# Patient Record
Sex: FEMALE | Race: WHITE | NOT HISPANIC OR LATINO | Employment: OTHER | URBAN - METROPOLITAN AREA
[De-identification: names, ages, dates, MRNs, and addresses within clinical notes are randomized per-mention and may not be internally consistent; named-entity substitution may affect disease eponyms.]

---

## 2017-01-10 ENCOUNTER — TELEPHONE (OUTPATIENT)
Dept: FAMILY MEDICINE | Facility: CLINIC | Age: 62
End: 2017-01-10

## 2017-01-10 NOTE — TELEPHONE ENCOUNTER
Panel Management Review      Patient has the following on her problem list:     Depression / Dysthymia review  PHQ-9 SCORE 2/6/2015 11/23/2015 9/23/2016   Total Score 10 - -   Total Score - 4 11      Patient is due for:  None      IVD   ASA: Failed    Last LDL:    CHOL      193   9/23/2016  HDL       37   9/23/2016  LDL       94   9/23/2016  LDL      144   11/23/2015  LDL      128   4/29/2009  TRIG      309   9/23/2016   CHOLHDLRATIO      6.0   4/18/2013     Is the patient on a Statin? YES   Is the patient on Aspirin? NO                  Medications     HMG CoA Reductase Inhibitors    atorvastatin (LIPITOR) 40 MG tablet          Last three blood pressure readings:  BP Readings from Last 3 Encounters:   10/05/16 159/95   09/23/16 128/64   03/23/16 160/98        Tobacco History:     History   Smoking status     Former Smoker -- 0.50 packs/day for 30 years     Quit date: 08/12/2011   Smokeless tobacco     Never Used     Comment: smoking 3-4 cigs per day         Hypertension   Last three blood pressure readings:  BP Readings from Last 3 Encounters:   10/05/16 159/95   09/23/16 128/64   03/23/16 160/98     Blood pressure: Failed    HTN Guidelines:  Age 18-59 BP range:  Less than 140/90  Age 60-85 with Diabetes:  Less than 140/90  Age 60-85 without Diabetes:  less than 150/90      Composite cancer screening  Chart review shows that this patient is due/due soon for the following None  Summary:    Patient is due/failing the following:   BP check    Action needed:   Patient needs nurse only appointment for BP check.    Type of outreach:    Sent MessageCast message.    Questions for provider review:    None                                                                                                                                    Cinda GRAVES       Chart routed to none .

## 2017-02-20 DIAGNOSIS — I10 HTN, GOAL BELOW 140/80: ICD-10-CM

## 2017-02-20 NOTE — TELEPHONE ENCOUNTER
Lisinopril-HCTZ      Last Written Prescription Date: 11-15-16  Last Fill Quantity: 90, # refills: 0  Last Office Visit with [facility], [facility] or [facility] prescribing provider: 9-23-16 with Dr. Davis       Potassium   Date Value Ref Range Status   10/14/2016 3.7 3.4 - 5.3 mmol/L Final     Creatinine   Date Value Ref Range Status   10/14/2016 0.77 0.52 - 1.04 mg/dL Final     BP Readings from Last 3 Encounters:   10/05/16 (!) 159/95   09/23/16 128/64   03/23/16 (!) 160/98     Fátima Bhatt [facility]

## 2017-02-21 RX ORDER — LISINOPRIL AND HYDROCHLOROTHIAZIDE 12.5; 2 MG/1; MG/1
1 TABLET ORAL DAILY
Qty: 90 TABLET | Refills: 0 | Status: SHIPPED | OUTPATIENT
Start: 2017-02-21 | End: 2017-05-10

## 2017-02-21 NOTE — TELEPHONE ENCOUNTER
Prescription approved per [facility] Protocol or patient Primary care provider (PCP)  RENETTA Bae RN/Ralf Tolbert

## 2017-02-22 DIAGNOSIS — E78.5 HYPERLIPIDEMIA LDL GOAL <100: ICD-10-CM

## 2017-02-22 DIAGNOSIS — F33.1 MAJOR DEPRESSIVE DISORDER, RECURRENT EPISODE, MODERATE (H): ICD-10-CM

## 2017-02-22 RX ORDER — SERTRALINE HYDROCHLORIDE 100 MG/1
200 TABLET, FILM COATED ORAL DAILY
Qty: 180 TABLET | Refills: 0 | Status: SHIPPED | OUTPATIENT
Start: 2017-02-22 | End: 2017-05-10

## 2017-02-22 RX ORDER — ATORVASTATIN CALCIUM 40 MG/1
40 TABLET, FILM COATED ORAL DAILY
Qty: 90 TABLET | Refills: 2 | Status: SHIPPED | OUTPATIENT
Start: 2017-02-22 | End: 2017-05-10

## 2017-02-22 NOTE — TELEPHONE ENCOUNTER
Prescription approved per [facility] Refill Protocol or patient Primary care provider (PCP)  RENETTA Bae  RN/Ralf Tolbert    phq9 sent through [facility]  RENETTA Bae  RN/Ralf Tolbert

## 2017-02-22 NOTE — TELEPHONE ENCOUNTER
sertraline      Last Written Prescription Date: 11/15/16  Last Fill Quantity: 180, # refills: 0  Last Office Visit with [facility] primary care provider:  9/23/16        Last PHQ-9 score on record=   PHQ-9 SCORE 9/23/2016   Total Score -   Total Score 11

## 2017-02-22 NOTE — TELEPHONE ENCOUNTER
atorvastatin      Last Written Prescription Date: 5/18/16  Last Fill Quantity: 90, # refills: 2  Last Office Visit with [facility], [facility] or [facility] prescribing provider: 9/23/16       Lab Results   Component Value Date    CHOL 193 09/23/2016     Lab Results   Component Value Date    HDL 37 09/23/2016     Lab Results   Component Value Date    LDL 94 09/23/2016     11/23/2015     04/29/2009     Lab Results   Component Value Date    TRIG 309 09/23/2016     Lab Results   Component Value Date    CHOLHDLRATIO 6.0 04/18/2013

## 2017-05-09 NOTE — PROGRESS NOTES
"  SUBJECTIVE:                                                    Karyn Gamez is a 61 year old female who presents to clinic today for the following health issues:      Hyperlipidemia Follow-Up  Atorvastatin 40mg qd    Rate your low fat/cholesterol diet?: not monitoring fat    Taking statin?  Yes, no muscle aches from statin    Other lipid medications/supplements?:  none     Hypertension Follow-up  Lisinopril-hydrochlorothiazide 20-12.5mg qd    Outpatient blood pressures are not being checked.    Low Salt Diet: no added salt     Cerebrovascular Follow-up      Patient history: cerebral embolism with cerebral infarction    Residual symptoms: None    Worsened or new symptoms since last visit: No    Daily aspirin use: No    Hypertension controlled: Yes      Depression Followup  Zoloft 200mg qd    Status since last visit: Stable    See PHQ-9 for current symptoms.  Other associated symptoms: None    Complicating factors:   Significant life event:  5th grandchild born 2 weeks ago, Tiffanie!   Current substance abuse:  None  Anxiety or Panic symptoms:  No    PHQ-9  English PHQ-9   Any Language            Amount of exercise or physical activity: 2-3 days/week for an average of 30-45 minutes    Problems taking medications regularly: No    Medication side effects: none    Diet: low salt          ROS:  Constitutional, HEENT, cardiovascular, pulmonary, gi and gu systems are negative, except as otherwise noted.    This document serves as a record of the services and decisions personally performed and made by Cristiana Davis MD. It was created on his behalf by Zaki Berger, a trained medical scribe. The creation of this document is based the provider's statements to the medical scribe.  Zaki Berger 1:26 PM May 10, 2017      OBJECTIVE:                                                    /66  Pulse 80  Temp 97.8  F (36.6  C) (Tympanic)  Ht 5' 5.5\" (1.664 m)  Wt 242 lb 2 oz (109.8 kg)  BMI 39.68 kg/m2  Body mass index is " 39.68 kg/(m^2).       GENERAL: healthy, alert and no distress  EYES: Eyes grossly normal to inspection, conjunctivae and sclerae normal  NECK: no adenopathy, no asymmetry, masses, or scars and thyroid normal to palpation  RESP: lungs clear to auscultation - no rales, rhonchi or wheezes  CV: regular rate and rhythm, normal S1 S2, no murmur  ABDOMEN: soft, nontender  MS: no gross musculoskeletal defects noted, no edema  NEURO: Normal strength and tone, mentation intact and speech normal  PSYCH: mentation appears normal, affect normal/bright         ASSESSMENT/PLAN:                                                      (E78.5) Hyperlipidemia LDL goal <100  Comment: Patient is tolerating high intensity statin therapy. Secondary prevention. Medication refilled.   Plan: atorvastatin (LIPITOR) 40 MG tablet            (F33.1) Major depressive disorder, recurrent episode, moderate (H)  Comment: Mood stable with SSRI therapy. Medication refilled.   Plan: sertraline (ZOLOFT) 100 MG tablet          (I10) HTN, goal below 140/80  Comment: BP within guidelines with use of ACE inhibitor and diuretic. Secondary prevention. Medication refilled.   Plan: lisinopril-hydrochlorothiazide         (PRINZIDE/ZESTORETIC) 20-12.5 MG per tablet      (E66.01) Morbid obesity, unspecified obesity type (H)  Comment: Body mass index is 39.68 kg/(m^2).   Plan: reviewed life style.     (I11.9,  I42.2) Hypertensive hypertrophic cardiomyopathy, without heart failure (H)  Comment: improved with better blood pressure control. No evidence of heart failure today. At some point, will repeat echocardiogram.       (I63.40) Cerebral infarction due to embolism of cerebral artery (H)  Comment: no reoccurrence of other neurologic symptoms.           Patient Instructions   *  Overall, you're doing well.     *  No change in medications.     *  Mammogram due this July. Call [phone].     *  Follow up appointment in October.     *  Call with any questions.     *   Check with your pharmacy about the shingles shot.         Patient will follow up in 6 months or sooner, PRN. Patient instructed to call with any questions or concerns.      The information in this document, created by a scribe for me, accurately reflects the services I personally performed and the decisions made by me. I have reviewed and approved this document for accuracy. 1:26 PM 5/10/2017    Cristiana Davis MD  [facility]

## 2017-05-10 ENCOUNTER — OFFICE VISIT (OUTPATIENT)
Dept: FAMILY MEDICINE | Facility: CLINIC | Age: 62
End: 2017-05-10
Payer: COMMERCIAL

## 2017-05-10 VITALS
TEMPERATURE: 97.8 F | SYSTOLIC BLOOD PRESSURE: 124 MMHG | HEIGHT: 66 IN | WEIGHT: 242.13 LBS | DIASTOLIC BLOOD PRESSURE: 66 MMHG | BODY MASS INDEX: 38.91 KG/M2 | HEART RATE: 80 BPM

## 2017-05-10 DIAGNOSIS — I11.9 HYPERTENSIVE HYPERTROPHIC CARDIOMYOPATHY, WITHOUT HEART FAILURE (H): ICD-10-CM

## 2017-05-10 DIAGNOSIS — I42.2 HYPERTENSIVE HYPERTROPHIC CARDIOMYOPATHY, WITHOUT HEART FAILURE (H): ICD-10-CM

## 2017-05-10 DIAGNOSIS — I63.40 CEREBRAL INFARCTION DUE TO EMBOLISM OF CEREBRAL ARTERY (H): ICD-10-CM

## 2017-05-10 DIAGNOSIS — E66.01 MORBID OBESITY, UNSPECIFIED OBESITY TYPE (H): ICD-10-CM

## 2017-05-10 DIAGNOSIS — F33.1 MAJOR DEPRESSIVE DISORDER, RECURRENT EPISODE, MODERATE (H): ICD-10-CM

## 2017-05-10 DIAGNOSIS — I10 HTN, GOAL BELOW 140/80: ICD-10-CM

## 2017-05-10 DIAGNOSIS — E78.5 HYPERLIPIDEMIA LDL GOAL <100: Primary | ICD-10-CM

## 2017-05-10 PROCEDURE — 99214 OFFICE O/P EST MOD 30 MIN: CPT | Performed by: FAMILY MEDICINE

## 2017-05-10 RX ORDER — ATORVASTATIN CALCIUM 40 MG/1
40 TABLET, FILM COATED ORAL DAILY
Qty: 90 TABLET | Refills: 1 | Status: SHIPPED | OUTPATIENT
Start: 2017-05-10 | End: 2018-02-19

## 2017-05-10 RX ORDER — SERTRALINE HYDROCHLORIDE 100 MG/1
200 TABLET, FILM COATED ORAL DAILY
Qty: 180 TABLET | Refills: 1 | Status: SHIPPED | OUTPATIENT
Start: 2017-05-10 | End: 2018-02-19

## 2017-05-10 RX ORDER — LISINOPRIL AND HYDROCHLOROTHIAZIDE 12.5; 2 MG/1; MG/1
1 TABLET ORAL DAILY
Qty: 90 TABLET | Refills: 3 | Status: SHIPPED | OUTPATIENT
Start: 2017-05-10 | End: 2018-02-19

## 2017-05-10 ASSESSMENT — ANXIETY QUESTIONNAIRES
5. BEING SO RESTLESS THAT IT IS HARD TO SIT STILL: NOT AT ALL
GAD7 TOTAL SCORE: 0
2. NOT BEING ABLE TO STOP OR CONTROL WORRYING: NOT AT ALL
6. BECOMING EASILY ANNOYED OR IRRITABLE: NOT AT ALL
7. FEELING AFRAID AS IF SOMETHING AWFUL MIGHT HAPPEN: NOT AT ALL
1. FEELING NERVOUS, ANXIOUS, OR ON EDGE: NOT AT ALL
3. WORRYING TOO MUCH ABOUT DIFFERENT THINGS: NOT AT ALL

## 2017-05-10 ASSESSMENT — PATIENT HEALTH QUESTIONNAIRE - PHQ9: 5. POOR APPETITE OR OVEREATING: NOT AT ALL

## 2017-05-10 NOTE — PATIENT INSTRUCTIONS
*  Overall, you're doing well.     *  No change in medications.     *  Mammogram due this July. Call [phone].     *  Follow up appointment in October.     *  Call with any questions.     *  Check with your pharmacy about the shingles shot.

## 2017-05-10 NOTE — MR AVS SNAPSHOT
After Visit Summary   5/10/2017    Karyn Gamez    MRN: [id number]           Patient Information     Date Of Birth          1955        Visit Information        Provider Department      5/10/2017 1:00 PM Cristiana Davis MD [facility]        Today's Diagnoses     Hyperlipidemia LDL goal <100        Major depressive disorder, recurrent episode, moderate (H)        HTN, goal below 140/80          Care Instructions    *  Overall, you're doing well.     *  No change in medications.     *  Mammogram due this July. Call [phone].     *  Follow up appointment in October.     *  Call with any questions.     *  Check with your pharmacy about the shingles shot.         Follow-ups after your visit        Who to contact     Normal or non-critical lab and imaging results will be communicated to you by [employer], letter or phone within 4 business days after the clinic has received the results. If you do not hear from us within 7 days, please contact the clinic through [employer] or phone. If you have a critical or abnormal lab result, we will notify you by phone as soon as possible.  Submit refill requests through [employer] or call your pharmacy and they will forward the refill request to us. Please allow 3 business days for your refill to be completed.          If you need to speak with a  for additional information , please call: [phone]           Additional Information About Your Visit        [employer] Information     [employer] gives you secure access to your electronic health record. If you see a primary care provider, you can also send messages to your care team and make appointments. If you have questions, please call your primary care clinic.  If you do not have a primary care provider, please call [phone] and they will assist you.        Care EveryWhere ID     This is your Care EveryWhere ID. This could be used by other organizations to access your [place]  "medical records  [id number]        Your Vitals Were     Pulse Temperature Height BMI (Body Mass Index)          80 97.8  F (36.6  C) (Tympanic) 5' 5.5\" (1.664 m) 39.68 kg/m2         Blood Pressure from Last 3 Encounters:   05/10/17 124/66   10/05/16 (!) 159/95   09/23/16 128/64    Weight from Last 3 Encounters:   05/10/17 242 lb 2 oz (109.8 kg)   10/05/16 243 lb 3.2 oz (110.3 kg)   09/23/16 243 lb 2 oz (110.3 kg)              Today, you had the following     No orders found for display         Today's Medication Changes          These changes are accurate as of: 5/10/17  1:37 PM.  If you have any questions, ask your nurse or doctor.               These medicines have changed or have updated prescriptions.        Dose/Directions    lisinopril-hydrochlorothiazide 20-12.5 MG per tablet   Commonly known as:  PRINZIDE/ZESTORETIC   This may have changed:  additional instructions   Used for:  HTN, goal below 140/80   Changed by:  Cristiana Davis MD        Dose:  1 tablet   Take 1 tablet by mouth daily   Quantity:  90 tablet   Refills:  3            Where to get your medicines      These medications were sent to [id number] - [place], [place] - [address] [place] [address] [address] [place] [address]    Hours:  24-hours Phone:  [phone]     atorvastatin 40 MG tablet    lisinopril-hydrochlorothiazide 20-12.5 MG per tablet    sertraline 100 MG tablet                Primary Care Provider Office Phone # Fax #    Cristiana Davis [phone]       [facility] [address] [place] [address]        Thank you!     Thank you for choosing [facility]  for your care. Our goal is always to provide you with excellent care. Hearing back from our patients is one way we can continue to improve our services. Please take a few minutes to complete the written survey that you may receive in the mail after your visit with us. Thank " you!             Your Updated Medication List - Protect others around you: Learn how to safely use, store and throw away your medicines at www.disposemymeds.org.          This list is accurate as of: 5/10/17  1:37 PM.  Always use your most recent med list.                   Brand Name Dispense Instructions for use    atorvastatin 40 MG tablet    LIPITOR    90 tablet    Take 1 tablet (40 mg) by mouth daily For cholesterol.       lisinopril-hydrochlorothiazide 20-12.5 MG per tablet    PRINZIDE/ZESTORETIC    90 tablet    Take 1 tablet by mouth daily       nitroglycerin 0.4 MG sublingual tablet    NITROSTAT     1 TAB EVERY 5 MIN AS NEEDED, UP TO 3 PER EPISODE       sertraline 100 MG tablet    ZOLOFT    180 tablet    Take 2 tablets (200 mg) by mouth daily

## 2017-05-10 NOTE — NURSING NOTE
"Chief Complaint   Patient presents with     Depression     Hypertension       Initial /66  Pulse 80  Temp 97.8  F (36.6  C) (Tympanic)  Ht 5' 5.5\" (1.664 m)  Wt 242 lb 2 oz (109.8 kg)  BMI 39.68 kg/m2 Estimated body mass index is 39.68 kg/(m^2) as calculated from the following:    Height as of this encounter: 5' 5.5\" (1.664 m).    Weight as of this encounter: 242 lb 2 oz (109.8 kg).  Medication Reconciliation: complete  "

## 2017-05-11 ASSESSMENT — PATIENT HEALTH QUESTIONNAIRE - PHQ9: SUM OF ALL RESPONSES TO PHQ QUESTIONS 1-9: 0

## 2017-05-11 ASSESSMENT — ANXIETY QUESTIONNAIRES: GAD7 TOTAL SCORE: 0

## 2017-05-20 DIAGNOSIS — F33.1 MAJOR DEPRESSIVE DISORDER, RECURRENT EPISODE, MODERATE (H): ICD-10-CM

## 2017-05-20 DIAGNOSIS — I10 HTN, GOAL BELOW 140/80: ICD-10-CM

## 2017-05-22 RX ORDER — SERTRALINE HYDROCHLORIDE 100 MG/1
TABLET, FILM COATED ORAL
Qty: 180 TABLET | Refills: 1 | Status: SHIPPED | OUTPATIENT
Start: 2017-05-22 | End: 2017-06-07

## 2017-05-22 RX ORDER — LISINOPRIL AND HYDROCHLOROTHIAZIDE 12.5; 2 MG/1; MG/1
TABLET ORAL
Qty: 90 TABLET | Refills: 3 | Status: SHIPPED | OUTPATIENT
Start: 2017-05-22 | End: 2017-06-07

## 2017-05-22 NOTE — TELEPHONE ENCOUNTER
Prescription approved per [facility] Refill Protocol or patient Primary care provider (PCP)  RENETTA Bae RN/Ralf Tolbert

## 2017-05-22 NOTE — TELEPHONE ENCOUNTER
Lisinopril/HCTZ  20/12.5mg      Last Written Prescription Date: 05/10/2017  #90 x 3  Last filled 02/21/82017  Last Office Visit with [facility], [facility] or [facility] prescribing provider: 05/10/2017  DARREN Davis       Potassium   Date Value Ref Range Status   10/14/2016 3.7 3.4 - 5.3 mmol/L Final     Creatinine   Date Value Ref Range Status   10/14/2016 0.77 0.52 - 1.04 mg/dL Final     BP Readings from Last 3 Encounters:   05/10/17 124/66   10/05/16 (!) 159/95   09/23/16 128/64     Sertraline 100mg     Last Written Prescription Date: 05/10/2017 #180 x 1  Last filled 02/22/2017  Last Office Last Written Prescription Date: 05/10/2017 #180 x 1  Last filled 02/22/2017Visit with [facility] primary care provider:  05/10/2017 DARREN Davis        Last PHQ-9 score on record=   PHQ-9 SCORE 5/10/2017   Total Score -   Total Score MyChart -   Total Score 0

## 2017-06-07 ENCOUNTER — OFFICE VISIT (OUTPATIENT)
Dept: FAMILY MEDICINE | Facility: CLINIC | Age: 62
End: 2017-06-07
Payer: COMMERCIAL

## 2017-06-07 VITALS
HEART RATE: 60 BPM | DIASTOLIC BLOOD PRESSURE: 80 MMHG | WEIGHT: 242.13 LBS | BODY MASS INDEX: 38.91 KG/M2 | TEMPERATURE: 98 F | SYSTOLIC BLOOD PRESSURE: 130 MMHG | HEIGHT: 66 IN
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
## 2017-06-07 DIAGNOSIS — S81.852A CAT BITE OF LEFT LOWER LEG, INITIAL ENCOUNTER: Primary | ICD-10-CM

## 2017-06-07 PROCEDURE — 99213 OFFICE O/P EST LOW 20 MIN: CPT | Performed by: FAMILY MEDICINE

## 2017-06-07 NOTE — PROGRESS NOTES
"  SUBJECTIVE:                                                    Karyn Gamez is a 61 year old female who presents to clinic today for the following health issues:      - Cat bite on inside of left calf, bite happened 3 days ago by patients cat. Cat is not UTD on shots but is indoor pet. Wound is red and swollen. There is some white drainage. She is using Peroxide at home to clean area.      ROS:  Constitutional, HEENT, cardiovascular, pulmonary, gi and gu systems are negative, except as otherwise noted.    This document serves as a record of the services and decisions personally performed and made by Cristiana Davis MD. It was created on his behalf by Zaki Berger, a trained medical scribe. The creation of this document is based the provider's statements to the medical scribe.  Zaki Berger 11:34 AM June 7, 2017  OBJECTIVE:                                                    /80  Pulse 60  Temp 98  F (36.7  C) (Tympanic)  Ht 5' 5.5\" (1.664 m)  Wt 242 lb 2 oz (109.8 kg)  BMI 39.68 kg/m2  Body mass index is 39.68 kg/(m^2).     GENERAL: healthy, alert and no distress  EYES: Eyes grossly normal to inspection, conjunctivae and sclerae normal  MS: no gross musculoskeletal defects noted, no edema  SKIN: 2 puncture wound, consistent with a cat bite. The superior wound have 2 mm erythema surrounding the wound without lymphangitis, the inferior puncture wound is clean and dry.  NEURO: Normal strength and tone, mentation intact and speech normal  PSYCH: mentation appears normal, affect normal/bright       ASSESSMENT/PLAN:                                                      (S81.852A,  W55.01XA) Cat bite of left lower leg, initial encounter  (primary encounter diagnosis)  Comment: Infected. Will treat with antibiotics. Reviewed side effects. Instructed to take it twice a day with food.   Plan: amoxicillin-clavulanate (AUGMENTIN) 875-125 MG         per tablet               There are no Patient Instructions on " file for this visit.       Patient will follow up if symptoms worsen or do not improve. Patient instructed to call with any questions or concerns.    The information in this document, created by a scribe for me, accurately reflects the services I personally performed and the decisions made by me. I have reviewed and approved this document for accuracy. 11:36 AM 6/7/2017      Cristiana Davis MD  [facility]

## 2017-06-07 NOTE — NURSING NOTE
"Chief Complaint   Patient presents with     Cat Bite       Initial /80  Pulse 60  Temp 98  F (36.7  C) (Tympanic)  Ht 5' 5.5\" (1.664 m)  Wt 242 lb 2 oz (109.8 kg)  BMI 39.68 kg/m2 Estimated body mass index is 39.68 kg/(m^2) as calculated from the following:    Height as of this encounter: 5' 5.5\" (1.664 m).    Weight as of this encounter: 242 lb 2 oz (109.8 kg).  Medication Reconciliation: complete  "

## 2017-06-07 NOTE — MR AVS SNAPSHOT
"              After Visit Summary   6/7/2017    Karyn Gamez    MRN: [id number]           Patient Information     Date Of Birth          1955        Visit Information        Provider Department      6/7/2017 11:20 AM Cristiana Davis MD [facility]        Today's Diagnoses     Cat bite of left lower leg, initial encounter    -  1       Follow-ups after your visit        Who to contact     Normal or non-critical lab and imaging results will be communicated to you by [employer], letter or phone within 4 business days after the clinic has received the results. If you do not hear from us within 7 days, please contact the clinic through [employer] or phone. If you have a critical or abnormal lab result, we will notify you by phone as soon as possible.  Submit refill requests through [employer] or call your pharmacy and they will forward the refill request to us. Please allow 3 business days for your refill to be completed.          If you need to speak with a  for additional information , please call: [phone]           Additional Information About Your Visit        [employer] Information     [employer] gives you secure access to your electronic health record. If you see a primary care provider, you can also send messages to your care team and make appointments. If you have questions, please call your primary care clinic.  If you do not have a primary care provider, please call [phone] and they will assist you.        Care EveryWhere ID     This is your Care EveryWhere ID. This could be used by other organizations to access your [place] medical records  [id number]        Your Vitals Were     Pulse Temperature Height BMI (Body Mass Index)          60 98  F (36.7  C) (Tympanic) 5' 5.5\" (1.664 m) 39.68 kg/m2         Blood Pressure from Last 3 Encounters:   06/07/17 130/80   05/10/17 124/66   10/05/16 (!) 159/95    Weight from Last 3 Encounters:   06/07/17 242 lb 2 oz (109.8 kg) "   05/10/17 242 lb 2 oz (109.8 kg)   10/05/16 243 lb 3.2 oz (110.3 kg)              Today, you had the following     No orders found for display         Today's Medication Changes          These changes are accurate as of: 6/7/17 11:59 PM.  If you have any questions, ask your nurse or doctor.               Start taking these medicines.        Dose/Directions    amoxicillin-clavulanate 875-125 MG per tablet   Commonly known as:  AUGMENTIN   Used for:  Cat bite of left lower leg, initial encounter   Started by:  Cristiana Davis MD        Dose:  1 tablet   Take 1 tablet by mouth 2 times daily   Quantity:  20 tablet   Refills:  0         These medicines have changed or have updated prescriptions.        Dose/Directions    lisinopril-hydrochlorothiazide 20-12.5 MG per tablet   Commonly known as:  PRINZIDE/ZESTORETIC   This may have changed:  Another medication with the same name was removed. Continue taking this medication, and follow the directions you see here.   Used for:  HTN, goal below 140/80   Changed by:  Cristiana Davis MD        Dose:  1 tablet   Take 1 tablet by mouth daily   Quantity:  90 tablet   Refills:  3       sertraline 100 MG tablet   Commonly known as:  ZOLOFT   This may have changed:  Another medication with the same name was removed. Continue taking this medication, and follow the directions you see here.   Used for:  Major depressive disorder, recurrent episode, moderate (H)   Changed by:  Cristiana Davis MD        Dose:  200 mg   Take 2 tablets (200 mg) by mouth daily   Quantity:  180 tablet   Refills:  1            Where to get your medicines      These medications were sent to [id number] - [place], [place] - [address] AT [place] [address] [address], [place] [address]    Hours:  24-hours Phone:  [phone]     amoxicillin-clavulanate 875-125 MG per tablet                Primary Care Provider Office Phone # Fax #    Cristiana Davis MD  [phone]       [facility] [address] [place] [address]        Thank you!     Thank you for choosing [facility]  for your care. Our goal is always to provide you with excellent care. Hearing back from our patients is one way we can continue to improve our services. Please take a few minutes to complete the written survey that you may receive in the mail after your visit with us. Thank you!             Your Updated Medication List - Protect others around you: Learn how to safely use, store and throw away your medicines at www.disposemymeds.org.          This list is accurate as of: 6/7/17 11:59 PM.  Always use your most recent med list.                   Brand Name Dispense Instructions for use    amoxicillin-clavulanate 875-125 MG per tablet    AUGMENTIN    20 tablet    Take 1 tablet by mouth 2 times daily       atorvastatin 40 MG tablet    LIPITOR    90 tablet    Take 1 tablet (40 mg) by mouth daily For cholesterol.       lisinopril-hydrochlorothiazide 20-12.5 MG per tablet    PRINZIDE/ZESTORETIC    90 tablet    Take 1 tablet by mouth daily       nitroglycerin 0.4 MG sublingual tablet    NITROSTAT     1 TAB EVERY 5 MIN AS NEEDED, UP TO 3 PER EPISODE       sertraline 100 MG tablet    ZOLOFT    180 tablet    Take 2 tablets (200 mg) by mouth daily

## 2018-01-17 ENCOUNTER — TELEPHONE (OUTPATIENT)
Dept: FAMILY MEDICINE | Facility: CLINIC | Age: 63
End: 2018-01-17

## 2018-01-17 DIAGNOSIS — R73.01 ELEVATED FASTING GLUCOSE: Primary | ICD-10-CM

## 2018-01-17 DIAGNOSIS — Z12.39 BREAST CANCER SCREENING: ICD-10-CM

## 2018-01-17 NOTE — TELEPHONE ENCOUNTER
Panel Management Review      Patient has the following on her problem list:     Depression / Dysthymia review    Measure:  Needs PHQ-9 score of 4 or less during index window.  Administer PHQ-9 and if score is 5 or more, send encounter to provider for next steps.    5 - 7 month window range:     PHQ-9 SCORE 9/23/2016 2/23/2017 5/10/2017   Total Score - - -   Total Score MyChart - 17 (Moderately severe depression) -   Total Score 11 - 0       If PHQ-9 recheck is 5 or more, route to provider for next steps.    Patient is due for:  PHQ9 and DAP    Hypertension   Last three blood pressure readings:  BP Readings from Last 3 Encounters:   06/07/17 130/80   05/10/17 124/66   10/05/16 (!) 159/95     Blood pressure: Passed    HTN Guidelines:  Age 18-59 BP range:  Less than 140/90  Age 60-85 with Diabetes:  Less than 140/90  Age 60-85 without Diabetes:  less than 150/90          Composite cancer screening  Chart review shows that this patient is due/due soon for the following Mammogram  Summary:    Patient is due/failing the following:   A1C, BP CHECK, DAP, PHQ9 and PHYSICAL    Action needed:   Patient needs office visit for physical with fasting labs. Needs referral for mammo .    Type of outreach:    Sent Fanzter message.    Questions for provider review:    None                                                                                                                                    Cinda Hood CMA([facility])       Chart routed to provider.

## 2018-01-17 NOTE — LETTER
My Depression Action Plan  Name: Karyn Gamez   Date of Birth 1955  Date: 1/18/2018    My doctor: Cristiana Davis   My clinic: [address] [address] [phone]          GREEN    ZONE   Good Control    What it looks like:     Things are going generally well. You have normal up s and down s. You may even feel depressed from time to time, but bad moods usually last less than a day.   What you need to do:  1. Continue to care for yourself (see self care plan)  2. Check your depression survival kit and update it as needed  3. Follow your physician s recommendations including any medication.  4. Do not stop taking medication unless you consult with your physician first.           YELLOW         ZONE Getting Worse    What it looks like:     Depression is starting to interfere with your life.     It may be hard to get out of bed; you may be starting to isolate yourself from others.    Symptoms of depression are starting to last most all day and this has happened for several days.     You may have suicidal thoughts but they are not constant.   What you need to do:     1. Call your care team, your response to treatment will improve if you keep your care team informed of your progress. Yellow periods are signs an adjustment may need to be made.     2. Continue your self-care, even if you have to fake it!    3. Talk to someone in your support network    4. Open up your depression survival kit           RED    ZONE Medical Alert - Get Help    What it looks like:     Depression is seriously interfering with your life.     You may experience these or other symptoms: You can t get out of bed most days, can t work or engage in other necessary activities, you have trouble taking care of basic hygiene, or basic responsibilities, thoughts of suicide or death that will not go away, self-injurious behavior.     What you need to do:  1. Call your care team and  request a same-day appointment. If they are not available (weekends or after hours) call your local crisis line, emergency room or 911.      Electronically signed by: Cinda Hood, January 18, 2018    Depression Self Care Plan / Survival Kit    Self-Care for Depression  Here s the deal. Your body and mind are really not as separate as most people think.  What you do and think affects how you feel and how you feel influences what you do and think. This means if you do things that people who feel good do, it will help you feel better.  Sometimes this is all it takes.  There is also a place for medication and therapy depending on how severe your depression is, so be sure to consult with your medical provider and/ or Behavioral Health Consultant if your symptoms are worsening or not improving.     In order to better manage my stress, I will:    Exercise  Get some form of exercise, every day. This will help reduce pain and release endorphins, the  feel good  chemicals in your brain. This is almost as good as taking antidepressants!  This is not the same as joining a gym and then never going! (they count on that by the way ) It can be as simple as just going for a walk or doing some gardening, anything that will get you moving.      Hygiene   Maintain good hygiene (Get out of bed in the morning, Make your bed, Brush your teeth, Take a shower, and Get dressed like you were going to work, even if you are unemployed).  If your clothes don't fit try to get ones that do.    Diet  I will strive to eat foods that are good for me, drink plenty of water, and avoid excessive sugar, caffeine, alcohol, and other mood-altering substances.  Some foods that are helpful in depression are: complex carbohydrates, B vitamins, flaxseed, fish or fish oil, fresh fruits and vegetables.    Psychotherapy  I agree to participate in Individual Therapy (if recommended).    Medication  If prescribed medications, I agree to take them.  Missing  doses can result in serious side effects.  I understand that drinking alcohol, or other illicit drug use, may cause potential side effects.  I will not stop my medication abruptly without first discussing it with my provider.    Staying Connected With Others  I will stay in touch with my friends, family members, and my primary care provider/team.    Use your imagination  Be creative.  We all have a creative side; it doesn t matter if it s oil painting, sand castles, or mud pies! This will also kick up the endorphins.    Witness Beauty  (AKA stop and smell the roses) Take a look outside, even in mid-winter. Notice colors, textures. Watch the squirrels and birds.     Service to others  Be of service to others.  There is always someone else in need.  By helping others we can  get out of ourselves  and remember the really important things.  This also provides opportunities for practicing all the other parts of the program.    Humor  Laugh and be silly!  Adjust your TV habits for less news and crime-drama and more comedy.    Control your stress  Try breathing deep, massage therapy, biofeedback, and meditation. Find time to relax each day.     My support system    Clinic Contact:  Phone number:    Contact 1:  Phone number:    Contact 2:  Phone number:    Tenriism/:  Phone number:    Therapist:  Phone number:    Local crisis center:    Phone number:    Other community support:  Phone number:

## 2018-01-18 ENCOUNTER — MYC MEDICAL ADVICE (OUTPATIENT)
Dept: FAMILY MEDICINE | Facility: CLINIC | Age: 63
End: 2018-01-18

## 2018-02-07 ASSESSMENT — PATIENT HEALTH QUESTIONNAIRE - PHQ9
10. IF YOU CHECKED OFF ANY PROBLEMS, HOW DIFFICULT HAVE THESE PROBLEMS MADE IT FOR YOU TO DO YOUR WORK, TAKE CARE OF THINGS AT HOME, OR GET ALONG WITH OTHER PEOPLE: NOT DIFFICULT AT ALL
SUM OF ALL RESPONSES TO PHQ QUESTIONS 1-9: 9
SUM OF ALL RESPONSES TO PHQ QUESTIONS 1-9: 9

## 2018-02-08 ASSESSMENT — PATIENT HEALTH QUESTIONNAIRE - PHQ9: SUM OF ALL RESPONSES TO PHQ QUESTIONS 1-9: 9

## 2018-02-15 ENCOUNTER — ALLIED HEALTH/NURSE VISIT (OUTPATIENT)
Dept: FAMILY MEDICINE | Facility: CLINIC | Age: 63
End: 2018-02-15

## 2018-02-15 ENCOUNTER — HOSPITAL ENCOUNTER (OUTPATIENT)
Dept: MAMMOGRAPHY | Facility: CLINIC | Age: 63
Discharge: HOME | End: 2018-02-15
Attending: FAMILY MEDICINE | Admitting: FAMILY MEDICINE
Payer: COMMERCIAL

## 2018-02-15 VITALS — SYSTOLIC BLOOD PRESSURE: 138 MMHG | DIASTOLIC BLOOD PRESSURE: 78 MMHG

## 2018-02-15 DIAGNOSIS — I42.2: ICD-10-CM

## 2018-02-15 DIAGNOSIS — Z12.39 BREAST CANCER SCREENING: ICD-10-CM

## 2018-02-15 DIAGNOSIS — I11.9: ICD-10-CM

## 2018-02-15 DIAGNOSIS — I10 HTN, GOAL BELOW 140/80: ICD-10-CM

## 2018-02-15 DIAGNOSIS — I10 HTN, GOAL BELOW 140/80: Primary | ICD-10-CM

## 2018-02-15 DIAGNOSIS — R73.01 ELEVATED FASTING GLUCOSE: ICD-10-CM

## 2018-02-15 DIAGNOSIS — E78.5 HYPERLIPIDEMIA LDL GOAL <100: ICD-10-CM

## 2018-02-15 LAB
ANION GAP SERPL CALCULATED.3IONS-SCNC: 4 MMOL/L (ref 3–14)
BUN SERPL-MCNC: 17 MG/DL (ref 7–30)
CALCIUM SERPL-MCNC: 9.4 MG/DL (ref 8.5–10.1)
CHLORIDE SERPL-SCNC: 105 MMOL/L (ref 94–109)
CHOLEST SERPL-MCNC: 208 MG/DL
CO2 SERPL-SCNC: 32 MMOL/L (ref 20–32)
CREAT SERPL-MCNC: 0.7 MG/DL (ref 0.52–1.04)
CREAT UR-MCNC: 278 MG/DL
GFR SERPL CREATININE-BSD FRML MDRD: 85 ML/MIN/1.7M2
GLUCOSE SERPL-MCNC: 132 MG/DL (ref 70–99)
HBA1C MFR BLD: 6.1 % (ref 4.3–6)
HDLC SERPL-MCNC: 42 MG/DL
LDLC SERPL CALC-MCNC: 123 MG/DL
MICROALBUMIN UR-MCNC: 143 MG/L
MICROALBUMIN/CREAT UR: 51.44 MG/G CR (ref 0–25)
NONHDLC SERPL-MCNC: 166 MG/DL
POTASSIUM SERPL-SCNC: 4.2 MMOL/L (ref 3.4–5.3)
SODIUM SERPL-SCNC: 141 MMOL/L (ref 133–144)
TRIGL SERPL-MCNC: 213 MG/DL

## 2018-02-15 PROCEDURE — 82043 UR ALBUMIN QUANTITATIVE: CPT | Performed by: FAMILY MEDICINE

## 2018-02-15 PROCEDURE — 36415 COLL VENOUS BLD VENIPUNCTURE: CPT | Performed by: FAMILY MEDICINE

## 2018-02-15 PROCEDURE — 77067 SCR MAMMO BI INCL CAD: CPT

## 2018-02-15 PROCEDURE — 83036 HEMOGLOBIN GLYCOSYLATED A1C: CPT | Performed by: FAMILY MEDICINE

## 2018-02-15 PROCEDURE — 80061 LIPID PANEL: CPT | Performed by: FAMILY MEDICINE

## 2018-02-15 PROCEDURE — 80048 BASIC METABOLIC PNL TOTAL CA: CPT | Performed by: FAMILY MEDICINE

## 2018-02-15 PROCEDURE — 99207 ZZC NO CHARGE NURSE ONLY: CPT | Performed by: FAMILY MEDICINE

## 2018-02-15 NOTE — PROGRESS NOTES
Karyn Gamez is enrolled/participating in the retail pharmacy Blood Pressure Goals Achievement Program (BPGAP).  Karyn Gamez was evaluated at [facility] on February 15, 2018 at which time her blood pressure was:    BP Readings from Last 3 Encounters:   02/15/18 138/78   06/07/17 130/80   05/10/17 124/66     Reviewed lifestyle modifications for blood pressure control and reduction: including making healthy food choices, managing weight, getting regular exercise, smoking cessation, reducing alcohol consumption, monitoring blood pressure regularly.     Karyn Gamez is not experiencing symptoms.    Follow-Up: BP is at goal of < 140/90mmHg (patient 18+ years of age with or without diabetes).  Recommended follow-up at pharmacy in 6 months.     Recommendation to Provider: none    Karyn Gamez was evaluated for enrollment into the PGEN study today.    Patient eligible for enrollment:  Unknown  Patient interested in enrollment:  Unknown    Completed by:   Thank you,  Josefina Dominguez, PharmD, [facility] Pharmacist  [facility]

## 2018-02-15 NOTE — MR AVS SNAPSHOT
"              After Visit Summary   2/15/2018    Karyn Gamez    MRN: [id number]           Patient Information     Date Of Birth          1955        Visit Information        Provider Department      2/15/2018 10:43 AM Cristiana Davis MD [facility]        Today's Diagnoses     HTN, goal below 140/80    -  1       Follow-ups after your visit        Your next 10 appointments already scheduled     Feb 15, 2018  3:30 PM CST   (Arrive by 3:15 PM)   MA SCREENING DIGITAL BILATERAL with [id number]   [facility] ([facility])    [address]  [facility] [address]   [phone]           Do not use any powder, lotion or deodorant under your arms or on your breast. If you do, we will ask you to remove it before your exam.  Wear comfortable, two-piece clothing.  If you have any allergies, tell your care team.  Bring any previous mammograms from other facilities or have them mailed to the breast center. Three-dimensional (3D) mammograms are available at [place] locations in [facility], [facility], [place], [place], and [place]. Health locations include [place] [facility] in [place]. Benefits of 3D mammograms include: - Improved rate of cancer detection - Decreases your chance of having to go back for more tests, which means fewer: - \"False-positive\" results (This means that there is an abnormal area but it isn't cancer.) - Invasive testing procedures, such as a biopsy or surgery - Can provide clearer images of the breast if you have dense breast tissue. 3D mammography is an optional exam that anyone can have with a 2D mammogram. It doesn't replace or take the place of a 2D mammogram. 2D mammograms remain an effective screening test for all women.  Not all insurance companies cover the cost of a 3D mammogram. Check with your insurance.              Who to contact     Normal or non-critical lab and imaging " results will be communicated to you by MyChart, letter or phone within 4 business days after the clinic has received the results. If you do not hear from us within 7 days, please contact the clinic through Neurosearchhart or phone. If you have a critical or abnormal lab result, we will notify you by phone as soon as possible.  Submit refill requests through Tangled or call your pharmacy and they will forward the refill request to us. Please allow 3 business days for your refill to be completed.          If you need to speak with a  for additional information , please call: [phone]           Additional Information About Your Visit        Tangled Information     Tangled gives you secure access to your electronic health record. If you see a primary care provider, you can also send messages to your care team and make appointments. If you have questions, please call your primary care clinic.  If you do not have a primary care provider, please call [phone] and they will assist you.        Care EveryWhere ID     This is your Care EveryWhere ID. This could be used by other organizations to access your [place] medical records  [id number]         Blood Pressure from Last 3 Encounters:   02/15/18 138/78   06/07/17 130/80   05/10/17 124/66    Weight from Last 3 Encounters:   06/07/17 242 lb 2 oz (109.8 kg)   05/10/17 242 lb 2 oz (109.8 kg)   10/05/16 243 lb 3.2 oz (110.3 kg)              Today, you had the following     No orders found for display       Primary Care Provider Office Phone # Fax #    Cristiana Armani Davis [phone] [address] [place] [facility] [address]        Equal Access to Services     [facility] AH: Hadii aad nicole hadasho Sojasonali, waaxda luqadaha, qaybta kaalmada presley, viola franklin. So [facility] [phone].    ATENCIÓN: Si habla español, tiene a francisco disposición servicios gratuitos de asistencia lingüística. Llame al [phone].    We  comply with applicable federal civil rights laws and [place] laws. We do not discriminate on the basis of race, color, national origin, age, disability, sex, sexual orientation, or gender identity.            Thank you!     Thank you for choosing [facility]  for your care. Our goal is always to provide you with excellent care. Hearing back from our patients is one way we can continue to improve our services. Please take a few minutes to complete the written survey that you may receive in the mail after your visit with us. Thank you!             Your Updated Medication List - Protect others around you: Learn how to safely use, store and throw away your medicines at www.disposemymeds.org.          This list is accurate as of 2/15/18 10:46 AM.  Always use your most recent med list.                   Brand Name Dispense Instructions for use Diagnosis    amoxicillin-clavulanate 875-125 MG per tablet    AUGMENTIN    20 tablet    Take 1 tablet by mouth 2 times daily    Cat bite of left lower leg, initial encounter       atorvastatin 40 MG tablet    LIPITOR    90 tablet    Take 1 tablet (40 mg) by mouth daily For cholesterol.    Hyperlipidemia LDL goal <100       lisinopril-hydrochlorothiazide 20-12.5 MG per tablet    PRINZIDE/ZESTORETIC    90 tablet    Take 1 tablet by mouth daily    HTN, goal below 140/80       nitroGLYcerin 0.4 MG sublingual tablet    NITROSTAT     1 TAB EVERY 5 MIN AS NEEDED, UP TO 3 PER EPISODE        sertraline 100 MG tablet    ZOLOFT    180 tablet    Take 2 tablets (200 mg) by mouth daily    Major depressive disorder, recurrent episode, moderate (H)

## 2018-05-24 ENCOUNTER — OFFICE VISIT (OUTPATIENT)
Dept: FAMILY MEDICINE | Facility: CLINIC | Age: 63
End: 2018-05-24
Payer: COMMERCIAL

## 2018-05-24 VITALS
DIASTOLIC BLOOD PRESSURE: 70 MMHG | HEIGHT: 65 IN | SYSTOLIC BLOOD PRESSURE: 132 MMHG | HEART RATE: 68 BPM | WEIGHT: 250.4 LBS | TEMPERATURE: 96.9 F | BODY MASS INDEX: 41.72 KG/M2 | RESPIRATION RATE: 14 BRPM

## 2018-05-24 DIAGNOSIS — R73.03 PRE-DIABETES: Primary | ICD-10-CM

## 2018-05-24 DIAGNOSIS — I10 HTN, GOAL BELOW 140/80: ICD-10-CM

## 2018-05-24 DIAGNOSIS — E78.5 HYPERLIPIDEMIA LDL GOAL <100: ICD-10-CM

## 2018-05-24 DIAGNOSIS — F33.1 MAJOR DEPRESSIVE DISORDER, RECURRENT EPISODE, MODERATE (H): ICD-10-CM

## 2018-05-24 LAB — HBA1C MFR BLD: 6.2 % (ref 0–5.6)

## 2018-05-24 PROCEDURE — 83036 HEMOGLOBIN GLYCOSYLATED A1C: CPT | Performed by: NURSE PRACTITIONER

## 2018-05-24 PROCEDURE — 36415 COLL VENOUS BLD VENIPUNCTURE: CPT | Performed by: NURSE PRACTITIONER

## 2018-05-24 PROCEDURE — 99214 OFFICE O/P EST MOD 30 MIN: CPT | Performed by: NURSE PRACTITIONER

## 2018-05-24 RX ORDER — LISINOPRIL AND HYDROCHLOROTHIAZIDE 12.5; 2 MG/1; MG/1
1 TABLET ORAL DAILY
Qty: 90 TABLET | Refills: 1 | Status: SHIPPED | OUTPATIENT
Start: 2018-05-24 | End: 2019-01-08

## 2018-05-24 RX ORDER — ATORVASTATIN CALCIUM 40 MG/1
40 TABLET, FILM COATED ORAL DAILY
Qty: 90 TABLET | Refills: 1 | Status: SHIPPED | OUTPATIENT
Start: 2018-05-24 | End: 2019-02-23

## 2018-05-24 RX ORDER — SERTRALINE HYDROCHLORIDE 100 MG/1
200 TABLET, FILM COATED ORAL DAILY
Qty: 180 TABLET | Refills: 1 | Status: SHIPPED | OUTPATIENT
Start: 2018-05-24 | End: 2019-02-23

## 2018-05-24 ASSESSMENT — ANXIETY QUESTIONNAIRES
6. BECOMING EASILY ANNOYED OR IRRITABLE: SEVERAL DAYS
5. BEING SO RESTLESS THAT IT IS HARD TO SIT STILL: SEVERAL DAYS
GAD7 TOTAL SCORE: 7
7. FEELING AFRAID AS IF SOMETHING AWFUL MIGHT HAPPEN: SEVERAL DAYS
2. NOT BEING ABLE TO STOP OR CONTROL WORRYING: SEVERAL DAYS
3. WORRYING TOO MUCH ABOUT DIFFERENT THINGS: SEVERAL DAYS
1. FEELING NERVOUS, ANXIOUS, OR ON EDGE: SEVERAL DAYS

## 2018-05-24 ASSESSMENT — ENCOUNTER SYMPTOMS
PALPITATIONS: 0
ARTHRALGIAS: 0
CHEST TIGHTNESS: 0
DIZZINESS: 0
VOMITING: 0
COUGH: 0
ABDOMINAL DISTENTION: 0
NAUSEA: 0
RHINORRHEA: 0
LIGHT-HEADEDNESS: 0
WHEEZING: 0
ABDOMINAL PAIN: 0
DIARRHEA: 0
HEADACHES: 0
NUMBNESS: 0
SHORTNESS OF BREATH: 0
CONSTIPATION: 0
MYALGIAS: 0
FATIGUE: 0
SORE THROAT: 0

## 2018-05-24 ASSESSMENT — PATIENT HEALTH QUESTIONNAIRE - PHQ9: 5. POOR APPETITE OR OVEREATING: SEVERAL DAYS

## 2018-05-24 ASSESSMENT — PAIN SCALES - GENERAL: PAINLEVEL: NO PAIN (0)

## 2018-05-24 NOTE — MR AVS SNAPSHOT
After Visit Summary   5/24/2018    Karyn Gamez    MRN: [id number]           Patient Information     Date Of Birth          1955        Visit Information        Provider Department      5/24/2018 10:40 AM Autumn Smallwood APRN CNP [facility]        Today's Diagnoses     Pre-diabetes    -  1    Hyperlipidemia LDL goal <100        HTN, goal below 140/80        Major depressive disorder, recurrent episode, moderate (H)           Follow-ups after your visit        Additional Services     DIABETES EDUCATOR REFERRAL       DIABETES SELF MANAGEMENT TRAINING (DSMT)      Your provider has referred you to Diabetes Education: FMG: Diabetes Education - All [facility] [phone]   [email or web address]     If an urgent visit is needed or A1C is above 12, Care Team to call the Diabetes  Education Team at [phone] or send an In Basket message to the Diabetes Education Pool (P DIAB ED-PATIENT CARE).    A  will call you to make your appointment. If it has been more than 3 business days since your referral was placed, please call the above phone number to schedule.    Type of training and number of hours: New Diagnosis: Initial group DSMT - 10 hours.      Diabetes Type: Pre-Diabetes - Patient to call [phone] for Pre-Diabetes Class        Diabetes Education Topics: Comprehensive Knowledge Assessment and Instruction and Blood glucose meter instruction     Special Educational Needs Requiring Individual DSMT: None      Please be aware that coverage of these services is subject to the terms and limitations of your health insurance plan.  Call member services at your health plan to determine Diabetes Self-Management Training (Codes  and ) and Medical Nutrition Therapy (Codes 71775 and 72095) benefits and ask which blood glucose monitor brands are covered by your plan.  Please bring the following with you to your  "appointment:    (1)  List of current medications   (2)  List of Blood Glucose Monitor brands that are covered by your insurance plan  (3)  Blood Glucose Monitor and log book  (4)   Food records for the 3 days prior to your visit    The Certified Diabetes Educator may make diabetes medication adjustments per the CDE Protocol and Collaborative Practice Agreement.                  Who to contact     Normal or non-critical lab and imaging results will be communicated to you by Somahart, letter or phone within 4 business days after the clinic has received the results. If you do not hear from us within 7 days, please contact the clinic through GFI Softwaret or phone. If you have a critical or abnormal lab result, we will notify you by phone as soon as possible.  Submit refill requests through QVOD Technology or call your pharmacy and they will forward the refill request to us. Please allow 3 business days for your refill to be completed.          If you need to speak with a  for additional information , please call: [phone]           Additional Information About Your Visit        QVOD Technology Information     Q[employer] Technology gives you secure access to your electronic health record. If you see a primary care provider, you can also send messages to your care team and make appointments. If you have questions, please call your primary care clinic.  If you do not have a primary care provider, please call [phone] and they will assist you.        Care EveryWhere ID     This is your Care EveryWhere ID. This could be used by other organizations to access your [place] medical records  [id number]        Your Vitals Were     Pulse Temperature Respirations Height BMI (Body Mass Index)       68 96.9  F (36.1  C) (Tympanic) 14 5' 5.25\" (1.657 m) 41.35 kg/m2        Blood Pressure from Last 3 Encounters:   05/24/18 132/70   02/15/18 138/78   06/07/17 130/80    Weight from Last 3 Encounters:   05/24/18 250 lb 6.4 oz (113.6 kg)   06/07/17 " 242 lb 2 oz (109.8 kg)   05/10/17 242 lb 2 oz (109.8 kg)              We Performed the Following     DIABETES EDUCATOR REFERRAL     Hemoglobin A1c          Today's Medication Changes          These changes are accurate as of 5/24/18 11:07 AM.  If you have any questions, ask your nurse or doctor.               Stop taking these medicines if you haven't already. Please contact your care team if you have questions.     amoxicillin-clavulanate 875-125 MG per tablet   Commonly known as:  AUGMENTIN   Stopped by:  Autumn Smallwood APRN CNP                Where to get your medicines      These medications were sent to [id number] - [place], [place] - [address]  [address], [place] [address]     Phone:  [phone]     atorvastatin 40 MG tablet    lisinopril-hydrochlorothiazide 20-12.5 MG per tablet    sertraline 100 MG tablet                Primary Care Provider Office Phone # Fax #    Cristiana Davis [phone] [address] [place] [address]        Equal Access to Services     [facility] AH: Hadii aad ku hadasho Soomaali, waaxda luqadaha, qaybta kaalmada adeegyada, viola valenzuela . So [facility] [phone].    ATENCIÓN: Si habla español, tiene a francisco disposición servicios gratuitos de asistencia lingüística. [facility] [phone].    We comply with applicable federal civil rights laws and [place] laws. We do not discriminate on the basis of race, color, national origin, age, disability, sex, sexual orientation, or gender identity.            Thank you!     [facility]  for your care. Our goal is always to provide you with excellent care. Hearing back from our patients is one way we can continue to improve our services. Please take a few minutes to complete the written survey that you may receive in the mail after your visit with us. Thank you!              Your Updated Medication List - Protect others around you: Learn how to safely use, store and throw away your medicines at www.disposemymeds.org.          This list is accurate as of 5/24/18 11:07 AM.  Always use your most recent med list.                   Brand Name Dispense Instructions for use Diagnosis    atorvastatin 40 MG tablet    LIPITOR    90 tablet    Take 1 tablet (40 mg) by mouth daily For cholesterol.    Hyperlipidemia LDL goal <100       lisinopril-hydrochlorothiazide 20-12.5 MG per tablet    PRINZIDE/ZESTORETIC    90 tablet    Take 1 tablet by mouth daily    HTN, goal below 140/80       nitroGLYcerin 0.4 MG sublingual tablet    NITROSTAT     1 TAB EVERY 5 MIN AS NEEDED, UP TO 3 PER EPISODE        sertraline 100 MG tablet    ZOLOFT    180 tablet    Take 2 tablets (200 mg) by mouth daily    Major depressive disorder, recurrent episode, moderate (H)

## 2018-05-24 NOTE — PROGRESS NOTES
SUBJECTIVE:   Karyn Gamez is a 62 year old female who presents to clinic today for the following health issues:      Hyperlipidemia Follow-Up      Rate your low fat/cholesterol diet?: not monitoring fat    Taking statin?  Yes, no muscle aches from statin    Other lipid medications/supplements?:  none    Hypertension Follow-up      Outpatient blood pressures are not being checked.    Low Salt Diet: no added salt    Depression Followup    Status since last visit: Stable     See PHQ-9 for current symptoms.  Other associated symptoms: None    Complicating factors:   Significant life event:  No   Current substance abuse:  None  Anxiety or Panic symptoms:  No    PHQ-9 5/10/2017 2/7/2018 5/24/2018   Total Score 0 9 8   Q9: Suicide Ideation Not at all Not at all Not at all     PHQ-9  English  PHQ-9   Any Language  Suicide Assessment Five-step Evaluation and Treatment (SAFE-T)      Amount of exercise or physical activity: 2-3 days/week for an average of 15-30 minutes    Problems taking medications regularly: No    Medication side effects: none    Diet: regular (no restrictions)        Problem list and histories reviewed & adjusted, as indicated.  Additional history: as documented    Current Outpatient Prescriptions   Medication Sig Dispense Refill     atorvastatin (LIPITOR) 40 MG tablet Take 1 tablet (40 mg) by mouth daily For cholesterol. 90 tablet 1     lisinopril-hydrochlorothiazide (PRINZIDE/ZESTORETIC) 20-12.5 MG per tablet Take 1 tablet by mouth daily 90 tablet 1     NITROGLYCERIN 0.4 MG SL SUBL 1 TAB EVERY 5 MIN AS NEEDED, UP TO 3 PER EPISODE       sertraline (ZOLOFT) 100 MG tablet Take 2 tablets (200 mg) by mouth daily 180 tablet 1     [DISCONTINUED] atorvastatin (LIPITOR) 40 MG tablet Take 1 tablet (40 mg) by mouth daily For cholesterol. 90 tablet 0     [DISCONTINUED] lisinopril-hydrochlorothiazide (PRINZIDE/ZESTORETIC) 20-12.5 MG per tablet Take 1 tablet by mouth daily 90 tablet 0     [DISCONTINUED] sertraline  "(ZOLOFT) 100 MG tablet Take 2 tablets (200 mg) by mouth daily 180 tablet 0     No Known Allergies    Reviewed and updated as needed this visit by clinical staff  Tobacco  Allergies  Meds  Med Hx  Surg Hx  Fam Hx  Soc Hx      Reviewed and updated as needed this visit by Provider          Needs to have refills of meds. Would like to start checking blood sugars.  Knows needs to be better about diet.  Really likes ice cream and eats it nearly daily.  Is agreeable to meeting with a diabetic nurse educator and getting a glucometer.  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    Taking all meds and is tolerating them ok. No real side effects that is aware of.  Does not ever forget to take medications.  Does not check blood pressure out in the community.    Had mammogram already this year.  Has had hysterectomy in the past for noncancerous reasons.  Is due for colonoscopy in 2019.    ROS:  Review of Systems   Constitutional: Negative for fatigue.   HENT: Negative for ear pain, rhinorrhea and sore throat.    Eyes: Negative for visual disturbance.   Respiratory: Negative for cough, chest tightness, shortness of breath and wheezing.    Cardiovascular: Negative for chest pain, palpitations and leg swelling.   Gastrointestinal: Negative for abdominal distention, abdominal pain, constipation, diarrhea, nausea and vomiting.   Endocrine: Negative for cold intolerance and heat intolerance.   Musculoskeletal: Negative for arthralgias and myalgias.   Skin: Negative for rash.   Neurological: Negative for dizziness, light-headedness, numbness and headaches.         OBJECTIVE:     /70 (BP Location: Left arm, Patient Position: Sitting, Cuff Size: Adult Large)  Pulse 68  Temp 96.9  F (36.1  C) (Tympanic)  Resp 14  Ht 5' 5.25\" (1.657 m)  Wt 250 lb 6.4 oz (113.6 kg)  BMI 41.35 kg/m2  Body mass index is 41.35 kg/(m^2).  Physical Exam   Constitutional: She appears well-developed and well-nourished.   HENT:   Head: " Normocephalic and atraumatic.   Right Ear: Tympanic membrane and external ear normal. No middle ear effusion.   Left Ear: Tympanic membrane and external ear normal.  No middle ear effusion.   Nose: No mucosal edema.   Mouth/Throat: Oropharynx is clear and moist and mucous membranes are normal.   Neck: Carotid bruit is not present. No thyromegaly present.   Cardiovascular: Normal rate, regular rhythm and normal heart sounds.    Pulmonary/Chest: Effort normal and breath sounds normal.   Abdominal: Soft. Normal appearance and bowel sounds are normal.   Neurological: She is alert.   Skin: Skin is warm and dry.   Psychiatric: She has a normal mood and affect. Her behavior is normal.       ASSESSMENT/PLAN:   1. Pre-diabetes  We will plan to refer to diabetic nurse educator  Plan to recheck A1c today.  - DIABETES EDUCATOR REFERRAL  - Hemoglobin A1c    2. Hyperlipidemia LDL goal <100  Risks and potential complications of hyperlipemia were reviewed with the patient  The patient understands that her hyperlipidemia is under fair control  Lifestyle modification was encouraged   We reviewed the importance of medication compliance regular follow up  Encouraged to call or return:  With any chest pain or discomfort  Any muscle or joint aches  Any shortness of breath or swelling to legs  Any new or unexplained symptoms   Plan to follow up in 6 months   - atorvastatin (LIPITOR) 40 MG tablet; Take 1 tablet (40 mg) by mouth daily For cholesterol.  Dispense: 90 tablet; Refill: 1    3. HTN, goal below 140/80  Risk and potential complications of hypertension were reviewed with the patient  The patient understands that their hypertension in under good control currently  We reviewed the importance of medication compliance and regular follow-up  Lifestyle modification was encouraged  Encouraged to call or return:  With any chest pain or discomfort  Any shortness of breath or swelling of ankles   Headaches not relieved with over the counter  meds  Any new or unexplained symptoms   Plan to follow up in 6 months  - lisinopril-hydrochlorothiazide (PRINZIDE/ZESTORETIC) 20-12.5 MG per tablet; Take 1 tablet by mouth daily  Dispense: 90 tablet; Refill: 1    4. Major depressive disorder, recurrent episode, moderate (H)  Doing well on current, will refill for the next 6 months.   - sertraline (ZOLOFT) 100 MG tablet; Take 2 tablets (200 mg) by mouth daily  Dispense: 180 tablet; Refill: 1        [facility]

## 2018-05-25 ASSESSMENT — PATIENT HEALTH QUESTIONNAIRE - PHQ9: SUM OF ALL RESPONSES TO PHQ QUESTIONS 1-9: 8

## 2018-05-25 ASSESSMENT — ANXIETY QUESTIONNAIRES: GAD7 TOTAL SCORE: 7

## 2018-08-25 ENCOUNTER — OFFICE VISIT (OUTPATIENT)
Dept: URGENT CARE | Facility: URGENT CARE | Age: 63
End: 2018-08-25
Payer: COMMERCIAL

## 2018-08-25 VITALS
DIASTOLIC BLOOD PRESSURE: 90 MMHG | WEIGHT: 244 LBS | HEART RATE: 75 BPM | OXYGEN SATURATION: 100 % | HEIGHT: 64 IN | TEMPERATURE: 98.1 F | BODY MASS INDEX: 41.66 KG/M2 | SYSTOLIC BLOOD PRESSURE: 176 MMHG

## 2018-08-25 DIAGNOSIS — M54.2 CERVICALGIA: Primary | ICD-10-CM

## 2018-08-25 PROCEDURE — 99213 OFFICE O/P EST LOW 20 MIN: CPT | Performed by: PHYSICIAN ASSISTANT

## 2018-08-25 RX ORDER — CYCLOBENZAPRINE HCL 10 MG
10 TABLET ORAL 3 TIMES DAILY PRN
Qty: 30 TABLET | Refills: 1 | Status: SHIPPED | OUTPATIENT
Start: 2018-08-25 | End: 2018-09-04

## 2018-08-25 NOTE — MR AVS SNAPSHOT
After Visit Summary   8/25/2018    Karyn Gamez    MRN: [id number]           Patient Information     Date Of Birth          1955        Visit Information        Provider Department      8/25/2018 9:40 AM Timo Patel PA-C [facility]        Today's Diagnoses     Cervicalgia    -  1    Morbid obesity (H)          Care Instructions    Ibuprofen 600-800 mg (3-4 pills) three times a day for 2-3 days   Muscle relaxer up to 3x a day, but as little as 5mg (1/2 pill) at bedtime is OK!    Follow up if worsening or no improvement by Wednesday    Back Care Tips    Caring for your back  These are things you can do to prevent a recurrence of acute back pain and to reduce symptoms from chronic back pain:    Maintain a healthy weight. If you are overweight, losing weight will help most types of back pain.    Exercise is an important part of recovery from most types of back pain. The muscles behind and in front of the spine support the back. This means strengthening both the back muscles and the abdominal muscles will provide better support for your spine.     Swimming and brisk walking are good overall exercises to improve your fitness level.    Practice safe lifting methods (below).    Practice good posture when sitting, standing and walking. Avoid prolonged sitting. This puts more stress on the lower back than standing or walking.    Wear quality shoes with sufficient arch support. Foot and ankle alignment can affect back symptoms. Women should avoid wearing high heels.    Therapeutic massage can help relax the back muscles without stretching them.    During the first 24 to 72 hours after an acute injury or flare-up of chronic back pain, apply an ice pack to the painful area for 20 minutes and then remove it for 20 minutes, over a period of 60 to 90 minutes, or several times a day. As a safety precaution, do not use a heating pad at bedtime. Sleeping on a heating pad  can lead to skin burns or tissue damage.    You can alternate ice and heat therapies.  Medicines  Talk to your healthcare provider before using medicines, especially if you have other medical problems or are taking other medicines.    You may use acetaminophen or ibuprofen to control pain, unless your healthcare provider prescribed other pain medicine. If you have chronic conditions like diabetes, liver or kidney disease, stomach ulcers, or gastrointestinal bleeding, or are taking blood thinners, talk with your healthcare provider before taking any medicines.    Be careful if you are given prescription pain medicines, narcotics, or medicine for muscle spasm. They can cause drowsiness, affect your coordination, reflexes, and judgment. Do not drive or operate heavy machinery while taking these types of medicines. Take prescription pain medicine only as prescribed by your healthcare provider.  Lumbar stretch  Here is a simple stretching exercise that will help relax muscle spasm and keep your back more limber. If exercise makes your back pain worse, don t do it.    Lie on your back with your knees bent and both feet on the ground.    Slowly raise your left knee to your chest as you flatten your lower back against the floor. Hold for 5 seconds.    Relax and repeat the exercise with your right knee.    Do 10 of these exercises for each leg.  Safe lifting method    Don t bend over at the waist to lift an object off the floor.  Instead, bend your knees and hips in a squat.     Keep your back and head upright    Hold the object close to your body, directly in front of you.    Straighten your legs to lift the object.     Lower the object to the floor in the reverse fashion.    If you must slide something across the floor, push it.  Posture tips  Sitting  Sit in chairs with straight backs or low-back support. Keep your knees lower than your hips, with your feet flat on the floor.  When driving, sit up straight. Adjust the  seat forward so you are not leaning toward the steering wheel.  A small pillow or rolled towel behind your lower back may help if you are driving long distances.   Standing  When standing for long periods, shift most of your weight to one leg at a time. Alternate legs every few minutes.   Sleeping  The best way to sleep is on your side with your knees bent. Put a low pillow under your head to support your neck in a neutral spine position. Avoid thick pillows that bend your neck to one side. Put a pillow between your legs to further relax your lower back. If you sleep on your back, put pillows under your knees to support your legs in a slightly flexed position. Use a firm mattress. If your mattress sags, replace it, or use a 1/2-inch plywood board under the mattress to add support.  Follow-up care  Follow up with your healthcare provider, or as advised.  If X-rays, a CT scan or an MRI scan were taken, they will be reviewed by a radiologist. You will be notified of any new findings that may affect your care.  Call 911  Call 911 if any of the following occur:    Trouble breathing    Confusion    Very drowsy    Fainting or loss of consciousness    Rapid or very slow heart rate    Loss of  bowel or bladder control  When to seek medical advice  Call your healthcare provider right away if any of the following occur:    Pain becomes worse or spreads to your arms or legs    Weakness or numbness in one or both arms or legs    Numbness in the groin area  Date Last Reviewed: 6/1/2016 2000-2017 [employer]. [address] [place] [address], [place] [id number]. All rights reserved. This information is not intended as a substitute for professional medical care. Always follow your healthcare professional's instructions.        General Neck and Back Pain    Both neck and back pain are usually caused by injury to the muscles or ligaments of the spine. Sometimes the disks that separate each bone of the spine may cause pain by  pressing on a nearby nerve. Back and neck pain may appear after a sudden twisting or bending force (such as in a car accident), or sometimes after a simple awkward movement. In either case, muscle spasm is often present and adds to the pain.  Acute neck and back pain usually gets better in 1 to 2 weeks. Pain related to disk disease, arthritis in the spinal joints or spinal stenosis (narrowing of the spinal canal) can become chronic and last for months or years.  Back and neck pain are common problems. Most people feel better in 1 or 2 weeks, and most of the rest in 1 to 2 months. Most people can remain active.  People have and describe pain differently.    Pain can be sharp, stabbing, shooting, aching, cramping, or burning    Movement, standing, bending, lifting, sitting, or walking may worsen the pain    Pain can be localized to one spot or area, or it can be more generalized    Pain can spread or radiate upwards, downwards, to the front, or go down your arms    Muscle spasm may occur.  Most of the time mechanical problems with the muscles or spine cause the pain. it is usually caused by an injury, whether known or not, to the muscles or ligaments. While illnesses can cause back pain, it is usually not caused by a serious illness. Pain is usually related to physical activity, whether sports, exercise, work, or normal activity. Sometimes it can occur without an identifiable cause. This can happen simply by stretching or moving wrong, without noting pain at the time. Other causes include:    Overexertion, lifting, pushing, pulling incorrectly or too aggressively.    Sudden twisting, bending or stretching from an accident (car or fall), or accidental movement.    Poor posture    Poor conditioning, lack of regular exercise    Spinal disc disease or arthritis    Stress    Pregnancy, or illness like appendicitis, bladder or kidney infection, pelvic infections   Home care    For neck pain: Use a comfortable pillow that  supports the head and keeps the spine in a neutral position. The position of the head should not be tilted forward or backward.    When in bed, try to find a position of comfort. A firm mattress is best. Try lying flat on your back with pillows under your knees. You can also try lying on your side with your knees bent up towards your chest and a pillow between your knees.    At first, do not try to stretch out the sore spots. If there is a strain, it is not like the good soreness you get after exercising without an injury. In this case, stretching may make it worse.    Don't sit for long periods, as in long car rides or other travel. This puts more stress on the lower back than standing or walking.    During the first 24 to 72 hours after an injury, apply an ice pack to the painful area for 20 minutes and then remove it for 20 minutes over a period of 60 to 90 minutes or several times a day.     You can alternate ice and heat therapies. Talk with your healthcare provider about the best treatment for your back or neck pain. As a safety precaution, do not use a heating pad at bedtime. Sleeping with a heating pad can lead to skin burns or tissue damage.    Therapeutic massage can help relax the back and neck muscles without stretching them.    Be aware of safe lifting methods and do not lift anything over 15 pounds until all the pain is gone.  Medicines  Talk to your healthcare provider before using medicine, especially if you have other medical problems or are taking other medicines.    You may use over-the-counter medicine to control pain, unless another pain medicine was prescribed. If you have chronic conditions like diabetes, liver or kidney disease, stomach ulcers,  gastrointestinal bleeding, or are taking blood thinner medicines.    Be careful if you are given pain medicines, narcotics, or medicine for muscle spasm. They can cause drowsiness, and can affect your coordination, reflexes, and judgment. Do not drive  or operate heavy machinery.  Follow-up care  Follow up with your healthcare provider, or as advised. Physical therapy or further tests may be needed.  If X-rays were taken, you will be notified of any new findings that may affect your care.  Call 911  Call 911 if any of the following occur:    Trouble breathing    Confusion    Very drowsy or trouble awakening    Fainting or loss of consciousness    Rapid or very slow heart rate    Loss of bowel or bladder control  When to seek medical advice  Call your healthcare provider right away if any of these occur:    Pain becomes worse or spreads into your arms or legs    Weakness, numbness or pain in one or both arms or legs    Numbness in the groin area    Difficulty walking    Fever of 100.4 F (38 C) or higher, or as directed by your healthcare provider  Date Last Reviewed: 7/1/2016 2000-2017 [employer]. [address] [place] [address]. All rights reserved. This information is not intended as a substitute for professional medical care. Always follow your healthcare professional's instructions.        Back Spasm (No Trauma)    Spasm of the back muscles can occur after a sudden forceful twisting or bending force (such as in a car accident), after a simple awkward movement, or after lifting something heavy with poor body positioning. In any case, muscle spasm adds to the pain. Sleeping in an awkward position or on a poor quality mattress can also cause this. Some people respond to emotional stress by tensing the muscles of their back.  Pain that continues may need further evaluation or other types of treatment such as physical therapy.  You don't always need X-rays for the initial evaluation of back pain, unless you had a physical injury such as from a car accident or fall. If your pain continues and doesn't respond to medical treatment, X-rays and other tests may then be done.   Home care    As soon as possible, start sitting or walking again to  avoid problems from prolonged bed rest (muscle weakness, worsening back stiffness and pain, blood clots in the legs).    When in bed, try to find a position of comfort. A firm mattress is best. Try lying flat on your back with pillows under your knees. You can also try lying on your side with your knees bent up toward your chest and a pillow between your knees.    Avoid prolonged sitting, long car rides, or travel. This puts more stress on the lower back than standing or walking.     During the first 24 to 72 hours after an injury or flare-up, apply an ice pack to the painful area for 20 minutes, then remove it for 20 minutes. Do this over a period of 60 to 90 minutes or several times a day. This will reduce swelling and pain. Always wrap ice packs in a thin towel.    You can start with ice, then switch to heat. Heat (hot shower, hot bath, or heating pad) reduces pain, and works well for muscle spasms. Apply heat to the painful area for 20 minutes, then remove it for 20 minutes. Do this over a period of 60 to 90 minutes or several times a day. Do not sleep on a heating pad as it can burn or damage skin.    Alternate ice and heat therapies.    Be aware of safe lifting methods and do not lift anything over 15 pounds until all the pain is gone.  Gentle stretching will help your back heal faster. Do this simple routine 2 to 3 times a day until your back is feeling better.    Lie on your back with your knees bent and both feet on the ground    Slowly raise your left knee to your chest as you flatten your lower back against the floor. Hold for 20 to 30 seconds.    Relax and repeat the exercise with your right knee.    Do 2 to 3 of these exercises for each leg.    Repeat, hugging both knees to your chest at the same time.    Do not bounce, but use a gentle pull.  Medicines  Talk to your doctor before using medicine, especially if you have other medical problems or are taking other medicines.  You may use acetaminophen or  ibuprofen to control pain, unless your healthcare provider prescribed another pain medicine. If you have a chronic condition such as diabetes, liver or kidney disease, stomach ulcer, or gastrointestinal bleeding, or are taking blood thinners, talk with your healthcare provider before taking any medicines.  Be careful if you are given prescription pain medicine, narcotics, or medicine for muscle spasm. They can cause drowsiness, affect your coordination, reflexes, or judgment. Do not drive or operate heavy machinery when taking these medicines. Take pain medicine only as prescribed by your healthcare provider.  Follow-up care  Follow up with your doctor, or as advised. Physical therapy or further tests may be needed.  If X-rays were taken, they may be reviewed by a radiologist. You will be notified of any new findings that may affect your care.  Call 911  Call 911 if any of these occur:    Trouble breathing    Confusion    Drowsiness or trouble awakening    Fainting or loss of consciousness    Rapid or very slow heart rate    Loss of bowel or bladder control  When to seek medical advice  Call your healthcare provider right away if any of these occur:    Pain becomes worse or spreads to your legs    Weakness or numbness in one or both legs    Numbness in the groin or genital area    Fever of 100.4 F (38 C) or higher, or as directed by your healthcare provider    Burning or pain when passing urine  Date Last Reviewed: 6/1/2016 2000-2017 [employer]. [address] [place] [address], [place] [address]. All rights reserved. This information is not intended as a substitute for professional medical care. Always follow your healthcare professional's instructions.                Follow-ups after your visit        Who to contact     If you have questions or need follow up information about today's clinic visit or your schedule please contact [facility] directly at [phone].  Normal or  "non-critical lab and imaging results will be communicated to you by MyChart, letter or phone within 4 business days after the clinic has received the results. If you do not hear from us within 7 days, please contact the clinic through [facility] or phone. If you have a critical or abnormal lab result, we will notify you by phone as soon as possible.  Submit refill requests through [employer] or call your pharmacy and they will forward the refill request to us. Please allow 3 business days for your refill to be completed.          Additional Information About Your Visit        [employer] Information     [employer] gives you secure access to your electronic health record. If you see a primary care provider, you can also send messages to your care team and make appointments. If you have questions, please call your primary care clinic.  If you do not have a primary care provider, please call [phone] and they will assist you.        Care EveryWhere ID     This is your Care EveryWhere ID. This could be used by other organizations to access your [place] medical records  [id number]        Your Vitals Were     Pulse Temperature Height Pulse Oximetry BMI (Body Mass Index)       75 98.1  F (36.7  C) (Tympanic) 5' 4\" (1.626 m) 100% 41.88 kg/m2        Blood Pressure from Last 3 Encounters:   08/25/18 176/90   05/24/18 132/70   02/15/18 138/78    Weight from Last 3 Encounters:   08/25/18 244 lb (110.7 kg)   05/24/18 250 lb 6.4 oz (113.6 kg)   06/07/17 242 lb 2 oz (109.8 kg)              Today, you had the following     No orders found for display         Today's Medication Changes          These changes are accurate as of 8/25/18 10:26 AM.  If you have any questions, ask your nurse or doctor.               Start taking these medicines.        Dose/Directions    cyclobenzaprine 10 MG tablet   Commonly known as:  FLEXERIL   Used for:  Cervicalgia   Started by:  Timo Patel PA-C        Dose:  10 mg   Take 1 tablet (10 mg) " by mouth 3 times daily as needed for muscle spasms   Quantity:  30 tablet   Refills:  1            Where to get your medicines      These medications were sent to [id number] - [place], [place] - [address]  [address], [place] [address]     Phone:  [phone]     cyclobenzaprine 10 MG tablet                Primary Care Provider Office Phone # Fax #    Cristiana Davis [phone] [address] [place] [address]        Equal Access to Services     [facility]: Hadii aad ku hadasho Soomaali, waaxda luqadaha, qaybta kaalmada adeegyada, waxay idiin hayaan adebraeden valenzuela . So [facility] [phone].    ATENCIÓN: Si habla español, tiene a francisco disposición servicios gratuitos de asistencia lingüística. [facility] [phone].    We comply with applicable federal civil rights laws and [place] laws. We do not discriminate on the basis of race, color, national origin, age, disability, sex, sexual orientation, or gender identity.            Thank you!     Thank you for choosing [facility]  for your care. Our goal is always to provide you with excellent care. Hearing back from our patients is one way we can continue to improve our services. Please take a few minutes to complete the written survey that you may receive in the mail after your visit with us. Thank you!             Your Updated Medication List - Protect others around you: Learn how to safely use, store and throw away your medicines at www.disposemymeds.org.          This list is accurate as of 8/25/18 10:26 AM.  Always use your most recent med list.                   Brand Name Dispense Instructions for use Diagnosis    atorvastatin 40 MG tablet    LIPITOR    90 tablet    Take 1 tablet (40 mg) by mouth daily For cholesterol.    Hyperlipidemia LDL goal <100       cyclobenzaprine 10 MG tablet    FLEXERIL    30 tablet    Take 1 tablet (10 mg) by  mouth 3 times daily as needed for muscle spasms    Cervicalgia       lisinopril-hydrochlorothiazide 20-12.5 MG per tablet    PRINZIDE/ZESTORETIC    90 tablet    Take 1 tablet by mouth daily    HTN, goal below 140/80       nitroGLYcerin 0.4 MG sublingual tablet    NITROSTAT     1 TAB EVERY 5 MIN AS NEEDED, UP TO 3 PER EPISODE        sertraline 100 MG tablet    ZOLOFT    180 tablet    Take 2 tablets (200 mg) by mouth daily    Major depressive disorder, recurrent episode, moderate (H)

## 2018-08-25 NOTE — PATIENT INSTRUCTIONS
Ibuprofen 600-800 mg (3-4 pills) three times a day for 2-3 days   Muscle relaxer up to 3x a day, but as little as 5mg (1/2 pill) at bedtime is OK!    Follow up if worsening or no improvement by Wednesday    Back Care Tips    Caring for your back  These are things you can do to prevent a recurrence of acute back pain and to reduce symptoms from chronic back pain:    Maintain a healthy weight. If you are overweight, losing weight will help most types of back pain.    Exercise is an important part of recovery from most types of back pain. The muscles behind and in front of the spine support the back. This means strengthening both the back muscles and the abdominal muscles will provide better support for your spine.     Swimming and brisk walking are good overall exercises to improve your fitness level.    Practice safe lifting methods (below).    Practice good posture when sitting, standing and walking. Avoid prolonged sitting. This puts more stress on the lower back than standing or walking.    Wear quality shoes with sufficient arch support. Foot and ankle alignment can affect back symptoms. Women should avoid wearing high heels.    Therapeutic massage can help relax the back muscles without stretching them.    During the first 24 to 72 hours after an acute injury or flare-up of chronic back pain, apply an ice pack to the painful area for 20 minutes and then remove it for 20 minutes, over a period of 60 to 90 minutes, or several times a day. As a safety precaution, do not use a heating pad at bedtime. Sleeping on a heating pad can lead to skin burns or tissue damage.    You can alternate ice and heat therapies.  Medicines  Talk to your healthcare provider before using medicines, especially if you have other medical problems or are taking other medicines.    You may use acetaminophen or ibuprofen to control pain, unless your healthcare provider prescribed other pain medicine. If you have chronic conditions like  diabetes, liver or kidney disease, stomach ulcers, or gastrointestinal bleeding, or are taking blood thinners, talk with your healthcare provider before taking any medicines.    Be careful if you are given prescription pain medicines, narcotics, or medicine for muscle spasm. They can cause drowsiness, affect your coordination, reflexes, and judgment. Do not drive or operate heavy machinery while taking these types of medicines. Take prescription pain medicine only as prescribed by your healthcare provider.  Lumbar stretch  Here is a simple stretching exercise that will help relax muscle spasm and keep your back more limber. If exercise makes your back pain worse, don t do it.    Lie on your back with your knees bent and both feet on the ground.    Slowly raise your left knee to your chest as you flatten your lower back against the floor. Hold for 5 seconds.    Relax and repeat the exercise with your right knee.    Do 10 of these exercises for each leg.  Safe lifting method    Don t bend over at the waist to lift an object off the floor.  Instead, bend your knees and hips in a squat.     Keep your back and head upright    Hold the object close to your body, directly in front of you.    Straighten your legs to lift the object.     Lower the object to the floor in the reverse fashion.    If you must slide something across the floor, push it.  Posture tips  Sitting  Sit in chairs with straight backs or low-back support. Keep your knees lower than your hips, with your feet flat on the floor.  When driving, sit up straight. Adjust the seat forward so you are not leaning toward the steering wheel.  A small pillow or rolled towel behind your lower back may help if you are driving long distances.   Standing  When standing for long periods, shift most of your weight to one leg at a time. Alternate legs every few minutes.   Sleeping  The best way to sleep is on your side with your knees bent. Put a low pillow under your head  to support your neck in a neutral spine position. Avoid thick pillows that bend your neck to one side. Put a pillow between your legs to further relax your lower back. If you sleep on your back, put pillows under your knees to support your legs in a slightly flexed position. Use a firm mattress. If your mattress sags, replace it, or use a 1/2-inch plywood board under the mattress to add support.  Follow-up care  Follow up with your healthcare provider, or as advised.  If X-rays, a CT scan or an MRI scan were taken, they will be reviewed by a radiologist. You will be notified of any new findings that may affect your care.  Call 911  Call 911 if any of the following occur:    Trouble breathing    Confusion    Very drowsy    Fainting or loss of consciousness    Rapid or very slow heart rate    Loss of  bowel or bladder control  When to seek medical advice  Call your healthcare provider right away if any of the following occur:    Pain becomes worse or spreads to your arms or legs    Weakness or numbness in one or both arms or legs    Numbness in the groin area  Date Last Reviewed: 6/1/2016 2000-2017 [employer] [address] [place] [address]. All rights reserved. This information is not intended as a substitute for professional medical care. Always follow your healthcare professional's instructions.        General Neck and Back Pain    Both neck and back pain are usually caused by injury to the muscles or ligaments of the spine. Sometimes the disks that separate each bone of the spine may cause pain by pressing on a nearby nerve. Back and neck pain may appear after a sudden twisting or bending force (such as in a car accident), or sometimes after a simple awkward movement. In either case, muscle spasm is often present and adds to the pain.  Acute neck and back pain usually gets better in 1 to 2 weeks. Pain related to disk disease, arthritis in the spinal joints or spinal stenosis (narrowing  of the spinal canal) can become chronic and last for months or years.  Back and neck pain are common problems. Most people feel better in 1 or 2 weeks, and most of the rest in 1 to 2 months. Most people can remain active.  People have and describe pain differently.    Pain can be sharp, stabbing, shooting, aching, cramping, or burning    Movement, standing, bending, lifting, sitting, or walking may worsen the pain    Pain can be localized to one spot or area, or it can be more generalized    Pain can spread or radiate upwards, downwards, to the front, or go down your arms    Muscle spasm may occur.  Most of the time mechanical problems with the muscles or spine cause the pain. it is usually caused by an injury, whether known or not, to the muscles or ligaments. While illnesses can cause back pain, it is usually not caused by a serious illness. Pain is usually related to physical activity, whether sports, exercise, work, or normal activity. Sometimes it can occur without an identifiable cause. This can happen simply by stretching or moving wrong, without noting pain at the time. Other causes include:    Overexertion, lifting, pushing, pulling incorrectly or too aggressively.    Sudden twisting, bending or stretching from an accident (car or fall), or accidental movement.    Poor posture    Poor conditioning, lack of regular exercise    Spinal disc disease or arthritis    Stress    Pregnancy, or illness like appendicitis, bladder or kidney infection, pelvic infections   Home care    For neck pain: Use a comfortable pillow that supports the head and keeps the spine in a neutral position. The position of the head should not be tilted forward or backward.    When in bed, try to find a position of comfort. A firm mattress is best. Try lying flat on your back with pillows under your knees. You can also try lying on your side with your knees bent up towards your chest and a pillow between your knees.    At first, do not try  to stretch out the sore spots. If there is a strain, it is not like the good soreness you get after exercising without an injury. In this case, stretching may make it worse.    Don't sit for long periods, as in long car rides or other travel. This puts more stress on the lower back than standing or walking.    During the first 24 to 72 hours after an injury, apply an ice pack to the painful area for 20 minutes and then remove it for 20 minutes over a period of 60 to 90 minutes or several times a day.     You can alternate ice and heat therapies. Talk with your healthcare provider about the best treatment for your back or neck pain. As a safety precaution, do not use a heating pad at bedtime. Sleeping with a heating pad can lead to skin burns or tissue damage.    Therapeutic massage can help relax the back and neck muscles without stretching them.    Be aware of safe lifting methods and do not lift anything over 15 pounds until all the pain is gone.  Medicines  Talk to your healthcare provider before using medicine, especially if you have other medical problems or are taking other medicines.    You may use over-the-counter medicine to control pain, unless another pain medicine was prescribed. If you have chronic conditions like diabetes, liver or kidney disease, stomach ulcers,  gastrointestinal bleeding, or are taking blood thinner medicines.    Be careful if you are given pain medicines, narcotics, or medicine for muscle spasm. They can cause drowsiness, and can affect your coordination, reflexes, and judgment. Do not drive or operate heavy machinery.  Follow-up care  Follow up with your healthcare provider, or as advised. Physical therapy or further tests may be needed.  If X-rays were taken, you will be notified of any new findings that may affect your care.  Call 911  Call 911 if any of the following occur:    Trouble breathing    Confusion    Very drowsy or trouble awakening    Fainting or loss of  consciousness    Rapid or very slow heart rate    Loss of bowel or bladder control  When to seek medical advice  Call your healthcare provider right away if any of these occur:    Pain becomes worse or spreads into your arms or legs    Weakness, numbness or pain in one or both arms or legs    Numbness in the groin area    Difficulty walking    Fever of 100.4 F (38 C) or higher, or as directed by your healthcare provider  Date Last Reviewed: 7/1/2016 2000-2017 [employer]. [address] [place] [address]. All rights reserved. This information is not intended as a substitute for professional medical care. Always follow your healthcare professional's instructions.        Back Spasm (No Trauma)    Spasm of the back muscles can occur after a sudden forceful twisting or bending force (such as in a car accident), after a simple awkward movement, or after lifting something heavy with poor body positioning. In any case, muscle spasm adds to the pain. Sleeping in an awkward position or on a poor quality mattress can also cause this. Some people respond to emotional stress by tensing the muscles of their back.  Pain that continues may need further evaluation or other types of treatment such as physical therapy.  You don't always need X-rays for the initial evaluation of back pain, unless you had a physical injury such as from a car accident or fall. If your pain continues and doesn't respond to medical treatment, X-rays and other tests may then be done.   Home care    As soon as possible, start sitting or walking again to avoid problems from prolonged bed rest (muscle weakness, worsening back stiffness and pain, blood clots in the legs).    When in bed, try to find a position of comfort. A firm mattress is best. Try lying flat on your back with pillows under your knees. You can also try lying on your side with your knees bent up toward your chest and a pillow between your knees.    Avoid prolonged  sitting, long car rides, or travel. This puts more stress on the lower back than standing or walking.     During the first 24 to 72 hours after an injury or flare-up, apply an ice pack to the painful area for 20 minutes, then remove it for 20 minutes. Do this over a period of 60 to 90 minutes or several times a day. This will reduce swelling and pain. Always wrap ice packs in a thin towel.    You can start with ice, then switch to heat. Heat (hot shower, hot bath, or heating pad) reduces pain, and works well for muscle spasms. Apply heat to the painful area for 20 minutes, then remove it for 20 minutes. Do this over a period of 60 to 90 minutes or several times a day. Do not sleep on a heating pad as it can burn or damage skin.    Alternate ice and heat therapies.    Be aware of safe lifting methods and do not lift anything over 15 pounds until all the pain is gone.  Gentle stretching will help your back heal faster. Do this simple routine 2 to 3 times a day until your back is feeling better.    Lie on your back with your knees bent and both feet on the ground    Slowly raise your left knee to your chest as you flatten your lower back against the floor. Hold for 20 to 30 seconds.    Relax and repeat the exercise with your right knee.    Do 2 to 3 of these exercises for each leg.    Repeat, hugging both knees to your chest at the same time.    Do not bounce, but use a gentle pull.  Medicines  Talk to your doctor before using medicine, especially if you have other medical problems or are taking other medicines.  You may use acetaminophen or ibuprofen to control pain, unless your healthcare provider prescribed another pain medicine. If you have a chronic condition such as diabetes, liver or kidney disease, stomach ulcer, or gastrointestinal bleeding, or are taking blood thinners, talk with your healthcare provider before taking any medicines.  Be careful if you are given prescription pain medicine, narcotics, or  medicine for muscle spasm. They can cause drowsiness, affect your coordination, reflexes, or judgment. Do not drive or operate heavy machinery when taking these medicines. Take pain medicine only as prescribed by your healthcare provider.  Follow-up care  Follow up with your doctor, or as advised. Physical therapy or further tests may be needed.  If X-rays were taken, they may be reviewed by a radiologist. You will be notified of any new findings that may affect your care.  Call 911  Call 911 if any of these occur:    Trouble breathing    Confusion    Drowsiness or trouble awakening    Fainting or loss of consciousness    Rapid or very slow heart rate    Loss of bowel or bladder control  When to seek medical advice  Call your healthcare provider right away if any of these occur:    Pain becomes worse or spreads to your legs    Weakness or numbness in one or both legs    Numbness in the groin or genital area    Fever of 100.4 F (38 C) or higher, or as directed by your healthcare provider    Burning or pain when passing urine  Date Last Reviewed: 6/1/2016 2000-2017 [employer]. [address] [place] [id number]. All rights reserved. This information is not intended as a substitute for professional medical care. Always follow your healthcare professional's instructions.

## 2018-08-25 NOTE — PROGRESS NOTES
SUBJECTIVE:  Chief Complaint   Patient presents with     Urgent Care     Pt in clinic to have eval for neck pain for 3 days.     Neck Pain     Karyn Gamez is a 62 year old female who presents with a chief complaint of neck pain.  Symptoms began 3 day(s) ago, are moderate and still present  Context:  Injury: None known woke up with it in the morning, assumed it was from sleeping on it wrong, but it hasn't improved.   Pain exacerbated by twisting and flexion/extension Relieved by Ibuprofen 4-5 pills.  She treated it initially with Ibuprofen. This is the first time this type of injury has occurred to this patient.     No headache, chest pain, vision change, numbness, tingling, shortness of breath or fever.  Denies MVA, fall.      Past Medical History:   Diagnosis Date     Cervicalgia 6/29/2005 June 29, 2005: Thrown from a horse - wearing a helmet - and struck side of head and neck, heard crepitation, no loc, Able to walk after injury.  persistant pain in neck relieved with ibuprofen, stiff but can move with ROM.  No changes in hands and feet sensation/motor.     Coronary artery disease      Depressive disorder, not elsewhere classified      Hypertension      Obesity, unspecified      Current Outpatient Prescriptions   Medication Sig Dispense Refill     atorvastatin (LIPITOR) 40 MG tablet Take 1 tablet (40 mg) by mouth daily For cholesterol. 90 tablet 1     lisinopril-hydrochlorothiazide (PRINZIDE/ZESTORETIC) 20-12.5 MG per tablet Take 1 tablet by mouth daily 90 tablet 1     NITROGLYCERIN 0.4 MG SL SUBL 1 TAB EVERY 5 MIN AS NEEDED, UP TO 3 PER EPISODE       sertraline (ZOLOFT) 100 MG tablet Take 2 tablets (200 mg) by mouth daily 180 tablet 1     Social History   Substance Use Topics     Smoking status: Former Smoker     Packs/day: 0.50     Years: 30.00     Quit date: 8/12/2011     Smokeless tobacco: Never Used      Comment: smoking 3-4 cigs per day     Alcohol use Yes      Comment: rarely       ROS:  Review of  "systems negative except as stated below    EXAM:   /90  Pulse 75  Temp 98.1  F (36.7  C) (Tympanic)  Ht 5' 4\" (1.626 m)  Wt 244 lb (110.7 kg)  SpO2 100%  BMI 41.88 kg/m2  M/S Exam: neck tenderness to palpation bilateral SCM, no deformity, no bony tenderness, step-offs, crepitus   GENERAL APPEARANCE: healthy, pleasant, alert and no distress  CV: No carotid bruits  SKIN: no suspicious lesions or rashes  NEURO: Normal strength and tone, mentation intact and speech normal    X-RAY was done.    ASSESSMENT:  (M54.2) Cervicalgia  (primary encounter diagnosis)  Plan: cyclobenzaprine (FLEXERIL) 10 MG tablet    Patient Instructions   Ibuprofen 600-800 mg (3-4 pills) three times a day for 2-3 days   Muscle relaxer up to 3x a day, but as little as 5mg (1/2 pill) at bedtime is OK!    Follow up if worsening or no improvement by Wednesday    Back Care Tips    Caring for your back  These are things you can do to prevent a recurrence of acute back pain and to reduce symptoms from chronic back pain:    Maintain a healthy weight. If you are overweight, losing weight will help most types of back pain.    Exercise is an important part of recovery from most types of back pain. The muscles behind and in front of the spine support the back. This means strengthening both the back muscles and the abdominal muscles will provide better support for your spine.     Swimming and brisk walking are good overall exercises to improve your fitness level.    Practice safe lifting methods (below).    Practice good posture when sitting, standing and walking. Avoid prolonged sitting. This puts more stress on the lower back than standing or walking.    Wear quality shoes with sufficient arch support. Foot and ankle alignment can affect back symptoms. Women should avoid wearing high heels.    Therapeutic massage can help relax the back muscles without stretching them.    During the first 24 to 72 hours after an acute injury or flare-up of " chronic back pain, apply an ice pack to the painful area for 20 minutes and then remove it for 20 minutes, over a period of 60 to 90 minutes, or several times a day. As a safety precaution, do not use a heating pad at bedtime. Sleeping on a heating pad can lead to skin burns or tissue damage.    You can alternate ice and heat therapies.  Medicines  Talk to your healthcare provider before using medicines, especially if you have other medical problems or are taking other medicines.    You may use acetaminophen or ibuprofen to control pain, unless your healthcare provider prescribed other pain medicine. If you have chronic conditions like diabetes, liver or kidney disease, stomach ulcers, or gastrointestinal bleeding, or are taking blood thinners, talk with your healthcare provider before taking any medicines.    Be careful if you are given prescription pain medicines, narcotics, or medicine for muscle spasm. They can cause drowsiness, affect your coordination, reflexes, and judgment. Do not drive or operate heavy machinery while taking these types of medicines. Take prescription pain medicine only as prescribed by your healthcare provider.  Lumbar stretch  Here is a simple stretching exercise that will help relax muscle spasm and keep your back more limber. If exercise makes your back pain worse, don t do it.    Lie on your back with your knees bent and both feet on the ground.    Slowly raise your left knee to your chest as you flatten your lower back against the floor. Hold for 5 seconds.    Relax and repeat the exercise with your right knee.    Do 10 of these exercises for each leg.  Safe lifting method    Don t bend over at the waist to lift an object off the floor.  Instead, bend your knees and hips in a squat.     Keep your back and head upright    Hold the object close to your body, directly in front of you.    Straighten your legs to lift the object.     Lower the object to the floor in the reverse  fashion.    If you must slide something across the floor, push it.  Posture tips  Sitting  Sit in chairs with straight backs or low-back support. Keep your knees lower than your hips, with your feet flat on the floor.  When driving, sit up straight. Adjust the seat forward so you are not leaning toward the steering wheel.  A small pillow or rolled towel behind your lower back may help if you are driving long distances.   Standing  When standing for long periods, shift most of your weight to one leg at a time. Alternate legs every few minutes.   Sleeping  The best way to sleep is on your side with your knees bent. Put a low pillow under your head to support your neck in a neutral spine position. Avoid thick pillows that bend your neck to one side. Put a pillow between your legs to further relax your lower back. If you sleep on your back, put pillows under your knees to support your legs in a slightly flexed position. Use a firm mattress. If your mattress sags, replace it, or use a 1/2-inch plywood board under the mattress to add support.  Follow-up care  Follow up with your healthcare provider, or as advised.  If X-rays, a CT scan or an MRI scan were taken, they will be reviewed by a radiologist. You will be notified of any new findings that may affect your care.  Call 911  Call 911 if any of the following occur:    Trouble breathing    Confusion    Very drowsy    Fainting or loss of consciousness    Rapid or very slow heart rate    Loss of  bowel or bladder control  When to seek medical advice  Call your healthcare provider right away if any of the following occur:    Pain becomes worse or spreads to your arms or legs    Weakness or numbness in one or both arms or legs    Numbness in the groin area  Date Last Reviewed: 6/1/2016 2000-2017 [employer]. [address] [facility], [place] [address]. All rights reserved. This information is not intended as a substitute for professional medical care. Always  follow your healthcare professional's instructions.        General Neck and Back Pain    Both neck and back pain are usually caused by injury to the muscles or ligaments of the spine. Sometimes the disks that separate each bone of the spine may cause pain by pressing on a nearby nerve. Back and neck pain may appear after a sudden twisting or bending force (such as in a car accident), or sometimes after a simple awkward movement. In either case, muscle spasm is often present and adds to the pain.  Acute neck and back pain usually gets better in 1 to 2 weeks. Pain related to disk disease, arthritis in the spinal joints or spinal stenosis (narrowing of the spinal canal) can become chronic and last for months or years.  Back and neck pain are common problems. Most people feel better in 1 or 2 weeks, and most of the rest in 1 to 2 months. Most people can remain active.  People have and describe pain differently.    Pain can be sharp, stabbing, shooting, aching, cramping, or burning    Movement, standing, bending, lifting, sitting, or walking may worsen the pain    Pain can be localized to one spot or area, or it can be more generalized    Pain can spread or radiate upwards, downwards, to the front, or go down your arms    Muscle spasm may occur.  Most of the time mechanical problems with the muscles or spine cause the pain. it is usually caused by an injury, whether known or not, to the muscles or ligaments. While illnesses can cause back pain, it is usually not caused by a serious illness. Pain is usually related to physical activity, whether sports, exercise, work, or normal activity. Sometimes it can occur without an identifiable cause. This can happen simply by stretching or moving wrong, without noting pain at the time. Other causes include:    Overexertion, lifting, pushing, pulling incorrectly or too aggressively.    Sudden twisting, bending or stretching from an accident (car or fall), or accidental  movement.    Poor posture    Poor conditioning, lack of regular exercise    Spinal disc disease or arthritis    Stress    Pregnancy, or illness like appendicitis, bladder or kidney infection, pelvic infections   Home care    For neck pain: Use a comfortable pillow that supports the head and keeps the spine in a neutral position. The position of the head should not be tilted forward or backward.    When in bed, try to find a position of comfort. A firm mattress is best. Try lying flat on your back with pillows under your knees. You can also try lying on your side with your knees bent up towards your chest and a pillow between your knees.    At first, do not try to stretch out the sore spots. If there is a strain, it is not like the good soreness you get after exercising without an injury. In this case, stretching may make it worse.    Don't sit for long periods, as in long car rides or other travel. This puts more stress on the lower back than standing or walking.    During the first 24 to 72 hours after an injury, apply an ice pack to the painful area for 20 minutes and then remove it for 20 minutes over a period of 60 to 90 minutes or several times a day.     You can alternate ice and heat therapies. Talk with your healthcare provider about the best treatment for your back or neck pain. As a safety precaution, do not use a heating pad at bedtime. Sleeping with a heating pad can lead to skin burns or tissue damage.    Therapeutic massage can help relax the back and neck muscles without stretching them.    Be aware of safe lifting methods and do not lift anything over 15 pounds until all the pain is gone.  Medicines  Talk to your healthcare provider before using medicine, especially if you have other medical problems or are taking other medicines.    You may use over-the-counter medicine to control pain, unless another pain medicine was prescribed. If you have chronic conditions like diabetes, liver or kidney  disease, stomach ulcers,  gastrointestinal bleeding, or are taking blood thinner medicines.    Be careful if you are given pain medicines, narcotics, or medicine for muscle spasm. They can cause drowsiness, and can affect your coordination, reflexes, and judgment. Do not drive or operate heavy machinery.  Follow-up care  Follow up with your healthcare provider, or as advised. Physical therapy or further tests may be needed.  If X-rays were taken, you will be notified of any new findings that may affect your care.  Call 911  Call 911 if any of the following occur:    Trouble breathing    Confusion    Very drowsy or trouble awakening    Fainting or loss of consciousness    Rapid or very slow heart rate    Loss of bowel or bladder control  When to seek medical advice  Call your healthcare provider right away if any of these occur:    Pain becomes worse or spreads into your arms or legs    Weakness, numbness or pain in one or both arms or legs    Numbness in the groin area    Difficulty walking    Fever of 100.4 F (38 C) or higher, or as directed by your healthcare provider  Date Last Reviewed: 7/1/2016 2000-2017 [employer]. [address] [place] [address]. All rights reserved. This information is not intended as a substitute for professional medical care. Always follow your healthcare professional's instructions.        Back Spasm (No Trauma)    Spasm of the back muscles can occur after a sudden forceful twisting or bending force (such as in a car accident), after a simple awkward movement, or after lifting something heavy with poor body positioning. In any case, muscle spasm adds to the pain. Sleeping in an awkward position or on a poor quality mattress can also cause this. Some people respond to emotional stress by tensing the muscles of their back.  Pain that continues may need further evaluation or other types of treatment such as physical therapy.  You don't always need X-rays for the  initial evaluation of back pain, unless you had a physical injury such as from a car accident or fall. If your pain continues and doesn't respond to medical treatment, X-rays and other tests may then be done.   Home care    As soon as possible, start sitting or walking again to avoid problems from prolonged bed rest (muscle weakness, worsening back stiffness and pain, blood clots in the legs).    When in bed, try to find a position of comfort. A firm mattress is best. Try lying flat on your back with pillows under your knees. You can also try lying on your side with your knees bent up toward your chest and a pillow between your knees.    Avoid prolonged sitting, long car rides, or travel. This puts more stress on the lower back than standing or walking.     During the first 24 to 72 hours after an injury or flare-up, apply an ice pack to the painful area for 20 minutes, then remove it for 20 minutes. Do this over a period of 60 to 90 minutes or several times a day. This will reduce swelling and pain. Always wrap ice packs in a thin towel.    You can start with ice, then switch to heat. Heat (hot shower, hot bath, or heating pad) reduces pain, and works well for muscle spasms. Apply heat to the painful area for 20 minutes, then remove it for 20 minutes. Do this over a period of 60 to 90 minutes or several times a day. Do not sleep on a heating pad as it can burn or damage skin.    Alternate ice and heat therapies.    Be aware of safe lifting methods and do not lift anything over 15 pounds until all the pain is gone.  Gentle stretching will help your back heal faster. Do this simple routine 2 to 3 times a day until your back is feeling better.    Lie on your back with your knees bent and both feet on the ground    Slowly raise your left knee to your chest as you flatten your lower back against the floor. Hold for 20 to 30 seconds.    Relax and repeat the exercise with your right knee.    Do 2 to 3 of these exercises  for each leg.    Repeat, hugging both knees to your chest at the same time.    Do not bounce, but use a gentle pull.  Medicines  Talk to your doctor before using medicine, especially if you have other medical problems or are taking other medicines.  You may use acetaminophen or ibuprofen to control pain, unless your healthcare provider prescribed another pain medicine. If you have a chronic condition such as diabetes, liver or kidney disease, stomach ulcer, or gastrointestinal bleeding, or are taking blood thinners, talk with your healthcare provider before taking any medicines.  Be careful if you are given prescription pain medicine, narcotics, or medicine for muscle spasm. They can cause drowsiness, affect your coordination, reflexes, or judgment. Do not drive or operate heavy machinery when taking these medicines. Take pain medicine only as prescribed by your healthcare provider.  Follow-up care  Follow up with your doctor, or as advised. Physical therapy or further tests may be needed.  If X-rays were taken, they may be reviewed by a radiologist. You will be notified of any new findings that may affect your care.  Call 911  Call 911 if any of these occur:    Trouble breathing    Confusion    Drowsiness or trouble awakening    Fainting or loss of consciousness    Rapid or very slow heart rate    Loss of bowel or bladder control  When to seek medical advice  Call your healthcare provider right away if any of these occur:    Pain becomes worse or spreads to your legs    Weakness or numbness in one or both legs    Numbness in the groin or genital area    Fever of 100.4 F (38 C) or higher, or as directed by your healthcare provider    Burning or pain when passing urine  Date Last Reviewed: 6/1/2016 2000-2017 [employer]. [address] [place] [address], [place] [address]. All rights reserved. This information is not intended as a substitute for professional medical care. Always follow your healthcare  professional's instructions.

## 2018-09-25 ENCOUNTER — OFFICE VISIT (OUTPATIENT)
Dept: FAMILY MEDICINE | Facility: CLINIC | Age: 63
End: 2018-09-25

## 2018-09-25 VITALS
WEIGHT: 246.2 LBS | DIASTOLIC BLOOD PRESSURE: 86 MMHG | TEMPERATURE: 98.3 F | RESPIRATION RATE: 20 BRPM | BODY MASS INDEX: 42.26 KG/M2 | SYSTOLIC BLOOD PRESSURE: 138 MMHG | HEART RATE: 64 BPM

## 2018-09-25 DIAGNOSIS — R07.0 THROAT PAIN: Primary | ICD-10-CM

## 2018-09-25 LAB
DEPRECATED S PYO AG THROAT QL EIA: NORMAL
SPECIMEN SOURCE: NORMAL

## 2018-09-25 PROCEDURE — 99213 OFFICE O/P EST LOW 20 MIN: CPT | Performed by: NURSE PRACTITIONER

## 2018-09-25 PROCEDURE — 87081 CULTURE SCREEN ONLY: CPT | Performed by: NURSE PRACTITIONER

## 2018-09-25 PROCEDURE — 87880 STREP A ASSAY W/OPTIC: CPT | Performed by: NURSE PRACTITIONER

## 2018-09-25 ASSESSMENT — PAIN SCALES - GENERAL: PAINLEVEL: MILD PAIN (2)

## 2018-09-25 NOTE — MR AVS SNAPSHOT
After Visit Summary   9/25/2018    Karyn Gamez    MRN: [id number]           Patient Information     Date Of Birth          1955        Visit Information        Provider Department      9/25/2018 1:40 PM Sonia Hogue APRN [facility]        Today's Diagnoses     Throat pain    -  1      Care Instructions    For the sore throat use warm tea and honey or even just spoonful of honey and hold it to the back of the throat. This will help to decrease the inflammation and thin the mucous.    gargle with warm salt water.       Once you start to feel better get a new toothbrush has you are feeling better,             Follow-ups after your visit        Follow-up notes from your care team     Return if symptoms worsen or fail to improve.      Who to contact     Normal or non-critical lab and imaging results will be communicated to you by [employer], letter or phone within 4 business days after the clinic has received the results. If you do not hear from us within 7 days, please contact the clinic through [employer] or phone. If you have a critical or abnormal lab result, we will notify you by phone as soon as possible.  Submit refill requests through [employer] or call your pharmacy and they will forward the refill request to us. Please allow 3 business days for your refill to be completed.          If you need to speak with a  for additional information , please call: [phone]           Additional Information About Your Visit        [employer] Information     [employer] gives you secure access to your electronic health record. If you see a primary care provider, you can also send messages to your care team and make appointments. If you have questions, please call your primary care clinic.  If you do not have a primary care provider, please call [phone] and they will assist you.        Care EveryWhere ID     This is your Care EveryWhere ID. This could be used by  other organizations to access your [facility] medical records  [id number]        Your Vitals Were     Pulse Temperature Respirations BMI (Body Mass Index)          64 98.3  F (36.8  C) (Tympanic) 20 42.26 kg/m2         Blood Pressure from Last 3 Encounters:   09/25/18 (!) 156/98   08/25/18 176/90   05/24/18 132/70    Weight from Last 3 Encounters:   09/25/18 246 lb 3.2 oz (111.7 kg)   08/25/18 244 lb (110.7 kg)   05/24/18 250 lb 6.4 oz (113.6 kg)              We Performed the Following     Beta strep group A culture     Strep, Rapid Screen        Primary Care Provider Office Phone # Fax #    Cristiana Davis [phone] [address] [place] [address]        Equal Access to Services     [facility]: Hadii francisca rivera hadasho Soomaali, waaxda luqadaha, qaybta kaalmada adeegyada, viola horton haycorey valenzuela . So [facility] [phone].    ATENCIÓN: Si habla español, tiene a francisco disposición servicios gratuitos de asistencia lingüística. Llame al [phone].    We comply with applicable federal civil rights laws and [place] laws. We do not discriminate on the basis of race, color, national origin, age, disability, sex, sexual orientation, or gender identity.            Thank you!     Thank you for choosing [facility]  for your care. Our goal is always to provide you with excellent care. Hearing back from our patients is one way we can continue to improve our services. Please take a few minutes to complete the written survey that you may receive in the mail after your visit with us. Thank you!             Your Updated Medication List - Protect others around you: Learn how to safely use, store and throw away your medicines at www.disposemymeds.org.          This list is accurate as of 9/25/18  2:14 PM.  Always use your most recent med list.                   Brand Name Dispense Instructions for use Diagnosis    atorvastatin 40 MG tablet    LIPITOR    90 tablet    Take  1 tablet (40 mg) by mouth daily For cholesterol.    Hyperlipidemia LDL goal <100       lisinopril-hydrochlorothiazide 20-12.5 MG per tablet    PRINZIDE/ZESTORETIC    90 tablet    Take 1 tablet by mouth daily    HTN, goal below 140/80       nitroGLYcerin 0.4 MG sublingual tablet    NITROSTAT     1 TAB EVERY 5 MIN AS NEEDED, UP TO 3 PER EPISODE        sertraline 100 MG tablet    ZOLOFT    180 tablet    Take 2 tablets (200 mg) by mouth daily    Major depressive disorder, recurrent episode, moderate (H)

## 2018-09-25 NOTE — NURSING NOTE
"Initial BP (!) 156/98 (BP Location: Left arm, Patient Position: Chair, Cuff Size: Adult Regular)  Pulse 64  Temp 98.3  F (36.8  C) (Tympanic)  Resp 20  Wt 246 lb 3.2 oz (111.7 kg)  BMI 42.26 kg/m2 Estimated body mass index is 42.26 kg/(m^2) as calculated from the following:    Height as of 8/25/18: 5' 4\" (1.626 m).    Weight as of this encounter: 246 lb 3.2 oz (111.7 kg). .    Effie Bailey CMA (AAMA)    "

## 2018-09-25 NOTE — PROGRESS NOTES
"  SUBJECTIVE:   Karyn Gamez is a 63 year old female who presents to clinic today for the following health issues:      ENT Symptoms             Symptoms: cc Present Absent Comment   Fever/Chills   x    Fatigue  x  More tired than usual   Muscle Aches   x    Eye Irritation   x    Sneezing   x    Nasal Rafa/Drg   x    Sinus Pressure/Pain   x    Loss of smell   x    Dental pain   x    Sore Throat x x     Swollen Glands x x     Ear Pain/Fullness  x  Bilateral plugged feeling   Cough   x    Wheeze   x    Chest Pain   x    Shortness of breath   x    Rash   x    Other   x      Symptom duration:  A couple of weeks   Symptom severity:  Had gotten better after taking some of her 's antibiotics and then about 3 days later symptoms returned.    Treatments tried:  Amoxicillin (took 6 pills/3 days of 's medication)   Contacts:  Unknown     Patient comes to clinic today with a several week history of sore throat. Describes it as \"razor blades\" when she swallows. Also complaining of ear fullness and fatigue. Reports she took 3 days worth of her 's amoxicillin 3 days ago which seemed to help but her discomfort has since returned. Has also tried ibuprofen last night and today which has provided some relief. Denies ear pain or otorrhea. Does report a slight decrease in hearing since the discomfort began. Denies cough, nasal drainage. 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     Problem list and histories reviewed & adjusted, as indicated.  Additional history: as documented    Patient Active Problem List   Diagnosis     HTN, goal below 140/80     Moderate major depression (H)     Hyperlipidemia LDL goal <100     Hypertensive hypertrophic cardiomyopathy (H)     Obesity     Cerebral embolism with cerebral infarction (H)     Elevated fasting glucose     Advanced directives, counseling/discussion     Morbid obesity (H)     Past Surgical History:   Procedure Laterality Date "     ANGIOGRAM  2010    negative     HYSTERECTOMY, PAP NO LONGER INDICATED      BSO       Social History   Substance Use Topics     Smoking status: Former Smoker     Packs/day: 0.50     Years: 30.00     Quit date: 8/12/2011     Smokeless tobacco: Never Used      Comment: smoking 3-4 cigs per day     Alcohol use Yes      Comment: rarely     Family History   Problem Relation Age of Onset     C.A.D. Father      first MI at 53, stenting x2     C.A.D. Mother      dx in her mid 50's     C.A.D. Brother      MI in mid-50's     Cerebrovascular Disease Brother      in late 50's         Current Outpatient Prescriptions   Medication Sig Dispense Refill     atorvastatin (LIPITOR) 40 MG tablet Take 1 tablet (40 mg) by mouth daily For cholesterol. 90 tablet 1     lisinopril-hydrochlorothiazide (PRINZIDE/ZESTORETIC) 20-12.5 MG per tablet Take 1 tablet by mouth daily 90 tablet 1     sertraline (ZOLOFT) 100 MG tablet Take 2 tablets (200 mg) by mouth daily 180 tablet 1     NITROGLYCERIN 0.4 MG SL SUBL 1 TAB EVERY 5 MIN AS NEEDED, UP TO 3 PER EPISODE       No Known Allergies  Recent Labs   Lab Test  05/24/18   1108  02/15/18   1002  10/14/16   0948  09/23/16   1023  11/23/15   1345   04/18/13   1007   08/12/11   2300   A1C  6.2*  6.1*   --   5.8  5.9   < >   --    < >   --    LDL   --   123*   --   94  144*   < >  162*   < >   --    HDL   --   42*   --   37*   --    --   40*   < >   --    TRIG   --   213*   --   309*   --    --   235*   < >   --    ALT   --    --   28   --    --    --    --    --   14   CR   --   0.70  0.77  0.79   --    < >  0.82   < >  0.78   GFRESTIMATED   --   85  76  74   --    < >  72   < >  77   GFRESTBLACK   --   >90  >90  African American GFR Calc    89   --    < >  87   < >  >90   POTASSIUM   --   4.2  3.7  3.6   --    < >  3.8   < >  3.6   TSH   --    --   1.62   --    --    --    --    --    --     < > = values in this interval not displayed.      BP Readings from Last 3 Encounters:   09/25/18 138/86    08/25/18 176/90   05/24/18 132/70    Wt Readings from Last 3 Encounters:   09/25/18 246 lb 3.2 oz (111.7 kg)   08/25/18 244 lb (110.7 kg)   05/24/18 250 lb 6.4 oz (113.6 kg)                    Reviewed and updated as needed this visit by clinical staff  Tobacco  Allergies  Meds  Med Hx  Surg Hx  Fam Hx  Soc Hx      Reviewed and updated as needed this visit by Provider         ROS:  CONSTITUTIONAL: NEGATIVE for fever, chills, change in weight. POSITIVE for mild fatigue   INTEGUMENTARY/SKIN: NEGATIVE for worrisome rashes, moles or lesions  ENT/MOUTH: See HPI   RESP: NEGATIVE for significant cough or SOB  CV: NEGATIVE for chest pain, palpitations or peripheral edema  GI: NEGATIVE for nausea, abdominal pain, heartburn, or change in bowel habits  NEURO: NEGATIVE for headaches    OBJECTIVE:     /86  Pulse 64  Temp 98.3  F (36.8  C) (Tympanic)  Resp 20  Wt 246 lb 3.2 oz (111.7 kg)  BMI 42.26 kg/m2  Body mass index is 42.26 kg/(m^2).  GENERAL: healthy, alert and no distress  EYES: Eyes grossly normal to inspection, PERRL and conjunctivae and sclerae normal  HENT: ear canals intact. Bilateral TMs with light reflex and landmarks are visible. Nose and mouth without ulcers or lesions; OP pink, moist, without edema, erythema, or exudate. Tonsils +0.   NECK: no adenopathy, no asymmetry, masses. Bilateral lateral neck tender to palpation  RESP: lungs clear to auscultation - no rales, rhonchi or wheezes  CV: regular rate and rhythm, normal S1 S2, no S3 or S4, no murmur, click or rub    Diagnostic Test Results:  Results for orders placed or performed in visit on 09/25/18 (from the past 24 hour(s))   Strep, Rapid Screen   Result Value Ref Range    Specimen Description Throat     Rapid Strep A Screen       NEGATIVE: No Group A streptococcal antigen detected by immunoassay, await culture report.       ASSESSMENT/PLAN:       ASSESSMENT/PLAN:      ICD-10-CM    1. Throat pain R07.0 Strep, Rapid Screen     Beta strep  group A culture       Patient Instructions   For the sore throat use warm tea and honey or even just spoonful of honey and hold it to the back of the throat. This will help to decrease the inflammation and thin the mucous.    gargle with warm salt water.       Once you start to feel better get a new toothbrush has you are feeling better,         RASHARD WOODY NP, APRN CNP  [facility]

## 2018-09-25 NOTE — PATIENT INSTRUCTIONS
For the sore throat use warm tea and honey or even just spoonful of honey and hold it to the back of the throat. This will help to decrease the inflammation and thin the mucous.    gargle with warm salt water.       Once you start to feel better get a new toothbrush has you are feeling better,

## 2018-09-26 LAB
BACTERIA SPEC CULT: NORMAL
SPECIMEN SOURCE: NORMAL

## 2019-01-08 DIAGNOSIS — I10 HTN, GOAL BELOW 140/80: ICD-10-CM

## 2019-01-08 RX ORDER — LISINOPRIL AND HYDROCHLOROTHIAZIDE 12.5; 2 MG/1; MG/1
1 TABLET ORAL DAILY
Qty: 90 TABLET | Refills: 1 | Status: SHIPPED | OUTPATIENT
Start: 2019-01-08 | End: 2019-06-19

## 2019-01-08 NOTE — TELEPHONE ENCOUNTER
Potassium   Date Value Ref Range Status   02/15/2018 4.2 3.4 - 5.3 mmol/L Final     BP Readings from Last 3 Encounters:   09/25/18 138/86   08/25/18 176/90   05/24/18 132/70       Prescription approved per [facility] Refill Protocol or patient Primary care provider (PCP)  RENETTA Bae  RN/Ralf Tolbert

## 2019-02-23 DIAGNOSIS — F33.1 MAJOR DEPRESSIVE DISORDER, RECURRENT EPISODE, MODERATE (H): ICD-10-CM

## 2019-02-23 DIAGNOSIS — E78.5 HYPERLIPIDEMIA LDL GOAL <100: ICD-10-CM

## 2019-02-25 RX ORDER — ATORVASTATIN CALCIUM 40 MG/1
TABLET, FILM COATED ORAL
Qty: 90 TABLET | Refills: 0 | Status: SHIPPED | OUTPATIENT
Start: 2019-02-25 | End: 2019-05-22

## 2019-02-25 RX ORDER — SERTRALINE HYDROCHLORIDE 100 MG/1
TABLET, FILM COATED ORAL
Qty: 180 TABLET | Refills: 0 | Status: SHIPPED | OUTPATIENT
Start: 2019-02-25 | End: 2019-05-22

## 2019-02-25 NOTE — TELEPHONE ENCOUNTER
"Requested Prescriptions   Pending Prescriptions Disp Refills     sertraline (ZOLOFT) 100 MG tablet [Pharmacy Med Name: SERTRALINE 100MG TABLETS] 180 tablet 0    Last Written Prescription Date:  05/24/2018 #180 x 1  Last filled 08/28/2018  Last office visit: 9/25/2018 DARREN Hogue   Future Office Visit: None    Sig: TAKE 2 TABLETS(200 MG) BY MOUTH DAILY    SSRIs Protocol Failed - 2/23/2019  8:52 PM       Failed - PHQ-9 score less than 5 in past 6 months    Please review last PHQ-9 score.   PHQ-9 SCORE 5/10/2017 2/7/2018 5/24/2018   PHQ-9 Total Score - - -   PHQ-9 Total Score MyChart - 9 (Mild depression) -   PHQ-9 Total Score 0 9 8       LOUISE-7 SCORE 9/23/2016 5/10/2017 5/24/2018   Total Score - - -   Total Score 10 0 7                Passed - Medication is active on med list       Passed - Patient is age 18 or older       Passed - No active pregnancy on record       Passed - No positive pregnancy test in last 12 months       Passed - Recent (6 mo) or future (30 days) visit within the authorizing provider's specialty    Patient had office visit in the last 6 months or has a visit in the next 30 days with authorizing provider or within the authorizing provider's specialty.  See \"Patient Info\" tab in inbasket, or \"Choose Columns\" in Meds & Orders section of the refill encounter.            atorvastatin (LIPITOR) 40 MG tablet [Pharmacy Med Name: ATORVASTATIN 40MG TABLETS] 90 tablet 0    Last Written Prescription Date:  05/24/2018 #90 x 1  Last filled 08/28/2018  Last office visit: 9/25/2018 DARREN Hogue   Future Office Visit: None    Sig: TAKE 1 TABLET(40 MG) BY MOUTH DAILY FOR CHOLESTEROL    Statins Protocol Failed - 2/23/2019  8:52 PM       Failed - LDL on file in past 12 months    Recent Labs   Lab Test 02/15/18  1002   *            Passed - No abnormal creatine kinase in past 12 months    No lab results found.            Passed - Recent (12 mo) or future (30 days) visit within the authorizing provider's specialty " "   Patient had office visit in the last 12 months or has a visit in the next 30 days with authorizing provider or within the authorizing provider's specialty.  See \"Patient Info\" tab in inbasket, or \"Choose Columns\" in Meds & Orders section of the refill encounter.             Passed - Medication is active on med list       Passed - Patient is age 18 or older       Passed - No active pregnancy on record       Passed - No positive pregnancy test in past 12 months          "

## 2019-02-25 NOTE — TELEPHONE ENCOUNTER
Routing refill request to provider for review/approval because:  PHQ-9= 8    Elayne Melton RN

## 2019-04-17 ENCOUNTER — ALLIED HEALTH/NURSE VISIT (OUTPATIENT)
Dept: FAMILY MEDICINE | Facility: CLINIC | Age: 64
End: 2019-04-17

## 2019-04-17 VITALS — SYSTOLIC BLOOD PRESSURE: 156 MMHG | DIASTOLIC BLOOD PRESSURE: 84 MMHG

## 2019-04-17 DIAGNOSIS — I10 HTN, GOAL BELOW 140/80: Primary | ICD-10-CM

## 2019-04-17 PROCEDURE — 99207 ZZC NO CHARGE NURSE ONLY: CPT | Performed by: FAMILY MEDICINE

## 2019-04-17 NOTE — PROGRESS NOTES
Karyn Gamez was evaluated at [facility] on April 17, 2019 at which time her blood pressure was:    BP Readings from Last 3 Encounters:   04/17/19 156/84   09/25/18 138/86   08/25/18 176/90     Pulse Readings from Last 3 Encounters:   09/25/18 64   08/25/18 75   05/24/18 68       Reviewed lifestyle modifications for blood pressure control and reduction: including making healthy food choices, managing weight, getting regular exercise, smoking cessation, reducing alcohol consumption, monitoring blood pressure regularly.     Symptoms: None    BP Goal:< 140/90 mmHg    BP Assessment:  BP too high    Potential Reasons for BP too high: Unknown/Other: -    BP Follow-Up Plan: Recheck BP in 30 days at pharmacy    Recommendation to Provider: -      Note completed by: Melinda Portillo RPh  [facility]

## 2019-05-22 DIAGNOSIS — F33.1 MAJOR DEPRESSIVE DISORDER, RECURRENT EPISODE, MODERATE (H): ICD-10-CM

## 2019-05-22 DIAGNOSIS — E78.5 HYPERLIPIDEMIA LDL GOAL <100: ICD-10-CM

## 2019-05-22 NOTE — TELEPHONE ENCOUNTER
PHQ-9 SCORE 5/10/2017 2/7/2018 5/24/2018   PHQ-9 Total Score - - -   PHQ-9 Total Score MyChart - 9 (Mild depression) -   PHQ-9 Total Score 0 9 8     ML for pt to call back  NEEDS appt and labs will fill till schedule appt only   RENETTA Bae RN/Ralf Tolbert  Medication is being filled for 1 time refill only due to: 30 daus fill only advised needs appt ,   Over due for office visit and/or labs   RENETTA Bae RN/Ralf Tolbert

## 2019-05-22 NOTE — TELEPHONE ENCOUNTER
"Requested Prescriptions   Pending Prescriptions Disp Refills     sertraline (ZOLOFT) 100 MG tablet [Pharmacy Med Name: SERTRALINE 100MG TABLETS]  Last Written Prescription Date:  2/25/19  Last Fill Quantity: 180,  # refills: 0   Last office visit: 9/25/2018 with prescribing provider:  geno   Future Office Visit:     180 tablet 0     Sig: TAKE 2 TABLETS(200 MG) BY MOUTH DAILY       SSRIs Protocol Failed - 5/22/2019  5:06 AM        Failed - PHQ-9 score less than 5 in past 6 months     Please review last PHQ-9 score.           Failed - Recent (6 mo) or future (30 days) visit within the authorizing provider's specialty     Patient had office visit in the last 6 months or has a visit in the next 30 days with authorizing provider or within the authorizing provider's specialty.  See \"Patient Info\" tab in inbasket, or \"Choose Columns\" in Meds & Orders section of the refill encounter.            Passed - Medication is active on med list        Passed - Patient is age 18 or older        Passed - No active pregnancy on record        Passed - No positive pregnancy test in last 12 months        atorvastatin (LIPITOR) 40 MG tablet [Pharmacy Med Name: ATORVASTATIN 40MG TABLETS]  Last Written Prescription Date:  2/25/19  Last Fill Quantity: 90,  # refills: 0   Last office visit: 9/25/2018 with prescribing provider:  geno   Future Office Visit:     90 tablet 0     Sig: TAKE 1 TABLET(40 MG) BY MOUTH DAILY FOR CHOLESTEROL       Statins Protocol Failed - 5/22/2019  5:06 AM        Failed - LDL on file in past 12 months     Recent Labs   Lab Test 02/15/18  1002   *             Passed - No abnormal creatine kinase in past 12 months     No lab results found.             Passed - Recent (12 mo) or future (30 days) visit within the authorizing provider's specialty     Patient had office visit in the last 12 months or has a visit in the next 30 days with authorizing provider or within the authorizing provider's specialty.  See " "\"Patient Info\" tab in inbasket, or \"Choose Columns\" in Meds & Orders section of the refill encounter.              Passed - Medication is active on med list        Passed - Patient is age 18 or older        Passed - No active pregnancy on record        Passed - No positive pregnancy test in past 12 months          "

## 2019-05-23 RX ORDER — ATORVASTATIN CALCIUM 40 MG/1
TABLET, FILM COATED ORAL
Qty: 30 TABLET | Refills: 0 | Status: SHIPPED | OUTPATIENT
Start: 2019-05-23 | End: 2019-06-19

## 2019-05-23 RX ORDER — SERTRALINE HYDROCHLORIDE 100 MG/1
TABLET, FILM COATED ORAL
Qty: 60 TABLET | Refills: 0 | Status: SHIPPED | OUTPATIENT
Start: 2019-05-23 | End: 2019-06-19

## 2019-06-06 ENCOUNTER — TELEPHONE (OUTPATIENT)
Dept: FAMILY MEDICINE | Facility: CLINIC | Age: 64
End: 2019-06-06

## 2019-06-06 NOTE — TELEPHONE ENCOUNTER
Panel Management Review      Patient has the following on her problem list:     Depression / Dysthymia review    Patient is due for:  PHQ9    Hypertension   Last three blood pressure readings:  BP Readings from Last 3 Encounters:   04/17/19 156/84   09/25/18 138/86   08/25/18 176/90     Blood pressure: FAILED    HTN Guidelines:  Less than 140/90      Composite cancer screening  Chart review shows that this patient is due/due soon for the following Colonoscopy  Summary:    Patient is due/failing the following:   PE/Labs; PHQ9; Colonoscopy    Action needed:   Patient needs office visit for PE and LABS, Patient needs referral/order: COLONOSCOPY    Type of outreach:    Sent letter.    Questions for provider review:    None                                                                                   Effie Bailey CMA (AAMA)      Chart routed to None .

## 2019-06-06 NOTE — LETTER
June 6, 2019      Karyn Gamez  [address] [place] [address]      Dear Karyn Gamez    We care about your health and have reviewed your health plan including your medical conditions, medication list, and lab results.  Based on this review, it is recommended that you follow up regarding the following health topic(s):     -Depression  -Colon Cancer Screening  -Wellness (Physical) Visit     We recommend you take the following action(s):    -- Questionnaire for depression. Please fill out the attached questionnaire. These questions are about your depression and how you have been feeling in the last 2 weeks.     -- Physical. Please call our clinic to schedule your physical appointment. Please plan to be fasting for this appointment (nothing to eat or drink except water and medications).     -- Colon screen. Colonoscopy or FIT test (take home test). One of these tests is recommended at age 50 to screen for colon cancer. The last colonoscopy/FIT that we have on file for you was from 05/12/2009.   Please call one of the following numbers to schedule a colonoscopy:  [facility] [phone]  [facility] [phone]  [facility] [phone]  [facility] [phone] (multiple sites, call for locations)  OR....  If you prefer to do a screening that is LESS INVASIVE AND LESS EXPENSIVE there is an test for you! It is called the FIT test. It is a screening test that is done yearly and can be DONE AT HOME! Do the test at home and mail it in (you don't even have to pay for postage). If you are willing to do this test, we can order the kit for you to  at our clinic. Please call us at [phone] if you need an order for a colonoscopy or FIT testing.    Please call us at [phone] (or use [employer]) to address the above recommendations.     Thank you for trusting [facility] and we appreciate the opportunity to serve you.  We look forward to supporting your  healthcare needs in the future.    Healthy Regards,      Your Health Care Team  [facility]

## 2019-06-07 ENCOUNTER — HOSPITAL ENCOUNTER (OUTPATIENT)
Facility: CLINIC | Age: 64
Setting detail: OBSERVATION
Discharge: HOME | End: 2019-06-08
Attending: INTERNAL MEDICINE | Admitting: INTERNAL MEDICINE
Payer: COMMERCIAL

## 2019-06-07 ENCOUNTER — HOSPITAL ENCOUNTER (OUTPATIENT)
Facility: CLINIC | Age: 64
Setting detail: OBSERVATION
Discharge: ANOTHER HOSPITAL | End: 2019-06-07
Attending: EMERGENCY MEDICINE | Admitting: INTERNAL MEDICINE
Payer: COMMERCIAL

## 2019-06-07 VITALS
HEART RATE: 60 BPM | WEIGHT: 253.31 LBS | OXYGEN SATURATION: 92 % | DIASTOLIC BLOOD PRESSURE: 102 MMHG | HEIGHT: 64 IN | TEMPERATURE: 97.2 F | BODY MASS INDEX: 43.25 KG/M2 | RESPIRATION RATE: 18 BRPM | SYSTOLIC BLOOD PRESSURE: 196 MMHG

## 2019-06-07 DIAGNOSIS — R07.9 ACUTE CHEST PAIN: ICD-10-CM

## 2019-06-07 DIAGNOSIS — I20.9 ANGINA PECTORIS (H): Primary | ICD-10-CM

## 2019-06-07 PROBLEM — E66.01 MORBID OBESITY (H): Chronic | Status: ACTIVE | Noted: 2018-08-25

## 2019-06-07 LAB
ALBUMIN SERPL-MCNC: 3.9 G/DL (ref 3.4–5)
ALP SERPL-CCNC: 100 U/L (ref 40–150)
ALT SERPL W P-5'-P-CCNC: 26 U/L (ref 0–50)
ANION GAP SERPL CALCULATED.3IONS-SCNC: 5 MMOL/L (ref 3–14)
AST SERPL W P-5'-P-CCNC: 23 U/L (ref 0–45)
BASOPHILS # BLD AUTO: 0.1 10E9/L (ref 0–0.2)
BASOPHILS NFR BLD AUTO: 0.7 %
BILIRUB SERPL-MCNC: 0.3 MG/DL (ref 0.2–1.3)
BUN SERPL-MCNC: 14 MG/DL (ref 7–30)
CALCIUM SERPL-MCNC: 9.5 MG/DL (ref 8.5–10.1)
CHLORIDE SERPL-SCNC: 106 MMOL/L (ref 94–109)
CO2 SERPL-SCNC: 30 MMOL/L (ref 20–32)
CREAT SERPL-MCNC: 0.73 MG/DL (ref 0.52–1.04)
D DIMER PPP FEU-MCNC: 0.3 UG/ML FEU (ref 0–0.5)
DIFFERENTIAL METHOD BLD: ABNORMAL
EOSINOPHIL # BLD AUTO: 0.2 10E9/L (ref 0–0.7)
EOSINOPHIL NFR BLD AUTO: 1.2 %
ERYTHROCYTE [DISTWIDTH] IN BLOOD BY AUTOMATED COUNT: 13 % (ref 10–15)
GFR SERPL CREATININE-BSD FRML MDRD: 88 ML/MIN/{1.73_M2}
GLUCOSE SERPL-MCNC: 83 MG/DL (ref 70–99)
HBA1C MFR BLD: 6.8 % (ref 0–5.6)
HCT VFR BLD AUTO: 42.6 % (ref 35–47)
HGB BLD-MCNC: 13.6 G/DL (ref 11.7–15.7)
IMM GRANULOCYTES # BLD: 0.1 10E9/L (ref 0–0.4)
IMM GRANULOCYTES NFR BLD: 0.7 %
LYMPHOCYTES # BLD AUTO: 4.9 10E9/L (ref 0.8–5.3)
LYMPHOCYTES NFR BLD AUTO: 37.7 %
MCH RBC QN AUTO: 29.4 PG (ref 26.5–33)
MCHC RBC AUTO-ENTMCNC: 31.9 G/DL (ref 31.5–36.5)
MCV RBC AUTO: 92 FL (ref 78–100)
MONOCYTES # BLD AUTO: 0.9 10E9/L (ref 0–1.3)
MONOCYTES NFR BLD AUTO: 7.2 %
NEUTROPHILS # BLD AUTO: 6.8 10E9/L (ref 1.6–8.3)
NEUTROPHILS NFR BLD AUTO: 52.5 %
NRBC # BLD AUTO: 0 10*3/UL
NRBC BLD AUTO-RTO: 0 /100
PLATELET # BLD AUTO: 352 10E9/L (ref 150–450)
POTASSIUM SERPL-SCNC: 3.2 MMOL/L (ref 3.4–5.3)
PROT SERPL-MCNC: 8.1 G/DL (ref 6.8–8.8)
RBC # BLD AUTO: 4.62 10E12/L (ref 3.8–5.2)
SODIUM SERPL-SCNC: 141 MMOL/L (ref 133–144)
TROPONIN I SERPL-MCNC: 0.02 UG/L (ref 0–0.04)
TROPONIN I SERPL-MCNC: <0.015 UG/L (ref 0–0.04)
WBC # BLD AUTO: 12.9 10E9/L (ref 4–11)

## 2019-06-07 PROCEDURE — 93005 ELECTROCARDIOGRAM TRACING: CPT | Performed by: EMERGENCY MEDICINE

## 2019-06-07 PROCEDURE — 93005 ELECTROCARDIOGRAM TRACING: CPT | Mod: 76 | Performed by: EMERGENCY MEDICINE

## 2019-06-07 PROCEDURE — 84484 ASSAY OF TROPONIN QUANT: CPT | Performed by: INTERNAL MEDICINE

## 2019-06-07 PROCEDURE — 85379 FIBRIN DEGRADATION QUANT: CPT | Performed by: EMERGENCY MEDICINE

## 2019-06-07 PROCEDURE — 99207 ZZC CDG-CODE CATEGORY CHANGED: CPT | Performed by: FAMILY MEDICINE

## 2019-06-07 PROCEDURE — 93010 ELECTROCARDIOGRAM REPORT: CPT | Mod: 76 | Performed by: EMERGENCY MEDICINE

## 2019-06-07 PROCEDURE — 25000132 ZZH RX MED GY IP 250 OP 250 PS 637: Performed by: PHYSICIAN ASSISTANT

## 2019-06-07 PROCEDURE — 85025 COMPLETE CBC W/AUTO DIFF WBC: CPT | Performed by: EMERGENCY MEDICINE

## 2019-06-07 PROCEDURE — 99207 ZZC CDG-CODE CATEGORY CHANGED: CPT | Performed by: INTERNAL MEDICINE

## 2019-06-07 PROCEDURE — 99236 HOSP IP/OBS SAME DATE HI 85: CPT | Performed by: FAMILY MEDICINE

## 2019-06-07 PROCEDURE — 25000132 ZZH RX MED GY IP 250 OP 250 PS 637: Performed by: HOSPITALIST

## 2019-06-07 PROCEDURE — G0378 HOSPITAL OBSERVATION PER HR: HCPCS

## 2019-06-07 PROCEDURE — 36415 COLL VENOUS BLD VENIPUNCTURE: CPT | Performed by: PHYSICIAN ASSISTANT

## 2019-06-07 PROCEDURE — 83036 HEMOGLOBIN GLYCOSYLATED A1C: CPT | Performed by: PHYSICIAN ASSISTANT

## 2019-06-07 PROCEDURE — 25000132 ZZH RX MED GY IP 250 OP 250 PS 637: Performed by: FAMILY MEDICINE

## 2019-06-07 PROCEDURE — 99285 EMERGENCY DEPT VISIT HI MDM: CPT | Mod: 25 | Performed by: EMERGENCY MEDICINE

## 2019-06-07 PROCEDURE — 84484 ASSAY OF TROPONIN QUANT: CPT | Mod: 91 | Performed by: PHYSICIAN ASSISTANT

## 2019-06-07 PROCEDURE — 99219 ZZC INITIAL OBSERVATION CARE,LEVL II: CPT | Performed by: INTERNAL MEDICINE

## 2019-06-07 PROCEDURE — 36415 COLL VENOUS BLD VENIPUNCTURE: CPT | Performed by: INTERNAL MEDICINE

## 2019-06-07 PROCEDURE — 25000132 ZZH RX MED GY IP 250 OP 250 PS 637: Performed by: INTERNAL MEDICINE

## 2019-06-07 PROCEDURE — 84484 ASSAY OF TROPONIN QUANT: CPT | Performed by: EMERGENCY MEDICINE

## 2019-06-07 PROCEDURE — 93010 ELECTROCARDIOGRAM REPORT: CPT | Mod: Z6 | Performed by: EMERGENCY MEDICINE

## 2019-06-07 PROCEDURE — 25000132 ZZH RX MED GY IP 250 OP 250 PS 637: Performed by: EMERGENCY MEDICINE

## 2019-06-07 PROCEDURE — 99207 ZZC APP CREDIT; MD BILLING SHARED VISIT: CPT | Performed by: PHYSICIAN ASSISTANT

## 2019-06-07 PROCEDURE — 80053 COMPREHEN METABOLIC PANEL: CPT | Performed by: EMERGENCY MEDICINE

## 2019-06-07 RX ORDER — LISINOPRIL 20 MG/1
20 TABLET ORAL DAILY
Status: DISCONTINUED | OUTPATIENT
Start: 2019-06-07 | End: 2019-06-07 | Stop reason: HOSPADM

## 2019-06-07 RX ORDER — SERTRALINE HYDROCHLORIDE 100 MG/1
200 TABLET, FILM COATED ORAL DAILY
Status: DISCONTINUED | OUTPATIENT
Start: 2019-06-07 | End: 2019-06-07 | Stop reason: HOSPADM

## 2019-06-07 RX ORDER — ACETAMINOPHEN 325 MG/1
650 TABLET ORAL EVERY 4 HOURS PRN
Status: DISCONTINUED | OUTPATIENT
Start: 2019-06-07 | End: 2019-06-07 | Stop reason: HOSPADM

## 2019-06-07 RX ORDER — POTASSIUM CHLORIDE 1.5 G/1.58G
20-40 POWDER, FOR SOLUTION ORAL
Status: DISCONTINUED | OUTPATIENT
Start: 2019-06-07 | End: 2019-06-08 | Stop reason: HOSPADM

## 2019-06-07 RX ORDER — NITROGLYCERIN 0.4 MG/1
0.4 TABLET SUBLINGUAL EVERY 5 MIN PRN
Status: DISCONTINUED | OUTPATIENT
Start: 2019-06-07 | End: 2019-06-08 | Stop reason: HOSPADM

## 2019-06-07 RX ORDER — LISINOPRIL 20 MG/1
20 TABLET ORAL DAILY
Status: DISCONTINUED | OUTPATIENT
Start: 2019-06-08 | End: 2019-06-08 | Stop reason: HOSPADM

## 2019-06-07 RX ORDER — ONDANSETRON 4 MG/1
4 TABLET, ORALLY DISINTEGRATING ORAL EVERY 6 HOURS PRN
Status: DISCONTINUED | OUTPATIENT
Start: 2019-06-07 | End: 2019-06-08 | Stop reason: HOSPADM

## 2019-06-07 RX ORDER — ACETAMINOPHEN 650 MG/1
650 SUPPOSITORY RECTAL EVERY 4 HOURS PRN
Status: DISCONTINUED | OUTPATIENT
Start: 2019-06-07 | End: 2019-06-07 | Stop reason: HOSPADM

## 2019-06-07 RX ORDER — ONDANSETRON 2 MG/ML
4 INJECTION INTRAMUSCULAR; INTRAVENOUS EVERY 6 HOURS PRN
Status: DISCONTINUED | OUTPATIENT
Start: 2019-06-07 | End: 2019-06-08 | Stop reason: HOSPADM

## 2019-06-07 RX ORDER — POTASSIUM CHLORIDE 1500 MG/1
20-40 TABLET, EXTENDED RELEASE ORAL
Status: DISCONTINUED | OUTPATIENT
Start: 2019-06-07 | End: 2019-06-08 | Stop reason: HOSPADM

## 2019-06-07 RX ORDER — ACETAMINOPHEN 325 MG/1
650 TABLET ORAL EVERY 4 HOURS PRN
Status: DISCONTINUED | OUTPATIENT
Start: 2019-06-07 | End: 2019-06-08 | Stop reason: HOSPADM

## 2019-06-07 RX ORDER — OXYCODONE HYDROCHLORIDE 5 MG/1
5-10 TABLET ORAL
Status: DISCONTINUED | OUTPATIENT
Start: 2019-06-07 | End: 2019-06-07 | Stop reason: HOSPADM

## 2019-06-07 RX ORDER — ASPIRIN 81 MG/1
324 TABLET, CHEWABLE ORAL ONCE
Status: COMPLETED | OUTPATIENT
Start: 2019-06-07 | End: 2019-06-07

## 2019-06-07 RX ORDER — ATORVASTATIN CALCIUM 20 MG/1
40 TABLET, FILM COATED ORAL DAILY
Status: DISCONTINUED | OUTPATIENT
Start: 2019-06-07 | End: 2019-06-07 | Stop reason: HOSPADM

## 2019-06-07 RX ORDER — NALOXONE HYDROCHLORIDE 0.4 MG/ML
.1-.4 INJECTION, SOLUTION INTRAMUSCULAR; INTRAVENOUS; SUBCUTANEOUS
Status: DISCONTINUED | OUTPATIENT
Start: 2019-06-07 | End: 2019-06-07 | Stop reason: HOSPADM

## 2019-06-07 RX ORDER — NITROGLYCERIN 0.4 MG/1
0.4 TABLET SUBLINGUAL EVERY 5 MIN PRN
Status: DISCONTINUED | OUTPATIENT
Start: 2019-06-07 | End: 2019-06-07

## 2019-06-07 RX ORDER — SERTRALINE HYDROCHLORIDE 100 MG/1
200 TABLET, FILM COATED ORAL DAILY
Status: DISCONTINUED | OUTPATIENT
Start: 2019-06-08 | End: 2019-06-08 | Stop reason: HOSPADM

## 2019-06-07 RX ORDER — ONDANSETRON 4 MG/1
4 TABLET, ORALLY DISINTEGRATING ORAL EVERY 6 HOURS PRN
Status: DISCONTINUED | OUTPATIENT
Start: 2019-06-07 | End: 2019-06-07 | Stop reason: HOSPADM

## 2019-06-07 RX ORDER — HYDROCHLOROTHIAZIDE 12.5 MG/1
12.5 CAPSULE ORAL DAILY
Status: DISCONTINUED | OUTPATIENT
Start: 2019-06-07 | End: 2019-06-07 | Stop reason: HOSPADM

## 2019-06-07 RX ORDER — ASPIRIN 81 MG/1
81 TABLET ORAL DAILY
Status: DISCONTINUED | OUTPATIENT
Start: 2019-06-08 | End: 2019-06-07

## 2019-06-07 RX ORDER — ALUMINA, MAGNESIA, AND SIMETHICONE 2400; 2400; 240 MG/30ML; MG/30ML; MG/30ML
30 SUSPENSION ORAL EVERY 4 HOURS PRN
Status: DISCONTINUED | OUTPATIENT
Start: 2019-06-07 | End: 2019-06-07 | Stop reason: HOSPADM

## 2019-06-07 RX ORDER — ATORVASTATIN CALCIUM 40 MG/1
40 TABLET, FILM COATED ORAL DAILY
Status: DISCONTINUED | OUTPATIENT
Start: 2019-06-08 | End: 2019-06-08 | Stop reason: HOSPADM

## 2019-06-07 RX ORDER — LIDOCAINE 40 MG/G
CREAM TOPICAL
Status: DISCONTINUED | OUTPATIENT
Start: 2019-06-07 | End: 2019-06-07 | Stop reason: HOSPADM

## 2019-06-07 RX ORDER — POLYETHYLENE GLYCOL 3350 17 G/17G
17 POWDER, FOR SOLUTION ORAL DAILY PRN
Status: DISCONTINUED | OUTPATIENT
Start: 2019-06-07 | End: 2019-06-08 | Stop reason: HOSPADM

## 2019-06-07 RX ORDER — LISINOPRIL AND HYDROCHLOROTHIAZIDE 12.5; 2 MG/1; MG/1
1 TABLET ORAL DAILY
Status: DISCONTINUED | OUTPATIENT
Start: 2019-06-07 | End: 2019-06-07 | Stop reason: RX

## 2019-06-07 RX ORDER — AMOXICILLIN 250 MG
2 CAPSULE ORAL 2 TIMES DAILY PRN
Status: DISCONTINUED | OUTPATIENT
Start: 2019-06-07 | End: 2019-06-08 | Stop reason: HOSPADM

## 2019-06-07 RX ORDER — POTASSIUM CHLORIDE 29.8 MG/ML
20 INJECTION INTRAVENOUS
Status: DISCONTINUED | OUTPATIENT
Start: 2019-06-07 | End: 2019-06-08 | Stop reason: HOSPADM

## 2019-06-07 RX ORDER — AMOXICILLIN 250 MG
1 CAPSULE ORAL 2 TIMES DAILY PRN
Status: DISCONTINUED | OUTPATIENT
Start: 2019-06-07 | End: 2019-06-08 | Stop reason: HOSPADM

## 2019-06-07 RX ORDER — ACETAMINOPHEN 650 MG/1
650 SUPPOSITORY RECTAL EVERY 4 HOURS PRN
Status: DISCONTINUED | OUTPATIENT
Start: 2019-06-07 | End: 2019-06-08 | Stop reason: HOSPADM

## 2019-06-07 RX ORDER — NITROGLYCERIN 0.4 MG/1
0.4 TABLET SUBLINGUAL EVERY 5 MIN PRN
Status: DISCONTINUED | OUTPATIENT
Start: 2019-06-07 | End: 2019-06-07 | Stop reason: HOSPADM

## 2019-06-07 RX ORDER — NALOXONE HYDROCHLORIDE 0.4 MG/ML
.1-.4 INJECTION, SOLUTION INTRAMUSCULAR; INTRAVENOUS; SUBCUTANEOUS
Status: DISCONTINUED | OUTPATIENT
Start: 2019-06-07 | End: 2019-06-08 | Stop reason: HOSPADM

## 2019-06-07 RX ORDER — POTASSIUM CL/LIDO/0.9 % NACL 10MEQ/0.1L
10 INTRAVENOUS SOLUTION, PIGGYBACK (ML) INTRAVENOUS
Status: DISCONTINUED | OUTPATIENT
Start: 2019-06-07 | End: 2019-06-08 | Stop reason: HOSPADM

## 2019-06-07 RX ORDER — POTASSIUM CHLORIDE 7.45 MG/ML
10 INJECTION INTRAVENOUS
Status: DISCONTINUED | OUTPATIENT
Start: 2019-06-07 | End: 2019-06-08 | Stop reason: HOSPADM

## 2019-06-07 RX ORDER — HYDRALAZINE HYDROCHLORIDE 20 MG/ML
10 INJECTION INTRAMUSCULAR; INTRAVENOUS EVERY 4 HOURS PRN
Status: DISCONTINUED | OUTPATIENT
Start: 2019-06-07 | End: 2019-06-08 | Stop reason: HOSPADM

## 2019-06-07 RX ADMIN — ALUMINUM HYDROXIDE, MAGNESIUM HYDROXIDE, AND DIMETHICONE 30 ML: 400; 400; 40 SUSPENSION ORAL at 14:57

## 2019-06-07 RX ADMIN — NITROGLYCERIN 0.4 MG: 0.4 TABLET SUBLINGUAL at 04:08

## 2019-06-07 RX ADMIN — METOPROLOL TARTRATE 12.5 MG: 25 TABLET, FILM COATED ORAL at 15:10

## 2019-06-07 RX ADMIN — ATORVASTATIN CALCIUM 40 MG: 20 TABLET, FILM COATED ORAL at 12:13

## 2019-06-07 RX ADMIN — ACETAMINOPHEN 650 MG: 325 TABLET, FILM COATED ORAL at 21:11

## 2019-06-07 RX ADMIN — POTASSIUM CHLORIDE 40 MEQ: 1500 TABLET, EXTENDED RELEASE ORAL at 21:11

## 2019-06-07 RX ADMIN — ASPIRIN 81 MG 324 MG: 81 TABLET ORAL at 02:34

## 2019-06-07 RX ADMIN — LISINOPRIL 20 MG: 20 TABLET ORAL at 12:13

## 2019-06-07 RX ADMIN — HYDROCHLOROTHIAZIDE 12.5 MG: 12.5 CAPSULE ORAL at 12:13

## 2019-06-07 RX ADMIN — SERTRALINE HYDROCHLORIDE 200 MG: 100 TABLET ORAL at 12:13

## 2019-06-07 ASSESSMENT — ENCOUNTER SYMPTOMS
PALPITATIONS: 0
APPETITE CHANGE: 0
HEADACHES: 0
SHORTNESS OF BREATH: 1
LIGHT-HEADEDNESS: 0
FEVER: 0
CHILLS: 0
FATIGUE: 0
BACK PAIN: 0
VOMITING: 0
NAUSEA: 0
ABDOMINAL PAIN: 0
COUGH: 0
CHEST TIGHTNESS: 0

## 2019-06-07 ASSESSMENT — MIFFLIN-ST. JEOR
SCORE: 1694
SCORE: 1588.27

## 2019-06-07 NOTE — ED PROVIDER NOTES
"     Emergency Department Patient Sign-out       Brief HPI:  This is a 63 year old female signed out to me by Dr.C Sullivan at shift change at 6AM .  See initial ED note by Dr MONALISA Sullivan for details of the presentation, plan of care, evaluation and assessment prior to assuming care in the emergency department.  Briefly by report patient presented for evaluation for concern for accelerating chest discomfort with activity concerning for ischemia.  Patient has comorbidities including obesity, hypertension, hyperlipidemia.  Plan of care at the time of signout is to admit to the medical service for further evaluation for cardiac rule out.  Patient had been signed out to Dr. HANS Zuniga-the admitting hospitalist who agreed to assume care on admission.  Patient was awaiting transfer to the medical service for further care.        Significant Events prior to my assuming care: Blood work, EKG, d-dimer, medications aspirin and nitroglycerin.    Significant Events prior to my assuming care: I reviewed Dr. Adams Sullivan's ED note.  I also reviewed diagnostic evaluation and studies include  EKG  obtained at 0225.  EKG showed concerning changes involving T wave depression with inversion in  leads I and aVL, V2 and V4 through V6.  Repeat troponin is pending.  Patient was transferred to the medical floor without incident.      Exam:   Patient Vitals for the past 24 hrs:   BP Temp Temp src Pulse Heart Rate Resp SpO2 Height Weight   06/07/19 0635 176/87 -- -- -- 58 -- -- -- --   06/07/19 0629 (!) 193/94 97.7  F (36.5  C) Oral -- 60 16 95 % 1.626 m (5' 4\") 114.9 kg (253 lb 4.9 oz)   06/07/19 0430 (!) 158/95 -- -- 64 60 11 96 % -- --   06/07/19 0400 (!) 198/107 -- -- 60 60 16 95 % -- --   06/07/19 0300 (!) 165/92 -- -- 60 60 18 94 % -- --   06/07/19 0203 (!) 188/107 98  F (36.7  C) Oral -- 98 20 98 % 1.626 m (5' 4\") 104.3 kg (230 lb)           ED RESULTS:   Results for orders placed or performed during the hospital encounter of 06/07/19 (from " the past 24 hour(s))   CBC with platelets differential     Status: Abnormal    Collection Time: 06/07/19  2:02 AM   Result Value Ref Range    WBC 12.9 (H) 4.0 - 11.0 10e9/L    RBC Count 4.62 3.8 - 5.2 10e12/L    Hemoglobin 13.6 11.7 - 15.7 g/dL    Hematocrit 42.6 35.0 - 47.0 %    MCV 92 78 - 100 fl    MCH 29.4 26.5 - 33.0 pg    MCHC 31.9 31.5 - 36.5 g/dL    RDW 13.0 10.0 - 15.0 %    Platelet Count 352 150 - 450 10e9/L    Diff Method Automated Method     % Neutrophils 52.5 %    % Lymphocytes 37.7 %    % Monocytes 7.2 %    % Eosinophils 1.2 %    % Basophils 0.7 %    % Immature Granulocytes 0.7 %    Nucleated RBCs 0 0 /100    Absolute Neutrophil 6.8 1.6 - 8.3 10e9/L    Absolute Lymphocytes 4.9 0.8 - 5.3 10e9/L    Absolute Monocytes 0.9 0.0 - 1.3 10e9/L    Absolute Eosinophils 0.2 0.0 - 0.7 10e9/L    Absolute Basophils 0.1 0.0 - 0.2 10e9/L    Abs Immature Granulocytes 0.1 0 - 0.4 10e9/L    Absolute Nucleated RBC 0.0    Comprehensive metabolic panel     Status: Abnormal    Collection Time: 06/07/19  2:02 AM   Result Value Ref Range    Sodium 141 133 - 144 mmol/L    Potassium 3.2 (L) 3.4 - 5.3 mmol/L    Chloride 106 94 - 109 mmol/L    Carbon Dioxide 30 20 - 32 mmol/L    Anion Gap 5 3 - 14 mmol/L    Glucose 83 70 - 99 mg/dL    Urea Nitrogen 14 7 - 30 mg/dL    Creatinine 0.73 0.52 - 1.04 mg/dL    GFR Estimate 88 >60 mL/min/[1.73_m2]    GFR Estimate If Black >90 >60 mL/min/[1.73_m2]    Calcium 9.5 8.5 - 10.1 mg/dL    Bilirubin Total 0.3 0.2 - 1.3 mg/dL    Albumin 3.9 3.4 - 5.0 g/dL    Protein Total 8.1 6.8 - 8.8 g/dL    Alkaline Phosphatase 100 40 - 150 U/L    ALT 26 0 - 50 U/L    AST 23 0 - 45 U/L   Troponin I     Status: None    Collection Time: 06/07/19  2:02 AM   Result Value Ref Range    Troponin I ES <0.015 0.000 - 0.045 ug/L   D dimer quantitative     Status: None    Collection Time: 06/07/19  2:02 AM   Result Value Ref Range    D Dimer 0.3 0.0 - 0.50 ug/ml FEU       ED MEDICATIONS:   Medications   nitroGLYcerin  (NITROSTAT) sublingual tablet 0.4 mg (has no administration in time range)   lidocaine 1 % 0.1-1 mL (has no administration in time range)   lidocaine (LMX4) kit (has no administration in time range)   sodium chloride (PF) 0.9% PF flush 3 mL (has no administration in time range)   sodium chloride (PF) 0.9% PF flush 3 mL (3 mLs Intracatheter Given 6/7/19 8067)   aspirin EC tablet 81 mg (has no administration in time range)   alum & mag hydroxide-simethicone (MYLANTA ES/MAALOX  ES) suspension 30 mL (has no administration in time range)   acetaminophen (TYLENOL) tablet 650 mg (has no administration in time range)   acetaminophen (TYLENOL) Suppository 650 mg (has no administration in time range)   naloxone (NARCAN) injection 0.1-0.4 mg (has no administration in time range)   oxyCODONE (ROXICODONE) tablet 5-10 mg (has no administration in time range)   aspirin (ASA) chewable tablet 324 mg (324 mg Oral Given 6/7/19 9857)         Impression:    ICD-10-CM    1. Acute chest pain R07.9        Plan:    Admit to the medical service for further evaluation for exertional chest discomfort.      Antwan Curtis MD  06/07/19 6978

## 2019-06-07 NOTE — ED PROVIDER NOTES
History     Chief Complaint   Patient presents with     Chest Pain     CP for the past few days has gotten worse tonight to left shoulder     HPI  Karyn Gamez is a 63 year old female with a history of hypertension, hyperlipidemia, obesity, and previous strokes presenting for evaluation of chest pain.  Patient reports left-sided chest pain radiating to her left arm over the past few days.  Pain is worse with any kind of exertion or movement.  Pain especially worse with laying on her left side.  Patient does report some increased difficulty breathing but no sweating or nausea.  No previous similar symptoms.  No known injury.  She does admit to increased physical activity with shoveling compost recently.    Allergies:  No Known Allergies    Problem List:    Patient Active Problem List    Diagnosis Date Noted     Morbid obesity (H) 08/25/2018     Priority: Medium     Advanced directives, counseling/discussion 09/23/2016     Priority: Medium     Advance Care Planning 9/23/2016: Patient declined information at this time             Elevated fasting glucose 04/19/2013     Priority: Medium     January 15, 2014 A1c 5.9. Address lifestyle. Repeat A1c in 6-12 months.       Cerebral embolism with cerebral infarction (H) 08/13/2011     Priority: Medium     January 16, 2014   no reoccurrence, no residual neurologic deficits.        Obesity 05/26/2011     Priority: Medium     April 18, 2013 Body mass index is 38.94 kg/(m^2)., addressed.          Hypertensive hypertrophic cardiomyopathy (H) 05/11/2010     Priority: Medium     May 26, 2011  echo done 4/10 showed concentric hypertrophy with LVH, EF 71%. Angiogram showed mild 3 vessel disease. On ace inhibitor.        Hyperlipidemia LDL goal <100 12/19/2007     Priority: Medium     April 18, 2013  muscle pain with simvastatin and Crestor. Currently tolerating 20 mg atorvastatin well. LDL: 105, down from 197 without statin,  repeat LDL in 6 months.       Moderate major  depression (H) 2006     Priority: Medium     January 15, 2014  doing well on zoloft. PHQ 9: 18, but I believe she's doing better then her scores represent. No change in medications. Follow up 6 months.         HTN, goal below 140/80 2005     Priority: Medium     January 15, 2014  within guide lines,  on lisinopri, 40 mg daily and HCTZ 12.5 mg daily. bp 120/80. K+: 4.1GFR: 71, microalbuminuria neg.  Follow up 6 months. History of CVA. Secondary prevention.          Past Medical History:    Past Medical History:   Diagnosis Date     Cervicalgia 2005     Coronary artery disease      Depressive disorder, not elsewhere classified      Hypertension      Obesity, unspecified        Past Surgical History:    Past Surgical History:   Procedure Laterality Date     ANGIOGRAM      negative     HYSTERECTOMY, PAP NO LONGER INDICATED      BSO       Family History:    Family History   Problem Relation Age of Onset     C.A.D. Father         first MI at 53, stenting x2     C.A.D. Mother         dx in her mid 50's     C.A.D. Brother         MI in mid-50's     Cerebrovascular Disease Brother         in late 50's       Social History:  Marital Status:   [2]  Social History     Tobacco Use     Smoking status: Former Smoker     Packs/day: 0.50     Years: 30.00     Pack years: 15.00     Last attempt to quit: 2011     Years since quittin.8     Smokeless tobacco: Never Used     Tobacco comment: smoking 3-4 cigs per day   Substance Use Topics     Alcohol use: Yes     Comment: rarely     Drug use: No        Medications:      atorvastatin (LIPITOR) 40 MG tablet   lisinopril-hydrochlorothiazide (PRINZIDE/ZESTORETIC) 20-12.5 MG tablet   NITROGLYCERIN 0.4 MG SL SUBL   sertraline (ZOLOFT) 100 MG tablet         Review of Systems   Constitutional: Negative for appetite change, chills, fatigue and fever.   HENT: Negative for congestion.    Respiratory: Positive for shortness of breath (mild with exertion). Negative  "for cough and chest tightness.    Cardiovascular: Positive for chest pain. Negative for palpitations.   Gastrointestinal: Negative for abdominal pain, nausea and vomiting.   Musculoskeletal: Negative for back pain.   Skin: Negative for rash.   Neurological: Negative for light-headedness and headaches.   All other systems reviewed and are negative.      Physical Exam   BP: (!) 188/107  Pulse: 60  Heart Rate: 98  Temp: 98  F (36.7  C)  Resp: 20  Height: 162.6 cm (5' 4\")  Weight: 104.3 kg (230 lb)  SpO2: 98 %      Physical Exam   Constitutional: She is oriented to person, place, and time. She appears well-developed and well-nourished.   HENT:   Head: Normocephalic and atraumatic.   Eyes: Conjunctivae are normal.   Cardiovascular: Normal rate and regular rhythm.   Pulmonary/Chest: Effort normal.   Abdominal: Soft. There is no tenderness.   Musculoskeletal: Normal range of motion. She exhibits no edema.   Neurological: She is alert and oriented to person, place, and time.   Skin: Skin is warm and dry. Capillary refill takes less than 2 seconds.   Psychiatric: She has a normal mood and affect.   Nursing note and vitals reviewed.      ED Course        Procedures               EKG Interpretation:      Interpreted by Adams Sullivan  Time reviewed: 0211  Symptoms at time of EKG: chest pain   Rhythm: normal sinus   Rate: Normal  Axis: Normal  Ectopy: none  Conduction: normal  ST Segments/ T Waves: ST depression in leads I, aVL, V6 with T wave inversions in 1, aVL, V5, V6.  ST elevation minimally present in V1 and lead III  Q Waves: III and aVr  Comparison to prior: Distinctly changed from previous EKG dated 1/24/06    Clinical Impression: Sinus rhythm with signs of lateral ischemia concerning for early ischemia             EKG Interpretation:      Interpreted by Adams Sullivan  Time reviewed:0230   Symptoms at time of EKG: chest pain   Rhythm: Normal sinus   Rate: Normal  Axis: Normal  Ectopy: None  Conduction: " Normal  ST Segments/ T Waves: Lateral ST depressions with T wave inversions suggestive of ischemia  Q Waves: None, v1, III and aVr  Comparison to prior: Unchanged    Clinical Impression: Sinus rhythm with lateral ST changes suggestive of ischemia, unchanged from previous                 Results for orders placed or performed during the hospital encounter of 06/07/19 (from the past 24 hour(s))   CBC with platelets differential   Result Value Ref Range    WBC 12.9 (H) 4.0 - 11.0 10e9/L    RBC Count 4.62 3.8 - 5.2 10e12/L    Hemoglobin 13.6 11.7 - 15.7 g/dL    Hematocrit 42.6 35.0 - 47.0 %    MCV 92 78 - 100 fl    MCH 29.4 26.5 - 33.0 pg    MCHC 31.9 31.5 - 36.5 g/dL    RDW 13.0 10.0 - 15.0 %    Platelet Count 352 150 - 450 10e9/L    Diff Method Automated Method     % Neutrophils 52.5 %    % Lymphocytes 37.7 %    % Monocytes 7.2 %    % Eosinophils 1.2 %    % Basophils 0.7 %    % Immature Granulocytes 0.7 %    Nucleated RBCs 0 0 /100    Absolute Neutrophil 6.8 1.6 - 8.3 10e9/L    Absolute Lymphocytes 4.9 0.8 - 5.3 10e9/L    Absolute Monocytes 0.9 0.0 - 1.3 10e9/L    Absolute Eosinophils 0.2 0.0 - 0.7 10e9/L    Absolute Basophils 0.1 0.0 - 0.2 10e9/L    Abs Immature Granulocytes 0.1 0 - 0.4 10e9/L    Absolute Nucleated RBC 0.0    Comprehensive metabolic panel   Result Value Ref Range    Sodium 141 133 - 144 mmol/L    Potassium 3.2 (L) 3.4 - 5.3 mmol/L    Chloride 106 94 - 109 mmol/L    Carbon Dioxide 30 20 - 32 mmol/L    Anion Gap 5 3 - 14 mmol/L    Glucose 83 70 - 99 mg/dL    Urea Nitrogen 14 7 - 30 mg/dL    Creatinine 0.73 0.52 - 1.04 mg/dL    GFR Estimate 88 >60 mL/min/[1.73_m2]    GFR Estimate If Black >90 >60 mL/min/[1.73_m2]    Calcium 9.5 8.5 - 10.1 mg/dL    Bilirubin Total 0.3 0.2 - 1.3 mg/dL    Albumin 3.9 3.4 - 5.0 g/dL    Protein Total 8.1 6.8 - 8.8 g/dL    Alkaline Phosphatase 100 40 - 150 U/L    ALT 26 0 - 50 U/L    AST 23 0 - 45 U/L   Troponin I   Result Value Ref Range    Troponin I ES <0.015 0.000 - 0.045  "ug/L   D dimer quantitative   Result Value Ref Range    D Dimer 0.3 0.0 - 0.50 ug/ml FEU       Medications   nitroGLYcerin (NITROSTAT) sublingual tablet 0.4 mg (0.4 mg Sublingual Given 6/7/19 0408)   aspirin (ASA) chewable tablet 324 mg (324 mg Oral Given 6/7/19 4188)     Patient Vitals for the past 24 hrs:   BP Temp Temp src Pulse Heart Rate Resp SpO2 Height Weight   06/07/19 0430 (!) 158/95 -- -- 64 60 11 96 % -- --   06/07/19 0400 (!) 198/107 -- -- 60 60 16 95 % -- --   06/07/19 0300 (!) 165/92 -- -- 60 60 18 94 % -- --   06/07/19 0203 (!) 188/107 98  F (36.7  C) Oral -- 98 20 98 % 1.626 m (5' 4\") 104.3 kg (230 lb)         3:40 AM:Trop neg. Repeat ekg unchanged.  Low risk by Wells for PE, Age >50 so cannot rule out by PERC. Dimer added on.     4:50 AM Patient re-assessed: Chest pain resolved after nitroglycerin.  Remains chest pain-free.  Blood pressure slightly improved.  Patient advised of my concern about her abnormal EKG and symptom improvement with nitro.  Recommended admission for further chest pain work-up.    5:38 AM; Discussed with Dr Zuniga.  Accepts for obs.    Assessments & Plan (with Medical Decision Making)  63-year-old female with a history of hypertension, hyperlipidemia, obesity, and coronary artery disease clinic for evaluation of chest pain.  Symptoms waxing and waning but appear to be exertional per her description.  Still with some active chest pain at rest upon arrival which resolved with nitroglycerin.  Initial EKG showed some lateral ischemic changes but were unchanged on repeat.  Troponin negative.  Symptoms fully resolved after single nitro.  Was given aspirin as well.  Given the resolution of her pain, admitted to hospital under observation for further chest pain rule out with serial troponins and stress testing.     I have reviewed the nursing notes.    I have reviewed the findings, diagnosis, plan and need for follow up with the patient.          Medication List      There are no " discharge medications for this visit.         Final diagnoses:   Acute chest pain       6/7/2019   [facility] EMERGENCY DEPARTMENT     Sullivan, Adams Espitia MD  06/07/19 9979

## 2019-06-07 NOTE — PROGRESS NOTES
Report called to LORELEI Burgos for transfer to [facility].      Patient left via ambulance at 1600. Condition stable. Daughter present.

## 2019-06-07 NOTE — PROVIDER NOTIFICATION
06/07/19 1433 06/07/19 1455   Vital Signs   BP (!) 186/92 (!) 196/102  (at rest)   BP Location Right arm Right arm   Dr Zuniga informed of blood pressure readings.

## 2019-06-07 NOTE — DISCHARGE SUMMARY
[facility], [place]    Transfer Summary  Hospital Medicine    Date of Admission:  6/7/2019  Date of Transfer:  6/7/2019   Discharging Provider: Priscila Honeycutt  Date of Service: 6/7/2019     Primary Care     Cristiana Davis  [address] [place] [address]      Identification and Chief Compaint: Karyn Gamez is a 63 year old female who presented on 6/7/2019 with complaint of chest pain.    Reason for Transfer:  Cardiology consult, potential coronary angiogram    Transfer Diagnosis:  Chest pain with ECG changes  Transfer hospital: [facility], discussed with accepting hospitalist, Dr. Mccray as well as consulting cardiologist    Medications Prior to Admission   Medication Sig Dispense Refill Last Dose     atorvastatin (LIPITOR) 40 MG tablet TAKE 1 TABLET(40 MG) BY MOUTH DAILY FOR CHOLESTEROL 30 tablet 0 6/6/2019 at am     lisinopril-hydrochlorothiazide (PRINZIDE/ZESTORETIC) 20-12.5 MG tablet TAKE 1 TABLET BY MOUTH DAILY 90 tablet 1 6/6/2019 at am     sertraline (ZOLOFT) 100 MG tablet TAKE 2 TABLETS(200 MG) BY MOUTH DAILY 60 tablet 0 6/6/2019 at am     Hospital Medications  Current Facility-Administered Medications   Medication     acetaminophen (TYLENOL) Suppository 650 mg     acetaminophen (TYLENOL) tablet 650 mg     alum & mag hydroxide-simethicone (MYLANTA ES/MAALOX  ES) suspension 30 mL     [START ON 6/8/2019] aspirin (ASA) EC tablet 325 mg     atorvastatin (LIPITOR) tablet 40 mg     lisinopril (PRINIVIL/ZESTRIL) tablet 20 mg    And     hydrochlorothiazide (MICROZIDE) capsule 12.5 mg     lidocaine (LMX4) kit     lidocaine 1 % 0.1-1 mL     naloxone (NARCAN) injection 0.1-0.4 mg     nitroGLYcerin (NITROSTAT) sublingual tablet 0.4 mg     oxyCODONE (ROXICODONE) tablet 5-10 mg     sertraline (ZOLOFT) tablet 200 mg     sodium chloride (PF) 0.9% PF flush 3 mL     sodium chloride (PF) 0.9% PF flush 3 mL     Allergies   No Known Allergies    Consultations This  "Hospital Stay   No consultations were requested during this admission    Significant Results and Procedures   Procedures    None    Data    Lab Results   Component Value Date    TROPI 0.020 06/07/2019    TROPI <0.015 06/07/2019      CBC RESULTS:   Recent Labs   Lab Test 06/07/19  0202   WBC 12.9*   RBC 4.62   HGB 13.6   HCT 42.6   MCV 92   MCH 29.4   MCHC 31.9   RDW 13.0        Last Comprehensive Metabolic Panel:  Sodium   Date Value Ref Range Status   06/07/2019 141 133 - 144 mmol/L Final     Potassium   Date Value Ref Range Status   06/07/2019 3.2 (L) 3.4 - 5.3 mmol/L Final     Chloride   Date Value Ref Range Status   06/07/2019 106 94 - 109 mmol/L Final     Carbon Dioxide   Date Value Ref Range Status   06/07/2019 30 20 - 32 mmol/L Final     Anion Gap   Date Value Ref Range Status   06/07/2019 5 3 - 14 mmol/L Final     Glucose   Date Value Ref Range Status   06/07/2019 83 70 - 99 mg/dL Final     Urea Nitrogen   Date Value Ref Range Status   06/07/2019 14 7 - 30 mg/dL Final     Creatinine   Date Value Ref Range Status   06/07/2019 0.73 0.52 - 1.04 mg/dL Final     GFR Estimate   Date Value Ref Range Status   06/07/2019 88 >60 mL/min/[1.73_m2] Final     Comment:     Non  GFR Calc  Starting 12/18/2018, serum creatinine based estimated GFR (eGFR) will be   calculated using the Chronic Kidney Disease Epidemiology Collaboration   (CKD-EPI) equation.       Calcium   Date Value Ref Range Status   06/07/2019 9.5 8.5 - 10.1 mg/dL Final     Bilirubin Total   Date Value Ref Range Status   06/07/2019 0.3 0.2 - 1.3 mg/dL Final     Alkaline Phosphatase   Date Value Ref Range Status   06/07/2019 100 40 - 150 U/L Final     ALT   Date Value Ref Range Status   06/07/2019 26 0 - 50 U/L Final     AST   Date Value Ref Range Status   06/07/2019 23 0 - 45 U/L Final     History of Present Illness   (From H&P) \"Karyn Gamez is a 63 year old female who presents with chest pain.     4 days ago developed left-sided " "chest pain. It started when she laid down on her left side to go to sleep on Monday night. It lasted for about a minute and resolved when she rolled onto her back. She also had a chest pressure in the same area that would last about 10-20 minutes. She did have some radiation at times to the left shoulder and some altered sensation down her left arm. She did have some associated nausea. The pain was reproducible when she would push on a specific area on her left breast. She wondered if it was all soreness related to doing yard work all weekend. She denied exertional pain on admission though per ED note did report some exertional symptoms. The pain and pressure continued and yesterday the pressure sensation became more frequent. She then developed some associated shortness of breath but felt this may have been due to feeling anxious about going to the ED. Upon arrival to the ED she received a dose of nitroglycerin which improved her symptoms. Currently she feels a small amount of sharp pain and soreness intermittently. She does have family history of coronary artery disease with her brother and father having MIs in their 50s and she believes her estranged mother had an MI early as well. \"    Hospital Course   Karyn Gamez was admitted on 6/7/2019.  The following problems were addressed during her hospitalization:    Atypical Chest Pain and Pressure  ECG changes  Chest pain and pressure began 5 days ago, became more frequent evening prior to admission. Radiated to left shoulder intermittently. Associated single episode of shortness of breath, intermittent radiation to left shoulder and intermittent nausea. Not really exertional on my history though history per ED sounded more exertional. Reproducible. Now largely resolved following nitroglycerin given in ED. Vital signs show elevated blood pressure (prior to resuming home med).  Troponin negative x 2, EKG with slight ST depression in 1, aVL, V6 and T wave inversion " in 1 when compared to prior 2010. Received ASA in addition to nitroglycerin in the ED. Concern for cardiac ischemia given strong family history, multiple risk factors and known mild disease. May also be musculoskeletal related to recent yard work. Cardiac risk factors include: smoking, family history, personal history of mild CAD, hyperlipidemia, hypertension and pre-diabetes.  Discussed with cardiology at [facility] who recommends transfer for potential cardiac cath, recommends starting heparin if next troponin elevated.  - plan for transfer to [facility]  - monitor pending troponin, will start heparin prior to transfer if elevated  - prn nitroglycerin available     Coronary Artery Disease  Chronic. Angiogram 5/2010 showed mild disease in LAD, OM, RCA per report that was pasted into a cardiology note.   - re-started full-dose aspirin as previously recommended by neurology, continue statin     History of Stress-Induced cardiomyopathy  Left ventricular hypertrophy  Admitted in 2010, diagnosed with stress induced cardiomyopathy. Last echo in 2011 showed normal EF, LVH.  - continue home ACEi     Hypertension  Chronic. Blood pressures reviewed, elevated since presentation -180.   - continue home lisinopril-hydrochlorothiazide      Hyperlipidemia  Chronic.  - continue home atorvastatin     Cerebrovascular disease  Occurred in 2011, no residual defects. Recommended high dose aspirin at time of discharge.  - restart home full dose ASA, continue statin     Depression  Chronic  - continue home sertraline     Pre-diabetes  Last hemoglobin A1c 6.2% on 5/2018.  - repeat hemoglobin A1c     Obesity  BMI 43.4. Contributes to above.    Pending Results   Unresulted Labs Ordered in the Past 30 Days of this Admission     No orders found from 4/8/2019 to 6/8/2019.        Physical Exam   Temp:  [96.7  F (35.9  C)-98  F (36.7  C)] 97.2  F (36.2  C)  Pulse:  [60-64] 64  Heart Rate:  [56-98] 59  Resp:  [11-20]  18  BP: (158-198)/() 160/82  SpO2:  [92 %-98 %] 92 %  Vitals:    06/07/19 0203 06/07/19 0629   Weight: 104.3 kg (230 lb) 114.9 kg (253 lb 4.9 oz)     Constitutional: appears well, standing up beside bed, no distress  CV: Regular rate and rhythm. Bilateral lower extremity edema to the shin  Respiratory: Clear to auscultation bilaterally  GI: Soft, non-tender.  Skin: Warm and dry    The transfer was discussed with the patient who initially was unsure of transfer due to concerns with expense. Ultimately agreeable, wanting the [facility] though no beds currently available. Agreeable to transfer to [facility]. Transfer also discussed with supervising physician, accepting hospitalist and consulting cardiologist.     Total time on this transfer was => 30 minutes    Priscila Honeycutt

## 2019-06-07 NOTE — H&P
[facility], [place]    History and Physical - Hospitalist Service       Date of Admission:  6/7/2019    Assessment & Plan   Karyn Gamez is a 63 year old female admitted on 6/7/2019. She has history of CVA, coronary artery disease, stress-induce cardiomyopathy, hypertension, hyperlipidemia, depression and obesity. She presents with intermittent chest pain for 5 days, becoming more frequent over past 12 hours.     Atypical Chest Pain and Pressure  ECG changes  Chest pain and pressure began 5 days ago, became more frequent evening prior to admission. Radiated to left shoulder intermittently. Associated single episode of shortness of breath, intermittent radiation to left shoulder and intermittent nausea. Not really exertional on my history though history per ED sounded more exertional. Reproducible.  Now largely resolved following nitroglycerin given in ED. Vital signs show elevated blood pressure.  Troponin negative x 2, EKG with slight ST depression in 1, aVL, V6. Received ASA in addition to nitroglycerin in the ED. Concern for cardiac ischemia given strong family history and known mild disease. May also be musculoskeletal related to recent yard work. Cardiac risk factors include: smoking, family history, personal history of mild CAD, hyperlipidemia, hypertension and pre-diabetes.  - continuous tele monitoring  - EKG prn chest pain   - serial troponins  - plan for stress test, will discuss with cardiology to see if this can be done as an outpatient vs inpatient  - Nitroglycerine prn pain as well as acetaminophen and oxycodone prn   - Regular adult diet, No caffeine  - order daily aspirin full dose as previously recommended by neurology, continue statin    Coronary Artery Disease  Chronic. Angiogram 5/2010 showed mild disease in LAD, OM, RCA per report that was pasted into a cardiology note.   - start aspirin will do full dose as previously recommended by neurology  - continue statin    History  of Stress-Induced cardiomyopathy  Left ventricular hypertrophy  Admitted in 2010, diagnosed with stress induced cardiomyopathy. Last echo in 2011 showed normal EF, LVH.  - continue home ACEi    Hypertension  Chronic. Blood pressures reviewed, elevated since presentation -180.   - continue home lisinopril-hydrochlorothiazide     Hyperlipidemia  Chronic.  - continue home atorvastatin    Cerebrovascular disease  Occurred in 2011, no residual defects. Recommended high dose aspirin at time of discharge.  - restart home full dose ASA, continue statin    Depression  Chronic  - continue home sertraline    Pre-diabetes  Last hemoglobin A1c 6.2% on 5/2018.  - repeat hemoglobin A1c    Obesity  BMI 43.4. Contributes to above.     Diet: Combination Diet No Caffeine Diet    DVT Prophylaxis: Low Risk/Ambulatory with no VTE prophylaxis indicated  Jasmine Catheter: not present  Code Status: Full code, presumed, patient would like more time to think about code status.    Disposition Plan   Expected discharge: today or tomorrow, recommended to prior living arrangement once work up complete.  Entered: Priscila Honeycutt PA-C 06/07/2019, 9:30 AM     The patient's care was discussed with the Attending Physician, Dr. Hernan Zuniga and Patient.    Priscila Honeycutt PA-C  [facility]  ______________________________________________________________________    Chief Complaint   Chest pain    History is obtained from the patient    History of Present Illness   Karyn Gamez is a 63 year old female who presents with chest pain.    4 days ago developed left-sided chest pain. It started when she laid down on her left side to go to sleep on Monday night. It lasted for about a minute and resolved when she rolled onto her back. She also had a chest pressure in the same area that would last about 10-20 minutes. She did have some radiation at times to the left shoulder and some altered sensation down her left arm.  She did have some associated nausea. The pain was reproducible when she would push on a specific area on her left breast. She wondered if it was all soreness related to doing yard work all weekend. She denied exertional pain on admission though per ED note did report some exertional symptoms. The pain and pressure continued and yesterday the pressure sensation became more frequent. She then developed some associated shortness of breath but felt this may have been due to feeling anxious about going to the ED. Upon arrival to the ED she received a dose of nitroglycerin which improved her symptoms. Currently she feels a small amount of sharp pain and soreness intermittently. She does have family history of coronary artery disease with her brother and father having MIs in their 50s and she believes her estranged mother had an MI early as well.     Review of Systems    The 10 point Review of Systems is negative other than noted in the HPI or here.     Past Medical History    I have reviewed this patient's medical history and updated it with pertinent information if needed.   Past Medical History:   Diagnosis Date     Cervicalgia 6/29/2005 June 29, 2005: Thrown from a horse - wearing a helmet - and struck side of head and neck, heard crepitation, no loc, Able to walk after injury.  persistant pain in neck relieved with ibuprofen, stiff but can move with ROM.  No changes in hands and feet sensation/motor.     Coronary artery disease      Depressive disorder, not elsewhere classified      Hypertension      Obesity, unspecified      Past Surgical History   I have reviewed this patient's surgical history and updated it with pertinent information if needed.  Past Surgical History:   Procedure Laterality Date     ANGIOGRAM  2010    negative     HYSTERECTOMY, PAP NO LONGER INDICATED      BSO     Social History   I have reviewed this patient's social history and updated it with pertinent information if needed.  Social History      Tobacco Use     Smoking status: Former Smoker     Packs/day: 0.50     Years: 30.00     Pack years: 15.00     Last attempt to quit: 2011     Years since quittin.8     Smokeless tobacco: Never Used     Tobacco comment: smoking 3-4 cigs per day   Substance Use Topics     Alcohol use: Yes     Comment: rarely     Drug use: No     Family History   I have reviewed this patient's family history and updated it with pertinent information if needed.   Family History   Problem Relation Age of Onset     MONALISA.TALITA Father         first MI at 53, stenting x2     C.TALITA Mother         dx in her mid 50's     CNAKUL Brother         MI in mid-50's     Cerebrovascular Disease Brother         in late 50's     Prior to Admission Medications   Prior to Admission Medications   Prescriptions Last Dose Informant Patient Reported? Taking?   NITROGLYCERIN 0.4 MG SL SUBL   Yes No   Si TAB EVERY 5 MIN AS NEEDED, UP TO 3 PER EPISODE   atorvastatin (LIPITOR) 40 MG tablet   No No   Sig: TAKE 1 TABLET(40 MG) BY MOUTH DAILY FOR CHOLESTEROL   lisinopril-hydrochlorothiazide (PRINZIDE/ZESTORETIC) 20-12.5 MG tablet   No No   Sig: TAKE 1 TABLET BY MOUTH DAILY   sertraline (ZOLOFT) 100 MG tablet   No No   Sig: TAKE 2 TABLETS(200 MG) BY MOUTH DAILY      Facility-Administered Medications: None     Allergies   No Known Allergies    Physical Exam   Vital Signs: Temp: 96.7  F (35.9  C) Temp src: Oral BP: 173/86 Pulse: 64 Heart Rate: 56 Resp: 16 SpO2: 94 % O2 Device: None (Room air)    Weight: 253 lbs 4.94 oz    Constitutional: awake, alert, cooperative, no apparent distress, and appears stated age  Eyes: Lids and lashes normal, extra ocular muscles intact, sclera clear, conjunctiva normal  ENT: Normocephalic, without obvious abnormality, atraumatic, oral pharynx with moist mucous membranes.  Hematologic / Lymphatic: no cervical lymphadenopathy and no supraclavicular lymphadenopathy  Respiratory: No increased work of breathing, good air exchange,  clear to auscultation bilaterally, no crackles or wheezing  Cardiovascular: regular rate and rhythm, and no murmur noted. Lower extremity edema to the knee bilaterally.  GI: obese, normal bowel sounds, soft, non-distended, non-tender  Genitounirinary: Deferred  Skin: normal skin color, texture, turgor  Musculoskeletal: Normal bulk and tone. Moves all 4 extremities appropriately.  Neurologic: Awake, alert, oriented to name, place and time.    Neuropsychiatric: calm though somewhat anxious, appropriate thought process/content, short term memory intact.    Data   Data reviewed today: I reviewed all medications, new labs and imaging results over the last 24 hours. I personally reviewed the EKG tracing showing NSR, slight ST depressions in I, aVL and V6 when compared to prior from 2010.    Recent Labs   Lab 06/07/19  0817 06/07/19  0202   WBC  --  12.9*   HGB  --  13.6   MCV  --  92   PLT  --  352   NA  --  141   POTASSIUM  --  3.2*   CHLORIDE  --  106   CO2  --  30   BUN  --  14   CR  --  0.73   ANIONGAP  --  5   ANGELES  --  9.5   GLC  --  83   ALBUMIN  --  3.9   PROTTOTAL  --  8.1   BILITOTAL  --  0.3   ALKPHOS  --  100   ALT  --  26   AST  --  23   TROPI 0.020 <0.015     No results found for this or any previous visit (from the past 24 hour(s)).

## 2019-06-07 NOTE — PLAN OF CARE
"[facility] ADMISSION NOTE    Patient admitted to room 2306 at approximately 0630 via wheel chair from emergency room. Patient was accompanied by transport tech.     Verbal SBAR report received from LORELEI Austin prior to patient arrival.     Patient ambulated to bed independently. Patient alert and oriented X 3. The patient is not having any pain. 0-10 Pain Scale: 4. Admission vital signs: Blood pressure 176/87, pulse 64, temperature 97.7  F (36.5  C), temperature source Oral, resp. rate 16, height 1.626 m (5' 4\"), weight 114.9 kg (253 lb 4.9 oz), SpO2 95 %, not currently breastfeeding. Patient was oriented to plan of care, call light, bed controls, tv, telephone, bathroom and visiting hours.     Risk Assessment    The following safety risks were identified during admission: none. Yellow risk band applied: NO.     Skin Initial Assessment    Patient declined full skin check. Patient denies any skin concerns.          Maki Cabrera"

## 2019-06-07 NOTE — ED NOTES
"Patient has  [place] to Observation  order. Patient has been given the Observation brochure -  What does Observation mean to me.\"  Patient has been given the opportunity to ask questions about observation status and their plan of care.      LETICIA CLAUDIO    "

## 2019-06-07 NOTE — PROGRESS NOTES
/92, HR 60. EDINSON Lam notified. Dr. Zuniga also notified. Additional 12.5mg Lisinopril administered. Maalox given for indigestion. No additional order for potassium ordered due to upset stomach at this time.

## 2019-06-08 VITALS
TEMPERATURE: 93.5 F | HEART RATE: 60 BPM | DIASTOLIC BLOOD PRESSURE: 80 MMHG | RESPIRATION RATE: 16 BRPM | SYSTOLIC BLOOD PRESSURE: 131 MMHG | OXYGEN SATURATION: 95 % | BODY MASS INDEX: 41.9 KG/M2 | WEIGHT: 244.1 LBS

## 2019-06-08 LAB
ANION GAP SERPL CALCULATED.3IONS-SCNC: 4 MMOL/L (ref 3–14)
BUN SERPL-MCNC: 17 MG/DL (ref 7–30)
CALCIUM SERPL-MCNC: 9.1 MG/DL (ref 8.5–10.1)
CHLORIDE SERPL-SCNC: 107 MMOL/L (ref 94–109)
CO2 SERPL-SCNC: 30 MMOL/L (ref 20–32)
CREAT SERPL-MCNC: 0.67 MG/DL (ref 0.52–1.04)
ERYTHROCYTE [DISTWIDTH] IN BLOOD BY AUTOMATED COUNT: 13.3 % (ref 10–15)
GFR SERPL CREATININE-BSD FRML MDRD: >90 ML/MIN/{1.73_M2}
GLUCOSE BLDC GLUCOMTR-MCNC: 125 MG/DL (ref 70–99)
GLUCOSE BLDC GLUCOMTR-MCNC: 96 MG/DL (ref 70–99)
GLUCOSE SERPL-MCNC: 104 MG/DL (ref 70–99)
HCT VFR BLD AUTO: 38.2 % (ref 35–47)
HGB BLD-MCNC: 12.9 G/DL (ref 11.7–15.7)
MCH RBC QN AUTO: 30.2 PG (ref 26.5–33)
MCHC RBC AUTO-ENTMCNC: 33.8 G/DL (ref 31.5–36.5)
MCV RBC AUTO: 90 FL (ref 78–100)
PLATELET # BLD AUTO: 291 10E9/L (ref 150–450)
POTASSIUM SERPL-SCNC: 4 MMOL/L (ref 3.4–5.3)
RBC # BLD AUTO: 4.27 10E12/L (ref 3.8–5.2)
SODIUM SERPL-SCNC: 141 MMOL/L (ref 133–144)
WBC # BLD AUTO: 8.8 10E9/L (ref 4–11)

## 2019-06-08 PROCEDURE — 80048 BASIC METABOLIC PNL TOTAL CA: CPT | Performed by: INTERNAL MEDICINE

## 2019-06-08 PROCEDURE — 36415 COLL VENOUS BLD VENIPUNCTURE: CPT | Performed by: INTERNAL MEDICINE

## 2019-06-08 PROCEDURE — 99217 ZZC OBSERVATION CARE DISCHARGE: CPT | Performed by: INTERNAL MEDICINE

## 2019-06-08 PROCEDURE — 99207 ZZC CDG-CODE CATEGORY CHANGED: CPT | Performed by: INTERNAL MEDICINE

## 2019-06-08 PROCEDURE — 85027 COMPLETE CBC AUTOMATED: CPT | Performed by: INTERNAL MEDICINE

## 2019-06-08 PROCEDURE — 25000132 ZZH RX MED GY IP 250 OP 250 PS 637: Performed by: HOSPITALIST

## 2019-06-08 PROCEDURE — 00000146 ZZHCL STATISTIC GLUCOSE BY METER IP

## 2019-06-08 PROCEDURE — 25000132 ZZH RX MED GY IP 250 OP 250 PS 637: Performed by: INTERNAL MEDICINE

## 2019-06-08 PROCEDURE — 99204 OFFICE O/P NEW MOD 45 MIN: CPT | Performed by: INTERNAL MEDICINE

## 2019-06-08 PROCEDURE — G0378 HOSPITAL OBSERVATION PER HR: HCPCS

## 2019-06-08 RX ADMIN — ATORVASTATIN CALCIUM 40 MG: 40 TABLET, FILM COATED ORAL at 09:16

## 2019-06-08 RX ADMIN — POTASSIUM CHLORIDE 20 MEQ: 1500 TABLET, EXTENDED RELEASE ORAL at 01:59

## 2019-06-08 RX ADMIN — LISINOPRIL 20 MG: 20 TABLET ORAL at 09:16

## 2019-06-08 RX ADMIN — SERTRALINE HYDROCHLORIDE 200 MG: 100 TABLET ORAL at 09:16

## 2019-06-08 NOTE — PLAN OF CARE
Patient admitted via ambulance. Denies pain/nausea/dyspnea. VSS. A/O. Independent. Appetite good. IV saline locked. Dr. Macias here to admit patient.

## 2019-06-08 NOTE — PLAN OF CARE
VSS: wnl for this pt  Neuro/Orientation: A&Ox4  Transfer/ activity: independent in room  LS/O2 sats: clear, diminished at bases  CV/Rhythm: SB  GI/diet:mod carb  /Jasmine:  Lines/ drains/ IVs/ GTT: SL  Procedures/Test results/Labs: cards work up  Arterial sites/Bruit/ CMS /hematoma: na  Isolation/Wounds/ Drains/ Dressing: na  Pain/PRN meds: na  Behaviors/ sitters/ CIWA:  Edema: none  Discharge Plan: pending cards work up  Other: potasium replaced this shift, recheck 4.0  Will pass on and continue to monitor

## 2019-06-08 NOTE — PROGRESS NOTES
Pt has been pain free with V/signs stable and maintaining her O2 sats in the mid 90's on Rm air. K+ on Adm 3.2 but after replacement recheck K+ 4.0 this morning.  Pt  Is now ready for discharge to home with her  providing transportation. Reviewed discharge instructions with Pt regarding Out patient Stress echo and U/S of lower extremities to be scheduled this coming week, Follow-up appts and Home Medications. No New RX's were ordered upon discharge.Remains in NSR.

## 2019-06-08 NOTE — DISCHARGE SUMMARY
[facility]  Hospitalist Discharge Summary       Date of Admission:  6/7/2019  Date of Discharge:  6/8/2019  Discharging Provider: Perez Vallejo DO      Discharge Diagnoses   1. Non-cardiac chest pain  2. H/o CAD   3. Possible subclavian steal vs PAD   4. HLP   5. HTN   6. Depression   7. Continue tobacco abuse   8. DM, suspect type II     Follow-ups Needed After Discharge   Follow-up Appointments     Follow-up and recommended labs and tests       Follow up with primary care provider, Cristiana Davis, within 1-2 weeks,   for hospital follow- up. The following labs/tests are recommended:   outpatient stress test with cardiology.  Consider arterial doppler US of   the upper extremities to evaluate for possible subclavian steal   Follow up with cardiology in 1 week for outpatient stress test per their   recommendations             Unresulted Labs Ordered in the Past 30 Days of this Admission     No orders found for last 61 day(s).          Discharge Disposition   Discharged to home  Condition at discharge: Stable    Hospital Course   Chest pain  Known mild CAD  Patient describes five days of left sided pressure chest pain brought on by exertion, but also reproducible at times by laying on her left side. Improved with nitroglycerin in ED. EKG is not entirely normal with nonspecific TWI and nonspecific ST changes that was stable on repeat. Troponins also 0.02-->0.018-->0.016 which is not entirely normal either. Angiogram in 2010 noted mild CAD. Patient is a smoker, has new diabetes, HLD, obesity, and family risk factors.  Serial troponins were negative   - ASA  - Seen by cardiology.  Plan for outpatient follow with likely stress testing    Possible subclavian steal vs PAD  Patient reports approximately 30-40 mmHg difference in blood pressures between their upper extremities, R>L.  She has a smoking history which puts her at higher risk for PAD.  This was an incidental finding and patient is  otherwise asymptomatic  - Defer further work up to outpatient setting.  May consider arterial US of the extremities to check flow      New Diabetes  Hgb A1c is 6.8% this admission which technically makes her a diabetic, this would be her first diagnosis.  - Outpatient follow up         Consultations This Hospital Stay   CARDIOLOGY IP CONSULT    Code Status   Full Code    Time Spent on this Encounter   IPerez, personally saw the patient today and spent less than or equal to 30 minutes discharging this patient.       Perez Vallejo, DO  [facility]  ______________________________________________________________________    Physical Exam   Vital Signs: Temp: 93.5  F (34.2  C) Temp src: Oral BP: 131/80 Pulse: 60 Heart Rate: 63 Resp: 16 SpO2: 95 % O2 Device: None (Room air)    Weight: 244 lbs 1.6 oz  General Appearance: Resting comfortably.  NAD  Respiratory: Clear to auscultation.  No respiratory distress  Cardiovascular: RRR.  No murmurs  GI: Bowel sounds present.  Non-tender  Skin: No rashes  Other: No edema         Primary Care Physician   Cristiana Davis    Discharge Orders      Reason for your hospital stay    Non-cardiac chest pain     Follow-up and recommended labs and tests     Follow up with primary care provider, Cristiana Davis, within 1-2 weeks, for hospital follow- up. The following labs/tests are recommended: outpatient stress test with cardiology.  Consider arterial doppler US of the upper extremities to evaluate for possible subclavian steal   Follow up with cardiology in 1 week for outpatient stress test per their recommendations     Activity    Your activity upon discharge: activity as tolerated     Diet    Follow this diet upon discharge: Orders Placed This Encounter      Moderate Consistent CHO Diet       Significant Results and Procedures   Most Recent 3 CBC's:  Recent Labs   Lab Test 06/08/19  0537 06/07/19  0202 10/14/16  0948   WBC 8.8 12.9* 9.9   HGB 12.9 13.6  13.2   MCV 90 92 89    352 297     Most Recent 3 BMP's:  Recent Labs   Lab Test 06/08/19  0537 06/07/19  0202 02/15/18  1002    141 141   POTASSIUM 4.0 3.2* 4.2   CHLORIDE 107 106 105   CO2 30 30 32   BUN 17 14 17   CR 0.67 0.73 0.70   ANIONGAP 4 5 4   ANGELES 9.1 9.5 9.4   * 83 132*   ,   Results for orders placed or performed during the hospital encounter of 02/15/18   *MA Screening Digital Bilateral    Narrative    SCREENING MAMMOGRAM, BILATERAL, DIGITAL w/CAD - 2/15/2018 3:35 PM.    BREAST SYMPTOMS: No current breast complaints.     COMPARISON:  7/23/2015, 5/5/2009.    BREAST DENSITY: Scattered fibroglandular densities.    COMMENTS: No findings of suspicion for malignancy.       Impression    IMPRESSION: BI-RADS CATEGORY: 1 - Negative.    RECOMMENDED FOLLOW-UP: Annual Mammography.  Recommend routine annual screening mammography.    Exam results letter mailed to patient.                AILEEN VILLAFUERTE MD       Discharge Medications   Current Discharge Medication List      CONTINUE these medications which have NOT CHANGED    Details   atorvastatin (LIPITOR) 40 MG tablet TAKE 1 TABLET(40 MG) BY MOUTH DAILY FOR CHOLESTEROL  Qty: 30 tablet, Refills: 0    Comments: Short fillonly must have OV and labs done to ck cholesterol June 2019  Associated Diagnoses: Hyperlipidemia LDL goal <100      lisinopril-hydrochlorothiazide (PRINZIDE/ZESTORETIC) 20-12.5 MG tablet TAKE 1 TABLET BY MOUTH DAILY  Qty: 90 tablet, Refills: 1    Comments: DUE in MAY 2019 for yearly office visit with provider  Associated Diagnoses: HTN, goal below 140/80      sertraline (ZOLOFT) 100 MG tablet TAKE 2 TABLETS(200 MG) BY MOUTH DAILY  Qty: 60 tablet, Refills: 0    Comments: Short fill  Pt must have OV  With Susan for ongoing refill [id number]  Associated Diagnoses: Major depressive disorder, recurrent episode, moderate (H)           Allergies   No Known Allergies

## 2019-06-08 NOTE — H&P
[facility]    History and Physical  Hospitalist       Date of Admission:  6/7/2019    Assessment & Plan   Karyn Gamez is a 63 year old female with PMH of mild coronary artery disease, tobacco use disorder, prediabetes, HTN, HLD, MDD, obesity, who presents with chest pain.    Chest pain  Known mild CAD  Patient describes five days of left sided pressure chest pain brought on by exertion, but also reproducible at times by laying on her left side. Improved with nitroglycerin in ED. EKG is not entirely normal with nonspecific TWI and nonspecific ST changes that was stable on repeat. Troponins also 0.02-->0.018-->0.016 which is not entirely normal either. Angiogram in 2010 noted mild CAD. Patient is a smoker, has new diabetes, HLD, obesity, and family risk factors.  - Tele  - nitroglycerin SL  - Cardiology consult for stress test vs cath  - Mod carb diet  - ASA    HTN:  - PTA lisinopril  - Hold hydrochlorothiazide    HLD: PTA atorvastatin    Depression: PTA Sertraline    New Diabetes: Hgb A1c is 6.8% this admission which technically makes her a diabetic, this would be her first diagnosis.  - Consider initiation of metformin at discharge, along with outpatient follow up    DVT Prophylaxis: Pneumatic Compression Devices  Code Status: Full Code    Disposition: Expected discharge in 1-2 days    Terrence Macias MD    Primary Care Physician   Cristiana Davis    Chief Complaint   Chest pain, transfer    History is obtained from the patient    History of Present Illness   Karyn Gamez is a 63 year old female who presents from [facility] for chest pain. She was admitted at [facility] earlier today. She reports chest pain for the past five days. The chest pain is left sided, pressure type, and happens throughout the day. Exertion seems to bring it on reliably. The pain was at times reproducible by laying on her left side. Initialyl she felt that she had pulled a muscle but now it no  longer feels that way. The frequency of the pain has been increasing and was most intense last night. Each episodes lasts for about 10 to 15 minutes.    Past Medical History    I have reviewed this patient's medical history and updated it with pertinent information if needed.   Past Medical History:   Diagnosis Date     Cervicalgia 6/29/2005 June 29, 2005: Thrown from a horse - wearing a helmet - and struck side of head and neck, heard crepitation, no loc, Able to walk after injury.  persistant pain in neck relieved with ibuprofen, stiff but can move with ROM.  No changes in hands and feet sensation/motor.     Coronary artery disease      Depressive disorder, not elsewhere classified      Hypertension      Obesity, unspecified        Past Surgical History   I have reviewed this patient's surgical history and updated it with pertinent information if needed.  Past Surgical History:   Procedure Laterality Date     ANGIOGRAM  2010    negative     HYSTERECTOMY, PAP NO LONGER INDICATED      BSO       Prior to Admission Medications   Prior to Admission Medications   Prescriptions Last Dose Informant Patient Reported? Taking?   atorvastatin (LIPITOR) 40 MG tablet 6/6/2019 at Unknown time  No Yes   Sig: TAKE 1 TABLET(40 MG) BY MOUTH DAILY FOR CHOLESTEROL   lisinopril-hydrochlorothiazide (PRINZIDE/ZESTORETIC) 20-12.5 MG tablet 6/6/2019 at Unknown time  No Yes   Sig: TAKE 1 TABLET BY MOUTH DAILY   sertraline (ZOLOFT) 100 MG tablet 6/6/2019 at Unknown time  No Yes   Sig: TAKE 2 TABLETS(200 MG) BY MOUTH DAILY      Facility-Administered Medications: None     Allergies   No Known Allergies    Social History   I have reviewed this patient's social history and updated it with pertinent information if needed. Karyn Flower Vera  reports that she quit smoking about 7 years ago. She has a 15.00 pack-year smoking history. She has never used smokeless tobacco. She reports that she drinks alcohol. She reports that she does not use  drugs.    Family History   I have reviewed this patient's family history and updated it with pertinent information if needed.   Family History   Problem Relation Age of Onset     C.A.D. Father         first MI at 53, stenting x2     C.A.D. Mother         dx in her mid 50's     C.A.LESLI. Brother         MI in mid-50's     Cerebrovascular Disease Brother         in late 50's       Review of Systems   The 10 point Review of Systems is negative other than noted in the HPI or here.     Physical Exam   Temp: 98.7  F (37.1  C) Temp src: Oral BP: 159/89   Heart Rate: 55 Resp: 18 SpO2: 93 % O2 Device: None (Room air)    Vital Signs with Ranges  Temp:  [96.7  F (35.9  C)-98.7  F (37.1  C)] 98.7  F (37.1  C)  Pulse:  [60-64] 60  Heart Rate:  [55-98] 55  Resp:  [11-20] 18  BP: (158-198)/() 159/89  SpO2:  [92 %-98 %] 93 %  0 lbs 0 oz    Constitutional: Female in NAD  Eyes: PERRL, nonicteric, normal ocular movements  Respiratory: CTAB, no wheezing or crackles  Cardiovascular: RRR, normal S1/2, no m/r/g  GI: No organomegaly, normoactive bowel sounds, nontender, nondistended  Vascular: Palpable peripheral pulses in upper and lower extremities, no lower extremity pitting edema  Skin: No rashes or scars  Musculoskeletal: Normal strength in UE and LE, moves all extremities  Neurologic: A&Ox3  Psychiatric: Appropriate affect and mood    Data   Data reviewed today:  I personally reviewed EKG .  Recent Labs   Lab 06/07/19  1807 06/07/19  1426 06/07/19  0817 06/07/19  0202   WBC  --   --   --  12.9*   HGB  --   --   --  13.6   MCV  --   --   --  92   PLT  --   --   --  352   NA  --   --   --  141   POTASSIUM  --   --   --  3.2*   CHLORIDE  --   --   --  106   CO2  --   --   --  30   BUN  --   --   --  14   CR  --   --   --  0.73   ANIONGAP  --   --   --  5   ANGELES  --   --   --  9.5   GLC  --   --   --  83   ALBUMIN  --   --   --  3.9   PROTTOTAL  --   --   --  8.1   BILITOTAL  --   --   --  0.3   ALKPHOS  --   --   --  100   ALT  --    --   --  26   AST  --   --   --  23   TROPI 0.016 0.018 0.020 <0.015       Imaging:  No results found for this or any previous visit (from the past 24 hour(s)).

## 2019-06-08 NOTE — CONSULTS
"Consult Date:  06/08/2019      REASON FOR CONSULTATION:  Chest pain.      Ms. Gamez is a 63-year-old female with a background history of obesity (body mass index 43), hypertension, hyperlipidemia, type 2 diabetes, active tobacco use, major depressive disorder, history of stress-induced cardiomyopathy, and mild coronary artery disease noted in 08/2010 a coronary angiogram.      HISTORY OF PRESENT ILLNESS:  Mrs. Gamez arrives to [facility] for further evaluation of chest discomfort for the past 5 days.  Notably, last weekend she was actively engaged and \"mulching\" with the use of a pitch fork.  Mrs. Gamez describes intermittent chest discomfort with a dull and sharp component, primarily on the left side of her chest.  Pain is able to be reproduced with positional changes, specifically lying on her left side as well as reaching across her chest with her left arm.  She also notes that perceived left arm weakness with the pain; the pain is not directly tied to exertion.  Pain is without associated symptoms including nausea, vomiting, diaphoresis, generalized weakness, shortness of breath, palpitations, presyncope or syncope.  The patient endorses no exertional limitations; however, she does have a sedentary lifestyle.  She denies heart failure syndrome.  She reports compliance with her medications.      Latest vital signs reveal a blood pressure 131/80, heart rate in the 60s.  Complete blood count and electrolyte panel are normal.  Troponin is negative.  ECG demonstrates left ventricular hypertrophy with a strain pattern, sinus rhythm.      SOCIAL HISTORY:  Active smoker, does engage in alcohol use.  Denies illicit drug use.      FAMILY HISTORY:  Notable for coronary artery disease in her brother, mother and father.      PHYSICAL EXAMINATION:   GENERAL:  Obese female, relaxed, sensorium clear, cognition intact, sitting in a chair reading at the bedside, cooperative.   HEENT:  No major abnormalities. "   VESSELS:  Non-elevated  JVP.   LUNGS:  Clear to auscultation bilaterally.   CARDIOVASCULAR:  Normal heart tones, regular rate and rhythm.   ABDOMEN:  Increased adiposity benign.   EXTREMITIES:  No edema.  Acceptable perfusion.      REVIEW OF SYSTEMS:  Otherwise negative except as what is already noted in the history of present illness.      ASSESSMENT AND PLAN:   1.  Chest pain, likely noncardiac.   2.  Mild coronary artery disease, nonobstructive.   3.  Hypertension.   4.  Hyperlipidemia.   5.  Type 2 diabetes.   6.  Active tobacco use.   7.  Obesity.   8.  History of stress-induced cardiomyopathy.   9.  Major depressive disorder.      Mrs. Gamez describes an atypical chest pain syndrome.   I do not feel that she needs further inpatient evaluation.  I would like her to be seen in the outpatient setting with preceding stress test.      The patient did endorse a known history of having differential blood pressures in her upper extremities.  This should be evaluated as well.  If there is a substantial difference greater than 12 mmHg, she should also receive an upper extremity ultrasound for further evaluation.      Thank you for this consultation.  We will sign off.  If there are any ongoing questions or concerns, feel free to contact the Cardiology Consult Service.         SHUKRI ZAVALA MD             D: 2019   T: 2019   MT: CHOLO      Name:     CAILIN GAMEZ   MRN:      [id number]        Account:       [id number]   :      1955           Consult Date:  2019      Document: [id number]

## 2019-06-19 ENCOUNTER — OFFICE VISIT (OUTPATIENT)
Dept: FAMILY MEDICINE | Facility: CLINIC | Age: 64
End: 2019-06-19
Payer: COMMERCIAL

## 2019-06-19 VITALS
WEIGHT: 247 LBS | BODY MASS INDEX: 42.17 KG/M2 | DIASTOLIC BLOOD PRESSURE: 80 MMHG | HEIGHT: 64 IN | TEMPERATURE: 99.1 F | SYSTOLIC BLOOD PRESSURE: 146 MMHG | OXYGEN SATURATION: 95 % | RESPIRATION RATE: 18 BRPM | HEART RATE: 69 BPM

## 2019-06-19 DIAGNOSIS — E11.8 TYPE 2 DIABETES MELLITUS WITH COMPLICATION, WITHOUT LONG-TERM CURRENT USE OF INSULIN (H): ICD-10-CM

## 2019-06-19 DIAGNOSIS — E78.5 HYPERLIPIDEMIA LDL GOAL <100: ICD-10-CM

## 2019-06-19 DIAGNOSIS — F33.1 MAJOR DEPRESSIVE DISORDER, RECURRENT EPISODE, MODERATE (H): ICD-10-CM

## 2019-06-19 DIAGNOSIS — I10 HTN, GOAL BELOW 140/80: ICD-10-CM

## 2019-06-19 PROCEDURE — 99214 OFFICE O/P EST MOD 30 MIN: CPT | Performed by: FAMILY MEDICINE

## 2019-06-19 RX ORDER — LISINOPRIL AND HYDROCHLOROTHIAZIDE 12.5; 2 MG/1; MG/1
1 TABLET ORAL DAILY
Qty: 90 TABLET | Refills: 1 | Status: SHIPPED | OUTPATIENT
Start: 2019-06-19 | End: 2020-01-22

## 2019-06-19 RX ORDER — METFORMIN HCL 500 MG
500 TABLET, EXTENDED RELEASE 24 HR ORAL
Qty: 90 TABLET | Refills: 1 | Status: SHIPPED | OUTPATIENT
Start: 2019-06-19 | End: 2020-01-22

## 2019-06-19 RX ORDER — ATORVASTATIN CALCIUM 40 MG/1
TABLET, FILM COATED ORAL
Qty: 90 TABLET | Refills: 1 | Status: SHIPPED | OUTPATIENT
Start: 2019-06-19 | End: 2020-01-22

## 2019-06-19 RX ORDER — SERTRALINE HYDROCHLORIDE 100 MG/1
TABLET, FILM COATED ORAL
Qty: 180 TABLET | Refills: 1 | Status: SHIPPED | OUTPATIENT
Start: 2019-06-19 | End: 2020-01-22

## 2019-06-19 ASSESSMENT — PAIN SCALES - GENERAL: PAINLEVEL: NO PAIN (0)

## 2019-06-19 ASSESSMENT — PATIENT HEALTH QUESTIONNAIRE - PHQ9: SUM OF ALL RESPONSES TO PHQ QUESTIONS 1-9: 15

## 2019-06-19 ASSESSMENT — MIFFLIN-ST. JEOR: SCORE: 1665.38

## 2019-06-19 NOTE — PROGRESS NOTES
Subjective     Karyn Gamez is a 63 year old female who presents to clinic today for the following health issues:    Chief Complaint   Patient presents with     Hospital F/U     chest pain 6/7-6/8     Hospital Follow-up Visit:  Hospital/Nursing Home/IP Rehab Facility: [facility]  Date of Admission: 6/7/19  Date of Discharge: 6/8/19  Reason(s) for Admission: Chest pain-Angina pectoris with movement and activity. Elevated blood pressure  Current status: Pain is not as intense, only present with movement and lying on left side at night. Denies shortness of breath.          Problems taking medications regularly:  None       Medication changes since discharge: None       Problems adhering to non-medication therapy:  None    Summary of hospitalization:  [facility] discharge summary reviewed  Diagnostic Tests/Treatments reviewed.  Follow up needed: none  Other Healthcare Providers Involved in Patient s Care:         None  Update since discharge: improved.     Post Discharge Medication Reconciliation: discharge medications reconciled, continue medications without change.  Plan of care communicated with patient     Coding guidelines for this visit:  Type of Medical   Decision Making Face-to-Face Visit       within 7 Days of discharge Face-to-Face Visit        within 14 days of discharge   Moderate Complexity [id number]   High Complexity [id number]            Hyperlipidemia Follow-Up  Atorvastatin 40 mg every day    Are you having any of the following symptoms?     Are you regularly taking any medication or supplement to lower your cholesterol?   Yes- lipitor    Are you having muscle aches or other side effects that you think could be caused by your cholesterol lowering medication?  No      Hypertension Follow-up  Lisinopril-hydrochlorothiazide 20-12.5 mg every day    Do you check your blood pressure regularly outside of the clinic? No     Are you following a low salt diet? Yes    Are your blood  "pressures ever more than 140 on the top number (systolic) OR more   than 90 on the bottom number (diastolic), for example 140/90? No      Reviewed and updated as needed this visit by Provider         Review of Systems   ROS COMP: Constitutional, HEENT, cardiovascular, pulmonary, gi and gu systems are negative, except as otherwise noted.      Objective    /80   Pulse 69   Temp 99.1  F (37.3  C) (Tympanic)   Resp 18   Ht 1.626 m (5' 4\")   Wt 112 kg (247 lb)   SpO2 95%   BMI 42.40 kg/m    Body mass index is 42.4 kg/m .  Physical Exam   GENERAL: healthy, alert and no distress  RESP: lungs clear to auscultation - no rales, rhonchi or wheezes  CV: regular rate and rhythm, normal S1 S2  ABDOMEN: soft, nontender  MS: no gross musculoskeletal defects noted, no edema  SKIN: no suspicious lesions or rashes  PSYCH: mentation appears normal, affect normal/bright    Diagnostic Test Results:      Results for orders placed or performed during the hospital encounter of 06/07/19   Troponin I   Result Value Ref Range    Troponin I ES 0.016 0.000 - 0.045 ug/L   Basic metabolic panel   Result Value Ref Range    Sodium 141 133 - 144 mmol/L    Potassium 4.0 3.4 - 5.3 mmol/L    Chloride 107 94 - 109 mmol/L    Carbon Dioxide 30 20 - 32 mmol/L    Anion Gap 4 3 - 14 mmol/L    Glucose 104 (H) 70 - 99 mg/dL    Urea Nitrogen 17 7 - 30 mg/dL    Creatinine 0.67 0.52 - 1.04 mg/dL    GFR Estimate >90 >60 mL/min/[1.73_m2]    GFR Estimate If Black >90 >60 mL/min/[1.73_m2]    Calcium 9.1 8.5 - 10.1 mg/dL   CBC with platelets   Result Value Ref Range    WBC 8.8 4.0 - 11.0 10e9/L    RBC Count 4.27 3.8 - 5.2 10e12/L    Hemoglobin 12.9 11.7 - 15.7 g/dL    Hematocrit 38.2 35.0 - 47.0 %    MCV 90 78 - 100 fl    MCH 30.2 26.5 - 33.0 pg    MCHC 33.8 31.5 - 36.5 g/dL    RDW 13.3 10.0 - 15.0 %    Platelet Count 291 150 - 450 10e9/L   Glucose by meter   Result Value Ref Range    Glucose 96 70 - 99 mg/dL   Glucose by meter   Result Value Ref Range    " Glucose 125 (H) 70 - 99 mg/dL       Assessment & Plan     (F33.1) Major depressive disorder, recurrent episode, moderate (H)  Comment: Doing well.  Plan: sertraline (ZOLOFT) 100 MG tablet        *Continue SSRI therapy.    (E11.8) Type 2 diabetes mellitus with complication, without long-term current use of insulin (H)  Comment: New diagnosis.  Will start metformin therapy and refer for diabetic education  Plan: metFORMIN (GLUCOPHAGE-XR) 500 MG 24 hr tablet      (I10) HTN, goal below 140/80  Comment: Systolic elevation.  Plan: lisinopril-hydrochlorothiazide         (PRINZIDE/ZESTORETIC) 20-12.5 MG tablet        Reviewed diet and lifestyle.    (E78.5) Hyperlipidemia LDL goal <100  Comment: Tolerating high intensity statin well. Denies any myalgias.   Plan: atorvastatin (LIPITOR) 40 MG tablet            Patient Instructions      *   The chest pain sounds like rib pain. Takes 6-8 weeks to go away.     *   It's okay to have a mammogram. [phone].     *   I'm okay with not doing a stress test.     *   Unfortunately, you now have diabetes. Need to start a medication called metformin. I'll send a prescription.     *  Call about setting up a mammogram: [phone].     *  Take a 81 mg aspirin daily.     *   Follow up appointment in 3 months.          Return in about 3 months (around 9/19/2019) for diabetes check.    Cristiana Davis MD  [facility]

## 2019-06-19 NOTE — NURSING NOTE
"Chief Complaint   Patient presents with     Hospital F/U     chest pain 6/7-6/8     Initial /80   Pulse 69   Temp 99.1  F (37.3  C) (Tympanic)   Resp 18   Ht 1.626 m (5' 4\")   Wt 112 kg (247 lb)   SpO2 95%   BMI 42.40 kg/m   Estimated body mass index is 42.4 kg/m  as calculated from the following:    Height as of this encounter: 1.626 m (5' 4\").    Weight as of this encounter: 112 kg (247 lb).    Medication Reconciliation: complete  Doris Avelar MA  "

## 2019-06-19 NOTE — PATIENT INSTRUCTIONS
Patient Education     Thanks for coming in today!    BP Readings from Last 6 Encounters:   06/19/19 146/80   06/08/19 131/80   06/07/19 (!) 196/102   04/17/19 156/84   09/25/18 138/86   08/25/18 176/90     Some tips and tricks to improve your eating habits with these basic guidelines: Read nutrition labels, limit your sodium (salt) intake to the recommended 1,500mg to 2,300 mg per day, which in turn can improve your blood pressure. Limit added sugars, cut out excess carbohydrates (cakes, cookies, muffins, breads, pasta, white rice, baked goods). Focus on healthy, non saturated fats that are liquid at room temperature and High Density Lipoproteins such as olive oil, almonds, and avocados to improve your cholesterol. Choose foods that are high in protein (grilled or baked chicken, eggs, fish, salmon) and a wide variety of fresh or frozen fruits and vegetables as opposed to canned. Avoid drinking beverages that are high in calories but low in nutrients. The suggested number of calories the average person should consume is 1,600 to 2,000 per day. Contact your healthcare provider to create a dietary plan that works best for you.   Lowering Your Blood Pressure with DASH  What is the DASH eating plan?  DASH (Dietary Approaches to Stop Hypertension) can help you prevent or lower high blood pressure. This eating plan provides the nutrients that help lower blood pressure: potassium, magnesium, calcium, protein and fiber.   If not controlled, high blood pressure may lead to heart disease, stroke or blindness.  This eating plan is rich in:     Fruits and vegetables    Whole grains    Fat-free or low-fat milk products    Fish and poultry (chicken, turkey, etc.)    Beans, seeds and nuts.  This eating plan is low in:     Salt and sodium    Sugar, sweets and drinks with sugar    Red meat, saturated fat and trans fat.  Lifestyle changes  Besides a healthy eating plan, other steps are needed to help control high blood pressure.      Stay at a healthy weight.    Be active for at least 30 minutes on most days.    If you drink alcohol, have no more than two drinks per day (for men) or one per day (for women).    If you take blood pressure medicine, take it as directed.  Losing weight with DASH  You can lose weight by eating fewer calories. It is best to take off pounds slowly over time. Talk to a dietitian about the best way to do this.  Staying active   To shed pounds, combine DASH with daily exercise. Start with a 15-minute walk each day. Slowly increase the time until you reach a total of 30 minutes on most days. To avoid weight gain, try for 60 minutes each day. Check with your doctor before starting any exercise program.  Tips for less sodium    Avoid processed foods. These may include baked goods, cereals, soy sauce and even antacids.    Cook with less salt. Don't bring the saltshaker to the table.    Season food with herbs, spices, lemon, lime, vinegar, wine and salt-free seasoning blends.    Use low-sodium canned vegetables or fruits.  Tips to ease the change  Take a week or two to slowly make changes to your diet.    Add a serving of vegetables at lunch one day. The next day, add a serving at dinner as well.    Add fruit to one meal or eat it as a snack.    Work up to three servings of fat-free and low-fat milk products each day.    If you eat large portions of meat, cut back by a third at each meal. The goal is to eat 6 oz (ounces) of meat or less per day.    Serve brown rice and whole-wheat bread and pasta.    Try casseroles and stir-davila dishes that have less meat and more vegetables, grains or cooked dry beans.    Serve two or more meatless meals each week.    For snacks and desserts, eat foods low in fat, sodium and sugar, such as:  ? Unsalted rice cakes, nuts or seeds  ? Fresh fruits, raw vegetables and raisins  ? Fat-free, low-fat or frozen yogurt  ? Popcorn with no salt or butter.    If you have trouble digesting milk products,  try lactose-free milk or take lactase pills.    If you have a nut allergy, eat seeds, beans, lentils or split peas.    Fruits, vegetables and whole grain foods are high in fiber. To avoid bloating and diarrhea (loose, watery stools), increase these foods over several weeks.  The DASH eating plan  Use this chart to plan your meals. Or take it with you when you shop for food.   Food Group Servings per Day  Serving Size Examples    1,600 Calories 2,000 Calories 2,600 Calories      Grains  (whole wheat) 6 6 to 8 10 to 11   1 slice bread    1 oz dry cereal      cup cooked rice, pasta or cereal Whole-wheat bread and rolls, whole-wheat pasta, English muffin, leis bread, bagel, cereals, grits, oatmeal, brown rice, unsalted pretzels and popcorn   Vegetables  3 to 4 4 to 5 5 to 6   1 cup raw leafy vegetables      cup cut-up raw or cooked vegetable      cup vegetable juice Broccoli, carrots, collards, green beans, green peas, kale, lima beans, potatoes, spinach, squash, sweet potatoes, tomatoes   Fruits 4 4 to 5 5 to 6   1 medium fruit      cup dried fruit      cup fresh, frozen, or canned fruit      cup fruit juice Apples, apricots, bananas, dates, grapes, oranges, grapefruit, mangoes, melons, peaches, pineapples, raisins, strawberries, tangerines   Fat-free or   low-fat milk and milk products 2 to 3 2 to 3 3   1 cup milk or yogurt    1   oz cheese Fat-free or low-fat (1%) milk, buttermilk, cheese, regular or frozen yogurt   Lean meats, poultry and fish 3 to 6 6 or less 6   1 oz cooked meats, poultry (chicken, turkey) or fish    1 egg    2 egg whites Lean meats (trim away any fat, then broil, roast or poach); remove skin from poultry. Eggs are high in cholesterol, so limit egg yolks to four or less per week.   Nuts, seeds   and legumes 3 per week 4 to 5 per week 1 per day   ? cup (1   oz) nuts    2 Tbsp peanut butter    2 Tbsp (   oz) seeds      cup cooked legumes (dry beans and peas) Almonds, hazelnuts, mixed nuts, peanuts,  walnuts, sunflower seeds, peanut butter, kidney beans, lentils, split peas   Fats and oils 2 2 to 3 3   1 tsp soft margarine    1 tsp vegetable oil    1 Tbsp champion    2 Tbsp salad dressing Soft margarine, vegetable oil (such as canola, corn, olive or safflower), low-fat mayonnaise, light salad dressing   Sweets and   added sugars 0 5 or less per week 2 or less per day   1 Tbsp sugar, jelly or jam      cup sorbet or gelatin    1 cup lemonade Fruit-flavored gelatin, fruit punch, hard candy, jelly, maple syrup, sorbets and ices   A sample meal plan  This sample menu gives two sodium levels. Start with 2,300 mg of sodium (about 1 teaspoon of table salt per day). Then, try to lower your intake to 1,500 mg a day. Talk to your doctor or dietitian about how to do this.   These samples are for people who eat 2,000 calories per day. The less salt you eat, the more you may lower your blood pressure.   Menu for 2,300 mg sodium per day   Breakfast:   cup instant oatmeal, 1 mini whole-wheat bagel, 1 tablespoon peanut butter, 1 medium banana, 1 cup (8 ounces) low-fat milk.   Lunch: 1 cup cantaloupe chunks, 1 cup apple juice and one chicken breast sandwich that includes:    Two slices (3 ounces) chicken breast, no skin    Two slices whole-wheat bread    1 slice (   ounce) reduced-fat cheddar cheese    One leaf roc lettuce    Two slices tomato    1 tablespoon low-fat mayonnaise.  Dinner: 1 cup cooked spaghetti,   cup low-salt vegetarian spaghetti sauce, 3 tablespoons Parmesan cheese;   cup corn (from frozen);   cup canned pears in juice; one spinach salad that includes:    1 cup fresh spinach leaves      cup fresh carrots, grated      cup fresh mushrooms, sliced    1 tablespoon vinegar and oil dressing.  Snacks:? cup unsalted almonds;   cup dried apricots; 1 cup fat-free fruit yogurt, no sugar added.  Reducing to 1,500 mg sodium per day  Use the same menu plan, but:    For breakfast, replace instant oatmeal with regular oatmeal  "and 1 teaspoon cinnamon.    For lunch, replace cheddar cheese with low-sodium Swiss cheese.  Resources on diet and health  [employer]  [facility] [facility]  [address] [address]   [place] [address]   Phone: [phone]   TTY: [phone]   www.nhlbi.nih.gov   \"Aim for a Healthy Weight\"   www.nhlbi.nih.gov/health/public/heart/obesity/lose_wt/index.htm  \"Dietary Guidelines for Americans 2005\"   and \"A Healthier You\"   www.healthierus.gov/dietaryguidelines  For informational purposes only. Not to replace the advice of your health care provider. Adapted from \"Your Guide to Lowering Blood Pressure with DASH,\" by the [employer] [place],   [facility] Publication No. , revised April 2006. Available at www.nhlbi.nih.gov/health/public/heart/hbp/dash/index.htm. Published by [employer]. [employer] 391465 - REV 09/15.  For informational purposes only. Not to replace the advice of your health care provider.  Copyright   2018 [employer]. All rights reserved.         *   The chest pain sounds like rib pain. Takes 6-8 weeks to go away.   *   It's okay to have a mammogram. [phone].   *   I'm okay with not doing a stress test.   *   Unfortunately, you now have diabetes. Need to start a medication called metformin. I'll send a prescription.     *  Call about setting up a mammogram: [phone].     *  Take a 81 mg aspirin daily.     *   Follow up appointment in 3 months.   "

## 2019-06-20 ENCOUNTER — ALLIED HEALTH/NURSE VISIT (OUTPATIENT)
Dept: EDUCATION SERVICES | Facility: CLINIC | Age: 64
End: 2019-06-20
Payer: COMMERCIAL

## 2019-06-20 ENCOUNTER — TELEPHONE (OUTPATIENT)
Dept: FAMILY MEDICINE | Facility: CLINIC | Age: 64
End: 2019-06-20

## 2019-06-20 DIAGNOSIS — E11.9 DIABETES MELLITUS, TYPE 2 (H): Primary | ICD-10-CM

## 2019-06-20 DIAGNOSIS — E11.8 TYPE 2 DIABETES MELLITUS WITH COMPLICATION, WITHOUT LONG-TERM CURRENT USE OF INSULIN (H): Primary | ICD-10-CM

## 2019-06-20 DIAGNOSIS — E11.9 TYPE 2 DIABETES MELLITUS WITHOUT COMPLICATION, WITHOUT LONG-TERM CURRENT USE OF INSULIN (H): Primary | ICD-10-CM

## 2019-06-20 PROCEDURE — G0108 DIAB MANAGE TRN  PER INDIV: HCPCS

## 2019-06-20 RX ORDER — FLASH GLUCOSE SENSOR
1 KIT MISCELLANEOUS
Qty: 2 EACH | Refills: 12 | Status: SHIPPED | OUTPATIENT
Start: 2019-06-20 | End: 2023-10-31

## 2019-06-20 RX ORDER — FLASH GLUCOSE SCANNING READER
1 EACH MISCELLANEOUS DAILY
Qty: 1 DEVICE | Refills: 2 | Status: SHIPPED | OUTPATIENT
Start: 2019-06-20 | End: 2023-10-31

## 2019-06-20 NOTE — PROGRESS NOTES
"Diabetes Self-Management Education & Support    Diabetes Education Self Management & Training    SUBJECTIVE/OBJECTIVE:  Presents for: Individual review  Accompanied by: Self  Diabetes education in the past 24mo: No  Diabetes type: Type 2  Date of diagnosis: 6.8 A1c on 6.7.19 - was familiar with pre-diabetes from before   Other concerns:: None  Cultural Influences/Ethnic Background:  American      Diabetes Symptoms & Complications   no signs/symptoms        Patient Problem List and Family Medical History reviewed for relevant medical history, current medical status, and diabetes risk factors.    Vitals:  There were no vitals taken for this visit.  Estimated body mass index is 42.4 kg/m  as calculated from the following:    Height as of 6/19/19: 1.626 m (5' 4\").    Weight as of 6/19/19: 112 kg (247 lb).   Last 3 BP:   BP Readings from Last 3 Encounters:   06/19/19 146/80   06/08/19 131/80   06/07/19 (!) 196/102       History   Smoking Status     Former Smoker     Packs/day: 0.50     Years: 30.00     Quit date: 8/12/2011   Smokeless Tobacco     Never Used     Comment: smoking 3-4 cigs per day       Labs:  Lab Results   Component Value Date    A1C 6.8 06/07/2019     Lab Results   Component Value Date     06/08/2019     Lab Results   Component Value Date     02/15/2018     HDL Cholesterol   Date Value Ref Range Status   02/15/2018 42 (L) >49 mg/dL Final   ]  GFR Estimate   Date Value Ref Range Status   06/08/2019 >90 >60 mL/min/[1.73_m2] Final     Comment:     Non  GFR Calc  Starting 12/18/2018, serum creatinine based estimated GFR (eGFR) will be   calculated using the Chronic Kidney Disease Epidemiology Collaboration   (CKD-EPI) equation.       GFR Estimate If Black   Date Value Ref Range Status   06/08/2019 >90 >60 mL/min/[1.73_m2] Final     Comment:      GFR Calc  Starting 12/18/2018, serum creatinine based estimated GFR (eGFR) will be   calculated using the Chronic Kidney " "Disease Epidemiology Collaboration   (CKD-EPI) equation.       Lab Results   Component Value Date    CR 0.67 06/08/2019     No results found for: MICROALBUMIN    Healthy Eating  Healthy Eating Assessed Today: Yes  Cultural/Holiness diet restrictions?: No  Patient on a regular basis: Eats 3 meals a day  Meals include: Breakfast, Lunch, Dinner  Breakfast: black coffee + zeus crackers, \"a lot\" and then eats more zeus crackers until lunch time   Lunch: egg salad or tuna sandwich OR fruit OR chips.  Drinking water throughout the day   Dinner: panini  OR pre prepared meals OR out to eat  (bigger meal than the others)   Snacks: crackers / zesu crackers / fruit  /  dairy is fine / pistachios   Other: sometimes up in the night, ice cream.  Cut out ice cream one week ago   Beverages: Water, Milk, Coffee  Has patient met with a dietitian in the past?: Yes    Being Active  Being Active Assessed Today: Yes(is planning on starting to walk)    Monitoring  Monitoring Assessed Today: Yes  Blood Glucose Meter: One Touch(clari one touch & cgm)      Taking Medications  Diabetes Medication(s)     Biguanides       metFORMIN (GLUCOPHAGE-XR) 500 MG 24 hr tablet    Take 1 tablet (500 mg) by mouth daily (with dinner) For blood sugars.          Taking Medication Assessed Today: Yes  Current Treatments: Oral Agent (monotherapy)  Problems taking diabetes medications regularly?: No  Diabetes medication side effects?: No  Treatment Compliance: All of the time    Problem Solving  Problem Solving Assessed Today: Yes  Hypoglycemia Frequency: Neve       Reducing Risks  Reducing Risks Assessed Today: No    Healthy Coping  Healthy Coping Assessed Today: Yes  Emotional response to diabetes: Ready to learn, Guilt/Self-blame  Stage of change: ACTION (Actively working towards change)  Support resources: In-person Offerings  Patient Activation Measure Survey Score:  ANGEL Score (Last Two) 5/26/2011   ANGEL Raw Score 45   Activation Score 73.1   ANGEL " Level 4             Goals Addressed as of 6/20/2019 at 3:19 PM       Healthy Eating (pt-stated)     Added 6/20/19 by Nikki Puri RD      I will have an egg or oatmeal or something else at breakfast and limit zeus crackers to a snack     I plan to meet my goal by this date:              Patient's most recent   Lab Results   Component Value Date    A1C 6.8 06/07/2019    is meeting goal of <7.0    ASSESSMENT/INTERVENTION:   New diagnosis.  Reviewed glucometer and patient is interested in a FreeStyle Germán.  Set diet goals.     Diabetes knowledge and skills assessment:     Patient is knowledgeable in diabetes management concepts related to: Healthy Eating, Being Active, Monitoring and Taking Medication    Patient needs further education on the following diabetes management concepts: Healthy Eating, Problem Solving, Reducing Risks and Healthy Coping    Based on learning assessment above, most appropriate setting for further diabetes education would be: Individual setting.    Education provided today on:  AADE Self-Care Behaviors:  Diabetes Pathophysiology  Healthy Eating: carbohydrate counting, consistency in amount, composition, and timing of food intake, portion control and label reading  Being Active: relationship to blood glucose  Monitoring: purpose, proper technique, log and interpret results, individual blood glucose targets, frequency of monitoring and  Taking Medication: action of prescribed medication  Problem Solving: low blood glucose - causes, signs/symptoms, treatment and prevention and carrying a carbohydrate source at all times  Patient was instructed on One Touch Verio meter and was able to provide an accurate return demonstration. Patient's blood glucose reading today was 99 mg/dL.    Opportunities for ongoing education and support in diabetes-self management were discussed.  Pt verbalized understanding of concepts discussed and recommendations provided today.       Education Materials  Provided:  Healthy Living with Diabetes     PLAN:  See Patient Instructions for co-developed, patient-stated behavior change goals.  AVS printed and provided to patient today. See Follow-Up section for recommended follow-up.    Nikki Puri RD, LD, CDE  Diabetes Education    Time Spent: 60 minutes  Encounter Type: Individual    A copy of this encounter was shared with the provider.

## 2019-06-20 NOTE — TELEPHONE ENCOUNTER
Nader Davis,     I pended a FreeStyle Germán continuous glucose monitor system (are generally has it on formulary)       Thanks!  Bekah Puri RD, LD, CDE  Diabetes Education

## 2019-06-20 NOTE — PATIENT INSTRUCTIONS
Diabetes Support Resources:  Websites: American Association of Diabetes Educators Participant Resources: www.diabeteseducator.org/living-with-diabetes   American Diabetes Association: www.diabetes.org  Diabetes Together 2 Goal: http://gewxglzo5jojl.org     Www.diatribe.org      Bring blood glucose meter and logbook with you to all doctor and follow-up appointments.    Diabetes Education Telephone Visit Follow-up:    We realize your time is valuable and your health is important! We offer a convenient Telephone Visit follow up! It s a quick way to check in for a medication dose adjustment without having to come back to clinic as soon.    Telephone Visits are often covered by insurance. Please check with your insurance plan to see if this type of visit is covered. If not, the cost is less expensive than an office visit:      Up to 10 minutes (Code 71799): $30    11-20 minutes (Code 64170): $59    More than 20 minutes (Code 21334): $85    Talk with your Diabetes Educator if you want to learn more.      [facility]:  For Your Diabetes Education and Nutrition Appointments Call:  [phone]   For Diabetes Education or Nutrition Related Questions:   Phone: [phone]  E-mail: [email or web address]  Fax: [phone]   If you need a medication refill please contact your pharmacy. Please allow 3 business days for your refills to be completed.      Please read the full Freestyle Germán manual for operating instructions and important safety information before use.  The following highlights some of the important information.     1.  The reader comes with a quick start guide for how to place the sensor and begin it.  It is FDA approved for the back of the arm only.  You can also view short  how to  videos on this website: [email or web address].[email or web address]/   Go to the support tab in the upper right hand corner and click overview.      2. The 14 day sensor takes an hour to warm up before  use, however is less accurate during the first day and therefor you still want to check your blood sugar.  If you see the  eye glass and blood drop symbol  this means check a finger stick blood sugar before making any treatment decisions.  If you are in doubt of the accuracy always use a blood finger stick.        3. Make sure you are adequately hydrated while using this system as dehydration can lead to inaccurate results.   Aspirin use > 350mg will temporarily make the sensor read lower.  Vitamin C intake of > 1000mg will temporarily make the sensor read higher.  Use a blood fingerstick.     4. The sensor can hold 8 hours of data.    Read/scan blood sugars every 8 hours to ensure entirety of data is available.  The reader can store 90 days of data.      5. Focus on trend arrows- will let you know if blood sugars are rising, falling or stable at the time you check.  Your blood sugar and sensor glucose will not always match.       6. It is okay to shower, bathe, and swim (up to 3 feet deep for 30 minutes) with sensor. Do not get reader wet.    7. Remove the sensor if you need to have an MRI, CT scan, x-ray or diathermy.    Metal detectors are ok.       8. Do not cover the sensor with extra adhesive (the small hole the center of the sensor must remain uncovered).  If you have trouble with sensor falling off, these are approved products to help with sensor adhesion: Torbot Skin Tac, SKIN-PREP Protective Barrier Wipe and Mastisol Liquid Adhesive.  Make sure to let these products dry appropriately before applying the sensor.      9. The reader functions as a glucometer and uses FreeStyle Precision Edenilson Test strips.  These are over the counter and you do not need a prescription to buy them.  Test strips are foil wrapped since you will likely be using less of them.     10.  The website https://www.[employer]libre.us/   has a comprehensive FAQ section.  Go to support in the upper right hand corner and then click FAQ.       11.  For reader and sensor questions or concerns please call [employer] Customer Support at [phone] (Monday through Friday 8AM to 8PM  Eastern time, excluding holidays).

## 2019-07-03 DIAGNOSIS — I10 HTN, GOAL BELOW 140/80: ICD-10-CM

## 2019-07-03 RX ORDER — LISINOPRIL AND HYDROCHLOROTHIAZIDE 12.5; 2 MG/1; MG/1
1 TABLET ORAL DAILY
Qty: 90 TABLET | Refills: 1 | Status: SHIPPED | OUTPATIENT
Start: 2019-07-03 | End: 2020-01-22

## 2019-07-03 NOTE — TELEPHONE ENCOUNTER
BP Readings from Last 3 Encounters:   06/19/19 146/80   06/08/19 131/80   06/07/19 (!) 196/102     Potassium   Date Value Ref Range Status   06/08/2019 4.0 3.4 - 5.3 mmol/L Final       Prescription approved per [facility] Refill Protocol or patient Primary care provider (PCP)  RENETTA Bae RN/Ralf Tolbert

## 2019-09-28 ENCOUNTER — HEALTH MAINTENANCE LETTER (OUTPATIENT)
Age: 64
End: 2019-09-28

## 2020-01-02 ENCOUNTER — TELEPHONE (OUTPATIENT)
Dept: FAMILY MEDICINE | Facility: CLINIC | Age: 65
End: 2020-01-02

## 2020-01-02 NOTE — LETTER
January 2, 2020    Karyn Gamez  [address] [facility]  [place] [address]    Dear Ryan Boss cares about your health and your health plan.  I have reviewed your medical conditions, medication list and lab results, and am making recommendations based on this review to better manage your health.    You are in particular need of attention regarding:  -Cholesterol  -Colon Cancer Screening  -Wellness (Physical) Visit     I am recommending that you:     -schedule a WELLNESS (Physical) APPOINTMENT with me.   I will check fasting labs the same day - nothing to eat except water and meds for 8-10 hours prior.    -schedule a COLONOSCOPY to look for colon cancer (due every 10 years or 5 years in higher risk situations.)        Colon cancer is now the second leading cause of cancer-related deaths in the [place] for both men and women and there are over 130,000 new cases and 50,000 deaths per year from colon cancer.  Colonoscopies can prevent 90-95% of these deaths.  Problem lesions can be removed before they ever become cancer.  This test is not only looking for cancer, but also getting rid of precancerious lesions.    If you are under/uninsured, we recommend you contact the [employer] program. [employer] is a free colorectal cancer screening program that provides colonoscopies for eligible under/uninsured [place] men and women. If you are interested in receiving a free colonoscopy, please call [employer] at [phone] (mention code [employer]Web) to see if you re eligible.      If you do not wish to do a colonoscopy or cannot afford to do one, at this time, there is another option. It is called a FIT test or Fecal Immunochemical Occult Blood Test (take home stool sample kit).  It does not replace the colonoscopy for colorectal cancer screening, but it can detect hidden bleeding in the lower colon.  It does need to be repeated every year and if a positive result is obtained, you would be referred  for a colonoscopy.          If you have completed either one of these tests at another facility, please call with the details of when and where the tests were done and if they were normal or not. Or have the records sent to our clinic so that we can best coordinate your care.      Please call us at the [phone] or use TouchMail to address the above recommendations.     Thank you for trusting [facility].  We appreciate the opportunity to serve you and look forward to supporting your healthcare in the future.    If you have (or plan to have) any of these tests done at a facility other than a [facility] or a [facility], please have the results sent to the [facility] location noted above.      Best Regards,    Cristiana Daivs MD

## 2020-01-02 NOTE — TELEPHONE ENCOUNTER
Panel Management Review      Patient has the following on her problem list:     Depression / Dysthymia review    Measure:  Needs PHQ-9 score of 4 or less during index window.  Administer PHQ-9 and if score is 5 or more, send encounter to provider for next steps.    5 - 7 month window range: yes    PHQ-9 SCORE 2/7/2018 5/24/2018 6/19/2019   PHQ-9 Total Score - - -   PHQ-9 Total Score MyChart 9 (Mild depression) - -   PHQ-9 Total Score 9 8 15       If PHQ-9 recheck is 5 or more, route to provider for next steps.    Patient is due for:  PHQ9    Diabetes    ASA: Failed    Last A1C  Lab Results   Component Value Date    A1C 6.8 06/07/2019    A1C 6.2 05/24/2018    A1C 6.1 02/15/2018    A1C 5.8 09/23/2016    A1C 5.9 11/23/2015     A1C tested: MONITOR    Last LDL:    Lab Results   Component Value Date    CHOL 208 02/15/2018     Lab Results   Component Value Date    HDL 42 02/15/2018     Lab Results   Component Value Date     02/15/2018     Lab Results   Component Value Date    TRIG 213 02/15/2018     Lab Results   Component Value Date    CHOLHDLRATIO 6.0 04/18/2013     Lab Results   Component Value Date    NHDL 166 02/15/2018       Is the patient on a Statin? YES             Is the patient on Aspirin? NO    Medications     HMG CoA Reductase Inhibitors     atorvastatin (LIPITOR) 40 MG tablet             Last three blood pressure readings:  BP Readings from Last 3 Encounters:   06/19/19 146/80   06/08/19 131/80   06/07/19 (!) 196/102       Date of last diabetes office visit: 6/19/19     Tobacco History:     History   Smoking Status     Former Smoker     Packs/day: 0.50     Years: 30.00     Quit date: 8/12/2011   Smokeless Tobacco     Never Used     Comment: smoking 3-4 cigs per day         Hypertension   Last three blood pressure readings:  BP Readings from Last 3 Encounters:   06/19/19 146/80   06/08/19 131/80   06/07/19 (!) 196/102     Blood pressure: MONITOR    HTN Guidelines:  Less than 140/90      Composite  cancer screening  Chart review shows that this patient is due/due soon for the following Colonoscopy  Summary:    Patient is due/failing the following:   COLONOSCOPY, LDL, PHQ9 and PHYSICAL    Action needed:   Patient needs office visit for Physical and fasting labs. and Patient needs referral/order: colonoscopy    Type of outreach:    Sent letter.    Questions for provider review:    None                                                                                                                                    EFRAIN MITTAL CMA

## 2020-01-22 ENCOUNTER — OFFICE VISIT (OUTPATIENT)
Dept: FAMILY MEDICINE | Facility: CLINIC | Age: 65
End: 2020-01-22

## 2020-01-22 VITALS
BODY MASS INDEX: 43.19 KG/M2 | SYSTOLIC BLOOD PRESSURE: 126 MMHG | HEIGHT: 64 IN | TEMPERATURE: 97.8 F | HEART RATE: 72 BPM | RESPIRATION RATE: 12 BRPM | DIASTOLIC BLOOD PRESSURE: 80 MMHG | WEIGHT: 253 LBS

## 2020-01-22 DIAGNOSIS — Z23 NEED FOR VACCINATION: ICD-10-CM

## 2020-01-22 DIAGNOSIS — E66.01 CLASS 3 SEVERE OBESITY DUE TO EXCESS CALORIES WITH SERIOUS COMORBIDITY AND BODY MASS INDEX (BMI) OF 40.0 TO 44.9 IN ADULT (H): ICD-10-CM

## 2020-01-22 DIAGNOSIS — E78.5 HYPERLIPIDEMIA LDL GOAL <100: Chronic | ICD-10-CM

## 2020-01-22 DIAGNOSIS — Z12.11 COLON CANCER SCREENING: ICD-10-CM

## 2020-01-22 DIAGNOSIS — Z23 NEED FOR PROPHYLACTIC VACCINATION AND INOCULATION AGAINST INFLUENZA: ICD-10-CM

## 2020-01-22 DIAGNOSIS — I10 HTN, GOAL BELOW 140/80: Chronic | ICD-10-CM

## 2020-01-22 DIAGNOSIS — E66.813 CLASS 3 SEVERE OBESITY DUE TO EXCESS CALORIES WITH SERIOUS COMORBIDITY AND BODY MASS INDEX (BMI) OF 40.0 TO 44.9 IN ADULT (H): ICD-10-CM

## 2020-01-22 DIAGNOSIS — F33.1 MAJOR DEPRESSIVE DISORDER, RECURRENT EPISODE, MODERATE (H): ICD-10-CM

## 2020-01-22 DIAGNOSIS — E11.8 TYPE 2 DIABETES MELLITUS WITH COMPLICATION, WITHOUT LONG-TERM CURRENT USE OF INSULIN (H): Primary | ICD-10-CM

## 2020-01-22 PROBLEM — E11.9 TYPE 2 DIABETES MELLITUS, WITHOUT LONG-TERM CURRENT USE OF INSULIN (H): Status: ACTIVE | Noted: 2019-06-19

## 2020-01-22 LAB
CHOLEST SERPL-MCNC: 230 MG/DL
CREAT UR-MCNC: 244 MG/DL
HBA1C MFR BLD: 6 % (ref 0–5.6)
HDLC SERPL-MCNC: 33 MG/DL
LDLC SERPL CALC-MCNC: 154 MG/DL
MICROALBUMIN UR-MCNC: 54 MG/L
MICROALBUMIN/CREAT UR: 22.09 MG/G CR (ref 0–25)
NONHDLC SERPL-MCNC: 197 MG/DL
TRIGL SERPL-MCNC: 215 MG/DL
TSH SERPL DL<=0.005 MIU/L-ACNC: 1.48 MU/L (ref 0.4–4)

## 2020-01-22 PROCEDURE — 90471 IMMUNIZATION ADMIN: CPT | Performed by: FAMILY MEDICINE

## 2020-01-22 PROCEDURE — 80061 LIPID PANEL: CPT | Performed by: FAMILY MEDICINE

## 2020-01-22 PROCEDURE — 90682 RIV4 VACC RECOMBINANT DNA IM: CPT | Performed by: FAMILY MEDICINE

## 2020-01-22 PROCEDURE — 36415 COLL VENOUS BLD VENIPUNCTURE: CPT | Performed by: FAMILY MEDICINE

## 2020-01-22 PROCEDURE — 84443 ASSAY THYROID STIM HORMONE: CPT | Performed by: FAMILY MEDICINE

## 2020-01-22 PROCEDURE — 83036 HEMOGLOBIN GLYCOSYLATED A1C: CPT | Performed by: FAMILY MEDICINE

## 2020-01-22 PROCEDURE — 90670 PCV13 VACCINE IM: CPT | Performed by: FAMILY MEDICINE

## 2020-01-22 PROCEDURE — 82043 UR ALBUMIN QUANTITATIVE: CPT | Performed by: FAMILY MEDICINE

## 2020-01-22 PROCEDURE — 90472 IMMUNIZATION ADMIN EACH ADD: CPT | Performed by: FAMILY MEDICINE

## 2020-01-22 PROCEDURE — 99214 OFFICE O/P EST MOD 30 MIN: CPT | Mod: 25 | Performed by: FAMILY MEDICINE

## 2020-01-22 PROCEDURE — 99207 C FOOT EXAM  NO CHARGE: CPT | Performed by: FAMILY MEDICINE

## 2020-01-22 RX ORDER — LISINOPRIL AND HYDROCHLOROTHIAZIDE 12.5; 2 MG/1; MG/1
1 TABLET ORAL DAILY
Qty: 90 TABLET | Refills: 1 | Status: SHIPPED | OUTPATIENT
Start: 2020-01-22 | End: 2021-04-12

## 2020-01-22 RX ORDER — METFORMIN HCL 500 MG
500 TABLET, EXTENDED RELEASE 24 HR ORAL
Qty: 90 TABLET | Refills: 1 | Status: SHIPPED | OUTPATIENT
Start: 2020-01-22 | End: 2021-01-24

## 2020-01-22 RX ORDER — SERTRALINE HYDROCHLORIDE 100 MG/1
TABLET, FILM COATED ORAL
Qty: 180 TABLET | Refills: 1 | Status: SHIPPED | OUTPATIENT
Start: 2020-01-22 | End: 2021-01-31

## 2020-01-22 RX ORDER — ATORVASTATIN CALCIUM 40 MG/1
TABLET, FILM COATED ORAL
Qty: 90 TABLET | Refills: 1 | Status: SHIPPED | OUTPATIENT
Start: 2020-01-22 | End: 2021-01-17 | Stop reason: DRUGHIGH

## 2020-01-22 ASSESSMENT — PATIENT HEALTH QUESTIONNAIRE - PHQ9
5. POOR APPETITE OR OVEREATING: SEVERAL DAYS
SUM OF ALL RESPONSES TO PHQ QUESTIONS 1-9: 9

## 2020-01-22 ASSESSMENT — PAIN SCALES - GENERAL: PAINLEVEL: NO PAIN (0)

## 2020-01-22 ASSESSMENT — ANXIETY QUESTIONNAIRES
GAD7 TOTAL SCORE: 8
7. FEELING AFRAID AS IF SOMETHING AWFUL MIGHT HAPPEN: SEVERAL DAYS
2. NOT BEING ABLE TO STOP OR CONTROL WORRYING: SEVERAL DAYS
5. BEING SO RESTLESS THAT IT IS HARD TO SIT STILL: SEVERAL DAYS
3. WORRYING TOO MUCH ABOUT DIFFERENT THINGS: SEVERAL DAYS
1. FEELING NERVOUS, ANXIOUS, OR ON EDGE: SEVERAL DAYS
6. BECOMING EASILY ANNOYED OR IRRITABLE: MORE THAN HALF THE DAYS

## 2020-01-22 ASSESSMENT — MIFFLIN-ST. JEOR: SCORE: 1687.6

## 2020-01-22 NOTE — PATIENT INSTRUCTIONS
*    Flu and pneumonia shot.     *   Overall, your doing well.     *   No change in medications.     *   The A1c, a 3 month average of your blood sugars, is very good 6.0.     *   Call about setting a mammogram: [phone].     *   Call about setting up a colonoscopy: [phone].     *   Follow up appointment in 6 months.

## 2020-01-22 NOTE — PROGRESS NOTES
Subjective     Karyn Gamez is a 64 year old female who presents to clinic today for the following health issues:    HPI     Diabetes Follow-up  Metformin 500mg every day     How often are you checking your blood sugar? Not at all    What concerns do you have today about your diabetes? None     Do you have any of these symptoms? (Select all that apply)  No numbness or tingling in feet.  No redness, sores or blisters on feet.  No complaints of excessive thirst.  No reports of blurry vision.  No significant changes to weight.    Have you had a diabetic eye exam in the last 12 months? No      Hyperlipidemia Follow-Up  Atorvastatin 40mg every day     Are you regularly taking any medication or supplement to lower your cholesterol?   Yes- statin    Are you having muscle aches or other side effects that you think could be caused by your cholesterol lowering medication?  No    Hypertension Follow-up  Lisinopril-hydrochlorothiazide 20-12.5mg every day     Do you check your blood pressure regularly outside of the clinic? No     Are you following a low salt diet? No    Are your blood pressures ever more than 140 on the top number (systolic) OR more   than 90 on the bottom number (diastolic), for example 140/90? N/A    BP Readings from Last 2 Encounters:   01/22/20 126/80   06/19/19 146/80     Hemoglobin A1C (%)   Date Value   01/22/2020 6.0 (H)   06/07/2019 6.8 (H)     LDL Cholesterol Calculated (mg/dL)   Date Value   01/22/2020 154 (H)   02/15/2018 123 (H)       Depression Followup  Zoloft 200mg every day     How are you doing with your depression since your last visit? No change    Are you having other symptoms that might be associated with depression? No    Have you had a significant life event?  No     Are you feeling anxious or having panic attacks?   No    Do you have any concerns with your use of alcohol or other drugs? No    Social History     Tobacco Use     Smoking status: Former Smoker     Packs/day: 0.50      Years: 30.00     Pack years: 15.00     Last attempt to quit: 2011     Years since quittin.4     Smokeless tobacco: Never Used     Tobacco comment: smoking 3-4 cigs per day   Substance Use Topics     Alcohol use: Yes     Comment: rarely     Drug use: No     PHQ 2018   PHQ-9 Total Score 8 15 9   Q9: Thoughts of better off dead/self-harm past 2 weeks Not at all Not at all Several days     LOUISE-7 SCORE 5/10/2017 2018 2020   Total Score - - -   Total Score 0 7 8       - Normal colonoscopy 2009. No family history of colorectal cancer in first-degree relative. On q 10 year colonoscopy schedule and is past due to repeat.      - Normal mammogram last completed 2/15/2018. She is on a every 2 year schedule and is due to repeat.        How many servings of fruits and vegetables do you eat daily?  2-3    On average, how many sweetened beverages do you drink each day (Examples: soda, juice, sweet tea, etc.  Do NOT count diet or artificially sweetened beverages)?   Orange juice    How many days per week do you exercise enough to make your heart beat faster? 3 or less    How many minutes a day do you exercise enough to make your heart beat faster? 20 - 29    How many days per week do you miss taking your medication? 0        Reviewed and updated as needed this visit by Provider         Review of Systems   ROS COMP: CONSTITUTIONAL:NEGATIVE for fever, chills, change in weight  INTEGUMENTARY/SKIN: NEGATIVE for worrisome rashes, moles or lesions  EYES: NEGATIVE for vision changes or irritation  RESP:NEGATIVE for significant cough or SOB  CV: NEGATIVE for chest pain, palpitations or peripheral edema  GI: NEGATIVE for nausea, abdominal pain, heartburn, or change in bowel habits  MUSCULOSKELETAL: NEGATIVE for significant arthralgias or myalgia  NEURO: NEGATIVE for weakness, dizziness or paresthesias  PSYCHIATRIC: NEGATIVE for changes in mood or affect    This document serves as a record of  "the services and decisions personally performed and made by Cristiana Davis MD. It was created on his behalf by Klaus Wagner, a trained medical scribe. The creation of this document is based the provider's statements to the medical scribe.  Klaus Wagner 2:59 PM January 22, 2020        Objective    /80   Pulse 72   Temp 97.8  F (36.6  C) (Tympanic)   Resp 12   Ht 1.626 m (5' 4\")   Wt 114.8 kg (253 lb)   BMI 43.43 kg/m    Body mass index is 43.43 kg/m .  Physical Exam   GENERAL: alert, pleasant and no distress  EYES: Eyes grossly normal to inspection, conjunctivae and sclerae normal  RESP: lungs clear to auscultation - no rales, rhonchi or wheezes  CV: regular rate and rhythm, normal S1 S2  ABDOMEN: soft, nontender  MS: no gross musculoskeletal defects noted, no edema  SKIN: no suspicious lesions or rashes  NEURO:  mentation intact and speech normal  PSYCH: mentation appears normal, affect normal/bright    Diagnostic Test Results:    Results for orders placed or performed in visit on 01/22/20   Hemoglobin A1c     Status: Abnormal   Result Value Ref [place]    Hemoglobin A1C 6.0 (H) 0 - 5.6 %   TSH with free T4 reflex     Status: None   Result Value Ref [place]    TSH 1.48 0.40 - 4.00 mU/L   Lipid panel reflex to direct LDL Fasting     Status: Abnormal   Result Value Ref [place]    Cholesterol 230 (H) <200 mg/dL    Triglycerides 215 (H) <150 mg/dL    HDL Cholesterol 33 (L) >49 mg/dL    LDL Cholesterol Calculated 154 (H) <100 mg/dL    Non HDL Cholesterol 197 (H) <130 mg/dL   Albumin Random Urine Quantitative with Creat Ratio     Status: None   Result Value Ref [place]    Creatinine Urine 244 mg/dL    Albumin Urine mg/L 54 mg/L    Albumin Urine mg/g Cr 22.09 0 - 25 mg/g Cr           Assessment & Plan     (E11.8) Type 2 diabetes mellitus with complication, without long-term current use of insulin (H)  (primary encounter diagnosis)  Comment: A1c 6.0, controlled well with using metformin.  Plan: metFORMIN " (GLUCOPHAGE-XR) 500 MG 24 hr tablet        Continue medication. Follow up 6 months.    (E78.5) Hyperlipidemia LDL goal <100  Comment: Tolerating high intensity statin well. Denies any myalgias. Elevated lipid panel, .   Plan: Lipid panel reflex to direct LDL Fasting,         atorvastatin (LIPITOR) 40 MG tablet        Continue.    (I10) HTN, goal below 140/80  Comment: BP within guidelines using 2 drug therapy, combined thiazide diuretic ACE Inhibitor.  Normal renal function. No evidence of microalbuminuria.  Plan: Albumin Random Urine Quantitative with Creat         Ratio, lisinopril-hydrochlorothiazide         (PRINZIDE/ZESTORETIC) 20-12.5 MG tablet        Continue medication. Follow up in 6 months.    (F33.1) Major depressive disorder, recurrent episode, moderate (H)  Comment: Doing well on SSRI therapy.  Plan: sertraline (ZOLOFT) 100 MG tablet        Continue.  Follow-up in 6 months.    (E66.01,  Z68.41) Class 3 severe obesity due to excess calories with serious comorbidity and body mass index (BMI) of 40.0 to 44.9 in adult (H)  Comment: Body mass index is 43.43 kg/m .  Plan: Reviewed lifestyle modifications, diet and exercise.    (Z12.11) Colon cancer screening  Comment: Preventative screening.  Plan: GASTROENTEROLOGY ADULT REF PROCEDURE ONLY          (Z23) Need for vaccination  Comment: I provided face to face counseling, answered questions, and explained the benefits and risks of the vaccine component order today.  Plan: VACCINE ADMINISTRATION, EACH ADDITIONAL,         PNEUMOCOCCAL CONJ VACCINE 13 VALENT IM          (Z23) Need for prophylactic vaccination and inoculation against influenza  Comment: I provided face to face counseling, answered questions, and explained the benefits and risks of the vaccine component order today.  Plan: INFLUENZA QUAD, RECOMBINANT, P-FREE (RIV4)         (FLUBLOCK) [19630], Vaccine Administration,         Initial [39567]            Patient Instructions   *    Flu and  "pneumonia shot.     *   Overall, your doing well.     *   No change in medications.     *   The A1c, a 3 month average of your blood sugars, is very good 6.0.     *   Call about setting a mammogram: [phone].     *   Call about setting up a colonoscopy: [phone].     *   Follow up appointment in 6 months.        BMI:   Estimated body mass index is 43.43 kg/m  as calculated from the following:    Height as of this encounter: 1.626 m (5' 4\").    Weight as of this encounter: 114.8 kg (253 lb).   Weight management plan: Discussed healthy diet and exercise guidelines            Return in about 6 months (around 7/22/2020) for diabetes check, Diabetes.    The information in this document, created by a scribe for me, accurately reflects the services I personally performed and the decisions made by me. I have reviewed and approved this document for accuracy.     Cristiana Davis MD  [facility]"

## 2020-01-23 ASSESSMENT — ANXIETY QUESTIONNAIRES: GAD7 TOTAL SCORE: 8

## 2020-01-24 ENCOUNTER — TELEPHONE (OUTPATIENT)
Dept: FAMILY MEDICINE | Facility: CLINIC | Age: 65
End: 2020-01-24

## 2020-01-24 NOTE — TELEPHONE ENCOUNTER
Patient called and has questions about her blood work that was done on 1/22/2020.    [facility]

## 2020-01-27 NOTE — TELEPHONE ENCOUNTER
Last viewed in [employer]:     1/24/2020  8:45 AM   By:     Karyn Gamez          Result Notes for Hemoglobin A1c     Notes recorded by Cristiana Davis MD on 1/26/2020 at 6:35 PM CST  Karyn,    Your recent tests show that you have normal thyroid function and there is no protein, or albumin, in your urine.  Your hemoglobin A1c, three-month average of your blood sugar readings, is much better.  Your cholesterol is still elevated however.  I would recommend that you consistently take your cholesterol-lowering medication, atorvastatin, and your other medications.  Please come in for a follow-up visit in 6 months     Not able to contact pt ? Phone problem   Sent [employer] note   P. Rex Bae  RN/Ralf Tolbert

## 2020-01-28 PROBLEM — F33.1 MAJOR DEPRESSIVE DISORDER, RECURRENT EPISODE, MODERATE (H): Status: ACTIVE | Noted: 2020-01-28

## 2020-01-28 PROBLEM — E11.8 TYPE 2 DIABETES MELLITUS WITH COMPLICATION, WITHOUT LONG-TERM CURRENT USE OF INSULIN (H): Status: ACTIVE | Noted: 2020-01-28

## 2020-11-21 ENCOUNTER — VIRTUAL VISIT (OUTPATIENT)
Dept: FAMILY MEDICINE | Facility: OTHER | Age: 65
End: 2020-11-21
Payer: COMMERCIAL

## 2020-11-21 ENCOUNTER — AMBULATORY - HEALTHEAST (OUTPATIENT)
Dept: FAMILY MEDICINE | Facility: CLINIC | Age: 65
End: 2020-11-21

## 2020-11-21 DIAGNOSIS — Z20.822 SUSPECTED 2019 NOVEL CORONAVIRUS INFECTION: ICD-10-CM

## 2020-11-21 PROCEDURE — 99421 OL DIG E/M SVC 5-10 MIN: CPT | Performed by: PHYSICIAN ASSISTANT

## 2020-11-21 NOTE — PROGRESS NOTES
"Date: 2020 11:38:55  Clinician: Margo Irene  Clinician NPI: [id number]  Patient: Karyn Gamez  Patient : 1955  Patient Address: [address] [place], [place] [id number]  Patient Phone: [phone]  Visit Protocol: URI  Patient Summary:  Karyn is a 65 year old ( : 1955 ) female who initiated a [facility] Visit for COVID-19 (Coronavirus) evaluation and screening. When asked the question \"Please sign me up to receive news, health information and promotions from [facility].\", Karyn responded \"Yes\".    Karyn states her symptoms started gradually 3-4 days ago.   Her symptoms consist of malaise and a cough.   Symptom details   Cough: Karyn coughs a few times an hour and her cough is not more bothersome at night. Phlegm does not come into her throat when she coughs. She believes her cough is caused by post-nasal drip.    Karyn denies having vomiting, rhinitis, facial pain or pressure, myalgias, chills, sore throat, teeth pain, ageusia, diarrhea, ear pain, headache, wheezing, fever, nasal congestion, nausea, and anosmia. She also denies taking antibiotic medication in the past month, having recent facial or sinus surgery in the past 60 days, and double sickening (worsening symptoms after initial improvement). She is not experiencing dyspnea.   Precipitating events  She has not recently been exposed to someone with influenza. Karyn has not been in close contact with any high risk individuals.   Pertinent COVID-19 (Coronavirus) information  Karyn does not work or volunteer as healthcare worker or a . In the past 14 days, Karyn has not worked or volunteered at a healthcare facility or group living setting.   In the past 14 days, she also has not lived in a congregate living setting.   Karyn has had a close contact with a laboratory-confirmed COVID-19 patient within 14 days of symptom onset. She was not exposed at her work. Date Karyn was exposed to the laboratory-confirmed " COVID-19 patient: 11/16/2020   Additional information about contact with COVID-19 (Coronavirus) patient as reported by the patient (free text): Our adult daughter &amp; her 12 &amp; 14 yr old kids all tested positive, daughter in bed now.    Since December 2019, Karyn has not been tested for COVID-19 and has not had upper respiratory infection or influenza-like illness.   Pertinent medical history  Karyn does not get yeast infections when she takes antibiotics.   Karyn does not need a return to work/school note.   Weight: 220 lbs   Karyn smokes or uses smokeless tobacco.   Weight: 220 lbs    MEDICATIONS: lisinopril oral, atorvastatin oral, sertraline oral, metformin oral, ALLERGIES: NKDA  Clinician Response:  Dear Karyn,   Your symptoms show that you may have coronavirus (COVID-19). This illness can cause fever, cough and trouble breathing. Many people get a mild case and get better on their own. Some people can get very sick.  What should I do?  We would like to test you for this virus.   1. Please call [phone] to schedule your visit. Explain that you were referred by [facility] to have a COVID-19 test. Be ready to share your [facility] visit ID number.  * If you need to schedule in [facility] please call [phone] or for [employer] employees please call [phone].  * If you need to schedule in the [place] area please call [phone]. [employer] employees call [phone].  The following will serve as your written order for this COVID Test, ordered by me, for the indication of suspected COVID [Z20.828]: The test will be ordered in [employer], our electronic health record, after you are scheduled. It will show as ordered and authorized by Wu Kenyon MD.  Order: COVID-19 (Coronavirus) PCR for SYMPTOMATIC testing from [facility].   2. When it's time for your COVID test:  Stay at least 6 feet away from others. (If someone will drive you to your test, stay in the backseat, as far away from the  as you can.)   Cover  "your mouth and nose with a mask, tissue or washcloth.  Go straight to the testing site. Don't make any stops on the way there or back.      3.Starting now: Stay home and away from others (self-isolate) until:   You've had no fever---and no medicine that reduces fever---for one full day (24 hours). And...   Your other symptoms have gotten better. For example, your cough or breathing has improved. And...   At least 10 days have passed since your symptoms started.       During this time, don't leave the house except for testing or medical care.   Stay in your own room, even for meals. Use your own bathroom if you can.   Stay away from others in your home. No hugging, kissing or shaking hands. No visitors.  Don't go to work, school or anywhere else.    Clean \"high touch\" surfaces often (doorknobs, counters, handles, etc.). Use a household cleaning spray or wipes. You'll find a full list of  on the EPA website: www.epa.gov/pesticide-registration/list-n-disinfectants-use-against-sars-cov-2.   Cover your mouth and nose with a mask, tissue or washcloth to avoid spreading germs.  Wash your hands and face often. Use soap and water.  Caregivers in these groups are at risk for severe illness due to COVID-19:  o People 65 years and older  o People who live in a nursing home or long-term care facility  o People with chronic disease (lung, heart, cancer, diabetes, kidney, liver, immunologic)  o People who have a weakened immune system, including those who:   Are in cancer treatment  Take medicine that weakens the immune system, such as corticosteroids  Had a bone marrow or organ transplant  Have an immune deficiency  Have poorly controlled HIV or AIDS  Are obese (body mass index of 40 or higher)  Smoke regularly   o Caregivers should wear gloves while washing dishes, handling laundry and cleaning bedrooms and bathrooms.  o Use caution when washing and drying laundry: Don't shake dirty laundry, and use the warmest water " setting that you can.  o For more tips, go to www.cdc.gov/coronavirus/2019-ncov/downloads/10Things.pdf.    4.Sign up for [employer]. We know it's scary to hear that you might have COVID-19. We want to track your symptoms to make sure you're okay over the next 2 weeks. Please look for an email from [employer]---this is a free, online program that we'll use to keep in touch. To sign up, follow the link in the email. Learn more at [email or web address]  How can I take care of myself?   Get lots of rest. Drink extra fluids (unless a doctor has told you not to).   Take Tylenol (acetaminophen) for fever or pain. If you have liver or kidney problems, ask your family doctor if it's okay to take Tylenol.   Adults can take either:    650 mg (two 325 mg pills) every 4 to 6 hours, or...   1,000 mg (two 500 mg pills) every 8 hours as needed.    Note: Don't take more than 3,000 mg in one day. Acetaminophen is found in many medicines (both prescribed and over-the-counter medicines). Read all labels to be sure you don't take too much.   For children, check the Tylenol bottle for the right dose. The dose is based on the child's age or weight.    If you have other health problems (like cancer, heart failure, an organ transplant or severe kidney disease): Call your specialty clinic if you don't feel better in the next 2 days.       Know when to call 911. Emergency warning signs include:    Trouble breathing or shortness of breath Pain or pressure in the chest that doesn't go away Feeling confused like you haven't felt before, or not being able to wake up Bluish-colored lips or face.  Where can I get more information?    [employer] -- About COVID-19: [email or web address]   CDC -- What to Do If You're Sick: www.cdc.gov/coronavirus/2019-ncov/about/steps-when-sick.html   CDC -- Ending Home Isolation: www.cdc.gov/coronavirus/2019-ncov/hcp/disposition-in-home-patients.html   CDC -- Caring for Someone:  www.cdc.gov/coronavirus/2019-ncov/if-you-are-sick/care-for-someone.html   [facility] -- Interim Guidance for Hospital Discharge to Home: www.health.[facility]/diseases/coronavirus/hcp/hospdischarge.pdf   [facility] clinical trials (COVID-19 research studies): [email or web address].[facility]-clinical-trials    Below are the COVID-19 hotlines at the [place] Department of Health ([facility]). Interpreters are available.    For health questions: Call 253-354-2184 or 1-480.769.1066 (7 a.m. to 7 p.m.) For questions about schools and childcare: Call 793-422-6659 or 1-739.153.3719 (7 a.m. to 7 p.m.)    Diagnosis: Contact with and (suspected) exposure to other viral communicable diseases  Diagnosis ICD: Z20.828

## 2020-11-22 ENCOUNTER — AMBULATORY - HEALTHEAST (OUTPATIENT)
Dept: FAMILY MEDICINE | Facility: CLINIC | Age: 65
End: 2020-11-22

## 2020-11-22 DIAGNOSIS — Z20.822 SUSPECTED 2019 NOVEL CORONAVIRUS INFECTION: ICD-10-CM

## 2020-11-24 ENCOUNTER — COMMUNICATION - HEALTHEAST (OUTPATIENT)
Dept: SCHEDULING | Facility: CLINIC | Age: 65
End: 2020-11-24

## 2020-11-27 ENCOUNTER — COMMUNICATION - HEALTHEAST (OUTPATIENT)
Dept: SCHEDULING | Facility: CLINIC | Age: 65
End: 2020-11-27

## 2021-01-09 ENCOUNTER — HEALTH MAINTENANCE LETTER (OUTPATIENT)
Age: 66
End: 2021-01-09

## 2021-01-15 ENCOUNTER — OFFICE VISIT (OUTPATIENT)
Dept: FAMILY MEDICINE | Facility: CLINIC | Age: 66
End: 2021-01-15
Payer: COMMERCIAL

## 2021-01-15 VITALS
BODY MASS INDEX: 40.63 KG/M2 | DIASTOLIC BLOOD PRESSURE: 96 MMHG | HEIGHT: 64 IN | SYSTOLIC BLOOD PRESSURE: 152 MMHG | TEMPERATURE: 98.5 F | WEIGHT: 238 LBS | HEART RATE: 83 BPM | OXYGEN SATURATION: 95 %

## 2021-01-15 DIAGNOSIS — E66.813 CLASS 3 SEVERE OBESITY DUE TO EXCESS CALORIES WITH SERIOUS COMORBIDITY AND BODY MASS INDEX (BMI) OF 40.0 TO 44.9 IN ADULT (H): ICD-10-CM

## 2021-01-15 DIAGNOSIS — F33.1 MAJOR DEPRESSIVE DISORDER, RECURRENT EPISODE, MODERATE (H): ICD-10-CM

## 2021-01-15 DIAGNOSIS — I11.9 HYPERTENSIVE HYPERTROPHIC CARDIOMYOPATHY, WITHOUT HEART FAILURE (H): ICD-10-CM

## 2021-01-15 DIAGNOSIS — E66.01 CLASS 3 SEVERE OBESITY DUE TO EXCESS CALORIES WITH SERIOUS COMORBIDITY AND BODY MASS INDEX (BMI) OF 40.0 TO 44.9 IN ADULT (H): ICD-10-CM

## 2021-01-15 DIAGNOSIS — I63.40 CEREBRAL INFARCTION DUE TO EMBOLISM OF CEREBRAL ARTERY (H): Chronic | ICD-10-CM

## 2021-01-15 DIAGNOSIS — I10 HTN, GOAL BELOW 140/90: ICD-10-CM

## 2021-01-15 DIAGNOSIS — F17.210 CIGARETTE SMOKER: ICD-10-CM

## 2021-01-15 DIAGNOSIS — I42.2 HYPERTENSIVE HYPERTROPHIC CARDIOMYOPATHY, WITHOUT HEART FAILURE (H): ICD-10-CM

## 2021-01-15 DIAGNOSIS — E78.5 HYPERLIPIDEMIA LDL GOAL <100: Chronic | ICD-10-CM

## 2021-01-15 DIAGNOSIS — Z23 NEED FOR SHINGLES VACCINE: ICD-10-CM

## 2021-01-15 DIAGNOSIS — E11.8 TYPE 2 DIABETES MELLITUS WITH COMPLICATION, WITHOUT LONG-TERM CURRENT USE OF INSULIN (H): Primary | ICD-10-CM

## 2021-01-15 DIAGNOSIS — Z87.891 PERSONAL HISTORY OF TOBACCO USE: ICD-10-CM

## 2021-01-15 DIAGNOSIS — Z23 NEED FOR PROPHYLACTIC VACCINATION AND INOCULATION AGAINST INFLUENZA: ICD-10-CM

## 2021-01-15 LAB
ANION GAP SERPL CALCULATED.3IONS-SCNC: 4 MMOL/L (ref 3–14)
BUN SERPL-MCNC: 16 MG/DL (ref 7–30)
CALCIUM SERPL-MCNC: 9.7 MG/DL (ref 8.5–10.1)
CHLORIDE SERPL-SCNC: 107 MMOL/L (ref 94–109)
CHOLEST SERPL-MCNC: 243 MG/DL
CO2 SERPL-SCNC: 30 MMOL/L (ref 20–32)
CREAT SERPL-MCNC: 0.81 MG/DL (ref 0.52–1.04)
CREAT UR-MCNC: 33 MG/DL
GFR SERPL CREATININE-BSD FRML MDRD: 76 ML/MIN/{1.73_M2}
GLUCOSE SERPL-MCNC: 94 MG/DL (ref 70–99)
HBA1C MFR BLD: 6.2 % (ref 0–5.6)
HDLC SERPL-MCNC: 44 MG/DL
LDLC SERPL CALC-MCNC: 152 MG/DL
MICROALBUMIN UR-MCNC: 12 MG/L
MICROALBUMIN/CREAT UR: 37.43 MG/G CR (ref 0–25)
NONHDLC SERPL-MCNC: 199 MG/DL
POTASSIUM SERPL-SCNC: 4.1 MMOL/L (ref 3.4–5.3)
SODIUM SERPL-SCNC: 141 MMOL/L (ref 133–144)
TRIGL SERPL-MCNC: 234 MG/DL

## 2021-01-15 PROCEDURE — 90750 HZV VACC RECOMBINANT IM: CPT | Performed by: FAMILY MEDICINE

## 2021-01-15 PROCEDURE — 99214 OFFICE O/P EST MOD 30 MIN: CPT | Mod: 25 | Performed by: FAMILY MEDICINE

## 2021-01-15 PROCEDURE — 80061 LIPID PANEL: CPT | Performed by: FAMILY MEDICINE

## 2021-01-15 PROCEDURE — 90472 IMMUNIZATION ADMIN EACH ADD: CPT | Performed by: FAMILY MEDICINE

## 2021-01-15 PROCEDURE — 90471 IMMUNIZATION ADMIN: CPT | Performed by: FAMILY MEDICINE

## 2021-01-15 PROCEDURE — 82043 UR ALBUMIN QUANTITATIVE: CPT | Performed by: FAMILY MEDICINE

## 2021-01-15 PROCEDURE — 36415 COLL VENOUS BLD VENIPUNCTURE: CPT | Performed by: FAMILY MEDICINE

## 2021-01-15 PROCEDURE — 83036 HEMOGLOBIN GLYCOSYLATED A1C: CPT | Performed by: FAMILY MEDICINE

## 2021-01-15 PROCEDURE — G0296 VISIT TO DETERM LDCT ELIG: HCPCS | Mod: 25 | Performed by: FAMILY MEDICINE

## 2021-01-15 PROCEDURE — 90682 RIV4 VACC RECOMBINANT DNA IM: CPT | Performed by: FAMILY MEDICINE

## 2021-01-15 PROCEDURE — 80048 BASIC METABOLIC PNL TOTAL CA: CPT | Performed by: FAMILY MEDICINE

## 2021-01-15 RX ORDER — AMLODIPINE BESYLATE 2.5 MG/1
2.5 TABLET ORAL DAILY
Qty: 90 TABLET | Refills: 1 | Status: SHIPPED | OUTPATIENT
Start: 2021-01-15 | End: 2021-08-26

## 2021-01-15 ASSESSMENT — PATIENT HEALTH QUESTIONNAIRE - PHQ9
5. POOR APPETITE OR OVEREATING: MORE THAN HALF THE DAYS
SUM OF ALL RESPONSES TO PHQ QUESTIONS 1-9: 17

## 2021-01-15 ASSESSMENT — ANXIETY QUESTIONNAIRES
IF YOU CHECKED OFF ANY PROBLEMS ON THIS QUESTIONNAIRE, HOW DIFFICULT HAVE THESE PROBLEMS MADE IT FOR YOU TO DO YOUR WORK, TAKE CARE OF THINGS AT HOME, OR GET ALONG WITH OTHER PEOPLE: SOMEWHAT DIFFICULT
1. FEELING NERVOUS, ANXIOUS, OR ON EDGE: MORE THAN HALF THE DAYS
7. FEELING AFRAID AS IF SOMETHING AWFUL MIGHT HAPPEN: MORE THAN HALF THE DAYS
2. NOT BEING ABLE TO STOP OR CONTROL WORRYING: MORE THAN HALF THE DAYS
5. BEING SO RESTLESS THAT IT IS HARD TO SIT STILL: SEVERAL DAYS
3. WORRYING TOO MUCH ABOUT DIFFERENT THINGS: MORE THAN HALF THE DAYS
6. BECOMING EASILY ANNOYED OR IRRITABLE: SEVERAL DAYS
GAD7 TOTAL SCORE: 12

## 2021-01-15 ASSESSMENT — MIFFLIN-ST. JEOR: SCORE: 1614.56

## 2021-01-15 NOTE — PROGRESS NOTES
Lung Cancer Screening Shared Decision Making Visit     Karyn Gamez is eligible for lung cancer screening on the basis of the information provided in my signed lung cancer screening order.     I have discussed with patient the risks and benefits of screening for lung cancer with low-dose CT.     The risks include:  radiation exposure: one low dose chest CT has as much ionizing radiation as about 15 chest x-rays or 6 months of background radiation living in [place]    false positives: 96% of positive findings/nodules are NOT cancer, but some might still require additional diagnostic evaluation, including biopsy  over-diagnosis: some slow growing cancers that might never have been clinically significant will be detected and treated unnecessarily     The benefit of early detection of lung cancer is contingent upon adherence to annual screening or more frequent follow up if indicated.     Furthermore, reaping the benefits of screening requires Karyn Gamez to be willing and physically able to undergo diagnostic procedures, if indicated. Although no specific guide is available for determining severity of comorbidities, it is reasonable to withhold screening in patients who have greater mortality risk from other diseases.     We did discuss that the only way to prevent lung cancer is to not smoke. Smoking cessation counseling was given, duration < 3 minutes.      I did not offer risk estimation using a calculator such as this one:    ShouldIScreen

## 2021-01-15 NOTE — PATIENT INSTRUCTIONS
*   Blood tests today.     *   Flu and shingles shot today. You'll need a second and final shingles booster in 2-6 months.     *    Time to stop smoking.     *    Call to set up a CT scan for lung cancer screening. [phone]. Also, ask about setting up a mammogram.     *     Follow up in 3 months to check blood pressure, weight, and smoking.       *     Will add another blood pressure pill called amlodipine.       Lung Cancer Screening   Frequently Asked Questions  If you are at high-risk for lung cancer, getting screened with low-dose computed tomography (LDCT) every year can help save your life. This handout offers answers to some of the most common questions about lung cancer screening. If you have other questions, please call [phone] ([phone]).     What is it?  Lung cancer screening uses special X-ray technology to create an image of your lung tissue. The exam is quick and easy and takes less than 10 seconds. We don t give you any medicine or use any needles. You can eat before and after the exam. You don t need to change your clothes as long as the clothing on your chest doesn t contain metal. But, you do need to be able to hold your breath for at least 6 seconds during the exam.    What is the goal of lung cancer screening?  The goal of lung cancer screening is to save lives. Many times, lung cancer is not found until a person starts having physical symptoms. Lung cancer screening can help detect lung cancer in the earliest stages when it may be easier to treat.    Who should be screened for lung cancer?  We suggest lung cancer screening for anyone who is at high-risk for lung cancer. You are in the high-risk group if you:      are between the ages of 55 and 79, and    have smoked at least 1 pack of cigarettes a day for 30 or more years, and    still smoke or have quit within the past 15 years.    However, if you have a new cough or shortness of breath, you should talk to your doctor  before being screened.    Some national lung health advocacy groups also recommend screening for people ages 50 to 79 who have smoked an average of 1 pack of cigarettes a day for 20 years. They must also have at least 1 other risk factor for lung cancer, not including exposure to secondhand smoke. Other risk factors are having had cancer in the past, emphysema, pulmonary fibrosis, COPD, a family history of lung cancer, or exposure to certain materials such as arsenic, asbestos, beryllium, cadmium, chromium, diesel fumes, nickel, radon or silica. Your care team can help you know if you have one of these risk factors.     Why does it matter if I have symptoms?  Certain symptoms can be a sign that you have a condition in your lungs that should be checked and treated by your doctor. These symptoms include fever, chest pain, a new or changing cough, shortness of breath that you have never felt before, coughing up blood or unexplained weight loss. Having any of these symptoms can greatly affect the results of lung cancer screening.       Should all smokers get an LDCT lung cancer screening exam?  It depends. Lung cancer screening is for a very specific group of men and women who have a history of heavy smoking over a long period of time (see  Who should be screened for lung cancer  above).  I am in the high-risk group, but have been diagnosed with cancer in the past. Is LDCT lung cancer screening right for me?  In some cases, you should not have LDCT lung screening, such as when your doctor is already following your cancer with CT scan studies. Your doctor will help you decide if LDCT lung screening is right for you.  Do I need to have a screening exam every year?  Yes. If you are in the high-risk group described earlier, you should get an LDCT lung cancer screening exam every year until you are 79, or are no longer willing or able to undergo screening and possible procedures to diagnose and treat lung cancer.  How  effective is LDCT at preventing death from lung cancer?  Studies have shown that LDCT lung cancer screening can lower the risk of death from lung cancer by 20 percent in people who are at high-risk.  What are the risks?  There are some risks and limitations of LDCT lung cancer screening. We want to make sure you understand the risks and benefits, so please let us know if you have any questions. Your doctor may want to talk with you more about these risks.    Radiation exposure: As with any exam that uses radiation, there is a very small increased risk of cancer. The amount of radiation in LDCT is small--about the same amount a person would get from a mammogram. Your doctor orders the exam when he or she feels the potential benefits outweigh the risks.    False negatives: No test is perfect, including LDCT. It is possible that you may have a medical condition, including lung cancer, that is not found during your exam. This is called a false negative result.    False positives and more testing: LDCT very often finds something in the lung that could be cancer, but in fact is not. This is called a false positive result. False positive tests often cause anxiety. To make sure these findings are not cancer, you may need to have more tests. These tests will be done only if you give us permission. Sometimes patients need a treatment that can have side effects, such as a biopsy. For more information on false positives, see  What can I expect from the results?     Findings not related to lung cancer: Your LDCT exam also takes pictures of areas of your body next to your lungs. In a very small number of cases, the CT scan will show an abnormal finding in one of these areas, such as your kidneys, adrenal glands, liver or thyroid. This finding may not be serious, but you may need more tests. Your doctor can help you decide what other tests you may need, if any.  What can I expect from the results?  About 1 out of 4 LDCT exams will  find something that may need more tests. Most of the time, these findings are lung nodules. Lung nodules are very small collections of tissue in the lung. These nodules are very common, and the vast majority--more than 97 percent--are not cancer (benign). Most are normal lymph nodes or small areas of scarring from past infections.  But, if a small lung nodule is found to be cancer, the cancer can be cured more than 90 percent of the time. To know if the nodule is cancer, we may need to get more images before your next yearly screening exam. If the nodule has suspicious features (for example, it is large, has an odd shape or grows over time), we will refer you to a specialist for further testing.  Will my doctor also get the results?  Yes. Your doctor will get a copy of your results.  Is it okay to keep smoking now that there s a cancer screening exam?  No. Tobacco is one of the strongest cancer-causing agents. It causes not only lung cancer, but other cancers and cardiovascular (heart) diseases as well. The damage caused by smoking builds over time. This means that the longer you smoke, the higher your risk of disease. While it is never too late to quit, the sooner you quit, the better.  Where can I find help to quit smoking?  The best way to prevent lung cancer is to stop smoking. If you have already quit smoking, congratulations and keep it up! For help on quitting smoking, please call [employer] at 2-174-174-JXEE ([phone]) or the American [employer] Society at 1-557.773.7941 to find local resources near you.  One-on-one health coaching:  If you d prefer to work individually with a health care provider on tobacco cessation, we offer:      Medication Therapy Management:  Our specially trained pharmacists work closely with you and your doctor to help you quit smoking.  Call [phone] or [phone] (toll free).     Can Do: Health coaching offered by [facility].  [email or web address]

## 2021-01-15 NOTE — PROGRESS NOTES
Assessment & Plan      (E11.8) Type 2 diabetes mellitus with complication, without long-term current use of insulin (H)  (primary encounter diagnosis)  Comment: Excellent control using single drug therapy.  Adequate renal function, trace microalbuminuria.  Lab Results   Component Value Date    A1C 6.2 01/15/2021    A1C 6.0 01/22/2020    A1C 6.8 06/07/2019    A1C 6.2 05/24/2018    A1C 6.1 02/15/2018       Plan: BASIC METABOLIC PANEL, HEMOGLOBIN A1C, Albumin         Random Urine Quantitative with Creat Ratio        Continue Metformin.  Follow-up in 6 months.    (F33.1) Major depressive disorder, recurrent episode, moderate (H)  Comment: Appears to be doing reasonably well on SSRI therapy.  Plan: Continue.  Follow-up in 6 months.    (E78.5) Hyperlipidemia LDL goal <100  Comment: Tolerating high intensity statin therapy well.  I question her compliance, LDL markedly elevated.  Plan: Lipid panel reflex to direct LDL Non-fasting,         atorvastatin (LIPITOR) 80 MG tablet        Reviewed the need to be compliant with her medications.    (I63.40) Cerebral infarction due to embolism of cerebral artery (H)  Comment: Minimal residual symptoms.  Reviewed secondary prevention.  Patient is on high intensity statin, aspirin therapy.  Will need to manage blood pressure more effectively.  Plan: Stressed importance of stopping smoking.    (E66.01,  Z68.41) Class 3 severe obesity due to excess calories with serious comorbidity and body mass index (BMI) of 40.0 to 44.9 in adult (H)  Comment: Reviewed lifestyle.      (I11.9,  I42.2) Hypertensive hypertrophic cardiomyopathy, without heart failure (H)  Comment: No signs of failure, will refer for echocardiogram in the future.      (I10) HTN, goal below 140/90  Comment: Above guidelines using 2 drug therapy, ACE inhibitor and thiazide diuretic.  We will add low-dose amlodipine.  Plan: amLODIPine (NORVASC) 2.5 MG tablet        Reviewed side effects including lower extremity edema.   Follow-up in 3 months.    (F17.210) Cigarette smoker  Plan: Prof fee: Shared Decisionmaking for Lung Cancer        Screening, CT Chest Lung Cancer Scrn Low Dose         wo      (Z41.291) Personal history of tobacco use  Comment: Reviewed, patient will try to quit smoking.  Declined pharmacological therapy.      (Z23) Need for prophylactic vaccination and inoculation against influenza  Plan: INFLUENZA QUAD, RECOMBINANT, P-FREE (RIV4)         (FLUBLOCK) [07805], ADMIN 1st VACCINE      (Z23) Need for shingles vaccine  Plan: EACH ADD'L VACCINE ADMINISTRATION    Results for orders placed or performed in visit on 01/15/21   BASIC METABOLIC PANEL     Status: None   Result Value Ref Range    Sodium 141 133 - 144 mmol/L    Potassium 4.1 3.4 - 5.3 mmol/L    Chloride 107 94 - 109 mmol/L    Carbon Dioxide 30 20 - 32 mmol/L    Anion Gap 4 3 - 14 mmol/L    Glucose 94 70 - 99 mg/dL    Urea Nitrogen 16 7 - 30 mg/dL    Creatinine 0.81 0.52 - 1.04 mg/dL    GFR Estimate 76 >60 mL/min/[1.73_m2]    GFR Estimate If Black 88 >60 mL/min/[1.73_m2]    Calcium 9.7 8.5 - 10.1 mg/dL   HEMOGLOBIN A1C     Status: Abnormal   Result Value Ref Range    Hemoglobin A1C 6.2 (H) 0 - 5.6 %   Lipid panel reflex to direct LDL Non-fasting     Status: Abnormal   Result Value Ref Range    Cholesterol 243 (H) <200 mg/dL    Triglycerides 234 (H) <150 mg/dL    HDL Cholesterol 44 (L) >49 mg/dL    LDL Cholesterol Calculated 152 (H) <100 mg/dL    Non HDL Cholesterol 199 (H) <130 mg/dL   Albumin Random Urine Quantitative with Creat Ratio     Status: Abnormal   Result Value Ref Range    Creatinine Urine 33 mg/dL    Albumin Urine mg/L 12 mg/L    Albumin Urine mg/g Cr 37.43 (H) 0 - 25 mg/g Cr        Patient Instructions   *   Blood tests today.     *   Flu and shingles shot today. You'll need a second and final shingles booster in 2-6 months.     *    Time to stop smoking.     *    Call to set up a CT scan for lung cancer screening. [phone]. Also, ask about  "setting up a mammogram.     *     Follow up in 3 months to check blood pressure, weight, and smoking.       *     Will add another blood pressure pill called amlodipine.           What is it?  Lung cancer screening uses special X-ray technology to create an image of your lung tissue. The exam is quick and easy and takes less than 10 seconds. We don t give you any medicine or use any   0956}     BMI:   Estimated body mass index is 40.85 kg/m  as calculated from the following:    Height as of this encounter: 1.626 m (5' 4\").    Weight as of this encounter: 108 kg (238 lb).   Weight management plan: Discussed healthy diet and exercise guidelines        Return in about 6 months (around 7/15/2021).    Cristiana Davis MD  [facility] NOAH Boss is a 65 year old who presents to clinic today for the following health issues     HPI       Diabetes Follow-up      How often are you checking your blood sugar? Not at all    What concerns do you have today about your diabetes? None     Do you have any of these symptoms? (Select all that apply)  No numbness or tingling in feet.  No redness, sores or blisters on feet.  No complaints of excessive thirst.  No reports of blurry vision.  No significant changes to weight.    Have you had a diabetic eye exam in the last 12 months? No          Hyperlipidemia Follow-Up      Are you regularly taking any medication or supplement to lower your cholesterol?   Yes- Atorvastatin    Are you having muscle aches or other side effects that you think could be caused by your cholesterol lowering medication?  No    Hypertension Follow-up      Do you check your blood pressure regularly outside of the clinic? No     Are you following a low salt diet? Yes, no added salt    Are your blood pressures ever more than 140 on the top number (systolic) OR more   than 90 on the bottom number (diastolic), for example 140/90? No    BP Readings from Last 2 Encounters:   01/15/21 (!) " 152/96   20 126/80     Hemoglobin A1C (%)   Date Value   01/15/2021 6.2 (H)   2020 6.0 (H)     LDL Cholesterol Calculated (mg/dL)   Date Value   01/15/2021 152 (H)   2020 154 (H)       Depression Followup    How are you doing with your depression since your last visit? Fluctuates but better     Are you having other symptoms that might be associated with depression? No    Have you had a significant life event?  No     Are you feeling anxious or having panic attacks?   No    Do you have any concerns with your use of alcohol or other drugs? No    Social History     Tobacco Use     Smoking status: Former Smoker     Packs/day: 0.50     Years: 30.00     Pack years: 15.00     Quit date: 2011     Years since quittin.5     Smokeless tobacco: Never Used     Tobacco comment: smoking 3-4 cigs per day   Substance Use Topics     Alcohol use: Yes     Comment: rarely     Drug use: No     PHQ 2019 2020 1/15/2021   PHQ-9 Total Score 15 9 17   Q9: Thoughts of better off dead/self-harm past 2 weeks Not at all Several days Not at all     LOIUSE-7 SCORE 2018 2020 1/15/2021   Total Score - - -   Total Score 7 8 12           Suicide Assessment Five-step Evaluation and Treatment (SAFE-T)      How many servings of fruits and vegetables do you eat daily?  0-1    On average, how many sweetened beverages do you drink each day (Examples: soda, juice, sweet tea, etc.  Do NOT count diet or artificially sweetened beverages)?   0    How many days per week do you exercise enough to make your heart beat faster? 3 or less    How many minutes a day do you exercise enough to make your heart beat faster? 9 or less    How many days per week do you miss taking your medication? 0    Also reviewed: Diabetes, hypertension, hypercholesterolemia, history of CVA.    Review of Systems   CONSTITUTIONAL: NEGATIVE for fever, chills, change in weight  ENT/MOUTH: NEGATIVE for ear, mouth and throat problems  RESP: NEGATIVE for  "significant cough or SOB  CV: NEGATIVE for chest pain, palpitations or peripheral edema  NEURO: NEGATIVE for weakness, dizziness or paresthesias  PSYCHIATRIC: NEGATIVE for changes in mood or affect      Objective    BP (!) 152/96 (BP Location: Left arm, Patient Position: Chair, Cuff Size: Adult Large)   Pulse 83   Temp 98.5  F (36.9  C) (Tympanic)   Ht 1.626 m (5' 4\")   Wt 108 kg (238 lb)   LMP  (LMP Unknown)   SpO2 95%   Breastfeeding No   BMI 40.85 kg/m    Body mass index is 40.85 kg/m .  Physical Exam   GENERAL: Healthy, alert and no distress  EYES: Eyes grossly normal to inspection, conjunctivae and sclerae normal  RESP: Lungs clear to auscultation - no rales, rhonchi or wheezes  CV: Regular rate and rhythm, normal S1 S2, no murmur  MS: No gross musculoskeletal defects noted, no edema  NEURO: Normal strength and tone, mentation intact and speech normal  PSYCH: Mentation appears normal, affect normal/bright    Cristiana Davis MD         "

## 2021-01-15 NOTE — NURSING NOTE
Prior to immunization administration, verified patients identity using patient s name and date of birth. Please see Immunization Activity for additional information.     Screening Questionnaire for Adult Immunization    Are you sick today?   No   Do you have allergies to medications, food, a vaccine component or latex?   No   Have you ever had a serious reaction after receiving a vaccination?   No   Do you have a long-term health problem with heart, lung, kidney, or metabolic disease (e.g., diabetes), asthma, a blood disorder, no spleen, complement component deficiency, a cochlear implant, or a spinal fluid leak?  Are you on long-term aspirin therapy?   No   Do you have cancer, leukemia, HIV/AIDS, or any other immune system problem?   No   Do you have a parent, brother, or sister with an immune system problem?   No   In the past 3 months, have you taken medications that affect  your immune system, such as prednisone, other steroids, or anticancer drugs; drugs for the treatment of rheumatoid arthritis, Crohn s disease, or psoriasis; or have you had radiation treatments?   No   Have you had a seizure, or a brain or other nervous system problem?   No   During the past year, have you received a transfusion of blood or blood    products, or been given immune (gamma) globulin or antiviral drug?   No   For women: Are you pregnant or is there a chance you could become       pregnant during the next month?   No   Have you received any vaccinations in the past 4 weeks?   No     Immunization questionnaire answers were all negative.        Per orders of Dr. Davis, injection of Shingrix and flu given by Federica Lopez MA. Patient instructed to remain in clinic for 15 minutes afterwards, and to report any adverse reaction to me immediately.       Screening performed by Federica Lopez MA on 1/15/2021 at 2:29 PM.

## 2021-01-16 ASSESSMENT — ANXIETY QUESTIONNAIRES: GAD7 TOTAL SCORE: 12

## 2021-01-17 RX ORDER — ATORVASTATIN CALCIUM 80 MG/1
80 TABLET, FILM COATED ORAL DAILY
Qty: 30 TABLET | Refills: 11 | Status: SHIPPED | OUTPATIENT
Start: 2021-01-17 | End: 2021-08-26

## 2021-01-22 DIAGNOSIS — E11.8 TYPE 2 DIABETES MELLITUS WITH COMPLICATION, WITHOUT LONG-TERM CURRENT USE OF INSULIN (H): ICD-10-CM

## 2021-01-24 RX ORDER — METFORMIN HCL 500 MG
TABLET, EXTENDED RELEASE 24 HR ORAL
Qty: 90 TABLET | Refills: 1 | Status: SHIPPED | OUTPATIENT
Start: 2021-01-24 | End: 2021-08-26

## 2021-01-24 NOTE — CONFIDENTIAL NOTE
"Prescription approved per [facility] [place].  Requested Prescriptions   Pending Prescriptions Disp Refills     metFORMIN (GLUCOPHAGE-XR) 500 MG 24 hr tablet [Pharmacy Med Name: METFORMIN ER 500MG 24HR TABS] 90 tablet 1     Sig: TAKE 1 TABLET BY MOUTH DAILY WITH DINNER FOR BLOOD SUGARS       Biguanide Agents Passed - 1/22/2021 10:44 AM        Passed - Patient is age 10 or older        Passed - Patient has documented A1c within the specified period of time.     If HgbA1C is 8 or greater, it needs to be on file within the past 3 months.  If less than 8, must be on file within the past 6 months.     Recent Labs   Lab Test 01/15/21  1357   A1C 6.2*             Passed - Patient's CR is NOT>1.4 OR Patient's EGFR is NOT<45 within past 12 mos.     Recent Labs   Lab Test 01/15/21  1357   GFRESTIMATED 76   GFRESTBLACK 88       Recent Labs   Lab Test 01/15/21  1357   CR 0.81             Passed - Patient does NOT have a diagnosis of CHF.        Passed - Medication is active on med list        Passed - Patient is not pregnant        Passed - Patient has not had a positive pregnancy test within the past 12 mos.         Passed - Recent (6 mo) or future (30 days) visit within the authorizing provider's specialty     Patient had office visit in the last 6 months or has a visit in the next 30 days with authorizing provider or within the authorizing provider's specialty.  See \"Patient Info\" tab in inbasket, or \"Choose Columns\" in Meds & Orders section of the refill encounter.                   "

## 2021-01-27 DIAGNOSIS — F33.1 MAJOR DEPRESSIVE DISORDER, RECURRENT EPISODE, MODERATE (H): ICD-10-CM

## 2021-01-31 RX ORDER — SERTRALINE HYDROCHLORIDE 100 MG/1
TABLET, FILM COATED ORAL
Qty: 180 TABLET | Refills: 1 | Status: SHIPPED | OUTPATIENT
Start: 2021-01-31 | End: 2021-08-26

## 2021-04-09 DIAGNOSIS — I10 HTN, GOAL BELOW 140/80: Chronic | ICD-10-CM

## 2021-04-12 RX ORDER — LISINOPRIL AND HYDROCHLOROTHIAZIDE 12.5; 2 MG/1; MG/1
1 TABLET ORAL DAILY
Qty: 90 TABLET | Refills: 1 | Status: SHIPPED | OUTPATIENT
Start: 2021-04-12 | End: 2021-08-26

## 2021-04-12 NOTE — TELEPHONE ENCOUNTER
"Requested Prescriptions   Pending Prescriptions Disp Refills    lisinopril-hydrochlorothiazide (ZESTORETIC) 20-12.5 MG tablet [Pharmacy Med Name: LISINOPRIL-HCTZ 20/12.5MG TABLETS] 90 tablet 1     Sig: Take 1 tablet by mouth daily       Diuretics (Including Combos) Protocol Failed - 4/9/2021  4:43 PM        Failed - Blood pressure under 140/90 in past 12 months     BP Readings from Last 3 Encounters:   01/15/21 (!) 152/96   01/22/20 126/80   06/19/19 146/80                 Passed - Recent (12 mo) or future (30 days) visit within the authorizing provider's specialty     Patient has had an office visit with the authorizing provider or a provider within the authorizing providers department within the previous 12 mos or has a future within next 30 days. See \"Patient Info\" tab in inbasket, or \"Choose Columns\" in Meds & Orders section of the refill encounter.              Passed - Medication is active on med list        Passed - Patient is age 18 or older        Passed - No active pregancy on record        Passed - Normal serum creatinine on file in past 12 months     Recent Labs   Lab Test 01/15/21  1357   CR 0.81              Passed - Normal serum potassium on file in past 12 months     Recent Labs   Lab Test 01/15/21  1357   POTASSIUM 4.1                    Passed - Normal serum sodium on file in past 12 months     Recent Labs   Lab Test 01/15/21  1357                 Passed - No positive pregnancy test in past 12 months       ACE Inhibitors (Including Combos) Protocol Failed - 4/9/2021  4:43 PM        Failed - Blood pressure under 140/90 in past 12 months     BP Readings from Last 3 Encounters:   01/15/21 (!) 152/96   01/22/20 126/80   06/19/19 146/80                 Passed - Recent (12 mo) or future (30 days) visit within the authorizing provider's specialty     Patient has had an office visit with the authorizing provider or a provider within the authorizing providers department within the previous 12 mos or " "has a future within next 30 days. See \"Patient Info\" tab in inbasket, or \"Choose Columns\" in Meds & Orders section of the refill encounter.              Passed - Medication is active on med list        Passed - Patient is age 18 or older        Passed - No active pregnancy on record        Passed - Normal serum creatinine on file in past 12 months     Recent Labs   Lab Test 01/15/21  1357   CR 0.81       Ok to refill medication if creatinine is low          Passed - Normal serum potassium on file in past 12 months     Recent Labs   Lab Test 01/15/21  1357   POTASSIUM 4.1             Passed - No positive pregnancy test within past 12 months           Routing refill request to provider for review/approval because:  Failed protocol.  Eli Torres BSN-RN  [facility]    "

## 2021-06-13 NOTE — TELEPHONE ENCOUNTER
Coronavirus (COVID-19) Notification    Lab Result   Lab test 2019-nCoV rRt-PCR OR SARS-COV-2 PCR    Nasopharyngeal AND/OR Oropharyngeal swab is NEGATIVE for 2019-nCoV RNA [OR] SARS-COV-2 RNA (COVID-19) RNA    Your result was negative. This means that we didn't find the virus that causes COVID-19 in your sample. A test may show negative when you do actually have the virus. This can happen when the virus is in the early stages of infection, before you feel illness symptoms.    If you have symptoms   Stay home and away from others (self-isolate) until you meet ALL of the guidelines below:    You've had no fever--and no medicine that reduces fever--for 1 full day (24 hours). And      Your other symptoms have gotten better. For example, your cough or breathing has improved. And     At least 10 days have passed since your symptoms started. (If you ve been told by a doctor that you have a weak immune system, wait 20 days.)     During this time:    Stay home. Don't go to work, school or anywhere else.     Stay in your own room, including for meals. Use your own bathroom if you can.    Stay away from others in your home. No hugging, kissing or shaking hands. No visitors.    Clean  high touch  surfaces often (doorknobs, counters, handles, etc.). Use a household cleaning spray or wipes. You can find a full list on the EPA website at www.epa.gov/pesticide-registration/list-n-disinfectants-use-against-sars-cov-2.    Cover your mouth and nose with a mask, tissue or other face covering to avoid spreading germs.    Wash your hands and face often with soap and water.    Going back to work  Check with your employer for any guidelines to follow for going back to work.  You are sent a letter for your Employer which will serve as formal document notice that you, the employee, tested negative for COVID-19, as of the testing date shown above.    If your symptoms worsen or other concerning symptoms, contact PCP, oncare or consider  returning to Emergency Dept.    Where can I get more information?    [facility]: [email or web address]    Coronavirus Basics: www.health.[email or web address].[place]/diseases/coronavirus/basics.html    [facility] Hotline (185-979-2201)    Lamin Arboleda RN, [facility] Triage/Med Refill 11/27/2020 1:44 PM

## 2021-08-26 ENCOUNTER — OFFICE VISIT (OUTPATIENT)
Dept: FAMILY MEDICINE | Facility: CLINIC | Age: 66
End: 2021-08-26
Payer: COMMERCIAL

## 2021-08-26 VITALS
HEIGHT: 64 IN | BODY MASS INDEX: 40.97 KG/M2 | RESPIRATION RATE: 16 BRPM | SYSTOLIC BLOOD PRESSURE: 136 MMHG | TEMPERATURE: 97.8 F | OXYGEN SATURATION: 97 % | HEART RATE: 59 BPM | DIASTOLIC BLOOD PRESSURE: 78 MMHG | WEIGHT: 240 LBS

## 2021-08-26 DIAGNOSIS — E66.01 CLASS 3 SEVERE OBESITY DUE TO EXCESS CALORIES WITH SERIOUS COMORBIDITY AND BODY MASS INDEX (BMI) OF 40.0 TO 44.9 IN ADULT (H): ICD-10-CM

## 2021-08-26 DIAGNOSIS — E66.813 CLASS 3 SEVERE OBESITY DUE TO EXCESS CALORIES WITH SERIOUS COMORBIDITY AND BODY MASS INDEX (BMI) OF 40.0 TO 44.9 IN ADULT (H): ICD-10-CM

## 2021-08-26 DIAGNOSIS — I42.2 HYPERTENSIVE HYPERTROPHIC CARDIOMYOPATHY, WITHOUT HEART FAILURE (H): Chronic | ICD-10-CM

## 2021-08-26 DIAGNOSIS — Z12.11 SCREEN FOR COLON CANCER: ICD-10-CM

## 2021-08-26 DIAGNOSIS — F33.1 MAJOR DEPRESSIVE DISORDER, RECURRENT EPISODE, MODERATE (H): ICD-10-CM

## 2021-08-26 DIAGNOSIS — E78.5 HYPERLIPIDEMIA LDL GOAL <100: Chronic | ICD-10-CM

## 2021-08-26 DIAGNOSIS — Z23 NEED FOR VACCINATION: ICD-10-CM

## 2021-08-26 DIAGNOSIS — I63.40 CEREBRAL INFARCTION DUE TO EMBOLISM OF CEREBRAL ARTERY (H): Chronic | ICD-10-CM

## 2021-08-26 DIAGNOSIS — I10 HTN, GOAL BELOW 140/90: ICD-10-CM

## 2021-08-26 DIAGNOSIS — I11.9 HYPERTENSIVE HYPERTROPHIC CARDIOMYOPATHY, WITHOUT HEART FAILURE (H): Chronic | ICD-10-CM

## 2021-08-26 DIAGNOSIS — E11.8 TYPE 2 DIABETES MELLITUS WITH COMPLICATION, WITHOUT LONG-TERM CURRENT USE OF INSULIN (H): Primary | ICD-10-CM

## 2021-08-26 LAB
ANION GAP SERPL CALCULATED.3IONS-SCNC: 3 MMOL/L (ref 3–14)
BUN SERPL-MCNC: 19 MG/DL (ref 7–30)
CALCIUM SERPL-MCNC: 9.3 MG/DL (ref 8.5–10.1)
CHLORIDE BLD-SCNC: 105 MMOL/L (ref 94–109)
CO2 SERPL-SCNC: 32 MMOL/L (ref 20–32)
CREAT SERPL-MCNC: 0.82 MG/DL (ref 0.52–1.04)
GFR SERPL CREATININE-BSD FRML MDRD: 76 ML/MIN/1.73M2
GLUCOSE BLD-MCNC: 85 MG/DL (ref 70–99)
HBA1C MFR BLD: 6 % (ref 0–5.6)
POTASSIUM BLD-SCNC: 3.9 MMOL/L (ref 3.4–5.3)
SODIUM SERPL-SCNC: 140 MMOL/L (ref 133–144)

## 2021-08-26 PROCEDURE — 83036 HEMOGLOBIN GLYCOSYLATED A1C: CPT | Performed by: FAMILY MEDICINE

## 2021-08-26 PROCEDURE — 99207 PR FOOT EXAM NO CHARGE: CPT | Performed by: FAMILY MEDICINE

## 2021-08-26 PROCEDURE — 99215 OFFICE O/P EST HI 40 MIN: CPT | Mod: 25 | Performed by: FAMILY MEDICINE

## 2021-08-26 PROCEDURE — 90471 IMMUNIZATION ADMIN: CPT | Performed by: FAMILY MEDICINE

## 2021-08-26 PROCEDURE — 90472 IMMUNIZATION ADMIN EACH ADD: CPT | Performed by: FAMILY MEDICINE

## 2021-08-26 PROCEDURE — 90715 TDAP VACCINE 7 YRS/> IM: CPT | Performed by: FAMILY MEDICINE

## 2021-08-26 PROCEDURE — 90750 HZV VACC RECOMBINANT IM: CPT | Performed by: FAMILY MEDICINE

## 2021-08-26 PROCEDURE — 96127 BRIEF EMOTIONAL/BEHAV ASSMT: CPT | Mod: 59 | Performed by: FAMILY MEDICINE

## 2021-08-26 PROCEDURE — 36415 COLL VENOUS BLD VENIPUNCTURE: CPT | Performed by: FAMILY MEDICINE

## 2021-08-26 PROCEDURE — 80061 LIPID PANEL: CPT | Performed by: FAMILY MEDICINE

## 2021-08-26 PROCEDURE — 90732 PPSV23 VACC 2 YRS+ SUBQ/IM: CPT | Performed by: FAMILY MEDICINE

## 2021-08-26 PROCEDURE — 80048 BASIC METABOLIC PNL TOTAL CA: CPT | Performed by: FAMILY MEDICINE

## 2021-08-26 RX ORDER — SERTRALINE HYDROCHLORIDE 100 MG/1
TABLET, FILM COATED ORAL
Qty: 180 TABLET | Refills: 1 | Status: SHIPPED | OUTPATIENT
Start: 2021-08-26 | End: 2022-03-02

## 2021-08-26 RX ORDER — METFORMIN HCL 500 MG
TABLET, EXTENDED RELEASE 24 HR ORAL
Qty: 90 TABLET | Refills: 1 | Status: SHIPPED | OUTPATIENT
Start: 2021-08-26 | End: 2022-03-23

## 2021-08-26 RX ORDER — LISINOPRIL AND HYDROCHLOROTHIAZIDE 12.5; 2 MG/1; MG/1
1 TABLET ORAL DAILY
Qty: 90 TABLET | Refills: 1 | Status: SHIPPED | OUTPATIENT
Start: 2021-08-26 | End: 2022-03-02

## 2021-08-26 RX ORDER — ATORVASTATIN CALCIUM 80 MG/1
80 TABLET, FILM COATED ORAL DAILY
Qty: 90 TABLET | Refills: 1 | Status: SHIPPED | OUTPATIENT
Start: 2021-08-26 | End: 2022-03-23

## 2021-08-26 RX ORDER — AMLODIPINE BESYLATE 2.5 MG/1
2.5 TABLET ORAL DAILY
Qty: 90 TABLET | Refills: 1 | Status: SHIPPED | OUTPATIENT
Start: 2021-08-26 | End: 2022-03-23

## 2021-08-26 ASSESSMENT — ANXIETY QUESTIONNAIRES
3. WORRYING TOO MUCH ABOUT DIFFERENT THINGS: SEVERAL DAYS
2. NOT BEING ABLE TO STOP OR CONTROL WORRYING: SEVERAL DAYS
7. FEELING AFRAID AS IF SOMETHING AWFUL MIGHT HAPPEN: SEVERAL DAYS
GAD7 TOTAL SCORE: 7
5. BEING SO RESTLESS THAT IT IS HARD TO SIT STILL: SEVERAL DAYS
6. BECOMING EASILY ANNOYED OR IRRITABLE: SEVERAL DAYS
IF YOU CHECKED OFF ANY PROBLEMS ON THIS QUESTIONNAIRE, HOW DIFFICULT HAVE THESE PROBLEMS MADE IT FOR YOU TO DO YOUR WORK, TAKE CARE OF THINGS AT HOME, OR GET ALONG WITH OTHER PEOPLE: NOT DIFFICULT AT ALL
1. FEELING NERVOUS, ANXIOUS, OR ON EDGE: SEVERAL DAYS

## 2021-08-26 ASSESSMENT — MIFFLIN-ST. JEOR: SCORE: 1623.63

## 2021-08-26 ASSESSMENT — PATIENT HEALTH QUESTIONNAIRE - PHQ9
5. POOR APPETITE OR OVEREATING: SEVERAL DAYS
SUM OF ALL RESPONSES TO PHQ QUESTIONS 1-9: 10

## 2021-08-26 NOTE — PATIENT INSTRUCTIONS
The A1c, a 3 month average of your blood sugars, is better. It's now 6.0.     Lab Results   Component Value Date    A1C 6.0 08/26/2021    A1C 6.2 01/15/2021    A1C 6.0 01/22/2020    A1C 6.8 06/07/2019    A1C 6.2 05/24/2018    A1C 6.1 02/15/2018      Sounds like a lymph node infection. Nothing else needs to be done.     Our scheduling office will call you about a colonoscopy.     Call about setting up a mammogram: [phone].     No change in medications.     The  is a great idea.     Follow up appointment in 6 months.

## 2021-08-26 NOTE — PROGRESS NOTES
Assessment & Plan     (E11.8) Type 2 diabetes mellitus with complication, without long-term current use of insulin (H)  (primary encounter diagnosis)  Comment: Currently doing well with single drug therapy, Metformin.  Denies GI side effects.  Lab Results   Component Value Date    A1C 6.0 08/26/2021    A1C 6.2 01/15/2021    A1C 6.0 01/22/2020    A1C 6.8 06/07/2019    A1C 6.2 05/24/2018    A1C 6.1 02/15/2018        Plan: FOOT EXAM, Hemoglobin A1c, Basic metabolic         panel  (Ca, Cl, CO2, Creat, Gluc, K, Na, BUN),         metFORMIN (GLUCOPHAGE-XR) 500 MG 24 hr tablet,         CANCELED: UA Macro with Reflex to Micro and         Culture - lab collect        Continue.  Follow-up in 6 months.    (I63.40) Cerebral infarction due to embolism of cerebral artery (H)  Comment: No sequelae, no recurrence.  Patient blood pressure within guidelines, this is being managed, high intensity statin, antiplatelet therapy  Plan: Continue current secondary prevention.    (E66.01,  Z68.41) Class 3 severe obesity due to excess calories with serious comorbidity and body mass index (BMI) of 40.0 to 44.9 in adult (H)  Comment: Reviewed lifestyle, patient will be seeing a  this fall to work on conditioning  Plan: Monitor.    (E78.5) Hyperlipidemia LDL goal <100  Comment: Tolerating high intensity statin therapy well.  LDL elevated, unclear if this secondary to not  LDL Cholesterol Calculated   Date Value Ref Range Status   01/15/2021 152 (H) <100 mg/dL Final     Comment:     Above desirable:  100-129 mg/dl  Borderline High:  130-159 mg/dL  High:             160-189 mg/dL  Very high:       >189 mg/dl        Plan: atorvastatin (LIPITOR) 80 MG tablet        We will recheck nonfasting lipid panel.    (I11.9,  I42.2) Hypertensive hypertrophic cardiomyopathy, without heart failure (H)  Comment: Based on echocardiogram from 2011.  No evidence of failure today on exam  Plan: We will repeat echocardiogram next year.    (F33.1) Major  depressive disorder, recurrent episode, moderate (H)  Comment: Doing well on SSRI therapy, continue  Plan: sertraline (ZOLOFT) 100 MG tablet  Follow-up in 6 months.    (Z23) Need for vaccination  Comment:   Plan: PNEUMOCOCCAL VACCINE,ADULT,SQ OR IM ([id number]),         TDAP VACCINE (Adacel, Boostrix)  [3043083],         ZOSTER VACCINE RECOMBINANT ADJUVANTED IM NJX      (Z12.11) Screen for colon cancer  Plan: Adult Gastro Ref - Procedure Only    (I10) HTN, goal below 140/90  Comment: Within guidelines on 3 drug regimen.  Normal potassium, adequate renal function.  Plan: amLODIPine (NORVASC) 2.5 MG tablet,         lisinopril-hydrochlorothiazide (ZESTORETIC)         20-12.5 MG tablet          Results for orders placed or performed in visit on 08/26/21   Basic metabolic panel  (Ca, Cl, CO2, Creat, Gluc, K, Na, BUN)     Status: Normal   Result Value Ref Range    Sodium 140 133 - 144 mmol/L    Potassium 3.9 3.4 - 5.3 mmol/L    Chloride 105 94 - 109 mmol/L    Carbon Dioxide (CO2) 32 20 - 32 mmol/L    Anion Gap 3 3 - 14 mmol/L    Urea Nitrogen 19 7 - 30 mg/dL    Creatinine 0.82 0.52 - 1.04 mg/dL    Calcium 9.3 8.5 - 10.1 mg/dL    Glucose 85 70 - 99 mg/dL    GFR Estimate 76 >60 mL/min/1.73m2   Hemoglobin A1c     Status: Abnormal   Result Value Ref Range    Hemoglobin A1C 6.0 (H) 0.0 - 5.6 %        Patient Instructions     The A1c, a 3 month average of your blood sugars, is better. It's now 6.0.     Lab Results   Component Value Date    A1C 6.0 08/26/2021    A1C 6.2 01/15/2021    A1C 6.0 01/22/2020    A1C 6.8 06/07/2019    A1C 6.2 05/24/2018    A1C 6.1 02/15/2018      Sounds like a lymph node infection. Nothing else needs to be done.     Our scheduling office will call you about a colonoscopy.     Call about setting up a mammogram: [phone].     No change in medications.     The  is a great idea.     Follow up appointment in 6 months.         [id number]}     BMI:   Estimated body mass index is 41.2 kg/m  as calculated  "from the following:    Height as of this encounter: 1.626 m (5' 4\").    Weight as of this encounter: 108.9 kg (240 lb).   Weight management plan: Discussed healthy diet and exercise guidelines      Return in about 6 months (around 2/26/2022) for BP Recheck.    Cristiana Davis MD  [facility] NOAH martin is a 65 year old who presents for the following health issues  HPI     Diabetes Follow-up      How often are you checking your blood sugar? Not at all    What concerns do you have today about your diabetes? None     Do you have any of these symptoms? (Select all that apply)  No numbness or tingling in feet.  No redness, sores or blisters on feet.  No complaints of excessive thirst.  No reports of blurry vision.  No significant changes to weight.    Have you had a diabetic eye exam in the last 12 months? No      Hyperlipidemia Follow-Up      Are you regularly taking any medication or supplement to lower your cholesterol?   Yes- atorvastatin 80mg    Are you having muscle aches or other side effects that you think could be caused by your cholesterol lowering medication?  No    Hypertension Follow-up      Do you check your blood pressure regularly outside of the clinic? No     Are you following a low salt diet? No    Are your blood pressures ever more than 140 on the top number (systolic) OR more   than 90 on the bottom number (diastolic), for example 140/90? No     *Pain under right arm for a few days- took abx for tooth infection and seemed to improve. No injury, redness or swelling visible when this occurred.    BP Readings from Last 2 Encounters:   08/26/21 136/78   01/15/21 (!) 152/96     Hemoglobin A1C (%)   Date Value   08/26/2021 6.0 (H)   01/15/2021 6.2 (H)   01/22/2020 6.0 (H)     LDL Cholesterol Calculated (mg/dL)   Date Value   01/15/2021 152 (H)   01/22/2020 154 (H)     Depression Followup    How are you doing with your depression since your last visit? No change    Are you " having other symptoms that might be associated with depression? No    Have you had a significant life event?  OTHER:  had stroke and moved in last year     Are you feeling anxious or having panic attacks?   No    Do you have any concerns with your use of alcohol or other drugs? No    Social History     Tobacco Use     Smoking status: Former Smoker     Packs/day: 0.50     Years: 30.00     Pack years: 15.00     Quit date: 8/12/2011     Years since quitting: 10.0     Smokeless tobacco: Never Used     Tobacco comment: smoking 3-4 cigs per day   Substance Use Topics     Alcohol use: Yes     Comment: rarely     Drug use: No     PHQ 1/22/2020 1/15/2021 8/26/2021   PHQ-9 Total Score 9 17 10   Q9: Thoughts of better off dead/self-harm past 2 weeks Several days Not at all Not at all     LOUISE-7 SCORE 1/22/2020 1/15/2021 8/26/2021   Total Score - - -   Total Score 8 12 7     Last PHQ-9 8/26/2021   1.  Little interest or pleasure in doing things 1   2.  Feeling down, depressed, or hopeless 1   3.  Trouble falling or staying asleep, or sleeping too much 1   4.  Feeling tired or having little energy 1   5.  Poor appetite or overeating 3   6.  Feeling bad about yourself 1   7.  Trouble concentrating 1   8.  Moving slowly or restless 1   Q9: Thoughts of better off dead/self-harm past 2 weeks 0   PHQ-9 Total Score 10   Difficulty at work, home, or with people Not difficult at all     LOUISE-7  8/26/2021   1. Feeling nervous, anxious, or on edge 1   2. Not being able to stop or control worrying 1   3. Worrying too much about different things 1   4. Trouble relaxing 1   5. Being so restless that it is hard to sit still 1   6. Becoming easily annoyed or irritable 1   7. Feeling afraid, as if something awful might happen 1   LOUISE-7 Total Score 7   If you checked any problems, how difficult have they made it for you to do your work, take care of things at home, or get along with other people? Not difficult at all     PHQ-9 somewhat  "artificially inflated secondary to caring for her .  He recently sustained a stroke significant neoplasia    Review of Systems   CONSTITUTIONAL: NEGATIVE for fever, chills, change in weight  ENT/MOUTH: NEGATIVE for ear, mouth and throat problems  RESP: NEGATIVE for significant cough or SOB  CV: NEGATIVE for chest pain, palpitations or peripheral edema      Objective    /78   Pulse 59   Temp 97.8  F (36.6  C) (Tympanic)   Resp 16   Ht 1.626 m (5' 4\")   Wt 108.9 kg (240 lb)   LMP  (LMP Unknown)   SpO2 97%   Breastfeeding No   BMI 41.20 kg/m    Body mass index is 41.2 kg/m .  Physical Exam   GENERAL: Healthy, alert and no distress  EYES: Eyes grossly normal to inspection, conjunctivae and sclerae normal  RESP: Lungs clear to auscultation - no rales, rhonchi or wheezes  CV: Regular rate and rhythm, normal S1 S2, no murmur  MS: No gross musculoskeletal defects noted, no edema  NEURO: Normal strength and tone, mentation intact and speech normal  PSYCH: Mentation appears normal, affect normal/bright     Cristiana Davis MD       Total time spent with chart preparation:                       6 minutes.  Time spent with face-to-face visit:                              24 minutes.  Time spent with documentation:                                 12 minutes.    Total time spent:                                                          42 minutes.        "

## 2021-08-27 LAB
CHOLEST SERPL-MCNC: 267 MG/DL
HDLC SERPL-MCNC: 41 MG/DL
LDLC SERPL CALC-MCNC: 179 MG/DL
NONHDLC SERPL-MCNC: 226 MG/DL
TRIGL SERPL-MCNC: 237 MG/DL

## 2021-08-27 ASSESSMENT — ANXIETY QUESTIONNAIRES: GAD7 TOTAL SCORE: 7

## 2021-10-23 ENCOUNTER — HEALTH MAINTENANCE LETTER (OUTPATIENT)
Age: 66
End: 2021-10-23

## 2021-12-21 ENCOUNTER — VIRTUAL VISIT (OUTPATIENT)
Dept: FAMILY MEDICINE | Facility: CLINIC | Age: 66
End: 2021-12-21
Payer: COMMERCIAL

## 2021-12-21 DIAGNOSIS — Z20.822 EXPOSURE TO 2019 NOVEL CORONAVIRUS: Primary | ICD-10-CM

## 2021-12-21 PROCEDURE — 99213 OFFICE O/P EST LOW 20 MIN: CPT | Mod: 95 | Performed by: FAMILY MEDICINE

## 2021-12-21 NOTE — PROGRESS NOTES
veronica is a 66 year old who is being evaluated via a billable video visit.      How would you like to obtain your AVS? [employer]  If the video visit is dropped, the invitation should be resent by: Text to cell phone: [phone]  Will anyone else be joining your video visit? No        Patient was evaluated by video rather than an in person visit due to current community outbreak of COVID-19.    Assessment/Plan:     1. Exposure to 2019 novel coronavirus  This is a 66-year-old patient who is vaccinated and boosted against COVID-19 with a high risk exposure to symptomatic person 2 days ago.  Discussed recommendation for PCR testing 3 to 5 days after exposure.  Will order this to be done in 2 days from now ideally; they realize that due to increased demand for testing I cannot guarantee testing would be done exactly on that day.  Risk factors for severe Covid illness include age greater than or equal to 65, BMI greater than 25, diabetes, hypertension, cerebrovascular disease.  We discussed that if Covid test is positive, they could consider treatment with monoclonal antibodies.  We will touch base with him once results are available.  They do have access to [employer] to see results.  - Asymptomatic COVID-19 Virus (Coronavirus) by PCR; Future      No follow-ups on file.  Subjective:   Veronica Gamez is a 66 year old female who is seen by video telehealth for: COVID exposure    HPI:   Patient and spouse seen together for video visit for Covid exposure.  They were both at a friend's house on 12/19/2021 (2 days ago).  The friend started to have a bit of a sore throat before they left that day and so immediately they distanced themselves.  The friend got tested yesterday and tested positive.  Patient denies fever, cough, sore throat, runny nose, cough, loss of smell or taste, fever, chills, or other symptoms.  Is immunized and boosted against COVID-19; booster 20 days ago.    Immunization History   Administered Date(s)  Administered     COVID-19,PF,Amy 03/30/2021     COVID-19,PF,Moderna Booster 12/01/2021        Objective   Vitals (patient-reported): LMP  (LMP Unknown)      Gen:  Awake and alert, appears well and in no distress.  CV: Normal color/no cyanosis  Resp: Normal respiratory effort, no grossly audible wheezing  Skin: No rash of visible skin         Video-Visit Details  Type of service:  Video Visit  Video Start Time: 3:09 PM   Video End Time (time video stopped): 3:14PM   10 min spent on the date of the encounter in chart review, patient visit, review of tests, documentation and/or discussion with other providers about the issues documented above.   Originating Location (pt. Location): Home  Distant Location (provider location):  [facility]   Mode of Communication:  Video Conference via [employer]    used: none- English fluent

## 2021-12-23 ENCOUNTER — LAB (OUTPATIENT)
Dept: FAMILY MEDICINE | Facility: CLINIC | Age: 66
End: 2021-12-23
Attending: FAMILY MEDICINE
Payer: COMMERCIAL

## 2021-12-23 DIAGNOSIS — Z20.822 EXPOSURE TO 2019 NOVEL CORONAVIRUS: ICD-10-CM

## 2021-12-23 LAB — SARS-COV-2 RNA RESP QL NAA+PROBE: NEGATIVE

## 2021-12-23 PROCEDURE — U0003 INFECTIOUS AGENT DETECTION BY NUCLEIC ACID (DNA OR RNA); SEVERE ACUTE RESPIRATORY SYNDROME CORONAVIRUS 2 (SARS-COV-2) (CORONAVIRUS DISEASE [COVID-19]), AMPLIFIED PROBE TECHNIQUE, MAKING USE OF HIGH THROUGHPUT TECHNOLOGIES AS DESCRIBED BY CMS-2020-01-R: HCPCS

## 2021-12-23 PROCEDURE — U0005 INFEC AGEN DETEC AMPLI PROBE: HCPCS

## 2022-02-24 ENCOUNTER — PATIENT OUTREACH (OUTPATIENT)
Dept: FAMILY MEDICINE | Facility: CLINIC | Age: 67
End: 2022-02-24
Payer: COMMERCIAL

## 2022-02-24 NOTE — TELEPHONE ENCOUNTER
Patient Quality Outreach    Patient is due for the following:   Diabetes -  Diabetic Follow-Up Visit  Depression  -  Depression follow-up visit  Physical    NEXT STEPS:   Schedule a yearly physical    Type of outreach:    Sent Send[employer] message.      Questions for provider review:    None     Anjelica Ybarra [facility]  Chart routed to Care Team.

## 2022-03-23 ENCOUNTER — OFFICE VISIT (OUTPATIENT)
Dept: FAMILY MEDICINE | Facility: CLINIC | Age: 67
End: 2022-03-23
Payer: COMMERCIAL

## 2022-03-23 VITALS
DIASTOLIC BLOOD PRESSURE: 81 MMHG | WEIGHT: 228.2 LBS | BODY MASS INDEX: 39.17 KG/M2 | HEART RATE: 82 BPM | SYSTOLIC BLOOD PRESSURE: 124 MMHG | TEMPERATURE: 98.5 F | OXYGEN SATURATION: 96 %

## 2022-03-23 DIAGNOSIS — I10 HTN, GOAL BELOW 140/90: ICD-10-CM

## 2022-03-23 DIAGNOSIS — Z12.11 SCREEN FOR COLON CANCER: ICD-10-CM

## 2022-03-23 DIAGNOSIS — F33.1 MAJOR DEPRESSIVE DISORDER, RECURRENT EPISODE, MODERATE (H): Primary | ICD-10-CM

## 2022-03-23 DIAGNOSIS — Z23 NEED FOR PROPHYLACTIC VACCINATION AND INOCULATION AGAINST INFLUENZA: ICD-10-CM

## 2022-03-23 DIAGNOSIS — I63.40 CEREBRAL INFARCTION DUE TO EMBOLISM OF CEREBRAL ARTERY (H): Chronic | ICD-10-CM

## 2022-03-23 DIAGNOSIS — E11.8 TYPE 2 DIABETES MELLITUS WITH COMPLICATION, WITHOUT LONG-TERM CURRENT USE OF INSULIN (H): ICD-10-CM

## 2022-03-23 DIAGNOSIS — I11.9 HYPERTENSIVE HYPERTROPHIC CARDIOMYOPATHY, WITHOUT HEART FAILURE (H): ICD-10-CM

## 2022-03-23 DIAGNOSIS — Z12.31 VISIT FOR SCREENING MAMMOGRAM: ICD-10-CM

## 2022-03-23 DIAGNOSIS — I42.2 HYPERTENSIVE HYPERTROPHIC CARDIOMYOPATHY, WITHOUT HEART FAILURE (H): ICD-10-CM

## 2022-03-23 DIAGNOSIS — E78.5 HYPERLIPIDEMIA LDL GOAL <100: Chronic | ICD-10-CM

## 2022-03-23 DIAGNOSIS — Z23 HIGH PRIORITY FOR 2019-NCOV VACCINE: ICD-10-CM

## 2022-03-23 DIAGNOSIS — Z71.89 COUNSELING FOR LIVING WILL: ICD-10-CM

## 2022-03-23 DIAGNOSIS — E66.01 MORBID OBESITY (H): ICD-10-CM

## 2022-03-23 PROBLEM — E66.813 CLASS 3 SEVERE OBESITY DUE TO EXCESS CALORIES WITH SERIOUS COMORBIDITY AND BODY MASS INDEX (BMI) OF 40.0 TO 44.9 IN ADULT (H): Status: RESOLVED | Noted: 2018-08-25 | Resolved: 2022-03-23

## 2022-03-23 LAB — TSH SERPL DL<=0.005 MIU/L-ACNC: 2.83 MU/L (ref 0.4–4)

## 2022-03-23 PROCEDURE — 90471 IMMUNIZATION ADMIN: CPT | Performed by: FAMILY MEDICINE

## 2022-03-23 PROCEDURE — 96127 BRIEF EMOTIONAL/BEHAV ASSMT: CPT | Performed by: FAMILY MEDICINE

## 2022-03-23 PROCEDURE — 90662 IIV NO PRSV INCREASED AG IM: CPT | Performed by: FAMILY MEDICINE

## 2022-03-23 PROCEDURE — 82043 UR ALBUMIN QUANTITATIVE: CPT | Performed by: FAMILY MEDICINE

## 2022-03-23 PROCEDURE — 99214 OFFICE O/P EST MOD 30 MIN: CPT | Mod: 25 | Performed by: FAMILY MEDICINE

## 2022-03-23 PROCEDURE — 84443 ASSAY THYROID STIM HORMONE: CPT | Performed by: FAMILY MEDICINE

## 2022-03-23 PROCEDURE — 36415 COLL VENOUS BLD VENIPUNCTURE: CPT | Performed by: FAMILY MEDICINE

## 2022-03-23 PROCEDURE — 0064A COVID-19,PF,MODERNA (18+ YRS BOOSTER .25ML): CPT | Performed by: FAMILY MEDICINE

## 2022-03-23 PROCEDURE — 91306 COVID-19,PF,MODERNA (18+ YRS BOOSTER .25ML): CPT | Performed by: FAMILY MEDICINE

## 2022-03-23 RX ORDER — METFORMIN HCL 500 MG
TABLET, EXTENDED RELEASE 24 HR ORAL
Qty: 90 TABLET | Refills: 1 | Status: SHIPPED | OUTPATIENT
Start: 2022-03-23 | End: 2023-01-31

## 2022-03-23 RX ORDER — AMLODIPINE BESYLATE 2.5 MG/1
2.5 TABLET ORAL DAILY
Qty: 90 TABLET | Refills: 1 | Status: SHIPPED | OUTPATIENT
Start: 2022-03-23 | End: 2023-01-31

## 2022-03-23 RX ORDER — ATORVASTATIN CALCIUM 80 MG/1
80 TABLET, FILM COATED ORAL DAILY
Qty: 90 TABLET | Refills: 1 | Status: SHIPPED | OUTPATIENT
Start: 2022-03-23 | End: 2023-01-31

## 2022-03-23 RX ORDER — BUPROPION HYDROCHLORIDE 150 MG/1
150 TABLET ORAL EVERY MORNING
Qty: 90 TABLET | Refills: 1 | Status: SHIPPED | OUTPATIENT
Start: 2022-03-23 | End: 2023-01-31

## 2022-03-23 ASSESSMENT — PAIN SCALES - GENERAL: PAINLEVEL: NO PAIN (0)

## 2022-03-23 ASSESSMENT — PATIENT HEALTH QUESTIONNAIRE - PHQ9
10. IF YOU CHECKED OFF ANY PROBLEMS, HOW DIFFICULT HAVE THESE PROBLEMS MADE IT FOR YOU TO DO YOUR WORK, TAKE CARE OF THINGS AT HOME, OR GET ALONG WITH OTHER PEOPLE: NOT DIFFICULT AT ALL
SUM OF ALL RESPONSES TO PHQ QUESTIONS 1-9: 5
SUM OF ALL RESPONSES TO PHQ QUESTIONS 1-9: 5

## 2022-03-23 NOTE — PATIENT INSTRUCTIONS
*   Thanks for coming in today.     *   Blood tests today.     *   No change in medications.     *   Time for a colonoscopy: If you have not heard from the scheduling office within 2 business days, please call [phone].    *   Call about setting up a mammogram: [phone]     *   The  is a great idea! Keep it up.

## 2022-03-23 NOTE — PROGRESS NOTES
Assessment & Plan     (F33.1) Major depressive disorder, recurrent episode, moderate (H)  (primary encounter diagnosis)  Comment: Appears to be doing well on SSRI therapy plus Wellbutrin.  Plan: buPROPion (WELLBUTRIN XL) 150 MG 24 hr tablet        Continue, follow-up in 6 months.    (E66.01) Morbid obesity (H)  Comment: BMI greater than 35, comorbidities.  Plan: Reviewed lifestyle.    (I11.9,  I42.2) Hypertensive hypertrophic cardiomyopathy, without heart failure (H)  Comment: Blood pressure with normal guidelines, on ACE inhibitor.  Plan: Continue current treatment, will rePete echocardiogram in the next 1 to 2 years.    (I63.40) Cerebral infarction due to embolism of cerebral artery (H)  Comment: Minimal residual symptoms, reviewed secondary prevention.  Plan: Continue with statin therapy, blood pressure management, blood sugar management, and antiplatelet therapy.    (E11.8) Type 2 diabetes mellitus with complication, without long-term current use of insulin (H)  Comment: Excellent control using single drug therapy, Metformin.  Lab Results   Component Value Date    A1C 6.0 08/26/2021    A1C 6.2 01/15/2021    A1C 6.0 01/22/2020    A1C 6.8 06/07/2019    A1C 6.2 05/24/2018    A1C 6.1 02/15/2018        Plan: Albumin Random Urine Quantitative with Creat         Ratio, metFORMIN (GLUCOPHAGE-XR) 500 MG 24 hr         tablet, TSH with free T4 reflex        Continue.  Follow-up in 6 months.    (I10) HTN, goal below 140/90  Comment: Within guidelines using 3 drug regimen, amlodipine, ACE inhibitor, thiazide diuretic.  Trace microalbuminuria.  Plan: amLODIPine (NORVASC) 2.5 MG tablet       continue current medications.    (E78.5) Hyperlipidemia LDL goal <100  Comment: Tolerating high intensity statin therapy well.  LDL elevated, question noncompliance.  LDL Cholesterol Calculated   Date Value Ref Range Status   08/26/2021 179 (H) <=100 mg/dL Final     Comment:     Age 0-19 years:  Desirable: 0-110 mg/dL   Borderline high:  "110-129 mg/dL   High: >= 130 mg/dL    Age 20 years and older:  Desirable: <100mg/dL  Above desirable: 100-129 mg/dL   Borderline high: 130-159 mg/dL   High: 160-189 mg/dL   Very high: >= 190 mg/dL   01/15/2021 152 (H) <100 mg/dL Final     Comment:     Above desirable:  100-129 mg/dl  Borderline High:  130-159 mg/dL  High:             160-189 mg/dL  Very high:       >189 mg/dl         Plan: atorvastatin (LIPITOR) 80 MG tablet        Stress importance of consistently taking her atorvastatin.    (Z71.89) Counseling for living will  Plan: Honoring Choices Referral      (Z23) Need for prophylactic vaccination and inoculation against influenza  Plan: INFLUENZA, QUAD, HIGH DOSE, PF, 65YR + (FLUZONE        HD)      (Z23) High priority for 2019-nCoV vaccine  Plan: COVID-19,PF,MODERNA (18+ Yrs BOOSTER .25mL)      (Z12.11) Screen for colon cancer  Plan: Adult Gastro Ref - Procedure Only      (Z12.31) Visit for screening mammogram      Results for orders placed or performed in visit on 03/23/22   TSH with free T4 reflex     Status: Normal   Result Value Ref Range    TSH 2.83 0.40 - 4.00 mU/L   Albumin Random Urine Quantitative with Creat Ratio     Status: Abnormal   Result Value Ref Range    Creatinine Urine mg/dL 62 mg/dL    Albumin Urine mg/L 26 mg/L    Albumin Urine mg/g Cr 41.94 (H) 0.00 - 25.00 mg/g Cr      Patient Instructions   *   Thanks for coming in today.     *   Blood tests today.     *   No change in medications.     *   Time for a colonoscopy: If you have not heard from the scheduling office within 2 business days, please call [phone].    *   Call about setting up a mammogram: [phone] .     *   The  is a great idea! Keep it up.  }     BMI:   Estimated body mass index is 39.17 kg/m  as calculated from the following:    Height as of 8/26/21: 1.626 m (5' 4\").    Weight as of this encounter: 103.5 kg (228 lb 3.2 oz).   Weight management plan: Discussed healthy diet and exercise " guidelines      Return in about 6 months (around 9/23/2022) for Depression checkup.    Cristiana Davis MD  [facility] NOAH martin is a 66 year old who presents for the following health issues     History of Present Illness       Mental Health Follow-up:                    Today's PHQ-9         PHQ-9 Total Score: 5  PHQ-9 Q9 Thoughts of better off dead/self-harm past 2 weeks :   (P) Not at all    How difficult have these problems made it for you to do your work, take care of things at home, or get along with other people: Not difficult at all        Reason for visit:  We ll check  Symptoms include:  None    She eats 2-3 servings of fruits and vegetables daily.She consumes 0 sweetened beverage(s) daily.She exercises with enough effort to increase her heart rate 30 to 60 minutes per day.  She exercises with enough effort to increase her heart rate 3 or less days per week. She is missing 1 dose(s) of medications per week.       Diabetes Follow-up      How often are you checking your blood sugar? Not at all    What concerns do you have today about your diabetes? None     Do you have any of these symptoms? (Select all that apply)  No numbness or tingling in feet.  No redness, sores or blisters on feet.  No complaints of excessive thirst.  No reports of blurry vision.  No significant changes to weight.    Have you had a diabetic eye exam in the last 12 months? No          Hyperlipidemia Follow-Up      Are you regularly taking any medication or supplement to lower your cholesterol?   Yes- .    Are you having muscle aches or other side effects that you think could be caused by your cholesterol lowering medication?  No    Hypertension Follow-up      Do you check your blood pressure regularly outside of the clinic? Yes     Are you following a low salt diet? Yes    Are your blood pressures ever more than 140 on the top number (systolic) OR more   than 90 on the bottom number (diastolic), for  example 140/90? Yes    BP Readings from Last 2 Encounters:   08/26/21 136/78   01/15/21 (!) 152/96     Hemoglobin A1C POCT (%)   Date Value   01/15/2021 6.2 (H)   01/22/2020 6.0 (H)     Hemoglobin A1C (%)   Date Value   08/26/2021 6.0 (H)     LDL Cholesterol Calculated (mg/dL)   Date Value   08/26/2021 179 (H)   01/15/2021 152 (H)   01/22/2020 154 (H)       Depression Followup    How are you doing with your depression since your last visit? No change    Are you having other symptoms that might be associated with depression? No    Have you had a significant life event?  No     Are you feeling anxious or having panic attacks?   No    Do you have any concerns with your use of alcohol or other drugs? No    Social History     Tobacco Use     Smoking status: Former Smoker     Packs/day: 0.50     Years: 30.00     Pack years: 15.00     Quit date: 8/12/2011     Years since quitting: 10.6     Smokeless tobacco: Never Used     Tobacco comment: smoking 3-4 cigs per day   Vaping Use     Vaping Use: Never used   Substance Use Topics     Alcohol use: Yes     Comment: rarely     Drug use: No     PHQ 1/15/2021 8/26/2021 3/23/2022   PHQ-9 Total Score 17 10 5   Q9: Thoughts of better off dead/self-harm past 2 weeks Not at all Not at all Not at all     LOUISE-7 SCORE 1/22/2020 1/15/2021 8/26/2021   Total Score - - -   Total Score 8 12 7     Last PHQ-9 3/23/2022   1.  Little interest or pleasure in doing things 1   2.  Feeling down, depressed, or hopeless 1   3.  Trouble falling or staying asleep, or sleeping too much 1   4.  Feeling tired or having little energy 1   5.  Poor appetite or overeating 1   6.  Feeling bad about yourself 0   7.  Trouble concentrating 0   8.  Moving slowly or restless 0   Q9: Thoughts of better off dead/self-harm past 2 weeks 0   PHQ-9 Total Score 5   Difficulty at work, home, or with people -     LOUISE-7  8/26/2021   1. Feeling nervous, anxious, or on edge 1   2. Not being able to stop or control worrying 1   3.  Worrying too much about different things 1   4. Trouble relaxing 1   5. Being so restless that it is hard to sit still 1   6. Becoming easily annoyed or irritable 1   7. Feeling afraid, as if something awful might happen 1   LOUISE-7 Total Score 7   If you checked any problems, how difficult have they made it for you to do your work, take care of things at home, or get along with other people? Not difficult at all       Review of Systems   CONSTITUTIONAL: NEGATIVE for fever, chills, change in weight  ENT/MOUTH: NEGATIVE for ear, mouth and throat problems  RESP: NEGATIVE for significant cough or SOB  CV: NEGATIVE for chest pain, palpitations or peripheral edema  NEURO: NEGATIVE for weakness, dizziness or paresthesias  PSYCHIATRIC: NEGATIVE for changes in mood or affect      Objective    LMP  (LMP Unknown)   There is no height or weight on file to calculate BMI.  Physical Exam   GENERAL: Healthy, alert and no distress  EYES: Eyes grossly normal to inspection, conjunctivae and sclerae normal  RESP: Lungs clear to auscultation - no rales, rhonchi or wheezes  CV: Regular rate and rhythm, normal S1 S2, no murmur  MS: No gross musculoskeletal defects noted, no edema  NEURO: Normal strength and tone, mentation intact and speech normal  PSYCH: Mentation appears normal, affect normal/bright     Cristiana Davis MD     Answers for HPI/ROS submitted by the patient on 3/23/2022  If you checked off any problems, how difficult have these problems made it for you to do your work, take care of things at home, or get along with other people?: Not difficult at all  PHQ9 TOTAL SCORE: 5

## 2022-03-24 LAB
CREAT UR-MCNC: 62 MG/DL
MICROALBUMIN UR-MCNC: 26 MG/L
MICROALBUMIN/CREAT UR: 41.94 MG/G CR (ref 0–25)

## 2022-03-24 ASSESSMENT — PATIENT HEALTH QUESTIONNAIRE - PHQ9: SUM OF ALL RESPONSES TO PHQ QUESTIONS 1-9: 5

## 2022-05-30 DIAGNOSIS — F33.1 MAJOR DEPRESSIVE DISORDER, RECURRENT EPISODE, MODERATE (H): ICD-10-CM

## 2022-05-30 DIAGNOSIS — I10 HTN, GOAL BELOW 140/90: ICD-10-CM

## 2022-06-01 RX ORDER — LISINOPRIL AND HYDROCHLOROTHIAZIDE 12.5; 2 MG/1; MG/1
1 TABLET ORAL DAILY
Qty: 90 TABLET | Refills: 0 | Status: SHIPPED | OUTPATIENT
Start: 2022-06-01 | End: 2023-01-31

## 2022-06-01 RX ORDER — SERTRALINE HYDROCHLORIDE 100 MG/1
TABLET, FILM COATED ORAL
Qty: 180 TABLET | Refills: 0 | Status: SHIPPED | OUTPATIENT
Start: 2022-06-01 | End: 2023-01-31

## 2022-06-01 NOTE — TELEPHONE ENCOUNTER
Prescription approved per [facility] Refill Protocol.  Cecile Mendoza RN    PHQ-9 score:    PHQ 3/23/2022   PHQ-9 Total Score 5   Q9: Thoughts of better off dead/self-harm past 2 weeks Not at all

## 2022-10-09 ENCOUNTER — HEALTH MAINTENANCE LETTER (OUTPATIENT)
Age: 67
End: 2022-10-09

## 2022-11-26 ENCOUNTER — HEALTH MAINTENANCE LETTER (OUTPATIENT)
Age: 67
End: 2022-11-26

## 2023-01-31 ENCOUNTER — OFFICE VISIT (OUTPATIENT)
Dept: FAMILY MEDICINE | Facility: CLINIC | Age: 68
End: 2023-01-31
Payer: COMMERCIAL

## 2023-01-31 ENCOUNTER — TELEPHONE (OUTPATIENT)
Dept: GASTROENTEROLOGY | Facility: CLINIC | Age: 68
End: 2023-01-31
Payer: COMMERCIAL

## 2023-01-31 VITALS
SYSTOLIC BLOOD PRESSURE: 128 MMHG | HEIGHT: 65 IN | BODY MASS INDEX: 37.62 KG/M2 | TEMPERATURE: 97.9 F | OXYGEN SATURATION: 95 % | WEIGHT: 225.8 LBS | RESPIRATION RATE: 16 BRPM | HEART RATE: 71 BPM | DIASTOLIC BLOOD PRESSURE: 80 MMHG

## 2023-01-31 DIAGNOSIS — E11.29 CONTROLLED TYPE 2 DIABETES MELLITUS WITH MICROALBUMINURIA, WITHOUT LONG-TERM CURRENT USE OF INSULIN (H): Primary | ICD-10-CM

## 2023-01-31 DIAGNOSIS — I42.2 HYPERTENSIVE HYPERTROPHIC CARDIOMYOPATHY, WITHOUT HEART FAILURE (H): ICD-10-CM

## 2023-01-31 DIAGNOSIS — I11.9 HYPERTENSIVE HYPERTROPHIC CARDIOMYOPATHY, WITHOUT HEART FAILURE (H): ICD-10-CM

## 2023-01-31 DIAGNOSIS — R80.9 CONTROLLED TYPE 2 DIABETES MELLITUS WITH MICROALBUMINURIA, WITHOUT LONG-TERM CURRENT USE OF INSULIN (H): Primary | ICD-10-CM

## 2023-01-31 DIAGNOSIS — F33.1 MAJOR DEPRESSIVE DISORDER, RECURRENT EPISODE, MODERATE (H): ICD-10-CM

## 2023-01-31 DIAGNOSIS — I10 HTN, GOAL BELOW 140/90: ICD-10-CM

## 2023-01-31 DIAGNOSIS — E78.5 HYPERLIPIDEMIA LDL GOAL <70: ICD-10-CM

## 2023-01-31 DIAGNOSIS — Z23 NEED FOR INFLUENZA VACCINATION: ICD-10-CM

## 2023-01-31 DIAGNOSIS — E66.01 MORBID OBESITY (H): ICD-10-CM

## 2023-01-31 DIAGNOSIS — I10 HYPERTENSION GOAL BP (BLOOD PRESSURE) < 140/90: ICD-10-CM

## 2023-01-31 DIAGNOSIS — Z23 HIGH PRIORITY FOR COVID-19 VACCINATION: ICD-10-CM

## 2023-01-31 DIAGNOSIS — I63.40 CEREBRAL INFARCTION DUE TO EMBOLISM OF CEREBRAL ARTERY (H): Chronic | ICD-10-CM

## 2023-01-31 LAB — HBA1C MFR BLD: 5.7 % (ref 0–5.6)

## 2023-01-31 PROCEDURE — 90471 IMMUNIZATION ADMIN: CPT | Performed by: FAMILY MEDICINE

## 2023-01-31 PROCEDURE — 80048 BASIC METABOLIC PNL TOTAL CA: CPT | Performed by: FAMILY MEDICINE

## 2023-01-31 PROCEDURE — 36415 COLL VENOUS BLD VENIPUNCTURE: CPT | Performed by: FAMILY MEDICINE

## 2023-01-31 PROCEDURE — 91313 COVID-19 VACCINE BIVALENT BOOSTER 18+ (MODERNA): CPT | Performed by: FAMILY MEDICINE

## 2023-01-31 PROCEDURE — 90662 IIV NO PRSV INCREASED AG IM: CPT | Performed by: FAMILY MEDICINE

## 2023-01-31 PROCEDURE — 83036 HEMOGLOBIN GLYCOSYLATED A1C: CPT | Performed by: FAMILY MEDICINE

## 2023-01-31 PROCEDURE — 80061 LIPID PANEL: CPT | Performed by: FAMILY MEDICINE

## 2023-01-31 PROCEDURE — 82043 UR ALBUMIN QUANTITATIVE: CPT | Performed by: FAMILY MEDICINE

## 2023-01-31 PROCEDURE — 82570 ASSAY OF URINE CREATININE: CPT | Performed by: FAMILY MEDICINE

## 2023-01-31 PROCEDURE — 0134A COVID-19 VACCINE BIVALENT BOOSTER 18+ (MODERNA): CPT | Performed by: FAMILY MEDICINE

## 2023-01-31 PROCEDURE — 99214 OFFICE O/P EST MOD 30 MIN: CPT | Mod: 25 | Performed by: FAMILY MEDICINE

## 2023-01-31 RX ORDER — ATORVASTATIN CALCIUM 80 MG/1
80 TABLET, FILM COATED ORAL DAILY
Qty: 90 TABLET | Refills: 1 | Status: SHIPPED | OUTPATIENT
Start: 2023-01-31

## 2023-01-31 RX ORDER — AMLODIPINE BESYLATE 2.5 MG/1
2.5 TABLET ORAL DAILY
Qty: 90 TABLET | Refills: 1 | Status: SHIPPED | OUTPATIENT
Start: 2023-01-31

## 2023-01-31 RX ORDER — LISINOPRIL AND HYDROCHLOROTHIAZIDE 12.5; 2 MG/1; MG/1
1 TABLET ORAL DAILY
Qty: 90 TABLET | Refills: 0 | Status: SHIPPED | OUTPATIENT
Start: 2023-01-31

## 2023-01-31 RX ORDER — BUPROPION HYDROCHLORIDE 150 MG/1
150 TABLET ORAL EVERY MORNING
Qty: 90 TABLET | Refills: 1 | Status: SHIPPED | OUTPATIENT
Start: 2023-01-31

## 2023-01-31 RX ORDER — SERTRALINE HYDROCHLORIDE 100 MG/1
200 TABLET, FILM COATED ORAL DAILY
Qty: 180 TABLET | Refills: 1 | Status: SHIPPED | OUTPATIENT
Start: 2023-01-31

## 2023-01-31 RX ORDER — METFORMIN HCL 500 MG
TABLET, EXTENDED RELEASE 24 HR ORAL
Qty: 90 TABLET | Refills: 1 | Status: SHIPPED | OUTPATIENT
Start: 2023-01-31

## 2023-01-31 ASSESSMENT — ANXIETY QUESTIONNAIRES
6. BECOMING EASILY ANNOYED OR IRRITABLE: SEVERAL DAYS
2. NOT BEING ABLE TO STOP OR CONTROL WORRYING: SEVERAL DAYS
4. TROUBLE RELAXING: SEVERAL DAYS
1. FEELING NERVOUS, ANXIOUS, OR ON EDGE: SEVERAL DAYS
IF YOU CHECKED OFF ANY PROBLEMS ON THIS QUESTIONNAIRE, HOW DIFFICULT HAVE THESE PROBLEMS MADE IT FOR YOU TO DO YOUR WORK, TAKE CARE OF THINGS AT HOME, OR GET ALONG WITH OTHER PEOPLE: NOT DIFFICULT AT ALL
5. BEING SO RESTLESS THAT IT IS HARD TO SIT STILL: NOT AT ALL
GAD7 TOTAL SCORE: 5
GAD7 TOTAL SCORE: 5
7. FEELING AFRAID AS IF SOMETHING AWFUL MIGHT HAPPEN: NOT AT ALL
7. FEELING AFRAID AS IF SOMETHING AWFUL MIGHT HAPPEN: NOT AT ALL
8. IF YOU CHECKED OFF ANY PROBLEMS, HOW DIFFICULT HAVE THESE MADE IT FOR YOU TO DO YOUR WORK, TAKE CARE OF THINGS AT HOME, OR GET ALONG WITH OTHER PEOPLE?: NOT DIFFICULT AT ALL
3. WORRYING TOO MUCH ABOUT DIFFERENT THINGS: SEVERAL DAYS
GAD7 TOTAL SCORE: 5

## 2023-01-31 ASSESSMENT — PATIENT HEALTH QUESTIONNAIRE - PHQ9
SUM OF ALL RESPONSES TO PHQ QUESTIONS 1-9: 5
SUM OF ALL RESPONSES TO PHQ QUESTIONS 1-9: 5
10. IF YOU CHECKED OFF ANY PROBLEMS, HOW DIFFICULT HAVE THESE PROBLEMS MADE IT FOR YOU TO DO YOUR WORK, TAKE CARE OF THINGS AT HOME, OR GET ALONG WITH OTHER PEOPLE: NOT DIFFICULT AT ALL

## 2023-01-31 NOTE — PATIENT INSTRUCTIONS
Blood and urine tests today.     I sent a sent in a prescription for Ozempic. Watch for nausea. It's a once weekly shot.     No other changes in medications.     Flu shot    Covid booster.    Keep up with exercise.     Follow up in 6 months. Make this a wellness visit.

## 2023-01-31 NOTE — PROGRESS NOTES
Assessment & Plan     (E11.29,  R80.9) Controlled type 2 diabetes mellitus with microalbuminuria, without long-term current use of insulin (H)  (primary encounter diagnosis)  Comment: Excellent control using single drug therapy.  Adequate renal function, no signs of microalbuminuria.  Lab Results   Component Value Date    A1C 5.7 01/31/2023    A1C 6.0 08/26/2021    A1C 6.2 01/15/2021    A1C 6.0 01/22/2020    A1C 6.8 06/07/2019    A1C 6.2 05/24/2018    A1C 6.1 02/15/2018        Plan: metFORMIN (GLUCOPHAGE XR) 500 MG 24 hr tablet,         HEMOGLOBIN A1C, semaglutide (OZEMPIC) 2         MG/1.5ML SOPN pen, Albumin Random Urine         Quantitative with Creat Ratio        Continue.  Follow-up in 6 months.    (I10) HTN, goal below 140/90  Comment: Within guidelines using 3 drug regimen.  Plan: amLODIPine (NORVASC) 2.5 MG tablet,         lisinopril-hydrochlorothiazide (ZESTORETIC)         20-12.5 MG tablet        Continue.    (E78.5) Hyperlipidemia LDL goal <70  Comment: Elevated, continue on high intensity statin.  Plan: atorvastatin (LIPITOR) 80 MG tablet, Lipid         panel reflex to direct LDL Non-fasting        Recheck in 6 months.    (F33.1) Major depressive disorder, recurrent episode, moderate (H)  Comment: Doing well with SSRI therapy plus Wellbutrin.  Plan: buPROPion (WELLBUTRIN XL) 150 MG 24 hr tablet,         sertraline (ZOLOFT) 100 MG tablet        Continue, follow-up in 6 months.    (I10) Hypertension goal BP (blood pressure) < 140/90  Plan: BASIC METABOLIC PANEL      (I11.9,  I42.2) Hypertensive hypertrophic cardiomyopathy, without heart failure (H)  Comment: Appears well compensated.  Blood pressure normal.  Plan: Continue current medications.    (E66.01) Morbid obesity (H)  Comment: BMI greater than 35 with comorbidities of hypertension, diabetes, hyperlipidemia.  Plan: Reviewed lifestyle.    (I63.40) Cerebral infarction due to embolism of cerebral artery (H)  Comment: No apparent sequela I.  Plan:  Continue with high intensity statin, antiplatelet therapy and management of her blood pressure.    (Z23) High priority for COVID-19 vaccination  Plan: COVID-19,PF,MODERNA BIVALENT (18+YRS)      (Z23) Need for influenza vaccination  Plan: INFLUENZA, QUAD, HIGH DOSE, PF, 65YR + (FLUZONE        HD)      Results for orders placed or performed in visit on 01/31/23   Albumin Random Urine Quantitative with Creat Ratio     Status: None   Result Value Ref Range    Creatinine Urine mg/dL 16 mg/dL    Albumin Urine mg/L <5 mg/L    Albumin Urine mg/g Cr     Lipid panel reflex to direct LDL Non-fasting     Status: Abnormal   Result Value Ref Range    Cholesterol 198 <200 mg/dL    Triglycerides 132 <150 mg/dL    Direct Measure HDL 42 (L) >=50 mg/dL    LDL Cholesterol Calculated 130 (H) <=100 mg/dL    Non HDL Cholesterol 156 (H) <130 mg/dL    Patient Fasting > 8hrs? Yes     Narrative    Cholesterol  Desirable:  <200 mg/dL    Triglycerides  Normal:  Less than 150 mg/dL  Borderline High:  150-199 mg/dL  High:  200-499 mg/dL  Very High:  Greater than or equal to 500 mg/dL    Direct Measure HDL  Female:  Greater than or equal to 50 mg/dL   Male:  Greater than or equal to 40 mg/dL    LDL Cholesterol  Desirable:  <100mg/dL  Above Desirable:  100-129 mg/dL   Borderline High:  130-159 mg/dL   High:  160-189 mg/dL   Very High:  >= 190 mg/dL    Non HDL Cholesterol  Desirable:  130 mg/dL  Above Desirable:  130-159 mg/dL  Borderline High:  160-189 mg/dL  High:  190-219 mg/dL  Very High:  Greater than or equal to 220 mg/dL   BASIC METABOLIC PANEL     Status: Abnormal   Result Value Ref Range    Sodium 139 133 - 144 mmol/L    Potassium 4.2 3.4 - 5.3 mmol/L    Chloride 103 94 - 109 mmol/L    Carbon Dioxide (CO2) 30 20 - 32 mmol/L    Anion Gap 6 3 - 14 mmol/L    Urea Nitrogen 20 7 - 30 mg/dL    Creatinine 0.83 0.52 - 1.04 mg/dL    Calcium 9.3 8.5 - 10.1 mg/dL    Glucose 100 (H) 70 - 99 mg/dL    GFR Estimate 77 >60 mL/min/1.73m2   HEMOGLOBIN A1C     " Status: Abnormal   Result Value Ref Range    Hemoglobin A1C 5.7 (H) 0.0 - 5.6 %        Patient Instructions   Blood and urine tests today.     I sent a sent in a prescription for Ozempic. Watch for nausea. It's a once weekly shot.     No other changes in medications.     Flu shot    Covid booster.    Keep up with exercise.     Follow up in 6 months. Make this a wellness visit.     BMI:   Estimated body mass index is 38.08 kg/m  as calculated from the following:    Height as of this encounter: 1.64 m (5' 4.57\").    Weight as of this encounter: 102.4 kg (225 lb 12.8 oz).   Weight management plan: Discussed healthy diet and exercise guidelines      Return in about 6 months (around 7/31/2023).    Cristiana Davis MD  [facility] NOAH martin is a 67 year old, presenting for the following health issues:  Recheck Medication      History of Present Illness       Diabetes:   She presents for follow up of diabetes.  She is not checking blood glucose. She has no concerns regarding her diabetes at this time.  She is not experiencing numbness or burning in feet, excessive thirst, blurry vision, weight changes or redness, sores or blisters on feet. The patient has not had a diabetic eye exam in the last 12 months.         She eats 2-3 servings of fruits and vegetables daily.She consumes 0 sweetened beverage(s) daily.She exercises with enough effort to increase her heart rate 20 to 29 minutes per day.  She exercises with enough effort to increase her heart rate 3 or less days per week. She is missing 1 dose(s) of medications per week.    Today's PHQ-9         PHQ-9 Total Score: 5    PHQ-9 Q9 Thoughts of better off dead/self-harm past 2 weeks :   Not at all    How difficult have these problems made it for you to do your work, take care of things at home, or get along with other people: Not difficult at all  Today's LOUISE-7 Score: 5         Also addressed the following:      HTN, goal below " "140/90  Hyperlipidemia LDL goal <70  Major depressive disorder, recurrent episode, moderate (H)  Hypertension goal BP (blood pressure) < 140/90  Hypertensive hypertrophic cardiomyopathy, without heart failure (H)  Morbid obesity (H)  Cerebral infarction due to embolism of cerebral artery (H)  High priority for COVID-19 vaccination  Need for influenza vaccination     Review of Systems   Constitutional, HEENT, cardiovascular, pulmonary, gi and gu systems are negative, except as otherwise noted.      Objective    Pulse 71   Temp 97.9  F (36.6  C) (Temporal)   Resp 16   Ht 1.64 m (5' 4.57\")   Wt 102.4 kg (225 lb 12.8 oz)   LMP  (LMP Unknown)   SpO2 95%   BMI 38.08 kg/m    Body mass index is 38.08 kg/m .  Physical Exam   GENERAL: Healthy, alert and no distress  EYES: Eyes grossly normal to inspection, conjunctivae and sclerae normal  RESP: Lungs clear to auscultation - no rales, rhonchi or wheezes  CV: Regular rate and rhythm, normal S1 S2, no murmur  MS: No gross musculoskeletal defects noted, no edema  NEURO: Normal strength and tone, mentation intact and speech normal  PSYCH: Mentation appears normal, affect normal/bright       Cristiana Davis MD         "

## 2023-01-31 NOTE — TELEPHONE ENCOUNTER
Screening Questions  BLUE  KIND OF PREP RED  LOCATION [review exclusion criteria] GREEN  SEDATION TYPE    Y Are you active on mychart?   CHAYITO MÉNDEZ  Ordering/Referring Provider?    [employer]  What type of coverage do you have?  N Have you had a positive covid test in the last 14 days?    37.8 1. BMI  [BMI 40+ - review exclusion criteria - MAC + [facility]]            *NEED PAC APPT AT [facility] + MAC (ONLY)*     SELF   2. Are you able to give consent for your medical care? [IF NO,RN REVIEW]          N  3. Are you taking any prescription pain medications on a routine schedule   (ex narcotics: tramadol, oxycodone, roxicodone, oxycontin,  and percocet)?               N  3a. EXTENDED PREP What kind of prescription?     N 4. Do you have any chemical dependencies such as alcohol, street drugs, or methadone?    **If yes 3- 5 , please schedule with MAC sedation.**          IF YES TO ANY 6 - 10 - HOSPITAL SETTING ONLY.     N 6.   Do you need assistance transferring?     N 7.   Have you had a heart or lung transplant?    N 8.   Are you currently on dialysis?   N 9.   Do you use daily home oxygen?   N 10. Do you take nitroglycerin?   10a. N If yes, how often?     11. [FEMALES]   Are you currently pregnant?     11a.  If yes, how many weeks? [ Greater than 12 weeks, OR NEEDED]    N 12. [review exclusion criteria]  Do you have any implantable devices in your body (pacemaker, defib, LVAD)?            *NEED PAC APPT AT [facility]*     N 13. Do you have Pulmonary Hypertension?             *NEED PAC APPT AT [facility]*     N 14. In the past 6 months, have you had any heart related issues including cardiomyopathy or heart attack?     N 14a. If yes, did it require cardiac stenting if so when?     N 15. Have you had a stroke or Transient ischemic attack (TIA - aka  mini stroke ) within 6 months?      N 16. Do you have mod to severe Obstructive Sleep Apnea?  [Hospital only - [place] at [place]]    N 17. Do you have SEVERE AND UNCONTROLLED asthma?               "*NEED PAC APPT AT [facility]*     N 18. Are you currently taking any blood thinners?     18a. If yes, inform patient to \"follow up w/ ordering provider for bridging instructions.\"    N 19. Do you take the medication Phentermine?    19a. If yes, \"Hold for 7 days before procedure.  Please consult your prescribing provider if you have questions about holding this medication.\"     N  20. Do you have chronic kidney disease?      Y - DIABETIC   21. Do you have a diagnosis of diabetes?     N  22. On a regular basis do you go 3-5 days between bowel movements?     23. Preferred LOCAL Pharmacy for Pre Prescription    [ LIST ONLY ONE PHARMACY]        [id number] - [place], [place] - [address] [place] [address]  AT [facility] OF [address]          - CLOSING REMINDERS -    Informed patient they will need an adult          Cannot take any type of public or medical transportation alone    Conscious Sedation- Needs  for 6 hours after the procedure        MAC/General-Needs  for 24 hours after procedure    Pre-Procedure Covid test to be completed         [[facility] PCR/HOME Testing Required] + ADULT to ACCOMPANY     Confirmed Nurse will call to complete assessment       - SCHEDULING DETAILS -      Hospital Setting Required? If yes, what is the exclusion?:  N      Additional comments:  PT REQUEST [facility]       IGOR   Surgeon   03/24/2023  Date of Procedure  MAC  Sedation Type     N PAC / Pre-op Required   [facility]        Type of Procedure Scheduled  Lower Endoscopy [Colonoscopy]      Which Colonoscopy Prep was Sent?     STANDARD GOLYTELY-If you answer yes to questions #8, #20, #21          "

## 2023-02-01 ENCOUNTER — ANCILLARY PROCEDURE (OUTPATIENT)
Dept: MAMMOGRAPHY | Facility: CLINIC | Age: 68
End: 2023-02-01
Attending: FAMILY MEDICINE
Payer: COMMERCIAL

## 2023-02-01 DIAGNOSIS — Z12.31 VISIT FOR SCREENING MAMMOGRAM: ICD-10-CM

## 2023-02-01 LAB
ANION GAP SERPL CALCULATED.3IONS-SCNC: 6 MMOL/L (ref 3–14)
BUN SERPL-MCNC: 20 MG/DL (ref 7–30)
CALCIUM SERPL-MCNC: 9.3 MG/DL (ref 8.5–10.1)
CHLORIDE BLD-SCNC: 103 MMOL/L (ref 94–109)
CHOLEST SERPL-MCNC: 198 MG/DL
CO2 SERPL-SCNC: 30 MMOL/L (ref 20–32)
CREAT SERPL-MCNC: 0.83 MG/DL (ref 0.52–1.04)
CREAT UR-MCNC: 16 MG/DL
FASTING STATUS PATIENT QL REPORTED: YES
GFR SERPL CREATININE-BSD FRML MDRD: 77 ML/MIN/1.73M2
GLUCOSE BLD-MCNC: 100 MG/DL (ref 70–99)
HDLC SERPL-MCNC: 42 MG/DL
LDLC SERPL CALC-MCNC: 130 MG/DL
MICROALBUMIN UR-MCNC: <5 MG/L
MICROALBUMIN/CREAT UR: NORMAL MG/G{CREAT}
NONHDLC SERPL-MCNC: 156 MG/DL
POTASSIUM BLD-SCNC: 4.2 MMOL/L (ref 3.4–5.3)
SODIUM SERPL-SCNC: 139 MMOL/L (ref 133–144)
TRIGL SERPL-MCNC: 132 MG/DL

## 2023-02-01 PROCEDURE — 77067 SCR MAMMO BI INCL CAD: CPT

## 2023-02-06 ENCOUNTER — TRANSFERRED RECORDS (OUTPATIENT)
Dept: MULTI SPECIALTY CLINIC | Facility: CLINIC | Age: 68
End: 2023-02-06
Payer: COMMERCIAL

## 2023-02-06 LAB — RETINOPATHY: NORMAL

## 2023-02-08 ENCOUNTER — MYC MEDICAL ADVICE (OUTPATIENT)
Dept: FAMILY MEDICINE | Facility: CLINIC | Age: 68
End: 2023-02-08
Payer: COMMERCIAL

## 2023-02-08 NOTE — TELEPHONE ENCOUNTER
Patient Quality Outreach    Patient is due for the following:   Diabetes -  Eye Exam and Foot Exam  Colon Cancer Screening  Physical Annual Wellness Visit    Type of outreach:    Sent [employer] message.  Patient responded to [employer]-sent eye exam to abstraction    Questions for provider review:    None     Jessi Meraz MA  Chart routed to Care Team.

## 2023-03-09 ENCOUNTER — TELEPHONE (OUTPATIENT)
Dept: GASTROENTEROLOGY | Facility: CLINIC | Age: 68
End: 2023-03-09

## 2023-03-09 DIAGNOSIS — Z12.11 SPECIAL SCREENING FOR MALIGNANT NEOPLASMS, COLON: Primary | ICD-10-CM

## 2023-03-09 RX ORDER — BISACODYL 5 MG/1
TABLET, DELAYED RELEASE ORAL
Qty: 4 TABLET | Refills: 0 | Status: SHIPPED | OUTPATIENT
Start: 2023-03-09

## 2023-03-14 NOTE — TELEPHONE ENCOUNTER
Attempted to contact patient regarding upcoming Colonoscopy  procedure on 3.24.23 for pre assessment questions. No answer.     Left message to return call to [phone] #4        Mary Schofield RN  Endoscopy Procedure Pre Assessment RN          
Patient scheduled for Colonoscopy  on 3.24.23.     Discuss Covid policy.     Pre op exam needed? N/A    Arrival time: 0930. Procedure time 1030    Facility location: [facility]; [address] [place] [address], [address] Floor, [place] [address]    Sedation type: MAC    Anticoagulations? No    Electronic implanted devices? No    Diabetic? No    Indication for procedure: Screening    Bowel prep recommendation: Standard Golytely     Prep instructions sent via [employer] Bowel prep script sent to [facility] #[id number] - [place], [place] - [address] [place] [address]  AT [facility] OF [address]    Pre visit planning completed.    Mary Schofield RN  Endoscopy Procedure Pre Assessment RN    
Pre assessment questions completed for upcoming Colonoscopy  procedure scheduled on 3.24.23    COVID policy reviewed.     Reviewed procedural arrival time 0930 and facility location [facility]; [address] [place] [address], [address] Floor, [place] [address]    Designated  policy reviewed. Instructed to have someone stay 24 hours post procedure.     Anticoagulation/blood thinners? No    Electronic implanted devices? No    Diabetic? Yes - Patient to hold oral diabetic medications day of procedure - metformin    Reviewed standard golytely procedure prep instructions.     Patient verbalized understanding and had no questions or concerns at this time.    Mary Schofield, LORELEI  Endoscopy Procedure Pre Assessment RN    
Cell Phone/PDA (specify)/Clothing/Other belongings

## 2023-03-24 ENCOUNTER — ANESTHESIA (OUTPATIENT)
Dept: SURGERY | Facility: AMBULATORY SURGERY CENTER | Age: 68
End: 2023-03-24
Payer: COMMERCIAL

## 2023-03-24 ENCOUNTER — HOSPITAL ENCOUNTER (OUTPATIENT)
Facility: AMBULATORY SURGERY CENTER | Age: 68
Discharge: HOME | End: 2023-03-24
Attending: INTERNAL MEDICINE
Payer: COMMERCIAL

## 2023-03-24 ENCOUNTER — ANESTHESIA EVENT (OUTPATIENT)
Dept: SURGERY | Facility: AMBULATORY SURGERY CENTER | Age: 68
End: 2023-03-24
Payer: COMMERCIAL

## 2023-03-24 VITALS
DIASTOLIC BLOOD PRESSURE: 63 MMHG | WEIGHT: 211 LBS | HEART RATE: 63 BPM | BODY MASS INDEX: 35.16 KG/M2 | HEIGHT: 65 IN | TEMPERATURE: 97.1 F | SYSTOLIC BLOOD PRESSURE: 116 MMHG | RESPIRATION RATE: 18 BRPM | OXYGEN SATURATION: 94 %

## 2023-03-24 VITALS — HEART RATE: 50 BPM

## 2023-03-24 LAB
COLONOSCOPY: NORMAL
GLUCOSE BLDC GLUCOMTR-MCNC: 100 MG/DL (ref 70–99)

## 2023-03-24 PROCEDURE — 82962 GLUCOSE BLOOD TEST: CPT | Performed by: PATHOLOGY

## 2023-03-24 PROCEDURE — 45385 COLONOSCOPY W/LESION REMOVAL: CPT | Mod: PT

## 2023-03-24 PROCEDURE — 88305 TISSUE EXAM BY PATHOLOGIST: CPT | Mod: TC | Performed by: INTERNAL MEDICINE

## 2023-03-24 PROCEDURE — 88305 TISSUE EXAM BY PATHOLOGIST: CPT | Mod: 26 | Performed by: PATHOLOGY

## 2023-03-24 RX ORDER — LIDOCAINE 40 MG/G
CREAM TOPICAL
Status: DISCONTINUED | OUTPATIENT
Start: 2023-03-24 | End: 2023-03-24 | Stop reason: HOSPADM

## 2023-03-24 RX ORDER — NALOXONE HYDROCHLORIDE 0.4 MG/ML
0.4 INJECTION, SOLUTION INTRAMUSCULAR; INTRAVENOUS; SUBCUTANEOUS
Status: DISCONTINUED | OUTPATIENT
Start: 2023-03-24 | End: 2023-03-25 | Stop reason: HOSPADM

## 2023-03-24 RX ORDER — ONDANSETRON 2 MG/ML
4 INJECTION INTRAMUSCULAR; INTRAVENOUS
Status: DISCONTINUED | OUTPATIENT
Start: 2023-03-24 | End: 2023-03-24 | Stop reason: HOSPADM

## 2023-03-24 RX ORDER — PROCHLORPERAZINE MALEATE 5 MG
5 TABLET ORAL EVERY 6 HOURS PRN
Status: DISCONTINUED | OUTPATIENT
Start: 2023-03-24 | End: 2023-03-25 | Stop reason: HOSPADM

## 2023-03-24 RX ORDER — ONDANSETRON 4 MG/1
4 TABLET, ORALLY DISINTEGRATING ORAL EVERY 6 HOURS PRN
Status: DISCONTINUED | OUTPATIENT
Start: 2023-03-24 | End: 2023-03-25 | Stop reason: HOSPADM

## 2023-03-24 RX ORDER — SODIUM CHLORIDE, SODIUM LACTATE, POTASSIUM CHLORIDE, CALCIUM CHLORIDE 600; 310; 30; 20 MG/100ML; MG/100ML; MG/100ML; MG/100ML
INJECTION, SOLUTION INTRAVENOUS CONTINUOUS
Status: DISCONTINUED | OUTPATIENT
Start: 2023-03-24 | End: 2023-03-24 | Stop reason: HOSPADM

## 2023-03-24 RX ORDER — GLYCOPYRROLATE 0.2 MG/ML
INJECTION, SOLUTION INTRAMUSCULAR; INTRAVENOUS PRN
Status: DISCONTINUED | OUTPATIENT
Start: 2023-03-24 | End: 2023-03-24

## 2023-03-24 RX ORDER — PROPOFOL 10 MG/ML
INJECTION, EMULSION INTRAVENOUS CONTINUOUS PRN
Status: DISCONTINUED | OUTPATIENT
Start: 2023-03-24 | End: 2023-03-24

## 2023-03-24 RX ORDER — NALOXONE HYDROCHLORIDE 0.4 MG/ML
0.2 INJECTION, SOLUTION INTRAMUSCULAR; INTRAVENOUS; SUBCUTANEOUS
Status: DISCONTINUED | OUTPATIENT
Start: 2023-03-24 | End: 2023-03-25 | Stop reason: HOSPADM

## 2023-03-24 RX ORDER — PROPOFOL 10 MG/ML
INJECTION, EMULSION INTRAVENOUS PRN
Status: DISCONTINUED | OUTPATIENT
Start: 2023-03-24 | End: 2023-03-24

## 2023-03-24 RX ORDER — FLUMAZENIL 0.1 MG/ML
0.2 INJECTION, SOLUTION INTRAVENOUS
Status: ACTIVE | OUTPATIENT
Start: 2023-03-24 | End: 2023-03-24

## 2023-03-24 RX ORDER — ONDANSETRON 2 MG/ML
4 INJECTION INTRAMUSCULAR; INTRAVENOUS EVERY 6 HOURS PRN
Status: DISCONTINUED | OUTPATIENT
Start: 2023-03-24 | End: 2023-03-25 | Stop reason: HOSPADM

## 2023-03-24 RX ADMIN — GLYCOPYRROLATE 0.2 MG: 0.2 INJECTION, SOLUTION INTRAMUSCULAR; INTRAVENOUS at 11:15

## 2023-03-24 RX ADMIN — SODIUM CHLORIDE, SODIUM LACTATE, POTASSIUM CHLORIDE, CALCIUM CHLORIDE: 600; 310; 30; 20 INJECTION, SOLUTION INTRAVENOUS at 09:50

## 2023-03-24 RX ADMIN — PROPOFOL 60 MG: 10 INJECTION, EMULSION INTRAVENOUS at 11:12

## 2023-03-24 RX ADMIN — PROPOFOL 200 MCG/KG/MIN: 10 INJECTION, EMULSION INTRAVENOUS at 11:12

## 2023-03-24 RX ADMIN — PROPOFOL 125 MCG/KG/MIN: 10 INJECTION, EMULSION INTRAVENOUS at 11:31

## 2023-03-24 ASSESSMENT — LIFESTYLE VARIABLES: TOBACCO_USE: 1

## 2023-03-24 NOTE — ANESTHESIA PREPROCEDURE EVALUATION
Anesthesia Pre-Procedure Evaluation    Patient: Karyn Gamez   MRN: [id number] : 1955        Procedure : Procedure(s):  COLONOSCOPY          Past Medical History:   Diagnosis Date     Cervicalgia 2005: Thrown from a horse - wearing a helmet - and struck side of head and neck, heard crepitation, no loc, Able to walk after injury.  persistant pain in neck relieved with ibuprofen, stiff but can move with ROM.  No changes in hands and feet sensation/motor.     Coronary artery disease      Depressive disorder, not elsewhere classified      Hypertension      Obesity, unspecified       Past Surgical History:   Procedure Laterality Date     ANGIOGRAM      negative     HYSTERECTOMY, PAP NO LONGER INDICATED      BSO      No Known Allergies   Social History     Tobacco Use     Smoking status: Former     Packs/day: 0.50     Years: 30.00     Pack years: 15.00     Types: Cigarettes     Quit date: 2011     Years since quittin.6     Smokeless tobacco: Never     Tobacco comments:     smoking 3-4 cigs per day   Substance Use Topics     Alcohol use: Yes     Comment: rarely      Wt Readings from Last 1 Encounters:   23 95.7 kg (211 lb)        Anesthesia Evaluation            ROS/MED HX  ENT/Pulmonary:     (+) tobacco use, Past use,     Neurologic:       Cardiovascular:     (+) Dyslipidemia hypertension-----    METS/Exercise Tolerance:     Hematologic:       Musculoskeletal:       GI/Hepatic:       Renal/Genitourinary:       Endo:     (+) type II DM,     Psychiatric/Substance Use:       Infectious Disease:       Malignancy:       Other:            Physical Exam    Airway  airway exam normal           Respiratory Devices and Support         Dental       (+) Removable bridges or other hardware      Cardiovascular   cardiovascular exam normal          Pulmonary   pulmonary exam normal                OUTSIDE LABS:  CBC:   Lab Results   Component Value Date    WBC 8.8 2019    WBC  12.9 (H) 06/07/2019    HGB 12.9 06/08/2019    HGB 13.6 06/07/2019    HCT 38.2 06/08/2019    HCT 42.6 06/07/2019     06/08/2019     06/07/2019     BMP:   Lab Results   Component Value Date     01/31/2023     08/26/2021    POTASSIUM 4.2 01/31/2023    POTASSIUM 3.9 08/26/2021    CHLORIDE 103 01/31/2023    CHLORIDE 105 08/26/2021    CO2 30 01/31/2023    CO2 32 08/26/2021    BUN 20 01/31/2023    BUN 19 08/26/2021    CR 0.83 01/31/2023    CR 0.82 08/26/2021     (H) 03/24/2023     (H) 01/31/2023     COAGS:   Lab Results   Component Value Date    PTT 28 08/13/2011    INR 1.06 08/13/2011     POC:   Lab Results   Component Value Date     (H) 06/08/2019     HEPATIC:   Lab Results   Component Value Date    ALBUMIN 3.9 06/07/2019    PROTTOTAL 8.1 06/07/2019    ALT 26 06/07/2019    AST 23 06/07/2019    ALKPHOS 100 06/07/2019    BILITOTAL 0.3 06/07/2019     OTHER:   Lab Results   Component Value Date    A1C 5.7 (H) 01/31/2023    ANGELES 9.3 01/31/2023    PHOS 4.3 08/12/2011    MAG 2.2 08/12/2011    TSH 2.83 03/23/2022       Anesthesia Plan    ASA Status:  3   NPO Status:  NPO Appropriate    Anesthesia Type: MAC.     - Reason for MAC: immobility needed   Induction: Intravenous.   Maintenance: TIVA.        Consents    Anesthesia Plan(s) and associated risks, benefits, and realistic alternatives discussed. Questions answered and patient/representative(s) expressed understanding.    - Discussed:     - Discussed with:  Patient      - Extended Intubation/Ventilatory Support Discussed: No.      - Patient is DNR/DNI Status: No    Use of blood products discussed: No .     Postoperative Care       PONV prophylaxis: Background Propofol Infusion     Comments:           H&P reviewed: Unable to attach H&P to encounter due to EHR limitations. H&P Update: appropriate H&P reviewed, patient examined. No interval changes since H&P (within 30 days).         Cole Srinivasan MD

## 2023-03-24 NOTE — ANESTHESIA CARE TRANSFER NOTE
Patient: Karyn Gamez    Procedure: Procedure(s):  COLONOSCOPY, WITH HOT POLYPECTOMY AND RESEARCH BIOPSY       Diagnosis: Screen for colon cancer [Z12.11]  Diagnosis Additional Information: No value filed.    Anesthesia Type:   MAC     Note:    Oropharynx: spontaneously breathing  Level of Consciousness: drowsy  Oxygen Supplementation: room air    Independent Airway: airway patency satisfactory and stable  Dentition: dentition unchanged  Vital Signs Stable: post-procedure vital signs reviewed and stable  Report to RN Given: handoff report given  Patient transferred to: Phase II    Handoff Report: Identifed the Patient, Identified the Reponsible Provider, Reviewed the pertinent medical history, Discussed the surgical course, Reviewed Intra-OP anesthesia mangement and issues during anesthesia, Set expectations for post-procedure period and Allowed opportunity for questions and acknowledgement of understanding      Vitals:  Vitals Value Taken Time   BP     Temp     Pulse     Resp     SpO2         Electronically Signed By: RK Verdin CRNA  March 24, 2023  11:48 AM

## 2023-03-24 NOTE — H&P
Karyn Gamez  [id number]  female  67 year old      Reason for procedure/surgery: Screening    Patient Active Problem List   Diagnosis     Hypertension goal BP (blood pressure) < 140/90     Hyperlipidemia LDL goal <100     Hypertensive hypertrophic cardiomyopathy (H)     Moderate obesity     Cerebral embolism with cerebral infarction (H)     Advanced directives, counseling/discussion     Type 2 diabetes mellitus with complication, without long-term current use of insulin (H)     Major depressive disorder, recurrent episode, moderate (H)     Morbid obesity (H)       Past Surgical History:    Past Surgical History:   Procedure Laterality Date     ANGIOGRAM      negative     HYSTERECTOMY, PAP NO LONGER INDICATED      BSO       Past Medical History:   Past Medical History:   Diagnosis Date     Cervicalgia 2005: Thrown from a horse - wearing a helmet - and struck side of head and neck, heard crepitation, no loc, Able to walk after injury.  persistant pain in neck relieved with ibuprofen, stiff but can move with ROM.  No changes in hands and feet sensation/motor.     Coronary artery disease      Depressive disorder, not elsewhere classified      Hypertension      Obesity, unspecified        Social History:   Social History     Tobacco Use     Smoking status: Former     Packs/day: 0.50     Years: 30.00     Pack years: 15.00     Types: Cigarettes     Quit date: 2011     Years since quittin.6     Smokeless tobacco: Never     Tobacco comments:     smoking 3-4 cigs per day   Substance Use Topics     Alcohol use: Yes     Comment: rarely       Family History:   Family History   Problem Relation Age of Onset     C.A.D. Father         first MI at 53, stenting x2     C.A.D. Mother         dx in her mid 50's     C.A.D. Brother         MI in mid-50's     Cerebrovascular Disease Brother         in late 50's       Allergies: No Known Allergies    Active Medications:   Current Outpatient Medications    Medication Sig Dispense Refill     amLODIPine (NORVASC) 2.5 MG tablet Take 1 tablet (2.5 mg) by mouth daily 90 tablet 1     atorvastatin (LIPITOR) 80 MG tablet Take 1 tablet (80 mg) by mouth daily For cholesterol. 90 tablet 1     bisacodyl (DULCOLAX) 5 MG EC tablet Take 2 tablets at 3 pm the day before your procedure. If your procedure is before 11 am, take 2 additional tablets at 11 pm. If your procedure is after 11 am, take 2 additional tablets at 6 am. For additional instructions refer to your colonoscopy prep instructions. 4 tablet 0     buPROPion (WELLBUTRIN XL) 150 MG 24 hr tablet Take 1 tablet (150 mg) by mouth every morning 90 tablet 1     lisinopril-hydrochlorothiazide (ZESTORETIC) 20-12.5 MG tablet Take 1 tablet by mouth daily 90 tablet 0     metFORMIN (GLUCOPHAGE XR) 500 MG 24 hr tablet TAKE 1 TABLET BY MOUTH DAILY WITH DINNER FOR BLOOD SUGARS 90 tablet 1     polyethylene glycol (GOLYTELY) 236 g suspension The night before the exam at 6 pm drink an 8-ounce glass every 15 minutes until the jug is half empty. If you arrive before 11 AM: Drink the other half of the [employer] jug at 11 PM night before procedure. If you arrive after 11 AM: Drink the other half of the [employer] jug at 6 AM day of procedure. For additional instructions refer to your colonoscopy prep instructions. 4000 mL 0     sertraline (ZOLOFT) 100 MG tablet Take 2 tablets (200 mg) by mouth daily 180 tablet 1     blood glucose monitoring (ONE TOUCH DELICA) lancets Use to test blood sugar 1 times daily 100 each 11     blood glucose monitoring (ONETOUCH VERIO IQ SYSTEM) meter device kit Use to test blood sugar 1 times daily 1 kit 0     Continuous Blood Gluc  (FREESTYLE CHAVO 14 DAY READER) HEAVEN 1 Device daily (Patient not taking: Reported on 12/21/2021) 1 Device 2     Continuous Blood Gluc Sensor (FREESTYLE CHAVO 14 DAY SENSOR) MISC 1 Device every 14 days (Patient not taking: Reported on 12/21/2021) 2 each 12     semaglutide (OZEMPIC) 2  "MG/1.5ML SOPN pen Inject 0.25 mg Subcutaneous every 7 days 3 mL 1       Systemic Review:   ENT/MOUTH: NEGATIVE for ear, mouth and throat problems  RESP: NEGATIVE for significant cough or SOB  CV: NEGATIVE for chest pain, palpitations or peripheral edema    Physical Examination:   Vital Signs: /80 (BP Location: Right arm)   Pulse 52   Temp 97.8  F (36.6  C) (Temporal)   Resp 16   Ht 1.638 m (5' 4.5\")   Wt 95.7 kg (211 lb)   LMP  (LMP Unknown)   SpO2 96%   BMI 35.66 kg/m    NECK: no adenopathy, no asymmetry, masses, or scars  RESP: lungs clear to auscultation - no rales, rhonchi or wheezes  CV: regular rate and rhythm, normal S1 S2, no S3 or S4, no murmur, click or rub, no peripheral edema and peripheral pulses strong  ABDOMEN: soft, nontender, no hepatosplenomegaly, no masses and bowel sounds normal  MS: no gross musculoskeletal defects noted, no edema    Plan: Appropriate to proceed as scheduled.      Bret Velez MD  3/24/2023    PCP:  Cristiana Davis    "

## 2023-03-24 NOTE — ANESTHESIA POSTPROCEDURE EVALUATION
Patient: Karyn Gamez    Procedure: Procedure(s):  COLONOSCOPY, WITH HOT POLYPECTOMY AND RESEARCH BIOPSY       Anesthesia Type:  MAC    Note:  Disposition: Outpatient   Postop Pain Control: Uneventful            Sign Out: Well controlled pain   PONV: No   Neuro/Psych: Uneventful            Sign Out: Acceptable/Baseline neuro status   Airway/Respiratory: Uneventful            Sign Out: Acceptable/Baseline resp. status   CV/Hemodynamics: Uneventful            Sign Out: Acceptable CV status; No obvious hypovolemia; No obvious fluid overload   Other NRE: NONE   DID A NON-ROUTINE EVENT OCCUR? No           Last vitals:  Vitals Value Taken Time   BP 96/57 03/24/23 1150   Temp 36.2  C (97.1  F) 03/24/23 1150   Pulse 57 03/24/23 1150   Resp 16 03/24/23 1150   SpO2 94 % 03/24/23 1150       Electronically Signed By: Cloe Srinivasan MD  March 24, 2023  11:53 AM

## 2023-03-27 LAB
PATH REPORT.COMMENTS IMP SPEC: NORMAL
PATH REPORT.COMMENTS IMP SPEC: NORMAL
PATH REPORT.FINAL DX SPEC: NORMAL
PATH REPORT.GROSS SPEC: NORMAL
PATH REPORT.MICROSCOPIC SPEC OTHER STN: NORMAL
PATH REPORT.RELEVANT HX SPEC: NORMAL
PHOTO IMAGE: NORMAL

## 2023-07-13 DIAGNOSIS — R80.9 CONTROLLED TYPE 2 DIABETES MELLITUS WITH MICROALBUMINURIA, WITHOUT LONG-TERM CURRENT USE OF INSULIN (H): Primary | ICD-10-CM

## 2023-07-13 DIAGNOSIS — E11.29 CONTROLLED TYPE 2 DIABETES MELLITUS WITH MICROALBUMINURIA, WITHOUT LONG-TERM CURRENT USE OF INSULIN (H): Primary | ICD-10-CM

## 2023-07-13 RX ORDER — SEMAGLUTIDE 0.68 MG/ML
INJECTION, SOLUTION SUBCUTANEOUS
Qty: 6 ML | Refills: 0 | Status: SHIPPED | OUTPATIENT
Start: 2023-07-13 | End: 2023-10-12

## 2023-08-19 ENCOUNTER — HEALTH MAINTENANCE LETTER (OUTPATIENT)
Age: 68
End: 2023-08-19

## 2023-09-18 ENCOUNTER — MYC MEDICAL ADVICE (OUTPATIENT)
Dept: FAMILY MEDICINE | Facility: CLINIC | Age: 68
End: 2023-09-18
Payer: COMMERCIAL

## 2023-09-18 ENCOUNTER — NURSE TRIAGE (OUTPATIENT)
Dept: FAMILY MEDICINE | Facility: CLINIC | Age: 68
End: 2023-09-18
Payer: COMMERCIAL

## 2023-09-18 DIAGNOSIS — U07.1 INFECTION DUE TO 2019 NOVEL CORONAVIRUS: Primary | ICD-10-CM

## 2023-09-18 NOTE — CONFIDENTIAL NOTE
RN COVID TREATMENT VISIT  09/18/23    The patient has been triaged and does not require a higher level of care.    Karyn Gamez  67 year old  Current weight? 311 lb    Has the patient been seen by a primary care provider at an [facility] or [facility] within the past two years? Yes.   Have you been in close proximity to/do you have a known exposure to a person with a confirmed case of influenza? No.     General treatment eligibility:  Date of positive COVID test (PCR or at home)?  9/18/23    Are you or have you been hospitalized for this COVID-19 infection? No.   Have you received monoclonal antibodies or antiviral treatment for COVID-19 since this positive test? No.   Do you have any of the following conditions that place you at risk of being very sick from COVID-19?   - Age 50 years or older  Yes, patient has at least one high risk condition as noted above.     Current COVID symptoms:   - fever or chills  - cough  - fatigue  - headache  - sore throat  - congestion or runny nose  Yes. Patient has at least one symptom as selected.     How many days since symptoms started? 5 days or less. Established patient, 12 years or older weighing at least 88.2 lbs, who has symptoms that started in the past 5 days, has not been hospitalized nor received treatment already, and is at risk for being very sick from COVID-19.     Treatment eligibility by RN:  Are you currently pregnant or nursing? No  Do you have a clinically significant hypersensitivity to nirmatrelvir or ritonavir, or toxic epidermal necrolysis (TEN) or Cobb-Bernard Syndrome? No  Do you have a history of hepatitis, any hepatic impairment on the Problem List (such as Child-Stark Class C, cirrhosis, fatty liver disease, alcoholic liver disease), or was the last liver lab (hepatic panel, ALT, AST, ALK Phos, bilirubin) elevated in the past 6 months? No  Do you have any history of severe renal impairment (eGFR < 30mL/min)? No    Is  patient eligible to continue? Yes, patient meets all eligibility requirements for the RN COVID treatment (as denoted by all no responses above).     Current Outpatient Medications   Medication Sig Dispense Refill    amLODIPine (NORVASC) 2.5 MG tablet Take 1 tablet (2.5 mg) by mouth daily 90 tablet 1    atorvastatin (LIPITOR) 80 MG tablet Take 1 tablet (80 mg) by mouth daily For cholesterol. 90 tablet 1    bisacodyl (DULCOLAX) 5 MG EC tablet Take 2 tablets at 3 pm the day before your procedure. If your procedure is before 11 am, take 2 additional tablets at 11 pm. If your procedure is after 11 am, take 2 additional tablets at 6 am. For additional instructions refer to your colonoscopy prep instructions. 4 tablet 0    blood glucose monitoring (ONE TOUCH DELICA) lancets Use to test blood sugar 1 times daily 100 each 11    blood glucose monitoring (ONETOUCH VERIO IQ SYSTEM) meter device kit Use to test blood sugar 1 times daily 1 kit 0    buPROPion (WELLBUTRIN XL) 150 MG 24 hr tablet Take 1 tablet (150 mg) by mouth every morning 90 tablet 1    Continuous Blood Gluc  (FREESTYLE CHAVO 14 DAY READER) HEAVEN 1 Device daily (Patient not taking: Reported on 12/21/2021) 1 Device 2    Continuous Blood Gluc Sensor (FREESTYLE CHAVO 14 DAY SENSOR) MISC 1 Device every 14 days (Patient not taking: Reported on 12/21/2021) 2 each 12    lisinopril-hydrochlorothiazide (ZESTORETIC) 20-12.5 MG tablet Take 1 tablet by mouth daily 90 tablet 0    metFORMIN (GLUCOPHAGE XR) 500 MG 24 hr tablet TAKE 1 TABLET BY MOUTH DAILY WITH DINNER FOR BLOOD SUGARS 90 tablet 1    OZEMPIC, 0.25 OR 0.5 MG/DOSE, 2 MG/3ML pen ADMINISTER 0.25 MG SUBCUTANOUSLY EVERY 7 DAYS 6 mL 0    polyethylene glycol (GOLYTELY) 236 g suspension The night before the exam at 6 pm drink an 8-ounce glass every 15 minutes until the jug is half empty. If you arrive before 11 AM: Drink the other half of the [employer] jug at 11 PM night before procedure. If you arrive after 11 AM:  Drink the other half of the [employer] jug at 6 AM day of procedure. For additional instructions refer to your colonoscopy prep instructions. 4000 mL 0    semaglutide (OZEMPIC) 2 MG/1.5ML SOPN pen Inject 0.25 mg Subcutaneous every 7 days 3 mL 1    sertraline (ZOLOFT) 100 MG tablet Take 2 tablets (200 mg) by mouth daily 180 tablet 1       Medications from List 1 of the standing order (on medications that exclude the use of Paxlovid) that patient is taking: NONE. Is patient taking Evelin's Wort? No  Is patient taking [place]'s Wort or any meds from List 1? No.   Medications from List 2 of the standing order (on meds that provider needs to adjust) that patient is taking: NONE. Is patient on any of the meds from List 2? No.   Medications from List 3 of standing order (on meds that a RN needs to adjust) that patient is taking: NONE. Is patient on any meds from List 3? No.     Paxlovid has an approximate 90% reduction in hospitalization. Paxlovid can possibly cause altered sense of taste, diarrhea (loose, watery stools), high blood pressure, muscle aches.     Would patient like a Paxlovid prescription?   Yes.   Lab Results   Component Value Date    GFRESTIMATED 77 01/31/2023       Was last eGFR reduced? No, eGFR 60 or greater/ No Result on record. Patient can receive the normal renal function dose. Paxlovid Rx sent to [place] pharmacy   [facility]     Temporary change to home medications: None    All medication adjustments (holds, etc) were discussed with the patient and patient was asked to repeat back (teachback) their med adjustment.  Did patient understand med adjustment? Yes, patient repeated back and understood correctly.        Reviewed the following instructions with the patient:    Paxlovid (nimatrelvir and ritonavir)    How it works  Two medicines (nirmatrelvir and ritonavir) are taken together. They stop the virus from growing. Less amount of virus is easier for your body to fight.    How to take  Medicine  comes in a daily container with both medicine tablets. Take by mouth twice daily (once in the morning, once at night) for 5 days.  The number of tablets to take varies by patient.  Don't chew or break capsules. Swallow whole.    When to take  Take as soon as possible after positive COVID-19 test result, and within 5 days of your first symptoms.    Possible side effects  Can cause altered sense of taste, diarrhea (loose, watery stools), high blood pressure, muscle aches.    Darlin Head RN

## 2023-09-18 NOTE — TELEPHONE ENCOUNTER
Additional Information    Negative: SEVERE difficulty breathing (e.g., struggling for each breath, speaks in single words)    Negative: Difficult to awaken or acting confused (e.g., disoriented, slurred speech)    Negative: Bluish (or gray) lips or face now    Negative: Shock suspected (e.g., cold/pale/clammy skin, too weak to stand, low BP, rapid pulse)    Negative: Sounds like a life-threatening emergency to the triager    Negative: SEVERE or constant chest pain or pressure  (Exception: Mild central chest pain, present only when coughing.)    Negative: MODERATE difficulty breathing (e.g., speaks in phrases, SOB even at rest, pulse 100-120)    Negative: Headache and stiff neck (can't touch chin to chest)    Negative: Oxygen level (e.g., pulse oximetry) 90% or lower    Negative: Chest pain or pressure  (Exception: MILD central chest pain, present only when coughing.)    Negative: Drinking very little and dehydration suspected (e.g., no urine > 12 hours, very dry mouth, very lightheaded)    Negative: Patient sounds very sick or weak to the triager    Protocols used: Coronavirus (COVID-19) Diagnosed or [id number]

## 2023-10-11 DIAGNOSIS — R80.9 CONTROLLED TYPE 2 DIABETES MELLITUS WITH MICROALBUMINURIA, WITHOUT LONG-TERM CURRENT USE OF INSULIN (H): ICD-10-CM

## 2023-10-11 DIAGNOSIS — E11.29 CONTROLLED TYPE 2 DIABETES MELLITUS WITH MICROALBUMINURIA, WITHOUT LONG-TERM CURRENT USE OF INSULIN (H): ICD-10-CM

## 2023-10-12 RX ORDER — SEMAGLUTIDE 0.68 MG/ML
INJECTION, SOLUTION SUBCUTANEOUS
Qty: 6 ML | Refills: 0 | Status: SHIPPED | OUTPATIENT
Start: 2023-10-12 | End: 2023-10-17

## 2023-10-17 RX ORDER — SEMAGLUTIDE 0.68 MG/ML
INJECTION, SOLUTION SUBCUTANEOUS
Qty: 6 ML | Refills: 0 | Status: SHIPPED | OUTPATIENT
Start: 2023-10-17

## 2023-10-17 NOTE — TELEPHONE ENCOUNTER
Patient calling to see if the ozempic Rx was sent.  She says she called and was told the Rx was denied by Dr. Davis.    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    I called [facility] who states they did not get the Rx sent by [facility], they only got a denial from Dr. Davis.    I re-sent the ozempic per [facility]'s Rx now.    Appears it was received.  I called and spoke to pharmacist, they did receive this and can fill for the patient.    Oralia BUTCHER, RN  [facility] Triage

## 2023-10-31 ENCOUNTER — OFFICE VISIT (OUTPATIENT)
Dept: FAMILY MEDICINE | Facility: CLINIC | Age: 68
End: 2023-10-31
Payer: COMMERCIAL

## 2023-10-31 ENCOUNTER — LAB (OUTPATIENT)
Dept: LAB | Facility: CLINIC | Age: 68
End: 2023-10-31
Payer: COMMERCIAL

## 2023-10-31 VITALS
DIASTOLIC BLOOD PRESSURE: 82 MMHG | RESPIRATION RATE: 16 BRPM | HEIGHT: 64 IN | OXYGEN SATURATION: 95 % | SYSTOLIC BLOOD PRESSURE: 135 MMHG | BODY MASS INDEX: 36.37 KG/M2 | HEART RATE: 65 BPM | WEIGHT: 213 LBS | TEMPERATURE: 96.9 F

## 2023-10-31 DIAGNOSIS — F33.1 MAJOR DEPRESSIVE DISORDER, RECURRENT EPISODE, MODERATE (H): ICD-10-CM

## 2023-10-31 DIAGNOSIS — E66.01 MORBID OBESITY (H): ICD-10-CM

## 2023-10-31 DIAGNOSIS — E11.8 TYPE 2 DIABETES MELLITUS WITH COMPLICATION, WITHOUT LONG-TERM CURRENT USE OF INSULIN (H): ICD-10-CM

## 2023-10-31 DIAGNOSIS — H26.9 CATARACT OF BOTH EYES, UNSPECIFIED CATARACT TYPE: ICD-10-CM

## 2023-10-31 DIAGNOSIS — Z01.818 PREOP GENERAL PHYSICAL EXAM: Primary | ICD-10-CM

## 2023-10-31 DIAGNOSIS — I10 HYPERTENSION GOAL BP (BLOOD PRESSURE) < 140/90: ICD-10-CM

## 2023-10-31 DIAGNOSIS — E66.9 MODERATE OBESITY: ICD-10-CM

## 2023-10-31 DIAGNOSIS — N18.2 CHRONIC KIDNEY DISEASE, STAGE 2 (MILD): ICD-10-CM

## 2023-10-31 DIAGNOSIS — Z23 NEEDS FLU SHOT: ICD-10-CM

## 2023-10-31 DIAGNOSIS — I11.9 HYPERTENSIVE HYPERTROPHIC CARDIOMYOPATHY, WITHOUT HEART FAILURE (H): Chronic | ICD-10-CM

## 2023-10-31 DIAGNOSIS — E78.5 HYPERLIPIDEMIA LDL GOAL <100: Chronic | ICD-10-CM

## 2023-10-31 DIAGNOSIS — Z01.818 PREOP GENERAL PHYSICAL EXAM: ICD-10-CM

## 2023-10-31 DIAGNOSIS — I63.40 CEREBRAL INFARCTION DUE TO EMBOLISM OF CEREBRAL ARTERY (H): Chronic | ICD-10-CM

## 2023-10-31 DIAGNOSIS — Z23 NEED FOR COVID-19 VACCINE: ICD-10-CM

## 2023-10-31 DIAGNOSIS — I42.2 HYPERTENSIVE HYPERTROPHIC CARDIOMYOPATHY, WITHOUT HEART FAILURE (H): Chronic | ICD-10-CM

## 2023-10-31 DIAGNOSIS — Z71.89 ADVANCED DIRECTIVES, COUNSELING/DISCUSSION: ICD-10-CM

## 2023-10-31 DIAGNOSIS — R06.83 SNORING: ICD-10-CM

## 2023-10-31 LAB
ERYTHROCYTE [DISTWIDTH] IN BLOOD BY AUTOMATED COUNT: 13 % (ref 10–15)
HBA1C MFR BLD: 5.4 % (ref 0–5.6)
HCT VFR BLD AUTO: 37.5 % (ref 35–47)
HGB BLD-MCNC: 12.4 G/DL (ref 11.7–15.7)
MCH RBC QN AUTO: 29.3 PG (ref 26.5–33)
MCHC RBC AUTO-ENTMCNC: 33.1 G/DL (ref 31.5–36.5)
MCV RBC AUTO: 89 FL (ref 78–100)
PLATELET # BLD AUTO: 360 10E3/UL (ref 150–450)
RBC # BLD AUTO: 4.23 10E6/UL (ref 3.8–5.2)
WBC # BLD AUTO: 8.9 10E3/UL (ref 4–11)

## 2023-10-31 PROCEDURE — 85027 COMPLETE CBC AUTOMATED: CPT

## 2023-10-31 PROCEDURE — 91320 SARSCV2 VAC 30MCG TRS-SUC IM: CPT | Performed by: STUDENT IN AN ORGANIZED HEALTH CARE EDUCATION/TRAINING PROGRAM

## 2023-10-31 PROCEDURE — 36415 COLL VENOUS BLD VENIPUNCTURE: CPT

## 2023-10-31 PROCEDURE — 80053 COMPREHEN METABOLIC PANEL: CPT

## 2023-10-31 PROCEDURE — 99214 OFFICE O/P EST MOD 30 MIN: CPT | Mod: 25 | Performed by: STUDENT IN AN ORGANIZED HEALTH CARE EDUCATION/TRAINING PROGRAM

## 2023-10-31 PROCEDURE — G0008 ADMIN INFLUENZA VIRUS VAC: HCPCS | Performed by: STUDENT IN AN ORGANIZED HEALTH CARE EDUCATION/TRAINING PROGRAM

## 2023-10-31 PROCEDURE — 83036 HEMOGLOBIN GLYCOSYLATED A1C: CPT

## 2023-10-31 PROCEDURE — 90480 ADMN SARSCOV2 VAC 1/ONLY CMP: CPT | Performed by: STUDENT IN AN ORGANIZED HEALTH CARE EDUCATION/TRAINING PROGRAM

## 2023-10-31 PROCEDURE — 90662 IIV NO PRSV INCREASED AG IM: CPT | Performed by: STUDENT IN AN ORGANIZED HEALTH CARE EDUCATION/TRAINING PROGRAM

## 2023-10-31 ASSESSMENT — PATIENT HEALTH QUESTIONNAIRE - PHQ9
SUM OF ALL RESPONSES TO PHQ QUESTIONS 1-9: 8
SUM OF ALL RESPONSES TO PHQ QUESTIONS 1-9: 8
10. IF YOU CHECKED OFF ANY PROBLEMS, HOW DIFFICULT HAVE THESE PROBLEMS MADE IT FOR YOU TO DO YOUR WORK, TAKE CARE OF THINGS AT HOME, OR GET ALONG WITH OTHER PEOPLE: SOMEWHAT DIFFICULT

## 2023-10-31 NOTE — PROGRESS NOTES
[facility] [address] [place] [address]  Phone: [phone]  Fax: [phone]  Primary Provider: Cristiana Davis  Pre-op Performing Provider: TAMARA ALAN [facility] Fax: [phone]      PREOPERATIVE EVALUATION:  Today's date: 10/31/2023    Karyn is a 68 year old female who presents for a preoperative evaluation.      10/31/2023     2:35 PM   Additional Questions   Roomed by eh   Accompanied by self       Surgical Information:  Surgery/Procedure: cataract  Surgery Location: [facility]  Surgeon: N/A  Surgery Date: 11/2 and 11/11  Time of Surgery: 6:15am  Where patient plans to recover: At home with family  Fax number for surgical facility: [phone]    Assessment & Plan     The proposed surgical procedure is considered LOW risk.    Preop general physical exam  Cataract of both eyes, unspecified cataract type  Medically optimized for procedure.  No contraindications.  CBC, CMP, A1c pending below.  - CBC with platelets  - Comprehensive metabolic panel (BMP + Alb, Alk Phos, ALT, AST, Total. 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  Patient reports that she was previously on aspirin, however, stopped taking it at some point.  Does not appear that there was an intentional reason to stop this.  - We will hold aspirin for now, however discussed with PCP regarding aspirin for history of stroke.    Type 2 diabetes mellitus with complication, without long-term current use of insulin (H)  Chronic, well controlled.  Continue current regimen.  - Hemoglobin A1c    Morbid obesity (H)  -On semaglutide, will hold 7 days prior.    Hyperlipidemia LDL goal <100  Chronic, continue atorvastatin 80 mg daily    Hypertension goal BP (blood pressure) < 140/90  Hypertensive hypertrophic cardiomyopathy, without heart failure (H)  Blood pressure well controlled.  EKG not indicated for low risk  procedure.  -Continue current medications    Major depressive disorder, recurrent episode, moderate (H)  Chronic, stable.  Continue current medications    Chronic kidney disease, stage 2 (mild)  Chronic, recheck today.  - Comprehensive metabolic panel (BMP + Alb, Alk Phos, ALT, AST, Total. Bili, TP)    Snoring  Stop bang score 5, high risk for moderate to severe TL.  Discussed with patient regarding referral to sleep study.  Patient would like to discuss with PCP.    Advanced directives, counseling/discussion  Provided for patient to review    Needs flu shot  - INFLUENZA VACCINE 65+ (FLUZONE HD)    Need for COVID-19 vaccine  - COVID-19 12+ (2023-24) (PFIZER)        Possible Sleep Apnea: STOP-BANG score 5          - No identified additional risk factors other than previously addressed    Antiplatelet or Anticoagulation Medication Instructions:  Patient is not on aspirin, however with history of CVA, should consider starting aspirin after surgery.   - Patient is on no antiplatelet or anticoagulation medications.    Additional Medication Instructions:   - ACE/ARB: Continue without modification (e.g., MAC anesthesia, neurosurgery, spine surgery, heart failure, or labile hypertension with risk of hypertension).   - Calcium Channel Blockers: May be continued on the day of surgery.   - Diuretics: May continue due to heart failure.   - GLP-1 Injectable (exenitide, liraglutide, semaglutide, dulaglutide, etc.): HOLD 7 days before surgery    - fiber, laxatives: HOLD day of surgery   - SSRIs, SNRIs, TCAs, Antipsychotics: Continue without modification.       RECOMMENDATION:  APPROVAL GIVEN to proceed with proposed procedure, without further diagnostic evaluation.    Review of external notes as documented elsewhere in note  35 minutes spent by me on the date of the encounter doing chart review, history and exam, documentation and further activities per the note      Subjective       HPI related to upcoming procedure:     Right  eye 11/2/23  Left 11/4/23        10/31/2023     2:23 PM   Preop Questions   1. Have you ever had a heart attack or stroke? YES - h/o CVA, approximately 10 years ago, not on aspirin.    2. Have you ever had surgery on your heart or blood vessels, such as a stent placement, a coronary artery bypass, or surgery on an artery in your head, neck, heart, or legs? No   3. Do you have chest pain with activity? No   4. Do you have a history of  heart failure? No   5. Do you currently have a cold, bronchitis or symptoms of other infection? No   6. Do you have a cough, shortness of breath, or wheezing? No   7. Do you or anyone in your family have previous history of blood clots? YES - brother has factor 5 leiden had blood clots, no personally h/o blood clots, negative for factor 5 leiden   8. Do you or does anyone in your family have a serious bleeding problem such as prolonged bleeding following surgeries or cuts? No   9. Have you ever had problems with anemia or been told to take iron pills? No   10. Have you had any abnormal blood loss such as black, tarry or bloody stools, or abnormal vaginal bleeding? No   11. Have you ever had a blood transfusion? No   12. Are you willing to have a blood transfusion if it is medically needed before, during, or after your surgery? Yes   13. Have you or any of your relatives ever had problems with anesthesia? No   14. Do you have sleep apnea, excessive snoring or daytime drowsiness? YES - stop bang 5 points, high risk. Will discuss with PCP regarding sleep study.    14a. Do you have a CPAP machine? No   15. Do you have any artifical heart valves or other implanted medical devices like a pacemaker, defibrillator, or continuous glucose monitor? No   16. Do you have artificial joints? No   17. Are you allergic to latex? No       Health Care Directive:  Patient does not have a Health Care Directive or Living Will: Discussed advance care planning with patient; information given to patient to  review.    Preoperative Review of :   reviewed - no record of controlled substances prescribed.          Review of Systems  CONSTITUTIONAL: NEGATIVE for fever, chills, change in weight  ENT/MOUTH: NEGATIVE for ear, mouth and throat problems  RESP: NEGATIVE for significant cough or SOB  CV: NEGATIVE for chest pain, palpitations or peripheral edema    Patient Active Problem List    Diagnosis Date Noted    Chronic kidney disease, stage 2 (mild) 10/31/2023     Priority: Medium    Morbid obesity (H) 03/23/2022     Priority: Medium    Type 2 diabetes mellitus with complication, without long-term current use of insulin (H) 01/28/2020     Priority: Medium    Major depressive disorder, recurrent episode, moderate (H) 01/28/2020     Priority: Medium    Advanced directives, counseling/discussion 09/23/2016     Priority: Medium     Advance Care Planning 9/23/2016: Patient declined information at this time            Cerebral embolism with cerebral infarction (H) 08/13/2011     Priority: Medium     January 16, 2014   no reoccurrence, no residual neurologic deficits.       Moderate obesity 05/26/2011     Priority: Medium     April 18, 2013 Body mass index is 38.94 kg/(m^2)., addressed.         Hypertensive hypertrophic cardiomyopathy (H) 05/11/2010     Priority: Medium     May 26, 2011  echo done 4/10 showed concentric hypertrophy with LVH, EF 71%. Angiogram showed mild 3 vessel disease. On ace inhibitor.       Hyperlipidemia LDL goal <100 12/19/2007     Priority: Medium     April 18, 2013  muscle pain with simvastatin and Crestor. Currently tolerating 20 mg atorvastatin well. LDL: 105, down from 197 without statin,  repeat LDL in 6 months.      Hypertension goal BP (blood pressure) < 140/90 06/29/2005     Priority: Medium     January 15, 2014  within guide lines,  on lisinopri, 40 mg daily and HCTZ 12.5 mg daily. bp 120/80. K+: 4.1GFR: 71, microalbuminuria neg.  Follow up 6 months. History of CVA. Secondary prevention.         Past Medical History:   Diagnosis Date    Cervicalgia 6/29/2005 June 29, 2005: Thrown from a horse - wearing a helmet - and struck side of head and neck, heard crepitation, no loc, Able to walk after injury.  persistant pain in neck relieved with ibuprofen, stiff but can move with ROM.  No changes in hands and feet sensation/motor.    Coronary artery disease     Depressive disorder, not elsewhere classified     Hypertension     Obesity, unspecified      Past Surgical History:   Procedure Laterality Date    ANGIOGRAM  2010    negative    COLONOSCOPY N/A 3/24/2023    Procedure: COLONOSCOPY, WITH HOT POLYPECTOMY AND RESEARCH BIOPSY;  Surgeon: Bret Velez MD;  Location: [facility] OR    HYSTERECTOMY, PAP NO LONGER INDICATED      BSO     Current Outpatient Medications   Medication Sig Dispense Refill    amLODIPine (NORVASC) 2.5 MG tablet Take 1 tablet (2.5 mg) by mouth daily 90 tablet 1    atorvastatin (LIPITOR) 80 MG tablet Take 1 tablet (80 mg) by mouth daily For cholesterol. 90 tablet 1    bisacodyl (DULCOLAX) 5 MG EC tablet Take 2 tablets at 3 pm the day before your procedure. If your procedure is before 11 am, take 2 additional tablets at 11 pm. If your procedure is after 11 am, take 2 additional tablets at 6 am. For additional instructions refer to your colonoscopy prep instructions. 4 tablet 0    blood glucose monitoring (ONE TOUCH DELICA) lancets Use to test blood sugar 1 times daily 100 each 11    blood glucose monitoring (ONETOUCH VERIO IQ SYSTEM) meter device kit Use to test blood sugar 1 times daily 1 kit 0    buPROPion (WELLBUTRIN XL) 150 MG 24 hr tablet Take 1 tablet (150 mg) by mouth every morning 90 tablet 1    lisinopril-hydrochlorothiazide (ZESTORETIC) 20-12.5 MG tablet Take 1 tablet by mouth daily 90 tablet 0    metFORMIN (GLUCOPHAGE XR) 500 MG 24 hr tablet TAKE 1 TABLET BY MOUTH DAILY WITH DINNER FOR BLOOD SUGARS 90 tablet 1    polyethylene glycol (GOLYTELY) 236 g suspension The night before  "the exam at 6 pm drink an 8-ounce glass every 15 minutes until the jug is half empty. If you arrive before 11 AM: Drink the other half of the Golytely jug at 11 PM night before procedure. If you arrive after 11 AM: Drink the other half of the Golytely jug at 6 AM day of procedure. For additional instructions refer to your colonoscopy prep instructions. 4000 mL 0    semaglutide (OZEMPIC, 0.25 OR 0.5 MG/DOSE,) 2 MG/3ML pen ADMINISTER 0.25 MG SUBCUTANEOUSLY ONCE EVERY 7 DAYS. 6 mL 0    sertraline (ZOLOFT) 100 MG tablet Take 2 tablets (200 mg) by mouth daily 180 tablet 1       No Known Allergies     Social History     Tobacco Use    Smoking status: Former     Packs/day: 0.50     Years: 30.00     Additional pack years: 0.00     Total pack years: 15.00     Types: Cigarettes     Quit date: 2011     Years since quittin.2    Smokeless tobacco: Never    Tobacco comments:     smoking 3-4 cigs per day   Substance Use Topics    Alcohol use: Yes     Comment: rarely     Family History   Problem Relation Age of Onset    C.A.D. Father         first MI at 53, stenting x2    C.A.D. Mother         dx in her mid 50's    C.A.D. Brother         MI in mid-50's    Cerebrovascular Disease Brother         in late 50's     History   Drug Use No         Objective     /82   Pulse 65   Temp 96.9  F (36.1  C) (Temporal)   Resp 16   Ht 1.638 m (5' 4.49\")   Wt 96.6 kg (213 lb)   LMP  (LMP Unknown)   SpO2 95%   BMI 36.01 kg/m      Physical Exam  GENERAL APPEARANCE: healthy, alert and no distress  HENT: ear canals and TM's normal and nose and mouth without ulcers or lesions  RESP: lungs clear to auscultation - no rales, rhonchi or wheezes  CV: regular rate and rhythm, normal S1 S2, no S3 or S4 and no murmur, click or rub   ABDOMEN: soft, nontender, no HSM or masses and bowel sounds normal  NEURO: Normal strength and tone, sensory exam grossly normal, mentation intact and speech normal    Recent Labs   Lab Test 23  1426 "      POTASSIUM 4.2   CR 0.83   A1C 5.7*        Diagnostics:  Labs pending at this time.  Results will be reviewed when available.   No EKG required for low risk surgery (cataract, skin procedure, breast biopsy, etc).    Revised Cardiac Risk Index (RCRI):  The patient has the following serious cardiovascular risks for perioperative complications:   - Cerebrovascular Disease (TIA or CVA) = 1 point     RCRI Interpretation: 1 point: Class II (low risk - 0.9% complication rate)         Signed Electronically by: Kadie Mcknight MD  Copy of this evaluation report is provided to requesting physician.      Prior to immunization administration, verified patients identity using patient s name and date of birth. Please see Immunization Activity for additional information.     Screening Questionnaire for Adult Immunization    Are you sick today?   No   Do you have allergies to medications, food, a vaccine component or latex?   No   Have you ever had a serious reaction after receiving a vaccination?   Yes   Do you have a long-term health problem with heart, lung, kidney, or metabolic disease (e.g., diabetes), asthma, a blood disorder, no spleen, complement component deficiency, a cochlear implant, or a spinal fluid leak?  Are you on long-term aspirin therapy?   No   Do you have cancer, leukemia, HIV/AIDS, or any other immune system problem?   No   Do you have a parent, brother, or sister with an immune system problem?   No   In the past 3 months, have you taken medications that affect  your immune system, such as prednisone, other steroids, or anticancer drugs; drugs for the treatment of rheumatoid arthritis, Crohn s disease, or psoriasis; or have you had radiation treatments?   No   Have you had a seizure, or a brain or other nervous system problem?   No   During the past year, have you received a transfusion of blood or blood    products, or been given immune (gamma) globulin or antiviral drug?   No   For women: Are you  pregnant or is there a chance you could become       pregnant during the next month?   No   Have you received any vaccinations in the past 4 weeks?   No     Immunization questionnaire was positive for at least one answer.  Notified provider.      Patient instructed to remain in clinic for 15 minutes afterwards, and to report any adverse reactions.     Screening performed by Abrahan Arias MA on 10/31/2023 at 2:42 PM.       Answers submitted by the patient for this visit:  Patient Health Questionnaire (Submitted on 10/31/2023)  If you checked off any problems, how difficult have these problems made it for you to do your work, take care of things at home, or get along with other people?: Somewhat difficult  PHQ9 TOTAL SCORE: 8

## 2023-10-31 NOTE — PATIENT INSTRUCTIONS
Preparing for Your Surgery  Getting started  A nurse will call you to review your health history and instructions. They will give you an arrival time based on your scheduled surgery time. Please be ready to share:  Your doctor's clinic name and phone number  Your medical, surgical, and anesthesia history  A list of allergies and sensitivities  A list of medicines, including herbal treatments and over-the-counter drugs  Whether the patient has a legal guardian (ask how to send us the papers in advance)  Please tell us if you're pregnant--or if there's any chance you might be pregnant. Some surgeries may injure a fetus (unborn baby), so they require a pregnancy test. Surgeries that are safe for a fetus don't always need a test, and you can choose whether to have one.   If you have a child who's having surgery, please ask for a copy of Preparing for Your Child's Surgery.    Preparing for surgery  Within 10 to 30 days of surgery: Have a pre-op exam (sometimes called an H&P, or History and Physical). This can be done at a clinic or pre-operative center.  If you're having a , you may not need this exam. Talk to your care team.  At your pre-op exam, talk to your care team about all medicines you take. If you need to stop any medicines before surgery, ask when to start taking them again.  We do this for your safety. Many medicines can make you bleed too much during surgery. Some change how well surgery (anesthesia) drugs work.  Call your insurance company to let them know you're having surgery. (If you don't have insurance, call [phone].)  Call your clinic if there's any change in your health. This includes signs of a cold or flu (sore throat, runny nose, cough, rash, fever). It also includes a scrape or scratch near the surgery site.  If you have questions on the day of surgery, call your hospital or surgery center.  Eating and drinking guidelines  For your safety: Unless your surgeon tells you otherwise,  follow the guidelines below.  Eat and drink as usual until 8 hours before you arrive for surgery. After that, no food or milk.  Drink clear liquids until 2 hours before you arrive. These are liquids you can see through, like water, Gatorade, and Propel Water. They also include plain black coffee and tea (no cream or milk), candy, and breath mints. You can spit out gum when you arrive.  If you drink alcohol: Stop drinking it the night before surgery.  If your care team tells you to take medicine on the morning of surgery, it's okay to take it with a sip of water.  Preventing infection  Shower or bathe the night before and morning of your surgery. Follow the instructions your clinic gave you. (If no instructions, use regular soap.)  Don't shave or clip hair near your surgery site. We'll remove the hair if needed.  Don't smoke or vape the morning of surgery. You may chew nicotine gum up to 2 hours before surgery. A nicotine patch is okay.  Note: Some surgeries require you to completely quit smoking and nicotine. Check with your surgeon.  Your care team will make every effort to keep you safe from infection. We will:  Clean our hands often with soap and water (or an alcohol-based hand rub).  Clean the skin at your surgery site with a special soap that kills germs.  Give you a special gown to keep you warm. (Cold raises the risk of infection.)  Wear special hair covers, masks, gowns and gloves during surgery.  Give antibiotic medicine, if prescribed. Not all surgeries need antibiotics.  What to bring on the day of surgery  Photo ID and insurance card  Copy of your health care directive, if you have one  Glasses and hearing aids (bring cases)  You can't wear contacts during surgery  Inhaler and eye drops, if you use them (tell us about these when you arrive)  CPAP machine or breathing device, if you use them  A few personal items, if spending the night  If you have . . .  A pacemaker, ICD (cardiac defibrillator) or other  implant: Bring the ID card.  An implanted stimulator: Bring the remote control.  A legal guardian: Bring a copy of the certified (court-stamped) guardianship papers.  Please remove any jewelry, including body piercings. Leave jewelry and other valuables at home.  If you're going home the day of surgery  You must have a responsible adult drive you home. They should stay with you overnight as well.  If you don't have someone to stay with you, and you aren't safe to go home alone, we may keep you overnight. Insurance often won't pay for this.  After surgery  If it's hard to control your pain or you need more pain medicine, please call your surgeon's office.  Questions?   If you have any questions for your care team, list them here: _________________________________________________________________________________________________________________________________________________________________________ ____________________________________ ____________________________________ ____________________________________  For informational purposes only. Not to replace the advice of your health care provider. Copyright   2003, 2019 [employer]. All rights reserved. Clinically reviewed by Kely Coker MD. [employer] 791880 - REV 12/22.    How to Take Your Medication Before Surgery  Hold semaglutide 7 days prior to surgery.  Hold any laxatives day of surgery.  Other medications okay to continue taking.

## 2023-11-01 LAB
ALBUMIN SERPL BCG-MCNC: 4 G/DL (ref 3.5–5.2)
ALP SERPL-CCNC: 84 U/L (ref 35–104)
ALT SERPL W P-5'-P-CCNC: 15 U/L (ref 0–50)
ANION GAP SERPL CALCULATED.3IONS-SCNC: 11 MMOL/L (ref 7–15)
AST SERPL W P-5'-P-CCNC: 23 U/L (ref 0–45)
BILIRUB SERPL-MCNC: 0.2 MG/DL
BUN SERPL-MCNC: 19 MG/DL (ref 8–23)
CALCIUM SERPL-MCNC: 9.6 MG/DL (ref 8.8–10.2)
CHLORIDE SERPL-SCNC: 102 MMOL/L (ref 98–107)
CREAT SERPL-MCNC: 0.83 MG/DL (ref 0.51–0.95)
DEPRECATED HCO3 PLAS-SCNC: 29 MMOL/L (ref 22–29)
EGFRCR SERPLBLD CKD-EPI 2021: 77 ML/MIN/1.73M2
GLUCOSE SERPL-MCNC: 93 MG/DL (ref 70–99)
POTASSIUM SERPL-SCNC: 4 MMOL/L (ref 3.4–5.3)
PROT SERPL-MCNC: 7.2 G/DL (ref 6.4–8.3)
SODIUM SERPL-SCNC: 142 MMOL/L (ref 135–145)

## 2024-01-06 ENCOUNTER — HEALTH MAINTENANCE LETTER (OUTPATIENT)
Age: 69
End: 2024-01-06

## 2024-05-25 ENCOUNTER — HEALTH MAINTENANCE LETTER (OUTPATIENT)
Age: 69
End: 2024-05-25

## 2024-09-09 ENCOUNTER — PATIENT OUTREACH (OUTPATIENT)
Dept: CARE COORDINATION | Facility: CLINIC | Age: 69
End: 2024-09-09

## 2024-09-09 ENCOUNTER — VIRTUAL VISIT (OUTPATIENT)
Dept: FAMILY MEDICINE | Facility: CLINIC | Age: 69
End: 2024-09-09
Payer: COMMERCIAL

## 2024-09-09 DIAGNOSIS — R10.9 ACUTE RIGHT FLANK PAIN: Primary | ICD-10-CM

## 2024-09-09 PROCEDURE — 99442 PR PHYSICIAN TELEPHONE EVALUATION 11-20 MIN: CPT | Mod: 93 | Performed by: STUDENT IN AN ORGANIZED HEALTH CARE EDUCATION/TRAINING PROGRAM

## 2024-09-09 ASSESSMENT — PATIENT HEALTH QUESTIONNAIRE - PHQ9
SUM OF ALL RESPONSES TO PHQ QUESTIONS 1-9: 14
10. IF YOU CHECKED OFF ANY PROBLEMS, HOW DIFFICULT HAVE THESE PROBLEMS MADE IT FOR YOU TO DO YOUR WORK, TAKE CARE OF THINGS AT HOME, OR GET ALONG WITH OTHER PEOPLE: NOT DIFFICULT AT ALL
SUM OF ALL RESPONSES TO PHQ QUESTIONS 1-9: 14

## 2024-09-09 NOTE — PROGRESS NOTES
Karyn is a 68 year old who is being evaluated via a billable telephone visit.      Originating Location (pt. Location): Home    Distant Location (provider location):  On-site    Karyn was seen today for office visit.    Diagnoses and all orders for this visit:    Acute right flank pain  Patient reported right flank pain for the last few days, started improving today.  Denies any trauma, injury, fever, blood in urine or stones in urine.  No prior imaging on file.  No history of kidney stones.  Discussed that pain could be musculoskeletal versus obstruction/stone that may have passed, although patient has not seen this.  Offered CT, however, given that pain is improving we can monitor for now.  Can take ibuprofen as needed for pain.  -Will call back if pain returns.  Could also consider going to urgent care/ADS/ED.        Subjective   Karyn is a 68 year old, presenting for the following health issues:  office visit (Right flank pain, getting better now)        9/9/2024    11:44 AM   Additional Questions   Roomed by    Accompanied by self     History of Present Illness       Reason for visit:  Right flank pain She is missing 4 dose(s) of medications per week.  She is not taking prescribed medications regularly due to remembering to take.      Pt reports at the time of the call, it was very painful, hurt to walk. Lasted 3 days, but now has a tenderness in the flank, seems at level of belly button. Feels like it may flare up again.   Has had this before, but doesn't remember when, a long time ago. Not sure what it was.   No blood or stones in urine, fevers, dysuria.   No known trigger, trauma or injury.   Took ibuprofen and helped.     No imaging on file.           Objective           Vitals:  No vitals were obtained today due to virtual visit.    Physical Exam   General: Alert and no distress //Respiratory: No audible wheeze, cough, or shortness of breath // Psychiatric:  Appropriate affect, tone, and pace of  words            Phone call duration: 15 minutes  Signed Electronically by: Kadie Mcknight MD

## 2024-11-07 ENCOUNTER — APPOINTMENT (OUTPATIENT)
Dept: GENERAL RADIOLOGY | Facility: CLINIC | Age: 69
DRG: 281 | End: 2024-11-07
Payer: COMMERCIAL

## 2024-11-07 ENCOUNTER — HOSPITAL ENCOUNTER (INPATIENT)
Facility: CLINIC | Age: 69
DRG: 281 | End: 2024-11-07
Attending: EMERGENCY MEDICINE | Admitting: STUDENT IN AN ORGANIZED HEALTH CARE EDUCATION/TRAINING PROGRAM
Payer: COMMERCIAL

## 2024-11-07 DIAGNOSIS — Z98.890 S/P CORONARY ANGIOGRAM: Primary | ICD-10-CM

## 2024-11-07 DIAGNOSIS — I21.4 NSTEMI (NON-ST ELEVATED MYOCARDIAL INFARCTION) (H): ICD-10-CM

## 2024-11-07 LAB
ALBUMIN SERPL BCG-MCNC: 4 G/DL (ref 3.5–5.2)
ALP SERPL-CCNC: 80 U/L (ref 40–150)
ALT SERPL W P-5'-P-CCNC: 15 U/L (ref 0–50)
ANION GAP SERPL CALCULATED.3IONS-SCNC: 12 MMOL/L (ref 7–15)
AST SERPL W P-5'-P-CCNC: 26 U/L (ref 0–45)
BASOPHILS # BLD AUTO: 0.1 10E3/UL (ref 0–0.2)
BASOPHILS NFR BLD AUTO: 1 %
BILIRUB SERPL-MCNC: 0.3 MG/DL
BUN SERPL-MCNC: 14.1 MG/DL (ref 8–23)
CALCIUM SERPL-MCNC: 9.3 MG/DL (ref 8.8–10.4)
CHLORIDE SERPL-SCNC: 106 MMOL/L (ref 98–107)
CHOLEST SERPL-MCNC: 312 MG/DL
CREAT SERPL-MCNC: 0.7 MG/DL (ref 0.51–0.95)
EGFRCR SERPLBLD CKD-EPI 2021: >90 ML/MIN/1.73M2
EOSINOPHIL # BLD AUTO: 0.1 10E3/UL (ref 0–0.7)
EOSINOPHIL NFR BLD AUTO: 1 %
ERYTHROCYTE [DISTWIDTH] IN BLOOD BY AUTOMATED COUNT: 13.1 % (ref 10–15)
ERYTHROCYTE [DISTWIDTH] IN BLOOD BY AUTOMATED COUNT: 13.2 % (ref 10–15)
GLUCOSE SERPL-MCNC: 119 MG/DL (ref 70–99)
HCO3 SERPL-SCNC: 24 MMOL/L (ref 22–29)
HCT VFR BLD AUTO: 42 % (ref 35–47)
HCT VFR BLD AUTO: 43.4 % (ref 35–47)
HDLC SERPL-MCNC: 39 MG/DL
HGB BLD-MCNC: 13.6 G/DL (ref 11.7–15.7)
HGB BLD-MCNC: 14.3 G/DL (ref 11.7–15.7)
HOLD SPECIMEN: NORMAL
IMM GRANULOCYTES # BLD: 0 10E3/UL
IMM GRANULOCYTES NFR BLD: 0 %
LDLC SERPL CALC-MCNC: 232 MG/DL
LYMPHOCYTES # BLD AUTO: 2.8 10E3/UL (ref 0.8–5.3)
LYMPHOCYTES NFR BLD AUTO: 32 %
MAGNESIUM SERPL-MCNC: 2.2 MG/DL (ref 1.7–2.3)
MCH RBC QN AUTO: 29 PG (ref 26.5–33)
MCH RBC QN AUTO: 29.1 PG (ref 26.5–33)
MCHC RBC AUTO-ENTMCNC: 32.4 G/DL (ref 31.5–36.5)
MCHC RBC AUTO-ENTMCNC: 32.9 G/DL (ref 31.5–36.5)
MCV RBC AUTO: 88 FL (ref 78–100)
MCV RBC AUTO: 90 FL (ref 78–100)
MONOCYTES # BLD AUTO: 0.7 10E3/UL (ref 0–1.3)
MONOCYTES NFR BLD AUTO: 7 %
NEUTROPHILS # BLD AUTO: 5.2 10E3/UL (ref 1.6–8.3)
NEUTROPHILS NFR BLD AUTO: 59 %
NONHDLC SERPL-MCNC: 273 MG/DL
NRBC # BLD AUTO: 0 10E3/UL
NRBC BLD AUTO-RTO: 0 /100
PLATELET # BLD AUTO: 373 10E3/UL (ref 150–450)
PLATELET # BLD AUTO: 427 10E3/UL (ref 150–450)
POTASSIUM SERPL-SCNC: 3.9 MMOL/L (ref 3.4–5.3)
PROT SERPL-MCNC: 7.5 G/DL (ref 6.4–8.3)
RBC # BLD AUTO: 4.69 10E6/UL (ref 3.8–5.2)
RBC # BLD AUTO: 4.92 10E6/UL (ref 3.8–5.2)
SODIUM SERPL-SCNC: 142 MMOL/L (ref 135–145)
TRIGL SERPL-MCNC: 205 MG/DL
TROPONIN T SERPL HS-MCNC: 19 NG/L
TROPONIN T SERPL HS-MCNC: 41 NG/L
TROPONIN T SERPL HS-MCNC: 45 NG/L
TSH SERPL DL<=0.005 MIU/L-ACNC: 3.54 UIU/ML (ref 0.3–4.2)
WBC # BLD AUTO: 11.8 10E3/UL (ref 4–11)
WBC # BLD AUTO: 8.8 10E3/UL (ref 4–11)

## 2024-11-07 PROCEDURE — 85027 COMPLETE CBC AUTOMATED: CPT | Performed by: EMERGENCY MEDICINE

## 2024-11-07 PROCEDURE — 83036 HEMOGLOBIN GLYCOSYLATED A1C: CPT

## 2024-11-07 PROCEDURE — 250N000013 HC RX MED GY IP 250 OP 250 PS 637

## 2024-11-07 PROCEDURE — 99285 EMERGENCY DEPT VISIT HI MDM: CPT | Performed by: EMERGENCY MEDICINE

## 2024-11-07 PROCEDURE — 120N000005 HC R&B MS OVERFLOW UMMC

## 2024-11-07 PROCEDURE — 85004 AUTOMATED DIFF WBC COUNT: CPT | Performed by: EMERGENCY MEDICINE

## 2024-11-07 PROCEDURE — 93005 ELECTROCARDIOGRAM TRACING: CPT | Performed by: EMERGENCY MEDICINE

## 2024-11-07 PROCEDURE — 36415 COLL VENOUS BLD VENIPUNCTURE: CPT | Performed by: EMERGENCY MEDICINE

## 2024-11-07 PROCEDURE — 82947 ASSAY GLUCOSE BLOOD QUANT: CPT | Performed by: EMERGENCY MEDICINE

## 2024-11-07 PROCEDURE — 82465 ASSAY BLD/SERUM CHOLESTEROL: CPT

## 2024-11-07 PROCEDURE — 99223 1ST HOSP IP/OBS HIGH 75: CPT | Mod: 25 | Performed by: STUDENT IN AN ORGANIZED HEALTH CARE EDUCATION/TRAINING PROGRAM

## 2024-11-07 PROCEDURE — 71045 X-RAY EXAM CHEST 1 VIEW: CPT | Mod: 26 | Performed by: RADIOLOGY

## 2024-11-07 PROCEDURE — 85014 HEMATOCRIT: CPT | Performed by: EMERGENCY MEDICINE

## 2024-11-07 PROCEDURE — 84443 ASSAY THYROID STIM HORMONE: CPT

## 2024-11-07 PROCEDURE — 93010 ELECTROCARDIOGRAM REPORT: CPT | Performed by: EMERGENCY MEDICINE

## 2024-11-07 PROCEDURE — 84484 ASSAY OF TROPONIN QUANT: CPT | Performed by: EMERGENCY MEDICINE

## 2024-11-07 PROCEDURE — 99285 EMERGENCY DEPT VISIT HI MDM: CPT | Mod: 25 | Performed by: EMERGENCY MEDICINE

## 2024-11-07 PROCEDURE — 250N000011 HC RX IP 250 OP 636: Performed by: EMERGENCY MEDICINE

## 2024-11-07 PROCEDURE — 84484 ASSAY OF TROPONIN QUANT: CPT

## 2024-11-07 PROCEDURE — 82310 ASSAY OF CALCIUM: CPT | Performed by: EMERGENCY MEDICINE

## 2024-11-07 PROCEDURE — 83735 ASSAY OF MAGNESIUM: CPT | Performed by: EMERGENCY MEDICINE

## 2024-11-07 PROCEDURE — 84075 ASSAY ALKALINE PHOSPHATASE: CPT | Performed by: EMERGENCY MEDICINE

## 2024-11-07 PROCEDURE — 71045 X-RAY EXAM CHEST 1 VIEW: CPT

## 2024-11-07 PROCEDURE — 250N000013 HC RX MED GY IP 250 OP 250 PS 637: Performed by: EMERGENCY MEDICINE

## 2024-11-07 PROCEDURE — 36415 COLL VENOUS BLD VENIPUNCTURE: CPT

## 2024-11-07 RX ORDER — ACETAMINOPHEN 650 MG/1
650 SUPPOSITORY RECTAL EVERY 4 HOURS PRN
Status: DISCONTINUED | OUTPATIENT
Start: 2024-11-07 | End: 2024-11-18 | Stop reason: HOSPADM

## 2024-11-07 RX ORDER — ASPIRIN 325 MG
325 TABLET ORAL ONCE
Status: COMPLETED | OUTPATIENT
Start: 2024-11-07 | End: 2024-11-07

## 2024-11-07 RX ORDER — AMOXICILLIN 250 MG
1 CAPSULE ORAL 2 TIMES DAILY
Status: DISCONTINUED | OUTPATIENT
Start: 2024-11-07 | End: 2024-11-18 | Stop reason: HOSPADM

## 2024-11-07 RX ORDER — AMOXICILLIN 250 MG
2 CAPSULE ORAL 2 TIMES DAILY
Status: DISCONTINUED | OUTPATIENT
Start: 2024-11-07 | End: 2024-11-18 | Stop reason: HOSPADM

## 2024-11-07 RX ORDER — DEXTROSE MONOHYDRATE 25 G/50ML
25-50 INJECTION, SOLUTION INTRAVENOUS
Status: DISCONTINUED | OUTPATIENT
Start: 2024-11-07 | End: 2024-11-18 | Stop reason: HOSPADM

## 2024-11-07 RX ORDER — LISINOPRIL AND HYDROCHLOROTHIAZIDE 12.5; 2 MG/1; MG/1
1 TABLET ORAL EVERY EVENING
Status: DISCONTINUED | OUTPATIENT
Start: 2024-11-07 | End: 2024-11-10

## 2024-11-07 RX ORDER — NITROGLYCERIN 0.4 MG/1
0.4 TABLET SUBLINGUAL EVERY 5 MIN PRN
Status: DISCONTINUED | OUTPATIENT
Start: 2024-11-07 | End: 2024-11-18 | Stop reason: HOSPADM

## 2024-11-07 RX ORDER — LIDOCAINE 40 MG/G
CREAM TOPICAL
Status: DISCONTINUED | OUTPATIENT
Start: 2024-11-07 | End: 2024-11-18 | Stop reason: HOSPADM

## 2024-11-07 RX ORDER — POLYETHYLENE GLYCOL 3350 17 G/17G
17 POWDER, FOR SOLUTION ORAL DAILY
Status: DISCONTINUED | OUTPATIENT
Start: 2024-11-08 | End: 2024-11-18 | Stop reason: HOSPADM

## 2024-11-07 RX ORDER — HEPARIN SODIUM 10000 [USP'U]/100ML
0-5000 INJECTION, SOLUTION INTRAVENOUS CONTINUOUS
Status: DISCONTINUED | OUTPATIENT
Start: 2024-11-07 | End: 2024-11-07

## 2024-11-07 RX ORDER — NICOTINE POLACRILEX 4 MG
15-30 LOZENGE BUCCAL
Status: DISCONTINUED | OUTPATIENT
Start: 2024-11-07 | End: 2024-11-18 | Stop reason: HOSPADM

## 2024-11-07 RX ORDER — MAGNESIUM HYDROXIDE/ALUMINUM HYDROXICE/SIMETHICONE 120; 1200; 1200 MG/30ML; MG/30ML; MG/30ML
30 SUSPENSION ORAL EVERY 4 HOURS PRN
Status: DISCONTINUED | OUTPATIENT
Start: 2024-11-07 | End: 2024-11-18 | Stop reason: HOSPADM

## 2024-11-07 RX ORDER — ATORVASTATIN CALCIUM 80 MG/1
80 TABLET, FILM COATED ORAL AT BEDTIME
Status: DISCONTINUED | OUTPATIENT
Start: 2024-11-07 | End: 2024-11-18 | Stop reason: HOSPADM

## 2024-11-07 RX ORDER — ACETAMINOPHEN 325 MG/1
650 TABLET ORAL EVERY 4 HOURS PRN
Status: DISCONTINUED | OUTPATIENT
Start: 2024-11-07 | End: 2024-11-18 | Stop reason: HOSPADM

## 2024-11-07 RX ORDER — AMLODIPINE BESYLATE 2.5 MG/1
2.5 TABLET ORAL EVERY EVENING
Status: DISCONTINUED | OUTPATIENT
Start: 2024-11-07 | End: 2024-11-08

## 2024-11-07 RX ORDER — HEPARIN SODIUM 10000 [USP'U]/100ML
0-5000 INJECTION, SOLUTION INTRAVENOUS CONTINUOUS
Status: DISCONTINUED | OUTPATIENT
Start: 2024-11-07 | End: 2024-11-14

## 2024-11-07 RX ADMIN — SENNOSIDES AND DOCUSATE SODIUM 1 TABLET: 8.6; 5 TABLET ORAL at 22:39

## 2024-11-07 RX ADMIN — HEPARIN SODIUM 1200 UNITS/HR: 10000 INJECTION, SOLUTION INTRAVENOUS at 21:37

## 2024-11-07 RX ADMIN — ASPIRIN 325 MG ORAL TABLET 325 MG: 325 PILL ORAL at 17:09

## 2024-11-07 RX ADMIN — ATORVASTATIN CALCIUM 80 MG: 80 TABLET, FILM COATED ORAL at 22:39

## 2024-11-07 ASSESSMENT — ACTIVITIES OF DAILY LIVING (ADL)
ADLS_ACUITY_SCORE: 0

## 2024-11-07 NOTE — ED PROVIDER NOTES
[place] EMERGENCY DEPARTMENT ([facility])    11/07/24       ED PROVIDER NOTE    History     Chief Complaint   Patient presents with    Hypertension     HPI  Karyn Gamez is a 69 year old female with a past medical history of hypertension, cerebral embolism with cerebral infarction, obesity, CKD stage 2, and depression, who presents to the ED for evaluation of chest pain.   Patient reports recent exertional chest pain when walking around that she has not previously had evaluated, and then earlier today developed chest pain at rest that lasted almost an hour and was fairly severe radiating to her jaw and left arm.  This prompted her to come to the emergency department for further evaluation and by the time of arrival her chest pain has resolved.  She states she has not been compliant with her home antihypertensive medications and has not really followed with a doctor.  By chart review she has not had prior cardiac catheterization or stress test.  Patient denies recent fevers, chills, cough, abdominal pain, nausea, vomiting, or other complaints.  Denies recent chest trauma or other new concerns.    Past Medical History  Past Medical History:   Diagnosis Date    Cervicalgia 6/29/2005 June 29, 2005: Thrown from a horse - wearing a helmet - and struck side of head and neck, heard crepitation, no loc, Able to walk after injury.  persistant pain in neck relieved with ibuprofen, stiff but can move with ROM.  No changes in hands and feet sensation/motor.    Coronary artery disease     Depressive disorder, not elsewhere classified     Hypertension     Obesity, unspecified      Past Surgical History:   Procedure Laterality Date    ANGIOGRAM  2010    negative    COLONOSCOPY N/A 3/24/2023    Procedure: COLONOSCOPY, WITH HOT POLYPECTOMY AND RESEARCH BIOPSY;  Surgeon: Bret Velez MD;  Location: [facility] OR    HYSTERECTOMY, PAP NO LONGER INDICATED      BSO     amLODIPine (NORVASC) 2.5 MG tablet  atorvastatin  "(LIPITOR) 80 MG tablet  bisacodyl (DULCOLAX) 5 MG EC tablet  blood glucose monitoring (ONE TOUCH DELICA) lancets  blood glucose monitoring (ONETOUCH VERIO IQ SYSTEM) meter device kit  buPROPion (WELLBUTRIN XL) 150 MG 24 hr tablet  lisinopril-hydrochlorothiazide (ZESTORETIC) 20-12.5 MG tablet  metFORMIN (GLUCOPHAGE XR) 500 MG 24 hr tablet  polyethylene glycol (GOLYTELY) 236 g suspension  semaglutide (OZEMPIC, 0.25 OR 0.5 MG/DOSE,) 2 MG/3ML pen  sertraline (ZOLOFT) 100 MG tablet      No Known Allergies  Family History  Family History   Problem Relation Age of Onset    C.A.D. Father         first MI at 53, stenting x2    C.A.D. Mother         dx in her mid 50's    C.A.D. Brother         MI in mid-50's    Cerebrovascular Disease Brother         in late 50's     Social History   Social History     Tobacco Use    Smoking status: Former     Current packs/day: 0.00     Average packs/day: 0.5 packs/day for 30.0 years (15.0 ttl pk-yrs)     Types: Cigarettes     Start date: 1981     Quit date: 2011     Years since quittin.2    Smokeless tobacco: Never    Tobacco comments:     smoking 3-4 cigs per day   Vaping Use    Vaping status: Never Used   Substance Use Topics    Alcohol use: Yes     Comment: rarely    Drug use: No      A complete review of systems was performed with pertinent positives and negatives noted in the HPI, and all other systems negative.    Physical Exam   BP: (!) 173/98  Temp: 98.3  F (36.8  C)  Resp: 18  Height: 170 cm (5' 6.93\")  Weight: 110.3 kg (243 lb 1.6 oz)  SpO2: 94 %  Physical Exam  Vitals and nursing note reviewed.   Constitutional:       General: She is not in acute distress.     Appearance: Normal appearance. She is not diaphoretic.   HENT:      Head: Normocephalic and atraumatic.      Mouth/Throat:      Mouth: Mucous membranes are moist.   Eyes:      General: No scleral icterus.     Conjunctiva/sclera: Conjunctivae normal.   Cardiovascular:      Rate and Rhythm: Normal rate and " regular rhythm.      Heart sounds: Normal heart sounds.   Pulmonary:      Effort: Pulmonary effort is normal. No respiratory distress.      Breath sounds: Normal breath sounds.   Abdominal:      General: Abdomen is flat.      Palpations: Abdomen is soft.      Tenderness: There is no abdominal tenderness.   Musculoskeletal:      Cervical back: Normal range of motion and neck supple.   Skin:     General: Skin is warm.      Findings: No rash.   Neurological:      General: No focal deficit present.      Mental Status: She is alert and oriented to person, place, and time.   Psychiatric:         Mood and Affect: Mood normal.         Behavior: Behavior normal.           ED Course, Procedures, & Data      Procedures            EKG Interpretation:      Interpreted by Bismark Aguiar MD  Time reviewed: 17:31  Symptoms at time of EKG: chest pain   Rhythm: normal sinus   Rate: normal  Axis: normal  Ectopy: none  Conduction: normal  ST Segments/ T Waves: No ST-T wave changes  Q Waves: none  Comparison to prior: Unchanged from 6/2019    Clinical Impression: normal EKG with nonspecific t-wave inversions in lead v4 and v5 consistent with prior; no STEMI    \       Results for orders placed or performed during the hospital encounter of 11/07/24   XR Chest 1 View     Status: None    Narrative    EXAM: XR CHEST 1 VIEW  LOCATION: [facility]  DATE: 11/7/2024    INDICATION: Chest pain  COMPARISON: 3/23/2016.    FINDINGS: The heart size is normal. The thoracic aorta is tortuous. Calcified granuloma in the right upper lobe. The lungs are otherwise clear. No pneumothorax.      Impression    IMPRESSION: No acute abnormality.   [place] Draw     Status: None    Narrative    The following orders were created for panel order [place] Draw.  Procedure                               Abnormality         Status                     ---------                               -----------         ------                      Extra Blue Top Tube[[id number]]                              Final result               Extra Red Top Tube[[id number]]                               Final result               Extra Green Top (Lithium...[[id number]]                      Final result               Extra Purple Top Tube[[id number]]                                                         Please view results for these tests on the individual orders.   Extra Blue Top Tube     Status: None   Result Value Ref Range    Hold Specimen JIC    Extra Red Top Tube     Status: None   Result Value Ref Range    Hold Specimen JI    Extra Green Top (Lithium Heparin) Tube     Status: None   Result Value Ref Range    Hold Specimen JIC    Comprehensive metabolic panel     Status: Abnormal   Result Value Ref Range    Sodium 142 135 - 145 mmol/L    Potassium 3.9 3.4 - 5.3 mmol/L    Carbon Dioxide (CO2) 24 22 - 29 mmol/L    Anion Gap 12 7 - 15 mmol/L    Urea Nitrogen 14.1 8.0 - 23.0 mg/dL    Creatinine 0.70 0.51 - 0.95 mg/dL    GFR Estimate >90 >60 mL/min/1.73m2    Calcium 9.3 8.8 - 10.4 mg/dL    Chloride 106 98 - 107 mmol/L    Glucose 119 (H) 70 - 99 mg/dL    Alkaline Phosphatase 80 40 - 150 U/L    AST 26 0 - 45 U/L    ALT 15 0 - 50 U/L    Protein Total 7.5 6.4 - 8.3 g/dL    Albumin 4.0 3.5 - 5.2 g/dL    Bilirubin Total 0.3 <=1.2 mg/dL   Troponin T, High Sensitivity     Status: Abnormal   Result Value Ref Range    Troponin T, High Sensitivity 19 (H) <=14 ng/L   Magnesium     Status: Normal   Result Value Ref Range    Magnesium 2.2 1.7 - 2.3 mg/dL   CBC with platelets and differential     Status: None   Result Value Ref Range    WBC Count 8.8 4.0 - 11.0 10e3/uL    RBC Count 4.69 3.80 - 5.20 10e6/uL    Hemoglobin 13.6 11.7 - 15.7 g/dL    Hematocrit 42.0 35.0 - 47.0 %    MCV 90 78 - 100 fL    MCH 29.0 26.5 - 33.0 pg    MCHC 32.4 31.5 - 36.5 g/dL    RDW 13.2 10.0 - 15.0 %    Platelet Count 373 150 - 450 10e3/uL    % Neutrophils 59 %    % Lymphocytes 32 %    %  Monocytes 7 %    % Eosinophils 1 %    % Basophils 1 %    % Immature Granulocytes 0 %    NRBCs per 100 WBC 0 <1 /100    Absolute Neutrophils 5.2 1.6 - 8.3 10e3/uL    Absolute Lymphocytes 2.8 0.8 - 5.3 10e3/uL    Absolute Monocytes 0.7 0.0 - 1.3 10e3/uL    Absolute Eosinophils 0.1 0.0 - 0.7 10e3/uL    Absolute Basophils 0.1 0.0 - 0.2 10e3/uL    Absolute Immature Granulocytes 0.0 <=0.4 10e3/uL    Absolute NRBCs 0.0 10e3/uL   Troponin T, High Sensitivity     Status: Abnormal   Result Value Ref Range    Troponin T, High Sensitivity 41 (H) <=14 ng/L   CBC with platelets     Status: Abnormal   Result Value Ref Range    WBC Count 11.8 (H) 4.0 - 11.0 10e3/uL    RBC Count 4.92 3.80 - 5.20 10e6/uL    Hemoglobin 14.3 11.7 - 15.7 g/dL    Hematocrit 43.4 35.0 - 47.0 %    MCV 88 78 - 100 fL    MCH 29.1 26.5 - 33.0 pg    MCHC 32.9 31.5 - 36.5 g/dL    RDW 13.1 10.0 - 15.0 %    Platelet Count 427 150 - 450 10e3/uL   Troponin T, High Sensitivity     Status: Abnormal   Result Value Ref Range    Troponin T, High Sensitivity 45 (H) <=14 ng/L   EKG 12 lead     Status: None (Preliminary result)   Result Value Ref Range    Systolic Blood Pressure  mmHg    Diastolic Blood Pressure  mmHg    Ventricular Rate 62 BPM    Atrial Rate 62 BPM    IN Interval 182 ms    QRS Duration 108 ms     ms    QTc 422 ms    P Axis 50 degrees    R AXIS 6 degrees    T Axis 131 degrees    Interpretation ECG       Sinus rhythm  Minimal voltage criteria for LVH, may be normal variant ( Artur product )  Marked ST abnormality, possible lateral subendocardial injury  Abnormal ECG     CBC with Platelets & Differential     Status: None    Narrative    The following orders were created for panel order CBC with Platelets & Differential.  Procedure                               Abnormality         Status                     ---------                               -----------         ------                     CBC with platelets and d...[[id number]]                       Final result                 Please view results for these tests on the individual orders.     Medications   amLODIPine (NORVASC) tablet 2.5 mg (has no administration in time range)   atorvastatin (LIPITOR) tablet 80 mg (has no administration in time range)   lisinopril-hydrochlorothiazide (ZESTORETIC) 20-12.5 MG per tablet 1 tablet (has no administration in time range)   lidocaine 1 % 0.1-1 mL (has no administration in time range)   lidocaine (LMX4) cream (has no administration in time range)   sodium chloride (PF) 0.9% PF flush 3 mL (3 mLs Intracatheter Not Given 11/7/24 2207)   sodium chloride (PF) 0.9% PF flush 3 mL (has no administration in time range)   medication instruction (has no administration in time range)   nitroGLYcerin (NITROSTAT) sublingual tablet 0.4 mg (has no administration in time range)   alum & mag hydroxide-simethicone (MAALOX) suspension 30 mL (has no administration in time range)   acetaminophen (TYLENOL) tablet 650 mg (has no administration in time range)   acetaminophen (TYLENOL) Suppository 650 mg (has no administration in time range)   senna-docusate (SENOKOT-S/PERICOLACE) 8.6-50 MG per tablet 1 tablet (has no administration in time range)     Or   senna-docusate (SENOKOT-S/PERICOLACE) 8.6-50 MG per tablet 2 tablet (has no administration in time range)   polyethylene glycol (MIRALAX) Packet 17 g (has no administration in time range)   heparin 25,000 units in 0.45% NaCl 250 mL ANTICOAGULANT infusion (1,200 Units/hr Intravenous Rate/Dose Verify 11/7/24 2149)   aspirin (ASA) tablet 325 mg (325 mg Oral $Given 11/7/24 1709)   heparin loading dose for LOW INTENSITY TREATMENT * Give BEFORE starting heparin infusion (6,000 Units Intravenous $Given 11/7/24 2136)     Labs Ordered and Resulted from Time of ED Arrival to Time of ED Departure   COMPREHENSIVE METABOLIC PANEL - Abnormal       Result Value    Sodium 142      Potassium 3.9      Carbon Dioxide (CO2) 24      Anion Gap 12      Urea  Nitrogen 14.1      Creatinine 0.70      GFR Estimate >90      Calcium 9.3      Chloride 106      Glucose 119 (*)     Alkaline Phosphatase 80      AST 26      ALT 15      Protein Total 7.5      Albumin 4.0      Bilirubin Total 0.3     TROPONIN T, HIGH SENSITIVITY - Abnormal    Troponin T, High Sensitivity 19 (*)    TROPONIN T, HIGH SENSITIVITY - Abnormal    Troponin T, High Sensitivity 41 (*)    CBC WITH PLATELETS - Abnormal    WBC Count 11.8 (*)     RBC Count 4.92      Hemoglobin 14.3      Hematocrit 43.4      MCV 88      MCH 29.1      MCHC 32.9      RDW 13.1      Platelet Count 427     TROPONIN T, HIGH SENSITIVITY - Abnormal    Troponin T, High Sensitivity 45 (*)    MAGNESIUM - Normal    Magnesium 2.2     CBC WITH PLATELETS AND DIFFERENTIAL    WBC Count 8.8      RBC Count 4.69      Hemoglobin 13.6      Hematocrit 42.0      MCV 90      MCH 29.0      MCHC 32.4      RDW 13.2      Platelet Count 373      % Neutrophils 59      % Lymphocytes 32      % Monocytes 7      % Eosinophils 1      % Basophils 1      % Immature Granulocytes 0      NRBCs per 100 WBC 0      Absolute Neutrophils 5.2      Absolute Lymphocytes 2.8      Absolute Monocytes 0.7      Absolute Eosinophils 0.1      Absolute Basophils 0.1      Absolute Immature Granulocytes 0.0      Absolute NRBCs 0.0     LIPID REFLEX TO DIRECT LDL PANEL   HEMOGLOBIN A1C   TSH WITH FREE T4 REFLEX     XR Chest 1 View   Final Result   IMPRESSION: No acute abnormality.             Critical care was not performed.     Medical Decision Making  The patient's presentation was of high complexity (an acute health issue posing potential threat to life or bodily function).    The patient's evaluation involved:  review of external note(s) from 1 sources (see separate area of note for details)  review of 3+ test result(s) ordered prior to this encounter (see separate area of note for details)  ordering and/or review of 3+ test(s) in this encounter (see separate area of note for  details)  discussion of management or test interpretation with another health professional (cardiology)    The patient's management necessitated high risk (a decision regarding hospitalization).    Assessment & Plan    Karyn Gamez is a 69 year old female with a past medical history of hypertension, cerebral embolism with cerebral infarction, obesity, CKD stage 2, and depression, who presents to the ED for evaluation of chest pain.  Patient is nontoxic-appearing on exam, hypertensive initially, has other vital signs within normal limits.  EKG was obtained and negative for ST elevations, did show some T wave inversions in leads V4 and V5 that are consistent with prior EKG from 2019, no signs of acute ischemia.  Patient does not have chest pain at this time.  Initial troponin obtained and 19, and after 2 hours a repeat troponin was obtained and was elevated at 41.  In light of her recent significant chest pain and her significant risk factors with diabetes, hyperlipidemia, and hypertension with recent exertional chest pain and now chest pain at rest earlier today, this is concerning for NSTEMI.  Patient was given full dose aspirin and started on heparin.  We spoke with cardiology who agreed with plan for admission for further management on heparin drip.  Patient was admitted to cardiology service for further care.    I have reviewed the nursing notes. I have reviewed the findings, diagnosis, plan and need for follow up with the patient.    New Prescriptions    No medications on file       Final diagnoses:   NSTEMI (non-ST elevated myocardial infarction) (H)       Bismark Aguiar MD  [facility] EMERGENCY DEPARTMENT  11/7/2024     Bismark Aguiar MD  11/07/24 [id number]

## 2024-11-07 NOTE — ED TRIAGE NOTES
BIBA from home for 3 days of intermittent CP with exertion  Worse today and radiating down L arm  Pain has now stopped  NSR  Not taking any prescribed medications  /110

## 2024-11-08 ENCOUNTER — APPOINTMENT (OUTPATIENT)
Dept: CARDIOLOGY | Facility: CLINIC | Age: 69
DRG: 281 | End: 2024-11-08
Payer: COMMERCIAL

## 2024-11-08 LAB
ABO/RH(D): NORMAL
ANION GAP SERPL CALCULATED.3IONS-SCNC: 12 MMOL/L (ref 7–15)
ANTIBODY SCREEN: NEGATIVE
ATRIAL RATE - MUSE: 62 BPM
BASOPHILS # BLD AUTO: 0.1 10E3/UL (ref 0–0.2)
BASOPHILS NFR BLD AUTO: 1 %
BUN SERPL-MCNC: 14.5 MG/DL (ref 8–23)
CALCIUM SERPL-MCNC: 9.5 MG/DL (ref 8.8–10.4)
CHLORIDE SERPL-SCNC: 105 MMOL/L (ref 98–107)
CREAT SERPL-MCNC: 0.69 MG/DL (ref 0.51–0.95)
DIASTOLIC BLOOD PRESSURE - MUSE: NORMAL MMHG
EGFRCR SERPLBLD CKD-EPI 2021: >90 ML/MIN/1.73M2
EOSINOPHIL # BLD AUTO: 0.1 10E3/UL (ref 0–0.7)
EOSINOPHIL NFR BLD AUTO: 1 %
ERYTHROCYTE [DISTWIDTH] IN BLOOD BY AUTOMATED COUNT: 13.2 % (ref 10–15)
EST. AVERAGE GLUCOSE BLD GHB EST-MCNC: 126 MG/DL
GLUCOSE BLDC GLUCOMTR-MCNC: 100 MG/DL (ref 70–99)
GLUCOSE BLDC GLUCOMTR-MCNC: 105 MG/DL (ref 70–99)
GLUCOSE BLDC GLUCOMTR-MCNC: 106 MG/DL (ref 70–99)
GLUCOSE BLDC GLUCOMTR-MCNC: 114 MG/DL (ref 70–99)
GLUCOSE BLDC GLUCOMTR-MCNC: 122 MG/DL (ref 70–99)
GLUCOSE SERPL-MCNC: 105 MG/DL (ref 70–99)
HBA1C MFR BLD: 6 %
HCO3 SERPL-SCNC: 23 MMOL/L (ref 22–29)
HCT VFR BLD AUTO: 41 % (ref 35–47)
HGB BLD-MCNC: 13.3 G/DL (ref 11.7–15.7)
IMM GRANULOCYTES # BLD: 0 10E3/UL
IMM GRANULOCYTES NFR BLD: 0 %
INTERPRETATION ECG - MUSE: NORMAL
LVEF ECHO: NORMAL
LYMPHOCYTES # BLD AUTO: 3.8 10E3/UL (ref 0.8–5.3)
LYMPHOCYTES NFR BLD AUTO: 36 %
MCH RBC QN AUTO: 29 PG (ref 26.5–33)
MCHC RBC AUTO-ENTMCNC: 32.4 G/DL (ref 31.5–36.5)
MCV RBC AUTO: 90 FL (ref 78–100)
MONOCYTES # BLD AUTO: 0.9 10E3/UL (ref 0–1.3)
MONOCYTES NFR BLD AUTO: 8 %
NEUTROPHILS # BLD AUTO: 5.8 10E3/UL (ref 1.6–8.3)
NEUTROPHILS NFR BLD AUTO: 54 %
NRBC # BLD AUTO: 0 10E3/UL
NRBC BLD AUTO-RTO: 0 /100
P AXIS - MUSE: 50 DEGREES
PLATELET # BLD AUTO: 348 10E3/UL (ref 150–450)
POTASSIUM SERPL-SCNC: 3.7 MMOL/L (ref 3.4–5.3)
PR INTERVAL - MUSE: 182 MS
QRS DURATION - MUSE: 108 MS
QT - MUSE: 416 MS
QTC - MUSE: 422 MS
R AXIS - MUSE: 6 DEGREES
RBC # BLD AUTO: 4.58 10E6/UL (ref 3.8–5.2)
SODIUM SERPL-SCNC: 140 MMOL/L (ref 135–145)
SPECIMEN EXPIRATION DATE: NORMAL
SYSTOLIC BLOOD PRESSURE - MUSE: NORMAL MMHG
T AXIS - MUSE: 131 DEGREES
TROPONIN T SERPL HS-MCNC: 32 NG/L
UFH PPP CHRO-ACNC: 0.23 IU/ML
UFH PPP CHRO-ACNC: 0.28 IU/ML
VENTRICULAR RATE- MUSE: 62 BPM
WBC # BLD AUTO: 10.7 10E3/UL (ref 4–11)

## 2024-11-08 PROCEDURE — 120N000005 HC R&B MS OVERFLOW UMMC

## 2024-11-08 PROCEDURE — 99222 1ST HOSP IP/OBS MODERATE 55: CPT | Mod: FS | Performed by: NURSE PRACTITIONER

## 2024-11-08 PROCEDURE — 250N000013 HC RX MED GY IP 250 OP 250 PS 637: Performed by: NURSE PRACTITIONER

## 2024-11-08 PROCEDURE — 272N000001 HC OR GENERAL SUPPLY STERILE: Performed by: INTERNAL MEDICINE

## 2024-11-08 PROCEDURE — 82962 GLUCOSE BLOOD TEST: CPT

## 2024-11-08 PROCEDURE — C1894 INTRO/SHEATH, NON-LASER: HCPCS | Performed by: INTERNAL MEDICINE

## 2024-11-08 PROCEDURE — 255N000002 HC RX 255 OP 636: Performed by: INTERNAL MEDICINE

## 2024-11-08 PROCEDURE — 86850 RBC ANTIBODY SCREEN: CPT | Performed by: NURSE PRACTITIONER

## 2024-11-08 PROCEDURE — B2111ZZ FLUOROSCOPY OF MULTIPLE CORONARY ARTERIES USING LOW OSMOLAR CONTRAST: ICD-10-PCS | Performed by: INTERNAL MEDICINE

## 2024-11-08 PROCEDURE — 36415 COLL VENOUS BLD VENIPUNCTURE: CPT

## 2024-11-08 PROCEDURE — 250N000013 HC RX MED GY IP 250 OP 250 PS 637

## 2024-11-08 PROCEDURE — 999N000208 ECHOCARDIOGRAM COMPLETE

## 2024-11-08 PROCEDURE — C1769 GUIDE WIRE: HCPCS | Performed by: INTERNAL MEDICINE

## 2024-11-08 PROCEDURE — 99233 SBSQ HOSP IP/OBS HIGH 50: CPT | Mod: 25 | Performed by: NURSE PRACTITIONER

## 2024-11-08 PROCEDURE — 250N000009 HC RX 250: Performed by: INTERNAL MEDICINE

## 2024-11-08 PROCEDURE — 86900 BLOOD TYPING SEROLOGIC ABO: CPT | Performed by: NURSE PRACTITIONER

## 2024-11-08 PROCEDURE — 36415 COLL VENOUS BLD VENIPUNCTURE: CPT | Performed by: NURSE PRACTITIONER

## 2024-11-08 PROCEDURE — 93454 CORONARY ARTERY ANGIO S&I: CPT | Mod: 26 | Performed by: INTERNAL MEDICINE

## 2024-11-08 PROCEDURE — 93306 TTE W/DOPPLER COMPLETE: CPT | Mod: 26 | Performed by: INTERNAL MEDICINE

## 2024-11-08 PROCEDURE — 80048 BASIC METABOLIC PNL TOTAL CA: CPT

## 2024-11-08 PROCEDURE — 99152 MOD SED SAME PHYS/QHP 5/>YRS: CPT | Performed by: INTERNAL MEDICINE

## 2024-11-08 PROCEDURE — 85004 AUTOMATED DIFF WBC COUNT: CPT

## 2024-11-08 PROCEDURE — 93454 CORONARY ARTERY ANGIO S&I: CPT | Performed by: INTERNAL MEDICINE

## 2024-11-08 PROCEDURE — C1887 CATHETER, GUIDING: HCPCS | Performed by: INTERNAL MEDICINE

## 2024-11-08 PROCEDURE — 36415 COLL VENOUS BLD VENIPUNCTURE: CPT | Performed by: STUDENT IN AN ORGANIZED HEALTH CARE EDUCATION/TRAINING PROGRAM

## 2024-11-08 PROCEDURE — 99153 MOD SED SAME PHYS/QHP EA: CPT | Performed by: INTERNAL MEDICINE

## 2024-11-08 PROCEDURE — 82435 ASSAY OF BLOOD CHLORIDE: CPT

## 2024-11-08 PROCEDURE — 250N000011 HC RX IP 250 OP 636: Performed by: INTERNAL MEDICINE

## 2024-11-08 PROCEDURE — C8929 TTE W OR WO FOL WCON,DOPPLER: HCPCS

## 2024-11-08 PROCEDURE — 85520 HEPARIN ASSAY: CPT | Performed by: STUDENT IN AN ORGANIZED HEALTH CARE EDUCATION/TRAINING PROGRAM

## 2024-11-08 PROCEDURE — 250N000011 HC RX IP 250 OP 636: Performed by: EMERGENCY MEDICINE

## 2024-11-08 PROCEDURE — 84484 ASSAY OF TROPONIN QUANT: CPT

## 2024-11-08 PROCEDURE — 99152 MOD SED SAME PHYS/QHP 5/>YRS: CPT | Mod: GC | Performed by: INTERNAL MEDICINE

## 2024-11-08 RX ORDER — ASPIRIN 81 MG/1
81 TABLET ORAL DAILY
Status: DISCONTINUED | OUTPATIENT
Start: 2024-11-08 | End: 2024-11-18 | Stop reason: HOSPADM

## 2024-11-08 RX ORDER — HEPARIN SODIUM 1000 [USP'U]/ML
INJECTION, SOLUTION INTRAVENOUS; SUBCUTANEOUS
Status: DISCONTINUED | OUTPATIENT
Start: 2024-11-08 | End: 2024-11-08 | Stop reason: HOSPADM

## 2024-11-08 RX ORDER — OXYCODONE HYDROCHLORIDE 5 MG/1
5 TABLET ORAL EVERY 4 HOURS PRN
Status: DISCONTINUED | OUTPATIENT
Start: 2024-11-08 | End: 2024-11-18 | Stop reason: HOSPADM

## 2024-11-08 RX ORDER — AMLODIPINE BESYLATE 5 MG/1
5 TABLET ORAL EVERY EVENING
Status: DISCONTINUED | OUTPATIENT
Start: 2024-11-08 | End: 2024-11-09

## 2024-11-08 RX ORDER — SODIUM CHLORIDE 9 MG/ML
75 INJECTION, SOLUTION INTRAVENOUS CONTINUOUS
Status: ACTIVE | OUTPATIENT
Start: 2024-11-08 | End: 2024-11-08

## 2024-11-08 RX ORDER — IOPAMIDOL 755 MG/ML
INJECTION, SOLUTION INTRAVASCULAR
Status: DISCONTINUED | OUTPATIENT
Start: 2024-11-08 | End: 2024-11-08 | Stop reason: HOSPADM

## 2024-11-08 RX ORDER — ATROPINE SULFATE 0.1 MG/ML
0.5 INJECTION INTRAVENOUS
Status: DISCONTINUED | OUTPATIENT
Start: 2024-11-08 | End: 2024-11-09

## 2024-11-08 RX ORDER — NALOXONE HYDROCHLORIDE 0.4 MG/ML
0.2 INJECTION, SOLUTION INTRAMUSCULAR; INTRAVENOUS; SUBCUTANEOUS
Status: DISCONTINUED | OUTPATIENT
Start: 2024-11-08 | End: 2024-11-09

## 2024-11-08 RX ORDER — FLUMAZENIL 0.1 MG/ML
0.2 INJECTION, SOLUTION INTRAVENOUS
Status: DISCONTINUED | OUTPATIENT
Start: 2024-11-08 | End: 2024-11-09

## 2024-11-08 RX ORDER — FENTANYL CITRATE 50 UG/ML
25 INJECTION, SOLUTION INTRAMUSCULAR; INTRAVENOUS
Status: DISCONTINUED | OUTPATIENT
Start: 2024-11-08 | End: 2024-11-09

## 2024-11-08 RX ORDER — HYDRALAZINE HYDROCHLORIDE 20 MG/ML
10 INJECTION INTRAMUSCULAR; INTRAVENOUS
Status: DISCONTINUED | OUTPATIENT
Start: 2024-11-08 | End: 2024-11-18 | Stop reason: HOSPADM

## 2024-11-08 RX ORDER — ACETAMINOPHEN 325 MG/1
650 TABLET ORAL EVERY 4 HOURS PRN
Status: DISCONTINUED | OUTPATIENT
Start: 2024-11-08 | End: 2024-11-08

## 2024-11-08 RX ORDER — NITROGLYCERIN 5 MG/ML
VIAL (ML) INTRAVENOUS
Status: DISCONTINUED | OUTPATIENT
Start: 2024-11-08 | End: 2024-11-08 | Stop reason: HOSPADM

## 2024-11-08 RX ORDER — NALOXONE HYDROCHLORIDE 0.4 MG/ML
0.4 INJECTION, SOLUTION INTRAMUSCULAR; INTRAVENOUS; SUBCUTANEOUS
Status: DISCONTINUED | OUTPATIENT
Start: 2024-11-08 | End: 2024-11-09

## 2024-11-08 RX ORDER — OXYCODONE HYDROCHLORIDE 10 MG/1
10 TABLET ORAL EVERY 4 HOURS PRN
Status: DISCONTINUED | OUTPATIENT
Start: 2024-11-08 | End: 2024-11-18 | Stop reason: HOSPADM

## 2024-11-08 RX ORDER — FENTANYL CITRATE 50 UG/ML
INJECTION, SOLUTION INTRAMUSCULAR; INTRAVENOUS
Status: DISCONTINUED | OUTPATIENT
Start: 2024-11-08 | End: 2024-11-08 | Stop reason: HOSPADM

## 2024-11-08 RX ORDER — NICARDIPINE HYDROCHLORIDE 2.5 MG/ML
INJECTION INTRAVENOUS
Status: DISCONTINUED | OUTPATIENT
Start: 2024-11-08 | End: 2024-11-08 | Stop reason: HOSPADM

## 2024-11-08 RX ADMIN — ATORVASTATIN CALCIUM 80 MG: 80 TABLET, FILM COATED ORAL at 21:40

## 2024-11-08 RX ADMIN — AMLODIPINE BESYLATE 5 MG: 5 TABLET ORAL at 18:15

## 2024-11-08 RX ADMIN — AMLODIPINE BESYLATE 2.5 MG: 2.5 TABLET ORAL at 01:12

## 2024-11-08 RX ADMIN — SENNOSIDES AND DOCUSATE SODIUM 2 TABLET: 8.6; 5 TABLET ORAL at 08:06

## 2024-11-08 RX ADMIN — LISINOPRIL AND HYDROCHLOROTHIAZIDE 1 TABLET: 12.5; 2 TABLET ORAL at 18:56

## 2024-11-08 RX ADMIN — HEPARIN SODIUM 1350 UNITS/HR: 10000 INJECTION, SOLUTION INTRAVENOUS at 13:48

## 2024-11-08 RX ADMIN — ASPIRIN 81 MG CHEWABLE TABLET 81 MG: 81 TABLET CHEWABLE at 08:07

## 2024-11-08 RX ADMIN — LISINOPRIL AND HYDROCHLOROTHIAZIDE 1 TABLET: 12.5; 2 TABLET ORAL at 01:12

## 2024-11-08 RX ADMIN — HUMAN ALBUMIN MICROSPHERES AND PERFLUTREN 5 ML: 10; .22 INJECTION, SOLUTION INTRAVENOUS at 10:11

## 2024-11-08 NOTE — PROGRESS NOTES
Interventional Cardiology Progress Note    Assessment & Plan:  Karyn Gamez is a 69 year old female with a PMH of DM2 not on insulin, tobacco use, HTN, HLD, depression admitted on 11/7/2024 with chest pain and elevated troponin c/f NSTEMI.     # NSTEMI  # HTN  # HLD  # Hx stress-induced cardiomyopathy 08/2010  Presented with 3 days of worsening exertional chest pain/pressure and burning, now occurring at rest and with dyspnea and radiation down left arm. Troponin elevation noted in the ED (19 -> 41, peak at 45, now down trending). T wave inversion V4-V6 also noted on 2019 EKG. ACS risk factors include HTN, T2DM, HLD, smoker, family history of premature CAD    - Coronary angiogram today, NPO, pt consented  - Continue heparin gtt  - s/p 325 mg ASA, start 81 mg ASA daily  - BB: Holding off on starting given HR 50's  - ACEi/ARB: continue PTA Lisinopril-hydrochlorothiazide 20-12.5 mg, can increase if BP uncontrolled  - CCB: BP elevated, increase PTA Norvasc to 5 mg daily (from 2.5)  - Statin: continue PTA Lipitor  - Echo pending  - Daily BMP, Mg, replace lytes per protocol   - PRN Nitroglucerin and EKG for any ongoing chest pain     # Small right hemisphere frontal lobe stroke 2011  Previously on atorvastatin 80 mg, baby aspirin, but not currently taking. Has had no residual symptoms since stroke in 2011. Will resume secondary prevention at admission and assist with follow-up at discharge.  - ASA, statin as listed above     # DM2  Home regimen includes metformin and Ozempic, not currently taking. Will hold home regimen and manage with sliding scale insulin.  - A1C 6.0  - Medium sliding scale insulin   - POCT glucose checks  - Hold PTA Metformin given upcoming procedure  - Plan to resume PTA medications at discharge     # Depression  Previously taking Wellbutrin and Zoloft, not currently. Will not resume at admission, defer to outpatient follow-up.    Prophylaxis:  DVT: hep gtt    Diet: NPO in prep for  "procedure  Activity: Up as tolerated  Code Status: None  Disposition: possible discharge today vs tomorrow pending coronary angiogram/recovery    Medically Ready for Discharge: Anticipated Today vs tomorrow pending coronary angiogram/recovery       Patient discussed w/ Dr. Noriega.      Maryann Em, APRN, CNP  [facility] Structural/Interventional Cardiology       Interval History:  - No acute events overnight  - Pt remain on heparin gtt, troponin peak at 45  - Pt reports feeling well this am, denies any chest pain currently      Most recent vital signs:  BP (!) 153/69   Pulse 59   Temp 98  F (36.7  C) (Oral)   Resp 18   Ht 1.7 m (5' 6.93\")   Wt 110.3 kg (243 lb 1.6 oz)   LMP  (LMP Unknown)   SpO2 98%   BMI 38.16 kg/m    Temp:  [97.8  F (36.6  C)-98.3  F (36.8  C)] 98  F (36.7  C)  Pulse:  [52-60] 59  Resp:  [18] 18  BP: (121-180)/(69-98) 153/69  SpO2:  [94 %-98 %] 98 %  Wt Readings from Last 2 Encounters:   11/07/24 110.3 kg (243 lb 1.6 oz)   10/31/23 96.6 kg (213 lb)       Intake/Output Summary (Last 24 hours) at 11/8/2024 0815  Last data filed at 11/8/2024 0616  Gross per 24 hour   Intake 103.8 ml   Output --   Net 103.8 ml       Physical exam:  General: In bed, in NAD  HEENT: EOMI, PERRLA, no scleral icterus or injection  CARDIAC: RRR, no m/r/g appreciated. Peripheral pulses 2+  RESP: CTAB, no wheezes, rhonchi or crackles appreciated.  GI: NABS, NT/ND, no guarding or rebound  EXTREMITIES: NO LE edema, pulses 2+.   SKIN: No acute lesions appreciated  NEURO: Alert and oriented X3    Labs (Past three days):  CBC  Recent Labs   Lab 11/07/24 2059 11/07/24  1654   WBC 11.8* 8.8   RBC 4.92 4.69   HGB 14.3 13.6   HCT 43.4 42.0   MCV 88 90   MCH 29.1 29.0   MCHC 32.9 32.4   RDW 13.1 13.2    373     BMP  Recent Labs   Lab 11/08/24  0136 11/07/24  1654   NA  --  142   POTASSIUM  --  3.9   CHLORIDE  --  106   CO2  --  24   ANIONGAP  --  12   * 119*   BUN  --  14.1   CR  --  0.70   GFRESTIMATED  --  >90   ANGELES  " --  9.3   MAG  --  2.2     Troponins:   Lab Results   Component Value Date    CTROPT 32 (H) 11/08/2024    CTROPT 45 (H) 11/07/2024    CTROPT 41 (H) 11/07/2024    CTROPT 19 (H) 11/07/2024        INRNo lab results found in last 7 days.  Liver panel  Recent Labs   Lab 11/07/24  1654   PROTTOTAL 7.5   ALBUMIN 4.0   BILITOTAL 0.3   ALKPHOS 80   AST 26   ALT 15       Troponin T High Sensitivity   Lab Results   Component Value Date/Time    CTROPT 32 (H) 11/08/2024 04:23 AM        Imaging/procedure results:  EKG 12 Lead 11/7/2024: NSR HR 62        Medical Decision Making   60 MINUTES SPENT BY ME on the date of service doing chart review, history, exam, documentation & further activities per the note.

## 2024-11-08 NOTE — MEDICATION SCRIBE - ADMISSION MEDICATION HISTORY
Medication Scribe Admission Medication History    Admission medication history is complete. The information provided in this note is only as accurate as the sources available at the time of the update.    Information Source(s): Patient via in-person    Pertinent Information: Pt reported she has not taken her home meds for almost a year.    Changes made to PTA medication list:  Added: Bisacodyl 5 mg EC tabs (completed), Polyethylene glycol 236 g suspension (completed), Semaglutide 2 mg/ml pen (NT).   Deleted: None  Changed: None    Allergies reviewed with patient and updates made in EHR: yes    Medication History Completed By: Bernice Hunter 11/8/2024 6:50 AM    PTA Med List   Medication Sig Last Dose/Taking    amLODIPine (NORVASC) 2.5 MG tablet Take 1 tablet (2.5 mg) by mouth daily More than a month    atorvastatin (LIPITOR) 80 MG tablet Take 1 tablet (80 mg) by mouth daily For cholesterol. More than a month    blood glucose monitoring (ONE TOUCH DELICA) lancets Use to test blood sugar 1 times daily Taking    blood glucose monitoring (ONETOUCH VERIO IQ SYSTEM) meter device kit Use to test blood sugar 1 times daily Taking    buPROPion (WELLBUTRIN XL) 150 MG 24 hr tablet Take 1 tablet (150 mg) by mouth every morning More than a month    lisinopril-hydrochlorothiazide (ZESTORETIC) 20-12.5 MG tablet Take 1 tablet by mouth daily More than a month    metFORMIN (GLUCOPHAGE XR) 500 MG 24 hr tablet TAKE 1 TABLET BY MOUTH DAILY WITH DINNER FOR BLOOD SUGARS More than a month    sertraline (ZOLOFT) 100 MG tablet Take 2 tablets (200 mg) by mouth daily More than a month

## 2024-11-08 NOTE — PROGRESS NOTES
Consenting/Education for Cardiology Procedure: Possible percutaneous intervention and Coronary angiogram    Patient understands we would like to perform the listed procedure(s) due to worsening chest pain, elevated troponin.    The patient understands the following:     The procedure was described to the patient in detail.    Moderate sedation is required for this procedure and the risks, benefits and alternatives to moderate sedation were discussed. Patient also understands risks and complications of the procedure which include but are not limited to bruising/swelling around the incision site, infection, bleeding, allergic reaction to local anesthetic, air embolism, arterial puncture, stroke, heart attack, need for emergency surgery, death.    Patient verbalized understanding of risks and benefits and has elected to proceed with the procedure or procedures listed above.    Lexus Em, [facility]  Cardiology

## 2024-11-08 NOTE — H&P
[facility]    Cardiology 1 History and Physical - Cardiology         Date of Admission:  11/7/2024    Assessment & Plan: HVSL    Karyn Gamez is a 69 year old female with a PMH of DM2 not on insulin, tobacco use, HTN, HLD, depression admitted on 11/7/2024 with chest pain and elevated troponin c/f NSTEMI.    # NSTEMI  # Hx stress-induced cardiomyopathy 08/2010  Presents with 3 days of intermittent and progressive exertional chest pain, now with dyspnea and radiation down left arm c/w angina. Has multiple risk factors, including HTN, HLD, active tobacco use, family history with multiple individuals with MI < 55, DM2. HTN, HLD, DM2 currently untreated as patient stopped her medications. Coronary angiogram (05/2010) with mild CAD, normal EF, multiple WMA c/w stress cardiomyopathy that resolved on future studies. Troponin elevation noted in the ED (19 -> 41) c/f NSTEMI. T wave inversion V4-V6 also noted on 2019 EKG. Chest pain has since resolved. Manisha score 105, TIFFANIE score 5. Aspirin loaded in ED, heparin gtt started, will plan for TTE and possible angio tomorrow to further evaluation.  - Heparin gtt  - Trend troponin to peak  - EKG with return of chest pain  - TTE in AM   - NPO @ MN, possible angio tomorrow     # Small right hemisphere frontal lobe stroke 2011  # HLD  Previously on atorvastatin 80 mg, baby aspirin, but not currently taking. Has had no residual symptoms since stroke in 2011. Will resume secondary prevention at admission and assist with follow-up at discharge.  - Resume atorvastatin 80 mg, goal LDL < 70  - Lipid panel at admission  - Hold baby aspirin while on heparin gtt  - DM and HTN management as below     # HTN  Previous regimen included amlodipine 2.5 mg, lisinopril-hydrochlorothiazide 20-12.5 mg. BP on arrival 172/90, given now doses of antihypertensives with improvement to 121/98. Dependent upon TTE results, may consider adjustment of  regimen for GDMT if necessary.  - PTA amlodipine 2.5 mg Qday  - PTA lisinopril-hydrochlorothiazide 20-12.5 mg QDay    # DM2  Home regimen includes metformin and Ozempic, not currently taking. Will hold home regimen and manage with sliding scale insulin.  - A1C at admission  - MDSSI  - Hypoglycemia protocol    # Depression  Previously taking Wellbutrin and Zoloft, not currently. Will not resume at admission, defer to outpatient follow-up.    Diet: Combination Diet 2 gm NA Diet; No Caffeine Diet  NPO per Anesthesia Guidelines for Procedure/Surgery Except for: Meds    DVT Prophylaxis: heparin gtt   Jasmine Catheter: Not present  Cardiac Monitoring: ACTIVE order. Indication: AMI (NSTEMI/ STEMI) (48 hours)  Code Status: Full Code      Disposition Plan   Expected discharge: 2 - 3 days, recommended to prior living arrangement once  NSTEMI work-up is completed .    The patient will be formally staffed in the morning with attending, Dr. Noriega.    Shayy Toussaint DO  PGY2 Internal Medicine Resident  Cardiology 2 Service   ____________________________________________________________________    Chief Complaint   Chest pain    History is obtained from the patient    History of Present Illness   Karyn Gamez is a 69 year old female with a PMH of DM2 not on insulin, tobacco use, HTN, HLD, depression who presented to the ED with chest pain.    Two or three days ago, the patient developed substernal chest pressure with exertion. This would resolve with rest and last ~minute. No nausea, dyspnea, palpitations. It was non-radiating. She did not take anything for the pain.    Today, the patient again developed substernal chest pressure with exertion, but this time it did not resolve with rest and remained. She additionally developed dyspnea and nausea without vomiting, and the pain began to radiate down the entire left arm to her hand. At this time, she called EMS.    En route, the patient's pain resolved. Currently, she denies any  pain. She does continue to smoke cigarettes, has social alcohol use with her . No IV drug use. Stopped taking her medications months ago, unknown reasons. Has been eating and drinking well lately, otherwise has been in her normal state of health.    Past Medical History    Past Medical History:   Diagnosis Date    Cervicalgia 6/29/2005 June 29, 2005: Thrown from a horse - wearing a helmet - and struck side of head and neck, heard crepitation, no loc, Able to walk after injury.  persistant pain in neck relieved with ibuprofen, stiff but can move with ROM.  No changes in hands and feet sensation/motor.    Coronary artery disease     Depressive disorder, not elsewhere classified     Hypertension     Obesity, unspecified        Past Surgical History   Past Surgical History:   Procedure Laterality Date    ANGIOGRAM  2010    negative    COLONOSCOPY N/A 3/24/2023    Procedure: COLONOSCOPY, WITH HOT POLYPECTOMY AND RESEARCH BIOPSY;  Surgeon: Bret Velez MD;  Location: [facility] OR    HYSTERECTOMY, PAP NO LONGER INDICATED      BSO       Prior to Admission Medications   Prior to Admission Medications   Prescriptions Last Dose Informant Patient Reported? Taking?   amLODIPine (NORVASC) 2.5 MG tablet   No No   Sig: Take 1 tablet (2.5 mg) by mouth daily   atorvastatin (LIPITOR) 80 MG tablet   No No   Sig: Take 1 tablet (80 mg) by mouth daily For cholesterol.   bisacodyl (DULCOLAX) 5 MG EC tablet   No No   Sig: Take 2 tablets at 3 pm the day before your procedure. If your procedure is before 11 am, take 2 additional tablets at 11 pm. If your procedure is after 11 am, take 2 additional tablets at 6 am. For additional instructions refer to your colonoscopy prep instructions.   blood glucose monitoring (ONE TOUCH DELICA) lancets   No No   Sig: Use to test blood sugar 1 times daily   blood glucose monitoring (ONETOUCH VERIO IQ SYSTEM) meter device kit   No No   Sig: Use to test blood sugar 1 times daily   buPROPion  (WELLBUTRIN XL) 150 MG 24 hr tablet   No No   Sig: Take 1 tablet (150 mg) by mouth every morning   lisinopril-hydrochlorothiazide (ZESTORETIC) 20-12.5 MG tablet   No No   Sig: Take 1 tablet by mouth daily   metFORMIN (GLUCOPHAGE XR) 500 MG 24 hr tablet   No No   Sig: TAKE 1 TABLET BY MOUTH DAILY WITH DINNER FOR BLOOD SUGARS   polyethylene glycol (GOLYTELY) 236 g suspension   No No   Sig: The night before the exam at 6 pm drink an 8-ounce glass every 15 minutes until the jug is half empty. If you arrive before 11 AM: Drink the other half of the Golytely jug at 11 PM night before procedure. If you arrive after 11 AM: Drink the other half of the Golytely jug at 6 AM day of procedure. For additional instructions refer to your colonoscopy prep instructions.   semaglutide (OZEMPIC, 0.25 OR 0.5 MG/DOSE,) 2 MG/3ML pen   No No   Sig: ADMINISTER 0.25 MG SUBCUTANEOUSLY ONCE EVERY 7 DAYS.   sertraline (ZOLOFT) 100 MG tablet   No No   Sig: Take 2 tablets (200 mg) by mouth daily      Facility-Administered Medications: None        Physical Exam   Vital Signs: Temp: 98.3  F (36.8  C) Temp src: Oral BP: (!) 121/98     Resp: 18 SpO2: 96 % O2 Device: None (Room air)    Weight: 243 lbs 1.6 oz    Constitutional: awake, alert, cooperative, no apparent distress, and appears stated age  Eyes: pupils equal, round and reactive to light.  ENT: Normocephalic, without obvious abnormality, no JVD.  Respiratory: No increased work of breathing, good air exchange, clear to auscultation bilaterally, no crackles or wheezing.  Cardiovascular: regular rate and rhythm, no murmur.  GI: normal bowel sounds, soft, non-distended, non-tender.  Skin: normal skin color, texture, turgor and no redness, warmth, or swelling  Musculoskeletal: There is no redness, warmth, or swelling of the joints.  Full range of motion noted.  Neurologic: Awake, alert, oriented to name, place and time.    Medical Decision Making       Please see A&P for additional details of  medical decision making.      Data     I have personally reviewed the following data over the past 24 hrs:    11.8 (H)  \   14.3   / 427     142 106 14.1 /  119 (H)   3.9 24 0.70 \     ALT: 15 AST: 26 AP: 80 TBILI: 0.3   ALB: 4.0 TOT PROTEIN: 7.5 LIPASE: N/A     Trop: 45 (H) BNP: N/A     TSH: 3.54 T4: N/A A1C: N/A       Imaging results reviewed over the past 24 hrs:   Recent Results (from the past 24 hours)   XR Chest 1 View    Narrative    EXAM: XR CHEST 1 VIEW  LOCATION: [facility]  DATE: 11/7/2024    INDICATION: Chest pain  COMPARISON: 3/23/2016.    FINDINGS: The heart size is normal. The thoracic aorta is tortuous. Calcified granuloma in the right upper lobe. The lungs are otherwise clear. No pneumothorax.      Impression    IMPRESSION: No acute abnormality.

## 2024-11-08 NOTE — PRE-PROCEDURE
GENERAL PRE-PROCEDURE:   Procedure:  Coronary angiogram with possible PCI  Date/Time:  11/8/2024 10:58 AM    Verbal consent obtained?: Yes    Written consent obtained?: Yes    Risks and benefits: Risks, benefits and alternatives were discussed    DC Plan: Appropriate discharge home plan in place for patients who are going home after procedure   Consent given by:  Patient  Patient states understanding of procedure being performed: Yes    Patient's understanding of procedure matches consent: Yes    Procedure consent matches procedure scheduled: Yes    Expected level of sedation:  Moderate  Appropriately NPO:  Yes  Mallampati  :  Grade 2- soft palate, base of uvula, tonsillar pillars, and portion of posterior pharyngeal wall visible  Lungs:  Lungs clear with good breath sounds bilaterally  Heart:  Normal heart sounds and rate  History & Physical reviewed:  History and physical reviewed and no updates needed  Statement of review:  I have reviewed the lab findings, diagnostic data, medications, and the plan for sedation    RK Mane, CNP  [facility] Cardiology

## 2024-11-08 NOTE — CONSULTS
Cardiothoracic Surgery Consult Note    Consult Reason: CABG consult    HPI: Karyn Gamez is a 69 year old female with a PMH of DM2, tobacco use, HTN, HLD, depression who was found to have NSTEMI. Further workup demonstrated severe multivessel CAD. Echocardiogram demonstrates preserved biventricular function. CVTS was consulted for consideration of surgical revascularization. Currently on a heparin drip, chest pain free. Denies dizziness, syncope, near syncope, palpitations, edema, history of hemoptysis, hematemesis, hematochezia, previous chest surgeries, chest radiation or vein stripping. Occasional alcohol and marijuana gummy use, active smoker, ~4-5 cigarettes per day. Is independent, lives with  whom she cares for and has good family support.    Coronary Angiogram:  Conclusion      Ramus lesion is 40% stenosed.    Prox Cx lesion is 95% stenosed.    Mid Cx lesion is 30% stenosed.    Dist LAD-2 lesion is 70% stenosed.    Mid LAD to Dist LAD lesion is 70% stenosed.    3rd Diag lesion is 70% stenosed.    Mid LAD lesion is 80% stenosed.    2nd Diag lesion is 90% stenosed.    Dist LAD-1 lesion is 80% stenosed.    Prox LAD to Mid LAD lesion is 30% stenosed.    Dist RCA lesion is 99% stenosed.    Mid RCA lesion is 40% stenosed.    Prox RCA lesion is 40% stenosed.    RPDA lesion is 80% stenosed.     1.significant three-vessel CAD     Echocardiogram:  Interpretation Summary  Global and regional left ventricular function is normal with an EF of 60-65%.  Asymmetric septal hypertrophy; septal wall is 1.8 cm, posterior wall is 0.8  cm.  Left ventricular cavity size is small.  No ABRAHAM, or LVOT gradient.  Global right ventricular function is normal.  The right ventricle is normal size.  The inferior vena cava was normal in size with preserved respiratory  variability.  No significant valvular abnormalities present.     This study was compared with the study from 2011 .  No significant changes noted.  Consider cardiac  MRI.    PMH:  Past Medical History:   Diagnosis Date    Cervicalgia 2005: Thrown from a horse - wearing a helmet - and struck side of head and neck, heard crepitation, no loc, Able to walk after injury.  persistant pain in neck relieved with ibuprofen, stiff but can move with ROM.  No changes in hands and feet sensation/motor.    Coronary artery disease     Depressive disorder, not elsewhere classified     Hypertension     Obesity, unspecified        PSH:  Past Surgical History:   Procedure Laterality Date    ANGIOGRAM      negative    COLONOSCOPY N/A 3/24/2023    Procedure: COLONOSCOPY, WITH HOT POLYPECTOMY AND RESEARCH BIOPSY;  Surgeon: Bret Velez MD;  Location: [facility] OR    HYSTERECTOMY, PAP NO LONGER INDICATED      BSO       Past Surgical History:   Procedure Laterality Date    ANGIOGRAM      negative    COLONOSCOPY N/A 3/24/2023    Procedure: COLONOSCOPY, WITH HOT POLYPECTOMY AND RESEARCH BIOPSY;  Surgeon: Bret Velez MD;  Location: [facility] OR    HYSTERECTOMY, PAP NO LONGER INDICATED      BSO       FH:  Family History   Problem Relation Age of Onset    C.A.LESLI. Father         first MI at 53, stenting x2    C.A.LESLI. Mother         dx in her mid 50's    C.ABECKY Brother         MI in mid-50's    Cerebrovascular Disease Brother         in late 50's       SH:  Social History     Socioeconomic History    Marital status:    Tobacco Use    Smoking status: Former     Current packs/day: 0.00     Average packs/day: 0.5 packs/day for 30.0 years (15.0 ttl pk-yrs)     Types: Cigarettes     Start date: 1981     Quit date: 2011     Years since quittin.2    Smokeless tobacco: Never    Tobacco comments:     smoking 3-4 cigs per day   Vaping Use    Vaping status: Never Used   Substance and Sexual Activity    Alcohol use: Yes     Comment: rarely    Drug use: No    Sexual activity: Never   Other Topics Concern    Parent/sibling w/ CABG, MI or angioplasty before 65F 55M? Yes      Comment: mother, father and 2 brothers     Social Drivers of Health     Financial Resource Strain: Low Risk  (10/31/2023)    Financial Resource Strain     Within the past 12 months, have you or your family members you live with been unable to get utilities (heat, electricity) when it was really needed?: No   Food Insecurity: Low Risk  (10/31/2023)    Food Insecurity     Within the past 12 months, did you worry that your food would run out before you got money to buy more?: No     Within the past 12 months, did the food you bought just not last and you didn t have money to get more?: No   Transportation Needs: Low Risk  (10/31/2023)    Transportation Needs     Within the past 12 months, has lack of transportation kept you from medical appointments, getting your medicines, non-medical meetings or appointments, work, or from getting things that you need?: No   Interpersonal Safety: Low Risk  (10/31/2023)    Interpersonal Safety     Do you feel physically and emotionally safe where you currently live?: Yes     Within the past 12 months, have you been hit, slapped, kicked or otherwise physically hurt by someone?: No     Within the past 12 months, have you been humiliated or emotionally abused in other ways by your partner or ex-partner?: No   Housing Stability: Low Risk  (10/31/2023)    Housing Stability     Do you have housing? : Yes     Are you worried about losing your housing?: No       Home Meds:  Medications Prior to Admission   Medication Sig Dispense Refill Last Dose/Taking    amLODIPine (NORVASC) 2.5 MG tablet Take 1 tablet (2.5 mg) by mouth daily 90 tablet 1 More than a month    atorvastatin (LIPITOR) 80 MG tablet Take 1 tablet (80 mg) by mouth daily For cholesterol. 90 tablet 1 More than a month    blood glucose monitoring (ONE TOUCH DELICA) lancets Use to test blood sugar 1 times daily 100 each 11 Taking    blood glucose monitoring (ONETOUCH VERIO IQ SYSTEM) meter device kit Use to test blood sugar 1  times daily 1 kit 0 Taking    buPROPion (WELLBUTRIN XL) 150 MG 24 hr tablet Take 1 tablet (150 mg) by mouth every morning 90 tablet 1 More than a month    lisinopril-hydrochlorothiazide (ZESTORETIC) 20-12.5 MG tablet Take 1 tablet by mouth daily 90 tablet 0 More than a month    metFORMIN (GLUCOPHAGE XR) 500 MG 24 hr tablet TAKE 1 TABLET BY MOUTH DAILY WITH DINNER FOR BLOOD SUGARS 90 tablet 1 More than a month    sertraline (ZOLOFT) 100 MG tablet Take 2 tablets (200 mg) by mouth daily 180 tablet 1 More than a month       Allergies:  No Known Allergies    ROS:  ROS: A complete 10 point ROS neg other than the symptoms noted above in the HPI.     Physical Exam:  Temp:  [97.8  F (36.6  C)-98.3  F (36.8  C)] 98.1  F (36.7  C)  Pulse:  [52-60] 54  Resp:  [12-18] 12  BP: (121-180)/(69-98) 156/80  SpO2:  [94 %-98 %] 94 %  Gen: NAD, resting comfortably in bed, conversational  HEENT: normocephalic, atraumatic cranium, EOMI, sclerae anicteric. Oral mucosa pink and moist, midline trachea,   Lungs: CTA in all fields, no wheezing or rhonchi on room air  CV: RRR, S1S2 normal, no murmur. Radial pulses and DP pulses symmetric. No dependent edema.   Abd: no scars, positive normal pitched bowel sounds, overall soft and non distended, nontender, no masses/guarding/rebound tenderness.   Musculoskeletal: grossly intact, strength 5/5 upper and lower extremities  Neuro: AOx3, CN II-VII grossly intact, sensation/motor intact in upper and lower extremities  Mental: normal mood and affect, regular rate of speech    Labs:  ABG No lab results found in last 7 days.  CBC  Recent Labs   Lab 11/08/24  0837 11/07/24  2059 11/07/24  1654   WBC 10.7 11.8* 8.8   HGB 13.3 14.3 13.6    427 373     BMP  Recent Labs   Lab 11/08/24  1159 11/08/24  0837 11/08/24  0811 11/08/24  0136 11/07/24  1654   NA  --  140  --   --  142   POTASSIUM  --  3.7  --   --  3.9   CHLORIDE  --  105  --   --  106   CO2  --  23  --   --  24   BUN  --  14.5  --   --  14.1    CR  --  0.69  --   --  0.70   * 105* 122* 106* 119*     LFT  Recent Labs   Lab 11/07/24  1654   AST 26   ALT 15   ALKPHOS 80   BILITOTAL 0.3   ALBUMIN 4.0     PancreasNo lab results found in last 7 days.      A/P: Karyn Gamez is a 69 year old female with a PMH of DM2, tobacco use, HTN, HLD, depression who was found to have NSTEMI. Further workup demonstrated severe multivessel CAD. Echocardiogram demonstrates preserved biventricular function. CVTS was consulted for consideration of surgical revascularization.     STS Risk Score Isolated CABG:  Procedure Type: Isolated CABG  Perioperative Outcome Estimate %  Operative Mortality 1.32%  Morbidity & Mortality 7.25%  Stroke 1.28%  Renal Failure 0.779%  Reoperation 1.48%  Prolonged Ventilation 4.47%  Deep Sternal Wound Infection 0.81%  Long Hospital Stay (>14 days) 4.21%  Short Hospital Stay (<6 days)* 41.9%    Recommend CABG, patient will think about it  Will proceed with preop working, labs/imaging  Plan TBD above  Thank you for the opportunity to participate in the care of this patient.    Patient and plan discussed with attending, Dr. Gorge Whittaker, APRN, [facility], CCRN  Nurse Practitioner  Cardiothoracic Surgery  Pager: [phone]      4:28 PM  November 8, 2024

## 2024-11-09 ENCOUNTER — APPOINTMENT (OUTPATIENT)
Dept: GENERAL RADIOLOGY | Facility: CLINIC | Age: 69
DRG: 281 | End: 2024-11-09
Attending: NURSE PRACTITIONER
Payer: COMMERCIAL

## 2024-11-09 ENCOUNTER — APPOINTMENT (OUTPATIENT)
Dept: CT IMAGING | Facility: CLINIC | Age: 69
DRG: 281 | End: 2024-11-09
Attending: NURSE PRACTITIONER
Payer: COMMERCIAL

## 2024-11-09 LAB
ALBUMIN UR-MCNC: NEGATIVE MG/DL
ANION GAP SERPL CALCULATED.3IONS-SCNC: 12 MMOL/L (ref 7–15)
APPEARANCE UR: CLEAR
BACTERIA #/AREA URNS HPF: ABNORMAL /HPF
BASOPHILS # BLD AUTO: 0.1 10E3/UL (ref 0–0.2)
BASOPHILS NFR BLD AUTO: 1 %
BILIRUB UR QL STRIP: NEGATIVE
BUN SERPL-MCNC: 15.9 MG/DL (ref 8–23)
CALCIUM SERPL-MCNC: 9.6 MG/DL (ref 8.8–10.4)
CHLORIDE SERPL-SCNC: 102 MMOL/L (ref 98–107)
COLOR UR AUTO: ABNORMAL
CREAT SERPL-MCNC: 0.7 MG/DL (ref 0.51–0.95)
EGFRCR SERPLBLD CKD-EPI 2021: >90 ML/MIN/1.73M2
EOSINOPHIL # BLD AUTO: 0.1 10E3/UL (ref 0–0.7)
EOSINOPHIL NFR BLD AUTO: 1 %
ERYTHROCYTE [DISTWIDTH] IN BLOOD BY AUTOMATED COUNT: 13.2 % (ref 10–15)
GLUCOSE BLDC GLUCOMTR-MCNC: 111 MG/DL (ref 70–99)
GLUCOSE BLDC GLUCOMTR-MCNC: 114 MG/DL (ref 70–99)
GLUCOSE BLDC GLUCOMTR-MCNC: 98 MG/DL (ref 70–99)
GLUCOSE BLDC GLUCOMTR-MCNC: 99 MG/DL (ref 70–99)
GLUCOSE SERPL-MCNC: 101 MG/DL (ref 70–99)
GLUCOSE UR STRIP-MCNC: NEGATIVE MG/DL
HCO3 SERPL-SCNC: 25 MMOL/L (ref 22–29)
HCT VFR BLD AUTO: 38.6 % (ref 35–47)
HGB BLD-MCNC: 13 G/DL (ref 11.7–15.7)
HGB UR QL STRIP: ABNORMAL
HYALINE CASTS: 1 /LPF
IMM GRANULOCYTES # BLD: 0 10E3/UL
IMM GRANULOCYTES NFR BLD: 0 %
KETONES UR STRIP-MCNC: NEGATIVE MG/DL
LEUKOCYTE ESTERASE UR QL STRIP: NEGATIVE
LYMPHOCYTES # BLD AUTO: 3.8 10E3/UL (ref 0.8–5.3)
LYMPHOCYTES NFR BLD AUTO: 42 %
MAGNESIUM SERPL-MCNC: 2.1 MG/DL (ref 1.7–2.3)
MCH RBC QN AUTO: 29.6 PG (ref 26.5–33)
MCHC RBC AUTO-ENTMCNC: 33.7 G/DL (ref 31.5–36.5)
MCV RBC AUTO: 88 FL (ref 78–100)
MONOCYTES # BLD AUTO: 0.8 10E3/UL (ref 0–1.3)
MONOCYTES NFR BLD AUTO: 9 %
MUCOUS THREADS #/AREA URNS LPF: PRESENT /LPF
NEUTROPHILS # BLD AUTO: 4.2 10E3/UL (ref 1.6–8.3)
NEUTROPHILS NFR BLD AUTO: 47 %
NITRATE UR QL: NEGATIVE
NRBC # BLD AUTO: 0 10E3/UL
NRBC BLD AUTO-RTO: 0 /100
PH UR STRIP: 5.5 [PH] (ref 5–7)
PLATELET # BLD AUTO: 326 10E3/UL (ref 150–450)
POTASSIUM SERPL-SCNC: 3.6 MMOL/L (ref 3.4–5.3)
RBC # BLD AUTO: 4.39 10E6/UL (ref 3.8–5.2)
RBC URINE: 1 /HPF
SODIUM SERPL-SCNC: 139 MMOL/L (ref 135–145)
SP GR UR STRIP: 1.01 (ref 1–1.03)
SQUAMOUS EPITHELIAL: 1 /HPF
UFH PPP CHRO-ACNC: 0.36 IU/ML
UFH PPP CHRO-ACNC: 0.36 IU/ML
UROBILINOGEN UR STRIP-MCNC: NORMAL MG/DL
WBC # BLD AUTO: 9 10E3/UL (ref 4–11)
WBC URINE: 3 /HPF

## 2024-11-09 PROCEDURE — 250N000013 HC RX MED GY IP 250 OP 250 PS 637: Performed by: NURSE PRACTITIONER

## 2024-11-09 PROCEDURE — 71250 CT THORAX DX C-: CPT

## 2024-11-09 PROCEDURE — 250N000011 HC RX IP 250 OP 636: Performed by: EMERGENCY MEDICINE

## 2024-11-09 PROCEDURE — 80048 BASIC METABOLIC PNL TOTAL CA: CPT

## 2024-11-09 PROCEDURE — 99233 SBSQ HOSP IP/OBS HIGH 50: CPT | Mod: FS | Performed by: NURSE PRACTITIONER

## 2024-11-09 PROCEDURE — 120N000005 HC R&B MS OVERFLOW UMMC

## 2024-11-09 PROCEDURE — 250N000013 HC RX MED GY IP 250 OP 250 PS 637

## 2024-11-09 PROCEDURE — 71046 X-RAY EXAM CHEST 2 VIEWS: CPT

## 2024-11-09 PROCEDURE — 36415 COLL VENOUS BLD VENIPUNCTURE: CPT

## 2024-11-09 PROCEDURE — 85041 AUTOMATED RBC COUNT: CPT

## 2024-11-09 PROCEDURE — 36415 COLL VENOUS BLD VENIPUNCTURE: CPT | Performed by: STUDENT IN AN ORGANIZED HEALTH CARE EDUCATION/TRAINING PROGRAM

## 2024-11-09 PROCEDURE — 83735 ASSAY OF MAGNESIUM: CPT | Performed by: STUDENT IN AN ORGANIZED HEALTH CARE EDUCATION/TRAINING PROGRAM

## 2024-11-09 PROCEDURE — 85520 HEPARIN ASSAY: CPT | Performed by: STUDENT IN AN ORGANIZED HEALTH CARE EDUCATION/TRAINING PROGRAM

## 2024-11-09 PROCEDURE — 71250 CT THORAX DX C-: CPT | Mod: 26 | Performed by: RADIOLOGY

## 2024-11-09 PROCEDURE — 99418 PROLNG IP/OBS E/M EA 15 MIN: CPT | Mod: FS | Performed by: NURSE PRACTITIONER

## 2024-11-09 PROCEDURE — 81003 URINALYSIS AUTO W/O SCOPE: CPT | Performed by: NURSE PRACTITIONER

## 2024-11-09 PROCEDURE — 85004 AUTOMATED DIFF WBC COUNT: CPT

## 2024-11-09 PROCEDURE — 71046 X-RAY EXAM CHEST 2 VIEWS: CPT | Mod: 26 | Performed by: STUDENT IN AN ORGANIZED HEALTH CARE EDUCATION/TRAINING PROGRAM

## 2024-11-09 RX ORDER — AMLODIPINE BESYLATE 10 MG/1
10 TABLET ORAL EVERY EVENING
Status: DISCONTINUED | OUTPATIENT
Start: 2024-11-09 | End: 2024-11-18 | Stop reason: HOSPADM

## 2024-11-09 RX ORDER — NALOXONE HYDROCHLORIDE 0.4 MG/ML
0.4 INJECTION, SOLUTION INTRAMUSCULAR; INTRAVENOUS; SUBCUTANEOUS
Status: DISCONTINUED | OUTPATIENT
Start: 2024-11-09 | End: 2024-11-18 | Stop reason: HOSPADM

## 2024-11-09 RX ORDER — NALOXONE HYDROCHLORIDE 0.4 MG/ML
0.2 INJECTION, SOLUTION INTRAMUSCULAR; INTRAVENOUS; SUBCUTANEOUS
Status: DISCONTINUED | OUTPATIENT
Start: 2024-11-09 | End: 2024-11-18 | Stop reason: HOSPADM

## 2024-11-09 RX ORDER — CARVEDILOL 3.12 MG/1
3.12 TABLET ORAL 2 TIMES DAILY WITH MEALS
Status: DISCONTINUED | OUTPATIENT
Start: 2024-11-09 | End: 2024-11-16

## 2024-11-09 RX ADMIN — HEPARIN SODIUM 1350 UNITS/HR: 10000 INJECTION, SOLUTION INTRAVENOUS at 16:07

## 2024-11-09 RX ADMIN — SENNOSIDES AND DOCUSATE SODIUM 1 TABLET: 8.6; 5 TABLET ORAL at 20:11

## 2024-11-09 RX ADMIN — CARVEDILOL 3.12 MG: 3.12 TABLET, FILM COATED ORAL at 17:05

## 2024-11-09 RX ADMIN — ASPIRIN 81 MG CHEWABLE TABLET 81 MG: 81 TABLET CHEWABLE at 07:46

## 2024-11-09 RX ADMIN — SENNOSIDES AND DOCUSATE SODIUM 1 TABLET: 8.6; 5 TABLET ORAL at 07:46

## 2024-11-09 RX ADMIN — AMLODIPINE BESYLATE 10 MG: 10 TABLET ORAL at 20:11

## 2024-11-09 RX ADMIN — LISINOPRIL AND HYDROCHLOROTHIAZIDE 1 TABLET: 12.5; 2 TABLET ORAL at 20:11

## 2024-11-09 RX ADMIN — POLYETHYLENE GLYCOL 3350 17 G: 17 POWDER, FOR SOLUTION ORAL at 07:45

## 2024-11-09 RX ADMIN — ATORVASTATIN CALCIUM 80 MG: 80 TABLET, FILM COATED ORAL at 20:11

## 2024-11-09 RX ADMIN — ACETAMINOPHEN 650 MG: 325 TABLET, FILM COATED ORAL at 18:01

## 2024-11-09 RX ADMIN — CARVEDILOL 3.12 MG: 3.12 TABLET, FILM COATED ORAL at 10:32

## 2024-11-09 NOTE — PLAN OF CARE
"BP (!) 144/75 (BP Location: Left arm)   Pulse 58   Temp 97.6  F (36.4  C) (Oral)   Resp 18   Ht 1.7 m (5' 6.93\")   Wt 110.3 kg (243 lb 1.6 oz)   LMP  (LMP Unknown)   SpO2 97%   BMI 38.16 kg/m      Goal Outcome Evaluation:    Plan of care reviewed with: patient   Overall patient progress: no change    Time: 3345-7952  Status: admitted with chest pain and elevated troponin c/f NSTEMI.   Neuro/Pain: A&Ox4, denied pain.   Activity: assist of one person. No out of bed this shift.   Cardiac/vs: bradycardia heart rate 50s, infrequent 40s. hypertensive, didn't meet parameter for team notification.   Respiratory: on room air sating >92%, denied SOB or coughing.   GI/: no bm this shift. Active bowel sound.   Diet: regular.   Skin: no skin deficit.   LDAs: PIV infusing Hep drip at 1350 units/her.   Labs/Imaging: Anti Xa level 0.36 this am.   Change this shift: none   Plan: continue to monitor. Hep drip, anti Xa level in the morning.     "

## 2024-11-09 NOTE — PLAN OF CARE
Problem: Comorbidity Management  Goal: Maintenance of Behavioral Health Symptom Control  Outcome: Progressing  Intervention: Maintain Behavioral Health Symptom Control  Flowsheets  Taken 11/9/2024 1018  Medication Review/Management: medications reviewed  Taken 11/9/2024 0743  Medication Review/Management: medications reviewed  Goal: Blood Glucose Levels Within Targeted Range  Intervention: Monitor and Manage Glycemia  Flowsheets  Taken 11/9/2024 1018  Medication Review/Management: medications reviewed  Taken 11/9/2024 0743  Medication Review/Management: medications reviewed  Goal: Blood Pressure in Desired Range  Intervention: Maintain Blood Pressure Management  Flowsheets  Taken 11/9/2024 1018  Medication Review/Management: medications reviewed  Taken 11/9/2024 0743  Medication Review/Management: medications reviewed     Problem: Acute Coronary Syndrome  Goal: Optimal Adaptation to Illness  Intervention: Support Adjustment to Life-Change Event  Flowsheets (Taken 11/9/2024 1018)  Supportive Measures:   active listening utilized   decision-making supported   positive reinforcement provided   problem-solving facilitated   relaxation techniques promoted   self-care encouraged   self-responsibility promoted   verbalization of feelings encouraged   self-reflection promoted  Family/Support System Care: self-care encouraged  Goal: Effective Cardiac Pump Function  Intervention: Optimize Cardiac Function and Blood Flow  Flowsheets (Taken 11/9/2024 1018)  Chest Pain Intervention:   cardiac monitoring continued   aspirin given  Environmental Support:   calm environment promoted   environmental consistency promoted   rest periods encouraged  Goal Outcome Evaluation:    Shift updates: No reports of chest pain. Carvedilol started today with improved systolic at 140's mmHg. - ectopies. SR with intermittent SB (asymptomatic). Denies shortness of breath, dizziness, abdominal pain, nausea, numbness or tingling sensations of upper and  lower extremities. Consumed share with good appetite and compliant to medications. + mild headache this evening and Tylenol was administered. Stable gait and balance. Heparin drip at 1,350 units/hr (therapeutic). To continue plan of care.

## 2024-11-09 NOTE — PROGRESS NOTES
[facility]   Cardiology   Progress Note     ASSESSMENT/PLAN:  Karyn Gamez is a 69 year old female with a PMH of DM2 not on insulin, tobacco use, HTN, HLD, depression admitted on 11/7/2024 with chest pain and elevated troponin c/f NSTEMI found to have severe multivessel disease being evaluated for CABG    Today's Changes  - Start Coreg 3.125 mg BID  - Increase Norvasc 10 mg daily  - continue heparin gtt     # NSTEMI  # Multivessel CAD  # HTN  # HLD  # Hx stress-induced cardiomyopathy 08/2010  Presented with 3 days of worsening exertional chest pain/pressure and burning, now occurring at rest and with dyspnea and radiation down left arm. Troponin peak at 45, now down trending. T wave inversion V4-V6 also noted on 2019 EKG. ACS risk factors include HTN, T2DM, HLD, smoker, family history of premature CAD. Coronary angiogram revealed multivessel disesase     - Coronary angiogram 11/8: severe multivessel disease, 95% pLCx, 90% D2, 80% dLAD, dRCA 99%, RPDA 80%  - CVTS consulted; pt agreeable to CABG  - CT Chest, Carotid US, Vein Mapping, xray today for surgical planning  - Continue heparin gtt until surgery per interventional cardiology  - Continue 81 mg ASA daily  - BB: HR improved, mid 50's-60's, start low dose Coreg 3.125 mg BID  - ACEi/ARB: continue PTA Lisinopril-hydrochlorothiazide 20-12.5 mg, can increase if BP uncontrolled  - CCB: BP elevated, increase PTA Norvasc to 10 mg daily   - Statin: continue PTA Lipitor  - Daily BMP, Mg, replace lytes per protocol   - PRN Nitroglucerin and EKG for any ongoing chest pain     # Small right hemisphere frontal lobe stroke 2011  Previously on atorvastatin 80 mg, baby aspirin, but not currently taking. Has had no residual symptoms since stroke in 2011. Will resume secondary prevention at admission and assist with follow-up at discharge.  - ASA, statin as listed above     # DM2  Home regimen includes metformin and Ozempic, not currently taking.  Will hold home regimen and manage with sliding scale insulin.  - A1C 6.0  - Medium sliding scale insulin   - POCT glucose checks  - Hold PTA Metformin while IP     # Depression  Previously taking Wellbutrin and Zoloft, not currently. Will not resume at admission, defer to outpatient follow-up.    # Smoker  4-5 cigarettes a day  - Interested/Motivated to quit    FEN: Low Sat Fat  Code status: Full  Prophylaxis:  heparin gtt  Isolation: None  Disposition: >5 days pending CABG timing/recovery    Medically Ready for Discharge: Anticipated in 5+ Days       Patient seen and discussed with Dr. Noriega, who agrees with above plan.    Maryann BECKMAN, CNP  [facility] Cardiology Team    Interval History:  - No acute events overnight  - Pt remains on heparin gtt  - Has ambulated in hallways, denies any chest pain with activity, but does report some SOB with activity    Physical Exam:  Temp:  [97.6  F (36.4  C)-98.2  F (36.8  C)] 97.9  F (36.6  C)  Pulse:  [53-74] 57  Resp:  [12-25] 17  BP: (144-177)/() 155/88  SpO2:  [93 %-97 %] 95 %      Wt:   Wt Readings from Last 5 Encounters:   11/07/24 110.3 kg (243 lb 1.6 oz)   10/31/23 96.6 kg (213 lb)   03/24/23 95.7 kg (211 lb)   01/31/23 102.4 kg (225 lb 12.8 oz)   03/23/22 103.5 kg (228 lb 3.2 oz)       General: NAD  HEENT:  PERRLA, EOMI.   Neck: no JVD  CV: RRR. No murmur appreciated. No rubs or gallops. Peripheral radial pulse intact.  Resp: No increased work of breathing or use of accessory muscles, breathing comfortably on room air.  Lung sounds clear throughout/bilaterally  Abdomen:  Normal active bowel sounds.  Abdomen is soft. No distension, non-tender to palpation.    Extremities: Warm. Capillary refill less than 3 sec. 2/4 radial pulses bilaterally.  2/4 pedal pulses bilaterally. No pre-tibial edema. No cyanosis or clubbing. RRA CDI, soft, non-tender to touch, minimal bruising, no swelling, no signs of hematoma  Skin:  Warm and dry. No erythema, rashes, ulceration or  diaphoresis.  Neuro: Alert and oriented x3.      Medications:  Current Facility-Administered Medications   Medication Dose Route Frequency Provider Last Rate Last Admin    amLODIPine (NORVASC) tablet 10 mg  10 mg Oral QPM Lexus Em CNP        aspirin EC tablet 81 mg  81 mg Oral Daily Lexus Em CNP   81 mg at 11/09/24 0746    atorvastatin (LIPITOR) tablet 80 mg  80 mg Oral At Bedtime Shayy Toussaint DO   80 mg at 11/08/24 2140    insulin aspart (NovoLOG) injection (RAPID ACTING)  1-7 Units Subcutaneous TID AC Shayy Toussaint DO        insulin aspart (NovoLOG) injection (RAPID ACTING)  1-5 Units Subcutaneous At Bedtime Shayy Toussaint DO        lisinopril-hydrochlorothiazide (ZESTORETIC) 20-12.5 MG per tablet 1 tablet  1 tablet Oral QPM Shayy Toussaint DO   1 tablet at 11/08/24 1856    polyethylene glycol (MIRALAX) Packet 17 g  17 g Oral Daily Shayy Toussaint DO   17 g at 11/09/24 0745    senna-docusate (SENOKOT-S/PERICOLACE) 8.6-50 MG per tablet 1 tablet  1 tablet Oral BID Shayy Toussaint DO   1 tablet at 11/09/24 0746    Or    senna-docusate (SENOKOT-S/PERICOLACE) 8.6-50 MG per tablet 2 tablet  2 tablet Oral BID Shayy Toussaint DO   2 tablet at 11/08/24 0806    sodium chloride (PF) 0.9% PF flush 3 mL  3 mL Intracatheter Q8H Shayy Toussaint DO   3 mL at 11/08/24 2141     Current Facility-Administered Medications   Medication Dose Route Frequency Provider Last Rate Last Admin    heparin 25,000 units in 0.45% NaCl 250 mL ANTICOAGULANT infusion  0-5,000 Units/hr Intravenous Continuous Bismark Aguiar MD 13.5 mL/hr at 11/09/24 0716 1,350 Units/hr at 11/09/24 0716    medication instruction   Does not apply Continuous PRN Shayy Toussaint DO           Labs:   [facility]  Recent Labs   Lab 11/09/24  0719 11/09/24  0412 11/08/24  2147 11/08/24  1820 11/08/24  1159 11/08/24  0837 11/08/24  0136 11/07/24  1654   NA  --  139  --   --   --  140  --  142   POTASSIUM  --  3.6  --   --    --  3.7  --  3.9   CHLORIDE  --  102  --   --   --  105  --  106   CO2  --  25  --   --   --  23  --  24   ANIONGAP  --  12  --   --   --  12  --  12   * 101* 105* 100*   < > 105*   < > 119*   BUN  --  15.9  --   --   --  14.5  --  14.1   CR  --  0.70  --   --   --  0.69  --  0.70   GFRESTIMATED  --  >90  --   --   --  >90  --  >90   ANGELES  --  9.6  --   --   --  9.5  --  9.3   MAG  --  2.1  --   --   --   --   --  2.2   PROTTOTAL  --   --   --   --   --   --   --  7.5   ALBUMIN  --   --   --   --   --   --   --  4.0   BILITOTAL  --   --   --   --   --   --   --  0.3   ALKPHOS  --   --   --   --   --   --   --  80   AST  --   --   --   --   --   --   --  26   ALT  --   --   --   --   --   --   --  15    < > = values in this interval not displayed.     CBC  Recent Labs   Lab 11/09/24  0412 11/08/24  0837 11/07/24 2059 11/07/24  1654   WBC 9.0 10.7 11.8* 8.8   RBC 4.39 4.58 4.92 4.69   HGB 13.0 13.3 14.3 13.6   HCT 38.6 41.0 43.4 42.0   MCV 88 90 88 90   MCH 29.6 29.0 29.1 29.0   MCHC 33.7 32.4 32.9 32.4   RDW 13.2 13.2 13.1 13.2    348 427 373     INRNo lab results found in last 7 days.  Arterial Blood GasNo lab results found in last 7 days.  Troponin T High Sensitivity   Lab Results   Component Value Date/Time    CTROPT 32 (H) 11/08/2024 04:23 AM       Diagnostics:  ECG 11/7/2024: NSR, HR 62, ST depression V5-6, TWI I      Echo 11/8/2024:  Interpretation Summary  Global and regional left ventricular function is normal with an EF of 60-65%.  Asymmetric septal hypertrophy; septal wall is 1.8 cm, posterior wall is 0.8  cm.  Left ventricular cavity size is small.  No ABRAHAM, or LVOT gradient.  Global right ventricular function is normal.  The right ventricle is normal size.  The inferior vena cava was normal in size with preserved respiratory  variability.  No significant valvular abnormalities present.     This study was compared with the study from 2011 .  No significant changes noted.    Coronary angiogram  11/8/2024:  Conclusion         Ramus lesion is 40% stenosed.    Prox Cx lesion is 95% stenosed.    Mid Cx lesion is 30% stenosed.    Dist LAD-2 lesion is 70% stenosed.    Mid LAD to Dist LAD lesion is 70% stenosed.    3rd Diag lesion is 70% stenosed.    Mid LAD lesion is 80% stenosed.    2nd Diag lesion is 90% stenosed.    Dist LAD-1 lesion is 80% stenosed.    Prox LAD to Mid LAD lesion is 30% stenosed.    Dist RCA lesion is 99% stenosed.    Mid RCA lesion is 40% stenosed.    Prox RCA lesion is 40% stenosed.    RPDA lesion is 80% stenosed.     1.significant three-vessel CAD         Plan   Follow bedrest per protocol   Return to the primary inpatient team for further evaluation and managmenet        1.continue therapeutic anticoagulation with heparin drip or Lovenox for ACS  2.continue aspirin  3.consult cardiothoracic surgery service for CABG     Recommendations  General Recommendations:  - Patient given specific instructions regarding care of arteriotomy site, activity restrictions, signs and symptoms of cardiac or vascular complications and to seek immediate medical evaluation should they occur.   - Arterial sheath removed from radial artery with TR Band placement.     Medications:  - Resume anticoagulant medication in 2 hours.     Surgical Therapy:   - Will obtain cardiovascular surgery consultation regarding coronary artery bypass grafting.     Coronary Findings  Diagnostic  Dominance: Right  Left Anterior Descending   Prox LAD to Mid LAD lesion is 30% stenosed.   Mid LAD lesion is 80% stenosed.   Mid LAD to Dist LAD lesion is 70% stenosed.   Dist LAD-1 lesion is 80% stenosed.   Dist LAD-2 lesion is 70% stenosed.      Second Diagonal Branch   2nd Diag lesion is 90% stenosed.      Third Diagonal Branch   3rd Diag lesion is 70% stenosed.      Ramus Intermedius   Ramus lesion is 40% stenosed.      Left Circumflex   Prox Cx lesion is 95% stenosed.   Mid Cx lesion is 30% stenosed.      Right Coronary Artery   Prox  RCA lesion is 40% stenosed.   Mid RCA lesion is 40% stenosed.   Dist RCA lesion is 99% stenosed.      Right Posterior Descending Artery   RPDA lesion is 80% stenosed.         Intervention   No interventions have been documented.         Medical Decision Making   60 MINUTES SPENT BY ME on the date of service doing chart review, history, exam, documentation & further activities per the note.

## 2024-11-09 NOTE — DISCHARGE INSTRUCTIONS
Coronary Findings    Diagnostic  Dominance: Right  Left Anterior Descending   Prox LAD to Mid LAD lesion is 30% stenosed.   Mid LAD lesion is 80% stenosed.   Mid LAD to Dist LAD lesion is 70% stenosed.   Dist LAD-1 lesion is 80% stenosed.   Dist LAD-2 lesion is 70% stenosed.      Second Diagonal Branch   2nd Diag lesion is 90% stenosed.      Third Diagonal Branch   3rd Diag lesion is 70% stenosed.      Ramus Intermedius   Ramus lesion is 40% stenosed.      Left Circumflex   Prox Cx lesion is 95% stenosed.   Mid Cx lesion is 30% stenosed.      Right Coronary Artery   Prox RCA lesion is 40% stenosed.   Mid RCA lesion is 40% stenosed.   Dist RCA lesion is 99% stenosed.      Right Posterior Descending Artery   RPDA lesion is 80% stenosed.         Intervention     No interventions have been documented.

## 2024-11-09 NOTE — PROVIDER NOTIFICATION
D:R wrist CDI/ soft /flat. +RP and CMS intact. No s/s of hematoma /hemmorhage /ecchymosis. Will continue to monitor.

## 2024-11-10 ENCOUNTER — APPOINTMENT (OUTPATIENT)
Dept: ULTRASOUND IMAGING | Facility: CLINIC | Age: 69
DRG: 281 | End: 2024-11-10
Attending: NURSE PRACTITIONER
Payer: COMMERCIAL

## 2024-11-10 LAB
ANION GAP SERPL CALCULATED.3IONS-SCNC: 13 MMOL/L (ref 7–15)
BASOPHILS # BLD AUTO: 0.1 10E3/UL (ref 0–0.2)
BASOPHILS NFR BLD AUTO: 1 %
BUN SERPL-MCNC: 25.8 MG/DL (ref 8–23)
CALCIUM SERPL-MCNC: 9.8 MG/DL (ref 8.8–10.4)
CHLORIDE SERPL-SCNC: 100 MMOL/L (ref 98–107)
CREAT SERPL-MCNC: 0.78 MG/DL (ref 0.51–0.95)
EGFRCR SERPLBLD CKD-EPI 2021: 82 ML/MIN/1.73M2
EOSINOPHIL # BLD AUTO: 0.1 10E3/UL (ref 0–0.7)
EOSINOPHIL NFR BLD AUTO: 1 %
ERYTHROCYTE [DISTWIDTH] IN BLOOD BY AUTOMATED COUNT: 13.1 % (ref 10–15)
GLUCOSE BLDC GLUCOMTR-MCNC: 102 MG/DL (ref 70–99)
GLUCOSE BLDC GLUCOMTR-MCNC: 109 MG/DL (ref 70–99)
GLUCOSE BLDC GLUCOMTR-MCNC: 109 MG/DL (ref 70–99)
GLUCOSE BLDC GLUCOMTR-MCNC: 115 MG/DL (ref 70–99)
GLUCOSE BLDC GLUCOMTR-MCNC: 123 MG/DL (ref 70–99)
GLUCOSE BLDC GLUCOMTR-MCNC: 129 MG/DL (ref 70–99)
GLUCOSE SERPL-MCNC: 110 MG/DL (ref 70–99)
HCO3 SERPL-SCNC: 25 MMOL/L (ref 22–29)
HCT VFR BLD AUTO: 39.4 % (ref 35–47)
HGB BLD-MCNC: 13.1 G/DL (ref 11.7–15.7)
IMM GRANULOCYTES # BLD: 0 10E3/UL
IMM GRANULOCYTES NFR BLD: 0 %
LYMPHOCYTES # BLD AUTO: 3.5 10E3/UL (ref 0.8–5.3)
LYMPHOCYTES NFR BLD AUTO: 41 %
MAGNESIUM SERPL-MCNC: 2 MG/DL (ref 1.7–2.3)
MCH RBC QN AUTO: 29.3 PG (ref 26.5–33)
MCHC RBC AUTO-ENTMCNC: 33.2 G/DL (ref 31.5–36.5)
MCV RBC AUTO: 88 FL (ref 78–100)
MONOCYTES # BLD AUTO: 0.8 10E3/UL (ref 0–1.3)
MONOCYTES NFR BLD AUTO: 9 %
NEUTROPHILS # BLD AUTO: 4.2 10E3/UL (ref 1.6–8.3)
NEUTROPHILS NFR BLD AUTO: 49 %
NRBC # BLD AUTO: 0 10E3/UL
NRBC BLD AUTO-RTO: 0 /100
PLATELET # BLD AUTO: 345 10E3/UL (ref 150–450)
POTASSIUM SERPL-SCNC: 3.8 MMOL/L (ref 3.4–5.3)
RBC # BLD AUTO: 4.47 10E6/UL (ref 3.8–5.2)
SODIUM SERPL-SCNC: 138 MMOL/L (ref 135–145)
UFH PPP CHRO-ACNC: 0.42 IU/ML
WBC # BLD AUTO: 8.6 10E3/UL (ref 4–11)

## 2024-11-10 PROCEDURE — 99233 SBSQ HOSP IP/OBS HIGH 50: CPT | Mod: 25 | Performed by: NURSE PRACTITIONER

## 2024-11-10 PROCEDURE — 85004 AUTOMATED DIFF WBC COUNT: CPT

## 2024-11-10 PROCEDURE — 999N000248 HC STATISTIC IV INSERT WITH US BY RN

## 2024-11-10 PROCEDURE — 93970 EXTREMITY STUDY: CPT

## 2024-11-10 PROCEDURE — 93970 EXTREMITY STUDY: CPT | Mod: 26 | Performed by: STUDENT IN AN ORGANIZED HEALTH CARE EDUCATION/TRAINING PROGRAM

## 2024-11-10 PROCEDURE — 80048 BASIC METABOLIC PNL TOTAL CA: CPT

## 2024-11-10 PROCEDURE — 250N000013 HC RX MED GY IP 250 OP 250 PS 637

## 2024-11-10 PROCEDURE — 250N000013 HC RX MED GY IP 250 OP 250 PS 637: Performed by: NURSE PRACTITIONER

## 2024-11-10 PROCEDURE — 82565 ASSAY OF CREATININE: CPT

## 2024-11-10 PROCEDURE — 36415 COLL VENOUS BLD VENIPUNCTURE: CPT

## 2024-11-10 PROCEDURE — 99418 PROLNG IP/OBS E/M EA 15 MIN: CPT | Mod: 25 | Performed by: NURSE PRACTITIONER

## 2024-11-10 PROCEDURE — 85520 HEPARIN ASSAY: CPT

## 2024-11-10 PROCEDURE — 85041 AUTOMATED RBC COUNT: CPT

## 2024-11-10 PROCEDURE — 93880 EXTRACRANIAL BILAT STUDY: CPT

## 2024-11-10 PROCEDURE — 93880 EXTRACRANIAL BILAT STUDY: CPT | Mod: 26 | Performed by: STUDENT IN AN ORGANIZED HEALTH CARE EDUCATION/TRAINING PROGRAM

## 2024-11-10 PROCEDURE — 250N000011 HC RX IP 250 OP 636: Performed by: EMERGENCY MEDICINE

## 2024-11-10 PROCEDURE — 83735 ASSAY OF MAGNESIUM: CPT | Performed by: NURSE PRACTITIONER

## 2024-11-10 PROCEDURE — 120N000005 HC R&B MS OVERFLOW UMMC

## 2024-11-10 RX ORDER — LISINOPRIL AND HYDROCHLOROTHIAZIDE 12.5; 2 MG/1; MG/1
2 TABLET ORAL EVERY EVENING
Status: DISCONTINUED | OUTPATIENT
Start: 2024-11-10 | End: 2024-11-18 | Stop reason: HOSPADM

## 2024-11-10 RX ORDER — HYDROXYZINE HYDROCHLORIDE 25 MG/1
50 TABLET, FILM COATED ORAL EVERY 6 HOURS PRN
Status: DISCONTINUED | OUTPATIENT
Start: 2024-11-10 | End: 2024-11-18 | Stop reason: HOSPADM

## 2024-11-10 RX ORDER — HYDROXYZINE HYDROCHLORIDE 25 MG/1
25 TABLET, FILM COATED ORAL EVERY 6 HOURS PRN
Status: DISCONTINUED | OUTPATIENT
Start: 2024-11-10 | End: 2024-11-18 | Stop reason: HOSPADM

## 2024-11-10 RX ADMIN — ATORVASTATIN CALCIUM 80 MG: 80 TABLET, FILM COATED ORAL at 22:19

## 2024-11-10 RX ADMIN — HEPARIN SODIUM 1350 UNITS/HR: 10000 INJECTION, SOLUTION INTRAVENOUS at 11:42

## 2024-11-10 RX ADMIN — SENNOSIDES AND DOCUSATE SODIUM 2 TABLET: 8.6; 5 TABLET ORAL at 20:15

## 2024-11-10 RX ADMIN — AMLODIPINE BESYLATE 10 MG: 10 TABLET ORAL at 20:16

## 2024-11-10 RX ADMIN — LISINOPRIL AND HYDROCHLOROTHIAZIDE 2 TABLET: 12.5; 2 TABLET ORAL at 20:19

## 2024-11-10 RX ADMIN — Medication 5 MG: at 23:40

## 2024-11-10 RX ADMIN — ACETAMINOPHEN 650 MG: 325 TABLET, FILM COATED ORAL at 03:46

## 2024-11-10 RX ADMIN — ASPIRIN 81 MG CHEWABLE TABLET 81 MG: 81 TABLET CHEWABLE at 09:11

## 2024-11-10 RX ADMIN — HYDROXYZINE HYDROCHLORIDE 25 MG: 25 TABLET, FILM COATED ORAL at 20:15

## 2024-11-10 RX ADMIN — POLYETHYLENE GLYCOL 3350 17 G: 17 POWDER, FOR SOLUTION ORAL at 09:12

## 2024-11-10 RX ADMIN — SENNOSIDES AND DOCUSATE SODIUM 1 TABLET: 8.6; 5 TABLET ORAL at 09:11

## 2024-11-10 NOTE — PLAN OF CARE
"BP (!) 148/88 (BP Location: Right arm)   Pulse 52   Temp 98.2  F (36.8  C) (Oral)   Resp 18   Ht 1.7 m (5' 6.93\")   Wt 110.3 kg (243 lb 1.6 oz)   LMP  (LMP Unknown)   SpO2 96%   BMI 38.16 kg/m      Goal Outcome Evaluation:    Plan of care reviewed with: patient   Overall patient progress: no change    Time: 8894-4715  Status: admitted with chest pain and elevated troponin c/f NSTEMI.   Neuro/Pain: A&Ox4, denied pain.   Activity: assist of one person. No out of bed this shift.   Cardiac/vs: bradycardia heart rate 50s, infrequent 40s. hypertensive, didn't meet parameter for team notification.   Respiratory: on room air sating >92%, denied SOB or coughing.   GI/: no bm this shift. Active bowel sound.   Diet: regular.   Skin: no skin deficit.   LDAs: PIV infusing Hep drip at 1350 units/her.   Change this shift: none   Plan: continue to monitor. Hep drip, anti Xa level in the morning.     "

## 2024-11-10 NOTE — PLAN OF CARE
"I: Monitored vitals and assessed pt status.     Running: Heparin at 1350 units/hr  PRN Med: Tylenol 650mg PO x1     Vitals: BP (!) 171/94 (BP Location: Left arm)   Pulse (!) 48   Temp 97.8  F (36.6  C) (Oral)   Resp 18   Ht 1.7 m (5' 6.93\")   Wt 108.8 kg (239 lb 12.8 oz)   LMP  (LMP Unknown)   SpO2 99%   BMI 37.64 kg/m         A:   Neuro: AOx4; Denies, dizziness, lightheadedness, numbness or tingling. Reports intermittent headache that is localized to the left side of head; pain was rated as 5/10 and described as tightness. PRN Tylenol provided relief. Endorsed some anxiety during the night, improved with music, calm environment, and support. Able to make needs known.  Cardiac: SR/SB; HR 46-60's. Hypertensive; did not meet parameter for provider notification. Afebrile. Denies chest pain. Reported one occurrence of palpitations/pressure in chest with SOB while lying down, improved with sitting. Provider notified and aware. No reoccurrence of symptoms reported by patient.  Respiratory: Sating >95% on RA. Reports dyspnea on exertion, denies SOB at rest. LS clear bilaterally.   Diet/appetite: Low saturated-fat diet.   Endocrine: BS check Q4hr. Pt on sliding scale insulin   GI/:  No BM reported this shift. Denies abdominal pain. Voids independently, not saving urine.   Activity:  Moves independently  Skin: Blanchable redness/swelling on left upper arm from infiltrated PIV. Denies pain at site. Right PIV, infusing heparin. Otherwise, no new deficits noted.       Outcome Evaluation:      Plan of Care Reviewed With: patient    Overall Patient Progress: no changeOverall Patient Progress: no change           "

## 2024-11-10 NOTE — PLAN OF CARE
Goal Outcome Evaluation:      Plan of Care Reviewed With: patient    Overall Patient Progress: no changeOverall Patient Progress: no change    Outcome Evaluation: Patient in therapeutic range for heparin, recheck tomorrow AM. Patient very anxious and worried about potential surgery, emotional support provided throughout shift.    STATUS: NSTEMI, CABG workup  NEURO: A/Ox4, sometimes has word difficulty finding (baseline)  VS: stable  ACTIVITY: independent   PAIN: no reports of pain  CARDIAC: on tele, sinus rhythm  RESP: room air  GI/: low sat fat diet, voids independently, ACHS blood sugars  SKIN: no new concerns  LDA:PIV R forearm, heparin infusing at 1350u/hr   LABS: Mag and K protocols, recheck tomorrow    Changes this shift:  No new changes today. Patient is struggling emotionally with this hospitalization. Helped provide support and promoted self care for the patient. CABG ultrasounds done this morning.    Plan:  Await potential CABG

## 2024-11-10 NOTE — PROGRESS NOTES
[facility]   Cardiology   Progress Note     ASSESSMENT/PLAN:  Karyn Gamez is a 69 year old female with a PMH of DM2 not on insulin, tobacco use, HTN, HLD, depression admitted on 11/7/2024 with chest pain and elevated troponin c/f NSTEMI found to have severe multivessel disease being evaluated for CABG     Today's Changes  - Increase Lisinopril-hydrochlorothiazide from 1-->2 tabs  - Carotid US, Vein Mapping US in prep for surgery     # NSTEMI  # Multivessel CAD  # HTN  # HLD  # Hx stress-induced cardiomyopathy 08/2010  Presented with 3 days of worsening exertional chest pain/pressure and burning, now occurring at rest and with dyspnea and radiation down left arm. Troponin peak at 45, now down trending. T wave inversion V4-V6 also noted on 2019 EKG. ACS risk factors include HTN, T2DM, HLD, smoker, family history of premature CAD. Coronary angiogram revealed multivessel disesase     - Coronary angiogram 11/8: severe multivessel disease, 95% pLCx, 90% D2, 80% dLAD, dRCA 99%, RPDA 80%  - CVTS consulted; CABG date TBD  - Carotid US, Vein Mapping for surgical planning  - Continue heparin gtt until surgery per interventional cardiology  - Continue 81 mg ASA daily  - BB: Coreg 3.125 mg BID; titration limited by low HR (50's)  - ACEi/ARB: Increase PTA Lisinopril-hydrochlorothiazide to 40-25 mg  - CCB:Norvasc 10 mg daily   - Statin: continue PTA Lipitor  - Daily BMP, Mg, replace lytes per protocol   - PRN Nitroglycerin and EKG for any chest pain  - Low threshold to use Nitroglycerin gtt if needed     # Small right hemisphere frontal lobe stroke 2011  Previously on atorvastatin 80 mg, baby aspirin, but not currently taking. Has had no residual symptoms since stroke in 2011. Will resume secondary prevention at admission and assist with follow-up at discharge.  - ASA, statin as listed above     # DM2  Home regimen includes metformin and Ozempic, not currently taking. Will hold home regimen and  "manage with sliding scale insulin.  - A1C 6.0  - Medium sliding scale insulin   - POCT glucose checks  - Hold PTA Metformin while IP     # Depression  Previously taking Wellbutrin and Zoloft, not currently. Will not resume at admission, defer to outpatient follow-up.     # Smoker  4-5 cigarettes a day  - Interested/Motivated to quit    # Possible TL  Per RN and patient, ongoing daytime tiredness after getting good sleep and snoring at night  - OP Sleep Study referral at time of discharge to assess TL     FEN: Low Sat Fat  Code status: Full  Prophylaxis:  heparin gtt  Isolation: None  Disposition: >5 days pending CABG timing/recovery    Medically Ready for Discharge: Anticipated in 5+ Days       Patient seen and discussed with Dr. Noriega, who agrees with above plan.    Maryann BECKMAN, CNP  [facility] Cardiology Team    Interval History:  - No acute events overnight  - Pt reports feeling well this am; she reports last night started to feel sudden left sided chest pressure that went into throat, then feels like she had a \"panic attack,\" both improved on their own, but pt states was miserable  - She states she did not get any prn medications during pain or anxiety episode  - Pain has resolved this am, has ambulated in room without any chest pain, SOB    Physical Exam:  Temp:  [97.7  F (36.5  C)-98.4  F (36.9  C)] 97.7  F (36.5  C)  Pulse:  [46-95] 52  Resp:  [11-28] 15  BP: (120-179)/(73-96) 120/73  SpO2:  [95 %-99 %] 95 %      Wt:   Wt Readings from Last 5 Encounters:   11/10/24 108.8 kg (239 lb 12.8 oz)   10/31/23 96.6 kg (213 lb)   03/24/23 95.7 kg (211 lb)   01/31/23 102.4 kg (225 lb 12.8 oz)   03/23/22 103.5 kg (228 lb 3.2 oz)       General: NAD  HEENT:  PERRLA, EOMI.   Neck: no JVD  CV: RRR. No murmur appreciated. No rubs or gallops. Peripheral radial pulse intact.  Resp: No increased work of breathing or use of accessory muscles, breathing comfortably on room air.  Lung sounds clear throughout/bilaterally  Abdomen:  " Normal active bowel sounds.  Abdomen is soft. No distension, non-tender to palpation.    Extremities: Warm. Capillary refill less than 3 sec. 2/4 radial pulses bilaterally.  2/4 pedal pulses bilaterally. No pre-tibial edema. No cyanosis or clubbing. RRA access site CDI, soft, non-tender to touch, no bruising, no swelling, no signs of hematoma  Skin:  Warm and dry. No erythema, rashes, ulceration or diaphoresis.  Neuro: Alert and oriented x3.      Medications:  Current Facility-Administered Medications   Medication Dose Route Frequency Provider Last Rate Last Admin    amLODIPine (NORVASC) tablet 10 mg  10 mg Oral QPM Lexus Em CNP   10 mg at 11/09/24 2011    aspirin EC tablet 81 mg  81 mg Oral Daily Lexus Em CNP   81 mg at 11/09/24 0746    atorvastatin (LIPITOR) tablet 80 mg  80 mg Oral At Bedtime Shayy Toussaint DO   80 mg at 11/09/24 2011    carvedilol (COREG) tablet 3.125 mg  3.125 mg Oral BID w/meals Lexus Em CNP   3.125 mg at 11/09/24 1705    insulin aspart (NovoLOG) injection (RAPID ACTING)  1-7 Units Subcutaneous TID AC Shayy Toussaint DO        insulin aspart (NovoLOG) injection (RAPID ACTING)  1-5 Units Subcutaneous At Bedtime Shayy Toussaint DO        lisinopril-hydrochlorothiazide (ZESTORETIC) 20-12.5 MG per tablet 2 tablet  2 tablet Oral QPM Lexus Em CNP        polyethylene glycol (MIRALAX) Packet 17 g  17 g Oral Daily Shayy Toussaint DO   17 g at 11/09/24 0745    senna-docusate (SENOKOT-S/PERICOLACE) 8.6-50 MG per tablet 1 tablet  1 tablet Oral BID Shayy Toussaint DO   1 tablet at 11/09/24 2011    Or    senna-docusate (SENOKOT-S/PERICOLACE) 8.6-50 MG per tablet 2 tablet  2 tablet Oral BID Shayy Toussaint DO   2 tablet at 11/08/24 0806    sodium chloride (PF) 0.9% PF flush 3 mL  3 mL Intracatheter Q8H Shayy Toussaint DO   3 mL at 11/08/24 4404     Current Facility-Administered Medications   Medication Dose Route Frequency Provider Last  Rate Last Admin    heparin 25,000 units in 0.45% NaCl 250 mL ANTICOAGULANT infusion  0-5,000 Units/hr Intravenous Continuous Bismark Aguiar MD 13.5 mL/hr at 11/10/24 0330 1,350 Units/hr at 11/10/24 0330    medication instruction   Does not apply Continuous PRN Shayy Toussaint, DO           Labs:   CMP  Recent Labs   Lab 11/10/24  0434 11/10/24  0108 11/09/24  2302 11/09/24  1704 11/09/24  0719 11/09/24  0412 11/08/24  1159 11/08/24  0837 11/08/24  0136 11/07/24  1654     --   --   --   --  139  --  140  --  142   POTASSIUM 3.8  --   --   --   --  3.6  --  3.7  --  3.9   CHLORIDE 100  --   --   --   --  102  --  105  --  106   CO2 25  --   --   --   --  25  --  23  --  24   ANIONGAP 13  --   --   --   --  12  --  12  --  12   * 102* 98 99   < > 101*   < > 105*   < > 119*   BUN 25.8*  --   --   --   --  15.9  --  14.5  --  14.1   CR 0.78  --   --   --   --  0.70  --  0.69  --  0.70   GFRESTIMATED 82  --   --   --   --  >90  --  >90  --  >90   ANGELES 9.8  --   --   --   --  9.6  --  9.5  --  9.3   MAG 2.0  --   --   --   --  2.1  --   --   --  2.2   PROTTOTAL  --   --   --   --   --   --   --   --   --  7.5   ALBUMIN  --   --   --   --   --   --   --   --   --  4.0   BILITOTAL  --   --   --   --   --   --   --   --   --  0.3   ALKPHOS  --   --   --   --   --   --   --   --   --  80   AST  --   --   --   --   --   --   --   --   --  26   ALT  --   --   --   --   --   --   --   --   --  15    < > = values in this interval not displayed.     CBC  Recent Labs   Lab 11/10/24  0434 11/09/24  0412 11/08/24  0837 11/07/24  2059   WBC 8.6 9.0 10.7 11.8*   RBC 4.47 4.39 4.58 4.92   HGB 13.1 13.0 13.3 14.3   HCT 39.4 38.6 41.0 43.4   MCV 88 88 90 88   MCH 29.3 29.6 29.0 29.1   MCHC 33.2 33.7 32.4 32.9   RDW 13.1 13.2 13.2 13.1    326 348 427     INRNo lab results found in last 7 days.  Arterial Blood GasNo lab results found in last 7 days.  Troponin T High Sensitivity   Lab Results   Component Value  Date/Time    CTROPT 32 (H) 11/08/2024 04:23 AM       Diagnostics:  ECG 11/7/2024: NSR, HR 62, ST depression V5-6, TWI I       Echo 11/8/2024:  Interpretation Summary  Global and regional left ventricular function is normal with an EF of 60-65%.  Asymmetric septal hypertrophy; septal wall is 1.8 cm, posterior wall is 0.8  cm.  Left ventricular cavity size is small.  No ABRAHAM, or LVOT gradient.  Global right ventricular function is normal.  The right ventricle is normal size.  The inferior vena cava was normal in size with preserved respiratory  variability.  No significant valvular abnormalities present.     This study was compared with the study from 2011 .  No significant changes noted.     Coronary angiogram 11/8/2024:  Conclusion          Ramus lesion is 40% stenosed.    Prox Cx lesion is 95% stenosed.    Mid Cx lesion is 30% stenosed.    Dist LAD-2 lesion is 70% stenosed.    Mid LAD to Dist LAD lesion is 70% stenosed.    3rd Diag lesion is 70% stenosed.    Mid LAD lesion is 80% stenosed.    2nd Diag lesion is 90% stenosed.    Dist LAD-1 lesion is 80% stenosed.    Prox LAD to Mid LAD lesion is 30% stenosed.    Dist RCA lesion is 99% stenosed.    Mid RCA lesion is 40% stenosed.    Prox RCA lesion is 40% stenosed.    RPDA lesion is 80% stenosed.     1.significant three-vessel CAD           Plan   Follow bedrest per protocol   Return to the primary inpatient team for further evaluation and managmenet        1.continue therapeutic anticoagulation with heparin drip or Lovenox for ACS  2.continue aspirin  3.consult cardiothoracic surgery service for CABG      Recommendations  General Recommendations:  - Patient given specific instructions regarding care of arteriotomy site, activity restrictions, signs and symptoms of cardiac or vascular complications and to seek immediate medical evaluation should they occur.   - Arterial sheath removed from radial artery with TR Band placement.     Medications:  - Resume anticoagulant  medication in 2 hours.     Surgical Therapy:   - Will obtain cardiovascular surgery consultation regarding coronary artery bypass grafting.      Coronary Findings  Diagnostic  Dominance: Right  Left Anterior Descending   Prox LAD to Mid LAD lesion is 30% stenosed.   Mid LAD lesion is 80% stenosed.   Mid LAD to Dist LAD lesion is 70% stenosed.   Dist LAD-1 lesion is 80% stenosed.   Dist LAD-2 lesion is 70% stenosed.      Second Diagonal Branch   2nd Diag lesion is 90% stenosed.      Third Diagonal Branch   3rd Diag lesion is 70% stenosed.      Ramus Intermedius   Ramus lesion is 40% stenosed.      Left Circumflex   Prox Cx lesion is 95% stenosed.   Mid Cx lesion is 30% stenosed.      Right Coronary Artery   Prox RCA lesion is 40% stenosed.   Mid RCA lesion is 40% stenosed.   Dist RCA lesion is 99% stenosed.      Right Posterior Descending Artery   RPDA lesion is 80% stenosed.         Intervention   No interventions have been documented.        Recent Results (from the past 24 hours)   XR Chest 2 Views    Narrative    Exam:  Chest X-ray 11/9/2024 9:47 AM    History: preop cabg    Comparison: 11/7/2024    Findings:   Frontal and lateral views of the chest.  The cardiac silhouette is within normal limits. No acute airspace  opacities. No pleural effusion. No pneumothorax. Visualized upper  abdomen is unremarkable. No acute osseous abnormality.       Impression    Impression:   1.  No acute cardiopulmonary abnormalities.    АНДРЕЙ RODRIGUEZ DO         SYSTEM ID:  [id number]   CT Chest w/o Contrast    Narrative    EXAMINATION: CT CHEST W/O CONTRAST, 11/9/2024 11:25 AM    CLINICAL HISTORY: preop cabg    COMPARISON: None.    TECHNIQUE: CT imaging obtained through the chest without contrast.  Coronal and axial MIP reformatted images obtained.     CONTRAST:  none.    FINDINGS:    Lines and tubes: None.    Mediastinum: No thyroid nodules. Central tracheobronchial tree is  patent. Heart size is normal. No pericardial effusion.   Normal caliber  thoracic vasculature. No thoracic lymphadenopathy. Calcified  mediastinal and hilar lymph nodes. Triple-vessel coronary artery  calcifications. Mild calcifications of the aorta. Esophagus appears  normal.    Lungs: Subpleural calcification in the right upper lobe. 3 mm  subpleural nodule in the left lower lobe (4/245. 2 mm nodule in the  left lower lobe (4/186). No consolidation. No pleural effusion or  pneumothorax.    Bones and soft tissues: No suspicious bone findings.     Upper abdomen: Cholelithiasis without acute cholecystitis. Calcified  granulomas in the spleen.      Impression    IMPRESSION:   1. No acute or suspicious abnormalities in the chest.  2. Sequelae of prior granulomatous infection including pleural and  mediastinal calcifications.  3. Small 2mm and 3mm nodules in the left lower lobe.  4. Cholelithiasis without acute cholecystitis.    NOLA ROCKWELL MD         SYSTEM ID:  [id number]       Medical Decision Making   60 MINUTES SPENT BY ME on the date of service doing chart review, history, exam, documentation & further activities per the note.

## 2024-11-11 ENCOUNTER — PREP FOR PROCEDURE (OUTPATIENT)
Dept: CARDIOLOGY | Facility: CLINIC | Age: 69
End: 2024-11-11
Payer: COMMERCIAL

## 2024-11-11 DIAGNOSIS — I25.10 CAD (CORONARY ARTERY DISEASE): Primary | ICD-10-CM

## 2024-11-11 LAB
ANION GAP SERPL CALCULATED.3IONS-SCNC: 14 MMOL/L (ref 7–15)
BASOPHILS # BLD AUTO: 0.1 10E3/UL (ref 0–0.2)
BASOPHILS NFR BLD AUTO: 1 %
BUN SERPL-MCNC: 29.1 MG/DL (ref 8–23)
CALCIUM SERPL-MCNC: 9.8 MG/DL (ref 8.8–10.4)
CHLORIDE SERPL-SCNC: 101 MMOL/L (ref 98–107)
CREAT SERPL-MCNC: 0.89 MG/DL (ref 0.51–0.95)
EGFRCR SERPLBLD CKD-EPI 2021: 70 ML/MIN/1.73M2
EOSINOPHIL # BLD AUTO: 0.1 10E3/UL (ref 0–0.7)
EOSINOPHIL NFR BLD AUTO: 2 %
ERYTHROCYTE [DISTWIDTH] IN BLOOD BY AUTOMATED COUNT: 13.2 % (ref 10–15)
GLUCOSE BLDC GLUCOMTR-MCNC: 109 MG/DL (ref 70–99)
GLUCOSE BLDC GLUCOMTR-MCNC: 131 MG/DL (ref 70–99)
GLUCOSE BLDC GLUCOMTR-MCNC: 96 MG/DL (ref 70–99)
GLUCOSE BLDC GLUCOMTR-MCNC: 99 MG/DL (ref 70–99)
GLUCOSE SERPL-MCNC: 112 MG/DL (ref 70–99)
HCO3 SERPL-SCNC: 24 MMOL/L (ref 22–29)
HCT VFR BLD AUTO: 37.5 % (ref 35–47)
HGB BLD-MCNC: 12.1 G/DL (ref 11.7–15.7)
IMM GRANULOCYTES # BLD: 0 10E3/UL
IMM GRANULOCYTES NFR BLD: 0 %
LYMPHOCYTES # BLD AUTO: 3.5 10E3/UL (ref 0.8–5.3)
LYMPHOCYTES NFR BLD AUTO: 43 %
MAGNESIUM SERPL-MCNC: 2 MG/DL (ref 1.7–2.3)
MCH RBC QN AUTO: 28.9 PG (ref 26.5–33)
MCHC RBC AUTO-ENTMCNC: 32.3 G/DL (ref 31.5–36.5)
MCV RBC AUTO: 90 FL (ref 78–100)
MONOCYTES # BLD AUTO: 0.8 10E3/UL (ref 0–1.3)
MONOCYTES NFR BLD AUTO: 10 %
NEUTROPHILS # BLD AUTO: 3.7 10E3/UL (ref 1.6–8.3)
NEUTROPHILS NFR BLD AUTO: 45 %
NRBC # BLD AUTO: 0 10E3/UL
NRBC BLD AUTO-RTO: 0 /100
PLATELET # BLD AUTO: 345 10E3/UL (ref 150–450)
POTASSIUM SERPL-SCNC: 3.9 MMOL/L (ref 3.4–5.3)
RBC # BLD AUTO: 4.18 10E6/UL (ref 3.8–5.2)
SODIUM SERPL-SCNC: 139 MMOL/L (ref 135–145)
UFH PPP CHRO-ACNC: 0.48 IU/ML
WBC # BLD AUTO: 8.2 10E3/UL (ref 4–11)

## 2024-11-11 PROCEDURE — 250N000011 HC RX IP 250 OP 636: Performed by: EMERGENCY MEDICINE

## 2024-11-11 PROCEDURE — 80048 BASIC METABOLIC PNL TOTAL CA: CPT

## 2024-11-11 PROCEDURE — 99418 PROLNG IP/OBS E/M EA 15 MIN: CPT | Mod: FS

## 2024-11-11 PROCEDURE — 85004 AUTOMATED DIFF WBC COUNT: CPT

## 2024-11-11 PROCEDURE — 99233 SBSQ HOSP IP/OBS HIGH 50: CPT | Mod: FS

## 2024-11-11 PROCEDURE — 85520 HEPARIN ASSAY: CPT | Performed by: STUDENT IN AN ORGANIZED HEALTH CARE EDUCATION/TRAINING PROGRAM

## 2024-11-11 PROCEDURE — 99418 PROLNG IP/OBS E/M EA 15 MIN: CPT | Mod: FS | Performed by: INTERNAL MEDICINE

## 2024-11-11 PROCEDURE — 250N000013 HC RX MED GY IP 250 OP 250 PS 637

## 2024-11-11 PROCEDURE — 36415 COLL VENOUS BLD VENIPUNCTURE: CPT

## 2024-11-11 PROCEDURE — 85018 HEMOGLOBIN: CPT

## 2024-11-11 PROCEDURE — 250N000013 HC RX MED GY IP 250 OP 250 PS 637: Performed by: NURSE PRACTITIONER

## 2024-11-11 PROCEDURE — 120N000005 HC R&B MS OVERFLOW UMMC

## 2024-11-11 PROCEDURE — 99233 SBSQ HOSP IP/OBS HIGH 50: CPT | Mod: FS | Performed by: INTERNAL MEDICINE

## 2024-11-11 PROCEDURE — 83735 ASSAY OF MAGNESIUM: CPT | Performed by: NURSE PRACTITIONER

## 2024-11-11 RX ADMIN — POLYETHYLENE GLYCOL 3350 17 G: 17 POWDER, FOR SOLUTION ORAL at 08:20

## 2024-11-11 RX ADMIN — ACETAMINOPHEN 650 MG: 325 TABLET, FILM COATED ORAL at 20:30

## 2024-11-11 RX ADMIN — ATORVASTATIN CALCIUM 80 MG: 80 TABLET, FILM COATED ORAL at 22:22

## 2024-11-11 RX ADMIN — AMLODIPINE BESYLATE 10 MG: 10 TABLET ORAL at 20:29

## 2024-11-11 RX ADMIN — SENNOSIDES AND DOCUSATE SODIUM 2 TABLET: 8.6; 5 TABLET ORAL at 08:19

## 2024-11-11 RX ADMIN — LISINOPRIL AND HYDROCHLOROTHIAZIDE 2 TABLET: 12.5; 2 TABLET ORAL at 20:37

## 2024-11-11 RX ADMIN — SENNOSIDES AND DOCUSATE SODIUM 2 TABLET: 8.6; 5 TABLET ORAL at 20:29

## 2024-11-11 RX ADMIN — HEPARIN SODIUM 1350 UNITS/HR: 10000 INJECTION, SOLUTION INTRAVENOUS at 05:22

## 2024-11-11 RX ADMIN — HEPARIN SODIUM 1350 UNITS/HR: 10000 INJECTION, SOLUTION INTRAVENOUS at 23:36

## 2024-11-11 RX ADMIN — ASPIRIN 81 MG CHEWABLE TABLET 81 MG: 81 TABLET CHEWABLE at 08:20

## 2024-11-11 NOTE — PLAN OF CARE
"I: Monitored vitals and assessed pt status. [id number].  Running: Heparin gtt at 1350 units/hr  PRN Med: Melatonin 5mg PO     Vitals: /58 (BP Location: Left arm)   Pulse 54   Temp 97.3  F (36.3  C) (Oral)   Resp 20   Ht 1.7 m (5' 6.93\")   Wt 109.4 kg (241 lb 3.2 oz)   LMP  (LMP Unknown)   SpO2 96%   BMI 37.86 kg/m         A:   Neuro: AOx4 Denies headache, dizziness, lightheadedness, numbness or tingling. Denying anxiety throughout shift. Endorses difficulty sleeping but this morning reported she got \"some rest\". Calls appropriately.  Cardiac: SR/SB; HR 45-60's. Occasional irregularity in heart rhythm noted on bedside monitor early this morning, provider notified and aware. Patient denied any palpitations, chest pain, or SOB. Afebrile, VSS.   Respiratory: Sating >95% on RA. Reports SOB with activity. LS clear bilaterally.   Diet/appetite: Low saturated fat diet, tolerating.  Endocrine: BS check ACHS. Pt on sliding scale insulin.  GI/:  No BM this shift. Denies abdominal pain. Saves urine in hat.    Activity:  Moves independently.  Pain: Denies.  Skin: No new deficits noted. R PIV, infusing heparin.    Goal Outcome Evaluation:      Plan of Care Reviewed With: patient    Overall Patient Progress: no changeOverall Patient Progress: no change           "

## 2024-11-11 NOTE — PLAN OF CARE
"I: Monitored vitals and assessed pt status.   Changes during this shift: MRI checklist completed and sent to department    Running: Heparin 1350 unit(s)/hr     Vitals: /72 (BP Location: Left arm)   Pulse 66   Temp 97.4  F (36.3  C) (Oral)   Resp 15   Ht 1.7 m (5' 6.93\")   Wt 109.4 kg (241 lb 3.2 oz)   LMP  (LMP Unknown)   SpO2 95%   BMI 37.86 kg/m      A:   Neuro: Ax4 denies headache, dizziness, lightheadedness, calls appropriately   Cardiac: SB 50s-60s  Afebrile, VSS. Denies chest pain.   Respiratory: sating >95 on room air. denies SOB. LS clear bilaterally.   Diet/appetite: Regular Diet. Good appetite.   Endocrine: BS check AC & HS. Pt on sliding scale insulin, no coverage given  GI/:  No BM this shift. Denies abdominal pain. Adequate urine output.   Activity: Moves independently, ambulated in hallways x 3  Skin: WNL, Warm, dry, normal color  Line:  PIV R infusing heparin drip  Goal Outcome Evaluation:      Plan of Care Reviewed With: patient    Overall Patient Progress: no changeOverall Patient Progress: no change    Outcome Evaluation: MRI consent completed, continued with heparin drip      "

## 2024-11-11 NOTE — PLAN OF CARE
Hours of Care:  1500 - 1900     D: NSTEMI, CABG workup    Neuro: A/O x 4. Pleasant and cooperative, expressed anxiety about upcoming CABG.  Respiratory:  On room air, sats well.  Lung sounds clear.    Cardiac: SR/SB. Sinus Percy's in the 50's. VSS.    GI/: Low sat fat diet, good appetite. No BM this shift. No report of nausea or vomiting. Voids independently.  Endo: Blood sugars ACHS.  Skin: No new deficits.  LDA/Drips: R. PIV, infusing heparin at 1350 units/hr     Pain: Denies  Diet: 2 g Na.  2000 mL fluid restriction  Electrolytes: Mag and K protocols, recheck tomorrow    P: Possible CABG. Continue to monitor Pt status and report changes to C1.    Goal Outcome Evaluation:    Overall Patient Progress: improving  Pt expressed anxiety over planned CABG, says she couldn't sleep yesternight. Atarax available but pt hesitant to use hoping she'll fall asleep given how tired she feels.

## 2024-11-11 NOTE — CONSULTS
"Dental Hygiene Consult Service    Karyn Gamez MRN# [id number]   YOB: 1955 Age: 69 year old     Date of Admission: 11/7/2024   PCP is Cristiana Davis  Date of Service: 11/11/2024  Hospital Day #: 4         Reason for Consult:   Referring MD & Reason for Visit: I was asked by Alma Noriega MD, to see Karyn Gamez for a LIMITED oral assessment regarding CABG work-up patient - active dental pain for 2 days     *Please note: dental hygiene services, including oral assessment, are not a replacement for examination by a licensed dentist. The patient has been informed of the oral assessment procedures and has provided verbal consent.       History of Present Illness:   This patient is a 69 year old female with a history of DM2 not on insulin, tobacco use, HTN, HLD, depression admitted on 11/7/2024 with chest pain and elevated troponin c/f NSTEMI found to have severe multivessel disease being evaluated for CABG.  Dental and oropharyngeal history is pertinent for no established dental home - pt reports last dental visit was \"a long time ago.\" (Unable to recall estimated timeline, but reports at least a year ago); undergoing CABG eval (dental clearance currently not requested, no CT ordered); social hx significant for tobacco smoking.  The patient reports cold sensitivity of the maxillary left second molar (tooth #15). Pt denies drainage, swelling, hot/sweet/pressure/biting sensitivity, recent sinus infections, or bruxism. Pt was experiencing \"dull\" headaches, improved with Tylenol. Pt's partial denture is 6-7 years old.       OHIP-5 Questionnaire  In the past SEVEN days:   Have you had difficulty chewing any foods because of problems with your teeth, mouth, dentures or jaw? 0 - NEVER   Have you had painful aching in your mouth? 2 - OCCASIONALLY   Have you felt uncomfortable about the appearance of your teeth, mouth dentures or jaws? 0 - NEVER   Have you felt that there has been less flavor in " "your food because of problems with your teeth, mouth, dentures or jaws? 4 - VERY OFTEN   Have you had difficulty doing your usual jobs because of problems with your teeth, mouth, dentures or jaws? 0 - NEVER   Total OHIP Score 6   General Observations   Level of support Patient is fully independent   Patient currently in pain No   Patient wears dentures Yes: Upper removable partial denture   Current oral care routine Brushing and flossing with soft-bristled manual toothbrush TID; removes partial denture overnight \"most nights\"   Patient has oral care products Toothbrush, Toothpaste, Interdental Care (e.g., floss, floss picks), and If Applicable, Denture Care Products (Labeled Case, Brush, Tabs)   Extraoral assessment   General appearance Symmetrical, Collapsed, and Normal TMJ function   Lymph nodes Symmetrical, no abnormalities, non-tender   Oral Health Assessment Tool (OHAT)   Lips 0=Healthy: smooth, pink, moist   Tongue 0=Healthy: normal, moist, roughness, pink   Gums and Tissues 0=Healthy: pink, moist, smooth, no bleeding (localized extensive gingival recession of maxillary terminal molars)   Saliva 0=Healthy: moist tissues, watery and free flowing saliva   Natural Teeth Yes/No 0=Healthy: no decayed or broken teeth/roots (none clinically visible)   Dentures Yes/No 2=Unhealthy: (ie. more than 1 broken area/tooth, denture missing or not worn, loose and needs denture adhesive, or not named) - partial denture requires adhesive for proper fit   Oral Cleanliness 1=Changes:(ie. food particles/tartar/plaque in 1-2 areas of the mouth or on small area of dentures or halitosis (bad breath)) - light dental tartar localized to mandibular anterior dentition   Dental Pain 0=Healthy: no behavioural, verbal, or physical signs of dental pain   OHAT Total Score (0-16) 3   Other Dental Concerns Suspicious for periodontitis and Infrequent professional dental care   Care provided during assessment Oral Assessment and Patient education "   Follow up DH assessments required? No   Connect with [facility] or [place] care? Yes: [facility] dental team will review   For Dental Team Service Requesting Consult: CARDS 1  Clearance/Reason: oral/dental pain  Dental CT status: currently not ordered  Upcoming Sx date(s), if known: TBD, tentatively 10/14  Expected discharge date: TBD  Ability to transfer to [facility]:   Pain reported, by pt: none  Swelling present: none  Current dental Rx regimen: none   Oral Care Plan To optimize oral health during hospitalization and reduce risk of HAIs:  Brush oral hard and soft tissues with soft-bristled toothbrush and fluoridated toothpaste at least BID (ideally up to QID)  Place small amount of fluoridated toothpaste provided on affected tooth and let sit   Interdental cleaning with floss adjuncts daily, as able  Swish-and-spit with alcohol-free antiseptic rinse BID to reduce oral bacterial load  Frequent application of Vaseline to lips    For denture care:  Remove prosthesis at least once daily (preferably overnight) and soak in denture case with Polident cleaning tabs; clean with denture brush before seating in mouth; remove after each meal and brush; if needed, apply minimal amount of denture adhesive to prosthesis before seating    Pt should establish/continue outpatient comprehensive dental care at clinic of choice under medical advisement following discharge.            Assessment and Plan:   Assessment:  This patient is a 69 year old female with a history of DM2 not on insulin, tobacco use, HTN, HLD, depression admitted on 11/7/2024 with chest pain and elevated troponin c/f NSTEMI found to have severe multivessel disease being evaluated for CABG, oral exam concerning for no clinical sign of odontogenic infection, but ill-fitting removable partial denture and localized extensive gingival recession.    Plan:   Dental pain suspected to be caused by ill-fitting partial denture. Denture will need further exam in a dental clinic  setting.  Implement oral care plan as described above. Pt is currently independent in oral cares.  Pt to continue comprehensive dental care at dental clinic of choice upon discharge.     Dana Silva, [facility], [facility]  Message on [employer] or pager *[phone]    Past Medical History   I have reviewed this patient's medical history and updated it with pertinent information if needed.  Past Medical History:   Diagnosis Date    Cervicalgia 2005: Thrown from a horse - wearing a helmet - and struck side of head and neck, heard crepitation, no loc, Able to walk after injury.  persistant pain in neck relieved with ibuprofen, stiff but can move with ROM.  No changes in hands and feet sensation/motor.    Coronary artery disease     Depressive disorder, not elsewhere classified     Hypertension     Obesity, unspecified        Past Surgical History   I have reviewed this patient's surgical history and updated it with pertinent information if needed.  Past Surgical History:   Procedure Laterality Date    ANGIOGRAM      negative    COLONOSCOPY N/A 3/24/2023    Procedure: COLONOSCOPY, WITH HOT POLYPECTOMY AND RESEARCH BIOPSY;  Surgeon: Bret Velez MD;  Location: [facility] OR    HYSTERECTOMY, PAP NO LONGER INDICATED      BSO       Social History   I have reviewed this patient's social history and updated it with pertinent information if needed.  Social History     Tobacco Use    Smoking status: Former     Current packs/day: 0.00     Average packs/day: 0.5 packs/day for 30.0 years (15.0 ttl pk-yrs)     Types: Cigarettes     Start date: 1981     Quit date: 2011     Years since quittin.2    Smokeless tobacco: Never    Tobacco comments:     smoking 3-4 cigs per day   Vaping Use    Vaping status: Never Used   Substance Use Topics    Alcohol use: Yes     Comment: rarely    Drug use: No     Prior to Admission Medications   Prior to Admission Medications   Prescriptions Last Dose Informant Patient  Reported? Taking?   amLODIPine (NORVASC) 2.5 MG tablet More than a month Self No Yes   Sig: Take 1 tablet (2.5 mg) by mouth daily   atorvastatin (LIPITOR) 80 MG tablet More than a month Self No Yes   Sig: Take 1 tablet (80 mg) by mouth daily For cholesterol.   blood glucose monitoring (ONE TOUCH DELICA) lancets  Self No Yes   Sig: Use to test blood sugar 1 times daily   blood glucose monitoring (ONETOUCH VERIO IQ SYSTEM) meter device kit  Self No Yes   Sig: Use to test blood sugar 1 times daily   buPROPion (WELLBUTRIN XL) 150 MG 24 hr tablet More than a month Self No Yes   Sig: Take 1 tablet (150 mg) by mouth every morning   lisinopril-hydrochlorothiazide (ZESTORETIC) 20-12.5 MG tablet More than a month Self No Yes   Sig: Take 1 tablet by mouth daily   metFORMIN (GLUCOPHAGE XR) 500 MG 24 hr tablet More than a month Self No Yes   Sig: TAKE 1 TABLET BY MOUTH DAILY WITH DINNER FOR BLOOD SUGARS   sertraline (ZOLOFT) 100 MG tablet More than a month Self No Yes   Sig: Take 2 tablets (200 mg) by mouth daily      Facility-Administered Medications: None     Allergies   No Known Allergies  Physical Exam

## 2024-11-11 NOTE — PROGRESS NOTES
CV Surgery Follow-up    Karyn Gamez is a 69 year old female with a PMH of DM2, tobacco use, HTN, HLD, depression who was found to have NSTEMI. Further workup demonstrated severe multivessel CAD. Echocardiogram demonstrates preserved biventricular function. CVTS was consulted for consideration of surgical revascularization. Currently on a heparin drip, chest pain free.    pre-op imaging/work-up reviewed  Surgery timing with Dr. Gorge BERUMEN, tentatively Thursday, Nov 14 in the afternoon  Pre-operative teaching completed;  orders to be completed closer to surgery date  Will continue to follow  Other cares per primary team     RK Bacon, ACNPC-AG, CCRN  Nurse Practitioner  Cardiothoracic Surgery

## 2024-11-11 NOTE — PLAN OF CARE
Shift Summary: 1900 - 2300  Neuro: A&Ox4, able to make needs known, and calls appropriately.   Cardiac:SR, Afebrile, VSS.   Respiratory: Lungs are clear in all lobes, denies dyspnea, no cough observed, and Sat>94% on RA  GI/: Active bowel sound, passing flatus, and Voiding spontaneously. No BM this shift.  Diet/appetite: Tolerating Low fat, <2400 mg NA diet. Denies nausea.  Activity: Up independently in the room    Pain: Denies   Skin: No new deficits noted.  Lines: Piv with heparin running at 1350 unit(s)/kg/h, next Xa in AM    Goal Outcome Evaluation:      Plan of Care Reviewed With: patient    Overall Patient Progress: improvingOverall Patient Progress: improving    Outcome Evaluation: Pt BG at bedtime was 115, no coverage. Heparin running at 1350u/h, next Xa in AM    Plan: Continue with the current plan of care, update Card 1 Team with any concerns

## 2024-11-11 NOTE — PROVIDER NOTIFICATION
Notified Shayy Toussaint MD at 0325 of irregular heart rhythm on bedside monitor. Patient denies any chest pain, palpitations, or SOB and remains VSS. Provider aware.

## 2024-11-11 NOTE — PROGRESS NOTES
[facility]   Cardiology   Progress Note     ASSESSMENT/PLAN:  Karyn Gamez is a 69 year old female with a PMH of DM2 not on insulin, tobacco use, HTN, HLD, depression admitted on 11/7/2024 with chest pain and elevated troponin c/f NSTEMI found to have severe multivessel disease being evaluated for CABG.     Today's Changes  - Continue to hold Coreg for bradycardia (asymptomatic)  - Surgery plan/work-up per CVTS  - Cardiac MRI to further evaluate asymmetric septal hypertrophy  - Dental consult for 2 days of reported dental pain  - PT/OT to prevent deconditioning     # NSTEMI  # Multivessel CAD  # Hx stress-induced cardiomyopathy 08/2010  # Asymmetric Septal Hypertrophy without LVOT on Echo  # HTN  # HLD  Presented with 3 days of worsening exertional chest pain/pressure and burning, now occurring at rest and with dyspnea and radiation down left arm. Troponin peak at 45, now down trending. T wave inversion V4-V6 also noted on 2019 EKG. ACS risk factors include HTN, T2DM, HLD, smoker, family history of premature CAD. Coronary angiogram revealed multivessel disesase     - Coronary angiogram 11/8: severe multivessel disease, 95% pLCx, 90% D2, 80% dLAD, dRCA 99%, RPDA 80%  - TTE 11/8: EF 60-65%, asymmetric septal hypertrophy, small LV cavity size, No ABRAHAM or LVOT, IVC normal, no significant valve disease  - CVTS consulted; CABG date TBD  - Carotid US, Vein Mapping for surgical planning  - Continue heparin gtt until surgery per interventional cardiology  - Cardiac MRI to further evaluate asymmetric septal hypertrophy seen on echo (if cannot accommodate inpatient, may be done outpatient)  - Continue 81 mg ASA daily  - BB: Coreg 3.125 mg BID; holding for low HR (50's)  - ACEi/ARB: PTA Lisinopril-hydrochlorothiazide increased to 40-25 mg  - CCB: Norvasc 10 mg daily   - Statin: continue PTA Lipitor  - Daily BMP, Mg, replace lytes per protocol   - PRN Nitroglycerin and EKG for any chest pain  -  Low threshold to use Nitroglycerin gtt if needed  - Dental consult for 2 days of reported dental pain  - PT/OT to prevent deconditioning     # Small right hemisphere frontal lobe stroke 2011  Previously on atorvastatin 80 mg, baby aspirin, but not currently taking. Has had no residual symptoms since stroke in 2011. Will resume secondary prevention at admission and assist with follow-up at discharge.  - ASA, statin as listed above     # DM2  Home regimen includes metformin and Ozempic, not currently taking. Will hold home regimen and manage with sliding scale insulin.  - A1C 6.0  - Medium sliding scale insulin   - POCT glucose checks  - Hold PTA Metformin while IP     # Depression  Previously taking Wellbutrin and Zoloft, not currently. Will not resume at admission, defer to outpatient follow-up.     # Current Smoker  4-5 cigarettes a day. Interested and motivated to quit.    # Possible TL  Per RN and patient, ongoing daytime tiredness after getting good sleep and snoring at night  - OP Sleep Study referral at time of discharge to assess TL     FEN: Low Sat Fat  Code status: Full  Prophylaxis:  heparin gtt  Isolation: None  Disposition: pending CABG timing/recovery  Medically Ready for Discharge: Anticipated in 5+ Days       Patient seen and discussed with Dr. Mccormick, who agrees with above plan.    Ana BECKMAN, LALO  [facility] Cardiology Team    Interval History:  - Occasional irregularity in her rhythm overnight assessed by cross cover and appeared to be sinus jam, patient was asymptomatic  - Pt reports feeling well this am, without chest pain or any other cardiac symptoms    Physical Exam:  Temp:  [97.3  F (36.3  C)-98.5  F (36.9  C)] 97.3  F (36.3  C)  Pulse:  [] 54  Resp:  [16-28] 20  BP: (100-141)/(58-93) 100/58  SpO2:  [94 %-100 %] 96 %      Wt:   Wt Readings from Last 5 Encounters:   11/11/24 109.4 kg (241 lb 3.2 oz)   10/31/23 96.6 kg (213 lb)   03/24/23 95.7 kg (211 lb)   01/31/23 102.4 kg (225 lb 12.8 oz)    03/23/22 103.5 kg (228 lb 3.2 oz)       General: NAD  CV: RRR. murmur appreciated.  Resp: No increased work of breathing or use of accessory muscles, breathing comfortably on room air.  Lung sounds clear throughout/bilaterally  Abdomen:  Normal active bowel sounds.  Abdomen is soft. No distension, non-tender to palpation.    Extremities: Warm. Capillary refill less than 3 sec. 2/4 radial pulses bilaterally.  2/4 pedal pulses bilaterally. No pre-tibial edema. No cyanosis or clubbing. RRA access site CDI, soft, non-tender to touch, no bruising, no swelling, no signs of hematoma  Skin:  Warm and dry. No erythema, rashes, ulceration or diaphoresis.  Neuro: Alert and oriented x3.      Medications:  Current Facility-Administered Medications   Medication Dose Route Frequency Provider Last Rate Last Admin    amLODIPine (NORVASC) tablet 10 mg  10 mg Oral QPM Lexus Em CNP   10 mg at 11/10/24 2016    aspirin EC tablet 81 mg  81 mg Oral Daily Lexus Em CNP   81 mg at 11/10/24 0911    atorvastatin (LIPITOR) tablet 80 mg  80 mg Oral At Bedtime Shayy Toussaint DO   80 mg at 11/10/24 2219    [Held by provider] carvedilol (COREG) tablet 3.125 mg  3.125 mg Oral BID w/meals Lexus Em CNP   3.125 mg at 11/09/24 1705    insulin aspart (NovoLOG) injection (RAPID ACTING)  1-7 Units Subcutaneous TID AC Shayy Toussaint DO        insulin aspart (NovoLOG) injection (RAPID ACTING)  1-5 Units Subcutaneous At Bedtime Shayy Toussaint DO        lisinopril-hydrochlorothiazide (ZESTORETIC) 20-12.5 MG per tablet 2 tablet  2 tablet Oral QPM Lexus Em CNP   2 tablet at 11/10/24 2019    polyethylene glycol (MIRALAX) Packet 17 g  17 g Oral Daily Shayy Toussaint DO   17 g at 11/10/24 0912    senna-docusate (SENOKOT-S/PERICOLACE) 8.6-50 MG per tablet 1 tablet  1 tablet Oral BID Shayy Toussaint DO   1 tablet at 11/10/24 0911    Or    senna-docusate (SENOKOT-S/PERICOLACE) 8.6-50 MG per tablet 2  tablet  2 tablet Oral BID Shayy Toussaint DO   2 tablet at 11/10/24 2015    sodium chloride (PF) 0.9% PF flush 3 mL  3 mL Intracatheter Q8H Shayy Toussaint DO   3 mL at 11/11/24 0522     Current Facility-Administered Medications   Medication Dose Route Frequency Provider Last Rate Last Admin    heparin 25,000 units in 0.45% NaCl 250 mL ANTICOAGULANT infusion  0-5,000 Units/hr Intravenous Continuous Bismark Aguiar MD 13.5 mL/hr at 11/11/24 0522 1,350 Units/hr at 11/11/24 0522    medication instruction   Does not apply Continuous PRN Shayy Toussaint DO           Labs:   CMP  Recent Labs   Lab 11/11/24  0556 11/10/24  2217 11/10/24  1727 11/10/24  1357 11/10/24  0857 11/10/24  0434 11/09/24  0719 11/09/24  0412 11/08/24  1159 11/08/24  0837 11/08/24  0136 11/07/24  1654     --   --   --   --  138  --  139  --  140  --  142   POTASSIUM 3.9  --   --   --   --  3.8  --  3.6  --  3.7  --  3.9   CHLORIDE 101  --   --   --   --  100  --  102  --  105  --  106   CO2 24  --   --   --   --  25  --  25  --  23  --  24   ANIONGAP 14  --   --   --   --  13  --  12  --  12  --  12   * 115* 109* 129*   < > 110*   < > 101*   < > 105*   < > 119*   BUN 29.1*  --   --   --   --  25.8*  --  15.9  --  14.5  --  14.1   CR 0.89  --   --   --   --  0.78  --  0.70  --  0.69  --  0.70   GFRESTIMATED 70  --   --   --   --  82  --  >90  --  >90  --  >90   ANGELES 9.8  --   --   --   --  9.8  --  9.6  --  9.5  --  9.3   MAG 2.0  --   --   --   --  2.0  --  2.1  --   --   --  2.2   PROTTOTAL  --   --   --   --   --   --   --   --   --   --   --  7.5   ALBUMIN  --   --   --   --   --   --   --   --   --   --   --  4.0   BILITOTAL  --   --   --   --   --   --   --   --   --   --   --  0.3   ALKPHOS  --   --   --   --   --   --   --   --   --   --   --  80   AST  --   --   --   --   --   --   --   --   --   --   --  26   ALT  --   --   --   --   --   --   --   --   --   --   --  15    < > = values in this interval not  displayed.     CBC  Recent Labs   Lab 11/11/24  0556 11/10/24  0434 11/09/24  0412 11/08/24  0837   WBC 8.2 8.6 9.0 10.7   RBC 4.18 4.47 4.39 4.58   HGB 12.1 13.1 13.0 13.3   HCT 37.5 39.4 38.6 41.0   MCV 90 88 88 90   MCH 28.9 29.3 29.6 29.0   MCHC 32.3 33.2 33.7 32.4   RDW 13.2 13.1 13.2 13.2    345 326 348     INRNo lab results found in last 7 days.  Arterial Blood GasNo lab results found in last 7 days.  Troponin T High Sensitivity   Lab Results   Component Value Date/Time    CTROPT 32 (H) 11/08/2024 04:23 AM       Diagnostics:  ECG 11/7/2024: NSR, HR 62, ST depression V5-6, TWI I       Echo 11/8/2024:  Interpretation Summary  Global and regional left ventricular function is normal with an EF of 60-65%.  Asymmetric septal hypertrophy; septal wall is 1.8 cm, posterior wall is 0.8 cm.  Left ventricular cavity size is small.  No ABRAHAM, or LVOT gradient.  Global right ventricular function is normal.  The right ventricle is normal size.  The inferior vena cava was normal in size with preserved respiratory variability.  No significant valvular abnormalities present.     This study was compared with the study from 2011 .  No significant changes noted.     Coronary angiogram 11/8/2024:  Conclusion    significant three-vessel CAD     Coronary Findings  Diagnostic  Dominance: Right  Left Anterior Descending   Prox LAD to Mid LAD lesion is 30% stenosed.   Mid LAD lesion is 80% stenosed.   Mid LAD to Dist LAD lesion is 70% stenosed.   Dist LAD-1 lesion is 80% stenosed.   Dist LAD-2 lesion is 70% stenosed.      Second Diagonal Branch   2nd Diag lesion is 90% stenosed.      Third Diagonal Branch   3rd Diag lesion is 70% stenosed.      Ramus Intermedius   Ramus lesion is 40% stenosed.      Left Circumflex   Prox Cx lesion is 95% stenosed.   Mid Cx lesion is 30% stenosed.      Right Coronary Artery   Prox RCA lesion is 40% stenosed.   Mid RCA lesion is 40% stenosed.   Dist RCA lesion is 99% stenosed.      Right Posterior  Descending Artery   RPDA lesion is 80% stenosed.         Intervention   No interventions have been documented.        Recent Results (from the past 24 hours)   US Lower Extremity Venous Mapping Bilateral    Narrative    Exam: Ultrasound Doppler vein mapping of bilateral lower extremities  dated  11/10/2024 9:15 AM    Comparison study: None    Clinical history: Bilateral lower extremity vein mapping study    Ordering clinician: Reba Whittaker    Technique: Grayscale (B-mode) with duplex Doppler ultrasound, along  with compression and augmentation, of the lower extremity veins.    RIGHT LEG:    CFV: Thrombus: No    GSV:  Proximal thigh: Thrombus: No, Wall thickness: Normal, 5.2 mm  Mid thigh: Thrombus: No, Wall thickness: Normal, 4 mm  Distal thigh: Thrombus: No, Wall thickness: Normal, 3.7 mm  Knee: Thrombus: No, Wall thickness: Normal, 3.5 mm  Distal calf: Thrombus: No, Wall thickness: Normal, 2.6 mm  Ankle: Thrombus: No, wall thickness: Normal, 2.5 mm    LEFT LEG:    CFV: Thrombus: No    GSV:  Proximal thigh: Thrombus: No, Wall thickness: Normal, 7.3 mm  Mid thigh: Thrombus: No, Wall thickness: Normal, 3.5 mm  Distal thigh: Thrombus: No, Wall thickness: Normal, 3.3 mm  Knee: Thrombus: No, Wall thickness: Normal, 3.2 mm  Superior calf: Thrombus: No, Wall thickness: Normal, 2.4 mm  Mid calf: Thrombus: No, Wall thickness: Normal, 1.7 mm  Ankle: Thrombus: No, wall thickness: Normal, 2.8 mm.        Impression    Impression:   1. No thrombus noted within either lower extremity.  2. Great saphenous vein diameter measurements as per above.    I have personally reviewed the examination and initial interpretation  and I agree with the findings.    GREY PAULINO MD         SYSTEM ID:  [id number]   US Carotid Bilateral    Narrative    Exam: Bilateral carotid duplex Doppler ultrasound dated 11/10/2024  9:15 AM    Clinical history: preop cabg    Comparison Study: Ultrasound carotid 8/13/2011    Technique: Grayscale (B-mode) and  duplex and spectral Doppler  ultrasound of the extracranial internal carotid, external carotid,  vertebral artery origins, right brachiocephalic/subclavian and left  subclavian arteries. Velocity measurements obtained with angle  correction at or less than 60 degrees.    Findings:    Right side:     Plaque Morphology: Predominantly echogenic, Smooth       Proximal CCA: 90/18 cm/sec     Mid CCA: 69/13 cm/sec     Distal CCA: 65/13 cm/sec       External CA: 128/15 cm/sec       Proximal ICA: 43/14 cm/sec     Mid ICA: 59/19 cm/sec     Distal ICA: 72/21 cm/sec       Vertebral artery: Not seen    ICA/CCA ratio: 1.5    Left side:     Plaque Morphology: Predominantly echogenic, Smooth       Proximal CCA: 92/20 cm/sec     Mid CCA: 107/23 cm/sec     Distal CCA: 94/21 cm/sec          External CA: 119/14 cm/sec       Proximal ICA: 108/28 cm/sec     Mid ICA: 74/24 cm/sec     Distal ICA: 67/22 cm/sec       Vertebral artery: 59 cm/sec     ICA/CCA ratio: 0.87      Impression    Impression:    1. Right side:        Degree of stenosis of the internal carotid artery: Less than 50 %.  .    2. Left side:         Degree of stenosis of the internal carotid artery: Less than 50 %.  .    Intersocietal Accreditation Commission Updated Recommendations for  Carotid Stenosis Interpretation Criteria - October 2021.  https://intersocietal.org/wp-content/uploads/2021/10/IAC-Vascular-Test  xv-Jrlcehptajwng_Nmllhye-Cvzumlsqlcufccg-for-Carotid-Stenosis-Interpre  ation-Criteria.pdf    Society for Vascular Surgery Clinical Practice Guidelines for  Management of Extracranial Cerebrovascular Disease. Journal of  Vascular Surgery Vol. 75, Issue 1, Supplement [id number] Published  online: June 18, 2021 (additional criteria adjustment for >70% ICA  stenosis)         Normal            ICA PSV: < 180 cm/s            Plaque: None            ICA/CCA PSV Ratio: < 2.0            ICA EDV: < 40 cm/s         < 50%            ICA PSV: < 180 cm/s            Plaque: <  50%            ICA/CCA PSV Ratio: < 2.0            ICA EDV: < 40 cm/s         50 - 69%            ICA PSV: < 180 - 230 cm/s            ICA/CCA PSV Ratio: 2.0 - 4.0            ICA EDV: 40 - 100 cm/s         > 70%            ICA PSV: > 230 cm/s AND             ICA/CCA PSV Ratio: > 4.0 or ICA EDV: > 100 cm/s         Total occlusion            ICA PSV: Undetectable            ICA/CCA PSV Ratio: N/A            ICA EDV: N/A    I have personally reviewed the examination and initial interpretation  and I agree with the findings.    АНДРЕЙ RODRIGUEZ DO         SYSTEM ID:  [id number]       Medical Decision Making    60 MINUTES SPENT BY ME on the date of service doing chart review, history, exam, documentation & further activities per the note.

## 2024-11-12 ENCOUNTER — APPOINTMENT (OUTPATIENT)
Dept: ULTRASOUND IMAGING | Facility: CLINIC | Age: 69
DRG: 281 | End: 2024-11-12
Payer: COMMERCIAL

## 2024-11-12 ENCOUNTER — APPOINTMENT (OUTPATIENT)
Dept: OCCUPATIONAL THERAPY | Facility: CLINIC | Age: 69
DRG: 281 | End: 2024-11-12
Payer: COMMERCIAL

## 2024-11-12 ENCOUNTER — ANESTHESIA EVENT (OUTPATIENT)
Dept: SURGERY | Facility: CLINIC | Age: 69
DRG: 281 | End: 2024-11-12
Payer: COMMERCIAL

## 2024-11-12 LAB
ANION GAP SERPL CALCULATED.3IONS-SCNC: 10 MMOL/L (ref 7–15)
BASOPHILS # BLD AUTO: 0.1 10E3/UL (ref 0–0.2)
BASOPHILS NFR BLD AUTO: 1 %
BUN SERPL-MCNC: 28.6 MG/DL (ref 8–23)
CALCIUM SERPL-MCNC: 9.6 MG/DL (ref 8.8–10.4)
CHLORIDE SERPL-SCNC: 104 MMOL/L (ref 98–107)
CREAT SERPL-MCNC: 0.88 MG/DL (ref 0.51–0.95)
EGFRCR SERPLBLD CKD-EPI 2021: 71 ML/MIN/1.73M2
EOSINOPHIL # BLD AUTO: 0.1 10E3/UL (ref 0–0.7)
EOSINOPHIL NFR BLD AUTO: 2 %
ERYTHROCYTE [DISTWIDTH] IN BLOOD BY AUTOMATED COUNT: 13.3 % (ref 10–15)
GLUCOSE BLDC GLUCOMTR-MCNC: 113 MG/DL (ref 70–99)
GLUCOSE BLDC GLUCOMTR-MCNC: 128 MG/DL (ref 70–99)
GLUCOSE BLDC GLUCOMTR-MCNC: 94 MG/DL (ref 70–99)
GLUCOSE SERPL-MCNC: 101 MG/DL (ref 70–99)
HCO3 SERPL-SCNC: 26 MMOL/L (ref 22–29)
HCT VFR BLD AUTO: 38.6 % (ref 35–47)
HGB BLD-MCNC: 12.7 G/DL (ref 11.7–15.7)
IMM GRANULOCYTES # BLD: 0 10E3/UL
IMM GRANULOCYTES NFR BLD: 0 %
LYMPHOCYTES # BLD AUTO: 3.4 10E3/UL (ref 0.8–5.3)
LYMPHOCYTES NFR BLD AUTO: 48 %
MAGNESIUM SERPL-MCNC: 2.3 MG/DL (ref 1.7–2.3)
MCH RBC QN AUTO: 29.6 PG (ref 26.5–33)
MCHC RBC AUTO-ENTMCNC: 32.9 G/DL (ref 31.5–36.5)
MCV RBC AUTO: 90 FL (ref 78–100)
MONOCYTES # BLD AUTO: 0.7 10E3/UL (ref 0–1.3)
MONOCYTES NFR BLD AUTO: 10 %
NEUTROPHILS # BLD AUTO: 2.8 10E3/UL (ref 1.6–8.3)
NEUTROPHILS NFR BLD AUTO: 40 %
NRBC # BLD AUTO: 0 10E3/UL
NRBC BLD AUTO-RTO: 0 /100
PLATELET # BLD AUTO: 304 10E3/UL (ref 150–450)
POTASSIUM SERPL-SCNC: 4.5 MMOL/L (ref 3.4–5.3)
RBC # BLD AUTO: 4.29 10E6/UL (ref 3.8–5.2)
SODIUM SERPL-SCNC: 140 MMOL/L (ref 135–145)
UFH PPP CHRO-ACNC: 0.36 IU/ML
UFH PPP CHRO-ACNC: 0.39 IU/ML
UFH PPP CHRO-ACNC: 0.63 IU/ML
WBC # BLD AUTO: 7.1 10E3/UL (ref 4–11)

## 2024-11-12 PROCEDURE — 36415 COLL VENOUS BLD VENIPUNCTURE: CPT | Performed by: STUDENT IN AN ORGANIZED HEALTH CARE EDUCATION/TRAINING PROGRAM

## 2024-11-12 PROCEDURE — 250N000013 HC RX MED GY IP 250 OP 250 PS 637

## 2024-11-12 PROCEDURE — 250N000013 HC RX MED GY IP 250 OP 250 PS 637: Performed by: NURSE PRACTITIONER

## 2024-11-12 PROCEDURE — 97530 THERAPEUTIC ACTIVITIES: CPT | Mod: GO

## 2024-11-12 PROCEDURE — 85025 COMPLETE CBC W/AUTO DIFF WBC: CPT

## 2024-11-12 PROCEDURE — 93931 UPPER EXTREMITY STUDY: CPT | Mod: LT

## 2024-11-12 PROCEDURE — 93931 UPPER EXTREMITY STUDY: CPT | Mod: 26 | Performed by: RADIOLOGY

## 2024-11-12 PROCEDURE — 85520 HEPARIN ASSAY: CPT | Performed by: STUDENT IN AN ORGANIZED HEALTH CARE EDUCATION/TRAINING PROGRAM

## 2024-11-12 PROCEDURE — 250N000011 HC RX IP 250 OP 636: Performed by: EMERGENCY MEDICINE

## 2024-11-12 PROCEDURE — 80048 BASIC METABOLIC PNL TOTAL CA: CPT

## 2024-11-12 PROCEDURE — 83735 ASSAY OF MAGNESIUM: CPT | Performed by: NURSE PRACTITIONER

## 2024-11-12 PROCEDURE — 97165 OT EVAL LOW COMPLEX 30 MIN: CPT | Mod: GO

## 2024-11-12 PROCEDURE — 99233 SBSQ HOSP IP/OBS HIGH 50: CPT | Mod: FS | Performed by: INTERNAL MEDICINE

## 2024-11-12 PROCEDURE — 99418 PROLNG IP/OBS E/M EA 15 MIN: CPT | Mod: FS | Performed by: INTERNAL MEDICINE

## 2024-11-12 PROCEDURE — 85041 AUTOMATED RBC COUNT: CPT

## 2024-11-12 PROCEDURE — 36415 COLL VENOUS BLD VENIPUNCTURE: CPT

## 2024-11-12 PROCEDURE — 120N000005 HC R&B MS OVERFLOW UMMC

## 2024-11-12 RX ADMIN — ASPIRIN 81 MG CHEWABLE TABLET 81 MG: 81 TABLET CHEWABLE at 07:49

## 2024-11-12 RX ADMIN — POLYETHYLENE GLYCOL 3350 17 G: 17 POWDER, FOR SOLUTION ORAL at 07:49

## 2024-11-12 RX ADMIN — AMLODIPINE BESYLATE 10 MG: 10 TABLET ORAL at 20:01

## 2024-11-12 RX ADMIN — HEPARIN SODIUM 1150 UNITS/HR: 10000 INJECTION, SOLUTION INTRAVENOUS at 20:56

## 2024-11-12 RX ADMIN — ACETAMINOPHEN 650 MG: 325 TABLET, FILM COATED ORAL at 20:01

## 2024-11-12 RX ADMIN — SENNOSIDES AND DOCUSATE SODIUM 2 TABLET: 8.6; 5 TABLET ORAL at 07:49

## 2024-11-12 RX ADMIN — ATORVASTATIN CALCIUM 80 MG: 80 TABLET, FILM COATED ORAL at 21:59

## 2024-11-12 RX ADMIN — SENNOSIDES AND DOCUSATE SODIUM 1 TABLET: 8.6; 5 TABLET ORAL at 20:02

## 2024-11-12 RX ADMIN — LISINOPRIL AND HYDROCHLOROTHIAZIDE 2 TABLET: 12.5; 2 TABLET ORAL at 22:00

## 2024-11-12 ASSESSMENT — ACTIVITIES OF DAILY LIVING (ADL)
ADLS_ACUITY_SCORE: 0
PREVIOUS_RESPONSIBILITIES: HOUSEKEEPING;MEAL PREP;LAUNDRY;SHOPPING;MEDICATION MANAGEMENT;DRIVING
ADLS_ACUITY_SCORE: 0

## 2024-11-12 NOTE — PLAN OF CARE
Goal Outcome Evaluation:  .Neuro: A&Ox4. Pleasant, calls appropriately   Cardiac: SB  HR in 50's-60's, Afebrile.   Respiratory: Sating >95% on RA. No SOB noted upon exertion   GI/: Adequate urine output. No BM this shift  Diet/appetite: Tolerating low fat Na <2400 mg diet. Adequate appetite  Activity:  Independent up to chair and in halls.  Pain: Tylenol given x1 d/t headache. At acceptable level on current regimen.   Skin: No new deficits noted.  LDA's: R PIV running Heparin at 1350 units/hr     Plan: Continue with POC. Notify primary team with changes.     Plan of Care Reviewed With: patient    Overall Patient Progress: no changeOverall Patient Progress: no change    Outcome Evaluation: .

## 2024-11-12 NOTE — PROGRESS NOTES
"   11/12/24 9314   Appointment Info   Signing Clinician's Name / Credentials (OT) Korinne Skrypek, OTS   Student Supervision On-site supervision provided;Therapy services provided with the co-signing licensed therapist guiding and directing the services, and providing the skilled judgement and assessment throughout the session   Rehab Comments (OT) pre-CABG (on 11/14?)   Living Environment   People in Home spouse   Current Living Arrangements other (see comments)  (town home with 2 levels)   Home Accessibility stairs within home   Number of Stairs, Within Home, Primary ten   Stair Railings, Within Home, Primary railings safe and in good condition;railing on right side (ascending)   Transportation Anticipated car, drives self;family or friend will provide   Living Environment Comments Pt recently moved to [facility] with . Bedroom is on second floor at this time, but can have all needs met on the main floor after surgery as usually sleep in recliner. Main floor BR is a walk in shower, and upstairs is a tub/shower.   Self-Care   Usual Activity Tolerance good   Regular Exercise No   Fall history within last six months no   Activity/Exercise/Self-Care Comment Pt is IND with ADLs and IADLs at baseline.  had history of stroke but does not need care. 3 adult children in area that can provide support as needed for IADLs.   Instrumental Activities of Daily Living (IADL)   Previous Responsibilities housekeeping;meal prep;laundry;shopping;medication management;driving   IADL Comments Pt IND with IADLs at baseline and \"the \" for the house. Pt endorsing plans for [place] strategies for shopping and meal prep. Has plans for support for other IADLs from friends/family.   General Information   Onset of Illness/Injury or Date of Surgery 11/11/24   Referring Physician Ana Zimmer APRN CNP   Patient/Family Therapy Goal Statement (OT) Return home safely and increase strength/endurance.   Additional Occupational " "Profile Info/Pertinent History of Current Problem Karyn Gamez is a 69 year old female with a PMH of DM2 not on insulin, tobacco use, HTN, HLD, depression admitted on 11/7/2024 with chest pain and elevated troponin c/f NSTEMI found to have severe multivessel disease being evaluated for CABG.   Existing Precautions/Restrictions fall;cardiac   Left Upper Extremity (Weight-bearing Status) full weight-bearing (FWB)   Right Upper Extremity (Weight-bearing Status) full weight-bearing (FWB)   Left Lower Extremity (Weight-bearing Status) full weight-bearing (FWB)   Right Lower Extremity (Weight-bearing Status) full weight-bearing (FWB)   General Observations and Info Pt moving well with good endurance. Pt mentally preparing for CABG surgery, endorsing some anxiety.   Cognitive Status Examination   Cognitive Status Comments Pt good historian and conversational. No cognitive deficits at this time. Pt endorsing feeling at baseline, \"normal.\"   Visual Perception   Visual Impairment/Limitations corrective lenses for reading   Range of Motion Comprehensive   General Range of Motion bilateral upper extremity ROM WNL   Strength Comprehensive (MMT)   General Manual Muscle Testing (MMT) Assessment no strength deficits identified   Bed Mobility   Bed Mobility supine-sit;sit-supine   Supine-Sit [place] (Bed Mobility) independent   Sit-Supine [place] (Bed Mobility) independent   Transfers   Transfers sit-stand transfer   Sit-Stand Transfer   Sit-Stand [place] (Transfers) independent   Activities of Daily Living   BADL Assessment/Intervention bathing;upper body dressing;lower body dressing;grooming;toileting   Bathing Assessment/Intervention   [place] Level (Bathing) independent   Comment, (Bathing) per clinical judgement   Upper Body Dressing Assessment/Training   Comment, (Upper Body Dressing) per clinical judgement   [place] Level (Upper Body Dressing) independent   Lower Body Dressing Assessment/Training "   Comment, (Lower Body Dressing) per clinical judgement   [place] Level (Lower Body Dressing) independent   Grooming Assessment/Training   [place] Level (Grooming) independent   Comment, (Grooming) per clinical judgement   Toileting   Comment, (Toileting) per clinical judgement   [place] Level (Toileting) independent   Clinical Impression   Criteria for Skilled Therapeutic Interventions Met (OT) Yes, treatment indicated   OT Diagnosis Pt pre-op and anticipate decreased IND with ADLs following surgery. Pt currenlty willl benefit from CR to progress functional endurance and maintain strength.   OT Problem List-Impairments impacting ADL activity tolerance impaired;mobility;strength;post-surgical precautions   Assessment of Occupational Performance 1-3 Performance Deficits   Identified Performance Deficits functional endurance, bathing, toileting, LBD, UBD, G/H   Planned Therapy Interventions (OT) ADL retraining;IADL retraining;bed mobility training;strengthening;transfer training;home program guidelines;progressive activity/exercise   Clinical Decision Making Complexity (OT) problem focused assessment/low complexity   Risk & Benefits of therapy have been explained evaluation/treatment results reviewed;care plan/treatment goals reviewed;risks/benefits reviewed;current/potential barriers reviewed;participants voiced agreement with care plan;participants included;patient   OT Total Evaluation Time   OT Eval, Low Complexity Minutes (12825) 10   OT Goals   Therapy Frequency (OT) Daily   OT Predicted Duration/Target Date for Goal Attainment 11/25/24   OT Goals Cardiac Phase 1;Transfers;Bed Mobility;OT Goal 1;Toilet Transfer/Toileting;Upper Body Bathing;Lower Body Bathing;Upper Body Dressing;Lower Body Dressing;Hygiene/Grooming   OT: Understanding of cardiac education to maximize quality of life, condition management, and health outcomes Patient;Demonstrate;Verbalize   OT: Perform aerobic activity with stable  cardiovascular response continuous;15 minutes   OT: Functional/aerobic ambulation tolerance with stable cardiovascular response in order to return to home and community environment Independent   OT: Navigation of stairs simulating home set up with stable cardiovascular response in order to return to home and community environment Independent   OT: Goal 1 Pt will complete tub/shower transfer with IND by d/c.   OT Discharge Planning   OT Plan OT: Nustep, functional mobility in precautions, progress endurance   OT Discharge Recommendation (DC Rec) home with assist;home with outpatient cardiac rehab   OT Rationale for DC Rec Pt IND at [facility] and moving well pre-op. Pending status after surgery, anticipate home with assist and OP CR to progress functional endurance and IND with I/ADLs. Will update rec as therapy progresses.   OT Brief overview of current status IND   Total Session Time   Timed Code Treatment Minutes 16   Total Session Time (sum of timed and untimed services) 26

## 2024-11-12 NOTE — PROGRESS NOTES
Care Management Follow Up    Length of Stay (days): 5    Expected Discharge Date: 11/20/2024     Concerns to be Addressed:       Patient plan of care discussed at interdisciplinary rounds: Yes    Anticipated Discharge Disposition:                Anticipated Discharge Services:    Anticipated Discharge DME:      Patient/family educated on Medicare website which has current facility and service quality ratings:    Education Provided on the Discharge Plan:    Patient/Family in Agreement with the Plan:      Referrals Placed by CM/SW:    Private pay costs discussed: Not applicable    Discussed  Partnership in Safe Discharge Planning  document with patient/family: No     Handoff Completed: No, handoff not indicated or clinically appropriate    Additional Information:  RNCC visited pt room for case management assessment briefly, but patient away at ultrasound, so RNCC will need to stop back, RNCC will re-attempt tomorrow. Per patient's friend in room, patient has moved/changed address, but they are unsure of the address at this moment.     Next Steps: visit patient for case management assessment.     IRENE WeeksN, BA, RN, CMSRN, RNCC  [facility]  Covering Units 6C Beds [id number], [address]  Phone: [phone]  Available on [employer] search 6C 6502-14 RNCC, 6515-19 RNCC  After Hours [phone]     6C  Ph: [phone]     6B/CRNCC Weekend/holiday on [employer] or [phone]     [facility] RNCC ED/5 Ortho/5 Med/Surg [phone]     [facility] RNCC 6 Med/Surg 8A, [phone]     Observation SW and weekend/after hours phone: [phone]

## 2024-11-12 NOTE — PLAN OF CARE
"I: Monitored vitals and assessed pt status.   Changes during this shift: ultrasound of upper extremities completed    Running: Heparin 1150 unit(s)/hr     Vitals: /67 (BP Location: Left arm)   Pulse 78   Temp 98  F (36.7  C) (Oral)   Resp 17   Ht 1.7 m (5' 6.93\")   Wt 109 kg (240 lb 3.2 oz)   LMP  (LMP Unknown)   SpO2 97%   BMI 37.70 kg/m      A:   Neuro: Ax4 denies dizziness, lightheadedness, numbness and tingling. calls appropriately   Cardiac: SR 80s  Afebrile, VSS. Denies chest pain.   Respiratory: sating >95 on room air. denies  LS clear bilaterally.   Diet/appetite: Regular Diet. Good appetite.   Endocrine: BS check AC & HS. Pt on sliding scale insulin, no coverage given.  GI/:  Large BM this shift. Denies abdominal pain. Adequate urine output.   Activity: Moves independently, ambulated in hallways frequently  Skin: No new deficits  Line:  PIV infusing heparin drip  Goal Outcome Evaluation:      Plan of Care Reviewed With: patient    Overall Patient Progress: no changeOverall Patient Progress: no change    Outcome Evaluation: ambulated in hallways multiple times, denies SOB/chest pain      "

## 2024-11-12 NOTE — PROGRESS NOTES
CV Surgery Follow-up    Karyn Gamez is a 69 year old female with a PMH of DM2, tobacco use, HTN, HLD, depression who was found to have NSTEMI. Further workup demonstrated severe multivessel CAD. Echocardiogram demonstrates preserved biventricular function. CVTS was consulted for consideration of surgical revascularization. Currently on a heparin drip, chest pain free.    Bilateral carotid US complete, <50% stenosis bilaterally. Vein mapping completed, adequate diameter bilaterally and without evidence of thrombus.   Echo completed 11/8, LV EF 60-65%, no significant valvular abnormalities  Right radial used for angiogram. Left hand dominant. Per Dr. Pennington request, will order arterial mapping and Dr. Pennington will discuss risks/benefits of using this with patient.  Surgery plan: CABG,  timing with Dr. Pennington Thursday, Nov 14 with a p.m. case start  Pre-operative teaching completed; orders placed 11/12. Hold heparin on call to OR as ordered.  Will continue to follow. Other cares per primary team. Please page with questions or concerns.    Discussed with Dr. Pennington.    Brenda Casillas PA-C  Cardiothoracic Surgery  Pager [phone]    11:47 AM November 12, 2024

## 2024-11-12 NOTE — PROGRESS NOTES
[facility] [place]   Cardiology   Progress Note     ASSESSMENT/PLAN:  Karyn Gamez is a 69 year old female with a PMH of DM2 not on insulin, tobacco use, HTN, HLD, depression admitted on 11/7/2024 with chest pain and elevated troponin c/f NSTEMI found to have severe multivessel disease being evaluated for CABG.     # NSTEMI  # Multivessel CAD  # Hx stress-induced cardiomyopathy 08/2010  # Asymmetric Septal Hypertrophy without LVOT on Echo  # HTN  # HLD  Presented with 3 days of worsening exertional chest pain/pressure and burning, now occurring at rest and with dyspnea and radiation down left arm. Troponin peak at 45, now down trending. T wave inversion V4-V6 also noted on 2019 EKG. ACS risk factors include HTN, T2DM, HLD, smoker, family history of premature CAD. Coronary angiogram revealed multivessel disesase     - Coronary angiogram 11/8: severe multivessel disease, 95% pLCx, 90% D2, 80% dLAD, dRCA 99%, RPDA 80%  - TTE 11/8: EF 60-65%, asymmetric septal hypertrophy, small LV cavity size, No ABRAHAM or LVOT, IVC normal, no significant valve disease  - cMRI 11/13 to assess HCM  - CVTS consulted; CABG tentative Thursday with Dr. Pennington  - Continue heparin gtt until surgery per interventional cardiology  - Continue 81 mg ASA daily  - BB: Coreg 3.125 mg BID; holding for low HR (50's)  - ACEi/ARB: PTA Lisinopril-hydrochlorothiazide increased to 40-25 mg  - CCB: Norvasc 10 mg daily   - Statin: continue PTA Lipitor  - Daily BMP, Mg, replace lytes per protocol   - PRN Nitroglycerin and EKG for any chest pain  - Low threshold to use Nitroglycerin gtt if needed  - Dental consulted for 2 days of reported dental pain; pain related to ill fitting dentures, will need OP follow up  - PT/OT pre-CABG     # Small right hemisphere frontal lobe stroke 2011  Previously on atorvastatin 80 mg, baby aspirin, but not currently taking. Has had no residual symptoms since stroke in 2011. Will resume secondary prevention  at admission and assist with follow-up at discharge.  - ASA, statin as listed above     # DM2  Home regimen includes metformin and Ozempic, not currently taking. Will hold home regimen and manage with sliding scale insulin.  - A1C 6.0  - Medium sliding scale insulin   - POCT glucose checks  - Hold PTA Metformin while IP     # Depression  Previously taking Wellbutrin and Zoloft, not currently. Will not resume at admission, defer to outpatient follow-up.     # Current Smoker  4-5 cigarettes a day. Interested and motivated to quit.     # Possible TL  Per RN and patient, ongoing daytime tiredness after getting good sleep and snoring at night  - OP Sleep Study referral at time of discharge to assess TL     FEN: Low Sat Fat  Code status: Full  Prophylaxis:  heparin gtt  Isolation: None  Disposition: pending CABG timing/recovery    Medically Ready for Discharge: Anticipated in 5+ Days       Patient seen and discussed with Dr. Mccormick, who agrees with above plan.    Maryann BECKMAN, CNP  [facility] Cardiology Team    Interval History:  - No acute events overnight  - Pt reports feeling well this am, continues to endorse intermittent ADAN that resolves with rest  - She denies having any chest pain    Physical Exam:  Temp:  [97.4  F (36.3  C)-98.5  F (36.9  C)] 98  F (36.7  C)  Pulse:  [51-72] 72  Resp:  [15-23] 22  BP: (108-131)/(56-84) 131/84  SpO2:  [95 %-100 %] 100 %      Wt:   Wt Readings from Last 5 Encounters:   11/12/24 109 kg (240 lb 3.2 oz)   10/31/23 96.6 kg (213 lb)   03/24/23 95.7 kg (211 lb)   01/31/23 102.4 kg (225 lb 12.8 oz)   03/23/22 103.5 kg (228 lb 3.2 oz)       General: NAD  HEENT:  PERRLA, EOMI.   Neck: no JVD  CV: RRR. No murmur appreciated. No rubs or gallops. Peripheral radial pulse intact.  Resp: No increased work of breathing or use of accessory muscles, breathing comfortably on room air.  Lung sounds clear throughout/bilaterally  Abdomen:  Normal active bowel sounds.  Abdomen is soft. No distension,  non-tender to palpation.    Extremities: Warm. Capillary refill less than 3 sec. 2/4 radial pulses bilaterally.  2/4 pedal pulses bilaterally. No pre-tibial edema. No cyanosis or clubbing.  Skin:  Warm and dry. No erythema, rashes, ulceration or diaphoresis.  Neuro: Alert and oriented x3.      Medications:  Current Facility-Administered Medications   Medication Dose Route Frequency Provider Last Rate Last Admin    amLODIPine (NORVASC) tablet 10 mg  10 mg Oral QPM Lexus Em CNP   10 mg at 11/11/24 2029    aspirin EC tablet 81 mg  81 mg Oral Daily Lexus Em, CNP   81 mg at 11/12/24 0749    atorvastatin (LIPITOR) tablet 80 mg  80 mg Oral At Bedtime Shayy Toussaint DO   80 mg at 11/11/24 2222    [Held by provider] carvedilol (COREG) tablet 3.125 mg  3.125 mg Oral BID w/meals Lexus Em, CNP   3.125 mg at 11/09/24 1705    insulin aspart (NovoLOG) injection (RAPID ACTING)  1-7 Units Subcutaneous TID AC Shayy Toussaint DO        insulin aspart (NovoLOG) injection (RAPID ACTING)  1-5 Units Subcutaneous At Bedtime Shayy Toussaint DO        lisinopril-hydrochlorothiazide (ZESTORETIC) 20-12.5 MG per tablet 2 tablet  2 tablet Oral QPM Lexus Em CNP   2 tablet at 11/11/24 2037    polyethylene glycol (MIRALAX) Packet 17 g  17 g Oral Daily Shayy Toussaint DO   17 g at 11/12/24 0749    senna-docusate (SENOKOT-S/PERICOLACE) 8.6-50 MG per tablet 1 tablet  1 tablet Oral BID Shayy Toussaint DO   1 tablet at 11/10/24 0911    Or    senna-docusate (SENOKOT-S/PERICOLACE) 8.6-50 MG per tablet 2 tablet  2 tablet Oral BID Shayy Toussaint DO   2 tablet at 11/12/24 0749    sodium chloride (PF) 0.9% PF flush 3 mL  3 mL Intracatheter Q8H Shayy Toussaint DO   3 mL at 11/12/24 0750     Current Facility-Administered Medications   Medication Dose Route Frequency Provider Last Rate Last Admin    heparin 25,000 units in 0.45% NaCl 250 mL ANTICOAGULANT infusion  0-5,000 Units/hr Intravenous  Continuous Bismark Aguiar MD 11.5 mL/hr at 11/12/24 0747 1,150 Units/hr at 11/12/24 0747    medication instruction   Does not apply Continuous PRN Shayy Toussaint, DO           Labs:   CMP  Recent Labs   Lab 11/12/24  0743 11/12/24  0545 11/11/24  2227 11/11/24  1730 11/11/24  0747 11/11/24  0556 11/10/24  0857 11/10/24  0434 11/09/24  0719 11/09/24  0412 11/08/24  0136 11/07/24  1654   NA  --  140  --   --   --  139  --  138  --  139   < > 142   POTASSIUM  --  4.5  --   --   --  3.9  --  3.8  --  3.6   < > 3.9   CHLORIDE  --  104  --   --   --  101  --  100  --  102   < > 106   CO2  --  26  --   --   --  24  --  25  --  25   < > 24   ANIONGAP  --  10  --   --   --  14  --  13  --  12   < > 12   * 101* 99 109*   < > 112*   < > 110*   < > 101*   < > 119*   BUN  --  28.6*  --   --   --  29.1*  --  25.8*  --  15.9   < > 14.1   CR  --  0.88  --   --   --  0.89  --  0.78  --  0.70   < > 0.70   GFRESTIMATED  --  71  --   --   --  70  --  82  --  >90   < > >90   ANGELES  --  9.6  --   --   --  9.8  --  9.8  --  9.6   < > 9.3   MAG  --  2.3  --   --   --  2.0  --  2.0  --  2.1  --  2.2   PROTTOTAL  --   --   --   --   --   --   --   --   --   --   --  7.5   ALBUMIN  --   --   --   --   --   --   --   --   --   --   --  4.0   BILITOTAL  --   --   --   --   --   --   --   --   --   --   --  0.3   ALKPHOS  --   --   --   --   --   --   --   --   --   --   --  80   AST  --   --   --   --   --   --   --   --   --   --   --  26   ALT  --   --   --   --   --   --   --   --   --   --   --  15    < > = values in this interval not displayed.     CBC  Recent Labs   Lab 11/12/24  0545 11/11/24  0556 11/10/24  0434 11/09/24  0412   WBC 7.1 8.2 8.6 9.0   RBC 4.29 4.18 4.47 4.39   HGB 12.7 12.1 13.1 13.0   HCT 38.6 37.5 39.4 38.6   MCV 90 90 88 88   MCH 29.6 28.9 29.3 29.6   MCHC 32.9 32.3 33.2 33.7   RDW 13.3 13.2 13.1 13.2    345 345 326     INRNo lab results found in last 7 days.  Arterial Blood GasNo lab results  found in last 7 days.  Troponin T High Sensitivity   Lab Results   Component Value Date/Time    CTROPT 32 (H) 11/08/2024 04:23 AM       Diagnostics:  ECG 11/7/2024: NSR, HR 62, ST depression V5-6, TWI I       Echo 11/8/2024:  Interpretation Summary  Global and regional left ventricular function is normal with an EF of 60-65%.  Asymmetric septal hypertrophy; septal wall is 1.8 cm, posterior wall is 0.8 cm.  Left ventricular cavity size is small.  No ABRAHAM, or LVOT gradient.  Global right ventricular function is normal.  The right ventricle is normal size.  The inferior vena cava was normal in size with preserved respiratory variability.  No significant valvular abnormalities present.     This study was compared with the study from 2011 .  No significant changes noted.     Coronary angiogram 11/8/2024:  Conclusion  significant three-vessel CAD     Coronary Findings  Diagnostic  Dominance: Right  Left Anterior Descending   Prox LAD to Mid LAD lesion is 30% stenosed.   Mid LAD lesion is 80% stenosed.   Mid LAD to Dist LAD lesion is 70% stenosed.   Dist LAD-1 lesion is 80% stenosed.   Dist LAD-2 lesion is 70% stenosed.      Second Diagonal Branch   2nd Diag lesion is 90% stenosed.      Third Diagonal Branch   3rd Diag lesion is 70% stenosed.      Ramus Intermedius   Ramus lesion is 40% stenosed.      Left Circumflex   Prox Cx lesion is 95% stenosed.   Mid Cx lesion is 30% stenosed.      Right Coronary Artery   Prox RCA lesion is 40% stenosed.   Mid RCA lesion is 40% stenosed.   Dist RCA lesion is 99% stenosed.      Right Posterior Descending Artery   RPDA lesion is 80% stenosed.         Intervention   No interventions have been documented.       No results found for this or any previous visit (from the past 24 hours).    Medical Decision Making   60 MINUTES SPENT BY ME on the date of service doing chart review, history, exam, documentation & further activities per the note.   Friend present at bedside during  assessment

## 2024-11-13 ENCOUNTER — APPOINTMENT (OUTPATIENT)
Dept: OCCUPATIONAL THERAPY | Facility: CLINIC | Age: 69
DRG: 281 | End: 2024-11-13
Payer: COMMERCIAL

## 2024-11-13 ENCOUNTER — APPOINTMENT (OUTPATIENT)
Dept: MRI IMAGING | Facility: CLINIC | Age: 69
DRG: 281 | End: 2024-11-13
Payer: COMMERCIAL

## 2024-11-13 LAB
ANION GAP SERPL CALCULATED.3IONS-SCNC: 11 MMOL/L (ref 7–15)
BASOPHILS # BLD AUTO: 0.1 10E3/UL (ref 0–0.2)
BASOPHILS NFR BLD AUTO: 1 %
BUN SERPL-MCNC: 28.6 MG/DL (ref 8–23)
CALCIUM SERPL-MCNC: 9.8 MG/DL (ref 8.8–10.4)
CHLORIDE SERPL-SCNC: 103 MMOL/L (ref 98–107)
CREAT SERPL-MCNC: 0.85 MG/DL (ref 0.51–0.95)
EGFRCR SERPLBLD CKD-EPI 2021: 74 ML/MIN/1.73M2
EOSINOPHIL # BLD AUTO: 0.1 10E3/UL (ref 0–0.7)
EOSINOPHIL NFR BLD AUTO: 1 %
ERYTHROCYTE [DISTWIDTH] IN BLOOD BY AUTOMATED COUNT: 13.2 % (ref 10–15)
GLUCOSE BLDC GLUCOMTR-MCNC: 120 MG/DL (ref 70–99)
GLUCOSE BLDC GLUCOMTR-MCNC: 121 MG/DL (ref 70–99)
GLUCOSE BLDC GLUCOMTR-MCNC: 121 MG/DL (ref 70–99)
GLUCOSE BLDC GLUCOMTR-MCNC: 99 MG/DL (ref 70–99)
GLUCOSE SERPL-MCNC: 104 MG/DL (ref 70–99)
HCO3 SERPL-SCNC: 26 MMOL/L (ref 22–29)
HCT VFR BLD AUTO: 38.5 % (ref 35–47)
HGB BLD-MCNC: 12.6 G/DL (ref 11.7–15.7)
IMM GRANULOCYTES # BLD: 0 10E3/UL
IMM GRANULOCYTES NFR BLD: 0 %
LYMPHOCYTES # BLD AUTO: 3.1 10E3/UL (ref 0.8–5.3)
LYMPHOCYTES NFR BLD AUTO: 37 %
MAGNESIUM SERPL-MCNC: 2.1 MG/DL (ref 1.7–2.3)
MCH RBC QN AUTO: 29.4 PG (ref 26.5–33)
MCHC RBC AUTO-ENTMCNC: 32.7 G/DL (ref 31.5–36.5)
MCV RBC AUTO: 90 FL (ref 78–100)
MONOCYTES # BLD AUTO: 1 10E3/UL (ref 0–1.3)
MONOCYTES NFR BLD AUTO: 12 %
NEUTROPHILS # BLD AUTO: 4.2 10E3/UL (ref 1.6–8.3)
NEUTROPHILS NFR BLD AUTO: 49 %
NRBC # BLD AUTO: 0 10E3/UL
NRBC BLD AUTO-RTO: 0 /100
PLATELET # BLD AUTO: 287 10E3/UL (ref 150–450)
POTASSIUM SERPL-SCNC: 4.1 MMOL/L (ref 3.4–5.3)
RBC # BLD AUTO: 4.28 10E6/UL (ref 3.8–5.2)
SODIUM SERPL-SCNC: 140 MMOL/L (ref 135–145)
UFH PPP CHRO-ACNC: 0.33 IU/ML
WBC # BLD AUTO: 8.4 10E3/UL (ref 4–11)

## 2024-11-13 PROCEDURE — 97110 THERAPEUTIC EXERCISES: CPT | Mod: GO | Performed by: OCCUPATIONAL THERAPIST

## 2024-11-13 PROCEDURE — 85004 AUTOMATED DIFF WBC COUNT: CPT

## 2024-11-13 PROCEDURE — 75561 CARDIAC MRI FOR MORPH W/DYE: CPT

## 2024-11-13 PROCEDURE — 120N000005 HC R&B MS OVERFLOW UMMC

## 2024-11-13 PROCEDURE — 85520 HEPARIN ASSAY: CPT | Performed by: INTERNAL MEDICINE

## 2024-11-13 PROCEDURE — 80048 BASIC METABOLIC PNL TOTAL CA: CPT

## 2024-11-13 PROCEDURE — 250N000013 HC RX MED GY IP 250 OP 250 PS 637: Performed by: NURSE PRACTITIONER

## 2024-11-13 PROCEDURE — 250N000013 HC RX MED GY IP 250 OP 250 PS 637

## 2024-11-13 PROCEDURE — 99418 PROLNG IP/OBS E/M EA 15 MIN: CPT | Mod: 25 | Performed by: INTERNAL MEDICINE

## 2024-11-13 PROCEDURE — 75561 CARDIAC MRI FOR MORPH W/DYE: CPT | Mod: 26 | Performed by: INTERNAL MEDICINE

## 2024-11-13 PROCEDURE — 82565 ASSAY OF CREATININE: CPT

## 2024-11-13 PROCEDURE — 99418 PROLNG IP/OBS E/M EA 15 MIN: CPT | Mod: 25 | Performed by: NURSE PRACTITIONER

## 2024-11-13 PROCEDURE — 83735 ASSAY OF MAGNESIUM: CPT | Performed by: NURSE PRACTITIONER

## 2024-11-13 PROCEDURE — A9585 GADOBUTROL INJECTION: HCPCS

## 2024-11-13 PROCEDURE — 255N000002 HC RX 255 OP 636

## 2024-11-13 PROCEDURE — 250N000011 HC RX IP 250 OP 636: Performed by: SURGERY

## 2024-11-13 PROCEDURE — 99233 SBSQ HOSP IP/OBS HIGH 50: CPT | Mod: 25 | Performed by: NURSE PRACTITIONER

## 2024-11-13 PROCEDURE — 36415 COLL VENOUS BLD VENIPUNCTURE: CPT | Performed by: INTERNAL MEDICINE

## 2024-11-13 PROCEDURE — 99233 SBSQ HOSP IP/OBS HIGH 50: CPT | Mod: 25 | Performed by: INTERNAL MEDICINE

## 2024-11-13 RX ORDER — GADOBUTROL 604.72 MG/ML
13 INJECTION INTRAVENOUS ONCE
Status: COMPLETED | OUTPATIENT
Start: 2024-11-13 | End: 2024-11-13

## 2024-11-13 RX ADMIN — SENNOSIDES AND DOCUSATE SODIUM 1 TABLET: 8.6; 5 TABLET ORAL at 21:31

## 2024-11-13 RX ADMIN — GADOBUTROL 13 ML: 604.72 INJECTION INTRAVENOUS at 13:53

## 2024-11-13 RX ADMIN — HEPARIN SODIUM 1150 UNITS/HR: 10000 INJECTION, SOLUTION INTRAVENOUS at 17:01

## 2024-11-13 RX ADMIN — SENNOSIDES AND DOCUSATE SODIUM 2 TABLET: 8.6; 5 TABLET ORAL at 07:57

## 2024-11-13 RX ADMIN — ASPIRIN 81 MG CHEWABLE TABLET 81 MG: 81 TABLET CHEWABLE at 07:57

## 2024-11-13 RX ADMIN — ATORVASTATIN CALCIUM 80 MG: 80 TABLET, FILM COATED ORAL at 21:31

## 2024-11-13 RX ADMIN — AMLODIPINE BESYLATE 10 MG: 10 TABLET ORAL at 21:31

## 2024-11-13 RX ADMIN — HYDROXYZINE HYDROCHLORIDE 50 MG: 25 TABLET, FILM COATED ORAL at 04:03

## 2024-11-13 RX ADMIN — LISINOPRIL AND HYDROCHLOROTHIAZIDE 2 TABLET: 12.5; 2 TABLET ORAL at 21:31

## 2024-11-13 ASSESSMENT — ACTIVITIES OF DAILY LIVING (ADL)
ADLS_ACUITY_SCORE: 0
DEPENDENT_IADLS:: INDEPENDENT
ADLS_ACUITY_SCORE: 0

## 2024-11-13 NOTE — PLAN OF CARE
D: pt admitted on 11/7/2024 with chest pain and elevated troponin c/f NSTEMI found to have severe multivessel disease being evaluated for CABG with PMH of DM2 not on insulin, tobacco use, HTN, HLD, depression.     I: Monitored vitals and assessed pt status.   Changes during this shift:     Running: Heparin  PRN Med:     Neuro: A&Ox4.   Cardiac: SR. VSS.   Respiratory:  on RA.  GI/: Adequate urine output.   Diet/appetite: Regular diet..  Activity: Up Ad Isamar  Pain: At acceptable level on current regimen.   Skin: No new deficits noted.  LDA's: PIV    Plan: Continue with POC. Notify primary team with changes.    Goal Outcome Evaluation:    Problem: Comorbidity Management  Goal: Maintenance of Behavioral Health Symptom Control  Intervention: Maintain Behavioral Health Symptom Control  Recent Flowsheet Documentation  Taken 11/13/2024 0409 by Jd Harmon RN  Medication Review/Management: medications reviewed  Taken 11/13/2024 0042 by Jd Harmon RN  Medication Review/Management: medications reviewed  Taken 11/12/2024 1932 by Jd Harmon RN  Medication Review/Management: medications reviewed  Goal: Blood Glucose Levels Within Targeted Range  Intervention: Monitor and Manage Glycemia  Recent Flowsheet Documentation  Taken 11/13/2024 0409 by Jd Harmon RN  Medication Review/Management: medications reviewed  Taken 11/13/2024 0042 by Jd Harmon RN  Medication Review/Management: medications reviewed  Taken 11/12/2024 1932 by Jd Harmon RN  Medication Review/Management: medications reviewed  Goal: Blood Pressure in Desired Range  Intervention: Maintain Blood Pressure Management  Recent Flowsheet Documentation  Taken 11/13/2024 0409 by Jd Harmon RN  Medication Review/Management: medications reviewed  Taken 11/13/2024 0042 by Jd Harmon RN  Medication Review/Management: medications reviewed  Taken 11/12/2024 1932 by Jd Harmon RN  Medication Review/Management: medications reviewed

## 2024-11-13 NOTE — PROGRESS NOTES
CV Surgery Follow-up    Karyn Gamez is a 69 year old female with a PMH of DM2, tobacco use, HTN, HLD, depression who was found to have NSTEMI. Further workup demonstrated severe multivessel CAD. Echocardiogram demonstrates preserved biventricular function. CVTS was consulted for consideration of surgical revascularization. Currently on a heparin drip, chest pain free.    Bilateral carotid US complete, <50% stenosis bilaterally. Vein mapping completed, adequate diameter bilaterally and without evidence of thrombus.   Echo completed 11/8, LV EF 60-65%, no significant valvular abnormalities  Right radial used for angiogram. Left hand dominant. Per Dr. Pennington request, will order arterial mapping and Dr. Pennington will discuss risks/benefits of using this with patient. Amado's test completed bedside, good flow through palmar arch with radial compression  Surgery plan: CABG with Dr. Pennington Thursday, Nov 14 with a p.m. case start  Pre-operative teaching completed; orders placed 11/12. Hold heparin on call to OR as ordered.  Will continue to follow. Other cares per primary team. Please page with questions or concerns.    Discussed with Dr. Pennington.    Suzie Leiva PA-C   Cardiothoracic Surgery   November 13, 2024 at 4:50 PM  Please reach me on [employer] or secure chat

## 2024-11-13 NOTE — ANESTHESIA PREPROCEDURE EVALUATION
Anesthesia Pre-Procedure Evaluation    Patient: Karyn Gamez   MRN: [id number] : 1955        Procedure : Procedure(s):  CORONARY ARTERY BYPASS GRAFT  POSSIBLE LEFT RADIAL ARTERY HARVEST          Past Medical History:   Diagnosis Date    Cervicalgia 2005: Thrown from a horse - wearing a helmet - and struck side of head and neck, heard crepitation, no loc, Able to walk after injury.  persistant pain in neck relieved with ibuprofen, stiff but can move with ROM.  No changes in hands and feet sensation/motor.    Coronary artery disease     Depressive disorder, not elsewhere classified     Hypertension     Obesity, unspecified       Past Surgical History:   Procedure Laterality Date    ANGIOGRAM      negative    COLONOSCOPY N/A 3/24/2023    Procedure: COLONOSCOPY, WITH HOT POLYPECTOMY AND RESEARCH BIOPSY;  Surgeon: Bret Velez MD;  Location: [facility] OR    CV CORONARY ANGIOGRAM N/A 2024    Procedure: Coronary Angiogram;  Surgeon: Emir Brand MD;  Location:  [facility]    HYSTERECTOMY, PAP NO LONGER INDICATED      BSO      No Known Allergies   Social History     Tobacco Use    Smoking status: Former     Current packs/day: 0.00     Average packs/day: 0.5 packs/day for 30.0 years (15.0 ttl pk-yrs)     Types: Cigarettes     Start date: 1981     Quit date: 2011     Years since quittin.2    Smokeless tobacco: Never    Tobacco comments:     smoking 3-4 cigs per day   Substance Use Topics    Alcohol use: Yes     Comment: rarely      Wt Readings from Last 1 Encounters:   24 108.8 kg (239 lb 14.4 oz)      Karyn Gamez is a 69 year old female with PMH of DM2 not on insulin, tobacco use, HTN, HLD, depression admitted on 2024 with chest pain and elevated troponin c/f NSTEMI found to have severe multivessel disease being evaluated for CABG.       Anesthesia Evaluation            ROS/MED HX  ENT/Pulmonary:     (+)                tobacco  use,                        Neurologic: Comment: Small right hemisphere frontal lobe stroke 2011    Cartoid US:  Impression:     1. Right side:         Degree of stenosis of the internal carotid artery: Less than 50 %.  .     2. Left side:          Degree of stenosis of the internal carotid artery: Less than 50 %.      (+)          CVA (1/2014), date: 2011, without deficits,                    Cardiovascular: Comment: cMR:  1. The left ventricle cavity size is small. There is asymmetric septal hypertrophy with maximal septal  thickness 2.0 cm. The global systolic function is normal. The LVEF is 74%. There are no regional wall  motion abnormalities.     2. The right ventricle is normal in cavity size. The global systolic function is normal. The RVEF is 70%.      3. Both atria are normal in size.     4. There is systolic anterior motion of the mitral valve and systolic flow acceleration in the LVOT. There  is mild mitral regurgitation. There is apical displacement of the papillary muscles.      5. Late gadolinium enhancement imaging shows mid-myocardial enhancement in the basal to mid lateral  segments.      6. There is a trivial pericardial effusion.      7. There is no clear intracardiac thrombus, although the left atrial appendage was incompletely visualized.      CONCLUSIONS:   Asymmetric septal hypertrophy with maximal wall thickness 2.0 cm. Systolic anterior motion of the mitral  valve is present. There is apical displacement of the papillary muscles. LGE imaging shows mid-myocardial  enhancement in a non-ischemic pattern. Collectively, these findings suggest obstructive hypertrophic  cardiomyopathy. Consider genetic testing for HCM.   Normal biventricular systolic function, LVEF 74%, RVEF 70%.         LUE artery:  IMPRESSION: Patent left radial and ulnar arteries to the wrist with  measurements as in the report. Intact palmar arch suggested.         (+) Dyslipidemia hypertension- -  CAD (severe 3 vessel  disease) -  - -                                 Previous cardiac testing   Echo: Date: 11/8/24 Results:  Interpretation Summary  Global and regional left ventricular function is normal with an EF of 60-65%.  Asymmetric septal hypertrophy; septal wall is 1.8 cm, posterior wall is 0.8  cm.  Left ventricular cavity size is small.  No ABRAHAM, or LVOT gradient.  Global right ventricular function is normal.  The right ventricle is normal size.  The inferior vena cava was normal in size with preserved respiratory  variability.  No significant valvular abnormalities present.      Stress Test:  Date: Results:    ECG Reviewed:  Date: 11/7/24 Results:  Sinus rhythm  Minimal voltage criteria for LVH, may be normal variant ( Artur product )  Marked ST abnormality, possible lateral subendocardial injury  Abnormal ECG  Unconfirmed report - interpretation of this ECG is computer generated - see medical record for final interpretation    Cath:  Date: 2024 Results:     Ramus lesion is 40% stenosed.     Prox Cx lesion is 95% stenosed.     Mid Cx lesion is 30% stenosed.     Dist LAD-2 lesion is 70% stenosed.     Mid LAD to Dist LAD lesion is 70% stenosed.     3rd Diag lesion is 70% stenosed.     Mid LAD lesion is 80% stenosed.     2nd Diag lesion is 90% stenosed.     Dist LAD-1 lesion is 80% stenosed.     Prox LAD to Mid LAD lesion is 30% stenosed.     Dist RCA lesion is 99% stenosed.     Mid RCA lesion is 40% stenosed.     Prox RCA lesion is 40% stenosed.     RPDA lesion is 80% stenosed.      METS/Exercise Tolerance:     Hematologic:       Musculoskeletal: Comment: Possible neck injury with horse accident in 2005      GI/Hepatic:       Renal/Genitourinary:     (+) renal disease, type: CRI,            Endo:     (+)  type II DM,       Diabetic complications: nephropathy cardiac problems.      Obesity (BMI 38),       Psychiatric/Substance Use:     (+) psychiatric history depression       Infectious Disease:       Malignancy:       Other:                OUTSIDE LABS:  CBC:   Lab Results   Component Value Date    WBC 8.4 11/13/2024    WBC 7.1 11/12/2024    HGB 12.6 11/13/2024    HGB 12.7 11/12/2024    HCT 38.5 11/13/2024    HCT 38.6 11/12/2024     11/13/2024     11/12/2024     BMP:   Lab Results   Component Value Date     11/13/2024     11/12/2024    POTASSIUM 4.1 11/13/2024    POTASSIUM 4.5 11/12/2024    CHLORIDE 103 11/13/2024    CHLORIDE 104 11/12/2024    CO2 26 11/13/2024    CO2 26 11/12/2024    BUN 28.6 (H) 11/13/2024    BUN 28.6 (H) 11/12/2024    CR 0.85 11/13/2024    CR 0.88 11/12/2024     (H) 11/13/2024     (H) 11/13/2024     COAGS:   Lab Results   Component Value Date    PTT 28 08/13/2011    INR 1.06 08/13/2011     POC:   Lab Results   Component Value Date     (H) 06/08/2019     HEPATIC:   Lab Results   Component Value Date    ALBUMIN 4.0 11/07/2024    PROTTOTAL 7.5 11/07/2024    ALT 15 11/07/2024    AST 26 11/07/2024    ALKPHOS 80 11/07/2024    BILITOTAL 0.3 11/07/2024     OTHER:   Lab Results   Component Value Date    A1C 6.0 (H) 11/07/2024    ANGELES 9.8 11/13/2024    PHOS 4.3 08/12/2011    MAG 2.1 11/13/2024    TSH 3.54 11/07/2024       Anesthesia Plan    ASA Status:  3    NPO Status:  NPO Appropriate    Anesthesia Type: General.     - Airway: ETT   Induction: Intravenous.   Maintenance: Balanced.   Techniques and Equipment:     - Lines/Monitors: Arterial Line, Central Line, BIS, NIRS, LESLIE, CVP     - Blood: Cell Saver, T&S     - Drips/Meds: Norepi, Vasopressin, Epinephrine     Consents           - Patient is DNR/DNI Status: No          Postoperative Care    Pain management: Multi-modal analgesia.   PONV prophylaxis: Ondansetron (or other 5HT-3), Dexamethasone or Solumedrol     Comments:               Valerie Lara MD    I have reviewed the pertinent notes and labs in the chart from the past 30 days and (re)examined the patient.  Any updates or changes from those notes are reflected in this  "note.               # Hypertension: Noted on problem list            # Obesity: Estimated body mass index is 37.65 kg/m  as calculated from the following:    Height as of this encounter: 1.7 m (5' 6.93\").    Weight as of this encounter: 108.8 kg (239 lb 14.4 oz).      # Financial/Environmental Concerns: none        "

## 2024-11-13 NOTE — PROGRESS NOTES
0700 - 1500      Cardiac: VSS. SR.  Resp: RA. SATs>95. Pt denies SOB.  Neuro: A&Ox4.   GI/: BM in AM, adequate urine output.   Diet: Low saturated fat, Na <2400  Activity: Up ad gregory. Independent.  Pain: Pt denies pain.  Skin: No new deficits noted  Lines: R PIV infusing heparin.    Shift Updates: Cardiac MRI during shift. Pt reported sharp chest pain during MRI. Pt evaluated in MRI and cleared by an ICU physician. Pain resolved quickly and did not repeat.    Plan: Continue with POC. Notify primary team with any changes.

## 2024-11-13 NOTE — PROGRESS NOTES
CLINICAL NUTRITION SERVICES - ASSESSMENT NOTE     Nutrition Prescription    RECOMMENDATIONS FOR MDs/PROVIDERS TO ORDER:  None at this time     Malnutrition Status:    Unable to determine due to pt not in room during attempts to see    Recommendations already ordered by Registered Dietitian (RD):  Prn snack/supplement order    Future/Additional Recommendations:  -Rec continue current diet, as ordered. Encourage pt to self-select tolerated foods/beverages (altered dentition). Rec small, frequent meals and use of oral supplements. Pt with increased protein needs once post-op.     -Monitor BG control. Hgb A1c of 6 on 11/7/24. BG was 104 on 11/13.   -Check a vitamin D lab with a CRP when appropriate. Pt's vitamin D deficiency lab was 23 on 10/14/16.  -Order a multivitamin with minerals to help meet micronutrient needs, if inadequate intake.   -Monitor need to adjust scheduled bowel regimen.    If TF becomes nutrition plan of care, would rec place feeding tube (FT) and initiate TFs, Osmolite 1.5 (or equivalent, concentrated, maintenance TF formula) and order Prosource TF 20 modulars, 1 pkt TID. Initiate TFs at 15 mL/hr, advancing rate by 10 mL Q 8 hrs (or per provider discretion, pending hemodynamic stability) to goal rate of 40 mL/hr. Osmolite 1.5 Dain (or equivalent) @ goal of 40ml/hr  (960ml/day) + Prosource TF 20 modulars, 1 pkt TID, provides: 1680 kcals, 120 g PRO, 731 ml free H20, 195 g CHO, and 0 g fiber daily.      If begin tube feeds:    - Flush FT with 30 mL water Q 4 hrs for patency. Rec provider adjust free water flushes as needed, pending fluid status.   - Ensure K+/Mg++/Phos labs are ordered daily until TFs advance to goal infusion to evaluate for sx of refeeding with nutrition received. If lytes trend low, aggressively replace lytes. Do not advance TF unless K+ is = or > 3, Mg++ is = or > 1.5, and Phos is = or > 1.9.   - If not already ordered, order a multivitamin/mineral (certavite or liquid  "multivitamin/minerals 15 mL/day via FT) to help ensure micronutrient needs being met with suspected hypermetabolic demands and potential interruptions to TF infusions.   - Monitor TF and possible need to adjust nutrition support regimen if necessary, pending medical course and nutrition status.     - Adjust insulin if applicable vs notify Endo team if following.    - Monitor need to check a metabolic cart study.    - Send a nutrition consult for \"Registered Dietitian to Order TF per Medical Nutrition Therapy Guidelines\" if desire RD to order TFs.     REASON FOR ASSESSMENT  Karyn Gamez is a/an 69 year old female assessed by the dietitian for LOS    NUTRITION/ADDITIONAL HISTORY  Pt assessed by RD for new bariatric nutrition consult 10/5/2016.     Pt not in room during attempts to see 11/13.     CURRENT NUTRITION ORDERS  Diet: Low Saturated Fat/2400 mg Sodium since 11/9.   Intake/Tolerance: Tolerating diet. Per nursing flowsheets (% intake), pt consuming 0-100% (NPO for a portion of the day) on 11/8, % with a good appetite 11/9, 0% with a good appetite 11/10 (yogurt, grapes, food from home), % of meals with a poor to good appetite 11/11, and 100% with a good appetite 11/12-11/13.     LABS  Labs reviewed    MEDICATIONS  Medications reviewed    ANTHROPOMETRICS  Height: 170 cm (5' 6.929\")  Most Recent Weight: 108.8 kg (239 lb 14.4 oz)    IBW: 61.4 kg    BMI: Obesity Grade II BMI 35-39.9  Weight History:  102.4 kg (1/31/23), 95.7 kg (3/24/23), 96.6 kg (10/31/23), 110.2 kg (11/7/24, admit), 108.8 kg (11/13) - Has gained wt over the past year.   Dosing Weight: 73 kg (adjusted, based on lowest wt so far this admission of 108.8 kg on 11/13)    ASSESSED NUTRITION NEEDS (for inpatient hospital stay)  Estimated Energy Needs: 5854-1506 kcals/day (20 - 25 kcals/kg)  Justification: Estimated needs with BMI  Estimated Protein Needs:  grams protein/day (1.2 - 1.5+ grams of pro/kg)  Justification: Increased needs " "once post-op, pending renal function  Estimated Fluid Needs: 3778-0377 mL/day (25 - 30 mL/kg)   Justification: Maintenance needs or per provider, pending fluid status    PHYSICAL FINDINGS/OTHER FINDINGS  See malnutrition section below.  GI: Having zero to one stool/s daily on average. Last Bowel Movement: 11/12/24. Stools are formed and brown.   HEENT: Dental appliance present  WOC: Not following at this time    MALNUTRITION  % Intake: Decreased intake does not meet criteria  % Weight Loss: Not meeting this criteria.     Subcutaneous Fat Loss: Unable to assess  Muscle Loss: Unable to assess  Fluid Accumulation/Edema: None noted  Malnutrition Diagnosis: Unable to determine due to pt not in room during attempts to see    NUTRITION DIAGNOSIS  Inadequate oral intake related to decreased appetite intermittently as evidenced by 0-25% of meals at times.       INTERVENTIONS  Implementation  See above    Goals  Patient to consume % of nutritionally adequate meal trays TID, or the equivalent with supplements/snacks.     Monitoring/Evaluation  Progress toward goals will be monitored and evaluated per protocol.       Jillian Rey, MS, RD, LD, CNSC      No longer available via pager  6C (beds [id number] and [id number]): Vocera \"6C Clinical Dietitian\"   Weekend/Holiday: Vocera \"Weekend Clinical Dietitian\"    "

## 2024-11-13 NOTE — PROGRESS NOTES
[facility]   Cardiology   Progress Note     ASSESSMENT/PLAN:  Karyn Gamez is a 69 year old female with PMH of DM2 not on insulin, tobacco use, HTN, HLD, depression admitted on 11/7/2024 with chest pain and elevated troponin c/f NSTEMI found to have severe multivessel disease being evaluated for CABG.     # NSTEMI  # Multivessel CAD  # Hx stress-induced cardiomyopathy 08/2010  # Asymmetric Septal Hypertrophy without LVOT on Echo  # HTN  # HLD  Presented with 3 days of worsening exertional chest pain/pressure and burning, now occurring at rest and with dyspnea and radiation down left arm. Troponin peak at 45, now down trending. T wave inversion V4-V6 also noted on 2019 EKG. ACS risk factors include HTN, T2DM, HLD, smoker, family history of premature CAD. Coronary angiogram revealed multivessel disesase     - Coronary angiogram 11/8: severe multivessel disease, 95% pLCx, 90% D2, 80% dLAD, dRCA 99%, RPDA 80%  - TTE 11/8: EF 60-65%, asymmetric septal hypertrophy, small LV cavity size, No ABRAHAM or LVOT, IVC normal, no significant valve disease  - cMRI 11/13 to assess HCM  - CVTS consulted; CABG 11/14 1400 with Dr. Pennington, NPO 0000  - Continue heparin gtt until surgery per interventional cardiology  - Continue 81 mg ASA daily  - BB: Discontinued given low HR, normal EF  - ACEi/ARB: PTA Lisinopril-hydrochlorothiazide increased to 40-25 mg  - CCB: Norvasc 10 mg daily   - Statin: continue PTA Lipitor  - Daily BMP, Mg, replace lytes per protocol   - PRN Nitroglycerin and EKG for any chest pain  - Low threshold to use Nitroglycerin gtt if needed  - Dental consulted for 2 days of reported dental pain; pain related to ill fitting dentures, will need OP follow up  - PT/OT pre-CABG     # Small right hemisphere frontal lobe stroke 2011  Previously on atorvastatin 80 mg, baby aspirin, but not currently taking. Has had no residual symptoms since stroke in 2011. Will resume secondary prevention at  admission and assist with follow-up at discharge.  - ASA, statin as listed above     # DM2  Home regimen includes metformin and Ozempic, not currently taking. Will hold home regimen and manage with sliding scale insulin.  - A1C 6.0  - Medium sliding scale insulin   - POCT glucose checks  - Hold PTA Metformin while IP     # Depression  Previously taking Wellbutrin and Zoloft, not currently. Will not resume at admission, defer to outpatient follow-up.     # Current Smoker  # Morbid obesity with BMI 38  4-5 cigarettes a day. Interested and motivated to quit.  Lifestyle changes recommended     # Possible TL  Per RN and patient, ongoing daytime tiredness after getting good sleep and snoring at night  - OP Sleep Study referral at time of discharge to assess TL     FEN: Low Sat Fat, NPO 0000  Code status: Full  Prophylaxis:  heparin gtt  Isolation: None  Disposition: pending CABG timing/recovery    Medically Ready for Discharge: Anticipated in 5+ Days     Patient seen and discussed with Dr. Mccormick, who agrees with above plan.    Maryann BECKMAN, CNP  [facility] Cardiology Team    Interval History:  - No acute events overnight  - Pt remains on heparin gtt  - this am, pt reports feeling well, she denies any chest pain, SOB, lightheadedness, dizziness    Physical Exam:  Temp:  [97.6  F (36.4  C)-98  F (36.7  C)] 98  F (36.7  C)  Pulse:  [55-85] 73  Resp:  [17-26] 20  BP: (105-135)/(66-96) 121/66  SpO2:  [94 %-99 %] 98 %      Wt:   Wt Readings from Last 5 Encounters:   11/13/24 108.8 kg (239 lb 14.4 oz)   10/31/23 96.6 kg (213 lb)   03/24/23 95.7 kg (211 lb)   01/31/23 102.4 kg (225 lb 12.8 oz)   03/23/22 103.5 kg (228 lb 3.2 oz)       General: NAD  HEENT:  PERRLA, EOMI.   Neck: no JVD.   CV: RRR. No murmur appreciated. No rubs or gallops. Peripheral radial pulse intact.  Resp: No increased work of breathing or use of accessory muscles, breathing comfortably on room air.  Lung sounds clear throughout/bilaterally  Abdomen:  Normal  active bowel sounds.  Abdomen is soft. No distension, non-tender to palpation.    Extremities: Warm. Capillary refill less than 3 sec. 2/4 radial pulses bilaterally.  2/4 pedal pulses bilaterally. No pre-tibial edema. No cyanosis or clubbing.  Skin:  Warm and dry. No erythema, rashes, ulceration or diaphoresis.  Neuro: Alert and oriented x3.      Medications:  Current Facility-Administered Medications   Medication Dose Route Frequency Provider Last Rate Last Admin    amLODIPine (NORVASC) tablet 10 mg  10 mg Oral QPM Lexus Em CNP   10 mg at 11/12/24 2001    aspirin EC tablet 81 mg  81 mg Oral Daily Lexus Em CNP   81 mg at 11/13/24 0757    atorvastatin (LIPITOR) tablet 80 mg  80 mg Oral At Bedtime Shayy Toussaint DO   80 mg at 11/12/24 2159    [Held by provider] carvedilol (COREG) tablet 3.125 mg  3.125 mg Oral BID w/meals Lexus Em CNP   3.125 mg at 11/09/24 1705    insulin aspart (NovoLOG) injection (RAPID ACTING)  1-7 Units Subcutaneous TID AC Shayy Toussaint DO        insulin aspart (NovoLOG) injection (RAPID ACTING)  1-5 Units Subcutaneous At Bedtime Shayy Toussaint DO        lisinopril-hydrochlorothiazide (ZESTORETIC) 20-12.5 MG per tablet 2 tablet  2 tablet Oral QPM Lexus Em CNP   2 tablet at 11/12/24 2200    polyethylene glycol (MIRALAX) Packet 17 g  17 g Oral Daily Shayy Toussaint DO   17 g at 11/12/24 0749    senna-docusate (SENOKOT-S/PERICOLACE) 8.6-50 MG per tablet 1 tablet  1 tablet Oral BID Shayy Toussaint DO   1 tablet at 11/12/24 2002    Or    senna-docusate (SENOKOT-S/PERICOLACE) 8.6-50 MG per tablet 2 tablet  2 tablet Oral BID Shayy Toussaint DO   2 tablet at 11/13/24 0757    sodium chloride (PF) 0.9% PF flush 3 mL  3 mL Intracatheter Q8H Shayy Toussaint DO   3 mL at 11/12/24 0750     Current Facility-Administered Medications   Medication Dose Route Frequency Provider Last Rate Last Admin    heparin 25,000 units in 0.45% NaCl 250 mL  ANTICOAGULANT infusion  0-5,000 Units/hr Intravenous Continuous Bismark Aguiar MD 11.5 mL/hr at 11/13/24 0200 1,150 Units/hr at 11/13/24 0200    medication instruction   Does not apply Continuous PRN Shayy Toussaint, DO           Labs:   CMP  Recent Labs   Lab 11/13/24  0628 11/12/24  2127 11/12/24  1736 11/12/24  0743 11/12/24  0545 11/11/24  0747 11/11/24  0556 11/10/24  0857 11/10/24  0434 11/08/24  0136 11/07/24  1654     --   --   --  140  --  139  --  138   < > 142   POTASSIUM 4.1  --   --   --  4.5  --  3.9  --  3.8   < > 3.9   CHLORIDE 103  --   --   --  104  --  101  --  100   < > 106   CO2 26  --   --   --  26  --  24  --  25   < > 24   ANIONGAP 11  --   --   --  10  --  14  --  13   < > 12   * 113* 94 128* 101*   < > 112*   < > 110*   < > 119*   BUN 28.6*  --   --   --  28.6*  --  29.1*  --  25.8*   < > 14.1   CR 0.85  --   --   --  0.88  --  0.89  --  0.78   < > 0.70   GFRESTIMATED 74  --   --   --  71  --  70  --  82   < > >90   ANGELES 9.8  --   --   --  9.6  --  9.8  --  9.8   < > 9.3   MAG 2.1  --   --   --  2.3  --  2.0  --  2.0   < > 2.2   PROTTOTAL  --   --   --   --   --   --   --   --   --   --  7.5   ALBUMIN  --   --   --   --   --   --   --   --   --   --  4.0   BILITOTAL  --   --   --   --   --   --   --   --   --   --  0.3   ALKPHOS  --   --   --   --   --   --   --   --   --   --  80   AST  --   --   --   --   --   --   --   --   --   --  26   ALT  --   --   --   --   --   --   --   --   --   --  15    < > = values in this interval not displayed.     CBC  Recent Labs   Lab 11/13/24  0628 11/12/24  0545 11/11/24  0556 11/10/24  0434   WBC 8.4 7.1 8.2 8.6   RBC 4.28 4.29 4.18 4.47   HGB 12.6 12.7 12.1 13.1   HCT 38.5 38.6 37.5 39.4   MCV 90 90 90 88   MCH 29.4 29.6 28.9 29.3   MCHC 32.7 32.9 32.3 33.2   RDW 13.2 13.3 13.2 13.1    304 345 345     INRNo lab results found in last 7 days.  Arterial Blood GasNo lab results found in last 7 days.  Troponin T High Sensitivity    Lab Results   Component Value Date/Time    CTROPT 32 (H) 11/08/2024 04:23 AM       Diagnostics:  ECG 11/7/2024: NSR, HR 62, ST depression V5-6, TWI I       Echo 11/8/2024:  Interpretation Summary  Global and regional left ventricular function is normal with an EF of 60-65%.  Asymmetric septal hypertrophy; septal wall is 1.8 cm, posterior wall is 0.8 cm.  Left ventricular cavity size is small.  No ABRAHAM, or LVOT gradient.  Global right ventricular function is normal.  The right ventricle is normal size.  The inferior vena cava was normal in size with preserved respiratory variability.  No significant valvular abnormalities present.     This study was compared with the study from 2011 .  No significant changes noted.     Coronary angiogram 11/8/2024:  Conclusion  significant three-vessel CAD     Coronary Findings  Diagnostic  Dominance: Right  Left Anterior Descending   Prox LAD to Mid LAD lesion is 30% stenosed.   Mid LAD lesion is 80% stenosed.   Mid LAD to Dist LAD lesion is 70% stenosed.   Dist LAD-1 lesion is 80% stenosed.   Dist LAD-2 lesion is 70% stenosed.      Second Diagonal Branch   2nd Diag lesion is 90% stenosed.      Third Diagonal Branch   3rd Diag lesion is 70% stenosed.      Ramus Intermedius   Ramus lesion is 40% stenosed.      Left Circumflex   Prox Cx lesion is 95% stenosed.   Mid Cx lesion is 30% stenosed.      Right Coronary Artery   Prox RCA lesion is 40% stenosed.   Mid RCA lesion is 40% stenosed.   Dist RCA lesion is 99% stenosed.      Right Posterior Descending Artery   RPDA lesion is 80% stenosed.         Intervention   No interventions have been documented.        Recent Results (from the past 24 hours)   US Upper Extremity Arterial Duplex Left    Narrative    ULTRASOUND UPPER EXTREMITY ARTERIAL DUPLEX LEFT 11/12/2024 2:17 PM    CLINICAL HISTORY: pre CABG - possible radial graft, please assess size  as well as presence/absence of calcium.     COMPARISONS: None available.    REFERRING  PROVIDER: ALTHEA HAIR    TECHNIQUE: Left brachial, radial, and ulnar arteries evaluated with  grayscale, color Doppler, and spectral pulsed wave Doppler ultrasound.  Radial side of the palmar arch evaluated without and with radial  artery compression.    FINDINGS:   LEFT:  Brachial artery, antecubital fossa: 171/0 cm/s, triphasic    Radial artery, origin: 99/0 cm/s, triphasic, 2.3 mm  Radial artery, mid forearm: 93/0 cm/s, triphasic, 2.7 mm  Radial artery, wrist: 123/10 cm/s, arterial monophasic, 1.9 mm    Ulnar artery, mid forearm: 92/0 cm/s, triphasic  Ulnar artery, wrist: 116/8 cm/s, triphasic    Palmar arch, without compression: 116/9 cm/s, arterial monophasic  Palmar arch, with radial compression: 11/0 cm/s, biphasic, RETROGRADE      Impression    IMPRESSION: Patent left radial and ulnar arteries to the wrist with  measurements as in the report. Intact palmar arch suggested.     JABARI FITZPATRICK MD         SYSTEM ID:  [id number]       Medical Decision Making   60 MINUTES SPENT BY ME on the date of service doing chart review, history, exam, documentation & further activities per the note.

## 2024-11-13 NOTE — CONSULTS
Care Management Initial Consult    General Information  Assessment completed with: Patient,    Type of CM/SW Visit: Initial Assessment    Primary Care Provider verified and updated as needed: Yes   Readmission within the last 30 days: no previous admission in last 30 days      Reason for Consult: other (see comments) (Social Determinants Of Health)  Advance Care Planning: Advance Care Planning Reviewed: no concerns identified          Communication Assessment  Patient's communication style: spoken language (English or Bilingual)    Hearing Difficulty or Deaf: no   Wear Glasses or Blind: yes    Cognitive  Cognitive/Neuro/Behavioral: WDL  Level of Consciousness: alert  Arousal Level: opens eyes spontaneously  Orientation: oriented x 4  Mood/Behavior: calm, cooperative  Best Language: 0 - No aphasia  Speech: clear, spontaneous    Living Environment:   People in home: spouse     Current living Arrangements: house      Able to return to prior arrangements: yes       Family/Social Support:  Care provided by: self  Provides care for: no one  Marital Status:   Support system:            Description of Support System:           Current Resources:   Patient receiving home care services: No        Community Resources: None  Equipment currently used at home: none  Supplies currently used at home: None    Employment/Financial:  Employment Status: retired        Financial Concerns: none           Does the patient's insurance plan have a 3 day qualifying hospital stay waiver?  Yes     Which insurance plan 3 day waiver is available? Alternative insurance waiver    Will the waiver be used for post-acute placement? No    Lifestyle & Psychosocial Needs:  Social Drivers of Health     Food Insecurity: Low Risk  (11/8/2024)    Food Insecurity     Within the past 12 months, did you worry that your food would run out before you got money to buy more?: No     Within the past 12 months, did the food you bought just not last and you  didn t have money to get more?: No   Depression: Not at risk (9/9/2024)    PHQ-2     PHQ-2 Score: 2   Housing Stability: High Risk (11/8/2024)    Housing Stability     Do you have housing? : No     Are you worried about losing your housing?: No   Tobacco Use: Medium Risk (9/9/2024)    Patient History     Smoking Tobacco Use: Former     Smokeless Tobacco Use: Never     Passive Exposure: Not on file   Financial Resource Strain: Low Risk  (11/8/2024)    Financial Resource Strain     Within the past 12 months, have you or your family members you live with been unable to get utilities (heat, electricity) when it was really needed?: No   Alcohol Use: Not on file   Transportation Needs: Low Risk  (11/8/2024)    Transportation Needs     Within the past 12 months, has lack of transportation kept you from medical appointments, getting your medicines, non-medical meetings or appointments, work, or from getting things that you need?: No   Physical Activity: Not on file   Interpersonal Safety: Low Risk  (11/8/2024)    Interpersonal Safety     Do you feel physically and emotionally safe where you currently live?: Yes     Within the past 12 months, have you been hit, slapped, kicked or otherwise physically hurt by someone?: No     Within the past 12 months, have you been humiliated or emotionally abused in other ways by your partner or ex-partner?: No   Stress: Not on file   Social Connections: Not on file   Health Literacy: Not on file       Functional Status:  Prior to admission patient needed assistance:   Dependent ADLs:: Independent  Dependent IADLs:: Independent       Mental Health Status:          Chemical Dependency Status:                Values/Beliefs:  Spiritual, Cultural Beliefs, Alevism Practices, Values that affect care:                 Discussed  Partnership in Safe Discharge Planning  document with patient/family: Yes: went over with patient    Additional Information:  RNCC visited patient today bedside for  "initial case management assessment, completed for order per Social Determinants Of Health needs: housing.    Patient comes from home, a house in [place], [place] with  Jazmyn (patient does have stable housing, order populated in error).     Patient is independent baseline, is a retired LPN at [employer].     Patient does not make use of any home equipment or home services or help.    Patient may get ride home from daughter Suyapa or spouse at discharge time.     RNCC updated patient's home address in [facility], added daughter Suyapa to contacts, and she should be called first as \"she has a better understanding of things, better memory of things.\"    RNCC went over OP CR with patient who is agreeable, no further discharge planning needs at this time, RNCC will sign off, re-consult case management as needed.    David Wagner, BSN, BA, RN, CMSRN, RNCC  [facility]  Covering Units 6C Beds [address]1947, [address]  Phone: [phone]  Available on [employer] search 6C 6502-14 RNCC, 6515-19 RNCC  After Hours [phone]     6C SW Ph: [phone]     6B/CRNCC Weekend/holiday on Vocera or [phone]     [facility] RNCC ED/5 Ortho/5 Med/Surg [phone]     [facility]/Surg 8A, [phone]     Observation SW and weekend/after hours phone: [phone]      "

## 2024-11-14 ENCOUNTER — ANESTHESIA (OUTPATIENT)
Dept: SURGERY | Facility: CLINIC | Age: 69
DRG: 281 | End: 2024-11-14
Payer: COMMERCIAL

## 2024-11-14 ENCOUNTER — ALLIED HEALTH/NURSE VISIT (OUTPATIENT)
Dept: RESEARCH | Facility: CLINIC | Age: 69
End: 2024-11-14
Payer: COMMERCIAL

## 2024-11-14 DIAGNOSIS — Z00.6 EXAMINATION OF PARTICIPANT OR CONTROL IN CLINICAL RESEARCH: Primary | ICD-10-CM

## 2024-11-14 LAB
ABO/RH(D): NORMAL
ALBUMIN SERPL BCG-MCNC: 4.4 G/DL (ref 3.5–5.2)
ANION GAP SERPL CALCULATED.3IONS-SCNC: 14 MMOL/L (ref 7–15)
ANTIBODY SCREEN: NEGATIVE
APTT PPP: 59 SECONDS (ref 22–38)
BASOPHILS # BLD MANUAL: 0.1 10E3/UL (ref 0–0.2)
BASOPHILS NFR BLD MANUAL: 1 %
BLD PROD TYP BPU: NORMAL
BLD PROD TYP BPU: NORMAL
BLOOD COMPONENT TYPE: NORMAL
BLOOD COMPONENT TYPE: NORMAL
BUN SERPL-MCNC: 28.6 MG/DL (ref 8–23)
CALCIUM SERPL-MCNC: 10.2 MG/DL (ref 8.8–10.4)
CHLORIDE SERPL-SCNC: 99 MMOL/L (ref 98–107)
CODING SYSTEM: NORMAL
CODING SYSTEM: NORMAL
CREAT SERPL-MCNC: 0.91 MG/DL (ref 0.51–0.95)
CROSSMATCH: NORMAL
CROSSMATCH: NORMAL
EGFRCR SERPLBLD CKD-EPI 2021: 68 ML/MIN/1.73M2
EOSINOPHIL # BLD MANUAL: 0.1 10E3/UL (ref 0–0.7)
EOSINOPHIL NFR BLD MANUAL: 1 %
ERYTHROCYTE [DISTWIDTH] IN BLOOD BY AUTOMATED COUNT: 13.2 % (ref 10–15)
ERYTHROCYTE [DISTWIDTH] IN BLOOD BY AUTOMATED COUNT: 13.4 % (ref 10–15)
GLUCOSE BLDC GLUCOMTR-MCNC: 111 MG/DL (ref 70–99)
GLUCOSE BLDC GLUCOMTR-MCNC: 115 MG/DL (ref 70–99)
GLUCOSE BLDC GLUCOMTR-MCNC: 128 MG/DL (ref 70–99)
GLUCOSE BLDC GLUCOMTR-MCNC: 99 MG/DL (ref 70–99)
GLUCOSE SERPL-MCNC: 115 MG/DL (ref 70–99)
HCO3 SERPL-SCNC: 26 MMOL/L (ref 22–29)
HCT VFR BLD AUTO: 38.9 % (ref 35–47)
HCT VFR BLD AUTO: 40.4 % (ref 35–47)
HGB BLD-MCNC: 12.9 G/DL (ref 11.7–15.7)
HGB BLD-MCNC: 13.2 G/DL (ref 11.7–15.7)
INR PPP: 1.08 (ref 0.85–1.15)
LYMPHOCYTES # BLD MANUAL: 6.2 10E3/UL (ref 0.8–5.3)
LYMPHOCYTES NFR BLD MANUAL: 50 %
MAGNESIUM SERPL-MCNC: 2.1 MG/DL (ref 1.7–2.3)
MCH RBC QN AUTO: 29.5 PG (ref 26.5–33)
MCH RBC QN AUTO: 29.6 PG (ref 26.5–33)
MCHC RBC AUTO-ENTMCNC: 32.7 G/DL (ref 31.5–36.5)
MCHC RBC AUTO-ENTMCNC: 33.2 G/DL (ref 31.5–36.5)
MCV RBC AUTO: 89 FL (ref 78–100)
MCV RBC AUTO: 91 FL (ref 78–100)
MONOCYTES # BLD MANUAL: 1.1 10E3/UL (ref 0–1.3)
MONOCYTES NFR BLD MANUAL: 9 %
MRSA DNA SPEC QL NAA+PROBE: NEGATIVE
NEUTROPHILS # BLD MANUAL: 5 10E3/UL (ref 1.6–8.3)
NEUTROPHILS NFR BLD MANUAL: 40 %
PLAT MORPH BLD: ABNORMAL
PLATELET # BLD AUTO: 336 10E3/UL (ref 150–450)
PLATELET # BLD AUTO: 351 10E3/UL (ref 150–450)
POTASSIUM SERPL-SCNC: 3.5 MMOL/L (ref 3.4–5.3)
PREALB SERPL-MCNC: 16.8 MG/DL (ref 20–40)
RBC # BLD AUTO: 4.38 10E6/UL (ref 3.8–5.2)
RBC # BLD AUTO: 4.46 10E6/UL (ref 3.8–5.2)
RBC MORPH BLD: ABNORMAL
SA TARGET DNA: NEGATIVE
SODIUM SERPL-SCNC: 139 MMOL/L (ref 135–145)
SPECIMEN EXPIRATION DATE: NORMAL
UFH PPP CHRO-ACNC: 0.36 IU/ML
UNIT ABO/RH: NORMAL
UNIT ABO/RH: NORMAL
UNIT NUMBER: NORMAL
UNIT NUMBER: NORMAL
UNIT STATUS: NORMAL
UNIT STATUS: NORMAL
UNIT TYPE ISBT: 6200
UNIT TYPE ISBT: 6200
WBC # BLD AUTO: 12.5 10E3/UL (ref 4–11)
WBC # BLD AUTO: 9.3 10E3/UL (ref 4–11)

## 2024-11-14 PROCEDURE — 250N000009 HC RX 250

## 2024-11-14 PROCEDURE — 85610 PROTHROMBIN TIME: CPT

## 2024-11-14 PROCEDURE — 999N000141 HC STATISTIC PRE-PROCEDURE NURSING ASSESSMENT: Performed by: STUDENT IN AN ORGANIZED HEALTH CARE EDUCATION/TRAINING PROGRAM

## 2024-11-14 PROCEDURE — 85014 HEMATOCRIT: CPT

## 2024-11-14 PROCEDURE — 83735 ASSAY OF MAGNESIUM: CPT | Performed by: NURSE PRACTITIONER

## 2024-11-14 PROCEDURE — 999N000001 HC CANCELLED SURGERY UP TO 15 MINS: Performed by: STUDENT IN AN ORGANIZED HEALTH CARE EDUCATION/TRAINING PROGRAM

## 2024-11-14 PROCEDURE — 36592 COLLECT BLOOD FROM PICC: CPT

## 2024-11-14 PROCEDURE — 250N000013 HC RX MED GY IP 250 OP 250 PS 637

## 2024-11-14 PROCEDURE — 82040 ASSAY OF SERUM ALBUMIN: CPT

## 2024-11-14 PROCEDURE — 250N000013 HC RX MED GY IP 250 OP 250 PS 637: Performed by: NURSE PRACTITIONER

## 2024-11-14 PROCEDURE — 36415 COLL VENOUS BLD VENIPUNCTURE: CPT

## 2024-11-14 PROCEDURE — 80048 BASIC METABOLIC PNL TOTAL CA: CPT

## 2024-11-14 PROCEDURE — 87641 MR-STAPH DNA AMP PROBE: CPT

## 2024-11-14 PROCEDURE — 86900 BLOOD TYPING SEROLOGIC ABO: CPT

## 2024-11-14 PROCEDURE — 250N000011 HC RX IP 250 OP 636

## 2024-11-14 PROCEDURE — 999N000054 HC STATISTIC EKG NON-CHARGEABLE

## 2024-11-14 PROCEDURE — 120N000005 HC R&B MS OVERFLOW UMMC

## 2024-11-14 PROCEDURE — 84134 ASSAY OF PREALBUMIN: CPT

## 2024-11-14 PROCEDURE — 85730 THROMBOPLASTIN TIME PARTIAL: CPT

## 2024-11-14 PROCEDURE — 86923 COMPATIBILITY TEST ELECTRIC: CPT

## 2024-11-14 PROCEDURE — 87640 STAPH A DNA AMP PROBE: CPT

## 2024-11-14 PROCEDURE — 258N000003 HC RX IP 258 OP 636

## 2024-11-14 PROCEDURE — 85520 HEPARIN ASSAY: CPT | Performed by: STUDENT IN AN ORGANIZED HEALTH CARE EDUCATION/TRAINING PROGRAM

## 2024-11-14 PROCEDURE — 85007 BL SMEAR W/DIFF WBC COUNT: CPT

## 2024-11-14 RX ORDER — CHLORHEXIDINE GLUCONATE ORAL RINSE 1.2 MG/ML
10 SOLUTION DENTAL ONCE
Status: COMPLETED | OUTPATIENT
Start: 2024-11-14 | End: 2024-11-14

## 2024-11-14 RX ORDER — LIDOCAINE 40 MG/G
CREAM TOPICAL
Status: DISCONTINUED | OUTPATIENT
Start: 2024-11-14 | End: 2024-11-14 | Stop reason: HOSPADM

## 2024-11-14 RX ORDER — NOREPINEPHRINE BITARTRATE 0.06 MG/ML
.01-.1 INJECTION, SOLUTION INTRAVENOUS CONTINUOUS
Status: DISCONTINUED | OUTPATIENT
Start: 2024-11-14 | End: 2024-11-14 | Stop reason: HOSPADM

## 2024-11-14 RX ORDER — ACETAMINOPHEN 325 MG/1
975 TABLET ORAL ONCE
Status: COMPLETED | OUTPATIENT
Start: 2024-11-14 | End: 2024-11-14

## 2024-11-14 RX ORDER — HEPARIN SOD,PORCINE/0.9 % NACL 30K/1000ML
INTRAVENOUS SOLUTION INTRAVENOUS
Status: DISCONTINUED | OUTPATIENT
Start: 2024-11-14 | End: 2024-11-14 | Stop reason: HOSPADM

## 2024-11-14 RX ORDER — CEFAZOLIN SODIUM/WATER 2 G/20 ML
2 SYRINGE (ML) INTRAVENOUS SEE ADMIN INSTRUCTIONS
Status: DISCONTINUED | OUTPATIENT
Start: 2024-11-14 | End: 2024-11-14 | Stop reason: HOSPADM

## 2024-11-14 RX ORDER — NALOXONE HYDROCHLORIDE 0.4 MG/ML
0.2 INJECTION, SOLUTION INTRAMUSCULAR; INTRAVENOUS; SUBCUTANEOUS
Status: DISCONTINUED | OUTPATIENT
Start: 2024-11-14 | End: 2024-11-14 | Stop reason: HOSPADM

## 2024-11-14 RX ORDER — NALOXONE HYDROCHLORIDE 0.4 MG/ML
0.4 INJECTION, SOLUTION INTRAMUSCULAR; INTRAVENOUS; SUBCUTANEOUS
Status: DISCONTINUED | OUTPATIENT
Start: 2024-11-14 | End: 2024-11-14 | Stop reason: HOSPADM

## 2024-11-14 RX ORDER — HEPARIN SODIUM 10000 [USP'U]/100ML
0-5000 INJECTION, SOLUTION INTRAVENOUS CONTINUOUS
Status: DISPENSED | OUTPATIENT
Start: 2024-11-14 | End: 2024-11-15

## 2024-11-14 RX ORDER — PHENYLEPHRINE HCL IN 0.9% NACL 50MG/250ML
.1-6 PLASTIC BAG, INJECTION (ML) INTRAVENOUS CONTINUOUS
Status: DISCONTINUED | OUTPATIENT
Start: 2024-11-14 | End: 2024-11-14 | Stop reason: HOSPADM

## 2024-11-14 RX ORDER — FENTANYL CITRATE 50 UG/ML
25-50 INJECTION, SOLUTION INTRAMUSCULAR; INTRAVENOUS
Status: DISCONTINUED | OUTPATIENT
Start: 2024-11-14 | End: 2024-11-14 | Stop reason: HOSPADM

## 2024-11-14 RX ORDER — ASPIRIN 81 MG/1
162 TABLET, CHEWABLE ORAL
Status: DISCONTINUED | OUTPATIENT
Start: 2024-11-14 | End: 2024-11-14 | Stop reason: HOSPADM

## 2024-11-14 RX ORDER — CEFAZOLIN SODIUM 2 G/100ML
2 INJECTION, SOLUTION INTRAVENOUS
Status: DISCONTINUED | OUTPATIENT
Start: 2024-11-14 | End: 2024-11-14 | Stop reason: HOSPADM

## 2024-11-14 RX ORDER — FAMOTIDINE 20 MG/1
20 TABLET, FILM COATED ORAL
Status: COMPLETED | OUTPATIENT
Start: 2024-11-14 | End: 2024-11-14

## 2024-11-14 RX ORDER — DEXMEDETOMIDINE HYDROCHLORIDE 4 UG/ML
.1-1.2 INJECTION, SOLUTION INTRAVENOUS CONTINUOUS
Status: DISCONTINUED | OUTPATIENT
Start: 2024-11-14 | End: 2024-11-14 | Stop reason: HOSPADM

## 2024-11-14 RX ORDER — EPINEPHRINE IN 0.9 % SOD CHLOR 5 MG/250ML
.01-.3 PLASTIC BAG, INJECTION (ML) INTRAVENOUS CONTINUOUS
Status: DISCONTINUED | OUTPATIENT
Start: 2024-11-14 | End: 2024-11-14 | Stop reason: HOSPADM

## 2024-11-14 RX ORDER — ASPIRIN 81 MG/1
81 TABLET, CHEWABLE ORAL
Status: DISCONTINUED | OUTPATIENT
Start: 2024-11-14 | End: 2024-11-14 | Stop reason: HOSPADM

## 2024-11-14 RX ORDER — FLUMAZENIL 0.1 MG/ML
0.2 INJECTION, SOLUTION INTRAVENOUS
Status: DISCONTINUED | OUTPATIENT
Start: 2024-11-14 | End: 2024-11-14 | Stop reason: HOSPADM

## 2024-11-14 RX ORDER — GABAPENTIN 100 MG/1
100 CAPSULE ORAL
Status: COMPLETED | OUTPATIENT
Start: 2024-11-14 | End: 2024-11-14

## 2024-11-14 RX ADMIN — HEPARIN SODIUM 1200 UNITS/HR: 10000 INJECTION, SOLUTION INTRAVENOUS at 20:27

## 2024-11-14 RX ADMIN — METOPROLOL TARTRATE 12.5 MG: 25 TABLET, FILM COATED ORAL at 13:09

## 2024-11-14 RX ADMIN — Medication 5 MG: at 23:48

## 2024-11-14 RX ADMIN — SENNOSIDES AND DOCUSATE SODIUM 1 TABLET: 8.6; 5 TABLET ORAL at 20:29

## 2024-11-14 RX ADMIN — ASPIRIN 81 MG CHEWABLE TABLET 81 MG: 81 TABLET CHEWABLE at 09:20

## 2024-11-14 RX ADMIN — ACETAMINOPHEN 975 MG: 325 TABLET, FILM COATED ORAL at 13:08

## 2024-11-14 RX ADMIN — GABAPENTIN 100 MG: 100 CAPSULE ORAL at 13:09

## 2024-11-14 RX ADMIN — ATORVASTATIN CALCIUM 80 MG: 80 TABLET, FILM COATED ORAL at 21:36

## 2024-11-14 RX ADMIN — LISINOPRIL AND HYDROCHLOROTHIAZIDE 2 TABLET: 12.5; 2 TABLET ORAL at 20:29

## 2024-11-14 RX ADMIN — CHLORHEXIDINE GLUCONATE 0.12% ORAL RINSE 10 ML: 1.2 LIQUID ORAL at 13:10

## 2024-11-14 RX ADMIN — HYDROXYZINE HYDROCHLORIDE 25 MG: 25 TABLET, FILM COATED ORAL at 23:48

## 2024-11-14 RX ADMIN — AMLODIPINE BESYLATE 10 MG: 10 TABLET ORAL at 20:29

## 2024-11-14 RX ADMIN — FAMOTIDINE 20 MG: 20 TABLET ORAL at 13:09

## 2024-11-14 ASSESSMENT — LIFESTYLE VARIABLES: TOBACCO_USE: 1

## 2024-11-14 NOTE — PLAN OF CARE
Hours of care: 0700-time of transfer to /Pre-op (~1220)  Problem: Adult Inpatient Plan of Care  Goal: Plan of Care Review  Flowsheets (Taken 11/14/2024 1328)  Outcome Evaluation: Pre op orders released, CHG wipes and Nozin completed. Maintained NPO status. Denies pain. Anticipating CABG today.  Plan of Care Reviewed With: patient  Overall Patient Progress: no change    For complete assessment, please see flowsheets.

## 2024-11-14 NOTE — PROGRESS NOTES
Surgery Clinical Trials Office Informed Consent Process Documentation  [employer] of Postoperative Complications and Near-Term Adverse Outcomes in Patients Undergoing Coronary Artery Bypass Graft Surgery  IRB#[id number]    ICF Version Date 07/29/2024  IRB Approval Date: 08/12/2024     Date of Consent : 11/14/24    The subject was screened and meets all of the inclusion criteria and none of the exclusion criteria is met.This note is documenting that the patient was contacted by the study team and consented to the study.    The subject was told:  -that the study involves research   -the purpose of the research study  -the expected duration of the study and the approximate number of subject sought  -of procedures that are identified as experimental  -of reasonably foreseeable risks or discomforts to the subject  -of any benefits to the subject or others that may be expected from the research  -of alternative procedures and/or treatment  -how the confidentiality of records would be maintained  -whether or not compensation and medical treatments are available should injury occur as a result of the study  -who to contact if they have questions related to the research study or questions regarding research subjects' rights  -that participation is completely voluntary and that their decision to or not to participate will have no impact on their relationships with the N and the staff    No study procedures were completed prior to the consent being obtained.  The use of historical information (lab or assessments) used for the purpose of the study was approved by subject.  The subject was fully aware that we would be reviewing their medical record for the study.  The subject demonstrated an understanding of what the study involved.  Specifically, how this study differed from standard of care at our center and what was required of the subject as part of the study.  The subject reviewed the consent form and was given the  opportunity to ask questions before signing.  Questions and concerns were answered by the study staff and/or study physician.    A copy of the signed informed consent document was offered to the subject:  [x] Yes [] No     The consent required the use of a :   [] Yes [x] No     A 'short form' consent was used:     [] Yes [x] No   If yes, provide name of witness:     This subject was previously mailed a consent form and contacted by the research team via phone:  [] Yes [x] No     An eConsent was used: [] Yes [x] No     Questions to Evaluate Subject Comprehension of Study:     Question: Adequate Response? If No, explain what actions were taken   What is being studied? [x] Yes  [] No   If you participate, what will be different than if you decide not to participate?  [x] Yes  [] No   How long will the study last; will you be required to return for visits? [x] Yes  [] No   What kinds of risks are there? [x] Yes  [] No      Do you understand that you can withdraw consent at any time and for any reason while participating in the study? [x] Yes  [] No      Study Coordinators:  Natanael Jorge  [phone]  [email or web address]  May Muhammad  [phone]  [email or web address]     : Kenneth Jha        [phone]     [email or web address]   PI: Bin Mcdonough, PhD, [facility] [phone]  [email or web address]    Note:       Sign: Natanael Jorge on 11/14/2024 at 10:49 AM

## 2024-11-14 NOTE — PLAN OF CARE
ICU End of Shift Summary. See flowsheets for vital signs and detailed assessment.    Changes this shift: No acute changes. Continuing hep gtt.       Plan: NPO MN, CABG tomorrow.       Goal Outcome Evaluation:      Plan of Care Reviewed With: patient    Overall Patient Progress: no changeOverall Patient Progress: no change

## 2024-11-14 NOTE — PLAN OF CARE
"/68 (BP Location: Left arm)   Pulse 64   Temp 97.9  F (36.6  C) (Oral)   Resp 19   Ht 1.7 m (5' 6.93\")   Wt 109.1 kg (240 lb 8.4 oz)   LMP  (LMP Unknown)   SpO2 97%   BMI 37.75 kg/m      VSS. Afebrile. Room air. Alert and oriented x 4. Denies pain. NPO as of midnight for OR today. Denies n/v. Independent to bathroom. Voiding. No BM overnight. Right PIV now saline locked. Heparin infusion stopped per order. Patient completed 1 full shower this morning and will need a CHG scrub closer to surgery time. Continue to monitor.  "

## 2024-11-14 NOTE — PROVIDER NOTIFICATION
Dr. Mccormick with cardiology services paged regarding Heparin drip. Initially, the order was to stop drip at 11:00 AM prior to surgery. A new order came in to stop heparin drip at 0600. RN confirming that this new order to stop drip at 0600 is correct. MD acknowledged that the new order to stop drip at 0600 is correct.

## 2024-11-14 NOTE — PLAN OF CARE
Hours of Care [phone]     End of Shift Summary. See flowsheets for vital signs and detailed assessment.     Changes this shift: No acute changes. Continuing hep gtt.        Plan: NPO MN, CABG tomorrow.    Goal Outcome Evaluation:    Problem: Comorbidity Management  Goal: Maintenance of Behavioral Health Symptom Control  Intervention: Maintain Behavioral Health Symptom Control  Recent Flowsheet Documentation  Taken 11/13/2024 2200 by Jd Harmon, RN  Medication Review/Management: medications reviewed  Goal: Blood Glucose Levels Within Targeted Range  Intervention: Monitor and Manage Glycemia  Recent Flowsheet Documentation  Taken 11/13/2024 2200 by Jd Harmon RN  Medication Review/Management: medications reviewed  Goal: Blood Pressure in Desired Range  Intervention: Maintain Blood Pressure Management  Recent Flowsheet Documentation  Taken 11/13/2024 2200 by Jd Harmon RN  Medication Review/Management: medications reviewed

## 2024-11-15 ENCOUNTER — PREP FOR PROCEDURE (OUTPATIENT)
Dept: CARDIOLOGY | Facility: CLINIC | Age: 69
End: 2024-11-15
Payer: COMMERCIAL

## 2024-11-15 ENCOUNTER — ANESTHESIA EVENT (OUTPATIENT)
Dept: SURGERY | Facility: HOSPITAL | Age: 69
End: 2024-11-15
Payer: COMMERCIAL

## 2024-11-15 DIAGNOSIS — I25.10 CAD (CORONARY ARTERY DISEASE): Primary | ICD-10-CM

## 2024-11-15 LAB
ANION GAP SERPL CALCULATED.3IONS-SCNC: 9 MMOL/L (ref 7–15)
APTT PPP: 45 SECONDS (ref 22–38)
APTT PPP: 88 SECONDS (ref 22–38)
ATRIAL RATE - MUSE: 68 BPM
BASOPHILS # BLD AUTO: 0.1 10E3/UL (ref 0–0.2)
BASOPHILS NFR BLD AUTO: 1 %
BUN SERPL-MCNC: 29 MG/DL (ref 8–23)
CALCIUM SERPL-MCNC: 9.4 MG/DL (ref 8.8–10.4)
CHLORIDE SERPL-SCNC: 102 MMOL/L (ref 98–107)
CREAT SERPL-MCNC: 0.99 MG/DL (ref 0.51–0.95)
DIASTOLIC BLOOD PRESSURE - MUSE: NORMAL MMHG
EGFRCR SERPLBLD CKD-EPI 2021: 62 ML/MIN/1.73M2
EOSINOPHIL # BLD AUTO: 0.1 10E3/UL (ref 0–0.7)
EOSINOPHIL NFR BLD AUTO: 1 %
ERYTHROCYTE [DISTWIDTH] IN BLOOD BY AUTOMATED COUNT: 13.3 % (ref 10–15)
GLUCOSE BLDC GLUCOMTR-MCNC: 104 MG/DL (ref 70–99)
GLUCOSE BLDC GLUCOMTR-MCNC: 112 MG/DL (ref 70–99)
GLUCOSE BLDC GLUCOMTR-MCNC: 140 MG/DL (ref 70–99)
GLUCOSE BLDC GLUCOMTR-MCNC: 92 MG/DL (ref 70–99)
GLUCOSE SERPL-MCNC: 103 MG/DL (ref 70–99)
HCO3 SERPL-SCNC: 27 MMOL/L (ref 22–29)
HCT VFR BLD AUTO: 35.9 % (ref 35–47)
HGB BLD-MCNC: 11.5 G/DL (ref 11.7–15.7)
IMM GRANULOCYTES # BLD: 0 10E3/UL
IMM GRANULOCYTES NFR BLD: 0 %
INTERPRETATION ECG - MUSE: NORMAL
LYMPHOCYTES # BLD AUTO: 4.1 10E3/UL (ref 0.8–5.3)
LYMPHOCYTES NFR BLD AUTO: 42 %
MAGNESIUM SERPL-MCNC: 2.1 MG/DL (ref 1.7–2.3)
MCH RBC QN AUTO: 29 PG (ref 26.5–33)
MCHC RBC AUTO-ENTMCNC: 32 G/DL (ref 31.5–36.5)
MCV RBC AUTO: 91 FL (ref 78–100)
MONOCYTES # BLD AUTO: 1 10E3/UL (ref 0–1.3)
MONOCYTES NFR BLD AUTO: 11 %
NEUTROPHILS # BLD AUTO: 4.4 10E3/UL (ref 1.6–8.3)
NEUTROPHILS NFR BLD AUTO: 45 %
NRBC # BLD AUTO: 0 10E3/UL
NRBC BLD AUTO-RTO: 0 /100
P AXIS - MUSE: 14 DEGREES
PLAT MORPH BLD: NORMAL
PLATELET # BLD AUTO: 259 10E3/UL (ref 150–450)
POTASSIUM SERPL-SCNC: 4.1 MMOL/L (ref 3.4–5.3)
PR INTERVAL - MUSE: 186 MS
QRS DURATION - MUSE: 110 MS
QT - MUSE: 440 MS
QTC - MUSE: 467 MS
R AXIS - MUSE: -13 DEGREES
RBC # BLD AUTO: 3.96 10E6/UL (ref 3.8–5.2)
RBC MORPH BLD: NORMAL
SODIUM SERPL-SCNC: 138 MMOL/L (ref 135–145)
SYSTOLIC BLOOD PRESSURE - MUSE: NORMAL MMHG
T AXIS - MUSE: 122 DEGREES
UFH PPP CHRO-ACNC: 0.38 IU/ML
VENTRICULAR RATE- MUSE: 68 BPM
WBC # BLD AUTO: 9.8 10E3/UL (ref 4–11)

## 2024-11-15 PROCEDURE — 85048 AUTOMATED LEUKOCYTE COUNT: CPT

## 2024-11-15 PROCEDURE — 80048 BASIC METABOLIC PNL TOTAL CA: CPT

## 2024-11-15 PROCEDURE — 99418 PROLNG IP/OBS E/M EA 15 MIN: CPT | Mod: FS | Performed by: INTERNAL MEDICINE

## 2024-11-15 PROCEDURE — 83735 ASSAY OF MAGNESIUM: CPT | Performed by: NURSE PRACTITIONER

## 2024-11-15 PROCEDURE — 85004 AUTOMATED DIFF WBC COUNT: CPT

## 2024-11-15 PROCEDURE — 999N000111 HC STATISTIC OT IP EVAL DEFER

## 2024-11-15 PROCEDURE — 36415 COLL VENOUS BLD VENIPUNCTURE: CPT

## 2024-11-15 PROCEDURE — 120N000005 HC R&B MS OVERFLOW UMMC

## 2024-11-15 PROCEDURE — 85730 THROMBOPLASTIN TIME PARTIAL: CPT | Performed by: STUDENT IN AN ORGANIZED HEALTH CARE EDUCATION/TRAINING PROGRAM

## 2024-11-15 PROCEDURE — 999N000248 HC STATISTIC IV INSERT WITH US BY RN

## 2024-11-15 PROCEDURE — 250N000013 HC RX MED GY IP 250 OP 250 PS 637

## 2024-11-15 PROCEDURE — 250N000013 HC RX MED GY IP 250 OP 250 PS 637: Performed by: NURSE PRACTITIONER

## 2024-11-15 PROCEDURE — 85730 THROMBOPLASTIN TIME PARTIAL: CPT

## 2024-11-15 PROCEDURE — 36415 COLL VENOUS BLD VENIPUNCTURE: CPT | Performed by: STUDENT IN AN ORGANIZED HEALTH CARE EDUCATION/TRAINING PROGRAM

## 2024-11-15 PROCEDURE — 85014 HEMATOCRIT: CPT

## 2024-11-15 PROCEDURE — 99233 SBSQ HOSP IP/OBS HIGH 50: CPT | Mod: FS | Performed by: INTERNAL MEDICINE

## 2024-11-15 PROCEDURE — 250N000011 HC RX IP 250 OP 636

## 2024-11-15 PROCEDURE — 250N000011 HC RX IP 250 OP 636: Performed by: STUDENT IN AN ORGANIZED HEALTH CARE EDUCATION/TRAINING PROGRAM

## 2024-11-15 PROCEDURE — 85520 HEPARIN ASSAY: CPT | Performed by: STUDENT IN AN ORGANIZED HEALTH CARE EDUCATION/TRAINING PROGRAM

## 2024-11-15 RX ORDER — HEPARIN SODIUM 10000 [USP'U]/100ML
0-5000 INJECTION, SOLUTION INTRAVENOUS CONTINUOUS
Status: DISCONTINUED | OUTPATIENT
Start: 2024-11-15 | End: 2024-11-16

## 2024-11-15 RX ADMIN — POLYETHYLENE GLYCOL 3350 17 G: 17 POWDER, FOR SOLUTION ORAL at 09:07

## 2024-11-15 RX ADMIN — HEPARIN SODIUM 1150 UNITS/HR: 10000 INJECTION, SOLUTION INTRAVENOUS at 10:21

## 2024-11-15 RX ADMIN — SENNOSIDES AND DOCUSATE SODIUM 2 TABLET: 8.6; 5 TABLET ORAL at 09:07

## 2024-11-15 RX ADMIN — ACETAMINOPHEN 650 MG: 325 TABLET, FILM COATED ORAL at 12:23

## 2024-11-15 RX ADMIN — HEPARIN SODIUM 1150 UNITS/HR: 10000 INJECTION, SOLUTION INTRAVENOUS at 15:29

## 2024-11-15 RX ADMIN — AMLODIPINE BESYLATE 10 MG: 10 TABLET ORAL at 20:29

## 2024-11-15 RX ADMIN — LISINOPRIL AND HYDROCHLOROTHIAZIDE 2 TABLET: 12.5; 2 TABLET ORAL at 20:40

## 2024-11-15 RX ADMIN — SENNOSIDES AND DOCUSATE SODIUM 2 TABLET: 8.6; 5 TABLET ORAL at 20:28

## 2024-11-15 RX ADMIN — ASPIRIN 81 MG CHEWABLE TABLET 81 MG: 81 TABLET CHEWABLE at 09:07

## 2024-11-15 RX ADMIN — ATORVASTATIN CALCIUM 80 MG: 80 TABLET, FILM COATED ORAL at 22:09

## 2024-11-15 NOTE — PLAN OF CARE
Occupational Therapy: Orders received. Chart reviewed and discussed with care team.? Occupational Therapy not indicated due to pt planning to transfer to [facility] for surgery. Will have new orders created there upon arrival.? Defer discharge recommendations to medical team.? Will complete orders. Reorder as needed.

## 2024-11-15 NOTE — PROGRESS NOTES
Care Management Follow Up    Length of Stay (days): 8    Expected Discharge Date: 11/20/2024     Concerns to be Addressed: discharge planning     Patient plan of care discussed at interdisciplinary rounds: Yes    Anticipated Discharge Disposition: Home              Anticipated Discharge Services:    Anticipated Discharge DME:      Patient/family educated on Medicare website which has current facility and service quality ratings: no  Education Provided on the Discharge Plan: Yes  Patient/Family in Agreement with the Plan: yes    Referrals Placed by CM/SW: Internal Clinic Care Coordination  Private pay costs discussed: Not applicable    Discussed  Partnership in Safe Discharge Planning  document with patient/family: Yes     Handoff Completed: Yes, [facility] PCP: Internal handoff referral completed    Additional Information:  RNCC received update that plan is for pt to transfer to [facility] in coming day(s) pending bed availability/patient placement for CABG procedure next week. RNCC to alert charge nurse of plan to work out with patient placement on transfer.    David Wagner BSN, BA, RN, CMSRN, RNCC  [facility]  Covering Units 6C Beds 4[address]280[address], [address]  Phone: [phone]  Available on [employer] search 6C 6502-14 RNCC, 1515-19 RNCC  After Hours [phone]     6C  Ph: [phone]     6B/CRNCC Weekend/holiday on Vocera or [phone]     [facility] [place]/5 Ortho/5 Med/Surg [phone]     [facility] 6 Med/Surg 8A, [phone]     Observation SW and weekend/after hours phone: [phone]

## 2024-11-15 NOTE — PROVIDER NOTIFICATION
Page sent to after hours Cards provider:    6C BK 4666. Merlyn MOSELEY [phone]. Pt CABG cancelled for today, back on 6C without diet order. Please renew signed/held orders for surgery and inform if heparin gtt should be restarted (no orders in place). Thank you.

## 2024-11-15 NOTE — PROGRESS NOTES
[facility]   Cardiology   Progress Note     ASSESSMENT/PLAN:  Karyn Gamez is a 69 year old female with PMH of DM2 not on insulin, tobacco use, HTN, HLD, depression admitted on 11/7/2024 with chest pain and elevated troponin c/f NSTEMI found to have severe multivessel disease being evaluated for CABG.    Changes Today   - CABG rescheduled for Monday 11/18 at [facility]   - Resumed heparin gtt      # NSTEMI  # Multivessel CAD  # Hx stress-induced cardiomyopathy 08/2010  # Asymmetric Septal Hypertrophy without LVOT on Echo  # Hypertrophic cardiomyopathy confirmed by cardiac MRI  # HTN  # HLD  Presented with 3 days of worsening exertional chest pain/pressure and burning, now occurring at rest and with dyspnea and radiation down left arm. Troponin peak at 45, now down trending. T wave inversion V4-V6 also noted on 2019 EKG. ACS risk factors include HTN, T2DM, HLD, smoker, family history of premature CAD. Coronary angiogram revealed multivessel disesase     - Coronary angiogram 11/8: severe multivessel disease, 95% pLCx, 90% D2, 80% dLAD, dRCA 99%, RPDA 80%  - TTE 11/8: EF 60-65%, asymmetric septal hypertrophy, small LV cavity size, No ABRAHAM or LVOT, IVC normal, no significant valve disease  - cMRI 11/13: Asymmetric septal hypertrophy with maximal wall thickness 2.0 cm. Systolic anterior motion of the mitral valve is present. There is apical displacement of the papillary muscles. LGE imaging shows mid-myocardial enhancement in a non-ischemic pattern. Collectively, these findings suggest obstructive hypertrophic cardiomyopathy. Consider genetic testing for HCM.  Normal biventricular systolic function, LVEF 74%, RVEF 70%.  - CVTS consulted; CABG originally planned 11/14 1400 with Dr. Pennington, but was canceled due to emergent case  - plan to transfer to [place] for CABG on Monday 11/18 per Dr. Pennington  - Continue heparin gtt until surgery per interventional cardiology  - Continue 81 mg  ASA daily  - BB: Discontinued given low HR, normal EF  - ACEi/ARB: PTA Lisinopril-hydrochlorothiazide increased to 40-25 mg  - CCB: Norvasc 10 mg daily   - Statin: continue PTA Lipitor  - Daily BMP, Mg, replace lytes per protocol   - PRN Nitroglycerin and EKG for any chest pain  - Low threshold to use Nitroglycerin gtt if needed  - Dental consulted for 2 days of reported dental pain; pain related to ill fitting dentures, will need OP follow up  - PT/OT pre-CABG  - Plan for follow up with Dr. Leal after discharge for new HCM diagnosis; encouraged her to consider having her children assessed for HCM     # Small right hemisphere frontal lobe stroke 2011  Previously on atorvastatin 80 mg, baby aspirin, but not currently taking. Has had no residual symptoms since stroke in 2011. Will resume secondary prevention at admission and assist with follow-up at discharge.  - ASA, statin as listed above     # DM2  Home regimen includes metformin and Ozempic, not currently taking. Will hold home regimen and manage with sliding scale insulin.  - A1C 6.0  - Medium sliding scale insulin   - POCT glucose checks  - Hold PTA Metformin while IP     # Depression  Previously taking Wellbutrin and Zoloft, not currently. Will not resume at admission, defer to outpatient follow-up.     # Current Smoker  # Morbid obesity with BMI 38  4-5 cigarettes a day. Interested and motivated to quit.  Lifestyle changes recommended     # Possible TL  Per RN and patient, ongoing daytime tiredness after getting good sleep and snoring at night  - OP Sleep Study referral at time of discharge to assess TL     FEN: Regular Adult  Code status: Full  Prophylaxis:  heparin gtt  Isolation: None  Disposition: plan to transfer to [place] Monday for CABG     Medically Ready for Discharge: Anticipated in 5+ Days     Patient seen and discussed with Dr. Mccormick, who agrees with above plan.    Livier De Los Santos, MS, PA-C  [facility] Cardiology Team  Pager [phone]/Sanchez    Interval  History:  Patient very tearful on exam and expresses significant anxiety regarding upcoming surgery. She reports feeling stress after seeing how much her surgery plans have affected her family. She reports that the medication she was given yesterday for anxiety left her feeling hazy and foggy and she does not want to take it anymore moving forward. VSS.     Physical Exam:  Temp:  [97.9  F (36.6  C)-98.2  F (36.8  C)] 97.9  F (36.6  C)  Pulse:  [54-83] 54  Resp:  [14-18] 16  BP: ()/(53-84) 95/55  Cuff Mean (mmHg):  [76] 76  SpO2:  [94 %-100 %] 97 %    I/O:   Intake/Output Summary (Last 24 hours) at 11/15/2024 0745  Last data filed at 11/14/2024 2352  Gross per 24 hour   Intake 603.08 ml   Output --   Net 603.08 ml        Wt:   Wt Readings from Last 5 Encounters:   11/15/24 109 kg (240 lb 3.2 oz)   10/31/23 96.6 kg (213 lb)   03/24/23 95.7 kg (211 lb)   01/31/23 102.4 kg (225 lb 12.8 oz)   03/23/22 103.5 kg (228 lb 3.2 oz)         General: Tearful, anxious, stable  HEENT:  PERRLA, EOMI.   CV: RRR. No murmur appreciated. No rubs or gallops.   Resp: No increased work of breathing or use of accessory muscles, breathing comfortably on room air.  Lung sounds clear throughout/bilaterally  Abdomen:  Normal active bowel sounds.  Abdomen is soft. No distension, non-tender to palpation.    Extremities: Warm. No pre-tibial edema. No cyanosis or clubbing.  Skin:  Warm and dry. No erythema, rashes, ulceration or diaphoresis.  Neuro: Alert and oriented x3.      Medications:  Current Facility-Administered Medications   Medication Dose Route Frequency Provider Last Rate Last Admin    amLODIPine (NORVASC) tablet 10 mg  10 mg Oral QPM Lexus Em CNP   10 mg at 11/14/24 2029    aspirin EC tablet 81 mg  81 mg Oral Daily Lexus Em CNP   81 mg at 11/14/24 0920    atorvastatin (LIPITOR) tablet 80 mg  80 mg Oral At Bedtime Shayy Toussaint DO   80 mg at 11/14/24 2130    [Held by provider] carvedilol (COREG)  tablet 3.125 mg  3.125 mg Oral BID w/meals Lexus Em CNP   3.125 mg at 11/09/24 1705    insulin aspart (NovoLOG) injection (RAPID ACTING)  1-7 Units Subcutaneous TID AC Shayy Toussaint DO        insulin aspart (NovoLOG) injection (RAPID ACTING)  1-5 Units Subcutaneous At Bedtime Shayy Toussaint DO        lisinopril-hydrochlorothiazide (ZESTORETIC) 20-12.5 MG per tablet 2 tablet  2 tablet Oral QPM Lexus Em CNP   2 tablet at 11/14/24 2029    polyethylene glycol (MIRALAX) Packet 17 g  17 g Oral Daily Shayy Toussaint DO   17 g at 11/12/24 0749    senna-docusate (SENOKOT-S/PERICOLACE) 8.6-50 MG per tablet 1 tablet  1 tablet Oral BID Shayy Toussaint DO   1 tablet at 11/14/24 2029    Or    senna-docusate (SENOKOT-S/PERICOLACE) 8.6-50 MG per tablet 2 tablet  2 tablet Oral BID Shayy Toussaint DO   2 tablet at 11/13/24 0757    sodium chloride (PF) 0.9% PF flush 3 mL  3 mL Intracatheter Q8H Shayy Toussaint DO   3 mL at 11/14/24 2348     Current Facility-Administered Medications   Medication Dose Route Frequency Provider Last Rate Last Admin    heparin 25,000 units in 0.45% NaCl 250 mL ANTICOAGULANT infusion  0-5,000 Units/hr Intravenous Continuous Shayy Toussaint DO 10 mL/hr at 11/15/24 0300 1,000 Units/hr at 11/15/24 0300    medication instruction   Does not apply Continuous PRN Shayy Toussaint DO           Labs:   CMP  Recent Labs   Lab 11/15/24  0636 11/14/24  2135 11/14/24  1829 11/14/24  1315 11/14/24  0955 11/14/24  0505 11/13/24  0806 11/13/24  0628 11/12/24  0743 11/12/24  0545     --   --   --   --  139  --  140  --  140   POTASSIUM 4.1  --   --   --   --  3.5  --  4.1  --  4.5   CHLORIDE 102  --   --   --   --  99  --  103  --  104   CO2 27  --   --   --   --  26  --  26  --  26   ANIONGAP 9  --   --   --   --  14  --  11  --  10   * 128* 115* 99   < > 115*   < > 104*   < > 101*   BUN 29.0*  --   --   --   --  28.6*  --  28.6*  --  28.6*   CR 0.99*  --   --    --   --  0.91  --  0.85  --  0.88   GFRESTIMATED 62  --   --   --   --  68  --  74  --  71   ANGELES 9.4  --   --   --   --  10.2  --  9.8  --  9.6   MAG 2.1  --   --   --   --  2.1  --  2.1  --  2.3   ALBUMIN  --   --   --   --   --  4.4  --   --   --   --     < > = values in this interval not displayed.     CBC  Recent Labs   Lab 11/15/24  0636 11/14/24  1927 11/14/24  0505 11/13/24  0628   WBC 9.8 9.3 12.5* 8.4   RBC 3.96 4.38 4.46 4.28   HGB 11.5* 12.9 13.2 12.6   HCT 35.9 38.9 40.4 38.5   MCV 91 89 91 90   MCH 29.0 29.5 29.6 29.4   MCHC 32.0 33.2 32.7 32.7   RDW 13.3 13.4 13.2 13.2    336 351 287     INR  Recent Labs   Lab 11/14/24  0505   INR 1.08     Arterial Blood GasNo lab results found in last 7 days.    Diagnostics:  ECG 11/14/24:    Echo 11/8/24:  Interpretation Summary  Global and regional left ventricular function is normal with an EF of 60-65%.  Asymmetric septal hypertrophy; septal wall is 1.8 cm, posterior wall is 0.8  cm.  Left ventricular cavity size is small.  No ABRAHAM, or LVOT gradient.  Global right ventricular function is normal.  The right ventricle is normal size.  The inferior vena cava was normal in size with preserved respiratory  variability.  No significant valvular abnormalities present.     This study was compared with the study from 2011 .  No significant changes noted.  Consider cardiac MRI.  ______________________________________________________________________________  Left Ventricle  Left ventricular cavity size is small. Global and regional left ventricular  function is normal with an EF of 60-65%. A mid-cavitary gradient is present.  Asymmetric septal hypertrophy; septal wall is 1.8 cm, posterior wall is 0.8  cm. Left ventricular diastolic function is not assessable.     Right Ventricle  The right ventricle is normal size. Global right ventricular function is  normal.     Atria  Both atria appear normal.     Mitral Valve  The mitral valve is normal. Trace mitral insufficiency  is present.     Aortic Valve  Trileaflet aortic sclerosis without stenosis. On Doppler interrogation, there  is no significant stenosis or regurgitation.     Tricuspid Valve  On Doppler interrogation, there is no significant stenosis or regurgitation.  The valve leaflets are not well visualized.     Pulmonic Valve  The valve leaflets are not well visualized. On Doppler interrogation, there is  no significant stenosis or regurgitation.     Vessels  The aorta root is normal. The inferior vena cava was normal in size with  preserved respiratory variability. Ascending aorta 3.8 cm.     Pericardium  No pericardial effusion is present.     Miscellaneous  No significant valvular abnormalities present.     Compared to Previous Study  This study was compared with the study from 2011 . No significant changes  noted.  ______________________________________________________________________________  MMode/2D Measurements & Calculations  IVSd: 1.9 cm  LVIDd: 4.3 cm  LVIDs: 3.0 cm  LVPWd: 1.7 cm  FS: 29.8 %  LV mass(C)d: 336.5 grams  LV mass(C)dI: 154.9 grams/m2  Ao root diam: 3.2 cm  asc Aorta Diam: 3.8 cm  LVOT diam: 2.2 cm  LVOT area: 3.7 cm2  Ao root diam index Ht(cm/m): 1.9  Ao root diam index BSA (cm/m2): 1.5  Asc Ao diam index BSA (cm/m2): 1.7  Asc Ao diam index Ht(cm/m): 2.3  LA Volume (BP): 30.7 ml     LA Volume Index (BP): 14.1 ml/m2  RWT: 0.78  TAPSE: 2.9 cm     Doppler Measurements & Calculations  MV E max abel: 35.1 cm/sec  MV A max abel: 80.7 cm/sec  MV E/A: 0.43  MV dec time: 0.27 sec  Ao V2 max: 235.0 cm/sec  Ao max P.1 mmHg  Ao V2 mean: 150.0 cm/sec  Ao mean P.0 mmHg  Ao V2 VTI: 47.9 cm  MELANIE(I,D): 2.1 cm2  MELANIE(V,D): 1.9 cm2  LV V1 max P.9 mmHg  LV V1 max: 121.0 cm/sec  LV V1 VTI: 27.6 cm  SV(LVOT): 100.8 ml  SI(LVOT): 46.4 ml/m2     AV Abel Ratio (DI): 0.51  MELANIE Index (cm2/m2): 0.97  E/E' av.2  Lateral E/e': 7.8  Medial E/e': 8.5  RV S Abel: 20.0 cm/sec    Coronary angiogram 24:    Ramus lesion  is 40% stenosed.    Prox Cx lesion is 95% stenosed.    Mid Cx lesion is 30% stenosed.    Dist LAD-2 lesion is 70% stenosed.    Mid LAD to Dist LAD lesion is 70% stenosed.    3rd Diag lesion is 70% stenosed.    Mid LAD lesion is 80% stenosed.    2nd Diag lesion is 90% stenosed.    Dist LAD-1 lesion is 80% stenosed.    Prox LAD to Mid LAD lesion is 30% stenosed.    Dist RCA lesion is 99% stenosed.    Mid RCA lesion is 40% stenosed.    Prox RCA lesion is 40% stenosed.    RPDA lesion is 80% stenosed.     1.significant three-vessel CAD         Plan     Follow bedrest per protocol   Return to the primary inpatient team for further evaluation and managmenet        1.continue therapeutic anticoagulation with heparin drip or Lovenox for ACS  2.continue aspirin  3.consult cardiothoracic surgery service for CABG     Recommendations    General Recommendations:  - Patient given specific instructions regarding care of arteriotomy site, activity restrictions, signs and symptoms of cardiac or vascular complications and to seek immediate medical evaluation should they occur.   - Arterial sheath removed from radial artery with TR Band placement.       Medications:  - Resume anticoagulant medication in 2 hours.       Surgical Therapy:   - Will obtain cardiovascular surgery consultation regarding coronary artery bypass grafting.     Coronary Findings    Diagnostic  Dominance: Right  Left Anterior Descending   Prox LAD to Mid LAD lesion is 30% stenosed.   Mid LAD lesion is 80% stenosed.   Mid LAD to Dist LAD lesion is 70% stenosed.   Dist LAD-1 lesion is 80% stenosed.   Dist LAD-2 lesion is 70% stenosed.      Second Diagonal Branch   2nd Diag lesion is 90% stenosed.      Third Diagonal Branch   3rd Diag lesion is 70% stenosed.      Ramus Intermedius   Ramus lesion is 40% stenosed.      Left Circumflex   Prox Cx lesion is 95% stenosed.   Mid Cx lesion is 30% stenosed.      Right Coronary Artery   Prox RCA lesion is 40% stenosed.   Mid  RCA lesion is 40% stenosed.   Dist RCA lesion is 99% stenosed.      Right Posterior Descending Artery   RPDA lesion is 80% stenosed.         Intervention     No interventions have been documented.         cMRI 11/13/24:   1. The left ventricle cavity size is small. There is asymmetric septal hypertrophy with maximal septal  thickness 2.0 cm. The global systolic function is normal. The LVEF is 74%. There are no regional wall  motion abnormalities.     2. The right ventricle is normal in cavity size. The global systolic function is normal. The RVEF is 70%.      3. Both atria are normal in size.     4. There is systolic anterior motion of the mitral valve and systolic flow acceleration in the LVOT. There  is mild mitral regurgitation. There is apical displacement of the papillary muscles.      5. Late gadolinium enhancement imaging shows mid-myocardial enhancement in the basal to mid lateral  segments.      6. There is a trivial pericardial effusion.      7. There is no clear intracardiac thrombus, although the left atrial appendage was incompletely visualized.      CONCLUSIONS:   Asymmetric septal hypertrophy with maximal wall thickness 2.0 cm. Systolic anterior motion of the mitral  valve is present. There is apical displacement of the papillary muscles. LGE imaging shows mid-myocardial  enhancement in a non-ischemic pattern. Collectively, these findings suggest obstructive hypertrophic  cardiomyopathy. Consider genetic testing for HCM.   Normal biventricular systolic function, LVEF 74%, RVEF 70%.     Recent Results (from the past 24 hours)   LESLIE with Report    Narrative    Ori Landaverde MD     11/14/2024  3:22 PM  Procedures         Medical Decision Making       60 MINUTES SPENT BY ME on the date of service doing chart review, history, exam, documentation & further activities per the note.

## 2024-11-15 NOTE — PLAN OF CARE
Goal Outcome Evaluation:      Problem: Acute Coronary Syndrome  Goal: Optimal Adaptation to Illness  Intervention: Support Adjustment to Life-Change Event  Recent Flowsheet Documentation  Taken 11/14/2024 2030 by Jd Harmon RN  Supportive Measures: active listening utilized  Family/Support System Care:   support provided   involvement promoted  Goal: Effective Cardiac Pump Function  Intervention: Optimize Cardiac Function and Blood Flow  Recent Flowsheet Documentation  Taken 11/14/2024 2030 by Jd Harmon RN  Environmental Support: calm environment promoted

## 2024-11-15 NOTE — PLAN OF CARE
3802-2207: No acute changes overnight. Patient was anxious with staff about upcoming procedure. PRN atarax and melatonin given with good effect, patient finally able to sleep.       Neuro: A/Ox4. Calls appropriately. Anxious, tearful, cooperative. Denies headache, dizziness, and lightheadedness.  Cardiac/Tele: VSS. SR/bradycardic while sleeping. Denies chest pain and palpitations. Afebrile.  Respiratory: Sats >95% on room air. Denies SOB.  GI/: Continent. Adequate urine output. Voiding independently. Last BM reports was several days ago. Denies nausea.  Diet/Appetite: Low saturated fat diet.  Skin: No new deficits noted.   LDAs: PIV infusing heparin.  Activity: Ind  Pain: none     Continue to monitor and notify team with changes. Awaiting CABG, patient hopeful for updated surgery date.

## 2024-11-15 NOTE — PROGRESS NOTES
CV Surgery Follow-up    Karyn Gamez is a 69 year old female with a PMH of DM2, tobacco use, HTN, HLD, depression who was found to have NSTEMI. Further workup demonstrated severe multivessel CAD. Echocardiogram demonstrates preserved biventricular function. CVTS was consulted for consideration of surgical revascularization. Currently on a heparin drip, chest pain free.    Bilateral carotid US complete, <50% stenosis bilaterally. Vein mapping completed, adequate diameter bilaterally and without evidence of thrombus.   Echo completed 11/8, LV EF 60-65%, no significant valvular abnormalities  Right radial used for angiogram. Left hand dominant. Amado's test completed bedside, good flow through palmar arch with radial compression. Dr. Pennington had discussion with patient of risks vs benefits, will plan for radial graft.  Surgery plan: CABG with Dr. Pennington. Initially planned for Thursday, Nov 14; unfortunately case had to be cancelled due to an emergent case that arose. After review of OR availability, will plan to transfer patient to [place] with tentative plan for surgery on 11/18. Appreciate cardiology's assistance with arranging transfer.   Pre-operative teaching completed; orders placed 11/12 - will need new orders once at [place].   Will continue to follow. Other cares per primary team. Please page with questions or concerns.    Dr. Pennington discussed the above with the patient.    Brenda Casillas PA-C  Cardiothoracic Surgery  Pager [phone]    11:39 AM November 15, 2024

## 2024-11-15 NOTE — PLAN OF CARE
Neuro: A&Ox4.   Cardiac: SR. VSS.   Respiratory: Sating 98 on RA.  GI/: Adequate urine output.  No BM this shift - bowel regimen meds given   Diet/appetite: Tolerating cardiac diet. Eating well.  Activity:  IND, up to chair and in halls.  Pain: denies   Skin: No new deficits noted.  LDA's:  PIV - infusing heparin  Plan: Continue with POC. Notify primary team with changes.

## 2024-11-16 VITALS
RESPIRATION RATE: 16 BRPM | OXYGEN SATURATION: 97 % | TEMPERATURE: 97.5 F | SYSTOLIC BLOOD PRESSURE: 109 MMHG | WEIGHT: 239 LBS | HEIGHT: 67 IN | BODY MASS INDEX: 37.51 KG/M2 | DIASTOLIC BLOOD PRESSURE: 61 MMHG | HEART RATE: 54 BPM

## 2024-11-16 LAB
ANION GAP SERPL CALCULATED.3IONS-SCNC: 9 MMOL/L (ref 7–15)
BASOPHILS # BLD AUTO: 0.1 10E3/UL (ref 0–0.2)
BASOPHILS NFR BLD AUTO: 1 %
BUN SERPL-MCNC: 24.7 MG/DL (ref 8–23)
CALCIUM SERPL-MCNC: 9.8 MG/DL (ref 8.8–10.4)
CHLORIDE SERPL-SCNC: 106 MMOL/L (ref 98–107)
CREAT SERPL-MCNC: 0.9 MG/DL (ref 0.51–0.95)
EGFRCR SERPLBLD CKD-EPI 2021: 69 ML/MIN/1.73M2
EOSINOPHIL # BLD AUTO: 0.1 10E3/UL (ref 0–0.7)
EOSINOPHIL NFR BLD AUTO: 1 %
ERYTHROCYTE [DISTWIDTH] IN BLOOD BY AUTOMATED COUNT: 13.2 % (ref 10–15)
GLUCOSE BLDC GLUCOMTR-MCNC: 100 MG/DL (ref 70–99)
GLUCOSE BLDC GLUCOMTR-MCNC: 102 MG/DL (ref 70–99)
GLUCOSE BLDC GLUCOMTR-MCNC: 112 MG/DL (ref 70–99)
GLUCOSE BLDC GLUCOMTR-MCNC: 126 MG/DL (ref 70–99)
GLUCOSE SERPL-MCNC: 99 MG/DL (ref 70–99)
HCO3 SERPL-SCNC: 27 MMOL/L (ref 22–29)
HCT VFR BLD AUTO: 37.2 % (ref 35–47)
HGB BLD-MCNC: 12.3 G/DL (ref 11.7–15.7)
IMM GRANULOCYTES # BLD: 0 10E3/UL
IMM GRANULOCYTES NFR BLD: 0 %
LYMPHOCYTES # BLD AUTO: 4.3 10E3/UL (ref 0.8–5.3)
LYMPHOCYTES NFR BLD AUTO: 48 %
MAGNESIUM SERPL-MCNC: 2.3 MG/DL (ref 1.7–2.3)
MCH RBC QN AUTO: 29.7 PG (ref 26.5–33)
MCHC RBC AUTO-ENTMCNC: 33.1 G/DL (ref 31.5–36.5)
MCV RBC AUTO: 90 FL (ref 78–100)
MONOCYTES # BLD AUTO: 0.9 10E3/UL (ref 0–1.3)
MONOCYTES NFR BLD AUTO: 10 %
NEUTROPHILS # BLD AUTO: 3.6 10E3/UL (ref 1.6–8.3)
NEUTROPHILS NFR BLD AUTO: 41 %
NRBC # BLD AUTO: 0 10E3/UL
NRBC BLD AUTO-RTO: 0 /100
PLATELET # BLD AUTO: 279 10E3/UL (ref 150–450)
POTASSIUM SERPL-SCNC: 4.5 MMOL/L (ref 3.4–5.3)
RBC # BLD AUTO: 4.14 10E6/UL (ref 3.8–5.2)
SODIUM SERPL-SCNC: 142 MMOL/L (ref 135–145)
UFH PPP CHRO-ACNC: 0.35 IU/ML
WBC # BLD AUTO: 8.9 10E3/UL (ref 4–11)

## 2024-11-16 PROCEDURE — 99232 SBSQ HOSP IP/OBS MODERATE 35: CPT

## 2024-11-16 PROCEDURE — 80048 BASIC METABOLIC PNL TOTAL CA: CPT

## 2024-11-16 PROCEDURE — 250N000013 HC RX MED GY IP 250 OP 250 PS 637

## 2024-11-16 PROCEDURE — 36415 COLL VENOUS BLD VENIPUNCTURE: CPT

## 2024-11-16 PROCEDURE — 85014 HEMATOCRIT: CPT

## 2024-11-16 PROCEDURE — 120N000005 HC R&B MS OVERFLOW UMMC

## 2024-11-16 PROCEDURE — 36415 COLL VENOUS BLD VENIPUNCTURE: CPT | Performed by: STUDENT IN AN ORGANIZED HEALTH CARE EDUCATION/TRAINING PROGRAM

## 2024-11-16 PROCEDURE — 83735 ASSAY OF MAGNESIUM: CPT | Performed by: NURSE PRACTITIONER

## 2024-11-16 PROCEDURE — 250N000011 HC RX IP 250 OP 636: Performed by: STUDENT IN AN ORGANIZED HEALTH CARE EDUCATION/TRAINING PROGRAM

## 2024-11-16 PROCEDURE — 85520 HEPARIN ASSAY: CPT | Performed by: STUDENT IN AN ORGANIZED HEALTH CARE EDUCATION/TRAINING PROGRAM

## 2024-11-16 PROCEDURE — 250N000013 HC RX MED GY IP 250 OP 250 PS 637: Performed by: NURSE PRACTITIONER

## 2024-11-16 PROCEDURE — 85004 AUTOMATED DIFF WBC COUNT: CPT

## 2024-11-16 RX ADMIN — SENNOSIDES AND DOCUSATE SODIUM 2 TABLET: 8.6; 5 TABLET ORAL at 20:01

## 2024-11-16 RX ADMIN — POLYETHYLENE GLYCOL 3350 17 G: 17 POWDER, FOR SOLUTION ORAL at 09:23

## 2024-11-16 RX ADMIN — AMLODIPINE BESYLATE 10 MG: 10 TABLET ORAL at 20:01

## 2024-11-16 RX ADMIN — LISINOPRIL AND HYDROCHLOROTHIAZIDE 2 TABLET: 12.5; 2 TABLET ORAL at 20:00

## 2024-11-16 RX ADMIN — SENNOSIDES AND DOCUSATE SODIUM 2 TABLET: 8.6; 5 TABLET ORAL at 09:22

## 2024-11-16 RX ADMIN — ASPIRIN 81 MG CHEWABLE TABLET 81 MG: 81 TABLET CHEWABLE at 09:22

## 2024-11-16 RX ADMIN — HEPARIN SODIUM 1150 UNITS/HR: 10000 INJECTION, SOLUTION INTRAVENOUS at 05:58

## 2024-11-16 RX ADMIN — ATORVASTATIN CALCIUM 80 MG: 80 TABLET, FILM COATED ORAL at 22:17

## 2024-11-16 NOTE — PROGRESS NOTES
Temp: 97.8  F (36.6  C) Temp src: Oral BP: 127/76 Pulse: 63   Resp: 18 SpO2: 97 % O2 Device: None (Room air)       Neuro: A&O x4.  Cardiac: Tele in place, SR/SB. Afebrile  Resp: VSS on RA.   GI/: Voiding well up to bathroom. LBM 11/12 senna and Mirilax given per previous RN. Passing gas. Tolerating regular diet.  Skin: No new deficits noted  LDAs: L PIV  in place infusing Heparin @ 1150 units/hr - Xa recheck @ 2230.   Activity: Up independently in the room.      Plan: Continue to monitor and follow POC. Plan to transfer pt to [facility] prior to CABG on 11/18. Notify C1 with changes.

## 2024-11-16 NOTE — PROGRESS NOTES
[facility]   Cardiology   Progress Note     ASSESSMENT/PLAN:  Karyn Gamez is a 69 year old female with PMH of DM2 not on insulin, tobacco use, HTN, HLD, depression admitted on 11/7/2024 with chest pain and elevated troponin c/f NSTEMI found to have severe multivessel disease being evaluated for CABG.     Changes Today   - CABG rescheduled for Monday 11/18 at [facility]       # NSTEMI  # Multivessel CAD  # Hx stress-induced cardiomyopathy 08/2010  # Asymmetric Septal Hypertrophy without LVOT on Echo  # Hypertrophic cardiomyopathy confirmed by cardiac MRI  # HTN  # HLD  Presented with 3 days of worsening exertional chest pain/pressure and burning, now occurring at rest and with dyspnea and radiation down left arm. Troponin peak at 45, now down trending. T wave inversion V4-V6 also noted on 2019 EKG. ACS risk factors include HTN, T2DM, HLD, smoker, family history of premature CAD. Coronary angiogram revealed multivessel disesase     - Coronary angiogram 11/8: severe multivessel disease, 95% pLCx, 90% D2, 80% dLAD, dRCA 99%, RPDA 80%  - TTE 11/8: EF 60-65%, asymmetric septal hypertrophy, small LV cavity size, No ABRAHAM or LVOT, IVC normal, no significant valve disease  - cMRI 11/13: Asymmetric septal hypertrophy with maximal wall thickness 2.0 cm. Systolic anterior motion of the mitral valve is present. There is apical displacement of the papillary muscles. LGE imaging shows mid-myocardial enhancement in a non-ischemic pattern. Collectively, these findings suggest obstructive hypertrophic cardiomyopathy. Consider genetic testing for HCM.  Normal biventricular systolic function, LVEF 74%, RVEF 70%.  - CVTS consulted; CABG originally planned 11/14 1400 with Dr. Pennington, but was canceled due to emergent case  - plan to transfer to [employer] [facility] [employer] [facility] for CABG on Monday 11/18 per Dr. Pennington  - Continue heparin gtt until surgery per interventional cardiology  - Continue 81 mg ASA daily  - BB:  Discontinued given low HR, normal EF  - ACEi/ARB: Lisinopril-hydrochlorothiazide to 40-25 mg  - CCB: Norvasc 10 mg daily   - Statin: continue PTA Lipitor  - Daily BMP, Mg, replace lytes per protocol   - PRN Nitroglycerin and EKG for any chest pain  - Low threshold to use Nitroglycerin gtt if needed  - Dental consulted for 2 days of reported dental pain; pain related to ill fitting dentures, will need OP follow up  - PT/OT pre-CABG  - Plan for follow up with Dr. Leal after discharge for new HCM diagnosis; encouraged her to consider having her children assessed for HCM     # Small right hemisphere frontal lobe stroke 2011  Previously on atorvastatin 80 mg, baby aspirin, but not currently taking. Has had no residual symptoms since stroke in 2011. Will resume secondary prevention at admission and assist with follow-up at discharge.  - ASA, statin as listed above     # DM2  Home regimen includes metformin and Ozempic, not currently taking. Will hold home regimen and manage with sliding scale insulin.  - A1C 6.0  - Medium sliding scale insulin   - POCT glucose checks  - Hold PTA Metformin while IP     # Depression  Previously taking Wellbutrin and Zoloft, not currently. Will not resume at admission, defer to outpatient follow-up.     # Current Smoker  # Morbid obesity with BMI 38  4-5 cigarettes a day. Interested and motivated to quit.  Lifestyle changes recommended     # Possible TL  Per RN and patient, ongoing daytime tiredness after getting good sleep and snoring at night  - OP Sleep Study referral at time of discharge to assess TL     FEN: Regular Adult  Code status: Full  Prophylaxis:  heparin gtt  Isolation: None  Disposition: plan to transfer to [place] Monday for CABG     Medically Ready for Discharge: Anticipated in 5+ Days     Patient seen and discussed with Dr. Mccormick, who agrees with above plan.    Livier De Los Santos MS, PA-C  [facility] Cardiology Team  Pager [phone]/Vocera    Interval History:  Patient in good spirits  this morning. Expresses understanding regarding plan for surgery on Monday. She was up and walking in the halls without limitations. VSS.     Physical Exam:  Temp:  [97.5  F (36.4  C)-98.2  F (36.8  C)] 97.9  F (36.6  C)  Pulse:  [54-68] 61  Resp:  [16-18] 16  BP: ()/(55-76) 127/62  SpO2:  [95 %-97 %] 95 %    I/O:   Intake/Output Summary (Last 24 hours) at 11/16/2024 0700  Last data filed at 11/15/2024 1900  Gross per 24 hour   Intake 1440 ml   Output --   Net 1440 ml        Wt:   Wt Readings from Last 5 Encounters:   11/16/24 108.4 kg (239 lb)   10/31/23 96.6 kg (213 lb)   03/24/23 95.7 kg (211 lb)   01/31/23 102.4 kg (225 lb 12.8 oz)   03/23/22 103.5 kg (228 lb 3.2 oz)     General: NAD; eating breakfast   HEENT:  PERRLA, EOMI.   CV: RRR. No murmur appreciated. No rubs or gallops.   Resp: No increased work of breathing or use of accessory muscles, breathing comfortably on room air.  Lung sounds clear throughout/bilaterally  Abdomen:  Normal active bowel sounds.  Abdomen is soft. No distension, non-tender to palpation.    Extremities: Warm. No pre-tibial edema. No cyanosis or clubbing.  Skin:  Warm and dry. No erythema, rashes, ulceration or diaphoresis.  Neuro: Alert and oriented x3.     Medications:  Current Facility-Administered Medications   Medication Dose Route Frequency Provider Last Rate Last Admin    amLODIPine (NORVASC) tablet 10 mg  10 mg Oral QPM Lexus Em CNP   10 mg at 11/15/24 2029    aspirin EC tablet 81 mg  81 mg Oral Daily Lexus Em CNP   81 mg at 11/15/24 0907    atorvastatin (LIPITOR) tablet 80 mg  80 mg Oral At Bedtime Shayy Toussaint DO   80 mg at 11/15/24 2209    [Held by provider] carvedilol (COREG) tablet 3.125 mg  3.125 mg Oral BID w/meals Lexus Em CNP   3.125 mg at 11/09/24 1705    insulin aspart (NovoLOG) injection (RAPID ACTING)  1-7 Units Subcutaneous TID AC Shayy Toussaint DO        insulin aspart (NovoLOG) injection (RAPID ACTING)  1-5  Units Subcutaneous At Bedtime Shayy Toussaint DO        lisinopril-hydrochlorothiazide (ZESTORETIC) 20-12.5 MG per tablet 2 tablet  2 tablet Oral QPM Lexus Em CNP   2 tablet at 11/15/24 2040    polyethylene glycol (MIRALAX) Packet 17 g  17 g Oral Daily Shayy Toussaint DO   17 g at 11/15/24 0907    senna-docusate (SENOKOT-S/PERICOLACE) 8.6-50 MG per tablet 1 tablet  1 tablet Oral BID Shayy Toussaint DO   1 tablet at 11/14/24 2029    Or    senna-docusate (SENOKOT-S/PERICOLACE) 8.6-50 MG per tablet 2 tablet  2 tablet Oral BID Shayy Toussaint DO   2 tablet at 11/15/24 2028    sodium chloride (PF) 0.9% PF flush 3 mL  3 mL Intracatheter Q8H Shayy Toussaint DO   3 mL at 11/14/24 2348     Current Facility-Administered Medications   Medication Dose Route Frequency Provider Last Rate Last Admin    heparin 25,000 units in 0.45% NaCl 250 mL ANTICOAGULANT infusion  0-5,000 Units/hr Intravenous Continuous Leann, Alma Trammell MD 11.5 mL/hr at 11/16/24 0558 1,150 Units/hr at 11/16/24 0558    medication instruction   Does not apply Continuous PRN Shayy Toussaint DO           Labs:   [facility]  Recent Labs   Lab 11/15/24  2211 11/15/24  1729 11/15/24  1232 11/15/24  0832 11/15/24  0636 11/14/24  0955 11/14/24  0505 11/13/24  0806 11/13/24  0628 11/12/24  0743 11/12/24  0545   NA  --   --   --   --  138  --  139  --  140  --  140   POTASSIUM  --   --   --   --  4.1  --  3.5  --  4.1  --  4.5   CHLORIDE  --   --   --   --  102  --  99  --  103  --  104   CO2  --   --   --   --  27  --  26  --  26  --  26   ANIONGAP  --   --   --   --  9  --  14  --  11  --  10   GLC 92 104* 140* 112* 103*   < > 115*   < > 104*   < > 101*   BUN  --   --   --   --  29.0*  --  28.6*  --  28.6*  --  28.6*   CR  --   --   --   --  0.99*  --  0.91  --  0.85  --  0.88   GFRESTIMATED  --   --   --   --  62  --  68  --  74  --  71   ANGELES  --   --   --   --  9.4  --  10.2  --  9.8  --  9.6   MAG  --   --   --   --  2.1  --  2.1  --  2.1   --  2.3   ALBUMIN  --   --   --   --   --   --  4.4  --   --   --   --     < > = values in this interval not displayed.     CBC  Recent Labs   Lab 11/16/24  0621 11/15/24  0636 11/14/24  1927 11/14/24  0505   WBC 8.9 9.8 9.3 12.5*   RBC 4.14 3.96 4.38 4.46   HGB 12.3 11.5* 12.9 13.2   HCT 37.2 35.9 38.9 40.4   MCV 90 91 89 91   MCH 29.7 29.0 29.5 29.6   MCHC 33.1 32.0 33.2 32.7   RDW 13.2 13.3 13.4 13.2    259 336 351     INR  Recent Labs   Lab 11/14/24  0505   INR 1.08     Arterial Blood GasNo lab results found in last 7 days.    Diagnostics:  ECG 11/14/24:    Echo 11/8/24:  Interpretation Summary  Global and regional left ventricular function is normal with an EF of 60-65%.  Asymmetric septal hypertrophy; septal wall is 1.8 cm, posterior wall is 0.8  cm.  Left ventricular cavity size is small.  No ABRAHAM, or LVOT gradient.  Global right ventricular function is normal.  The right ventricle is normal size.  The inferior vena cava was normal in size with preserved respiratory  variability.  No significant valvular abnormalities present.     This study was compared with the study from 2011 .  No significant changes noted.  Consider cardiac MRI.  ______________________________________________________________________________  Left Ventricle  Left ventricular cavity size is small. Global and regional left ventricular  function is normal with an EF of 60-65%. A mid-cavitary gradient is present.  Asymmetric septal hypertrophy; septal wall is 1.8 cm, posterior wall is 0.8  cm. Left ventricular diastolic function is not assessable.     Right Ventricle  The right ventricle is normal size. Global right ventricular function is  normal.     Atria  Both atria appear normal.     Mitral Valve  The mitral valve is normal. Trace mitral insufficiency is present.     Aortic Valve  Trileaflet aortic sclerosis without stenosis. On Doppler interrogation, there  is no significant stenosis or regurgitation.     Tricuspid Valve  On  Doppler interrogation, there is no significant stenosis or regurgitation.  The valve leaflets are not well visualized.     Pulmonic Valve  The valve leaflets are not well visualized. On Doppler interrogation, there is  no significant stenosis or regurgitation.     Vessels  The aorta root is normal. The inferior vena cava was normal in size with  preserved respiratory variability. Ascending aorta 3.8 cm.     Pericardium  No pericardial effusion is present.     Miscellaneous  No significant valvular abnormalities present.     Compared to Previous Study  This study was compared with the study from  . No significant changes  noted.  ______________________________________________________________________________  MMode/2D Measurements & Calculations  IVSd: 1.9 cm  LVIDd: 4.3 cm  LVIDs: 3.0 cm  LVPWd: 1.7 cm  FS: 29.8 %  LV mass(C)d: 336.5 grams  LV mass(C)dI: 154.9 grams/m2  Ao root diam: 3.2 cm  asc Aorta Diam: 3.8 cm  LVOT diam: 2.2 cm  LVOT area: 3.7 cm2  Ao root diam index Ht(cm/m): 1.9  Ao root diam index BSA (cm/m2): 1.5  Asc Ao diam index BSA (cm/m2): 1.7  Asc Ao diam index Ht(cm/m): 2.3  LA Volume (BP): 30.7 ml     LA Volume Index (BP): 14.1 ml/m2  RWT: 0.78  TAPSE: 2.9 cm     Doppler Measurements & Calculations  MV E max abel: 35.1 cm/sec  MV A max abel: 80.7 cm/sec  MV E/A: 0.43  MV dec time: 0.27 sec  Ao V2 max: 235.0 cm/sec  Ao max P.1 mmHg  Ao V2 mean: 150.0 cm/sec  Ao mean P.0 mmHg  Ao V2 VTI: 47.9 cm  MELANIE(I,D): 2.1 cm2  MELANIE(V,D): 1.9 cm2  LV V1 max P.9 mmHg  LV V1 max: 121.0 cm/sec  LV V1 VTI: 27.6 cm  SV(LVOT): 100.8 ml  SI(LVOT): 46.4 ml/m2     AV Abel Ratio (DI): 0.51  MELANIE Index (cm2/m2): 0.97  E/E' av.2  Lateral E/e': 7.8  Medial E/e': 8.5  RV S Abel: 20.0 cm/sec     Coronary angiogram 24:    Ramus lesion is 40% stenosed.    Prox Cx lesion is 95% stenosed.    Mid Cx lesion is 30% stenosed.    Dist LAD-2 lesion is 70% stenosed.    Mid LAD to Dist LAD lesion is 70% stenosed.    3rd  Diag lesion is 70% stenosed.    Mid LAD lesion is 80% stenosed.    2nd Diag lesion is 90% stenosed.    Dist LAD-1 lesion is 80% stenosed.    Prox LAD to Mid LAD lesion is 30% stenosed.    Dist RCA lesion is 99% stenosed.    Mid RCA lesion is 40% stenosed.    Prox RCA lesion is 40% stenosed.    RPDA lesion is 80% stenosed.     1.significant three-vessel CAD           Plan      Follow bedrest per protocol   Return to the primary inpatient team for further evaluation and managmenet        1.continue therapeutic anticoagulation with heparin drip or Lovenox for ACS  2.continue aspirin  3.consult cardiothoracic surgery service for CABG      Recommendations     General Recommendations:  - Patient given specific instructions regarding care of arteriotomy site, activity restrictions, signs and symptoms of cardiac or vascular complications and to seek immediate medical evaluation should they occur.   - Arterial sheath removed from radial artery with TR Band placement.       Medications:  - Resume anticoagulant medication in 2 hours.       Surgical Therapy:   - Will obtain cardiovascular surgery consultation regarding coronary artery bypass grafting.      Coronary Findings     Diagnostic  Dominance: Right  Left Anterior Descending   Prox LAD to Mid LAD lesion is 30% stenosed.   Mid LAD lesion is 80% stenosed.   Mid LAD to Dist LAD lesion is 70% stenosed.   Dist LAD-1 lesion is 80% stenosed.   Dist LAD-2 lesion is 70% stenosed.      Second Diagonal Branch   2nd Diag lesion is 90% stenosed.      Third Diagonal Branch   3rd Diag lesion is 70% stenosed.      Ramus Intermedius   Ramus lesion is 40% stenosed.      Left Circumflex   Prox Cx lesion is 95% stenosed.   Mid Cx lesion is 30% stenosed.      Right Coronary Artery   Prox RCA lesion is 40% stenosed.   Mid RCA lesion is 40% stenosed.   Dist RCA lesion is 99% stenosed.      Right Posterior Descending Artery   RPDA lesion is 80% stenosed.         Intervention      No  interventions have been documented.            cMRI 11/13/24:   1. The left ventricle cavity size is small. There is asymmetric septal hypertrophy with maximal septal  thickness 2.0 cm. The global systolic function is normal. The LVEF is 74%. There are no regional wall  motion abnormalities.     2. The right ventricle is normal in cavity size. The global systolic function is normal. The RVEF is 70%.      3. Both atria are normal in size.     4. There is systolic anterior motion of the mitral valve and systolic flow acceleration in the LVOT. There  is mild mitral regurgitation. There is apical displacement of the papillary muscles.      5. Late gadolinium enhancement imaging shows mid-myocardial enhancement in the basal to mid lateral  segments.      6. There is a trivial pericardial effusion.      7. There is no clear intracardiac thrombus, although the left atrial appendage was incompletely visualized.      CONCLUSIONS:   Asymmetric septal hypertrophy with maximal wall thickness 2.0 cm. Systolic anterior motion of the mitral  valve is present. There is apical displacement of the papillary muscles. LGE imaging shows mid-myocardial  enhancement in a non-ischemic pattern. Collectively, these findings suggest obstructive hypertrophic  cardiomyopathy. Consider genetic testing for HCM.   Normal biventricular systolic function, LVEF 74%, RVEF 70%.          Recent Results (from the past 24 hours)   LESLIE with Report     Narrative     Ori Landaverde MD     11/14/2024  3:22 PM  Procedures          No results found for this or any previous visit (from the past 24 hours).    Medical Decision Making       35 MINUTES SPENT BY ME on the date of service doing chart review, history, exam, documentation & further activities per the note.

## 2024-11-16 NOTE — PROGRESS NOTES
Transport via [facility] to [place] pre-op scheduled for Monday 11/18,  at 0430, arrive by 0530.

## 2024-11-16 NOTE — PLAN OF CARE
"Goal Outcome Evaluation:  Shift 1900 - 0730    Primary team: EDWIGE-Penelope    NEURO: A&O x4. Pt is calm and cooperative. Calls appropriately, denies numbness and tingling.   RESPIRATORY: All LS clear and diminished. Pt denies SOB.   CARDIAC: SR, VSS. Occasional bradycardia over NOC. +1 dependent edema.  GI/: Voids independently. Reports constipation, last BM 11/12/24. Senna administered, see MAR.   SKIN: Generalized bruising. No new deficits noted.  PAIN: Pt denied pain this shift.   TESTS/PROCEDURES: CABG planned for 11/18 at [facility].  MOBILITY: Up ad gregory.   DIET: Low sat fat < 2400 mg.  LDAs: L PIV running heparin @1150 units/hr.    PLAN: Continue with POC. Awaiting transfer to [facility] for CABG. Notify primary care team with any concerns/updates.     Intake/Output Summary (Last 24 hours) at 11/16/2024 0641  Last data filed at 11/15/2024 1900  Gross per 24 hour   Intake 1440 ml   Output --   Net 1440 ml     /62 (BP Location: Left arm)   Pulse 61   Temp 97.9  F (36.6  C) (Oral)   Resp 16   Ht 1.7 m (5' 6.93\")   Wt 108.4 kg (239 lb)   LMP  (LMP Unknown)   SpO2 95%   BMI 37.51 kg/m       Problem: Adult Inpatient Plan of Care  Goal: Plan of Care Review  Description: The Plan of Care Review/Shift note should be completed every shift.  The Outcome Evaluation is a brief statement about your assessment that the patient is improving, declining, or no change.  This information will be displayed automatically on your shift  note.  Outcome: Progressing  Flowsheets (Taken 11/16/2024 0153)  Plan of Care Reviewed With: patient  Overall Patient Progress: no change  Goal: Patient-Specific Goal (Individualized)  Description: You can add care plan individualizations to a care plan. Examples of Individualization might be:  \"Parent requests to be called daily at 9am for status\", \"I have a hard time hearing out of my right ear\", or \"Do not touch me to wake me up as it startles  me\".  Outcome: Progressing  Goal: Absence " of Hospital-Acquired Illness or Injury  Outcome: Progressing  Intervention: Identify and Manage Fall Risk  Recent Flowsheet Documentation  Taken 11/15/2024 2000 by Alvina Garnett RN  Safety Promotion/Fall Prevention:   assistive device/personal items within reach   clutter free environment maintained   lighting adjusted   nonskid shoes/slippers when out of bed   patient and family education   room near nurse's station   room organization consistent  Intervention: Prevent Skin Injury  Recent Flowsheet Documentation  Taken 11/16/2024 0000 by Alvina Garnett RN  Body Position:   position changed independently   side-lying 30 degrees   weight shifting  Taken 11/15/2024 2000 by Alvina Garnett RN  Body Position:   position changed independently   sitting up in bed   weight shifting  Skin Protection: adhesive use limited  Intervention: Prevent and Manage VTE (Venous Thromboembolism) Risk  Recent Flowsheet Documentation  Taken 11/15/2024 2000 by Alvina Garnett RN  VTE Prevention/Management: SCDs off (sequential compression devices)  Intervention: Prevent Infection  Recent Flowsheet Documentation  Taken 11/15/2024 2000 by Alvina Garnett RN  Infection Prevention:   environmental surveillance performed   equipment surfaces disinfected   hand hygiene promoted   rest/sleep promoted   single patient room provided  Goal: Optimal Comfort and Wellbeing  Outcome: Progressing  Goal: Readiness for Transition of Care  Outcome: Progressing     Problem: Acute Coronary Syndrome  Goal: Optimal Adaptation to Illness  Outcome: Progressing  Intervention: Support Adjustment to Life-Change Event  Recent Flowsheet Documentation  Taken 11/15/2024 2000 by Alvina Garnett RN  Supportive Measures:   active listening utilized   decision-making supported   positive reinforcement provided   relaxation techniques promoted   self-care encouraged   verbalization of feelings encouraged  Family/Support System Care:   involvement promoted    self-care encouraged  Goal: Normalized Cardiac Rhythm  Outcome: Progressing  Goal: Effective Cardiac Pump Function  Outcome: Progressing  Intervention: Optimize Cardiac Function and Blood Flow  Recent Flowsheet Documentation  Taken 11/15/2024 2000 by Alvina Garnett RN  Environmental Support:   calm environment promoted   environmental consistency promoted   personal routine supported   rest periods encouraged     Problem: Comorbidity Management  Goal: Maintenance of Behavioral Health Symptom Control  Outcome: Progressing  Intervention: Maintain Behavioral Health Symptom Control  Recent Flowsheet Documentation  Taken 11/15/2024 2000 by Alvina Garnett RN  Medication Review/Management:   medications reviewed   high-risk medications identified  Goal: Blood Glucose Levels Within Targeted Range  Outcome: Progressing  Intervention: Monitor and Manage Glycemia  Recent Flowsheet Documentation  Taken 11/15/2024 2000 by Alvina Garnett RN  Medication Review/Management:   medications reviewed   high-risk medications identified  Goal: Blood Pressure in Desired Range  Outcome: Progressing  Intervention: Maintain Blood Pressure Management  Recent Flowsheet Documentation  Taken 11/15/2024 2000 by Alvina Garnett RN  Medication Review/Management:   medications reviewed   high-risk medications identified

## 2024-11-17 LAB
ABO/RH(D): NORMAL
ALBUMIN UR-MCNC: NEGATIVE MG/DL
ANION GAP SERPL CALCULATED.3IONS-SCNC: 15 MMOL/L (ref 7–15)
ANTIBODY SCREEN: NEGATIVE
APPEARANCE UR: CLEAR
BASOPHILS # BLD AUTO: 0.1 10E3/UL (ref 0–0.2)
BASOPHILS NFR BLD AUTO: 1 %
BILIRUB UR QL STRIP: NEGATIVE
BUN SERPL-MCNC: 21.8 MG/DL (ref 8–23)
CALCIUM SERPL-MCNC: 9.8 MG/DL (ref 8.8–10.4)
CHLORIDE SERPL-SCNC: 101 MMOL/L (ref 98–107)
COLOR UR AUTO: ABNORMAL
CREAT SERPL-MCNC: 0.89 MG/DL (ref 0.51–0.95)
EGFRCR SERPLBLD CKD-EPI 2021: 70 ML/MIN/1.73M2
EOSINOPHIL # BLD AUTO: 0.1 10E3/UL (ref 0–0.7)
EOSINOPHIL NFR BLD AUTO: 1 %
ERYTHROCYTE [DISTWIDTH] IN BLOOD BY AUTOMATED COUNT: 13 % (ref 10–15)
GLUCOSE BLDC GLUCOMTR-MCNC: 101 MG/DL (ref 70–99)
GLUCOSE BLDC GLUCOMTR-MCNC: 109 MG/DL (ref 70–99)
GLUCOSE BLDC GLUCOMTR-MCNC: 111 MG/DL (ref 70–99)
GLUCOSE BLDC GLUCOMTR-MCNC: 86 MG/DL (ref 70–99)
GLUCOSE SERPL-MCNC: 108 MG/DL (ref 70–99)
GLUCOSE UR STRIP-MCNC: NEGATIVE MG/DL
HCO3 SERPL-SCNC: 23 MMOL/L (ref 22–29)
HCT VFR BLD AUTO: 39 % (ref 35–47)
HGB BLD-MCNC: 12.8 G/DL (ref 11.7–15.7)
HGB UR QL STRIP: NEGATIVE
HYALINE CASTS: 4 /LPF
IMM GRANULOCYTES # BLD: 0 10E3/UL
IMM GRANULOCYTES NFR BLD: 0 %
KETONES UR STRIP-MCNC: NEGATIVE MG/DL
LEUKOCYTE ESTERASE UR QL STRIP: ABNORMAL
LYMPHOCYTES # BLD AUTO: 3.5 10E3/UL (ref 0.8–5.3)
LYMPHOCYTES NFR BLD AUTO: 42 %
MAGNESIUM SERPL-MCNC: 2.1 MG/DL (ref 1.7–2.3)
MCH RBC QN AUTO: 29.2 PG (ref 26.5–33)
MCHC RBC AUTO-ENTMCNC: 32.8 G/DL (ref 31.5–36.5)
MCV RBC AUTO: 89 FL (ref 78–100)
MONOCYTES # BLD AUTO: 0.9 10E3/UL (ref 0–1.3)
MONOCYTES NFR BLD AUTO: 12 %
MRSA DNA SPEC QL NAA+PROBE: NEGATIVE
NEUTROPHILS # BLD AUTO: 3.6 10E3/UL (ref 1.6–8.3)
NEUTROPHILS NFR BLD AUTO: 44 %
NITRATE UR QL: NEGATIVE
NRBC # BLD AUTO: 0 10E3/UL
NRBC BLD AUTO-RTO: 0 /100
PH UR STRIP: 5 [PH] (ref 5–7)
PLATELET # BLD AUTO: 287 10E3/UL (ref 150–450)
POTASSIUM SERPL-SCNC: 4 MMOL/L (ref 3.4–5.3)
RBC # BLD AUTO: 4.39 10E6/UL (ref 3.8–5.2)
RBC URINE: 0 /HPF
SA TARGET DNA: NEGATIVE
SODIUM SERPL-SCNC: 139 MMOL/L (ref 135–145)
SP GR UR STRIP: 1.01 (ref 1–1.03)
SPECIMEN EXPIRATION DATE: NORMAL
SQUAMOUS EPITHELIAL: 1 /HPF
TRANSITIONAL EPI: <1 /HPF
UFH PPP CHRO-ACNC: <0.1 IU/ML
UROBILINOGEN UR STRIP-MCNC: NORMAL MG/DL
WBC # BLD AUTO: 8.2 10E3/UL (ref 4–11)
WBC URINE: 3 /HPF

## 2024-11-17 PROCEDURE — 120N000003 HC R&B IMCU UMMC

## 2024-11-17 PROCEDURE — 81001 URINALYSIS AUTO W/SCOPE: CPT

## 2024-11-17 PROCEDURE — 86900 BLOOD TYPING SEROLOGIC ABO: CPT

## 2024-11-17 PROCEDURE — 250N000013 HC RX MED GY IP 250 OP 250 PS 637: Performed by: NURSE PRACTITIONER

## 2024-11-17 PROCEDURE — 85041 AUTOMATED RBC COUNT: CPT

## 2024-11-17 PROCEDURE — 85004 AUTOMATED DIFF WBC COUNT: CPT

## 2024-11-17 PROCEDURE — 83735 ASSAY OF MAGNESIUM: CPT | Performed by: NURSE PRACTITIONER

## 2024-11-17 PROCEDURE — 85520 HEPARIN ASSAY: CPT | Performed by: STUDENT IN AN ORGANIZED HEALTH CARE EDUCATION/TRAINING PROGRAM

## 2024-11-17 PROCEDURE — 36415 COLL VENOUS BLD VENIPUNCTURE: CPT

## 2024-11-17 PROCEDURE — 87641 MR-STAPH DNA AMP PROBE: CPT

## 2024-11-17 PROCEDURE — 80048 BASIC METABOLIC PNL TOTAL CA: CPT

## 2024-11-17 PROCEDURE — 999N000128 HC STATISTIC PERIPHERAL IV START W/O US GUIDANCE

## 2024-11-17 PROCEDURE — 99232 SBSQ HOSP IP/OBS MODERATE 35: CPT

## 2024-11-17 PROCEDURE — 85018 HEMOGLOBIN: CPT

## 2024-11-17 PROCEDURE — 250N000013 HC RX MED GY IP 250 OP 250 PS 637

## 2024-11-17 PROCEDURE — 87640 STAPH A DNA AMP PROBE: CPT

## 2024-11-17 RX ADMIN — SENNOSIDES AND DOCUSATE SODIUM 2 TABLET: 8.6; 5 TABLET ORAL at 08:51

## 2024-11-17 RX ADMIN — ACETAMINOPHEN 650 MG: 325 TABLET, FILM COATED ORAL at 19:43

## 2024-11-17 RX ADMIN — POLYETHYLENE GLYCOL 3350 17 G: 17 POWDER, FOR SOLUTION ORAL at 08:50

## 2024-11-17 RX ADMIN — ACETAMINOPHEN 650 MG: 325 TABLET, FILM COATED ORAL at 05:18

## 2024-11-17 RX ADMIN — ASPIRIN 81 MG CHEWABLE TABLET 81 MG: 81 TABLET CHEWABLE at 08:51

## 2024-11-17 RX ADMIN — ATORVASTATIN CALCIUM 80 MG: 80 TABLET, FILM COATED ORAL at 22:26

## 2024-11-17 NOTE — PLAN OF CARE
Neuro: A&Ox4.   Cardiac: SR. VSS.   Respiratory: Sating 98 on RA.  GI/: Adequate urine output. BM X 1. Bowel regimen meds given   Diet/appetite: Tolerating low sat fat low na  Eating well.  Activity:  IND, up to chair and in halls.  Pain: denies   Skin: No new deficits noted.  LDA's:  PIV - SL   Plan: plan for surgery on Monday 11/18.Continue with POC. Notify primary team with changes.

## 2024-11-17 NOTE — PLAN OF CARE
Goal Outcome Evaluation:      Plan of Care Reviewed With: patient    Overall Patient Progress: improvingOverall Patient Progress: improving    Neuro: A&OX4 and up ad gregory.  Cardiac: SB and AVSS, denies pain.    Respiratory: Sating 98 on RA and LS clear.  GI/: Low fat diet and low sodium.  And voiding via bedside urinal.  Skin: No new deficits noted.  LDA's: PIV -SL   Plan: Plan on CABG on Monday at [place]  at 0430 and tentative OR at 0730.

## 2024-11-17 NOTE — PLAN OF CARE
Neuro: A&Ox4.   Cardiac: SR. VSS.   Respiratory: Sating 98 on RA.  GI/: Adequate urine output. BM X2 - large   Diet/appetite: Tolerating regular  diet. NPO at midnight   Activity:  IND up to chair and in halls.  Pain: pt c/o 5/10 headache / toothache pain, prn tylenol given x 1    Skin: No new deficits noted.  LDA's:  PIV - SL     Plan: transport set up for 11/18  - 4:30am Continue with POC. Notify primary team with changes.

## 2024-11-17 NOTE — PROGRESS NOTES
[facility]   Cardiology   Progress Note     ASSESSMENT/PLAN:  Kayrn Gamez is a 69 year old female with PMH of DM2 not on insulin, tobacco use, HTN, HLD, depression admitted on 11/7/2024 with chest pain and elevated troponin c/f NSTEMI found to have severe multivessel disease being evaluated for CABG.     Changes Today   - CABG rescheduled for Monday 11/18 at [facility]; pickup scheduled 0430  - NPO at 0000     # NSTEMI  # Multivessel CAD  # Hx stress-induced cardiomyopathy 08/2010  # Asymmetric Septal Hypertrophy without LVOT on Echo  # Hypertrophic cardiomyopathy confirmed by cardiac MRI  # HTN  # HLD  Presented with 3 days of worsening exertional chest pain/pressure and burning, now occurring at rest and with dyspnea and radiation down left arm. Troponin peak at 45, now down trending. T wave inversion V4-V6 also noted on 2019 EKG. ACS risk factors include HTN, T2DM, HLD, smoker, family history of premature CAD. Coronary angiogram revealed multivessel disease  - Coronary angiogram 11/8: severe multivessel disease, 95% pLCx, 90% D2, 80% dLAD, dRCA 99%, RPDA 80%  - TTE 11/8: EF 60-65%, asymmetric septal hypertrophy, small LV cavity size, No ABRAHAM or LVOT, IVC normal, no significant valve disease  - cMRI 11/13: Asymmetric septal hypertrophy with maximal wall thickness 2.0 cm. Systolic anterior motion of the mitral valve is present. There is apical displacement of the papillary muscles. LGE imaging shows mid-myocardial enhancement in a non-ischemic pattern. Collectively, these findings suggest obstructive hypertrophic cardiomyopathy. Consider genetic testing for HCM.  Normal biventricular systolic function, LVEF 74%, RVEF 70%.  - CVTS consulted; CABG originally planned 11/14 1400 with Dr. Pennington, but was canceled due to emergent case  - plan to transfer to [place] for CABG on Monday 11/18 per Dr. Pennington  - Stopped heparin gtt 11/16 per Dr. Mccormick due to ambulatory status  - Continue  81 mg ASA daily  - BB: Discontinued given low HR, normal EF  - ACEi/ARB: Lisinopril-hydrochlorothiazide to 40-25 mg  - CCB: Norvasc 10 mg daily   - Statin: continue PTA Lipitor  - Daily BMP, Mg, replace lytes per protocol   - PRN Nitroglycerin and EKG for any chest pain  - Low threshold to use Nitroglycerin gtt if needed  - Dental consulted for 2 days of reported dental pain; pain related to ill fitting dentures, will need OP follow up  - PT/OT pre-CABG  - Plan for follow up with Dr. Leal after discharge for new HCM diagnosis; encouraged her to consider having her children assessed for HCM     # Small right hemisphere frontal lobe stroke 2011  Previously on atorvastatin 80 mg, baby aspirin, but not currently taking. Has had no residual symptoms since stroke in 2011. Will resume secondary prevention at admission and assist with follow-up at discharge.  - ASA, statin as listed above     # DM2  Home regimen includes metformin and Ozempic, not currently taking. Will hold home regimen and manage with sliding scale insulin.  - A1C 6.0  - Medium sliding scale insulin   - POCT glucose checks  - Hold PTA Metformin while IP     # Depression  Previously taking Wellbutrin and Zoloft, not currently. Will not resume at admission, defer to outpatient follow-up.     # Current Smoker  # Morbid obesity with BMI 38  4-5 cigarettes a day. Interested and motivated to quit.  Lifestyle changes recommended     # Possible TL  Per RN and patient, ongoing daytime tiredness after getting good sleep and snoring at night  - OP Sleep Study referral at time of discharge to assess TL     FEN: Regular Adult; NPO at 0000  Code status: Full  Prophylaxis:  ambulation  Isolation: None  Disposition: plan to transfer to [place] Monday for CABG     Medically Ready for Discharge: Anticipated in 5+ Days     Patient seen and discussed with Dr. Mccormick, who agrees with above plan.    Livier De Los Santos, MS, PA-C  [facility] Cardiology Team  Pager [phone]/[place]  History:  Patient in good spirits today, feels ready for surgery tomorrow. She continues to have questions regarding which vessel will be used for her surgery. Discussed with CVTS team who will provide repeated education to patient. VSS.     Physical Exam:  Temp:  [97.4  F (36.3  C)-98.4  F (36.9  C)] 97.9  F (36.6  C)  Pulse:  [54-91] 63  Resp:  [16-18] 16  BP: (104-138)/(61-89) 138/65  Cuff Mean (mmHg):  [87] 87  SpO2:  [94 %-100 %] 99 %    I/O:   Intake/Output Summary (Last 24 hours) at 11/17/2024 0649  Last data filed at 11/16/2024 2300  Gross per 24 hour   Intake 1920 ml   Output --   Net 1920 ml        Wt:   Wt Readings from Last 5 Encounters:   11/17/24 107.9 kg (237 lb 14 oz)   10/31/23 96.6 kg (213 lb)   03/24/23 95.7 kg (211 lb)   01/31/23 102.4 kg (225 lb 12.8 oz)   03/23/22 103.5 kg (228 lb 3.2 oz)       General: NAD; eating breakfast   HEENT:  PERRLA, EOMI.   CV: RRR. No murmur appreciated. No rubs or gallops.   Resp: No increased work of breathing or use of accessory muscles, breathing comfortably on room air.  Lung sounds clear throughout/bilaterally  Abdomen:  Normal active bowel sounds.  Abdomen is soft. No distension, non-tender to palpation.    Extremities: Warm. No pre-tibial edema. No cyanosis or clubbing.  Skin:  Warm and dry. No erythema, rashes, ulceration or diaphoresis.  Neuro: Alert and oriented x3.      Medications:  Current Facility-Administered Medications   Medication Dose Route Frequency Provider Last Rate Last Admin    amLODIPine (NORVASC) tablet 10 mg  10 mg Oral QPM Lexus Em CNP   10 mg at 11/16/24 2001    aspirin EC tablet 81 mg  81 mg Oral Daily Lexus Em CNP   81 mg at 11/16/24 0922    atorvastatin (LIPITOR) tablet 80 mg  80 mg Oral At Bedtime Shayy Toussaint DO   80 mg at 11/16/24 2217    insulin aspart (NovoLOG) injection (RAPID ACTING)  1-7 Units Subcutaneous TID Shayy Ray DO        lisinopril-hydrochlorothiazide (ZESTORETIC) 20-12.5 MG  per tablet 2 tablet  2 tablet Oral QPM Lexus Em, CNP   2 tablet at 11/16/24 2000    polyethylene glycol (MIRALAX) Packet 17 g  17 g Oral Daily Shayy Toussaint DO   17 g at 11/16/24 0923    senna-docusate (SENOKOT-S/PERICOLACE) 8.6-50 MG per tablet 1 tablet  1 tablet Oral BID Shayy Toussaint DO   1 tablet at 11/14/24 2029    Or    senna-docusate (SENOKOT-S/PERICOLACE) 8.6-50 MG per tablet 2 tablet  2 tablet Oral BID Shayy Toussaint DO   2 tablet at 11/16/24 2001    sodium chloride (PF) 0.9% PF flush 3 mL  3 mL Intracatheter Q8H Shayy Toussaint DO   3 mL at 11/16/24 2351     Current Facility-Administered Medications   Medication Dose Route Frequency Provider Last Rate Last Admin    medication instruction   Does not apply Continuous PRN Shayy Toussaint DO           Labs:   CMP  Recent Labs   Lab 11/17/24  0531 11/16/24  2203 11/16/24  1821 11/16/24  1229 11/16/24  0814 11/16/24  0621 11/15/24  0832 11/15/24  0636 11/14/24  0955 11/14/24  0505     --   --   --   --  142  --  138  --  139   POTASSIUM 4.0  --   --   --   --  4.5  --  4.1  --  3.5   CHLORIDE 101  --   --   --   --  106  --  102  --  99   CO2 23  --   --   --   --  27  --  27  --  26   ANIONGAP 15  --   --   --   --  9  --  9  --  14   * 126* 102* 100*   < > 99   < > 103*   < > 115*   BUN 21.8  --   --   --   --  24.7*  --  29.0*  --  28.6*   CR 0.89  --   --   --   --  0.90  --  0.99*  --  0.91   GFRESTIMATED 70  --   --   --   --  69  --  62  --  68   ANGELES 9.8  --   --   --   --  9.8  --  9.4  --  10.2   MAG 2.1  --   --   --   --  2.3  --  2.1  --  2.1   ALBUMIN  --   --   --   --   --   --   --   --   --  4.4    < > = values in this interval not displayed.     CBC  Recent Labs   Lab 11/17/24  0531 11/16/24  0621 11/15/24  0636 11/14/24  1927   WBC 8.2 8.9 9.8 9.3   RBC 4.39 4.14 3.96 4.38   HGB 12.8 12.3 11.5* 12.9   HCT 39.0 37.2 35.9 38.9   MCV 89 90 91 89   MCH 29.2 29.7 29.0 29.5   MCHC 32.8 33.1 32.0 33.2   RDW  13.0 13.2 13.3 13.4    279 259 336     INR  Recent Labs   Lab 11/14/24  0505   INR 1.08     Arterial Blood GasNo lab results found in last 7 days.    Diagnostics:  ECG 11/14/24:    Echo 11/8/24:  Interpretation Summary  Global and regional left ventricular function is normal with an EF of 60-65%.  Asymmetric septal hypertrophy; septal wall is 1.8 cm, posterior wall is 0.8  cm.  Left ventricular cavity size is small.  No ABRAHAM, or LVOT gradient.  Global right ventricular function is normal.  The right ventricle is normal size.  The inferior vena cava was normal in size with preserved respiratory  variability.  No significant valvular abnormalities present.     This study was compared with the study from 2011 .  No significant changes noted.  Consider cardiac MRI.  ______________________________________________________________________________  Left Ventricle  Left ventricular cavity size is small. Global and regional left ventricular  function is normal with an EF of 60-65%. A mid-cavitary gradient is present.  Asymmetric septal hypertrophy; septal wall is 1.8 cm, posterior wall is 0.8  cm. Left ventricular diastolic function is not assessable.     Right Ventricle  The right ventricle is normal size. Global right ventricular function is  normal.     Atria  Both atria appear normal.     Mitral Valve  The mitral valve is normal. Trace mitral insufficiency is present.     Aortic Valve  Trileaflet aortic sclerosis without stenosis. On Doppler interrogation, there  is no significant stenosis or regurgitation.     Tricuspid Valve  On Doppler interrogation, there is no significant stenosis or regurgitation.  The valve leaflets are not well visualized.     Pulmonic Valve  The valve leaflets are not well visualized. On Doppler interrogation, there is  no significant stenosis or regurgitation.     Vessels  The aorta root is normal. The inferior vena cava was normal in size with  preserved respiratory variability. Ascending  aorta 3.8 cm.     Pericardium  No pericardial effusion is present.     Miscellaneous  No significant valvular abnormalities present.     Compared to Previous Study  This study was compared with the study from  . No significant changes  noted.  ______________________________________________________________________________  MMode/2D Measurements & Calculations  IVSd: 1.9 cm  LVIDd: 4.3 cm  LVIDs: 3.0 cm  LVPWd: 1.7 cm  FS: 29.8 %  LV mass(C)d: 336.5 grams  LV mass(C)dI: 154.9 grams/m2  Ao root diam: 3.2 cm  asc Aorta Diam: 3.8 cm  LVOT diam: 2.2 cm  LVOT area: 3.7 cm2  Ao root diam index Ht(cm/m): 1.9  Ao root diam index BSA (cm/m2): 1.5  Asc Ao diam index BSA (cm/m2): 1.7  Asc Ao diam index Ht(cm/m): 2.3  LA Volume (BP): 30.7 ml     LA Volume Index (BP): 14.1 ml/m2  RWT: 0.78  TAPSE: 2.9 cm     Doppler Measurements & Calculations  MV E max abel: 35.1 cm/sec  MV A max abel: 80.7 cm/sec  MV E/A: 0.43  MV dec time: 0.27 sec  Ao V2 max: 235.0 cm/sec  Ao max P.1 mmHg  Ao V2 mean: 150.0 cm/sec  Ao mean P.0 mmHg  Ao V2 VTI: 47.9 cm  MELANIE(I,D): 2.1 cm2  MELANIE(V,D): 1.9 cm2  LV V1 max P.9 mmHg  LV V1 max: 121.0 cm/sec  LV V1 VTI: 27.6 cm  SV(LVOT): 100.8 ml  SI(LVOT): 46.4 ml/m2     AV Abel Ratio (DI): 0.51  MELANIE Index (cm2/m2): 0.97  E/E' av.2  Lateral E/e': 7.8  Medial E/e': 8.5  RV S Abel: 20.0 cm/sec     Coronary angiogram 24:    Ramus lesion is 40% stenosed.    Prox Cx lesion is 95% stenosed.    Mid Cx lesion is 30% stenosed.    Dist LAD-2 lesion is 70% stenosed.    Mid LAD to Dist LAD lesion is 70% stenosed.    3rd Diag lesion is 70% stenosed.    Mid LAD lesion is 80% stenosed.    2nd Diag lesion is 90% stenosed.    Dist LAD-1 lesion is 80% stenosed.    Prox LAD to Mid LAD lesion is 30% stenosed.    Dist RCA lesion is 99% stenosed.    Mid RCA lesion is 40% stenosed.    Prox RCA lesion is 40% stenosed.    RPDA lesion is 80% stenosed.     1.significant three-vessel CAD           Plan      Follow bedrest  per protocol   Return to the primary inpatient team for further evaluation and managmenet        1.continue therapeutic anticoagulation with heparin drip or Lovenox for ACS  2.continue aspirin  3.consult cardiothoracic surgery service for CABG      Recommendations     General Recommendations:  - Patient given specific instructions regarding care of arteriotomy site, activity restrictions, signs and symptoms of cardiac or vascular complications and to seek immediate medical evaluation should they occur.   - Arterial sheath removed from radial artery with TR Band placement.       Medications:  - Resume anticoagulant medication in 2 hours.       Surgical Therapy:   - Will obtain cardiovascular surgery consultation regarding coronary artery bypass grafting.      Coronary Findings     Diagnostic  Dominance: Right  Left Anterior Descending   Prox LAD to Mid LAD lesion is 30% stenosed.   Mid LAD lesion is 80% stenosed.   Mid LAD to Dist LAD lesion is 70% stenosed.   Dist LAD-1 lesion is 80% stenosed.   Dist LAD-2 lesion is 70% stenosed.      Second Diagonal Branch   2nd Diag lesion is 90% stenosed.      Third Diagonal Branch   3rd Diag lesion is 70% stenosed.      Ramus Intermedius   Ramus lesion is 40% stenosed.      Left Circumflex   Prox Cx lesion is 95% stenosed.   Mid Cx lesion is 30% stenosed.      Right Coronary Artery   Prox RCA lesion is 40% stenosed.   Mid RCA lesion is 40% stenosed.   Dist RCA lesion is 99% stenosed.      Right Posterior Descending Artery   RPDA lesion is 80% stenosed.         Intervention      No interventions have been documented.            cMRI 11/13/24:   1. The left ventricle cavity size is small. There is asymmetric septal hypertrophy with maximal septal  thickness 2.0 cm. The global systolic function is normal. The LVEF is 74%. There are no regional wall  motion abnormalities.     2. The right ventricle is normal in cavity size. The global systolic function is normal. The RVEF is 70%.       3. Both atria are normal in size.     4. There is systolic anterior motion of the mitral valve and systolic flow acceleration in the LVOT. There  is mild mitral regurgitation. There is apical displacement of the papillary muscles.      5. Late gadolinium enhancement imaging shows mid-myocardial enhancement in the basal to mid lateral  segments.      6. There is a trivial pericardial effusion.      7. There is no clear intracardiac thrombus, although the left atrial appendage was incompletely visualized.      CONCLUSIONS:   Asymmetric septal hypertrophy with maximal wall thickness 2.0 cm. Systolic anterior motion of the mitral  valve is present. There is apical displacement of the papillary muscles. LGE imaging shows mid-myocardial  enhancement in a non-ischemic pattern. Collectively, these findings suggest obstructive hypertrophic  cardiomyopathy. Consider genetic testing for HCM.   Normal biventricular systolic function, LVEF 74%, RVEF 70%.            Recent Results (from the past 24 hours)   LESLIE with Report     Narrative     Ori Landaverde MD     11/14/2024  3:22 PM  Procedures          No results found for this or any previous visit (from the past 24 hours).    Medical Decision Making       40 MINUTES SPENT BY ME on the date of service doing chart review, history, exam, documentation & further activities per the note.

## 2024-11-18 ENCOUNTER — ANESTHESIA (OUTPATIENT)
Dept: SURGERY | Facility: HOSPITAL | Age: 69
End: 2024-11-18
Payer: COMMERCIAL

## 2024-11-18 ENCOUNTER — APPOINTMENT (OUTPATIENT)
Dept: RADIOLOGY | Facility: HOSPITAL | Age: 69
End: 2024-11-18
Payer: COMMERCIAL

## 2024-11-18 ENCOUNTER — HOSPITAL ENCOUNTER (INPATIENT)
Facility: HOSPITAL | Age: 69
DRG: 235 | End: 2024-11-18
Attending: STUDENT IN AN ORGANIZED HEALTH CARE EDUCATION/TRAINING PROGRAM | Admitting: STUDENT IN AN ORGANIZED HEALTH CARE EDUCATION/TRAINING PROGRAM
Payer: COMMERCIAL

## 2024-11-18 VITALS
TEMPERATURE: 97.8 F | RESPIRATION RATE: 16 BRPM | WEIGHT: 237.88 LBS | DIASTOLIC BLOOD PRESSURE: 62 MMHG | HEIGHT: 67 IN | BODY MASS INDEX: 37.34 KG/M2 | SYSTOLIC BLOOD PRESSURE: 108 MMHG | OXYGEN SATURATION: 95 % | HEART RATE: 57 BPM

## 2024-11-18 DIAGNOSIS — Z95.1 S/P CABG (CORONARY ARTERY BYPASS GRAFT): ICD-10-CM

## 2024-11-18 DIAGNOSIS — T14.8XXA OPEN WOUND: ICD-10-CM

## 2024-11-18 DIAGNOSIS — F33.1 MAJOR DEPRESSIVE DISORDER, RECURRENT EPISODE, MODERATE (H): Primary | ICD-10-CM

## 2024-11-18 DIAGNOSIS — I21.4 NSTEMI (NON-ST ELEVATED MYOCARDIAL INFARCTION) (H): ICD-10-CM

## 2024-11-18 PROBLEM — I25.10 CAD (CORONARY ARTERY DISEASE): Status: ACTIVE | Noted: 2024-11-18

## 2024-11-18 LAB
ALBUMIN SERPL BCG-MCNC: 3.6 G/DL (ref 3.5–5.2)
ALBUMIN SERPL BCG-MCNC: 4 G/DL (ref 3.5–5.2)
ALP SERPL-CCNC: 55 U/L (ref 40–150)
ALP SERPL-CCNC: 80 U/L (ref 40–150)
ALT SERPL W P-5'-P-CCNC: 57 U/L (ref 0–50)
ALT SERPL W P-5'-P-CCNC: 71 U/L (ref 0–50)
ANION GAP SERPL CALCULATED.3IONS-SCNC: 10 MMOL/L (ref 7–15)
ANION GAP SERPL CALCULATED.3IONS-SCNC: 13 MMOL/L (ref 7–15)
APTT PPP: 49 SECONDS (ref 22–38)
APTT PPP: 61 SECONDS (ref 22–38)
AST SERPL W P-5'-P-CCNC: 53 U/L (ref 0–45)
AST SERPL W P-5'-P-CCNC: 76 U/L (ref 0–45)
ATRIAL RATE - MUSE: 82 BPM
BASE EXCESS BLDA CALC-SCNC: -0.6 MMOL/L (ref -3–3)
BASE EXCESS BLDA CALC-SCNC: -0.9 MMOL/L (ref -3–3)
BASE EXCESS BLDA CALC-SCNC: -2.5 MMOL/L (ref -3–3)
BASE EXCESS BLDA CALC-SCNC: -4.6 MMOL/L (ref -3–3)
BASE EXCESS BLDA CALC-SCNC: -6 MMOL/L (ref -3–3)
BASE EXCESS BLDA CALC-SCNC: -6.6 MMOL/L (ref -3–3)
BASE EXCESS BLDA CALC-SCNC: 0.6 MMOL/L (ref -3–3)
BASE EXCESS BLDA CALC-SCNC: 0.8 MMOL/L (ref -3–3)
BASE EXCESS BLDA CALC-SCNC: 2.3 MMOL/L (ref -3–3)
BASE EXCESS BLDV CALC-SCNC: -3.8 MMOL/L (ref -3–3)
BASE EXCESS BLDV CALC-SCNC: 1.6 MMOL/L (ref -3–3)
BASOPHILS # BLD AUTO: 0.1 10E3/UL (ref 0–0.2)
BASOPHILS NFR BLD AUTO: 1 %
BILIRUB SERPL-MCNC: 0.4 MG/DL
BILIRUB SERPL-MCNC: 0.4 MG/DL
BLD PROD TYP BPU: NORMAL
BLD PROD TYP BPU: NORMAL
BLOOD COMPONENT TYPE: NORMAL
BLOOD COMPONENT TYPE: NORMAL
BUN SERPL-MCNC: 17.1 MG/DL (ref 8–23)
BUN SERPL-MCNC: 23.4 MG/DL (ref 8–23)
CA-I BLD-MCNC: 4.1 MG/DL (ref 4.4–5.2)
CA-I BLD-MCNC: 4.2 MG/DL (ref 4.4–5.2)
CA-I BLD-MCNC: 4.3 MG/DL (ref 4.4–5.2)
CA-I BLD-MCNC: 4.4 MG/DL (ref 4.4–5.2)
CA-I BLD-MCNC: 4.5 MG/DL (ref 4.4–5.2)
CA-I BLD-MCNC: 4.5 MG/DL (ref 4.4–5.2)
CA-I BLD-MCNC: 5 MG/DL (ref 4.4–5.2)
CALCIUM SERPL-MCNC: 7.9 MG/DL (ref 8.8–10.4)
CALCIUM SERPL-MCNC: 9.7 MG/DL (ref 8.8–10.4)
CHLORIDE SERPL-SCNC: 103 MMOL/L (ref 98–107)
CHLORIDE SERPL-SCNC: 108 MMOL/L (ref 98–107)
CODING SYSTEM: NORMAL
CODING SYSTEM: NORMAL
COHGB MFR BLD: 96.7 % (ref 95–96)
COHGB MFR BLD: 98.4 % (ref 95–96)
CPB POCT: NO
CREAT SERPL-MCNC: 0.81 MG/DL (ref 0.51–0.95)
CREAT SERPL-MCNC: 0.94 MG/DL (ref 0.51–0.95)
CROSSMATCH: NORMAL
CROSSMATCH: NORMAL
DIASTOLIC BLOOD PRESSURE - MUSE: NORMAL MMHG
EGFRCR SERPLBLD CKD-EPI 2021: 66 ML/MIN/1.73M2
EGFRCR SERPLBLD CKD-EPI 2021: 79 ML/MIN/1.73M2
EOSINOPHIL # BLD AUTO: 0.1 10E3/UL (ref 0–0.7)
EOSINOPHIL NFR BLD AUTO: 1 %
ERYTHROCYTE [DISTWIDTH] IN BLOOD BY AUTOMATED COUNT: 13 % (ref 10–15)
ERYTHROCYTE [DISTWIDTH] IN BLOOD BY AUTOMATED COUNT: 13 % (ref 10–15)
ERYTHROCYTE [DISTWIDTH] IN BLOOD BY AUTOMATED COUNT: 13.1 % (ref 10–15)
FIBRINOGEN PPP-MCNC: 230 MG/DL (ref 170–510)
GLUCOSE BLD-MCNC: 114 MG/DL (ref 70–99)
GLUCOSE BLD-MCNC: 120 MG/DL (ref 70–99)
GLUCOSE BLD-MCNC: 131 MG/DL (ref 70–99)
GLUCOSE BLD-MCNC: 140 MG/DL (ref 70–99)
GLUCOSE BLD-MCNC: 162 MG/DL (ref 70–99)
GLUCOSE BLD-MCNC: 174 MG/DL (ref 70–99)
GLUCOSE BLD-MCNC: 180 MG/DL (ref 70–99)
GLUCOSE BLDC GLUCOMTR-MCNC: 108 MG/DL (ref 70–99)
GLUCOSE BLDC GLUCOMTR-MCNC: 117 MG/DL (ref 70–99)
GLUCOSE BLDC GLUCOMTR-MCNC: 141 MG/DL (ref 70–99)
GLUCOSE BLDC GLUCOMTR-MCNC: 152 MG/DL (ref 70–99)
GLUCOSE BLDC GLUCOMTR-MCNC: 153 MG/DL (ref 70–99)
GLUCOSE BLDC GLUCOMTR-MCNC: 164 MG/DL (ref 70–99)
GLUCOSE BLDC GLUCOMTR-MCNC: 172 MG/DL (ref 70–99)
GLUCOSE BLDC GLUCOMTR-MCNC: 189 MG/DL (ref 70–99)
GLUCOSE BLDC GLUCOMTR-MCNC: 217 MG/DL (ref 70–99)
GLUCOSE BLDC GLUCOMTR-MCNC: 262 MG/DL (ref 70–99)
GLUCOSE SERPL-MCNC: 105 MG/DL (ref 70–99)
GLUCOSE SERPL-MCNC: 260 MG/DL (ref 70–99)
HCO3 BLD-SCNC: 19 MMOL/L (ref 21–28)
HCO3 BLD-SCNC: 21 MMOL/L (ref 21–28)
HCO3 BLDA-SCNC: 21 MMOL/L (ref 21–28)
HCO3 BLDA-SCNC: 22 MMOL/L (ref 21–28)
HCO3 BLDA-SCNC: 24 MMOL/L (ref 21–28)
HCO3 BLDA-SCNC: 24 MMOL/L (ref 21–28)
HCO3 BLDA-SCNC: 25 MMOL/L (ref 21–28)
HCO3 BLDA-SCNC: 25 MMOL/L (ref 21–28)
HCO3 BLDA-SCNC: 27 MMOL/L (ref 21–28)
HCO3 BLDV-SCNC: 24 MMOL/L (ref 21–28)
HCO3 BLDV-SCNC: 26 MMOL/L (ref 21–28)
HCO3 SERPL-SCNC: 21 MMOL/L (ref 22–29)
HCO3 SERPL-SCNC: 27 MMOL/L (ref 22–29)
HCT VFR BLD AUTO: 23.5 % (ref 35–47)
HCT VFR BLD AUTO: 26.5 % (ref 35–47)
HCT VFR BLD AUTO: 36.7 % (ref 35–47)
HCT VFR BLD CALC: 24 % (ref 35–47)
HGB BLD-MCNC: 11.1 G/DL (ref 11.7–15.7)
HGB BLD-MCNC: 11.7 G/DL (ref 11.7–15.7)
HGB BLD-MCNC: 11.9 G/DL (ref 11.7–15.7)
HGB BLD-MCNC: 7.7 G/DL (ref 11.7–15.7)
HGB BLD-MCNC: 7.7 G/DL (ref 11.7–15.7)
HGB BLD-MCNC: 8 G/DL (ref 11.7–15.7)
HGB BLD-MCNC: 8.2 G/DL (ref 11.7–15.7)
HGB BLD-MCNC: 8.3 G/DL (ref 11.7–15.7)
HGB BLD-MCNC: 8.3 G/DL (ref 11.7–15.7)
HGB BLD-MCNC: 8.6 G/DL (ref 11.7–15.7)
HGB BLD-MCNC: 8.9 G/DL (ref 11.7–15.7)
HGB BLD-MCNC: 9 G/DL (ref 11.7–15.7)
IMM GRANULOCYTES # BLD: 0 10E3/UL
IMM GRANULOCYTES NFR BLD: 0 %
INR PPP: 1.4 (ref 0.85–1.15)
INR PPP: 1.5 (ref 0.85–1.15)
INTERPRETATION ECG - MUSE: NORMAL
ISSUE DATE AND TIME: NORMAL
ISSUE DATE AND TIME: NORMAL
LACTATE BLD-SCNC: 0.5 MMOL/L (ref 0.7–2)
LACTATE BLD-SCNC: 0.6 MMOL/L (ref 0.7–2)
LACTATE BLD-SCNC: 0.7 MMOL/L (ref 0.7–2)
LACTATE BLD-SCNC: 0.8 MMOL/L (ref 0.7–2)
LACTATE BLD-SCNC: 1.2 MMOL/L (ref 0.7–2)
LACTATE BLD-SCNC: 2.3 MMOL/L (ref 0.7–2)
LACTATE BLD-SCNC: 2.5 MMOL/L (ref 0.7–2)
LACTATE SERPL-SCNC: 2.6 MMOL/L (ref 0.7–2)
LACTATE SERPL-SCNC: 3.5 MMOL/L (ref 0.7–2)
LACTATE SERPL-SCNC: 4.9 MMOL/L (ref 0.7–2)
LACTATE SERPL-SCNC: 5.4 MMOL/L (ref 0.7–2)
LACTATE SERPL-SCNC: 6.5 MMOL/L (ref 0.7–2)
LACTATE SERPL-SCNC: 7.3 MMOL/L (ref 0.7–2)
LYMPHOCYTES # BLD AUTO: 4 10E3/UL (ref 0.8–5.3)
LYMPHOCYTES NFR BLD AUTO: 48 %
MAGNESIUM SERPL-MCNC: 2.1 MG/DL (ref 1.7–2.3)
MAGNESIUM SERPL-MCNC: 3 MG/DL (ref 1.7–2.3)
MCH RBC QN AUTO: 29.6 PG (ref 26.5–33)
MCH RBC QN AUTO: 29.6 PG (ref 26.5–33)
MCH RBC QN AUTO: 30 PG (ref 26.5–33)
MCHC RBC AUTO-ENTMCNC: 32.4 G/DL (ref 31.5–36.5)
MCHC RBC AUTO-ENTMCNC: 32.8 G/DL (ref 31.5–36.5)
MCHC RBC AUTO-ENTMCNC: 34 G/DL (ref 31.5–36.5)
MCV RBC AUTO: 88 FL (ref 78–100)
MCV RBC AUTO: 90 FL (ref 78–100)
MCV RBC AUTO: 91 FL (ref 78–100)
MONOCYTES # BLD AUTO: 0.8 10E3/UL (ref 0–1.3)
MONOCYTES NFR BLD AUTO: 9 %
NEUTROPHILS # BLD AUTO: 3.5 10E3/UL (ref 1.6–8.3)
NEUTROPHILS NFR BLD AUTO: 41 %
NRBC # BLD AUTO: 0 10E3/UL
NRBC BLD AUTO-RTO: 0 /100
O2/TOTAL GAS SETTING VFR VENT: 50 %
O2/TOTAL GAS SETTING VFR VENT: 53 %
O2/TOTAL GAS SETTING VFR VENT: 55 %
OXYHGB MFR BLDA: 100 % (ref 92–100)
OXYHGB MFR BLDA: 94 % (ref 92–100)
OXYHGB MFR BLDA: 99 % (ref 92–100)
OXYHGB MFR BLDV: 31 % (ref 70–75)
OXYHGB MFR BLDV: 89 % (ref 70–75)
P AXIS - MUSE: 36 DEGREES
PCO2 BLD: 39 MM HG (ref 35–45)
PCO2 BLD: 40 MM HG (ref 35–45)
PCO2 BLDA: 38 MM HG (ref 35–45)
PCO2 BLDA: 38 MM HG (ref 35–45)
PCO2 BLDA: 41 MM HG (ref 35–45)
PCO2 BLDA: 46 MM HG (ref 35–45)
PCO2 BLDA: 47 MM HG (ref 35–45)
PCO2 BLDA: 47 MM HG (ref 35–45)
PCO2 BLDA: 62 MM HG (ref 35–45)
PCO2 BLDV: 53 MM HG (ref 40–50)
PCO2 BLDV: 58 MM HG (ref 40–50)
PEEP: 5 CM H2O
PH BLD: 7.3 [PH] (ref 7.35–7.45)
PH BLD: 7.34 [PH] (ref 7.35–7.45)
PH BLDA: 7.25 [PH] (ref 7.35–7.45)
PH BLDA: 7.26 [PH] (ref 7.35–7.45)
PH BLDA: 7.32 [PH] (ref 7.35–7.45)
PH BLDA: 7.38 [PH] (ref 7.35–7.45)
PH BLDA: 7.38 [PH] (ref 7.35–7.45)
PH BLDA: 7.42 [PH] (ref 7.35–7.45)
PH BLDA: 7.43 [PH] (ref 7.35–7.45)
PH BLDV: 7.23 [PH] (ref 7.32–7.43)
PH BLDV: 7.34 [PH] (ref 7.32–7.43)
PHOSPHATE SERPL-MCNC: 2.8 MG/DL (ref 2.5–4.5)
PLATELET # BLD AUTO: 210 10E3/UL (ref 150–450)
PLATELET # BLD AUTO: 245 10E3/UL (ref 150–450)
PLATELET # BLD AUTO: 276 10E3/UL (ref 150–450)
PO2 BLD: 83 MM HG (ref 80–105)
PO2 BLD: 95 MM HG (ref 80–105)
PO2 BLDA: 245 MM HG (ref 80–105)
PO2 BLDA: 324 MM HG (ref 80–105)
PO2 BLDA: 390 MM HG (ref 80–105)
PO2 BLDA: 399 MM HG (ref 80–105)
PO2 BLDA: 404 MM HG (ref 80–105)
PO2 BLDA: 472 MM HG (ref 80–105)
PO2 BLDA: 79 MM HG (ref 80–105)
PO2 BLDV: 25 MM HG (ref 25–47)
PO2 BLDV: 61 MM HG (ref 25–47)
POTASSIUM BLD-SCNC: 3.4 MMOL/L (ref 3.4–5.3)
POTASSIUM BLD-SCNC: 3.5 MMOL/L (ref 3.4–5.3)
POTASSIUM BLD-SCNC: 3.9 MMOL/L (ref 3.4–5.3)
POTASSIUM BLD-SCNC: 4 MMOL/L (ref 3.4–5.3)
POTASSIUM BLD-SCNC: 4.1 MMOL/L (ref 3.4–5.3)
POTASSIUM BLD-SCNC: 4.2 MMOL/L (ref 3.4–5.3)
POTASSIUM BLD-SCNC: 4.5 MMOL/L (ref 3.4–5.3)
POTASSIUM BLD-SCNC: 4.8 MMOL/L (ref 3.4–5.3)
POTASSIUM SERPL-SCNC: 3.7 MMOL/L (ref 3.4–5.3)
POTASSIUM SERPL-SCNC: 3.7 MMOL/L (ref 3.4–5.3)
POTASSIUM SERPL-SCNC: 4.3 MMOL/L (ref 3.4–5.3)
PR INTERVAL - MUSE: 180 MS
PREALB SERPL-MCNC: 15.1 MG/DL (ref 20–40)
PROT SERPL-MCNC: 5.5 G/DL (ref 6.4–8.3)
PROT SERPL-MCNC: 7.3 G/DL (ref 6.4–8.3)
QRS DURATION - MUSE: 112 MS
QT - MUSE: 428 MS
QTC - MUSE: 500 MS
R AXIS - MUSE: 28 DEGREES
RBC # BLD AUTO: 2.6 10E6/UL (ref 3.8–5.2)
RBC # BLD AUTO: 3 10E6/UL (ref 3.8–5.2)
RBC # BLD AUTO: 4.02 10E6/UL (ref 3.8–5.2)
SAO2 % BLDA: 100 % (ref 92–100)
SAO2 % BLDA: 100 % (ref 95–96)
SAO2 % BLDA: 96 % (ref 92–100)
SAO2 % BLDA: 96 % (ref 95–96)
SAO2 % BLDA: 97 % (ref 92–100)
SAO2 % BLDV: 31.8 % (ref 70–75)
SAO2 % BLDV: 91 % (ref 70–75)
SODIUM BLD-SCNC: 141 MMOL/L (ref 135–145)
SODIUM BLD-SCNC: 141 MMOL/L (ref 135–145)
SODIUM BLD-SCNC: 142 MMOL/L (ref 135–145)
SODIUM BLD-SCNC: 142 MMOL/L (ref 135–145)
SODIUM BLD-SCNC: 143 MMOL/L (ref 135–145)
SODIUM BLD-SCNC: 143 MMOL/L (ref 135–145)
SODIUM BLD-SCNC: 144 MMOL/L (ref 135–145)
SODIUM BLD-SCNC: 144 MMOL/L (ref 135–145)
SODIUM SERPL-SCNC: 140 MMOL/L (ref 135–145)
SODIUM SERPL-SCNC: 142 MMOL/L (ref 135–145)
SYSTOLIC BLOOD PRESSURE - MUSE: NORMAL MMHG
T AXIS - MUSE: 103 DEGREES
UNIT ABO/RH: NORMAL
UNIT ABO/RH: NORMAL
UNIT NUMBER: NORMAL
UNIT NUMBER: NORMAL
UNIT STATUS: NORMAL
UNIT STATUS: NORMAL
UNIT TYPE ISBT: 6200
UNIT TYPE ISBT: 6200
VENTRICULAR RATE- MUSE: 82 BPM
WBC # BLD AUTO: 14.5 10E3/UL (ref 4–11)
WBC # BLD AUTO: 22.6 10E3/UL (ref 4–11)
WBC # BLD AUTO: 8.5 10E3/UL (ref 4–11)

## 2024-11-18 PROCEDURE — 250N000012 HC RX MED GY IP 250 OP 636 PS 637: Performed by: STUDENT IN AN ORGANIZED HEALTH CARE EDUCATION/TRAINING PROGRAM

## 2024-11-18 PROCEDURE — 33519 CABG ARTERY-VEIN THREE: CPT | Performed by: STUDENT IN AN ORGANIZED HEALTH CARE EDUCATION/TRAINING PROGRAM

## 2024-11-18 PROCEDURE — 94002 VENT MGMT INPAT INIT DAY: CPT

## 2024-11-18 PROCEDURE — 82247 BILIRUBIN TOTAL: CPT

## 2024-11-18 PROCEDURE — 06BP4ZZ EXCISION OF RIGHT SAPHENOUS VEIN, PERCUTANEOUS ENDOSCOPIC APPROACH: ICD-10-PCS | Performed by: STUDENT IN AN ORGANIZED HEALTH CARE EDUCATION/TRAINING PROGRAM

## 2024-11-18 PROCEDURE — 33534 CABG ARTERIAL TWO: CPT | Performed by: STUDENT IN AN ORGANIZED HEALTH CARE EDUCATION/TRAINING PROGRAM

## 2024-11-18 PROCEDURE — 250N000024 HC ISOFLURANE, PER MIN: Performed by: STUDENT IN AN ORGANIZED HEALTH CARE EDUCATION/TRAINING PROGRAM

## 2024-11-18 PROCEDURE — 85041 AUTOMATED RBC COUNT: CPT

## 2024-11-18 PROCEDURE — 5A1221Z PERFORMANCE OF CARDIAC OUTPUT, CONTINUOUS: ICD-10-PCS | Performed by: STUDENT IN AN ORGANIZED HEALTH CARE EDUCATION/TRAINING PROGRAM

## 2024-11-18 PROCEDURE — 83605 ASSAY OF LACTIC ACID: CPT | Performed by: STUDENT IN AN ORGANIZED HEALTH CARE EDUCATION/TRAINING PROGRAM

## 2024-11-18 PROCEDURE — 85014 HEMATOCRIT: CPT

## 2024-11-18 PROCEDURE — 272N000001 HC OR GENERAL SUPPLY STERILE: Performed by: STUDENT IN AN ORGANIZED HEALTH CARE EDUCATION/TRAINING PROGRAM

## 2024-11-18 PROCEDURE — 85384 FIBRINOGEN ACTIVITY: CPT | Performed by: REGISTERED NURSE

## 2024-11-18 PROCEDURE — 250N000011 HC RX IP 250 OP 636: Performed by: STUDENT IN AN ORGANIZED HEALTH CARE EDUCATION/TRAINING PROGRAM

## 2024-11-18 PROCEDURE — 250N000009 HC RX 250

## 2024-11-18 PROCEDURE — 84134 ASSAY OF PREALBUMIN: CPT

## 2024-11-18 PROCEDURE — 85027 COMPLETE CBC AUTOMATED: CPT | Performed by: REGISTERED NURSE

## 2024-11-18 PROCEDURE — P9016 RBC LEUKOCYTES REDUCED: HCPCS

## 2024-11-18 PROCEDURE — 250N000013 HC RX MED GY IP 250 OP 250 PS 637: Performed by: STUDENT IN AN ORGANIZED HEALTH CARE EDUCATION/TRAINING PROGRAM

## 2024-11-18 PROCEDURE — 250N000013 HC RX MED GY IP 250 OP 250 PS 637

## 2024-11-18 PROCEDURE — 82805 BLOOD GASES W/O2 SATURATION: CPT

## 2024-11-18 PROCEDURE — 02100Z9 BYPASS CORONARY ARTERY, ONE ARTERY FROM LEFT INTERNAL MAMMARY, OPEN APPROACH: ICD-10-PCS | Performed by: STUDENT IN AN ORGANIZED HEALTH CARE EDUCATION/TRAINING PROGRAM

## 2024-11-18 PROCEDURE — 410N000003 HC PER-PERFUSION 1ST 30 MIN: Performed by: STUDENT IN AN ORGANIZED HEALTH CARE EDUCATION/TRAINING PROGRAM

## 2024-11-18 PROCEDURE — 410N000004: Performed by: STUDENT IN AN ORGANIZED HEALTH CARE EDUCATION/TRAINING PROGRAM

## 2024-11-18 PROCEDURE — 250N000009 HC RX 250: Performed by: INTERNAL MEDICINE

## 2024-11-18 PROCEDURE — 999N000157 HC STATISTIC RCP TIME EA 10 MIN

## 2024-11-18 PROCEDURE — 82330 ASSAY OF CALCIUM: CPT

## 2024-11-18 PROCEDURE — 999N000156 HC STATISTIC RCP CONSULT EA 30 MIN

## 2024-11-18 PROCEDURE — 93005 ELECTROCARDIOGRAM TRACING: CPT

## 2024-11-18 PROCEDURE — 84100 ASSAY OF PHOSPHORUS: CPT

## 2024-11-18 PROCEDURE — 85018 HEMOGLOBIN: CPT | Performed by: STUDENT IN AN ORGANIZED HEALTH CARE EDUCATION/TRAINING PROGRAM

## 2024-11-18 PROCEDURE — 85018 HEMOGLOBIN: CPT

## 2024-11-18 PROCEDURE — 85004 AUTOMATED DIFF WBC COUNT: CPT

## 2024-11-18 PROCEDURE — 33509 NDSC HRV UXTR ART 1 SGM CAB: CPT | Mod: XU | Performed by: STUDENT IN AN ORGANIZED HEALTH CARE EDUCATION/TRAINING PROGRAM

## 2024-11-18 PROCEDURE — 258N000003 HC RX IP 258 OP 636: Performed by: REGISTERED NURSE

## 2024-11-18 PROCEDURE — 999N000141 HC STATISTIC PRE-PROCEDURE NURSING ASSESSMENT: Performed by: STUDENT IN AN ORGANIZED HEALTH CARE EDUCATION/TRAINING PROGRAM

## 2024-11-18 PROCEDURE — 84075 ASSAY ALKALINE PHOSPHATASE: CPT

## 2024-11-18 PROCEDURE — 82330 ASSAY OF CALCIUM: CPT | Performed by: INTERNAL MEDICINE

## 2024-11-18 PROCEDURE — 82947 ASSAY GLUCOSE BLOOD QUANT: CPT

## 2024-11-18 PROCEDURE — 82805 BLOOD GASES W/O2 SATURATION: CPT | Performed by: INTERNAL MEDICINE

## 2024-11-18 PROCEDURE — 84132 ASSAY OF SERUM POTASSIUM: CPT

## 2024-11-18 PROCEDURE — 76984 DX INTRAOP THORACIC AORTA US: CPT | Mod: 26 | Performed by: STUDENT IN AN ORGANIZED HEALTH CARE EDUCATION/TRAINING PROGRAM

## 2024-11-18 PROCEDURE — 250N000011 HC RX IP 250 OP 636

## 2024-11-18 PROCEDURE — 370N000017 HC ANESTHESIA TECHNICAL FEE, PER MIN: Performed by: STUDENT IN AN ORGANIZED HEALTH CARE EDUCATION/TRAINING PROGRAM

## 2024-11-18 PROCEDURE — 82310 ASSAY OF CALCIUM: CPT

## 2024-11-18 PROCEDURE — 33508 ENDOSCOPIC VEIN HARVEST: CPT | Mod: XU | Performed by: STUDENT IN AN ORGANIZED HEALTH CARE EDUCATION/TRAINING PROGRAM

## 2024-11-18 PROCEDURE — 999N000253 HC STATISTIC WEANING TRIALS

## 2024-11-18 PROCEDURE — 250N000025 HC SEVOFLURANE, PER MIN: Performed by: STUDENT IN AN ORGANIZED HEALTH CARE EDUCATION/TRAINING PROGRAM

## 2024-11-18 PROCEDURE — 99291 CRITICAL CARE FIRST HOUR: CPT | Performed by: STUDENT IN AN ORGANIZED HEALTH CARE EDUCATION/TRAINING PROGRAM

## 2024-11-18 PROCEDURE — 258N000003 HC RX IP 258 OP 636: Performed by: STUDENT IN AN ORGANIZED HEALTH CARE EDUCATION/TRAINING PROGRAM

## 2024-11-18 PROCEDURE — 360N000079 HC SURGERY LEVEL 6, PER MIN: Performed by: STUDENT IN AN ORGANIZED HEALTH CARE EDUCATION/TRAINING PROGRAM

## 2024-11-18 PROCEDURE — 82805 BLOOD GASES W/O2 SATURATION: CPT | Performed by: STUDENT IN AN ORGANIZED HEALTH CARE EDUCATION/TRAINING PROGRAM

## 2024-11-18 PROCEDURE — 86923 COMPATIBILITY TEST ELECTRIC: CPT

## 2024-11-18 PROCEDURE — 84295 ASSAY OF SERUM SODIUM: CPT

## 2024-11-18 PROCEDURE — 85730 THROMBOPLASTIN TIME PARTIAL: CPT | Performed by: REGISTERED NURSE

## 2024-11-18 PROCEDURE — 250N000011 HC RX IP 250 OP 636: Performed by: REGISTERED NURSE

## 2024-11-18 PROCEDURE — 86923 COMPATIBILITY TEST ELECTRIC: CPT | Performed by: STUDENT IN AN ORGANIZED HEALTH CARE EDUCATION/TRAINING PROGRAM

## 2024-11-18 PROCEDURE — 999N000259 HC STATISTIC EXTUBATION

## 2024-11-18 PROCEDURE — 71045 X-RAY EXAM CHEST 1 VIEW: CPT

## 2024-11-18 PROCEDURE — 200N000001 HC R&B ICU

## 2024-11-18 PROCEDURE — 02100AW BYPASS CORONARY ARTERY, ONE ARTERY FROM AORTA WITH AUTOLOGOUS ARTERIAL TISSUE, OPEN APPROACH: ICD-10-PCS | Performed by: STUDENT IN AN ORGANIZED HEALTH CARE EDUCATION/TRAINING PROGRAM

## 2024-11-18 PROCEDURE — 85730 THROMBOPLASTIN TIME PARTIAL: CPT

## 2024-11-18 PROCEDURE — 83605 ASSAY OF LACTIC ACID: CPT

## 2024-11-18 PROCEDURE — 36415 COLL VENOUS BLD VENIPUNCTURE: CPT | Performed by: NURSE PRACTITIONER

## 2024-11-18 PROCEDURE — 03BC4ZZ EXCISION OF LEFT RADIAL ARTERY, PERCUTANEOUS ENDOSCOPIC APPROACH: ICD-10-PCS | Performed by: STUDENT IN AN ORGANIZED HEALTH CARE EDUCATION/TRAINING PROGRAM

## 2024-11-18 PROCEDURE — 85048 AUTOMATED LEUKOCYTE COUNT: CPT

## 2024-11-18 PROCEDURE — 85610 PROTHROMBIN TIME: CPT | Performed by: REGISTERED NURSE

## 2024-11-18 PROCEDURE — C1898 LEAD, PMKR, OTHER THAN TRANS: HCPCS | Performed by: STUDENT IN AN ORGANIZED HEALTH CARE EDUCATION/TRAINING PROGRAM

## 2024-11-18 PROCEDURE — 82803 BLOOD GASES ANY COMBINATION: CPT

## 2024-11-18 PROCEDURE — 250N000009 HC RX 250: Performed by: STUDENT IN AN ORGANIZED HEALTH CARE EDUCATION/TRAINING PROGRAM

## 2024-11-18 PROCEDURE — 258N000003 HC RX IP 258 OP 636

## 2024-11-18 PROCEDURE — 83735 ASSAY OF MAGNESIUM: CPT | Performed by: NURSE PRACTITIONER

## 2024-11-18 PROCEDURE — 83735 ASSAY OF MAGNESIUM: CPT

## 2024-11-18 PROCEDURE — 85610 PROTHROMBIN TIME: CPT

## 2024-11-18 PROCEDURE — 93010 ELECTROCARDIOGRAM REPORT: CPT | Performed by: INTERNAL MEDICINE

## 2024-11-18 PROCEDURE — 84155 ASSAY OF PROTEIN SERUM: CPT

## 2024-11-18 PROCEDURE — 272N000004 HC RX 272: Performed by: STUDENT IN AN ORGANIZED HEALTH CARE EDUCATION/TRAINING PROGRAM

## 2024-11-18 PROCEDURE — 83605 ASSAY OF LACTIC ACID: CPT | Performed by: INTERNAL MEDICINE

## 2024-11-18 PROCEDURE — 250N000009 HC RX 250: Performed by: REGISTERED NURSE

## 2024-11-18 PROCEDURE — 021209W BYPASS CORONARY ARTERY, THREE ARTERIES FROM AORTA WITH AUTOLOGOUS VENOUS TISSUE, OPEN APPROACH: ICD-10-PCS | Performed by: STUDENT IN AN ORGANIZED HEALTH CARE EDUCATION/TRAINING PROGRAM

## 2024-11-18 PROCEDURE — P9045 ALBUMIN (HUMAN), 5%, 250 ML: HCPCS | Performed by: REGISTERED NURSE

## 2024-11-18 RX ORDER — PROCHLORPERAZINE MALEATE 5 MG/1
5 TABLET ORAL EVERY 6 HOURS PRN
Status: DISCONTINUED | OUTPATIENT
Start: 2024-11-18 | End: 2024-11-28

## 2024-11-18 RX ORDER — LIDOCAINE HYDROCHLORIDE 10 MG/ML
INJECTION, SOLUTION INFILTRATION; PERINEURAL PRN
Status: DISCONTINUED | OUTPATIENT
Start: 2024-11-18 | End: 2024-11-18

## 2024-11-18 RX ORDER — BUPROPION HYDROCHLORIDE 150 MG/1
150 TABLET ORAL EVERY MORNING
Status: DISCONTINUED | OUTPATIENT
Start: 2024-11-19 | End: 2024-11-20

## 2024-11-18 RX ORDER — EPHEDRINE SULFATE 50 MG/ML
INJECTION, SOLUTION INTRAMUSCULAR; INTRAVENOUS; SUBCUTANEOUS PRN
Status: DISCONTINUED | OUTPATIENT
Start: 2024-11-18 | End: 2024-11-18

## 2024-11-18 RX ORDER — NOREPINEPHRINE BITARTRATE 0.02 MG/ML
.01-.1 INJECTION, SOLUTION INTRAVENOUS CONTINUOUS
Status: DISCONTINUED | OUTPATIENT
Start: 2024-11-18 | End: 2024-11-18 | Stop reason: HOSPADM

## 2024-11-18 RX ORDER — NALOXONE HYDROCHLORIDE 0.4 MG/ML
0.4 INJECTION, SOLUTION INTRAMUSCULAR; INTRAVENOUS; SUBCUTANEOUS
Status: DISCONTINUED | OUTPATIENT
Start: 2024-11-18 | End: 2024-12-03 | Stop reason: HOSPADM

## 2024-11-18 RX ORDER — DEXMEDETOMIDINE HYDROCHLORIDE 4 UG/ML
.1-1.2 INJECTION, SOLUTION INTRAVENOUS CONTINUOUS
Status: DISCONTINUED | OUTPATIENT
Start: 2024-11-18 | End: 2024-11-18

## 2024-11-18 RX ORDER — CHLORHEXIDINE GLUCONATE ORAL RINSE 1.2 MG/ML
15 SOLUTION DENTAL EVERY 12 HOURS
Status: DISCONTINUED | OUTPATIENT
Start: 2024-11-18 | End: 2024-11-19

## 2024-11-18 RX ORDER — ONDANSETRON 2 MG/ML
4 INJECTION INTRAMUSCULAR; INTRAVENOUS EVERY 6 HOURS PRN
Status: DISCONTINUED | OUTPATIENT
Start: 2024-11-18 | End: 2024-11-27

## 2024-11-18 RX ORDER — OXYCODONE HYDROCHLORIDE 5 MG/1
10 TABLET ORAL EVERY 4 HOURS PRN
Status: DISCONTINUED | OUTPATIENT
Start: 2024-11-18 | End: 2024-11-27

## 2024-11-18 RX ORDER — ACETAMINOPHEN 325 MG/1
650 TABLET ORAL EVERY 4 HOURS PRN
Status: DISCONTINUED | OUTPATIENT
Start: 2024-11-21 | End: 2024-12-03 | Stop reason: HOSPADM

## 2024-11-18 RX ORDER — SERTRALINE HYDROCHLORIDE 100 MG/1
200 TABLET, FILM COATED ORAL DAILY
Status: DISCONTINUED | OUTPATIENT
Start: 2024-11-19 | End: 2024-11-18

## 2024-11-18 RX ORDER — ASPIRIN 300 MG/1
300 SUPPOSITORY RECTAL ONCE
Status: COMPLETED | OUTPATIENT
Start: 2024-11-18 | End: 2024-11-18

## 2024-11-18 RX ORDER — DEXTROSE MONOHYDRATE 25 G/50ML
25-50 INJECTION, SOLUTION INTRAVENOUS
Status: DISCONTINUED | OUTPATIENT
Start: 2024-11-18 | End: 2024-12-03 | Stop reason: HOSPADM

## 2024-11-18 RX ORDER — ONDANSETRON 4 MG/1
4 TABLET, ORALLY DISINTEGRATING ORAL EVERY 6 HOURS PRN
Status: DISCONTINUED | OUTPATIENT
Start: 2024-11-18 | End: 2024-12-03 | Stop reason: HOSPADM

## 2024-11-18 RX ORDER — SODIUM CHLORIDE 9 MG/ML
INJECTION, SOLUTION INTRAVENOUS CONTINUOUS PRN
Status: DISCONTINUED | OUTPATIENT
Start: 2024-11-18 | End: 2024-11-18

## 2024-11-18 RX ORDER — VANCOMYCIN HYDROCHLORIDE 1 G/20ML
INJECTION, POWDER, LYOPHILIZED, FOR SOLUTION INTRAVENOUS PRN
Status: DISCONTINUED | OUTPATIENT
Start: 2024-11-18 | End: 2024-11-18 | Stop reason: HOSPADM

## 2024-11-18 RX ORDER — NITROGLYCERIN 10 MG/100ML
INJECTION INTRAVENOUS PRN
Status: DISCONTINUED | OUTPATIENT
Start: 2024-11-18 | End: 2024-11-18

## 2024-11-18 RX ORDER — NALOXONE HYDROCHLORIDE 1 MG/ML
0.1 INJECTION INTRAMUSCULAR; INTRAVENOUS; SUBCUTANEOUS
Status: DISCONTINUED | OUTPATIENT
Start: 2024-11-18 | End: 2024-11-18 | Stop reason: HOSPADM

## 2024-11-18 RX ORDER — LIDOCAINE 4 G/G
1-2 PATCH TOPICAL EVERY 24 HOURS
Status: DISCONTINUED | OUTPATIENT
Start: 2024-11-18 | End: 2024-11-24

## 2024-11-18 RX ORDER — HYDRALAZINE HYDROCHLORIDE 20 MG/ML
10 INJECTION INTRAMUSCULAR; INTRAVENOUS EVERY 30 MIN PRN
Status: DISCONTINUED | OUTPATIENT
Start: 2024-11-18 | End: 2024-12-03 | Stop reason: HOSPADM

## 2024-11-18 RX ORDER — KETAMINE HYDROCHLORIDE 10 MG/ML
INJECTION INTRAMUSCULAR; INTRAVENOUS PRN
Status: DISCONTINUED | OUTPATIENT
Start: 2024-11-18 | End: 2024-11-18

## 2024-11-18 RX ORDER — PROPOFOL 10 MG/ML
INJECTION, EMULSION INTRAVENOUS PRN
Status: DISCONTINUED | OUTPATIENT
Start: 2024-11-18 | End: 2024-11-18

## 2024-11-18 RX ORDER — FENTANYL CITRATE 50 UG/ML
INJECTION, SOLUTION INTRAMUSCULAR; INTRAVENOUS PRN
Status: DISCONTINUED | OUTPATIENT
Start: 2024-11-18 | End: 2024-11-18

## 2024-11-18 RX ORDER — SODIUM CHLORIDE, SODIUM LACTATE, POTASSIUM CHLORIDE, CALCIUM CHLORIDE 600; 310; 30; 20 MG/100ML; MG/100ML; MG/100ML; MG/100ML
INJECTION, SOLUTION INTRAVENOUS CONTINUOUS
Status: DISCONTINUED | OUTPATIENT
Start: 2024-11-18 | End: 2024-11-18 | Stop reason: HOSPADM

## 2024-11-18 RX ORDER — SODIUM CHLORIDE, SODIUM GLUCONATE, SODIUM ACETATE, POTASSIUM CHLORIDE AND MAGNESIUM CHLORIDE 526; 502; 368; 37; 30 MG/100ML; MG/100ML; MG/100ML; MG/100ML; MG/100ML
1000 INJECTION, SOLUTION INTRAVENOUS
Status: DISCONTINUED | OUTPATIENT
Start: 2024-11-18 | End: 2024-11-18

## 2024-11-18 RX ORDER — ACETAMINOPHEN 325 MG/1
975 TABLET ORAL ONCE
Status: COMPLETED | OUTPATIENT
Start: 2024-11-18 | End: 2024-11-18

## 2024-11-18 RX ORDER — METHADONE HYDROCHLORIDE 10 MG/ML
INJECTION, SOLUTION INTRAMUSCULAR; INTRAVENOUS; SUBCUTANEOUS
Status: DISCONTINUED
Start: 2024-11-18 | End: 2024-11-18 | Stop reason: HOSPADM

## 2024-11-18 RX ORDER — HYDROMORPHONE HCL IN WATER/PF 6 MG/30 ML
0.2 PATIENT CONTROLLED ANALGESIA SYRINGE INTRAVENOUS
Status: DISCONTINUED | OUTPATIENT
Start: 2024-11-18 | End: 2024-11-25

## 2024-11-18 RX ORDER — ACETAMINOPHEN 325 MG/1
975 TABLET ORAL EVERY 8 HOURS
Status: DISPENSED | OUTPATIENT
Start: 2024-11-18 | End: 2024-11-21

## 2024-11-18 RX ORDER — VASOPRESSIN IN 0.9 % NACL 2 UNIT/2ML
SYRINGE (ML) INTRAVENOUS PRN
Status: DISCONTINUED | OUTPATIENT
Start: 2024-11-18 | End: 2024-11-18

## 2024-11-18 RX ORDER — ATORVASTATIN CALCIUM 40 MG/1
80 TABLET, FILM COATED ORAL AT BEDTIME
Status: DISCONTINUED | OUTPATIENT
Start: 2024-11-18 | End: 2024-12-03 | Stop reason: HOSPADM

## 2024-11-18 RX ORDER — POTASSIUM CHLORIDE 29.8 MG/ML
20 INJECTION INTRAVENOUS
Status: COMPLETED | OUTPATIENT
Start: 2024-11-18 | End: 2024-11-18

## 2024-11-18 RX ORDER — POLYETHYLENE GLYCOL 3350 17 G/17G
17 POWDER, FOR SOLUTION ORAL DAILY
Status: DISCONTINUED | OUTPATIENT
Start: 2024-11-19 | End: 2024-12-03 | Stop reason: HOSPADM

## 2024-11-18 RX ORDER — OXYCODONE HYDROCHLORIDE 5 MG/1
5 TABLET ORAL EVERY 4 HOURS PRN
Status: DISCONTINUED | OUTPATIENT
Start: 2024-11-18 | End: 2024-12-01

## 2024-11-18 RX ORDER — ASPIRIN 81 MG/1
162 TABLET, CHEWABLE ORAL ONCE
Status: COMPLETED | OUTPATIENT
Start: 2024-11-18 | End: 2024-11-18

## 2024-11-18 RX ORDER — MAGNESIUM SULFATE 4 G/50ML
4 INJECTION INTRAVENOUS ONCE
Status: COMPLETED | OUTPATIENT
Start: 2024-11-18 | End: 2024-11-18

## 2024-11-18 RX ORDER — METHADONE HYDROCHLORIDE 10 MG/ML
INJECTION, SOLUTION INTRAMUSCULAR; INTRAVENOUS; SUBCUTANEOUS PRN
Status: DISCONTINUED | OUTPATIENT
Start: 2024-11-18 | End: 2024-11-18

## 2024-11-18 RX ORDER — LIDOCAINE 40 MG/G
CREAM TOPICAL
Status: DISCONTINUED | OUTPATIENT
Start: 2024-11-18 | End: 2024-11-18 | Stop reason: HOSPADM

## 2024-11-18 RX ORDER — LIDOCAINE HYDROCHLORIDE 10 MG/ML
INJECTION, SOLUTION INFILTRATION; PERINEURAL
Status: COMPLETED | OUTPATIENT
Start: 2024-11-18 | End: 2024-11-18

## 2024-11-18 RX ORDER — CEFAZOLIN SODIUM 2 G/100ML
2 INJECTION, SOLUTION INTRAVENOUS EVERY 8 HOURS
Status: COMPLETED | OUTPATIENT
Start: 2024-11-18 | End: 2024-11-19

## 2024-11-18 RX ORDER — DEXMEDETOMIDINE HYDROCHLORIDE 4 UG/ML
.1-1.2 INJECTION, SOLUTION INTRAVENOUS CONTINUOUS
Status: DISCONTINUED | OUTPATIENT
Start: 2024-11-18 | End: 2024-11-18 | Stop reason: HOSPADM

## 2024-11-18 RX ORDER — ASPIRIN 300 MG/1
300 SUPPOSITORY RECTAL DAILY
Status: DISCONTINUED | OUTPATIENT
Start: 2024-11-19 | End: 2024-11-19

## 2024-11-18 RX ORDER — PAPAVERINE HYDROCHLORIDE 30 MG/ML
INJECTION INTRAMUSCULAR; INTRAVENOUS PRN
Status: DISCONTINUED | OUTPATIENT
Start: 2024-11-18 | End: 2024-11-18 | Stop reason: HOSPADM

## 2024-11-18 RX ORDER — CHLORHEXIDINE GLUCONATE ORAL RINSE 1.2 MG/ML
10 SOLUTION DENTAL ONCE
Status: COMPLETED | OUTPATIENT
Start: 2024-11-18 | End: 2024-11-18

## 2024-11-18 RX ORDER — NALOXONE HYDROCHLORIDE 0.4 MG/ML
0.2 INJECTION, SOLUTION INTRAMUSCULAR; INTRAVENOUS; SUBCUTANEOUS
Status: DISCONTINUED | OUTPATIENT
Start: 2024-11-18 | End: 2024-12-03 | Stop reason: HOSPADM

## 2024-11-18 RX ORDER — PHENYLEPHRINE HCL IN 0.9% NACL 50MG/250ML
.1-4 PLASTIC BAG, INJECTION (ML) INTRAVENOUS CONTINUOUS PRN
Status: DISCONTINUED | OUTPATIENT
Start: 2024-11-18 | End: 2024-11-19

## 2024-11-18 RX ORDER — PANTOPRAZOLE SODIUM 40 MG/1
40 TABLET, DELAYED RELEASE ORAL DAILY
Status: DISCONTINUED | OUTPATIENT
Start: 2024-11-18 | End: 2024-11-18

## 2024-11-18 RX ORDER — HEPARIN SODIUM 1000 [USP'U]/ML
INJECTION, SOLUTION INTRAVENOUS; SUBCUTANEOUS PRN
Status: DISCONTINUED | OUTPATIENT
Start: 2024-11-18 | End: 2024-11-18

## 2024-11-18 RX ORDER — AMOXICILLIN 250 MG
1 CAPSULE ORAL 2 TIMES DAILY
Status: DISCONTINUED | OUTPATIENT
Start: 2024-11-18 | End: 2024-12-03 | Stop reason: HOSPADM

## 2024-11-18 RX ORDER — BISACODYL 10 MG
10 SUPPOSITORY, RECTAL RECTAL DAILY PRN
Status: DISCONTINUED | OUTPATIENT
Start: 2024-11-21 | End: 2024-12-03 | Stop reason: HOSPADM

## 2024-11-18 RX ORDER — ASPIRIN 81 MG/1
162 TABLET, CHEWABLE ORAL
Status: COMPLETED | OUTPATIENT
Start: 2024-11-18 | End: 2024-11-18

## 2024-11-18 RX ORDER — ASPIRIN 81 MG/1
81 TABLET, CHEWABLE ORAL
Status: COMPLETED | OUTPATIENT
Start: 2024-11-18 | End: 2024-11-18

## 2024-11-18 RX ORDER — PHENYLEPHRINE HCL IN 0.9% NACL 50MG/250ML
.1-6 PLASTIC BAG, INJECTION (ML) INTRAVENOUS CONTINUOUS
Status: DISCONTINUED | OUTPATIENT
Start: 2024-11-18 | End: 2024-11-18 | Stop reason: HOSPADM

## 2024-11-18 RX ORDER — DEXTROSE MONOHYDRATE 25 G/50ML
25-50 INJECTION, SOLUTION INTRAVENOUS
Status: DISCONTINUED | OUTPATIENT
Start: 2024-11-18 | End: 2024-11-18

## 2024-11-18 RX ORDER — CEFAZOLIN SODIUM/WATER 2 G/20 ML
2 SYRINGE (ML) INTRAVENOUS SEE ADMIN INSTRUCTIONS
Status: DISCONTINUED | OUTPATIENT
Start: 2024-11-18 | End: 2024-11-18 | Stop reason: HOSPADM

## 2024-11-18 RX ORDER — HYDROMORPHONE HCL IN WATER/PF 6 MG/30 ML
0.4 PATIENT CONTROLLED ANALGESIA SYRINGE INTRAVENOUS
Status: DISCONTINUED | OUTPATIENT
Start: 2024-11-18 | End: 2024-11-22

## 2024-11-18 RX ORDER — NICOTINE POLACRILEX 4 MG
15-30 LOZENGE BUCCAL
Status: DISCONTINUED | OUTPATIENT
Start: 2024-11-18 | End: 2024-12-03 | Stop reason: HOSPADM

## 2024-11-18 RX ORDER — HEPARIN SOD,PORCINE/0.9 % NACL 30K/1000ML
INTRAVENOUS SOLUTION INTRAVENOUS
Status: DISCONTINUED | OUTPATIENT
Start: 2024-11-18 | End: 2024-11-18 | Stop reason: HOSPADM

## 2024-11-18 RX ORDER — ETOMIDATE 2 MG/ML
INJECTION INTRAVENOUS
Status: COMPLETED | OUTPATIENT
Start: 2024-11-18 | End: 2024-11-18

## 2024-11-18 RX ORDER — HEPARIN SODIUM 5000 [USP'U]/.5ML
5000 INJECTION, SOLUTION INTRAVENOUS; SUBCUTANEOUS EVERY 8 HOURS
Status: DISCONTINUED | OUTPATIENT
Start: 2024-11-19 | End: 2024-11-28

## 2024-11-18 RX ORDER — METFORMIN HYDROCHLORIDE 500 MG/1
500 TABLET, EXTENDED RELEASE ORAL
Status: DISCONTINUED | OUTPATIENT
Start: 2024-11-18 | End: 2024-12-03 | Stop reason: HOSPADM

## 2024-11-18 RX ORDER — CEFAZOLIN SODIUM/WATER 2 G/20 ML
2 SYRINGE (ML) INTRAVENOUS
Status: COMPLETED | OUTPATIENT
Start: 2024-11-18 | End: 2024-11-18

## 2024-11-18 RX ORDER — MORPHINE SULFATE 1 MG/ML
150 INJECTION, SOLUTION EPIDURAL; INTRATHECAL; INTRAVENOUS ONCE
Status: DISCONTINUED | OUTPATIENT
Start: 2024-11-18 | End: 2024-11-18 | Stop reason: HOSPADM

## 2024-11-18 RX ORDER — NICOTINE POLACRILEX 4 MG
15-30 LOZENGE BUCCAL
Status: DISCONTINUED | OUTPATIENT
Start: 2024-11-18 | End: 2024-11-18

## 2024-11-18 RX ORDER — ASPIRIN 81 MG/1
162 TABLET, CHEWABLE ORAL DAILY
Status: DISCONTINUED | OUTPATIENT
Start: 2024-11-19 | End: 2024-11-19

## 2024-11-18 RX ORDER — CALCIUM GLUCONATE 20 MG/ML
1 INJECTION, SOLUTION INTRAVENOUS
Status: DISPENSED | OUTPATIENT
Start: 2024-11-18 | End: 2024-11-26

## 2024-11-18 RX ORDER — FLUMAZENIL 0.1 MG/ML
0.2 INJECTION, SOLUTION INTRAVENOUS
Status: DISCONTINUED | OUTPATIENT
Start: 2024-11-18 | End: 2024-11-18 | Stop reason: HOSPADM

## 2024-11-18 RX ORDER — ETOMIDATE 2 MG/ML
INJECTION INTRAVENOUS PRN
Status: DISCONTINUED | OUTPATIENT
Start: 2024-11-18 | End: 2024-11-18

## 2024-11-18 RX ORDER — PROTAMINE SULFATE 10 MG/ML
INJECTION, SOLUTION INTRAVENOUS PRN
Status: DISCONTINUED | OUTPATIENT
Start: 2024-11-18 | End: 2024-11-18

## 2024-11-18 RX ORDER — FENTANYL CITRATE 50 UG/ML
25-100 INJECTION, SOLUTION INTRAMUSCULAR; INTRAVENOUS
Status: DISCONTINUED | OUTPATIENT
Start: 2024-11-18 | End: 2024-11-18 | Stop reason: HOSPADM

## 2024-11-18 RX ORDER — DEXAMETHASONE SODIUM PHOSPHATE 10 MG/ML
INJECTION, SOLUTION INTRAMUSCULAR; INTRAVENOUS PRN
Status: DISCONTINUED | OUTPATIENT
Start: 2024-11-18 | End: 2024-11-18

## 2024-11-18 RX ORDER — CALCIUM GLUCONATE 20 MG/ML
2 INJECTION, SOLUTION INTRAVENOUS
Status: ACTIVE | OUTPATIENT
Start: 2024-11-18 | End: 2024-11-26

## 2024-11-18 RX ADMIN — PROTAMINE SULFATE 50 MG: 10 INJECTION, SOLUTION INTRAVENOUS at 12:34

## 2024-11-18 RX ADMIN — ASPIRIN 81 MG CHEWABLE TABLET 162 MG: 81 TABLET CHEWABLE at 07:14

## 2024-11-18 RX ADMIN — CALCIUM GLUCONATE 1 G: 20 INJECTION, SOLUTION INTRAVENOUS at 15:23

## 2024-11-18 RX ADMIN — SODIUM CHLORIDE, POTASSIUM CHLORIDE, SODIUM LACTATE AND CALCIUM CHLORIDE 250 ML: 600; 310; 30; 20 INJECTION, SOLUTION INTRAVENOUS at 19:50

## 2024-11-18 RX ADMIN — ONDANSETRON 4 MG: 2 INJECTION INTRAMUSCULAR; INTRAVENOUS at 21:54

## 2024-11-18 RX ADMIN — SUGAMMADEX 220 MG: 100 INJECTION, SOLUTION INTRAVENOUS at 14:17

## 2024-11-18 RX ADMIN — LIDOCAINE HYDROCHLORIDE 2 ML: 10 INJECTION, SOLUTION INFILTRATION; PERINEURAL at 07:30

## 2024-11-18 RX ADMIN — SODIUM CHLORIDE: 9 INJECTION, SOLUTION INTRAVENOUS at 08:29

## 2024-11-18 RX ADMIN — MIDAZOLAM HYDROCHLORIDE 1 MG: 1 INJECTION, SOLUTION INTRAMUSCULAR; INTRAVENOUS at 07:52

## 2024-11-18 RX ADMIN — Medication 0.5 MCG/KG/MIN: at 15:15

## 2024-11-18 RX ADMIN — FENTANYL CITRATE 50 MCG: 50 INJECTION, SOLUTION INTRAMUSCULAR; INTRAVENOUS at 08:54

## 2024-11-18 RX ADMIN — HYDROMORPHONE HYDROCHLORIDE 0.2 MG: 0.2 INJECTION, SOLUTION INTRAMUSCULAR; INTRAVENOUS; SUBCUTANEOUS at 20:35

## 2024-11-18 RX ADMIN — Medication 5 MG: at 10:05

## 2024-11-18 RX ADMIN — ETOMIDATE 20 MG: 2 INJECTION, SOLUTION INTRAVENOUS at 07:59

## 2024-11-18 RX ADMIN — Medication 2 G: at 07:16

## 2024-11-18 RX ADMIN — SODIUM CHLORIDE, POTASSIUM CHLORIDE, SODIUM LACTATE AND CALCIUM CHLORIDE: 600; 310; 30; 20 INJECTION, SOLUTION INTRAVENOUS at 07:48

## 2024-11-18 RX ADMIN — ASPIRIN 300 MG: 300 SUPPOSITORY RECTAL at 15:08

## 2024-11-18 RX ADMIN — SODIUM CHLORIDE, POTASSIUM CHLORIDE, SODIUM LACTATE AND CALCIUM CHLORIDE 250 ML: 600; 310; 30; 20 INJECTION, SOLUTION INTRAVENOUS at 15:48

## 2024-11-18 RX ADMIN — POTASSIUM CHLORIDE 20 MEQ: 29.8 INJECTION, SOLUTION INTRAVENOUS at 20:42

## 2024-11-18 RX ADMIN — DEXAMETHASONE SODIUM PHOSPHATE 10 MG: 10 INJECTION, SOLUTION INTRAMUSCULAR; INTRAVENOUS at 08:26

## 2024-11-18 RX ADMIN — Medication 5 MG: at 08:11

## 2024-11-18 RX ADMIN — KETAMINE HYDROCHLORIDE 50 MG: 10 INJECTION INTRAMUSCULAR; INTRAVENOUS at 08:50

## 2024-11-18 RX ADMIN — HEPARIN SODIUM 40000 UNITS: 1000 INJECTION INTRAVENOUS; SUBCUTANEOUS at 09:46

## 2024-11-18 RX ADMIN — NICARDIPINE HYDROCHLORIDE 0.5 MG/HR: 0.2 INJECTION, SOLUTION INTRAVENOUS at 11:27

## 2024-11-18 RX ADMIN — AMINOCAPROIC ACID 1 G/HR: 250 INJECTION, SOLUTION INTRAVENOUS at 10:00

## 2024-11-18 RX ADMIN — PHENYLEPHRINE HYDROCHLORIDE 200 MCG: 10 INJECTION INTRAVENOUS at 13:27

## 2024-11-18 RX ADMIN — CALCIUM GLUCONATE 1 G: 20 INJECTION, SOLUTION INTRAVENOUS at 21:06

## 2024-11-18 RX ADMIN — Medication 2 G: at 11:11

## 2024-11-18 RX ADMIN — SODIUM BICARBONATE 100 MEQ: 84 INJECTION, SOLUTION INTRAVENOUS at 18:56

## 2024-11-18 RX ADMIN — POTASSIUM CHLORIDE 20 MEQ: 29.8 INJECTION, SOLUTION INTRAVENOUS at 19:41

## 2024-11-18 RX ADMIN — INSULIN HUMAN 3 UNITS/HR: 1 INJECTION, SOLUTION INTRAVENOUS at 09:53

## 2024-11-18 RX ADMIN — ALBUMIN HUMAN: 0.05 INJECTION, SOLUTION INTRAVENOUS at 12:20

## 2024-11-18 RX ADMIN — Medication 0.5 MCG/KG/MIN: at 07:57

## 2024-11-18 RX ADMIN — ROCURONIUM BROMIDE 100 MG: 50 INJECTION, SOLUTION INTRAVENOUS at 07:59

## 2024-11-18 RX ADMIN — LIDOCAINE HYDROCHLORIDE 5 ML: 10 INJECTION, SOLUTION INFILTRATION; PERINEURAL at 07:59

## 2024-11-18 RX ADMIN — LIDOCAINE 2 PATCH: 4 PATCH TOPICAL at 15:39

## 2024-11-18 RX ADMIN — SODIUM CHLORIDE, POTASSIUM CHLORIDE, SODIUM LACTATE AND CALCIUM CHLORIDE 250 ML: 600; 310; 30; 20 INJECTION, SOLUTION INTRAVENOUS at 19:35

## 2024-11-18 RX ADMIN — MIDAZOLAM HYDROCHLORIDE 2 MG: 1 INJECTION, SOLUTION INTRAMUSCULAR; INTRAVENOUS at 12:10

## 2024-11-18 RX ADMIN — CHLORHEXIDINE GLUCONATE 10 ML: 1.2 RINSE ORAL at 07:15

## 2024-11-18 RX ADMIN — PHENYLEPHRINE HYDROCHLORIDE 200 MCG: 10 INJECTION INTRAVENOUS at 14:02

## 2024-11-18 RX ADMIN — PROTAMINE SULFATE 50 MG: 10 INJECTION, SOLUTION INTRAVENOUS at 12:33

## 2024-11-18 RX ADMIN — FENTANYL CITRATE 100 MCG: 50 INJECTION, SOLUTION INTRAMUSCULAR; INTRAVENOUS at 07:48

## 2024-11-18 RX ADMIN — ACETAMINOPHEN 975 MG: 325 TABLET ORAL at 21:12

## 2024-11-18 RX ADMIN — ONDANSETRON 4 MG: 2 INJECTION INTRAMUSCULAR; INTRAVENOUS at 14:17

## 2024-11-18 RX ADMIN — Medication 5 MG: at 08:35

## 2024-11-18 RX ADMIN — ATORVASTATIN CALCIUM 80 MG: 40 TABLET, FILM COATED ORAL at 21:12

## 2024-11-18 RX ADMIN — METOPROLOL TARTRATE 12.5 MG: 25 TABLET, FILM COATED ORAL at 07:13

## 2024-11-18 RX ADMIN — Medication 2 UNITS: at 13:42

## 2024-11-18 RX ADMIN — PROPOFOL 60 MG: 10 INJECTION, EMULSION INTRAVENOUS at 08:55

## 2024-11-18 RX ADMIN — PHENYLEPHRINE HYDROCHLORIDE 200 MCG: 10 INJECTION INTRAVENOUS at 08:02

## 2024-11-18 RX ADMIN — Medication 5 MG: at 10:10

## 2024-11-18 RX ADMIN — EPINEPHRINE 0.02 MCG/KG/MIN: 1 INJECTION INTRAMUSCULAR; INTRAVENOUS; SUBCUTANEOUS at 13:40

## 2024-11-18 RX ADMIN — MAGNESIUM SULFATE HEPTAHYDRATE 4 G: 80 INJECTION, SOLUTION INTRAVENOUS at 07:25

## 2024-11-18 RX ADMIN — DEXMEDETOMIDINE HYDROCHLORIDE 0.5 MCG/KG/HR: 400 INJECTION INTRAVENOUS at 08:30

## 2024-11-18 RX ADMIN — ACETAMINOPHEN 975 MG: 325 TABLET ORAL at 07:21

## 2024-11-18 RX ADMIN — PROTAMINE SULFATE 50 MG: 10 INJECTION, SOLUTION INTRAVENOUS at 12:35

## 2024-11-18 RX ADMIN — PROTAMINE SULFATE 50 MG: 10 INJECTION, SOLUTION INTRAVENOUS at 12:36

## 2024-11-18 RX ADMIN — ROCURONIUM BROMIDE 50 MG: 50 INJECTION, SOLUTION INTRAVENOUS at 12:20

## 2024-11-18 RX ADMIN — NOREPINEPHRINE BITARTRATE 0.03 MCG/KG/MIN: 0.02 INJECTION, SOLUTION INTRAVENOUS at 12:19

## 2024-11-18 RX ADMIN — ROCURONIUM BROMIDE 50 MG: 50 INJECTION, SOLUTION INTRAVENOUS at 10:30

## 2024-11-18 RX ADMIN — ALBUMIN HUMAN: 0.05 INJECTION, SOLUTION INTRAVENOUS at 12:40

## 2024-11-18 RX ADMIN — NITROGLYCERIN 40 MCG: 10 INJECTION INTRAVENOUS at 08:52

## 2024-11-18 RX ADMIN — MIDAZOLAM HYDROCHLORIDE 1 MG: 1 INJECTION, SOLUTION INTRAMUSCULAR; INTRAVENOUS at 07:48

## 2024-11-18 RX ADMIN — NITROGLYCERIN 40 MCG: 10 INJECTION INTRAVENOUS at 08:54

## 2024-11-18 RX ADMIN — SODIUM CHLORIDE: 9 INJECTION, SOLUTION INTRAVENOUS at 13:20

## 2024-11-18 RX ADMIN — FENTANYL CITRATE 50 MCG: 50 INJECTION, SOLUTION INTRAMUSCULAR; INTRAVENOUS at 08:57

## 2024-11-18 RX ADMIN — CEFAZOLIN SODIUM 2 G: 2 INJECTION, SOLUTION INTRAVENOUS at 18:04

## 2024-11-18 RX ADMIN — NITROGLYCERIN 20 MCG: 10 INJECTION INTRAVENOUS at 08:56

## 2024-11-18 RX ADMIN — SENNOSIDES AND DOCUSATE SODIUM 1 TABLET: 8.6; 5 TABLET ORAL at 21:12

## 2024-11-18 RX ADMIN — HYDROMORPHONE HYDROCHLORIDE 0.2 MG: 0.2 INJECTION, SOLUTION INTRAMUSCULAR; INTRAVENOUS; SUBCUTANEOUS at 14:46

## 2024-11-18 RX ADMIN — Medication 2.5 MCG/KG/MIN: at 21:05

## 2024-11-18 RX ADMIN — HYDROMORPHONE HYDROCHLORIDE 0.2 MG: 0.2 INJECTION, SOLUTION INTRAMUSCULAR; INTRAVENOUS; SUBCUTANEOUS at 14:44

## 2024-11-18 RX ADMIN — ETOMIDATE 10 MG: 2 INJECTION INTRAVENOUS at 08:01

## 2024-11-18 RX ADMIN — SODIUM CHLORIDE, POTASSIUM CHLORIDE, SODIUM LACTATE AND CALCIUM CHLORIDE 250 ML: 600; 310; 30; 20 INJECTION, SOLUTION INTRAVENOUS at 19:00

## 2024-11-18 RX ADMIN — Medication 20 MG: at 07:59

## 2024-11-18 RX ADMIN — SODIUM CHLORIDE, POTASSIUM CHLORIDE, SODIUM LACTATE AND CALCIUM CHLORIDE 250 ML: 600; 310; 30; 20 INJECTION, SOLUTION INTRAVENOUS at 14:48

## 2024-11-18 RX ADMIN — SODIUM CHLORIDE, POTASSIUM CHLORIDE, SODIUM LACTATE AND CALCIUM CHLORIDE 250 ML: 600; 310; 30; 20 INJECTION, SOLUTION INTRAVENOUS at 19:15

## 2024-11-18 RX ADMIN — AMINOCAPROIC ACID 5 G: 250 INJECTION, SOLUTION INTRAVENOUS at 08:30

## 2024-11-18 ASSESSMENT — ACTIVITIES OF DAILY LIVING (ADL)
ADLS_ACUITY_SCORE: 0

## 2024-11-18 NOTE — PHARMACY-ADMISSION MEDICATION HISTORY
Admission medication history completed at [facility]. Please see Medication Scribe Admission Medication History note from 11/08/2024.

## 2024-11-18 NOTE — ANESTHESIA POSTPROCEDURE EVALUATION
Patient: Karyn Gamez    Procedure: Procedure(s):  CORONARY ARTERY BYPASS GRAFT TIMES FIVE, LEFT INTERNAL MAMMARY ARTERY HARVEST, RIGHT ENDOSCOPIC VESSEL PROCUREMENT, EPIAORTIC ULTRASOUND, ANESTHESIA TRANSESOPHAGEAL ECHOCARDIOGRAM  LEFT RADIAL ARTERY HARVEST       Anesthesia Type:  General    Note:  Disposition: ICU            ICU Sign Out: Anesthesiologist/ICU physician sign out WAS performed   Postop Pain Control: Uneventful            Sign Out: Well controlled pain   PONV: No   Neuro/Psych: Uneventful            Sign Out: Acceptable/Baseline neuro status   Airway/Respiratory: Uneventful            Sign Out: AIRWAY IN SITU/Resp. Support   CV/Hemodynamics: Uneventful            Sign Out: Acceptable CV status; No obvious hypovolemia; No obvious fluid overload   Other NRE: NONE   DID A NON-ROUTINE EVENT OCCUR? No           Last vitals:  Vitals:    11/18/24 1430 11/18/24 1450 11/18/24 1500   BP: 105/59 91/56    Pulse: 74 83 82   Resp:      Temp:      SpO2: 100% 99% 100%       Electronically Signed By: Tiana Garcia MD  November 18, 2024  3:16 PM

## 2024-11-18 NOTE — PROGRESS NOTES
Transfer    Transferred to: [place]  Via:EMS  Reason for transfer: CABG surgery  Family: Aware of transfer  Belongings: Packed and sent with pt  Chart: Delivered with pt to EMS  Report given to: PACU nurse @ [facility]  Pt status: MAVERICKS

## 2024-11-18 NOTE — PROGRESS NOTES
Patient was received into . Patient was placed on ACVC 18 500 +5 100%. ETT 22@teeth. BS equal and bilateral. No adverse reactions or obstructions noted. RT will follow.    Dwight Alva, RT  11/19/24

## 2024-11-18 NOTE — ANESTHESIA PROCEDURE NOTES
Airway         Procedure Start/Stop Times: 11/18/2024 8:01 AM  Staff -        CRNA: Arian Smallwood APRN CRNA       Performed By: CRNA  Consent for Airway        Urgency: elective  Indications and Patient Condition       Indications for airway management: phil-procedural        Final Airway Details       Final airway type: endotracheal airway       Successful airway: ETT - single  Endotracheal Airway Details        ETT size (mm): 7.0       Cuffed: yes       Successful intubation technique: video laryngoscopy       VL Blade Size: Glidescope 3       Grade View of Cords: 1       Adjucts: stylet       Position: Left       Measured from: gums/teeth       Secured at (cm): 21       Bite block used: None    Post intubation assessment        Placement verified by: capnometry, equal breath sounds and chest rise        Number of attempts at approach: 1       Number of other approaches attempted: 0       Secured with: tape       Ease of procedure: easy       Dentition: Intact       Dental guard used and removed.    Medication(s) Administered   etomidate (AMIDATE) injection - Intravenous   10 mg - 11/18/2024 8:01:00 AM  Medication Administration Time: 11/18/2024 8:01 AM

## 2024-11-18 NOTE — ANESTHESIA PREPROCEDURE EVALUATION
Anesthesia Pre-Procedure Evaluation    Patient: Karyn Gamez   MRN: [id number] : 1955        Procedure : Procedure(s):  CORONARY ARTERY BYPASS GRAFT (CABG) , INTERNAL MAMMARY ARTERY HARVEST, ENDOSCOPIC VESSEL PROCUREMENT  POSSIBLE LEFT RADIAL ARTERY HARVEST          Past Medical History:   Diagnosis Date    Cervicalgia 2005: Thrown from a horse - wearing a helmet - and struck side of head and neck, heard crepitation, no loc, Able to walk after injury.  persistant pain in neck relieved with ibuprofen, stiff but can move with ROM.  No changes in hands and feet sensation/motor.    Coronary artery disease     Depressive disorder, not elsewhere classified     Hypertension     Obesity, unspecified       Past Surgical History:   Procedure Laterality Date    ANGIOGRAM      negative    COLONOSCOPY N/A 3/24/2023    Procedure: COLONOSCOPY, WITH HOT POLYPECTOMY AND RESEARCH BIOPSY;  Surgeon: Bret Velez MD;  Location: [facility] OR    CV CORONARY ANGIOGRAM N/A 2024    Procedure: Coronary Angiogram;  Surgeon: Emir Brand MD;  Location: [facility] CARDIAC CATH LAB    HYSTERECTOMY, PAP NO LONGER INDICATED      BSO      No Known Allergies   Social History     Tobacco Use    Smoking status: Former     Current packs/day: 0.00     Average packs/day: 0.5 packs/day for 30.0 years (15.0 ttl pk-yrs)     Types: Cigarettes     Start date: 1981     Quit date: 2011     Years since quittin.2    Smokeless tobacco: Never    Tobacco comments:     smoking 3-4 cigs per day   Substance Use Topics    Alcohol use: Yes     Comment: rarely      Wt Readings from Last 1 Encounters:   24 107.9 kg (237 lb 14 oz)        Anesthesia Evaluation            ROS/MED HX  ENT/Pulmonary:       Neurologic:     (+)          CVA,    TIA,                  Cardiovascular:     (+)  hypertension- -  CAD -  - -                                      METS/Exercise Tolerance:     Hematologic:        Musculoskeletal:       GI/Hepatic:       Renal/Genitourinary:     (+) renal disease,             Endo:     (+) type I DM,              Obesity,       Psychiatric/Substance Use:       Infectious Disease:       Malignancy:       Other:            Physical Exam    Airway        Mallampati: III   TM distance: > 3 FB   Neck ROM: full   Mouth opening: > 3 cm    Respiratory Devices and Support         Dental    unable to assess        Cardiovascular   cardiovascular exam normal          Pulmonary   pulmonary exam normal                OUTSIDE LABS:  CBC:   Lab Results   Component Value Date    WBC 8.5 11/18/2024    WBC 8.2 11/17/2024    HGB 11.9 11/18/2024    HGB 12.8 11/17/2024    HCT 36.7 11/18/2024    HCT 39.0 11/17/2024     11/18/2024     11/17/2024     BMP:   Lab Results   Component Value Date     11/18/2024     11/17/2024    POTASSIUM 4.3 11/18/2024    POTASSIUM 4.0 11/17/2024    CHLORIDE 103 11/18/2024    CHLORIDE 101 11/17/2024    CO2 27 11/18/2024    CO2 23 11/17/2024    BUN 23.4 (H) 11/18/2024    BUN 21.8 11/17/2024    CR 0.94 11/18/2024    CR 0.89 11/17/2024     (H) 11/18/2024     (H) 11/17/2024     COAGS:   Lab Results   Component Value Date    PTT 45 (H) 11/15/2024    INR 1.08 11/14/2024     POC:   Lab Results   Component Value Date     (H) 06/08/2019     HEPATIC:   Lab Results   Component Value Date    ALBUMIN 4.0 11/18/2024    PROTTOTAL 7.3 11/18/2024    ALT 71 (H) 11/18/2024    AST 53 (H) 11/18/2024    ALKPHOS 80 11/18/2024    BILITOTAL 0.4 11/18/2024     OTHER:   Lab Results   Component Value Date    A1C 6.0 (H) 11/07/2024    ANGELES 9.7 11/18/2024    PHOS 4.3 08/12/2011    MAG 2.1 11/18/2024    TSH 3.54 11/07/2024       Anesthesia Plan    ASA Status:  4       Anesthesia Type: General.     - Airway: ETT   Induction: Intravenous.           Consents          - Extended Intubation/Ventilatory Support Discussed: No.      - Patient is DNR/DNI Status: No     Use of  "blood products discussed: No .     Postoperative Care    Pain management: IV analgesics.        Comments:               Tiana Garcia MD    I have reviewed the pertinent notes and labs in the chart from the past 30 days and (re)examined the patient.  Any updates or changes from those notes are reflected in this note.               # Hypertension: Noted on problem list           # Obesity: Estimated body mass index is 37.34 kg/m  as calculated from the following:    Height as of 11/7/24: 1.7 m (5' 6.93\").    Weight as of 11/17/24: 107.9 kg (237 lb 14 oz).       # Financial/Environmental Concerns:          " no

## 2024-11-18 NOTE — ANESTHESIA PROCEDURE NOTES
Central Line/PA Catheter Placement    Pre-Procedure   Staff -        Performed By: anesthesiologist       Location: OR       Pre-Anesthestic Checklist: patient identified, IV checked, site marked, risks and benefits discussed, informed consent, monitors and equipment checked, pre-op evaluation and at physician/surgeon's request  Timeout:       Correct Patient: Yes        Correct Procedure: Yes        Correct Site: Yes        Correct Position: Yes        Correct Laterality: Yes   Line Placement:   This line was placed Post Induction starting at 11/18/2024 7:40 AM and ending at 11/18/2024 7:43 AM    Procedure   Procedure: central line       Laterality: right       Insertion Site: internal jugular.       Patient Position: Trendelenburg  Sterile Prep        All elements of maximal sterile barrier technique followed       Patient Prep/Sterile Barriers: draped, hand hygiene, gloves , hat , mask , draped, gown, sterile gel and probe cover       Skin prep: Chloraprep  Insertion/Injection        Local skin infiltrated with mL of 1% lidocaine.        Technique: ultrasound guided and Seldinger Technique        1. Ultrasound was used to evaluate the access site.       2. Vein evaluated via ultrasound for patency/adequacy.       3. Using real-time ultrasound the needle/catheter was observed entering the artery/vein.       Introducer Type: 9 Fr, 2-lumen MAC        Type: CVC       Catheter Length: 8       Number of Lumens: single lumen  Narrative         Secured by: suture       Tegaderm dressing used.       Complications: None apparent,        blood aspirated from all lumens,

## 2024-11-18 NOTE — CONSULTS
Critical Care Consult Note     11/18/2024     Name: Karyn Gamez MRN#: [id number]   Age: 69 year old YOB: 1955        Summary     Past medical history of R-frontal lobe stroke without residual deficits (2011), T2DM, HTN, HLD, active tobacco smoker, mood disorder, obesity     Presented 11/7/24 for chest pain. Found to have NSTEMI, severe multivessel CAD, & obstructive hypertrophic cardiomyopathy. Karyn underwent five vessel coronary artery bypass graft without intraoperative complications. Post-operative mechanical ventilation requirement (11/18 - current) in conjunction with hemodynamic instability requiring the use of vasopressors-inotropes      Assessment & Plan      Neurology, Psychiatry, Sedation, Analgesia:  Sedation & Analgesia   - daily spontaneous awakening & breathing trials, RASS goal 0 to -1  - dexmedetomidine infusion   - multimodal pain management: received methadone, APAP, prn hydromorphone & oxycodone   - plan for extubation within six hours of arrival in the ICU as able     Risk for neurologic sequelae of cardiopulmonary bypass   - wean sedation for neurological examination     Cardiovascular:  Post-operative hemodynamic instability  Suspected post-cardiopulmonary bypass vasoplegia   - routine hemodynamic management per CV surgery: epinephrine & norepinephrine has been made available   - in the setting of HOCM with ABRAHAM on 11/7/24 cMRI would consider phenylephrine & higher LVEDP target however 11/7/24 TTE did not demonstrated ABRAHAM or LVOT gradient  - ensure adequate DO2 with appropriate Hgb & SaO2; avoid excessive acidemia   - would favor balanced crystalloid for volume replacement - International Collaboration for Transfusion Medicine Guidelines (2024) recommend against the use of albumin for volume replacement in adult patients undergoing cardiovascular surgery (PMID 10537465)    Severe multivessel CAD  11/18/24 s/p five vessel CABG   - Aspirin & statin     Post-operative normal  sinus rhythm   S/p placement of temporary epicardial pacing wires y     Hypertrophic obstructive cardiomyopathy   11/7/24 cMRI demonstrated ABRAHAM & systolic flow acceleration in the LVOT, apical displacement of the papillary muscles, small LV cavity size, asymmetric septal hypertrophy (2 cm max thickness), LVEF 74%, no RWMA, RV normal size. 11/7/24 TTE demonstrated LVEF 60-65%, asymmetric septal hypertrophy, no ABRAHAM or LVOT gradient, normal RV size & function     Respiratory, Airway:  Post-operative invasive mechanical ventilation   11/18/24 radiograph demonstrated left basilar opacification, lines & tubes in good position   - supplemental O2 to maintain spO2 >= 90-96% & PaO2 >= 60 mmHg  - post-operative lung protection (TV <8 mL/kg IBW, Pplat <30, driving pressure <16) (PMID [id number])  - target normocarbia pH 7.35 - 7.45, continuously monitored end tidal CO2  - enhanced recovery after cardiac surgery / fast-track may extubate within six hours   - ensure adequate pain control & pulmonary hygiene at extubation, IS & flutter-valve    Post-operative chest tubes  - monitor output      Gastrointestinal:  No active issues     Renal, Acid-base, Electrolytes, Volume :  Normal renal indices     Infectious Disease:  Lor-operative antibiotics per CV surgery     Systemic inflammatory response   Secondary to surgical trauma & CPB circuit-blood interface contact  - monitor for persistence or signs of infection & organ dysfunction     Hematology, Oncology:  Post-operative anemia  - monitor for bleeding: Hgb goal >7.5 - 8 to ensure adequate DO2    Risk for post-cardiac bypass coagulopathy   - correct hypothermia, acidosis, hypertension, hypocalcemia     Risk for post-cardiac bypass thrombocytopenia   Anticipate fall in platelet count after cardiopulmonary bypass in most patients, typically to levels approximately half of baseline, angy between postoperative days 2-4, & persists for 4 - 6 days following surgery.     Endocrine:  MICU  "insulin/glucose protocol   - maintain BG of 140 to 180 mg/dL with a continuous IV insulin infusion    Checklist:  FEN: advance as tolerated at extubation   VTE ppx: subcutaneous UFH  GI ppx: pantoprazole  Bowel regimen: PEG & senna   VAP ppx: Head of bed >30 degrees, daily oral care  Lines/tube-size: brachial arterial line 11/8, CVC 11/18 (no PAC), sun 11/18, ETT   Skin: sternotomy site clean & dry   PT/OT/SLP, early mobility: cardiac rehab   Code Status: full       History of the Present Illness      Airway - easy  Methadone - 20 mg    Pre-echo - LVEF ~60%, no valvular disease or WMA  Post-echo - \"  \"   Notable vitals & infusions - low dose epinephrine & NE, fixed dose nicardipine, dexmedetomidine, amicar   Pacer settings & rate - back-up rate 50, NSR 70s   Notable labs - last K 3.5, Hgb 8, ABG 7.32/46/79    Products - 1 L crystalloid & 280 mL cell saver    Reversal given - yes   Notable case events - uncomplicated intraoperative course        Past Medical History      R-frontal lobe stroke without residual deficits (2011), T2DM, HTN, HLD, active tobacco smoker, mood disorder, obesity       Social History      Reviewed in the electronic medical record      Family History      Reviewed in the electronic medical record      Allergies      No Known Allergies      Review of Systems      Review of systems not obtained due to patient factors.      Medications      Prior to Admission Medications  Medications Prior to Admission   Medication Sig Dispense Refill Last Dose/Taking    amLODIPine (NORVASC) 2.5 MG tablet Take 1 tablet (2.5 mg) by mouth daily 90 tablet 1     atorvastatin (LIPITOR) 80 MG tablet Take 1 tablet (80 mg) by mouth daily For cholesterol. 90 tablet 1     blood glucose monitoring (ONE TOUCH DELICA) lancets Use to test blood sugar 1 times daily 100 each 11     blood glucose monitoring (ONETOUCH VERIO IQ SYSTEM) meter device kit Use to test blood sugar 1 times daily 1 kit 0     buPROPion (WELLBUTRIN XL) 150 " MG 24 hr tablet Take 1 tablet (150 mg) by mouth every morning 90 tablet 1     lisinopril-hydrochlorothiazide (ZESTORETIC) 20-12.5 MG tablet Take 1 tablet by mouth daily 90 tablet 0     metFORMIN (GLUCOPHAGE XR) 500 MG 24 hr tablet TAKE 1 TABLET BY MOUTH DAILY WITH DINNER FOR BLOOD SUGARS 90 tablet 1     sertraline (ZOLOFT) 100 MG tablet Take 2 tablets (200 mg) by mouth daily 180 tablet 1          Physical Exam      Neurologic: Sedated.Does not open eyes, track, or follow commands. PERRL.   HEENT: Head and face normal. No nasal discharge. Oropharynx normal. Eyelids, conjunctiva, & sclera normal.   Neck: Neck appearance normal. No neck masses. Thyroid not enlarged.  Respiratory: Lungs clear bilaterally. No wheezes, crackles, or rhonchi.   Cardiovascular: Regular rate & rhythm  Normal S1 & S2. No murmurs, rubs. or gallops. No elevated JVP.    Gastrointestinal: Obese. Soft   Musculoskeletal: Skeletal configuration normal and muscle mass normal for age. Joint appearance overall normal.  Skin, Hair, & Nails: Skin color normal. Sternotomy site clean & dry  Hair & nails normal.  Extremities: No peripheral edema. Warm      All pertinent vital signs, ventilator settings, I&Os, laboratory, microbiology, ECGs, & imaging data has been personally reviewed. Total Critical Care time, excluding procedures, was 50 minutes     HANS Garcia MD  Critical Care Medicine

## 2024-11-18 NOTE — BRIEF OP NOTE
[facility]    Brief Operative Note    Pre-operative diagnosis: CAD (coronary artery disease) [I25.10]  Post-operative diagnosis Same as pre-operative diagnosis    Procedure: CABG x5 with LIMA to LAD, left radial artery to obtuse marginal, rSVG to RPDA, RPL, and diagonal, endoscopic harvest of left radial artery and right saphenous vein    Surgeon: Surgeons and Role:     * Mary Pennington MD - Primary  - Lolis Rubio, SHADI/-MONALISA  - Suyapa Rosales PA-C    Anesthesia: General   Estimated Blood Loss: 300 ml    Drains: 1 mediastinal chest tube and bilateral pleural gorge drains  Specimens: * No specimens in log *  Findings:   Preserved biventricular function, NSR at end of case .  Complications: None.  Implants: * No implants in log *

## 2024-11-18 NOTE — ANESTHESIA PROCEDURE NOTES
Arterial Line Procedure Note    Pre-Procedure   Staff -        Performed By: anesthesiologist       Location: OR       Pre-Anesthestic Checklist: patient identified, IV checked, risks and benefits discussed, informed consent, monitors and equipment checked, pre-op evaluation and at physician/surgeon's request  Timeout:       Correct Patient: Yes        Correct Procedure: Yes        Correct Site: Yes        Correct Position: Yes   Line Placement:   This line was placed Pre Induction starting at 11/18/2024 7:30 AM and ending at 11/18/2024 7:32 AM  Procedure   Procedure: arterial line       Laterality: right       Insertion Site: brachial.  Sterile Prep        Standard elements of sterile barrier followed       Skin prep: Chloraprep  Insertion/Injection        Technique: ultrasound guided        1. Ultrasound was used to evaluate the access site.       2. Artery evaluated via ultrasound for patency/adequacy.       3. Using real-time ultrasound the needle/catheter was observed entering the artery/vein.       Catheter Type/Size: 20 G, 12 cm  Narrative         Secured by: suture       Tegaderm dressing used.       Complications: None apparent,        Arterial waveform: Yes

## 2024-11-19 ENCOUNTER — APPOINTMENT (OUTPATIENT)
Dept: RADIOLOGY | Facility: HOSPITAL | Age: 69
End: 2024-11-19
Payer: COMMERCIAL

## 2024-11-19 ENCOUNTER — PATIENT OUTREACH (OUTPATIENT)
Dept: CARE COORDINATION | Facility: CLINIC | Age: 69
End: 2024-11-19
Payer: COMMERCIAL

## 2024-11-19 ENCOUNTER — APPOINTMENT (OUTPATIENT)
Dept: OCCUPATIONAL THERAPY | Facility: HOSPITAL | Age: 69
DRG: 235 | End: 2024-11-19
Payer: COMMERCIAL

## 2024-11-19 DIAGNOSIS — Z95.1 S/P CABG (CORONARY ARTERY BYPASS GRAFT): Primary | ICD-10-CM

## 2024-11-19 LAB
ALBUMIN SERPL BCG-MCNC: 3.4 G/DL (ref 3.5–5.2)
ALP SERPL-CCNC: 50 U/L (ref 40–150)
ALT SERPL W P-5'-P-CCNC: 54 U/L (ref 0–50)
ANION GAP SERPL CALCULATED.3IONS-SCNC: 8 MMOL/L (ref 7–15)
ANION GAP SERPL CALCULATED.3IONS-SCNC: 9 MMOL/L (ref 7–15)
AST SERPL W P-5'-P-CCNC: 91 U/L (ref 0–45)
BASE EXCESS BLDA CALC-SCNC: -3 MMOL/L (ref -3–3)
BILIRUB SERPL-MCNC: 0.2 MG/DL
BLD PROD TYP BPU: NORMAL
BLOOD COMPONENT TYPE: NORMAL
BUN SERPL-MCNC: 16.6 MG/DL (ref 8–23)
BUN SERPL-MCNC: 20.1 MG/DL (ref 8–23)
CA-I BLD-MCNC: 4.6 MG/DL (ref 4.4–5.2)
CA-I BLD-MCNC: 4.7 MG/DL (ref 4.4–5.2)
CALCIUM SERPL-MCNC: 7.9 MG/DL (ref 8.8–10.4)
CALCIUM SERPL-MCNC: 8.1 MG/DL (ref 8.8–10.4)
CHLORIDE SERPL-SCNC: 114 MMOL/L (ref 98–107)
CHLORIDE SERPL-SCNC: 115 MMOL/L (ref 98–107)
CODING SYSTEM: NORMAL
COHGB MFR BLD: 94.4 % (ref 95–96)
CREAT SERPL-MCNC: 0.86 MG/DL (ref 0.51–0.95)
CREAT SERPL-MCNC: 0.95 MG/DL (ref 0.51–0.95)
CROSSMATCH: NORMAL
EGFRCR SERPLBLD CKD-EPI 2021: 65 ML/MIN/1.73M2
EGFRCR SERPLBLD CKD-EPI 2021: 73 ML/MIN/1.73M2
ERYTHROCYTE [DISTWIDTH] IN BLOOD BY AUTOMATED COUNT: 13.6 % (ref 10–15)
GLUCOSE BLDC GLUCOMTR-MCNC: 101 MG/DL (ref 70–99)
GLUCOSE BLDC GLUCOMTR-MCNC: 104 MG/DL (ref 70–99)
GLUCOSE BLDC GLUCOMTR-MCNC: 108 MG/DL (ref 70–99)
GLUCOSE BLDC GLUCOMTR-MCNC: 109 MG/DL (ref 70–99)
GLUCOSE BLDC GLUCOMTR-MCNC: 110 MG/DL (ref 70–99)
GLUCOSE BLDC GLUCOMTR-MCNC: 112 MG/DL (ref 70–99)
GLUCOSE BLDC GLUCOMTR-MCNC: 112 MG/DL (ref 70–99)
GLUCOSE BLDC GLUCOMTR-MCNC: 113 MG/DL (ref 70–99)
GLUCOSE BLDC GLUCOMTR-MCNC: 114 MG/DL (ref 70–99)
GLUCOSE BLDC GLUCOMTR-MCNC: 115 MG/DL (ref 70–99)
GLUCOSE BLDC GLUCOMTR-MCNC: 116 MG/DL (ref 70–99)
GLUCOSE BLDC GLUCOMTR-MCNC: 133 MG/DL (ref 70–99)
GLUCOSE BLDC GLUCOMTR-MCNC: 98 MG/DL (ref 70–99)
GLUCOSE SERPL-MCNC: 119 MG/DL (ref 70–99)
GLUCOSE SERPL-MCNC: 98 MG/DL (ref 70–99)
HCO3 BLD-SCNC: 23 MMOL/L (ref 21–28)
HCO3 SERPL-SCNC: 22 MMOL/L (ref 22–29)
HCO3 SERPL-SCNC: 23 MMOL/L (ref 22–29)
HCT VFR BLD AUTO: 23.6 % (ref 35–47)
HGB BLD-MCNC: 7.7 G/DL (ref 11.7–15.7)
HGB BLD-MCNC: 8 G/DL (ref 11.7–15.7)
ISSUE DATE AND TIME: NORMAL
LACTATE SERPL-SCNC: 1.4 MMOL/L (ref 0.7–2)
LACTATE SERPL-SCNC: 2.5 MMOL/L (ref 0.7–2)
LACTATE SERPL-SCNC: 3 MMOL/L (ref 0.7–2)
MAGNESIUM SERPL-MCNC: 2.1 MG/DL (ref 1.7–2.3)
MAGNESIUM SERPL-MCNC: 2.3 MG/DL (ref 1.7–2.3)
MCH RBC QN AUTO: 29.7 PG (ref 26.5–33)
MCHC RBC AUTO-ENTMCNC: 32.6 G/DL (ref 31.5–36.5)
MCV RBC AUTO: 91 FL (ref 78–100)
O2/TOTAL GAS SETTING VFR VENT: 53 %
PCO2 BLD: 42 MM HG (ref 35–45)
PH BLD: 7.34 [PH] (ref 7.35–7.45)
PHOSPHATE SERPL-MCNC: 3.9 MG/DL (ref 2.5–4.5)
PHOSPHATE SERPL-MCNC: 4.1 MG/DL (ref 2.5–4.5)
PLATELET # BLD AUTO: 233 10E3/UL (ref 150–450)
PO2 BLD: 71 MM HG (ref 80–105)
POTASSIUM SERPL-SCNC: 5.2 MMOL/L (ref 3.4–5.3)
POTASSIUM SERPL-SCNC: 5.2 MMOL/L (ref 3.4–5.3)
POTASSIUM SERPL-SCNC: 5.4 MMOL/L (ref 3.4–5.3)
PROT SERPL-MCNC: 5.4 G/DL (ref 6.4–8.3)
RBC # BLD AUTO: 2.59 10E6/UL (ref 3.8–5.2)
SAO2 % BLDA: 93 % (ref 92–100)
SODIUM SERPL-SCNC: 145 MMOL/L (ref 135–145)
SODIUM SERPL-SCNC: 146 MMOL/L (ref 135–145)
UNIT ABO/RH: NORMAL
UNIT NUMBER: NORMAL
UNIT STATUS: NORMAL
UNIT TYPE ISBT: 6200
WBC # BLD AUTO: 18.8 10E3/UL (ref 4–11)

## 2024-11-19 PROCEDURE — 82805 BLOOD GASES W/O2 SATURATION: CPT | Performed by: INTERNAL MEDICINE

## 2024-11-19 PROCEDURE — 250N000011 HC RX IP 250 OP 636: Performed by: PHYSICIAN ASSISTANT

## 2024-11-19 PROCEDURE — 97110 THERAPEUTIC EXERCISES: CPT | Mod: GO

## 2024-11-19 PROCEDURE — 83735 ASSAY OF MAGNESIUM: CPT | Performed by: STUDENT IN AN ORGANIZED HEALTH CARE EDUCATION/TRAINING PROGRAM

## 2024-11-19 PROCEDURE — 97166 OT EVAL MOD COMPLEX 45 MIN: CPT | Mod: GO

## 2024-11-19 PROCEDURE — P9016 RBC LEUKOCYTES REDUCED: HCPCS | Performed by: STUDENT IN AN ORGANIZED HEALTH CARE EDUCATION/TRAINING PROGRAM

## 2024-11-19 PROCEDURE — 85027 COMPLETE CBC AUTOMATED: CPT

## 2024-11-19 PROCEDURE — 80053 COMPREHEN METABOLIC PANEL: CPT

## 2024-11-19 PROCEDURE — 80051 ELECTROLYTE PANEL: CPT | Performed by: STUDENT IN AN ORGANIZED HEALTH CARE EDUCATION/TRAINING PROGRAM

## 2024-11-19 PROCEDURE — 5A09557 ASSISTANCE WITH RESPIRATORY VENTILATION, GREATER THAN 96 CONSECUTIVE HOURS, CONTINUOUS POSITIVE AIRWAY PRESSURE: ICD-10-PCS | Performed by: PHYSICIAN ASSISTANT

## 2024-11-19 PROCEDURE — P9045 ALBUMIN (HUMAN), 5%, 250 ML: HCPCS | Performed by: STUDENT IN AN ORGANIZED HEALTH CARE EDUCATION/TRAINING PROGRAM

## 2024-11-19 PROCEDURE — 250N000011 HC RX IP 250 OP 636: Performed by: STUDENT IN AN ORGANIZED HEALTH CARE EDUCATION/TRAINING PROGRAM

## 2024-11-19 PROCEDURE — 82330 ASSAY OF CALCIUM: CPT | Performed by: STUDENT IN AN ORGANIZED HEALTH CARE EDUCATION/TRAINING PROGRAM

## 2024-11-19 PROCEDURE — 84132 ASSAY OF SERUM POTASSIUM: CPT | Performed by: STUDENT IN AN ORGANIZED HEALTH CARE EDUCATION/TRAINING PROGRAM

## 2024-11-19 PROCEDURE — 82040 ASSAY OF SERUM ALBUMIN: CPT

## 2024-11-19 PROCEDURE — 250N000013 HC RX MED GY IP 250 OP 250 PS 637

## 2024-11-19 PROCEDURE — 250N000013 HC RX MED GY IP 250 OP 250 PS 637: Performed by: PHYSICIAN ASSISTANT

## 2024-11-19 PROCEDURE — 82330 ASSAY OF CALCIUM: CPT

## 2024-11-19 PROCEDURE — 258N000003 HC RX IP 258 OP 636: Performed by: STUDENT IN AN ORGANIZED HEALTH CARE EDUCATION/TRAINING PROGRAM

## 2024-11-19 PROCEDURE — 85018 HEMOGLOBIN: CPT | Performed by: PHYSICIAN ASSISTANT

## 2024-11-19 PROCEDURE — 94660 CPAP INITIATION&MGMT: CPT

## 2024-11-19 PROCEDURE — 84100 ASSAY OF PHOSPHORUS: CPT | Performed by: STUDENT IN AN ORGANIZED HEALTH CARE EDUCATION/TRAINING PROGRAM

## 2024-11-19 PROCEDURE — 999N000157 HC STATISTIC RCP TIME EA 10 MIN

## 2024-11-19 PROCEDURE — 83605 ASSAY OF LACTIC ACID: CPT | Performed by: STUDENT IN AN ORGANIZED HEALTH CARE EDUCATION/TRAINING PROGRAM

## 2024-11-19 PROCEDURE — 3E043XZ INTRODUCTION OF VASOPRESSOR INTO CENTRAL VEIN, PERCUTANEOUS APPROACH: ICD-10-PCS | Performed by: PHYSICIAN ASSISTANT

## 2024-11-19 PROCEDURE — 250N000009 HC RX 250: Performed by: STUDENT IN AN ORGANIZED HEALTH CARE EDUCATION/TRAINING PROGRAM

## 2024-11-19 PROCEDURE — 97535 SELF CARE MNGMENT TRAINING: CPT | Mod: GO

## 2024-11-19 PROCEDURE — 84100 ASSAY OF PHOSPHORUS: CPT

## 2024-11-19 PROCEDURE — 71045 X-RAY EXAM CHEST 1 VIEW: CPT

## 2024-11-19 PROCEDURE — 200N000001 HC R&B ICU

## 2024-11-19 PROCEDURE — 250N000011 HC RX IP 250 OP 636

## 2024-11-19 PROCEDURE — 83735 ASSAY OF MAGNESIUM: CPT

## 2024-11-19 PROCEDURE — 250N000009 HC RX 250

## 2024-11-19 PROCEDURE — 99291 CRITICAL CARE FIRST HOUR: CPT | Performed by: STUDENT IN AN ORGANIZED HEALTH CARE EDUCATION/TRAINING PROGRAM

## 2024-11-19 RX ORDER — DEXTROSE MONOHYDRATE 25 G/50ML
25-50 INJECTION, SOLUTION INTRAVENOUS
Status: DISCONTINUED | OUTPATIENT
Start: 2024-11-19 | End: 2024-11-19

## 2024-11-19 RX ORDER — ASPIRIN 300 MG/1
300 SUPPOSITORY RECTAL DAILY
Status: DISCONTINUED | OUTPATIENT
Start: 2024-11-20 | End: 2024-11-28

## 2024-11-19 RX ORDER — ASPIRIN 81 MG/1
81 TABLET, CHEWABLE ORAL DAILY
Status: DISCONTINUED | OUTPATIENT
Start: 2024-11-20 | End: 2024-11-28

## 2024-11-19 RX ORDER — CLOPIDOGREL BISULFATE 75 MG/1
75 TABLET ORAL DAILY
Status: DISCONTINUED | OUTPATIENT
Start: 2024-11-19 | End: 2024-11-28

## 2024-11-19 RX ORDER — NICOTINE POLACRILEX 4 MG
15-30 LOZENGE BUCCAL
Status: DISCONTINUED | OUTPATIENT
Start: 2024-11-19 | End: 2024-11-19

## 2024-11-19 RX ORDER — AMLODIPINE BESYLATE 2.5 MG/1
2.5 TABLET ORAL DAILY
Status: DISCONTINUED | OUTPATIENT
Start: 2024-11-19 | End: 2024-11-21

## 2024-11-19 RX ORDER — FUROSEMIDE 10 MG/ML
40 INJECTION INTRAMUSCULAR; INTRAVENOUS ONCE
Status: COMPLETED | OUTPATIENT
Start: 2024-11-19 | End: 2024-11-19

## 2024-11-19 RX ADMIN — CLOPIDOGREL BISULFATE 75 MG: 75 TABLET ORAL at 13:23

## 2024-11-19 RX ADMIN — ATORVASTATIN CALCIUM 80 MG: 40 TABLET, FILM COATED ORAL at 20:32

## 2024-11-19 RX ADMIN — BUPROPION HYDROCHLORIDE 150 MG: 150 TABLET, FILM COATED, EXTENDED RELEASE ORAL at 08:21

## 2024-11-19 RX ADMIN — Medication 3.2 MCG/KG/MIN: at 05:38

## 2024-11-19 RX ADMIN — Medication 2.5 MCG/KG/MIN: at 00:02

## 2024-11-19 RX ADMIN — LIDOCAINE 1 PATCH: 4 PATCH TOPICAL at 17:01

## 2024-11-19 RX ADMIN — OXYCODONE 10 MG: 5 TABLET ORAL at 20:33

## 2024-11-19 RX ADMIN — SENNOSIDES AND DOCUSATE SODIUM 1 TABLET: 8.6; 5 TABLET ORAL at 20:33

## 2024-11-19 RX ADMIN — ASPIRIN 81 MG CHEWABLE TABLET 162 MG: 81 TABLET CHEWABLE at 08:20

## 2024-11-19 RX ADMIN — ACETAMINOPHEN 975 MG: 325 TABLET ORAL at 05:15

## 2024-11-19 RX ADMIN — PHENYLEPHRINE HYDROCHLORIDE 1.5 MCG/KG/MIN: 50 INJECTION INTRAVENOUS at 21:58

## 2024-11-19 RX ADMIN — FUROSEMIDE 40 MG: 10 INJECTION, SOLUTION INTRAMUSCULAR; INTRAVENOUS at 08:21

## 2024-11-19 RX ADMIN — ALBUMIN HUMAN 12.5 G: 0.05 INJECTION, SOLUTION INTRAVENOUS at 05:05

## 2024-11-19 RX ADMIN — PANTOPRAZOLE SODIUM 40 MG: 40 INJECTION, POWDER, FOR SOLUTION INTRAVENOUS at 06:51

## 2024-11-19 RX ADMIN — OXYCODONE 10 MG: 5 TABLET ORAL at 11:31

## 2024-11-19 RX ADMIN — Medication 3.3 MCG/KG/MIN: at 10:26

## 2024-11-19 RX ADMIN — HEPARIN SODIUM 5000 UNITS: 5000 INJECTION, SOLUTION INTRAVENOUS; SUBCUTANEOUS at 21:15

## 2024-11-19 RX ADMIN — CEFAZOLIN SODIUM 2 G: 2 INJECTION, SOLUTION INTRAVENOUS at 02:31

## 2024-11-19 RX ADMIN — HEPARIN SODIUM 5000 UNITS: 5000 INJECTION, SOLUTION INTRAVENOUS; SUBCUTANEOUS at 13:23

## 2024-11-19 RX ADMIN — Medication 2.8 MCG/KG/MIN: at 02:42

## 2024-11-19 RX ADMIN — HYDROMORPHONE HYDROCHLORIDE 0.2 MG: 0.2 INJECTION, SOLUTION INTRAMUSCULAR; INTRAVENOUS; SUBCUTANEOUS at 01:19

## 2024-11-19 RX ADMIN — ONDANSETRON 4 MG: 2 INJECTION INTRAMUSCULAR; INTRAVENOUS at 12:31

## 2024-11-19 RX ADMIN — Medication 3.5 MCG/KG/MIN: at 08:23

## 2024-11-19 RX ADMIN — INSULIN HUMAN 4 UNITS/HR: 1 INJECTION, SOLUTION INTRAVENOUS at 09:08

## 2024-11-19 RX ADMIN — CEFAZOLIN SODIUM 2 G: 2 INJECTION, SOLUTION INTRAVENOUS at 11:53

## 2024-11-19 RX ADMIN — PHENYLEPHRINE HYDROCHLORIDE 3.3 MCG/KG/MIN: 50 INJECTION INTRAVENOUS at 11:59

## 2024-11-19 RX ADMIN — FUROSEMIDE 40 MG: 10 INJECTION, SOLUTION INTRAMUSCULAR; INTRAVENOUS at 14:35

## 2024-11-19 RX ADMIN — HYDROMORPHONE HYDROCHLORIDE 0.2 MG: 0.2 INJECTION, SOLUTION INTRAMUSCULAR; INTRAVENOUS; SUBCUTANEOUS at 13:23

## 2024-11-19 RX ADMIN — AMLODIPINE BESYLATE 2.5 MG: 2.5 TABLET ORAL at 08:22

## 2024-11-19 NOTE — PROGRESS NOTES
Clinic Care Coordination Contact  Care Coordination Clinician Chart Review    Situation: Patient chart reviewed by care coordinator.    Background: Clinic Care Coordination Referral received from inpatient care team for transition handoff communication following hospital admission.    Assessment: Upon chart review, patient is not a candidate for Primary Care Clinic Care Coordination enrollment due to reason stated below:  The patient was transferred to [facility] for CABGX5 surgery.    Plan/Recommendations: Clinic Care Coordination Referral/order cancelled. RN/SW CC will perform no further monitoring/outreaches at this time and will remain available as needed. If new needs arise, a new Care Coordination Referral may be placed.    Kulwinder Carrera MSN, RN, PHN, CCM   Primary Care Clinical RN Care Coordinator  [facility]  11/19/2024   7:27 AM  [email or web address]  Office: [phone]

## 2024-11-19 NOTE — PROVIDER NOTIFICATION
Routine update provided to Dr Pennington.  Discussed recent extubation, o2 requirements, increasing vasopressor needs, recent fluid administration, elevated lactic acid level.  While on the phone with her, I did pause the epicardial pacer.  Intrinisc HR 70 - appeared sinus at bedside monitor - however, MAP dropped significantly on arterial line to 58 and CI decreased significantly to 1.7.  Increased pacer rate back to 90bpm and numbers normalized.   Will leave paced for now.      Plan to wean epi down as able.  Trend lactates hourly. Check ABG/VBG (ScVO2).  1g calcium gluconate now.      11/18/24 2045 11/18/24 2049 11/18/24 2100   Vitals   Pulse 89 (S)  70 89   Oximeter Heart Rate 89 bpm  --  89 bpm   /60 91/55 114/54   BP - Mean 84 68 78   Art Line   Arterial Line BP 95/56 82/47 81/52   Arterial Line MAP (mmHg) 70 mmHg (S)  58 mmHg 62 mmHg   Invasive Hemodynamic Monitoring   CVP (mmHg) 13 mmHg 18 mmHg 14 mmHg   Cardiac Output (CO) 5.1 L/min 3.8 L/min 5.4 L/min   Cardiac Index (CI) 2.3 L/min/m2 (S)  1.7 L/min/m2 2.5 L/min/m2   Systemic Vasular Resistance (SVR) 891 (dyne*sec)/cm5 819 (dyne*sec)/cm5 723 (dyne*sec)/cm5

## 2024-11-19 NOTE — PLAN OF CARE
[facility] - ICU    RN Progress Note:            Pertinent Assessments:      Please refer to flowsheet rows for full assessment     UOP is declining, trend closely. D/w CVS during AM rounds, plan to give a one time dose of lasix after blood transfusion.     Extubated at 1925.  Required 8L oxymask most of the night, until 0630 required 10L with spo2 88%.  Intensivist ordered CPAP; initiated at 0703am.             Key Events - This Shift:       Lactic elevated last evening.  Trending lactates q1-2h overnight.  Most recent LA 3.0.    Remains rather vasoplegic, unable to tolerate large movements.  Did not attempt to dangle or stand on this shift.                  Barriers to Discharge / Downgrade:     Epicardial pacemaker, vasoactive drips         Point of Contact Update: YES-OR-NO: No  If No, reason: Can be updated during wake hours

## 2024-11-19 NOTE — PROGRESS NOTES
"After weaning for 175 minutes patient extubated at 1925, placed on 4L NC, BS clear bilaterally, patient cough is strong, non-productive, patient is able to verbalize her name  \"Karyn\" upon extubation,  no complications at this time. RT will follow.    Dwight Alva, RT   11/19/24    "

## 2024-11-19 NOTE — PROGRESS NOTES
11/19/24 0815   Appointment Info   Signing Clinician's Name / Credentials (OT) Lana He OT   Appointment Canceled Reason (OT) Other (see Cancel Comments row)   Appointment Cancel Comments (OT) pt seen1 session today per nsg so pt can rest   Living Environment   People in Home spouse   Current Living Arrangements house   Home Accessibility stairs within home   Number of Stairs, Within Home, Primary greater than 10 stairs  (15)   Stair Railings, Within Home, Primary railings safe and in good condition;railing on right side (ascending)   Transportation Anticipated family or friend will provide;car, drives self   Living Environment Comments pt was independent w/her ADLs and mobility   Self-Care   Usual Activity Tolerance good   Current Activity Tolerance fair   Equipment Currently Used at Home none   Fall history within last six months no   Instrumental Activities of Daily Living (IADL)   Previous Responsibilities housekeeping;meal prep;laundry;shopping;medication management;driving  (family assist w/all tasks)   General Information   Onset of Illness/Injury or Date of Surgery 11/18/24   Referring Physician Dr Pennington   Patient/Family Therapy Goal Statement (OT) go home   Additional Occupational Profile Info/Pertinent History of Current Problem Karyn Gamez is a 69 year old female with PMH of DM2 not on insulin, tobacco use, HTN, HLD, depression admitted on 11/7/2024 with chest pain and elevated troponin c/f NSTEMI found to have severe multivessel disease being evaluated for CABG.   Existing Precautions/Restrictions cardiac   Left Upper Extremity (Weight-bearing Status) non weight-bearing (NWB)   Right Upper Extremity (Weight-bearing Status) non weight-bearing (NWB)   Left Lower Extremity (Weight-bearing Status) full weight-bearing (FWB)   Right Lower Extremity (Weight-bearing Status) full weight-bearing (FWB)   Cognitive Status Examination   Orientation Status orientation to person, place and time   Follows  Commands WNL   Visual Perception   Visual Impairment/Limitations corrective lenses for reading   Sensory   Sensory Quick Adds sensation intact   Pain Assessment   Patient Currently in Pain Yes, see Vital Sign flowsheet  (incision site)   Posture   Posture forward head position   Range of Motion Comprehensive   General Range of Motion no range of motion deficits identified   Strength Comprehensive (MMT)   General Manual Muscle Testing (MMT) Assessment no strength deficits identified   Muscle Tone Assessment   Muscle Tone Quick Adds No deficits were identified   Coordination   Upper Extremity Coordination No deficits were identified   Bed Mobility   Supine-Sit Pine (Bed Mobility) moderate assist (50% patient effort);2 person assist   Transfers   Transfers sit-stand transfer   Transfer Comments CGA w/hand hold assist   Sit-Stand Transfer   Sit-Stand Pine (Transfers) contact guard   Balance   Balance Assessment standing dynamic balance   Activities of Daily Living   BADL Assessment/Intervention no deficits identified   Bathing Assessment/Intervention   Pine Level (Bathing) independent   Upper Body Dressing Assessment/Training   Pine Level (Upper Body Dressing) independent   Lower Body Dressing Assessment/Training   Pine Level (Lower Body Dressing) independent   Grooming Assessment/Training   Pine Level (Grooming) independent   Toileting   Pine Level (Toileting) contact guard assist   Clinical Impression   Criteria for Skilled Therapeutic Interventions Met (OT) Yes, treatment indicated   OT Diagnosis CABG   Influenced by the following impairments fatigue, decreased ADLs/balance   OT Problem List-Impairments impacting ADL activity tolerance impaired;mobility;strength;post-surgical precautions   Assessment of Occupational Performance 3-5 Performance Deficits   Identified Performance Deficits fatigue, decreased ADLs, balance   Planned Therapy Interventions (OT) ADL  retraining;IADL retraining;bed mobility training;strengthening;transfer training;home program guidelines;progressive activity/exercise   Clinical Decision Making Complexity (OT) detailed assessment/moderate complexity   Risk & Benefits of therapy have been explained evaluation/treatment results reviewed;care plan/treatment goals reviewed;risks/benefits reviewed;current/potential barriers reviewed;participants voiced agreement with care plan;participants included;patient   OT Total Evaluation Time   OT Eval, Moderate Complexity Minutes (45859) 10   OT Goals   Therapy Frequency (OT) 2 times/day   OT Predicted Duration/Target Date for Goal Attainment 11/25/24   OT: Understanding of cardiac education to maximize quality of life, condition management, and health outcomes Patient;Demonstrate;Verbalize   OT: Perform aerobic activity with stable cardiovascular response continuous;10 minutes   OT: Functional/aerobic ambulation tolerance with stable cardiovascular response in order to return to home and community environment Greater than 300 feet;Modified independent   OT: Navigation of stairs simulating home set up with stable cardiovascular response in order to return to home and community environment Greater than 10 stairs;Modified independent   Self-Care/Home Management   Self-Care/Home Mgmt/ADL, Compensatory, Meal Prep Minutes (66773) 10   Symptoms Noted During/After Treatment (Meal Preparation/Planning Training) fatigue   Treatment Detail/Skilled Intervention bed mobility Mod of 2 due to fatigue and cues for logroll, STS CGA pt took 3 steps w/out a device, instructed pt and family in sternal precautions, stoplight tool for home use   Therapeutic Procedures/Exercise   Therapeutic Procedure: strength, endurance, ROM, flexibillity minutes (31172) 8   Treatment Detail/Skilled Intervention see LE cals   Symptoms Noted During/After Treatment fatigue   Treatment Time Includes (CR Only) See specific exercise details  intervention group(s)   Calisthenics   Type Hip Abductors;Hip Flexor: kicks;March in place;Knee Flexion;Knee Bends  (ankle pumps)   Symptoms Fatigue   Cardiovascular Response Normal   Exercise Details MIGNON bonilla completed 10 reps x6 ex w/visual cues laying supine in bed   Vital Signs Details before /71, HR 67, O2 93, after /66, HR 71, O2 94   Cardiac Education   Education Provided Precautions;Stop light tool   Education Packet Given to Patient Yes   All Patient Education Handouts Reviewed with Patient and/or Family Yes   Cardiac Rehab Phase II Plan   Phase II Order Received Yes   Phase II Appointment Status Scheduled   Date/Time 11/22 9;45   Location [facility]   OT Discharge Planning   OT Plan STS, Transfers. LE cals, precautions   OT Discharge Recommendation (DC Rec) home with assist;home with outpatient cardiac rehab   OT Rationale for DC Rec pt is safe to return home w/family support and OP Cardiac Rehab   Total Session Time   Timed Code Treatment Minutes 18   Total Session Time (sum of timed and untimed services) 28

## 2024-11-19 NOTE — PROGRESS NOTES
Repositioned to right side at 0015.  Patient became acutely hypotensive and stopped responding to voice briefly (Still blinking, breathing, and had a pulse).  Epinephrine had been turned off 10 minutes prior.  Restarted.  Patient turned supine to monitor hemodynamics closely at this time.     Will delay dangling at bedside and continue to turn side to side as able.    Margo Schaefer RN 11/19/2024 12:31 AM

## 2024-11-19 NOTE — PROGRESS NOTES
Critical Care Progress Note     11/19/2024     Name: Karyn Gamez MRN#: [id number]   Age: 69 year old YOB: 1955        Summary     Past medical history of R-frontal lobe stroke without residual deficits (2011), T2DM, HTN, HLD, active tobacco smoker, mood disorder, obesity      Presented 11/7/24 for chest pain. Found to have NSTEMI, severe multivessel CAD, & obstructive hypertrophic cardiomyopathy. Karyn underwent five vessel coronary artery bypass graft without intraoperative complications. Short lived post-operative mechanical ventilation requirement with post-extubation hypoxemic respiratory failure in conjunction with hemodynamic instability requiring the use of vasopressors-inotropes      Interval Events     Progressive hypoxemia. BPAP 60%. Net + 4.4 L since ICU admission. Low dose epi, nicardipine, & phenyl. Unremarkable BMP & ABG. CBC downtrending WBC, stable Hgb & plts. Given 1 unit PRBCs for Hgb 7.7.     Radiograph mild left basilar opacity, arguable increased interstitial markings     [place] - 1) NIPPV, aggressive pulmonary hygiene: IS, acapella, EZ PAP; 2) would favor diuresis      Assessment & Plan      Neurology, Psychiatry, Sedation, Analgesia:  Analgesia   - multimodal pain management: received methadone, APAP, prn hydromorphone & oxycodone      Cardiovascular:  Post-operative hemodynamic instability  Suspected post-cardiopulmonary bypass vasoplegia   - routine hemodynamic management per CV surgery: epinephrine & norepinephrine has been made available   - in the setting of HOCM with ABRAHAM on 11/7/24 cMRI would consider phenylephrine & higher LVEDP target however 11/7/24 TTE did not demonstrated ABRAHAM or LVOT gradient  - ensure adequate DO2 with appropriate Hgb & SaO2; avoid excessive acidemia   - would favor balanced crystalloid for volume replacement - International Collaboration for Transfusion Medicine Guidelines (2024) recommend against the use of albumin for volume replacement in  adult patients undergoing cardiovascular surgery (PMID [id number])     Severe multivessel CAD  11/18/24 s/p five vessel CABG   - Aspirin & statin      Post-operative normal sinus rhythm   S/p placement of temporary epicardial pacing wires y      Hypertrophic obstructive cardiomyopathy   11/7/24 cMRI demonstrated ABRAHAM & systolic flow acceleration in the LVOT, apical displacement of the papillary muscles, small LV cavity size, asymmetric septal hypertrophy (2 cm max thickness), LVEF 74%, no RWMA, RV normal size. 11/7/24 TTE demonstrated LVEF 60-65%, asymmetric septal hypertrophy, no ABRAHAM or LVOT gradient, normal RV size & function      Respiratory, Airway:  Post-operative invasive mechanical ventilation - resolved  Post-operative hypoxemic respiratory failure   Serial radiograph demonstrated mild left basilar opacity, arguable increased interstitial markings    - pulmonary hygiene: acapella QID, EZ PAP qid, IS Q2H while awake, out of bed to chair, PT/OT as able   - supplemental O2 to maintain spO2 >= 90-96% & PaO2 >= 60 mmHg  - target normocarbia pH 7.35 - 7.45, continuously monitored end tidal CO2  - ensure adequate pain control & pulmonary hygiene at extubation, IS & flutter-valve  - would favor empiric diuresis      Post-operative chest tubes  - monitor output       Gastrointestinal:  No active issues      Renal, Acid-base, Electrolytes, Volume :  Normal renal indices      Infectious Disease:  Lor-operative antibiotics per CV surgery      Systemic inflammatory response   Secondary to surgical trauma & CPB circuit-blood interface contact  - monitor for persistence or signs of infection & organ dysfunction      Hematology, Oncology:  Post-operative anemia  - monitor for bleeding: Hgb goal >7.5 - 8 to ensure adequate DO2     Risk for post-cardiac bypass coagulopathy   - correct hypothermia, acidosis, hypertension, hypocalcemia      Risk for post-cardiac bypass thrombocytopenia   Anticipate fall in platelet count after  cardiopulmonary bypass in most patients, typically to levels approximately half of baseline, angy between postoperative days 2-4, & persists for 4 - 6 days following surgery.      Endocrine:  MICU insulin/glucose protocol   - maintain BG of 140 to 180 mg/dL with a continuous IV insulin infusion     Checklist:  FEN: advance as tolerated at extubation   VTE ppx: subcutaneous UFH  GI ppx: pantoprazole  Bowel regimen: PEG & senna   VAP ppx: Head of bed >30 degrees, daily oral care  Lines/tube-size: brachial arterial line 11/8, CVC 11/18 (no PAC), sun 11/18   Skin: sternotomy site clean & dry   PT/OT/SLP, early mobility: cardiac rehab   Code Status: full       Physical Exam      Neurologic: Off sedation. Alert. Tremulous. Tracks & follows commands in all extremities with symmetric strength.   HEENT: Head and face normal. No nasal discharge. Oropharynx normal. Eyelids, conjunctiva, & sclera normal.   Neck: Neck appearance normal. No neck masses. Thyroid not enlarged.  Respiratory: Lungs clear bilaterally. No wheezes, crackles, or rhonchi.   Cardiovascular: Regular rate & rhythm  Normal S1 & S2. No murmurs, rubs. or gallops. No elevated JVP.    Gastrointestinal: Obese. Soft   Musculoskeletal: Skeletal configuration normal and muscle mass normal for age. Joint appearance overall normal.  Skin, Hair, & Nails: Skin color normal. Sternotomy site clean & dry  Hair & nails normal.  Extremities: Trace peripheral edema. Warm       All pertinent vital signs, ventilator settings, I&Os, laboratory, microbiology, ECGs, & imaging data has been personally reviewed. Total Critical Care time, excluding procedures, was 50 minutes     HANS Garcia MD  Critical Care Medicine

## 2024-11-19 NOTE — PROGRESS NOTES
CT EXTUBATION FAST TRACK:    [facility] - ICU     FastTrack Candidate: Yes, Extubation Goal Time ( 6 Hours ) 2005     Patient Status: Extubated       Extubated at 1935 to 4L NC.  Patient extremely anxious and now on 8L oxymask with sats 93%.  Plan to check an ABG one hour post extubation.     Margo Schaefer RN 11/18/2024 8:11 PM

## 2024-11-19 NOTE — PROGRESS NOTES
Patient removed CPAP mask due to nausea. Patient was placed on 8L oxymask. No adverse reactions or obstructions noted. RT will follow.    Dwight Alva, RT

## 2024-11-19 NOTE — PLAN OF CARE
[facility] - ICU    RN Progress Note:            Pertinent Assessments:      Please refer to flowsheet rows for full assessment     - anxious but redirectable, noted with tremors and forgetfulness  - hypotension (systolic = 80s and mean arterial pressure = 60) whenever the pacemaker rate was turned down, but does not complaint of any chest pain and discomfort.  - Had oxygen saturation between 88-89 on oxymask at 10 liters, BiPAP mask was used while asleep           Key Events - This Shift:       - The patient needed a lot of encouragement and explanation prior to provision of care to alleviate her anxiety. She was able to stand at the bedside with assist of 3. No hypotension nor chest pain.  - Lasix 40 mg IV was given twice. She had adequate urine output post lasix, see I&O flow sheet   - able to tolerate clear liquids, will advance diet later once the BiPAP mask is not in use.  - Epinephrine, insulin and Cardene drips were all stopped as ordered.  - No bleeding noted at the chest tube sites, clean, dry and intact.  - Hemoglobin recheck post blood transfusion was 8.0                Barriers to Discharge / Downgrade:     - still on [place] drip.         Point of Contact Update:   Name: Suyapa (at bedside)  Phone Number: in file  Summary of Conversation: updated regarding her plan of care, current drips.

## 2024-11-19 NOTE — TREATMENT PLAN
RCAT Treatment Plan    Patient Score: 13  Patient Acuity: 3    Clinical Indication for Therapy: CABG x5/atelectasis and prevent atelectasis    Therapy Ordered: Flutter valve QID    Assessment Summary: I assessed this patient and she has clear BS bilaterally. Patient was extubated without complications and placed on 4L NC. Flutter valve recommended for volume expansion/prevent atelectasis. RT will reassess as needed.    Dwight Alva, RT  11/18/2024

## 2024-11-19 NOTE — PROGRESS NOTES
CVTS Daily Progress Note   POD#1 s/p CABG x5 with LIMA to LAD, left radial artery to obtuse marginal, rSVG to RPDA, RPL, and diagonal, endoscopic harvest of left radial artery and right saphenous vein   Attending: Gorge  LOS: 1    SUBJECTIVE/INTERVAL EVENTS:    Patient arrived to ICU from OR yesterday afternoon. She was subsequently extubated although is requiring moderately high supplemental O2 (BiPAP versus oxymask). Remains on nicardipine (radial artery graft protection), rosy, and epi; normotensive. CI 2.8. Paced at 90bpm; underlying rhythm appears sinus jam and BP does not tolerate. Not yet up to chair due to pressors, etc. Pain well controlled. - BM / flatus. Tolerating limited diet without nausea. UOP adequate. Chest tube output appropriate. Hgb 7.7 with 1u PRBC infusing. Patient denies new chest pain, shortness of breath, abdominal pain, calf pain, nausea. Patient has no questions today although complains of being thirsty.     OBJECTIVE:  Temp:  [97.3  F (36.3  C)-98.2  F (36.8  C)] 97.3  F (36.3  C)  Pulse:  [64-92] 89  Resp:  [20-24] 20  BP: ()/(47-73) 105/65  MAP:  [44 mmHg-83 mmHg] 66 mmHg  Arterial Line BP: ()/(36-61) 90/54  FiO2 (%):  [50 %-100 %] 60 %  SpO2:  [87 %-100 %] 96 %  Vitals:    11/19/24 0600   Weight: 114.3 kg (251 lb 14.4 oz)       Clinically Significant Risk Factors         # Hypernatremia: Highest Na = 146 mmol/L in last 2 days, will monitor as appropriate  # Hyperchloremia: Highest Cl = 115 mmol/L in last 2 days, will monitor as appropriate      # Hypocalcemia: Lowest Ca = 7.9 mg/dL in last 2 days, will monitor and replace as appropriate     # Hypoalbuminemia: Lowest albumin = 3.4 g/dL at 11/19/2024 12:00 AM, will monitor as appropriate  # Coagulation Defect: INR = 1.40 (Ref range: 0.85 - 1.15) and/or PTT = 61 Seconds (Ref range: 22 - 38 Seconds), will monitor for bleeding    # Hypertension: Noted on problem list     # Acute Hypoxic Respiratory Failure: Documented O2  "saturation < 90%. Continue supplemental oxygen as needed  # Acute Hypercapnic Respiratory Failure: based on arterial blood gas results.  Continue supplemental oxygen and ventilatory support as indicated.  # Acute Hypercapnic Respiratory Failure: based on venous blood gas results.  Continue supplemental oxygen and ventilatory support as indicated.         # Obesity: Estimated body mass index is 39.54 kg/m  as calculated from the following:    Height as of 11/7/24: 1.7 m (5' 6.93\").    Weight as of this encounter: 114.3 kg (251 lb 14.4 oz)., PRESENT ON ADMISSION     # Financial/Environmental Concerns:     # History of CABG: noted on surgical history              Current Medications:    Scheduled Meds:  Current Facility-Administered Medications   Medication Dose Route Frequency Provider Last Rate Last Admin    acetaminophen (TYLENOL) tablet 975 mg  975 mg Oral Q8H Jasmin Lemons PA-C   975 mg at 11/19/24 0515    amLODIPine (NORVASC) tablet 2.5 mg  2.5 mg Oral Daily Ly Atwood PA-C   2.5 mg at 11/19/24 0822    aspirin (ASA) chewable tablet 162 mg  162 mg Oral or NG Tube Daily Jasmin Lemons PA-C   162 mg at 11/19/24 0820    Or    aspirin (ASA) Suppository 300 mg  300 mg Rectal Daily Jasmin Lemons PA-C        atorvastatin (LIPITOR) tablet 80 mg  80 mg Oral At Bedtime Jasmin Lemons PA-C   80 mg at 11/18/24 2112    buPROPion (WELLBUTRIN XL) 24 hr tablet 150 mg  150 mg Oral QAM Ly Atwood PA-C   150 mg at 11/19/24 0821    ceFAZolin (ANCEF) 2 g in 100 mL D5W intermittent infusion  2 g Intravenous Q8H Jasmin Lemons PA-C 200 mL/hr at 11/19/24 0231 2 g at 11/19/24 0231    heparin ANTICOAGULANT injection 5,000 Units  5,000 Units Subcutaneous Q8H Jasmin Lemons PA-C        influenza trivalent vaccine high-dose for ages 65 years and greater (FLUZONE-HD) injection 0.5 mL  0.5 mL Intramuscular Prior to discharge Mary Pennington MD        Lidocaine (LIDOCARE) 4 % " Patch 1-2 patch  1-2 patch Transdermal Q24H Jasmin Lemons PA-C   2 patch at 11/18/24 1539    [Held by provider] metFORMIN (GLUCOPHAGE XR) 24 hr tablet 500 mg  500 mg Oral Daily with supper Jasmin Lemons PA-C        pantoprazole (PROTONIX) 2 mg/mL suspension 40 mg  40 mg Per Feeding Tube [facility] Mike Garcai MD        Or    pantoprazole (PROTONIX) IV push injection 40 mg  40 [facility]   40 mg at 11/19/24 0651    polyethylene glycol (MIRALAX) Packet 17 g  17 g Oral Daily Jasmin Lemons PA-C        senna-docusate (SENOKOT-S/PERICOLACE) 8.6-50 MG per tablet 1 tablet  1 tablet Oral BID Jasmin Lemons PA-C   1 tablet at 11/18/24 2112     Continuous Infusions:  Current Facility-Administered Medications   Medication Dose Route Frequency Provider Last Rate Last Admin    EPINEPHrine (ADRENALIN) 5 mg in sodium chloride 0.9 % 250 mL infusion CENTRAL  0.01-0.1 mcg/kg/min Intravenous Continuous PRN Jasmin Lemons PA-C 6.5 mL/hr at 11/19/24 0800 0.02 mcg/kg/min at 11/19/24 0800    insulin regular (MYXREDLIN) 1 unit/mL infusion  0-24 Units/hr Intravenous Continuous Jasmin Lemons PA-C 1.5 mL/hr at 11/19/24 0949 1.5 Units/hr at 11/19/24 0949    phenylephrine (LISA-SYNEPHRINE) 200 mg in sodium chloride 0.9 % 250 mL MAX CONC infusion  0.1-4 mcg/kg/min Intravenous Continuous PRN Mary Pennington MD         PRN Meds:.  Current Facility-Administered Medications   Medication Dose Route Frequency Provider Last Rate Last Admin    [START ON 11/21/2024] acetaminophen (TYLENOL) tablet 650 mg  650 mg Oral Q4H PRN Jasmin Lemons PA-C        [START ON 11/21/2024] bisacodyl (DULCOLAX) suppository 10 mg  10 mg Rectal Daily PRN Jasmin Lemons PA-C        calcium gluconate 1 g in 50 mL in sodium chloride intermittent infusion  1 g Intravenous Once PRN Jasmin Lemons PA-C   1 g at 11/18/24 2106    calcium gluconate 2 g in  mL intermittent infusion  2 g  Intravenous Once PRN Jasmin Lemons PA-C        calcium gluconate 3 g in sodium chloride 0.9 % 100 mL intermittent infusion  3 g Intravenous Once PRN Jasmin Lemons PA-C        glucose gel 15-30 g  15-30 g Oral Q15 Min PRN Jasmin Lemons PA-C        Or    dextrose 50 % injection 25-50 mL  25-50 mL Intravenous Q15 Min PRN Jasmin Lemons PA-C        Or    glucagon injection 1 mg  1 mg Subcutaneous Q15 Min PRN Jasmin Lemons PA-C        EPINEPHrine (ADRENALIN) 5 mg in sodium chloride 0.9 % 250 mL infusion CENTRAL  0.01-0.1 mcg/kg/min Intravenous Continuous PRN Jasmin Lemons PA-C 6.5 mL/hr at 11/19/24 0800 0.02 mcg/kg/min at 11/19/24 0800    hydrALAZINE (APRESOLINE) injection 10 mg  10 mg Intravenous Q30 Min PRN Jasmin Lemons PA-C        HYDROmorphone (DILAUDID) injection 0.2 mg  0.2 mg Intravenous Q2H PRN Jasmin Lemons PA-C   0.2 mg at 11/19/24 0119    Or    HYDROmorphone (DILAUDID) injection 0.4 mg  0.4 mg Intravenous Q2H PRN Jasmin Lemons PA-C        lactated ringers BOLUS 250 mL  250 mL Intravenous Q15 Min PRN Jasmin Lemons PA-C 500 mL/hr at 11/18/24 1950 250 mL at 11/18/24 1950    [START ON 11/20/2024] magnesium hydroxide (MILK OF MAGNESIA) suspension 30 mL  30 mL Oral Daily PRN Jasmin Lemons PA-C        naloxone (NARCAN) injection 0.2 mg  0.2 mg Intravenous Q2 Min PRN Jasmin Lemons PA-C        Or    naloxone (NARCAN) injection 0.4 mg  0.4 mg Intravenous Q2 Min PRN Jasmin Lemons PA-C        Or    naloxone (NARCAN) injection 0.2 mg  0.2 mg Intramuscular Q2 Min PRN Jasmin Lemons PA-C        Or    naloxone (NARCAN) injection 0.4 mg  0.4 mg Intramuscular Q2 Min PRN Jasmin Lemons PA-C        ondansetron (ZOFRAN ODT) ODT tab 4 mg  4 mg Oral Q6H PRN Jasmin Lemons PA-C        Or    ondansetron (ZOFRAN) injection 4 mg  4 mg Intravenous Q6H PRN Jasmin Lemons PA-C   4 mg at 11/18/24 6648     oxyCODONE (ROXICODONE) tablet 5 mg  5 mg Oral Q4H PRN Jasmin Lemons PA-C        Or    oxyCODONE (ROXICODONE) tablet 10 mg  10 mg Oral Q4H PRN Jasmin Lemons PA-C   10 mg at 11/19/24 1131    phenylephrine (LISA-SYNEPHRINE) 200 mg in sodium chloride 0.9 % 250 mL MAX CONC infusion  0.1-4 mcg/kg/min Intravenous Continuous PRN Mary Pennington MD        prochlorperazine (COMPAZINE) injection 5 mg  5 mg Intravenous Q6H PRN Jasmin Lemons PA-C        Or    prochlorperazine (COMPAZINE) tablet 5 mg  5 mg Oral Q6H PRN Jasmin Lemons PA-C           Cardiographics:    Telemetry monitoring demonstrates paced rhythm at 90bpm per my personal review.    Imaging:  Results for orders placed or performed during the hospital encounter of 11/18/24   XR Chest Port 1 View    Impression    IMPRESSION: Endotracheal tube terminates approximately 5.0 cm above the osmin. Right neck central venous catheter sheath with tip in the mid SVC. Bilateral chest tubes, ascending mediastinal drain, and median sternotomy wires also noted.    The lungs are slightly hypoinflated, otherwise negative. No definite pleural effusion or pneumothorax.    Normal size cardiomediastinal silhouette.   XR Chest Port 1 View    Impression    IMPRESSION: Patient has been extubated. Sternotomy with mediastinal clips and markers. Bilateral chest tubes. No pneumothorax seen. Cardiomegaly. Mild pulmonary vascular prominence. Linear atelectasis left upper lung. No focal airspace consolidations.       Labs, personally reviewed.  Hemoglobin   Date Value Ref Range Status   11/19/2024 7.7 (L) 11.7 - 15.7 g/dL Final   11/18/2024 8.3 (L) 11.7 - 15.7 g/dL Final   11/18/2024 9.0 (L) 11.7 - 15.7 g/dL Final   06/08/2019 12.9 11.7 - 15.7 g/dL Final   06/07/2019 13.6 11.7 - 15.7 g/dL Final   10/14/2016 13.2 11.7 - 15.7 g/dL Final     Hemoglobin POCT   Date Value Ref Range Status   11/18/2024 8.2 (L) 11.7 - 15.7 g/dL Final   11/18/2024 8.0 (L) 11.7 - 15.7 g/dL  Final   11/18/2024 7.7 (L) 11.7 - 15.7 g/dL Final     WBC   Date Value Ref Range Status   06/08/2019 8.8 4.0 - 11.0 10e9/L Final   06/07/2019 12.9 (H) 4.0 - 11.0 10e9/L Final   10/14/2016 9.9 4.0 - 11.0 10e9/L Final     WBC Count   Date Value Ref Range Status   11/19/2024 18.8 (H) 4.0 - 11.0 10e3/uL Final   11/18/2024 22.6 (H) 4.0 - 11.0 10e3/uL Final   11/18/2024 14.5 (H) 4.0 - 11.0 10e3/uL Final     Platelet Count   Date Value Ref Range Status   11/19/2024 233 150 - 450 10e3/uL Final   11/18/2024 245 150 - 450 10e3/uL Final   11/18/2024 210 150 - 450 10e3/uL Final   06/08/2019 291 150 - 450 10e9/L Final   06/07/2019 352 150 - 450 10e9/L Final   10/14/2016 297 150 - 450 10e9/L Final     Creatinine   Date Value Ref Range Status   11/19/2024 0.95 0.51 - 0.95 mg/dL Final   11/19/2024 0.86 0.51 - 0.95 mg/dL Final   11/18/2024 0.81 0.51 - 0.95 mg/dL Final   01/15/2021 0.81 0.52 - 1.04 mg/dL Final   06/08/2019 0.67 0.52 - 1.04 mg/dL Final   06/07/2019 0.73 0.52 - 1.04 mg/dL Final     Potassium   Date Value Ref Range Status   11/19/2024 5.2 3.4 - 5.3 mmol/L Final   11/19/2024 5.2 3.4 - 5.3 mmol/L Final   11/18/2024 3.7 3.4 - 5.3 mmol/L Final   11/18/2024 3.7 3.4 - 5.3 mmol/L Final   01/31/2023 4.2 3.4 - 5.3 mmol/L Final   08/26/2021 3.9 3.4 - 5.3 mmol/L Final   01/15/2021 4.1 3.4 - 5.3 mmol/L Final   06/08/2019 4.0 3.4 - 5.3 mmol/L Final   06/07/2019 3.2 (L) 3.4 - 5.3 mmol/L Final     Potassium POCT   Date Value Ref Range Status   11/18/2024 3.4 3.4 - 5.3 mmol/L Final   11/18/2024 3.5 3.4 - 5.3 mmol/L Final   11/18/2024 4.1 3.4 - 5.3 mmol/L Final     Magnesium   Date Value Ref Range Status   11/19/2024 2.3 1.7 - 2.3 mg/dL Final   11/18/2024 3.0 (H) 1.7 - 2.3 mg/dL Final   11/18/2024 2.1 1.7 - 2.3 mg/dL Final   08/12/2011 2.2 1.6 - 2.3 mg/dL Final   05/06/2010 2.3 1.6 - 2.3 mg/dL Final   05/05/2010 2.3 1.6 - 2.3 mg/dL Final          I/O:  I/O last 3 completed shifts:  In: 7183.45 [P.O.:400; I.V.:6963.45; Other:260; IV  Piggyback:2100]  Out: 2797 [Urine:2097; Chest Tube:700]       Physical Exam:    General: Patient seen in bed. NAD. Conversant. Pleasant  CV: Paced rhythm on monitor. 2+ peripheral pulses in all extremities. Mild edema. Incision C/D/I.  Pulm: Non-labored effort on BiPAP. Chest tubes in place, serosanguinous output, no air leak.  Abd: Soft, NT, ND  : Jasmine with bailey urine  Ext: Mild pedal edema, SCDs in place, warm, distal pulses intact  Neuro: CNs grossly intact      ASSESSMENT/PLAN:    Karyn Gamez is a 69 year old female with a history of CAD who is s/p CABGx5.    Active Problems:    CAD (coronary artery disease)        NEURO:   - Scheduled Tylenol/lidocaine patches and PRN Tylenol/oxycodone/dilaudid for pain  - PTA Wellbutrin and Zoloft    CV:   - Pre-op EF 60-65%  - Weaning pressors as hemodynamics allow; currently on epi and rosy.  - - Nicardipine for radial artery graft protection--can turn off 2 hours after CCB admin  - Amlodipine 2.5mg daily--do not hold (radial artery graft protection)  - Beta blocker pending weaning from pressors  - [id number] daily (decreased dose due to Plavix)  - Plavix daily for one year per surgeon request  - Atorvastatin 80mg daily  - Chest tubes to remain today     PULM:   - Extubated on POD0  - Maintaining oxygen saturations on BiPAP this AM  - Encourage pulmonary toilet    FEN/GI:  - Continue electrolyte replacement protocol  - Cardiac; ADAT  - Bowel regimen    RENAL:  - Adequate UOP/hr. Continue to monitor closely.  - Cr 0.95  - Jasmine to remain in for close monitoring of I/O and during period of diuresis/relative immobility  - Diuresis PRN and pending weaning from pressors    HEME:  - Acute blood loss anemia post-op.   - No bleeding concerns. Hep SQ, ASA  - 1u PRBC this AM for Hgb 7.7 with recheck later and tomorrow    ID:  - Lor op ppx complete, afebrile. No concerns for infection    ENDO:   - Continue insulin gtt  - PTA Metformin held; restart when appropriate    PPx:   -  DVT: SCDs, SQ heparin TID, ambulation   - GI: Protonix 40mg IV/PO daily    DISPO:   - Continue critical care in ICU due to pressor need and tenuous respiratory status  - Medically Ready for Discharge: Anticipated in 5+ Days         Patient discussed with Dr. Pennington.        _______  Ly Atwood PA-C  Cardiothoracic Surgery  [phone]

## 2024-11-20 ENCOUNTER — APPOINTMENT (OUTPATIENT)
Dept: RADIOLOGY | Facility: HOSPITAL | Age: 69
End: 2024-11-20
Attending: STUDENT IN AN ORGANIZED HEALTH CARE EDUCATION/TRAINING PROGRAM
Payer: COMMERCIAL

## 2024-11-20 ENCOUNTER — APPOINTMENT (OUTPATIENT)
Dept: ULTRASOUND IMAGING | Facility: HOSPITAL | Age: 69
End: 2024-11-20
Attending: PHYSICIAN ASSISTANT
Payer: COMMERCIAL

## 2024-11-20 ENCOUNTER — APPOINTMENT (OUTPATIENT)
Dept: CARDIOLOGY | Facility: HOSPITAL | Age: 69
DRG: 235 | End: 2024-11-20
Attending: STUDENT IN AN ORGANIZED HEALTH CARE EDUCATION/TRAINING PROGRAM
Payer: COMMERCIAL

## 2024-11-20 LAB
ALBUMIN SERPL BCG-MCNC: 3.1 G/DL (ref 3.5–5.2)
ALBUMIN SERPL BCG-MCNC: 3.2 G/DL (ref 3.5–5.2)
ALBUMIN SERPL BCG-MCNC: 3.2 G/DL (ref 3.5–5.2)
ALBUMIN SERPL BCG-MCNC: 3.3 G/DL (ref 3.5–5.2)
ALBUMIN UR-MCNC: 10 MG/DL
ALBUMIN UR-MCNC: 10 MG/DL
ALP SERPL-CCNC: 53 U/L (ref 40–150)
ALP SERPL-CCNC: 54 U/L (ref 40–150)
ALP SERPL-CCNC: 61 U/L (ref 40–150)
ALP SERPL-CCNC: 64 U/L (ref 40–150)
ALT SERPL W P-5'-P-CCNC: 813 U/L (ref 0–50)
ALT SERPL W P-5'-P-CCNC: 820 U/L (ref 0–50)
ALT SERPL W P-5'-P-CCNC: 875 U/L (ref 0–50)
ALT SERPL W P-5'-P-CCNC: 933 U/L (ref 0–50)
ANION GAP SERPL CALCULATED.3IONS-SCNC: 10 MMOL/L (ref 7–15)
ANION GAP SERPL CALCULATED.3IONS-SCNC: 10 MMOL/L (ref 7–15)
ANION GAP SERPL CALCULATED.3IONS-SCNC: 8 MMOL/L (ref 7–15)
ANION GAP SERPL CALCULATED.3IONS-SCNC: 8 MMOL/L (ref 7–15)
ANION GAP SERPL CALCULATED.3IONS-SCNC: 9 MMOL/L (ref 7–15)
APPEARANCE UR: ABNORMAL
APPEARANCE UR: ABNORMAL
AST SERPL W P-5'-P-CCNC: 1061 U/L (ref 0–45)
AST SERPL W P-5'-P-CCNC: 1121 U/L (ref 0–45)
AST SERPL W P-5'-P-CCNC: 1131 U/L (ref 0–45)
AST SERPL W P-5'-P-CCNC: 1150 U/L (ref 0–45)
BACTERIA #/AREA URNS HPF: ABNORMAL /HPF
BACTERIA #/AREA URNS HPF: ABNORMAL /HPF
BASE EXCESS BLDA CALC-SCNC: -3.8 MMOL/L (ref -3–3)
BASE EXCESS BLDA CALC-SCNC: -3.9 MMOL/L (ref -3–3)
BASE EXCESS BLDA CALC-SCNC: -4 MMOL/L (ref -3–3)
BASE EXCESS BLDA CALC-SCNC: -5 MMOL/L (ref -3–3)
BASE EXCESS BLDV CALC-SCNC: -2.8 MMOL/L (ref -3–3)
BASE EXCESS BLDV CALC-SCNC: -3 MMOL/L (ref -3–3)
BASE EXCESS BLDV CALC-SCNC: -3.2 MMOL/L (ref -3–3)
BASE EXCESS BLDV CALC-SCNC: -3.9 MMOL/L (ref -3–3)
BASE EXCESS BLDV CALC-SCNC: -4.3 MMOL/L (ref -3–3)
BILIRUB DIRECT SERPL-MCNC: 0.23 MG/DL (ref 0–0.3)
BILIRUB DIRECT SERPL-MCNC: 0.28 MG/DL (ref 0–0.3)
BILIRUB DIRECT SERPL-MCNC: 0.34 MG/DL (ref 0–0.3)
BILIRUB DIRECT SERPL-MCNC: 0.36 MG/DL (ref 0–0.3)
BILIRUB SERPL-MCNC: 0.4 MG/DL
BILIRUB SERPL-MCNC: 0.6 MG/DL
BILIRUB UR QL STRIP: NEGATIVE
BILIRUB UR QL STRIP: NEGATIVE
BLD PROD TYP BPU: NORMAL
BLOOD COMPONENT TYPE: NORMAL
BUN SERPL-MCNC: 33.3 MG/DL (ref 8–23)
BUN SERPL-MCNC: 37.9 MG/DL (ref 8–23)
BUN SERPL-MCNC: 40.9 MG/DL (ref 8–23)
BUN SERPL-MCNC: 42.9 MG/DL (ref 8–23)
BUN SERPL-MCNC: 43.1 MG/DL (ref 8–23)
CA-I BLD-MCNC: 4.4 MG/DL (ref 4.4–5.2)
CA-I BLD-MCNC: 4.7 MG/DL (ref 4.4–5.2)
CALCIUM SERPL-MCNC: 7.4 MG/DL (ref 8.8–10.4)
CALCIUM SERPL-MCNC: 7.7 MG/DL (ref 8.8–10.4)
CALCIUM SERPL-MCNC: 7.8 MG/DL (ref 8.8–10.4)
CALCIUM SERPL-MCNC: 8 MG/DL (ref 8.8–10.4)
CALCIUM SERPL-MCNC: 8.1 MG/DL (ref 8.8–10.4)
CHLORIDE SERPL-SCNC: 107 MMOL/L (ref 98–107)
CODING SYSTEM: NORMAL
COHGB MFR BLD: 92.5 % (ref 95–96)
COHGB MFR BLD: 92.8 % (ref 95–96)
COHGB MFR BLD: 97.9 % (ref 95–96)
COHGB MFR BLD: 99.9 % (ref 95–96)
COLOR UR AUTO: YELLOW
COLOR UR AUTO: YELLOW
CREAT SERPL-MCNC: 1.64 MG/DL (ref 0.51–0.95)
CREAT SERPL-MCNC: 1.91 MG/DL (ref 0.51–0.95)
CREAT SERPL-MCNC: 1.94 MG/DL (ref 0.51–0.95)
CREAT SERPL-MCNC: 1.99 MG/DL (ref 0.51–0.95)
CREAT SERPL-MCNC: 2.03 MG/DL (ref 0.51–0.95)
CREAT UR-MCNC: 173.1 MG/DL
CROSSMATCH: NORMAL
CRP SERPL-MCNC: 167.8 MG/L
EGFRCR SERPLBLD CKD-EPI 2021: 26 ML/MIN/1.73M2
EGFRCR SERPLBLD CKD-EPI 2021: 27 ML/MIN/1.73M2
EGFRCR SERPLBLD CKD-EPI 2021: 28 ML/MIN/1.73M2
EGFRCR SERPLBLD CKD-EPI 2021: 28 ML/MIN/1.73M2
EGFRCR SERPLBLD CKD-EPI 2021: 34 ML/MIN/1.73M2
ERYTHROCYTE [DISTWIDTH] IN BLOOD BY AUTOMATED COUNT: 14.4 % (ref 10–15)
GLUCOSE BLDC GLUCOMTR-MCNC: 106 MG/DL (ref 70–99)
GLUCOSE BLDC GLUCOMTR-MCNC: 108 MG/DL (ref 70–99)
GLUCOSE BLDC GLUCOMTR-MCNC: 117 MG/DL (ref 70–99)
GLUCOSE BLDC GLUCOMTR-MCNC: 99 MG/DL (ref 70–99)
GLUCOSE SERPL-MCNC: 106 MG/DL (ref 70–99)
GLUCOSE SERPL-MCNC: 111 MG/DL (ref 70–99)
GLUCOSE SERPL-MCNC: 117 MG/DL (ref 70–99)
GLUCOSE SERPL-MCNC: 118 MG/DL (ref 70–99)
GLUCOSE SERPL-MCNC: 120 MG/DL (ref 70–99)
GLUCOSE UR STRIP-MCNC: NEGATIVE MG/DL
GLUCOSE UR STRIP-MCNC: NEGATIVE MG/DL
HCO3 BLD-SCNC: 22 MMOL/L (ref 21–28)
HCO3 BLDV-SCNC: 23 MMOL/L (ref 21–28)
HCO3 BLDV-SCNC: 23 MMOL/L (ref 21–28)
HCO3 BLDV-SCNC: 24 MMOL/L (ref 21–28)
HCO3 BLDV-SCNC: 25 MMOL/L (ref 21–28)
HCO3 BLDV-SCNC: 25 MMOL/L (ref 21–28)
HCO3 SERPL-SCNC: 21 MMOL/L (ref 22–29)
HCO3 SERPL-SCNC: 22 MMOL/L (ref 22–29)
HCO3 SERPL-SCNC: 23 MMOL/L (ref 22–29)
HCT VFR BLD AUTO: 24 % (ref 35–47)
HGB BLD-MCNC: 7.6 G/DL (ref 11.7–15.7)
HGB BLD-MCNC: 7.7 G/DL (ref 11.7–15.7)
HGB BLD-MCNC: 8.4 G/DL (ref 11.7–15.7)
HGB UR QL STRIP: ABNORMAL
HGB UR QL STRIP: ABNORMAL
HYALINE CASTS: 80 /LPF
HYALINE CASTS: 90 /LPF
INR PPP: 1.64 (ref 0.85–1.15)
ISSUE DATE AND TIME: NORMAL
KETONES UR STRIP-MCNC: NEGATIVE MG/DL
KETONES UR STRIP-MCNC: NEGATIVE MG/DL
LACTATE SERPL-SCNC: 1.1 MMOL/L (ref 0.7–2)
LACTATE SERPL-SCNC: 1.4 MMOL/L (ref 0.7–2)
LACTATE SERPL-SCNC: 1.5 MMOL/L (ref 0.7–2)
LEUKOCYTE ESTERASE UR QL STRIP: ABNORMAL
LEUKOCYTE ESTERASE UR QL STRIP: ABNORMAL
LVEF ECHO: NORMAL
MAGNESIUM SERPL-MCNC: 2.2 MG/DL (ref 1.7–2.3)
MCH RBC QN AUTO: 29.7 PG (ref 26.5–33)
MCHC RBC AUTO-ENTMCNC: 32.1 G/DL (ref 31.5–36.5)
MCV RBC AUTO: 93 FL (ref 78–100)
MUCOUS THREADS #/AREA URNS LPF: PRESENT /LPF
MUCOUS THREADS #/AREA URNS LPF: PRESENT /LPF
NITRATE UR QL: NEGATIVE
NITRATE UR QL: NEGATIVE
O2/TOTAL GAS SETTING VFR VENT: 100 %
O2/TOTAL GAS SETTING VFR VENT: 40 %
O2/TOTAL GAS SETTING VFR VENT: 55 %
O2/TOTAL GAS SETTING VFR VENT: 60 %
OXYHGB MFR BLDV: 26 % (ref 70–75)
OXYHGB MFR BLDV: 31 % (ref 70–75)
OXYHGB MFR BLDV: 35 % (ref 70–75)
OXYHGB MFR BLDV: 36 % (ref 70–75)
OXYHGB MFR BLDV: 51 % (ref 70–75)
PCO2 BLD: 42 MM HG (ref 35–45)
PCO2 BLD: 45 MM HG (ref 35–45)
PCO2 BLD: 47 MM HG (ref 35–45)
PCO2 BLD: 47 MM HG (ref 35–45)
PCO2 BLDV: 52 MM HG (ref 40–50)
PCO2 BLDV: 53 MM HG (ref 40–50)
PCO2 BLDV: 56 MM HG (ref 40–50)
PCO2 BLDV: 60 MM HG (ref 40–50)
PCO2 BLDV: 60 MM HG (ref 40–50)
PEEP: 6 CM H2O
PH BLD: 7.28 [PH] (ref 7.35–7.45)
PH BLD: 7.29 [PH] (ref 7.35–7.45)
PH BLD: 7.3 [PH] (ref 7.35–7.45)
PH BLD: 7.32 [PH] (ref 7.35–7.45)
PH BLDV: 7.22 [PH] (ref 7.32–7.43)
PH BLDV: 7.23 [PH] (ref 7.32–7.43)
PH BLDV: 7.25 [PH] (ref 7.32–7.43)
PH UR STRIP: 5 [PH] (ref 5–7)
PH UR STRIP: 5 [PH] (ref 5–7)
PHOSPHATE SERPL-MCNC: 4.1 MG/DL (ref 2.5–4.5)
PLATELET # BLD AUTO: 177 10E3/UL (ref 150–450)
PO2 BLD: 158 MM HG (ref 80–105)
PO2 BLD: 68 MM HG (ref 80–105)
PO2 BLD: 71 MM HG (ref 80–105)
PO2 BLD: 98 MM HG (ref 80–105)
PO2 BLDV: 23 MM HG (ref 25–47)
PO2 BLDV: 26 MM HG (ref 25–47)
PO2 BLDV: 27 MM HG (ref 25–47)
PO2 BLDV: 27 MM HG (ref 25–47)
PO2 BLDV: 33 MM HG (ref 25–47)
POTASSIUM SERPL-SCNC: 4.8 MMOL/L (ref 3.4–5.3)
POTASSIUM SERPL-SCNC: 5 MMOL/L (ref 3.4–5.3)
POTASSIUM SERPL-SCNC: 5.1 MMOL/L (ref 3.4–5.3)
POTASSIUM SERPL-SCNC: 5.7 MMOL/L (ref 3.4–5.3)
POTASSIUM SERPL-SCNC: 5.7 MMOL/L (ref 3.4–5.3)
PROCALCITONIN SERPL IA-MCNC: 1.47 NG/ML
PROT SERPL-MCNC: 5 G/DL (ref 6.4–8.3)
PROT SERPL-MCNC: 5.2 G/DL (ref 6.4–8.3)
PROT SERPL-MCNC: 5.3 G/DL (ref 6.4–8.3)
PROT SERPL-MCNC: 5.3 G/DL (ref 6.4–8.3)
RBC # BLD AUTO: 2.59 10E6/UL (ref 3.8–5.2)
RBC URINE: 11 /HPF
RBC URINE: 15 /HPF
SAO2 % BLDA: 91 % (ref 92–100)
SAO2 % BLDA: 91 % (ref 92–100)
SAO2 % BLDA: 96 % (ref 92–100)
SAO2 % BLDA: 99 % (ref 92–100)
SAO2 % BLDV: 26.4 % (ref 70–75)
SAO2 % BLDV: 31.8 % (ref 70–75)
SAO2 % BLDV: 36 % (ref 70–75)
SAO2 % BLDV: 37 % (ref 70–75)
SAO2 % BLDV: 51.6 % (ref 70–75)
SODIUM SERPL-SCNC: 137 MMOL/L (ref 135–145)
SODIUM SERPL-SCNC: 138 MMOL/L (ref 135–145)
SODIUM SERPL-SCNC: 139 MMOL/L (ref 135–145)
SP GR UR STRIP: 1.01 (ref 1–1.03)
SP GR UR STRIP: 1.02 (ref 1–1.03)
SQUAMOUS EPITHELIAL: 24 /HPF
SQUAMOUS EPITHELIAL: 3 /HPF
TRANSITIONAL EPI: 1 /HPF
TRANSITIONAL EPI: 3 /HPF
TROPONIN T SERPL HS-MCNC: 1593 NG/L
TROPONIN T SERPL HS-MCNC: 1671 NG/L
TROPONIN T SERPL HS-MCNC: 1685 NG/L
TROPONIN T SERPL HS-MCNC: 1702 NG/L
UNIT ABO/RH: NORMAL
UNIT NUMBER: NORMAL
UNIT STATUS: NORMAL
UNIT TYPE ISBT: 6200
UROBILINOGEN UR STRIP-MCNC: <2 MG/DL
UROBILINOGEN UR STRIP-MCNC: <2 MG/DL
UUN UR-MCNC: 174 MG/DL (ref 801–1666)
WBC # BLD AUTO: 19.1 10E3/UL (ref 4–11)
WBC URINE: 117 /HPF
WBC URINE: 98 /HPF

## 2024-11-20 PROCEDURE — 999N000157 HC STATISTIC RCP TIME EA 10 MIN

## 2024-11-20 PROCEDURE — 85018 HEMOGLOBIN: CPT | Performed by: PHYSICIAN ASSISTANT

## 2024-11-20 PROCEDURE — 250N000011 HC RX IP 250 OP 636: Mod: JZ | Performed by: STUDENT IN AN ORGANIZED HEALTH CARE EDUCATION/TRAINING PROGRAM

## 2024-11-20 PROCEDURE — 86140 C-REACTIVE PROTEIN: CPT | Performed by: STUDENT IN AN ORGANIZED HEALTH CARE EDUCATION/TRAINING PROGRAM

## 2024-11-20 PROCEDURE — 82805 BLOOD GASES W/O2 SATURATION: CPT

## 2024-11-20 PROCEDURE — P9016 RBC LEUKOCYTES REDUCED: HCPCS | Performed by: STUDENT IN AN ORGANIZED HEALTH CARE EDUCATION/TRAINING PROGRAM

## 2024-11-20 PROCEDURE — 250N000011 HC RX IP 250 OP 636: Performed by: STUDENT IN AN ORGANIZED HEALTH CARE EDUCATION/TRAINING PROGRAM

## 2024-11-20 PROCEDURE — 250N000013 HC RX MED GY IP 250 OP 250 PS 637: Performed by: INTERNAL MEDICINE

## 2024-11-20 PROCEDURE — 82330 ASSAY OF CALCIUM: CPT

## 2024-11-20 PROCEDURE — 82805 BLOOD GASES W/O2 SATURATION: CPT | Performed by: STUDENT IN AN ORGANIZED HEALTH CARE EDUCATION/TRAINING PROGRAM

## 2024-11-20 PROCEDURE — 81001 URINALYSIS AUTO W/SCOPE: CPT | Performed by: PHYSICIAN ASSISTANT

## 2024-11-20 PROCEDURE — 71045 X-RAY EXAM CHEST 1 VIEW: CPT

## 2024-11-20 PROCEDURE — 82565 ASSAY OF CREATININE: CPT | Performed by: PHYSICIAN ASSISTANT

## 2024-11-20 PROCEDURE — 250N000011 HC RX IP 250 OP 636: Performed by: PHYSICIAN ASSISTANT

## 2024-11-20 PROCEDURE — 300N000004 RESEARCH KIT COLLECTION

## 2024-11-20 PROCEDURE — 82330 ASSAY OF CALCIUM: CPT | Performed by: STUDENT IN AN ORGANIZED HEALTH CARE EDUCATION/TRAINING PROGRAM

## 2024-11-20 PROCEDURE — 85610 PROTHROMBIN TIME: CPT | Performed by: PHYSICIAN ASSISTANT

## 2024-11-20 PROCEDURE — 93010 ELECTROCARDIOGRAM REPORT: CPT | Performed by: STUDENT IN AN ORGANIZED HEALTH CARE EDUCATION/TRAINING PROGRAM

## 2024-11-20 PROCEDURE — 93308 TTE F-UP OR LMTD: CPT | Mod: 26 | Performed by: GENERAL ACUTE CARE HOSPITAL

## 2024-11-20 PROCEDURE — 82805 BLOOD GASES W/O2 SATURATION: CPT | Performed by: PHYSICIAN ASSISTANT

## 2024-11-20 PROCEDURE — 94660 CPAP INITIATION&MGMT: CPT

## 2024-11-20 PROCEDURE — 87040 BLOOD CULTURE FOR BACTERIA: CPT | Performed by: STUDENT IN AN ORGANIZED HEALTH CARE EDUCATION/TRAINING PROGRAM

## 2024-11-20 PROCEDURE — 82533 TOTAL CORTISOL: CPT | Performed by: STUDENT IN AN ORGANIZED HEALTH CARE EDUCATION/TRAINING PROGRAM

## 2024-11-20 PROCEDURE — 80048 BASIC METABOLIC PNL TOTAL CA: CPT | Performed by: PHYSICIAN ASSISTANT

## 2024-11-20 PROCEDURE — 84484 ASSAY OF TROPONIN QUANT: CPT | Performed by: STUDENT IN AN ORGANIZED HEALTH CARE EDUCATION/TRAINING PROGRAM

## 2024-11-20 PROCEDURE — 85018 HEMOGLOBIN: CPT | Performed by: STUDENT IN AN ORGANIZED HEALTH CARE EDUCATION/TRAINING PROGRAM

## 2024-11-20 PROCEDURE — 82248 BILIRUBIN DIRECT: CPT | Performed by: PHYSICIAN ASSISTANT

## 2024-11-20 PROCEDURE — 85027 COMPLETE CBC AUTOMATED: CPT | Performed by: PHYSICIAN ASSISTANT

## 2024-11-20 PROCEDURE — 999N000065 XR CHEST PORT 1 VIEW

## 2024-11-20 PROCEDURE — 80069 RENAL FUNCTION PANEL: CPT | Performed by: STUDENT IN AN ORGANIZED HEALTH CARE EDUCATION/TRAINING PROGRAM

## 2024-11-20 PROCEDURE — 81003 URINALYSIS AUTO W/O SCOPE: CPT | Performed by: STUDENT IN AN ORGANIZED HEALTH CARE EDUCATION/TRAINING PROGRAM

## 2024-11-20 PROCEDURE — 03HB33Z INSERTION OF INFUSION DEVICE INTO RIGHT RADIAL ARTERY, PERCUTANEOUS APPROACH: ICD-10-PCS | Performed by: STUDENT IN AN ORGANIZED HEALTH CARE EDUCATION/TRAINING PROGRAM

## 2024-11-20 PROCEDURE — 82040 ASSAY OF SERUM ALBUMIN: CPT | Performed by: PHYSICIAN ASSISTANT

## 2024-11-20 PROCEDURE — 258N000003 HC RX IP 258 OP 636: Performed by: STUDENT IN AN ORGANIZED HEALTH CARE EDUCATION/TRAINING PROGRAM

## 2024-11-20 PROCEDURE — 99207 PR NO CHARGE LOS: CPT | Performed by: NURSE PRACTITIONER

## 2024-11-20 PROCEDURE — 84540 ASSAY OF URINE/UREA-N: CPT | Performed by: STUDENT IN AN ORGANIZED HEALTH CARE EDUCATION/TRAINING PROGRAM

## 2024-11-20 PROCEDURE — 83605 ASSAY OF LACTIC ACID: CPT | Performed by: STUDENT IN AN ORGANIZED HEALTH CARE EDUCATION/TRAINING PROGRAM

## 2024-11-20 PROCEDURE — 84145 PROCALCITONIN (PCT): CPT | Performed by: STUDENT IN AN ORGANIZED HEALTH CARE EDUCATION/TRAINING PROGRAM

## 2024-11-20 PROCEDURE — 93005 ELECTROCARDIOGRAM TRACING: CPT | Performed by: STUDENT IN AN ORGANIZED HEALTH CARE EDUCATION/TRAINING PROGRAM

## 2024-11-20 PROCEDURE — 250N000013 HC RX MED GY IP 250 OP 250 PS 637

## 2024-11-20 PROCEDURE — 200N000001 HC R&B ICU

## 2024-11-20 PROCEDURE — 87086 URINE CULTURE/COLONY COUNT: CPT | Performed by: PHYSICIAN ASSISTANT

## 2024-11-20 PROCEDURE — 36620 INSERTION CATHETER ARTERY: CPT | Performed by: STUDENT IN AN ORGANIZED HEALTH CARE EDUCATION/TRAINING PROGRAM

## 2024-11-20 PROCEDURE — 99291 CRITICAL CARE FIRST HOUR: CPT | Mod: 25 | Performed by: STUDENT IN AN ORGANIZED HEALTH CARE EDUCATION/TRAINING PROGRAM

## 2024-11-20 PROCEDURE — 93325 DOPPLER ECHO COLOR FLOW MAPG: CPT | Mod: 26 | Performed by: GENERAL ACUTE CARE HOSPITAL

## 2024-11-20 PROCEDURE — 84075 ASSAY ALKALINE PHOSPHATASE: CPT | Performed by: STUDENT IN AN ORGANIZED HEALTH CARE EDUCATION/TRAINING PROGRAM

## 2024-11-20 PROCEDURE — 250N000009 HC RX 250: Performed by: STUDENT IN AN ORGANIZED HEALTH CARE EDUCATION/TRAINING PROGRAM

## 2024-11-20 PROCEDURE — 76705 ECHO EXAM OF ABDOMEN: CPT

## 2024-11-20 PROCEDURE — 93503 INSERT/PLACE HEART CATHETER: CPT | Performed by: STUDENT IN AN ORGANIZED HEALTH CARE EDUCATION/TRAINING PROGRAM

## 2024-11-20 PROCEDURE — 93321 DOPPLER ECHO F-UP/LMTD STD: CPT | Mod: 26 | Performed by: GENERAL ACUTE CARE HOSPITAL

## 2024-11-20 PROCEDURE — 82374 ASSAY BLOOD CARBON DIOXIDE: CPT | Performed by: STUDENT IN AN ORGANIZED HEALTH CARE EDUCATION/TRAINING PROGRAM

## 2024-11-20 PROCEDURE — 80048 BASIC METABOLIC PNL TOTAL CA: CPT | Performed by: STUDENT IN AN ORGANIZED HEALTH CARE EDUCATION/TRAINING PROGRAM

## 2024-11-20 PROCEDURE — 05HM33Z INSERTION OF INFUSION DEVICE INTO RIGHT INTERNAL JUGULAR VEIN, PERCUTANEOUS APPROACH: ICD-10-PCS | Performed by: STUDENT IN AN ORGANIZED HEALTH CARE EDUCATION/TRAINING PROGRAM

## 2024-11-20 PROCEDURE — 36415 COLL VENOUS BLD VENIPUNCTURE: CPT

## 2024-11-20 PROCEDURE — 250N000013 HC RX MED GY IP 250 OP 250 PS 637: Performed by: PHYSICIAN ASSISTANT

## 2024-11-20 PROCEDURE — 36600 WITHDRAWAL OF ARTERIAL BLOOD: CPT

## 2024-11-20 PROCEDURE — 03HY32Z INSERTION OF MONITORING DEVICE INTO UPPER ARTERY, PERCUTANEOUS APPROACH: ICD-10-PCS | Performed by: STUDENT IN AN ORGANIZED HEALTH CARE EDUCATION/TRAINING PROGRAM

## 2024-11-20 PROCEDURE — 82247 BILIRUBIN TOTAL: CPT | Performed by: STUDENT IN AN ORGANIZED HEALTH CARE EDUCATION/TRAINING PROGRAM

## 2024-11-20 PROCEDURE — 82570 ASSAY OF URINE CREATININE: CPT | Performed by: STUDENT IN AN ORGANIZED HEALTH CARE EDUCATION/TRAINING PROGRAM

## 2024-11-20 PROCEDURE — 4A1239Z MONITORING OF CARDIAC OUTPUT, PERCUTANEOUS APPROACH: ICD-10-PCS | Performed by: STUDENT IN AN ORGANIZED HEALTH CARE EDUCATION/TRAINING PROGRAM

## 2024-11-20 PROCEDURE — 255N000002 HC RX 255 OP 636: Performed by: STUDENT IN AN ORGANIZED HEALTH CARE EDUCATION/TRAINING PROGRAM

## 2024-11-20 PROCEDURE — 83605 ASSAY OF LACTIC ACID: CPT | Performed by: PHYSICIAN ASSISTANT

## 2024-11-20 PROCEDURE — 83735 ASSAY OF MAGNESIUM: CPT | Performed by: STUDENT IN AN ORGANIZED HEALTH CARE EDUCATION/TRAINING PROGRAM

## 2024-11-20 PROCEDURE — 250N000009 HC RX 250: Performed by: PHYSICIAN ASSISTANT

## 2024-11-20 PROCEDURE — 250N000011 HC RX IP 250 OP 636

## 2024-11-20 PROCEDURE — 93005 ELECTROCARDIOGRAM TRACING: CPT

## 2024-11-20 RX ORDER — PIPERACILLIN SODIUM, TAZOBACTAM SODIUM 3; .375 G/15ML; G/15ML
3.38 INJECTION, POWDER, LYOPHILIZED, FOR SOLUTION INTRAVENOUS ONCE
Status: COMPLETED | OUTPATIENT
Start: 2024-11-20 | End: 2024-11-20

## 2024-11-20 RX ORDER — PIPERACILLIN SODIUM, TAZOBACTAM SODIUM 4; .5 G/20ML; G/20ML
4.5 INJECTION, POWDER, LYOPHILIZED, FOR SOLUTION INTRAVENOUS EVERY 6 HOURS
Status: DISCONTINUED | OUTPATIENT
Start: 2024-11-20 | End: 2024-11-20 | Stop reason: DRUGHIGH

## 2024-11-20 RX ORDER — VANCOMYCIN HYDROCHLORIDE 1 G/200ML
1000 INJECTION, SOLUTION INTRAVENOUS EVERY 24 HOURS
Status: DISCONTINUED | OUTPATIENT
Start: 2024-11-20 | End: 2024-11-20 | Stop reason: DRUGHIGH

## 2024-11-20 RX ORDER — DIPHENHYDRAMINE HCL 25 MG
25 CAPSULE ORAL ONCE
Status: COMPLETED | OUTPATIENT
Start: 2024-11-20 | End: 2024-11-20

## 2024-11-20 RX ORDER — PHENYLEPHRINE HCL IN 0.9% NACL 50MG/250ML
.1-6 PLASTIC BAG, INJECTION (ML) INTRAVENOUS CONTINUOUS
Status: DISCONTINUED | OUTPATIENT
Start: 2024-11-20 | End: 2024-11-24

## 2024-11-20 RX ORDER — IOPAMIDOL 755 MG/ML
117 INJECTION, SOLUTION INTRAVASCULAR ONCE
Status: DISCONTINUED | OUTPATIENT
Start: 2024-11-20 | End: 2024-12-03 | Stop reason: HOSPADM

## 2024-11-20 RX ORDER — PIPERACILLIN SODIUM, TAZOBACTAM SODIUM 3; .375 G/15ML; G/15ML
3.38 INJECTION, POWDER, LYOPHILIZED, FOR SOLUTION INTRAVENOUS EVERY 8 HOURS
Status: DISCONTINUED | OUTPATIENT
Start: 2024-11-21 | End: 2024-11-26

## 2024-11-20 RX ORDER — NOREPINEPHRINE BITARTRATE 0.02 MG/ML
INJECTION, SOLUTION INTRAVENOUS
Status: COMPLETED
Start: 2024-11-20 | End: 2024-11-20

## 2024-11-20 RX ORDER — VANCOMYCIN HYDROCHLORIDE 1 G/200ML
1000 INJECTION, SOLUTION INTRAVENOUS EVERY 24 HOURS
Status: DISCONTINUED | OUTPATIENT
Start: 2024-11-21 | End: 2024-11-21

## 2024-11-20 RX ORDER — NOREPINEPHRINE BITARTRATE 0.02 MG/ML
.01-.6 INJECTION, SOLUTION INTRAVENOUS CONTINUOUS
Status: DISCONTINUED | OUTPATIENT
Start: 2024-11-20 | End: 2024-11-20

## 2024-11-20 RX ORDER — FUROSEMIDE 10 MG/ML
40 INJECTION INTRAMUSCULAR; INTRAVENOUS ONCE
Status: COMPLETED | OUTPATIENT
Start: 2024-11-20 | End: 2024-11-20

## 2024-11-20 RX ADMIN — VANCOMYCIN HYDROCHLORIDE 2500 MG: 1 INJECTION, POWDER, LYOPHILIZED, FOR SOLUTION INTRAVENOUS at 17:49

## 2024-11-20 RX ADMIN — PANTOPRAZOLE SODIUM 40 MG: 40 INJECTION, POWDER, FOR SOLUTION INTRAVENOUS at 09:27

## 2024-11-20 RX ADMIN — Medication 1 MCG/KG/MIN: at 12:12

## 2024-11-20 RX ADMIN — VASOPRESSIN 1 UNITS/HR: 20 INJECTION, SOLUTION INTRAMUSCULAR; SUBCUTANEOUS at 15:50

## 2024-11-20 RX ADMIN — DIPHENHYDRAMINE HYDROCHLORIDE 25 MG: 25 CAPSULE ORAL at 22:31

## 2024-11-20 RX ADMIN — OXYCODONE 10 MG: 5 TABLET ORAL at 00:19

## 2024-11-20 RX ADMIN — NOREPINEPHRINE BITARTRATE 0.03 MCG/KG/MIN: 0.02 INJECTION, SOLUTION INTRAVENOUS at 10:59

## 2024-11-20 RX ADMIN — NALOXONE HYDROCHLORIDE 0.2 MG: 0.4 INJECTION, SOLUTION INTRAMUSCULAR; INTRAVENOUS; SUBCUTANEOUS at 08:10

## 2024-11-20 RX ADMIN — HEPARIN SODIUM 5000 UNITS: 5000 INJECTION, SOLUTION INTRAVENOUS; SUBCUTANEOUS at 22:31

## 2024-11-20 RX ADMIN — CALCIUM GLUCONATE 1 G: 20 INJECTION, SOLUTION INTRAVENOUS at 07:05

## 2024-11-20 RX ADMIN — NALOXONE HYDROCHLORIDE 0.2 MG: 0.4 INJECTION, SOLUTION INTRAMUSCULAR; INTRAVENOUS; SUBCUTANEOUS at 08:12

## 2024-11-20 RX ADMIN — HYDROMORPHONE HYDROCHLORIDE 0.2 MG: 0.2 INJECTION, SOLUTION INTRAMUSCULAR; INTRAVENOUS; SUBCUTANEOUS at 16:36

## 2024-11-20 RX ADMIN — FUROSEMIDE 20 MG/HR: 10 INJECTION, SOLUTION INTRAVENOUS at 10:54

## 2024-11-20 RX ADMIN — ONDANSETRON 4 MG: 2 INJECTION INTRAMUSCULAR; INTRAVENOUS at 16:35

## 2024-11-20 RX ADMIN — NICARDIPINE HYDROCHLORIDE 0.5 MG/HR: 0.2 INJECTION, SOLUTION INTRAVENOUS at 17:49

## 2024-11-20 RX ADMIN — ASPIRIN 300 MG: 300 SUPPOSITORY RECTAL at 14:09

## 2024-11-20 RX ADMIN — HYDROMORPHONE HYDROCHLORIDE 0.2 MG: 0.2 INJECTION, SOLUTION INTRAMUSCULAR; INTRAVENOUS; SUBCUTANEOUS at 14:15

## 2024-11-20 RX ADMIN — HEPARIN SODIUM 5000 UNITS: 5000 INJECTION, SOLUTION INTRAVENOUS; SUBCUTANEOUS at 14:09

## 2024-11-20 RX ADMIN — HYDROMORPHONE HYDROCHLORIDE 0.2 MG: 0.2 INJECTION, SOLUTION INTRAMUSCULAR; INTRAVENOUS; SUBCUTANEOUS at 19:43

## 2024-11-20 RX ADMIN — LIDOCAINE 1 PATCH: 4 PATCH TOPICAL at 16:13

## 2024-11-20 RX ADMIN — HEPARIN SODIUM 5000 UNITS: 5000 INJECTION, SOLUTION INTRAVENOUS; SUBCUTANEOUS at 06:29

## 2024-11-20 RX ADMIN — PIPERACILLIN AND TAZOBACTAM 3.38 G: 3; .375 INJECTION, POWDER, FOR SOLUTION INTRAVENOUS at 19:49

## 2024-11-20 RX ADMIN — PERFLUTREN 2 ML: 6.52 INJECTION, SUSPENSION INTRAVENOUS at 11:13

## 2024-11-20 RX ADMIN — FUROSEMIDE 40 MG: 10 INJECTION, SOLUTION INTRAMUSCULAR; INTRAVENOUS at 09:27

## 2024-11-20 RX ADMIN — HYDROMORPHONE HYDROCHLORIDE 0.1 MG: 0.2 INJECTION, SOLUTION INTRAMUSCULAR; INTRAVENOUS; SUBCUTANEOUS at 08:21

## 2024-11-20 ASSESSMENT — ACTIVITIES OF DAILY LIVING (ADL)
ADLS_ACUITY_SCORE: 0
DEPENDENT_IADLS:: INDEPENDENT
ADLS_ACUITY_SCORE: 0

## 2024-11-20 NOTE — CONSULTS
[facility]    Stroke Telephone Note    I was called by Ly Atwood PA-C on 11/20/24 regarding patient Karyn Gamez. The patient is a 69 year old female with history significant for prior sroke, DM2, HTN, HLD, tobacco use, obesity. She is POD2 s/pCABG x5. RRT called around 0600 for drowsiness, with plan to optimize BiPAP settings and repeat VBG. Code stroke activated at 0750 for persistent lethargy. Per provider, she was responsive overnight but is unable to remain alert or consistently follow commands this morning. Per provider, no focal extremity weakness. LKW 0300. ABG shows mild acidosis. She is on aspirin + Plavix. She is not a candidate for TNK due to recent cardiac surgery.     Imaging delayed due to lethargy and concern for airway protection per EDINSON Atwood and patient may require intubation. Communicated that if there is concern for acute neurologic process, patient should be stabilized and STAT imaging should be obtained to rule out acute intracranial process.     ADDENDUM:   1215: checked in with EDINSON Atwood and patient having lines placed, not appropriate for transport to CT.   1500: Per intensivist team, no new neuro findings. Still not felt to be stable for STAT CT/CTA, awaiting blood transfusion. Notified them to page for new focal neuro deficits/acute change in neuro examination.     Vitals  BP: 100/57   Pulse: 89   Resp: 23   Temp: 97.9  F (36.6  C)   Weight: 114.5 kg (252 lb 8 oz)    Stroke Code Data (for stroke code without tele)  Stroke code activated 11/20/24  0751   Stroke provider first response 11/20/24  0754   Last known normal 11/20/24  0300      Time of discovery (or onset of symptoms) 11/20/24  0620   Head CT read by Stroke Neuro Provider        Was stroke code de-escalated?             Imaging Findings  CT head: PENDING   CTA head/neck: PENDING    Intravenous Thrombolysis  Not given due to:   - surgery/trauma within the past 14 days  - unclear or unfavorable  "risk-benefit profile for extended window thrombolysis beyond the conventional 4.5 hour time window    Endovascular Treatment  Imaging pending     Impression  Acute encephalopathy without report of focal neurologic deficits.     Recommendations   Code stroke is ongoing. Recommend STAT CT/CTA/CTP to rule out ICH and LVO. Not currently stable for transport to imaging per primary team.     Case discussed with vascular neurology attending Dr. Mathew.    My recommendations are based on the information provided over the phone by Karyn Gamez's in-person providers. They are not intended to replace the clinical judgment of her in-person providers. I was not requested to personally see or examine the patient at this time.     La Gates, CNP  Vascular Neurology    To page me or covering stroke neurology team member, click here: [email or web address]  Choose \"On Call\" tab at top, then select \"NEUROLOGY/ALL SITES\" from middle drop-down box, press Enter, then look for \"stroke\" or \"telestroke\" for your site.    "

## 2024-11-20 NOTE — PROGRESS NOTES
CLINICAL NUTRITION SERVICES - ASSESSMENT NOTE     Nutrition Prescription    RECOMMENDATIONS FOR MDs/PROVIDERS TO ORDER:      Malnutrition Status:    Unable to determine    Recommendations already ordered by Registered Dietitian (RD):  none    Future/Additional Recommendations:  Monitor for diet start vs enteral nutrition next 24-48 hrs.     REASON FOR ASSESSMENT  Karyn Gamez is a/an 69 year old female assessed by the dietitian for LOS.    Patient transferred from [facility] on 11/18/24.  Original RD LOS assessment 11/13/24.   Patient s/p CABG 11/18/24.  Patient has been NPO since 11/18 due to respiratory status, has been on BiPaP and CPaP since extubation on 11/18/24.  Patient has history of DM, CKD, NSTEMI, obesity.    NUTRITION HISTORY  Patient with fair to good po intake at previous facility, noted 2 days with decreased po intake which may be related to NPO status.  Has Been NPO since surgery 11/18/24.   Unable to see patient as patient busy with staff following stroke code this am.      CURRENT NUTRITION ORDERS  Diet: NPO day 3.      LABS  CMP  Recent Labs   Lab 11/20/24  0900 11/20/24  0808 11/20/24  0424 11/19/24  2238 11/19/24  2021 11/19/24  0702 11/19/24  0501 11/19/24  0001 11/19/24  0000 11/18/24  1426 11/18/24  1418 11/18/24  0611 11/18/24  0348     --  137  --   --   --  145  --  146*   < > 142   < > 140   POTASSIUM 5.7*  --  5.7* 5.4*  --   --  5.2  --  5.2   < > 3.7  3.7   < > 4.3   * 117* 118*  --  113*   < > 116*  119*   < > 98   < > 260*   < > 105*   BUN 37.9*  --  33.3*  --   --   --  20.1  --  16.6  --  17.1  --  23.4*   CR 1.91*  --  1.64*  --   --   --  0.95  --  0.86  --  0.81  --  0.94   ANGELES 8.1*  --  7.4*  --   --   --  7.9*  --  8.1*  --  7.9*  --  9.7   MAG  --   --  2.2 2.1  --   --  2.3  --   --   --  3.0*  --  2.1   PHOS  --   --  4.1 3.9  --   --  4.1  --   --   --  2.8  --   --    ALBUMIN  --   --  3.1*  --   --   --   --   --  3.4*  --  3.6  --  4.0   BILITOTAL  --   --   0.4  --   --   --   --   --  0.2  --  0.4  --  0.4   ALKPHOS  --   --  53  --   --   --   --   --  50  --  55  --  80   AST  --   --  1,150*  --   --   --   --   --  91*  --  76*  --  53*   ALT  --   --  820*  --   --   --   --   --  54*  --  57*  --  71*    < > = values in this interval not displayed.   HgbA1c 6 11/7/24.  Labs reviewed    MEDICATIONS  Current Facility-Administered Medications   Medication Dose Route Frequency Provider Last Rate Last Admin    [Held by provider] acetaminophen (TYLENOL) tablet 975 mg  975 mg Oral Q8H Jasmin Lemons PA-C   975 mg at 11/19/24 0515    amLODIPine (NORVASC) tablet 2.5 mg  2.5 mg Oral Daily Ly Atwood PA-C   2.5 mg at 11/19/24 0822    aspirin (ASA) chewable tablet 81 mg  81 mg Oral or NG Tube Daily Ly Atwood PA-C        Or    aspirin (ASA) Suppository 300 mg  300 mg Rectal Daily Ly Atowod PA-C        [Held by provider] atorvastatin (LIPITOR) tablet 80 mg  80 mg Oral At Bedtime Jasmin Lemons PA-C   80 mg at 11/19/24 2032    [Held by provider] buPROPion (WELLBUTRIN XL) 24 hr tablet 150 mg  150 mg Oral QAM Ly Atwood PA-C   150 mg at 11/19/24 0821    clopidogrel (PLAVIX) tablet 75 mg  75 mg Oral Daily Ly Atwood PA-C   75 mg at 11/19/24 1323    heparin ANTICOAGULANT injection 5,000 Units  5,000 Units Subcutaneous Q8H Jasmin Lemons PA-C   5,000 Units at 11/20/24 0629    influenza trivalent vaccine high-dose for ages 65 years and greater (FLUZONE-HD) injection 0.5 mL  0.5 mL Intramuscular Prior to discharge Mary Pennington MD        insulin aspart (NovoLOG) injection (RAPID ACTING)  1-7 Units Subcutaneous TID AC Ly Atwood PA-C        insulin aspart (NovoLOG) injection (RAPID ACTING)  1-5 Units Subcutaneous At Bedtime Ly Atwood PA-C        iopamidol (ISOVUE-370) solution 117 mL  117 mL Intravenous Once Ly Atwood PA-C        And    sodium chloride 0.9 %  bag for CT scan flush use  100 mL As instructed Once Ly Atwood PA-C        Lidocaine (LIDOCARE) 4 % Patch 1-2 patch  1-2 patch Transdermal Q24H Jasmin Lemons PA-C   1 patch at 11/19/24 1701    [Held by provider] metFORMIN (GLUCOPHAGE XR) 24 hr tablet 500 mg  500 mg Oral Daily with supper Jasmin Lemons PA-C        pantoprazole (PROTONIX) 2 mg/mL suspension 40 mg  40 mg Per Feeding Tube QAM  Mike Garcia MD        Or    pantoprazole (PROTONIX) IV push injection 40 mg  40 mg Intravenous QAM  Mike Garcia MD   40 mg at 11/20/24 0927    polyethylene glycol (MIRALAX) Packet 17 g  17 g Oral Daily Jasmin Lemons PA-C        senna-docusate (SENOKOT-S/PERICOLACE) 8.6-50 MG per tablet 1 tablet  1 tablet Oral BID Jasmin Lemons PA-C   1 tablet at 11/19/24 2033      Current Facility-Administered Medications   Medication Dose Route Frequency Provider Last Rate Last Admin    furosemide (LASIX) 500 mg/50mL infusion ADULT MAX CONC  20 mg/hr Intravenous Continuous Ly Atwood PA-C          Current Facility-Administered Medications   Medication Dose Route Frequency Provider Last Rate Last Admin    [Held by provider] acetaminophen (TYLENOL) tablet 650 mg  650 mg Oral Q4H PRN Jasmin Lemons PA-C        [START ON 11/21/2024] bisacodyl (DULCOLAX) suppository 10 mg  10 mg Rectal Daily PRN Jasmin Lemons PA-C        calcium gluconate 1 g in 50 mL in sodium chloride intermittent infusion  1 g Intravenous Once PRN Jasmin Lemons PA-C   1 g at 11/20/24 0705    calcium gluconate 2 g in  mL intermittent infusion  2 g Intravenous Once PRN Jasmin Lemons PA-C        calcium gluconate 3 g in sodium chloride 0.9 % 100 mL intermittent infusion  3 g Intravenous Once PRN Jasmin Lemons PA-C        glucose gel 15-30 g  15-30 g Oral Q15 Min PRN Jasmin Lemons PA-C        Or    dextrose 50 % injection 25-50 mL  25-50 mL Intravenous Q15 Min PRN  "Jasmin Lemons PA-C        Or    glucagon injection 1 mg  1 mg Subcutaneous Q15 Min PRN Jasmin Lemons PA-C        hydrALAZINE (APRESOLINE) injection 10 mg  10 mg Intravenous Q30 Min PRN Jasmin Lemons PA-C        HYDROmorphone (DILAUDID) injection 0.2 mg  0.2 mg Intravenous Q2H PRN Jasmin Lemons PA-C   0.1 mg at 11/20/24 0821    Or    HYDROmorphone (DILAUDID) injection 0.4 mg  0.4 mg Intravenous Q2H PRN Jasmin Lemons PA-C        lactated ringers BOLUS 250 mL  250 mL Intravenous Q15 Min PRN Jasmin Lemons PA-C 500 mL/hr at 11/18/24 1950 250 mL at 11/18/24 1950    magnesium hydroxide (MILK OF MAGNESIA) suspension 30 mL  30 mL Oral Daily PRN Jasmin Lemons PA-C        naloxone (NARCAN) injection 0.2 mg  0.2 mg Intravenous Q2 Min PRN Jasmin Lemons PA-C        Or    naloxone (NARCAN) injection 0.4 mg  0.4 mg Intravenous Q2 Min PRN Jasmin Lemons PA-C        Or    naloxone (NARCAN) injection 0.2 mg  0.2 mg Intramuscular Q2 Min PRN Jasmin Lemons PA-C   0.2 mg at 11/20/24 0812    Or    naloxone (NARCAN) injection 0.4 mg  0.4 mg Intramuscular Q2 Min PRN Jasmin Lemons PA-C        ondansetron (ZOFRAN ODT) ODT tab 4 mg  4 mg Oral Q6H PRN Jasmin Lemons PA-C        Or    ondansetron (ZOFRAN) injection 4 mg  4 mg Intravenous Q6H PRN Jasmin Lemons PA-C   4 mg at 11/19/24 1231    oxyCODONE (ROXICODONE) tablet 5 mg  5 mg Oral Q4H PRN Jasmin Lemons PA-C        Or    oxyCODONE (ROXICODONE) tablet 10 mg  10 mg Oral Q4H PRN Jasmin Lemons PA-C   10 mg at 11/20/24 0019    prochlorperazine (COMPAZINE) injection 5 mg  5 mg Intravenous Q6H PRN Jasmin Lemons PA-C        Or    prochlorperazine (COMPAZINE) tablet 5 mg  5 mg Oral Q6H PRN Jasmin Lemons PA-C          Medications reviewed    ANTHROPOMETRICS  Height: 0 cm (Data Unavailable) 5'7\" per chart  Most Recent Weight: 114.5 kg (252 lb 8 oz)    BMI: Obesity Grade " II BMI 35-39.9  Weight History:   11/20/24 : 114.5 kg (252 lb 8 oz)   11/19/24 : 114.3 kg (251 lb 14.4 oz)   11/17/24 : 107.9 kg (237 lb 14 oz)   11/07/24 : 110.3 kg (243 lb 1.6 oz)   10/31/23 : 96.6 kg (213 lb)       Dosing Weight: 73 kg, adjusted wt.    ASSESSED NUTRITION NEEDS  Estimated Energy Needs: 1825+ kcals/day (25 +kcals/kg)  Justification: Obese and Post-op  Estimated Protein Needs: 73-88 grams protein/day (1 - 1.2 grams of pro/kg)  Justification: CKD and Post-op  Estimated Fluid Needs: 1825+ mL/day (1 mL/kcal)   Justification: Maintenance    PHYSICAL FINDINGS  See malnutrition section below.  Per chart  Trace to mild edema.  Last BM 11/17/24   Surgical incisions    MALNUTRITION:  % Weight Loss:  None noted-wt up post-op, possible fluid gain  % Intake:  Decreased intake does not meet criteria for malnutrition 3 days NPO and 2 days decreased po noted.   Subcutaneous Fat Loss:  unable to assess  Muscle Loss:  unable to assess  Fluid Retention:  Mild per chart.    Malnutrition Diagnosis: Unable to determine due to need NFPE.    NUTRITION DIAGNOSIS  Inadequate oral intake related to acute illness and surgery as evidenced by intermittent NPO status at previous facility and NPO since surgery on 11/18.        INTERVENTIONS  Implementation  Nutrition Education: Plan diet education for Heart Healthy, Consistent carbohydrate diet before discharge.     Monitor for diet start vs need for enteral nutrition in next 24-48 hrs.     Goals  Previous-Patient to consume % of nutritionally adequate meal trays TID, or the equivalent with supplements/snacks. -not progressing, NPO.   New Goals:  -Diet start vs enteral nutrition in next 48 hrs.  -Meet nutrition needs.  -Electrolytes WNL  -BG      Monitoring/Evaluation  Progress toward goals will be monitored and evaluated per protocol.

## 2024-11-20 NOTE — PROGRESS NOTES
CVTS Daily Progress Note   POD#2 s/p CABG x5 with LIMA to LAD, left radial artery to obtuse marginal, rSVG to RPDA, RPL, and diagonal, endoscopic harvest of left radial artery and right saphenous vein   Attending: Gorge  LOS: 2    SUBJECTIVE/INTERVAL EVENTS:    Patient very lethargic this morning. Very difficult to arouse and intermittently, at best, follows commands. Slightly acidotic/hypercapnic on VBG and ABG despite BiPAP followed by CPAP. Stroke code subsequently called although patient largely with non-focal exam when awake. Narcan administered (despite no narcotics x8 hours) with minimal overall effect. Considering reintubation this morning. Will be unable to complete stroke code imaging at this time due to tenuous respiratory status. Otherwise, weaned from pressors yesterday; normotensive. Appears asystolic under temp pacer which is currently set to 90bpm. Was up to chair earlier but now back to bed given the above. Pain appears well controlled. - BM. NPO for now.. UOP borderline adequate overall but is tapering down. Cr 1.64 (0.95). K 5.7. Chest tube output appropriate although still moderate. Hgb 7.7 after 1u PRBC yesterday. LFTs quite elevated as well. Patient cannot meaningfully participate in ROS due to mental status.       OBJECTIVE:  Temp:  [97.9  F (36.6  C)-98.4  F (36.9  C)] 97.9  F (36.6  C)  Pulse:  [] 89  Resp:  [13-31] 22  BP: ()/(38-75) 109/57  MAP:  [47 mmHg-73 mmHg] 51 mmHg  Arterial Line BP: ()/(39-59) 65/43  FiO2 (%):  [55 %] 55 %  SpO2:  [80 %-100 %] 100 %  Vitals:    11/19/24 0600 11/20/24 0530   Weight: 114.3 kg (251 lb 14.4 oz) 114.5 kg (252 lb 8 oz)       Clinically Significant Risk Factors        # Hyperkalemia: Highest K = 5.7 mmol/L in last 2 days, will monitor as appropriate  # Hypernatremia: Highest Na = 146 mmol/L in last 2 days, will monitor as appropriate  # Hyperchloremia: Highest Cl = 115 mmol/L in last 2 days, will monitor as appropriate      #  "Hypocalcemia: Lowest iCa = 4.1 mg/dL in last 2 days, will monitor and replace as appropriate     # Hypoalbuminemia: Lowest albumin = 3.1 g/dL at 11/20/2024  4:24 AM, will monitor as appropriate    # Coagulation Defect: INR = 1.40 (Ref range: 0.85 - 1.15) and/or PTT = 61 Seconds (Ref range: 22 - 38 Seconds), will monitor for bleeding   # Acute Kidney Injury, unspecified: based on a >150% or 0.3 mg/dL increase in last creatinine compared to past 90 day average, will monitor renal function  # Hypertension: Noted on problem list     # Acute Hypoxic Respiratory Failure: Documented O2 saturation < 90%. Continue supplemental oxygen as needed  # Acute Hypercapnic Respiratory Failure: based on arterial blood gas results.  Continue supplemental oxygen and ventilatory support as indicated.  # Acute Hypercapnic Respiratory Failure: based on venous blood gas results.  Continue supplemental oxygen and ventilatory support as indicated.         # Obesity: Estimated body mass index is 39.63 kg/m  as calculated from the following:    Height as of 11/7/24: 1.7 m (5' 6.93\").    Weight as of this encounter: 114.5 kg (252 lb 8 oz)., PRESENT ON ADMISSION       # Financial/Environmental Concerns:     # History of CABG: noted on surgical history              Current Medications:    Scheduled Meds:  Current Facility-Administered Medications   Medication Dose Route Frequency Provider Last Rate Last Admin    acetaminophen (TYLENOL) tablet 975 mg  975 mg Oral Q8H Jasmin Lemons PA-C   975 mg at 11/19/24 0515    amLODIPine (NORVASC) tablet 2.5 mg  2.5 mg Oral Daily Ly Atwood PA-C   2.5 mg at 11/19/24 0822    aspirin (ASA) chewable tablet 81 mg  81 mg Oral or NG Tube Daily Ly Atwood PA-C        Or    aspirin (ASA) Suppository 300 mg  300 mg Rectal Daily Ly Atwood PA-C        atorvastatin (LIPITOR) tablet 80 mg  80 mg Oral At Bedtime Jasmin Lemons PA-C   80 mg at 11/19/24 2032    buPROPion " (WELLBUTRIN XL) 24 hr tablet 150 mg  150 mg Oral QAM Ly Atwood PA-C   150 mg at 11/19/24 0821    clopidogrel (PLAVIX) tablet 75 mg  75 mg Oral Daily Ly Atwood PA-C   75 mg at 11/19/24 1323    furosemide (LASIX) injection 40 mg  40 mg Intravenous Once Ly Atwood PA-C        heparin ANTICOAGULANT injection 5,000 Units  5,000 Units Subcutaneous Q8H Jasmin Lemons PA-C   5,000 Units at 11/20/24 0629    influenza trivalent vaccine high-dose for ages 65 years and greater (FLUZONE-HD) injection 0.5 mL  0.5 mL Intramuscular Prior to discharge Mary Pennington MD        insulin aspart (NovoLOG) injection (RAPID ACTING)  1-7 Units Subcutaneous TID AC Ly Atwood PA-C        insulin aspart (NovoLOG) injection (RAPID ACTING)  1-5 Units Subcutaneous At Bedtime Ly Atwood PA-C        iopamidol (ISOVUE-370) solution 117 mL  117 mL Intravenous Once Ly Atwood PA-C        And    sodium chloride 0.9 % bag for CT scan flush use  100 mL As instructed Once Ly Atwood PA-C        Lidocaine (LIDOCARE) 4 % Patch 1-2 patch  1-2 patch Transdermal Q24H Jasmin Lemons PA-C   1 patch at 11/19/24 1701    [Held by provider] metFORMIN (GLUCOPHAGE XR) 24 hr tablet 500 mg  500 mg Oral Daily with supper Jasmin Lemons PA-C        pantoprazole (PROTONIX) 2 mg/mL suspension 40 mg  40 mg Per Feeding Tube QAM AC Mike Garcia MD        Or    pantoprazole (PROTONIX) IV push injection 40 mg  40 mg Intravenous QAM AC Mike Garcia MD   40 mg at 11/19/24 0651    polyethylene glycol (MIRALAX) Packet 17 g  17 g Oral Daily Jasmin Lemons PA-C        senna-docusate (SENOKOT-S/PERICOLACE) 8.6-50 MG per tablet 1 tablet  1 tablet Oral BID Jasmin Lemons PA-C   1 tablet at 11/19/24 2033     Continuous Infusions:  Current Facility-Administered Medications   Medication Dose Route Frequency Provider Last Rate Last Admin     PRN Meds:.  Current  Facility-Administered Medications   Medication Dose Route Frequency Provider Last Rate Last Admin    [START ON 11/21/2024] acetaminophen (TYLENOL) tablet 650 mg  650 mg Oral Q4H PRN Jasmin Lemons PA-C        [START ON 11/21/2024] bisacodyl (DULCOLAX) suppository 10 mg  10 mg Rectal Daily PRN Jasmin Lemons PA-C        calcium gluconate 1 g in 50 mL in sodium chloride intermittent infusion  1 g Intravenous Once PRN Jasmin Lemons PA-C   1 g at 11/20/24 0705    calcium gluconate 2 g in  mL intermittent infusion  2 g Intravenous Once PRN Jasmin Lemons PA-C        calcium gluconate 3 g in sodium chloride 0.9 % 100 mL intermittent infusion  3 g Intravenous Once PRN Jasmin Lemons PA-C        glucose gel 15-30 g  15-30 g Oral Q15 Min PRN Jasmin Lemons PA-C        Or    dextrose 50 % injection 25-50 mL  25-50 mL Intravenous Q15 Min PRN Jasmin Lemons PA-C        Or    glucagon injection 1 mg  1 mg Subcutaneous Q15 Min PRN Jasmin Lemons PA-C        hydrALAZINE (APRESOLINE) injection 10 mg  10 mg Intravenous Q30 Min PRN Jasmin Lemons PA-C        HYDROmorphone (DILAUDID) injection 0.2 mg  0.2 mg Intravenous Q2H PRN Jasmin Lemons PA-C   0.1 mg at 11/20/24 0821    Or    HYDROmorphone (DILAUDID) injection 0.4 mg  0.4 mg Intravenous Q2H PRN Jasmin Lemons PA-C        lactated ringers BOLUS 250 mL  250 mL Intravenous Q15 Min PRN Jasmin Lemons PA-C 500 mL/hr at 11/18/24 1950 250 mL at 11/18/24 1950    magnesium hydroxide (MILK OF MAGNESIA) suspension 30 mL  30 mL Oral Daily PRN Jasmin Lemons PA-C        naloxone (NARCAN) injection 0.2 mg  0.2 mg Intravenous Q2 Min PRN Jasmin Lemons PA-C        Or    naloxone (NARCAN) injection 0.4 mg  0.4 mg Intravenous Q2 Min PRN Jasmin Lemons PA-C        Or    naloxone (NARCAN) injection 0.2 mg  0.2 mg Intramuscular Q2 Min PRN Jasmin Lemons PA-C   0.2 mg at 11/20/24  0812    Or    naloxone (NARCAN) injection 0.4 mg  0.4 mg Intramuscular Q2 Min PRN Jasmin Lemons PA-C        ondansetron (ZOFRAN ODT) ODT tab 4 mg  4 mg Oral Q6H PRN Jasmin Lemons PA-C        Or    ondansetron (ZOFRAN) injection 4 mg  4 mg Intravenous Q6H PRN Jasmin Lmeons PA-C   4 mg at 11/19/24 1231    oxyCODONE (ROXICODONE) tablet 5 mg  5 mg Oral Q4H PRN Jasmin Lemons PA-C        Or    oxyCODONE (ROXICODONE) tablet 10 mg  10 mg Oral Q4H PRN Jasmin Lemons PA-C   10 mg at 11/20/24 0019    prochlorperazine (COMPAZINE) injection 5 mg  5 mg Intravenous Q6H PRN Jasmin Lemons PA-C        Or    prochlorperazine (COMPAZINE) tablet 5 mg  5 mg Oral Q6H PRN Jasmin Lemons PA-C           Cardiographics:    Telemetry monitoring demonstrates paced rhythm at 90bpm per my personal review.    Imaging:  Results for orders placed or performed during the hospital encounter of 11/18/24   XR Chest Port 1 View    Impression    IMPRESSION: Endotracheal tube terminates approximately 5.0 cm above the osmin. Right neck central venous catheter sheath with tip in the mid SVC. Bilateral chest tubes, ascending mediastinal drain, and median sternotomy wires also noted.    The lungs are slightly hypoinflated, otherwise negative. No definite pleural effusion or pneumothorax.    Normal size cardiomediastinal silhouette.   XR Chest Port 1 View    Impression    IMPRESSION: Patient has been extubated. Sternotomy with mediastinal clips and markers. Bilateral chest tubes. No pneumothorax seen. Cardiomegaly. Mild pulmonary vascular prominence. Linear atelectasis left upper lung. No focal airspace consolidations.       Labs, personally reviewed.  Hemoglobin   Date Value Ref Range Status   11/20/2024 7.7 (L) 11.7 - 15.7 g/dL Final   11/19/2024 8.0 (L) 11.7 - 15.7 g/dL Final   11/19/2024 7.7 (L) 11.7 - 15.7 g/dL Final   06/08/2019 12.9 11.7 - 15.7 g/dL Final   06/07/2019 13.6 11.7 - 15.7 g/dL Final    10/14/2016 13.2 11.7 - 15.7 g/dL Final     WBC   Date Value Ref Range Status   06/08/2019 8.8 4.0 - 11.0 10e9/L Final   06/07/2019 12.9 (H) 4.0 - 11.0 10e9/L Final   10/14/2016 9.9 4.0 - 11.0 10e9/L Final     WBC Count   Date Value Ref Range Status   11/20/2024 19.1 (H) 4.0 - 11.0 10e3/uL Final   11/19/2024 18.8 (H) 4.0 - 11.0 10e3/uL Final   11/18/2024 22.6 (H) 4.0 - 11.0 10e3/uL Final     Platelet Count   Date Value Ref Range Status   11/20/2024 177 150 - 450 10e3/uL Final   11/19/2024 233 150 - 450 10e3/uL Final   11/18/2024 245 150 - 450 10e3/uL Final   06/08/2019 291 150 - 450 10e9/L Final   06/07/2019 352 150 - 450 10e9/L Final   10/14/2016 297 150 - 450 10e9/L Final     Creatinine   Date Value Ref Range Status   11/20/2024 1.64 (H) 0.51 - 0.95 mg/dL Final   11/19/2024 0.95 0.51 - 0.95 mg/dL Final   11/19/2024 0.86 0.51 - 0.95 mg/dL Final   01/15/2021 0.81 0.52 - 1.04 mg/dL Final   06/08/2019 0.67 0.52 - 1.04 mg/dL Final   06/07/2019 0.73 0.52 - 1.04 mg/dL Final     Potassium   Date Value Ref Range Status   11/20/2024 5.7 (H) 3.4 - 5.3 mmol/L Final   11/19/2024 5.4 (H) 3.4 - 5.3 mmol/L Final   11/19/2024 5.2 3.4 - 5.3 mmol/L Final   01/31/2023 4.2 3.4 - 5.3 mmol/L Final   08/26/2021 3.9 3.4 - 5.3 mmol/L Final   01/15/2021 4.1 3.4 - 5.3 mmol/L Final   06/08/2019 4.0 3.4 - 5.3 mmol/L Final   06/07/2019 3.2 (L) 3.4 - 5.3 mmol/L Final     Potassium POCT   Date Value Ref Range Status   11/18/2024 3.4 3.4 - 5.3 mmol/L Final   11/18/2024 3.5 3.4 - 5.3 mmol/L Final   11/18/2024 4.1 3.4 - 5.3 mmol/L Final     Magnesium   Date Value Ref Range Status   11/20/2024 2.2 1.7 - 2.3 mg/dL Final   11/19/2024 2.1 1.7 - 2.3 mg/dL Final   11/19/2024 2.3 1.7 - 2.3 mg/dL Final   08/12/2011 2.2 1.6 - 2.3 mg/dL Final   05/06/2010 2.3 1.6 - 2.3 mg/dL Final   05/05/2010 2.3 1.6 - 2.3 mg/dL Final          I/O:  I/O last 3 completed shifts:  In: 2849.34 [P.O.:965; I.V.:1061.34; IV Piggyback:500]  Out: 2428 [Urine:1618; Chest Tube:810]        Physical Exam:    General: Patient seen in bed. NAD. Quite lethargic and disoriented.  CV: Paced rhythm on monitor. 2+ peripheral pulses in all extremities. Mild edema. Incision C/D/I.  Pulm: Non-labored effort on BiPAP. Chest tubes in place, serosanguinous output, no air leak.  Abd: Soft, NT, ND  : Jasmine with bailey urine  Ext: Mild pedal edema, SCDs in place, warm, distal pulses intact  Neuro: Unable to eval fully       ASSESSMENT/PLAN:    Karyn Gamez is a 69 year old female with a history of CAD who is s/p CABGx5.    Active Problems:    CAD (coronary artery disease)    **Note to below; all PO meds currently held due to NPO status    NEURO:   - Scheduled Tylenol (held)/lidocaine patches and PRN Tylenol (held) /oxycodone/dilaudid for pain  - Had not taken PTA Wellbutrin or Zoloft for several months--can reassess after hospital discharge.    CV:   - Pre-op EF 60-65%  - Normotensive off pressors  - Amlodipine 2.5mg daily--do not hold (radial artery graft protection). May have to consider IV CCB if remains NPO  - Beta blocker pending improvement in HR  - [id number] daily/300mg HI (decreased dose due to Plavix)  - Plavix daily for one year per surgeon request   - Atorvastatin 80mg daily (held in light of LFTs)  - Chest tubes to remain today   - Echo pending today    PULM:   - Extubated on POD0  - Maintaining oxygen saturations on BiPAP this AM  - Encourage pulmonary toilet  - Frequent ABGs    FEN/GI:  - Continue electrolyte replacement protocol  - Diet: NPO for now  - Bowel regimen  - Transaminitis; unclear etiology (?congestion). Recheck later and daily    RENAL:  - Adequate UOP/hr. Continue to monitor closely.  - Cr 1.64 (0.95)  - BMP q8h  - Jasmine to remain in for close monitoring of I/O and during period of diuresis/relative immobility (new order placed)  - Diuresis with Lasix 20mg/hr gtt for now    HEME:  - Acute blood loss anemia post-op.   - No bleeding concerns. Hep SQ, ASA  - Hgb 7.7 with recheck later  and daily  - 1u PRBC 11/19 for Hgb 7.7    ID:  - Lor op ppx complete, afebrile. No concerns for infection    ENDO:   - Transition to SSI  - PTA Metformin held; restart when appropriate    PPx:   - DVT: SCDs, SQ heparin TID, ambulation   - GI: Protonix 40mg IV/PO daily    DISPO:   - Continue critical care in ICU due to tenuous respiratory status  - Medically Ready for Discharge: Anticipated in 5+ Days         Patient discussed with Dr. Pennington.        _______  VERA GraceC  Cardiothoracic Surgery  [phone]

## 2024-11-20 NOTE — PROCEDURES
[facility]    Arterial line placement    Date/Time: 11/20/2024 1:37 PM    Performed by: Britany Hardy MD  Authorized by: Mike Garcia MD      UNIVERSAL PROTOCOL   Site Marked: Yes  Prior Images Obtained and Reviewed:  Yes  Required items: Required blood products, implants, devices and special equipment available    Patient identity confirmed:  Verbally with patient, arm band, provided demographic data and hospital-assigned identification number  Patient was reevaluated immediately before administering moderate or deep sedation or anesthesia  Confirmation Checklist:  Patient's identity using two indicators, procedure was appropriate and matched the consent or emergent situation and correct equipment/implants were available  Time out: Immediately prior to the procedure a time out was called    Universal Protocol: the Joint Commission Universal Protocol was followed    Preparation: Patient was prepped and draped in usual sterile fashion    ESBL (mL):  3  Indication:  multiple ABGs respiratory failure hemodynamic monitoring  Location:  Right radial       ANESTHESIA    Local Anesthetic:  Lidocaine 1% without epinephrine  Anesthetic Total (mL):  3      SEDATION    Patient Sedated: No      PROCEDURE DETAILS    Needle Gauge:  20  Seldinger technique: Seldinger technique used    Number of Attempts:  1  Post-procedure:  Line sutured and dressing applied      PROCEDURE  Describe Procedure: Routine arterial line kit was used. After sterilization and local anesthesia, needle was insert into right radial artery under ultrasound guidance. Catheter was advanced over the guidewire as per routine fashion. Good waveform was observed once connected to monitor.   Patient Tolerance:  Patient tolerated the procedure well with no immediate complications  Length of time physician/provider present for 1:1 monitoring during sedation: 30   Patient's R RA line, after few hours, no longer has good waveform. Few  attempts for blood drawl were unsuccessful. R RA was removed. Eventually a right brachial/axillary arterial line was performed by Dr. Garcia. Please see Dr. Garcia's separate notes on arterial lines on other locations for details.

## 2024-11-20 NOTE — SIGNIFICANT EVENT
Significant Event Note    Time of event: 6:23 AM November 20, 2024    Description of event:  Rapid response called. Patient drowsy, confused slow response to questions. Answering questions mostly by repeating her birth date. Equal  strength moans when she is lifting her arms and right arm shifts downwards. Hard to follow commands. Will repeat exam.     Briefly, patient is POD2 s/2 CABG x5. She was on CPAP overnight prior to developing AMS.     Plan:  AMS, concern for CO retention. VBG, PH 7.2 and CO2 60   Switch to Bipap 12/6 rate of 16, will repeat VBG in 2 hours. If mentation not improving will get CT head.         Discussed with: bedside nurse      Chet Gil MD

## 2024-11-20 NOTE — SIGNIFICANT EVENT
Significant Event Note    Time of event: 8:04 AM November 20, 2024    Description of event:  STROKE CODE    Patient is s/p 5 vessel CABG  Intensivist in room and cardiothoracic PA on arrival  Patient on BiPAP, alert oriented, no witnessed focal deficits on brief neuro exam done by intensivist    Plan:  Stroke code being managed by cardiothoracic PA, who will assume management of the stroke code and is the contact for stroke neurology  Intensivist also involved     Discussed with: Cardiothoracic PA    German Parikh MD

## 2024-11-20 NOTE — PROGRESS NOTES
[facility]    ICU Progress Note       Date of Admission:  11/18/2024    Assessment: Critical Care   Karyn Gamez is a 69 year old female admitted on 11/18/2024.   69F h/o R frontal lobe CVA w/ residual deficit (2011), T2DM, HTN, HLD, active tobacco usage, mood disorder, presented 11/7/2024 w/ chest pain c/w NSTEMI, found to have multi-vessel CAD and HCM on CMR, s/p 5v-CABG (11/18/24, Dr. Pennington, GUTIERREZ->LAD, LRA->OM, rSVG->rPDA, rSVG->rPLV, rSVG->diag), w/ hospital course c/b HoTN requiring phenylephrine briefly, initially doing well but 11/20/24 w/ new SHIRA w/ oliguria and shock liver.        Plan: Critical Care   Detailed assessment and plan for this patient are as follows:    1.Neurology:   # CVA (historic) - R frontal CVA w/ residual deficit (2011)  # Risk of ICU delirium (advanced age, multiple comorbidity)  - Monitor neurological status. Notify the MD for any acute changes in exam.    2. Cardiovascular: no acute issues   # Hypotension - likely cardiogenic due to either graft dysfunction or LVOT obstruction  # CAD s/p 5v-CABG (11/18/24, Dr. Pennington, GUTIERREZ->LAD, LRA->OM, rSVG->rPDA, rSVG->rPLV, rSVG->diag)   # Asymmetric LV septum w/ LVOT gradient  # Baseline essential HTN  - TTE re: LV/RV, LVOT gradient  - Telemetry & hemodynamics monitoring; goal MAP > 65  - ECG if developing new/worse chest pain or arrhythmia  - NE, Vaso gtts for inotropic and pressor support, wean as able  - Hold PTA bB and CCB  - Continue ASA, plavix  - keep MAP>80, HR <80  - Euvolemia    3. Pulmonary/Ventilator Management:   # Acute respiratory failure with hypoxemia +/- hypercapnia - multifactorial   FiO2 (%): 55 %, Resp: 30  - Continue BiPAP, intubation PRN  - Duonebs PRN, no current indication    4. GI and Nutrition : no acute GI issues, active GIB, liver dysfunction, or constipation/diarrhea; no contraindication for enteral nutrition   # Shock liver  # Risk of constipation  - BM regimen  - hold statin   - Trend LFT,  avoid hepatoxicity agents    5. Renal/Fluids/Electrolytes:    # SHIRA - likely both pre-renal due to shock  # Risk of electrolyte abnormalities (including hypo- and/or hyper- K, Mg, phos, Cl, Na) - likely 2/2 poor PO intake, acute illness phase, medication related; will treat the underlying diseases and replete PRN if low  - Monitor renal function, I/O, UOP  - Avoid diuresis  - Replete electrolytes (K, Mg, Ca, Phos) per protocol  - Avoid nephrotoxic agents    6. Infectious Disease: SHIV  - Hold abx  - Monitor fever curve, WBC, inflammation    7. Endocrine: no active issues, stable  # DM at baseline not on home insulin  # HLD  # Obesity BMI: Body mass index is 39.63 kg/m .  - ISS    8. Hematology/Oncology:   # Anemia (chronic acute unknown chronicity)  # Coagulopathy due to shock liver  # Risk of thrombocytopenia   ?  - Trend CBC w/ diff & coag  - Transfusion goal: Hgb >7, plt >10 (plt >20 if +fever)  - Iron study    10. MSK: no active issues  # Chronic back pain ?  - Non-opioid pain regimen preferred (Tylenol PRN)  - Ulcer screen and treatment as per ICU protocol  - PT/OT/rehab evaluation     11. ICU chest list  # FAST HUG  Feeding:  Feeding: no.  Patient is receiving NPO    Jasmine: yes  Analgesia/Sedation:no    Thromboembolic prophylaxis: Yes; Mode:  Heparin and SCDs  HOB>30:  Yes  Stress Ulcer Protocol Active: No; Mode: Not Indicated  Glycemic Control: Any glucose > 180 yes; Mode of Insulin Therapy: Sliding Scale Insulin      # PT/OT/SLP/early mobility:  Restraints? no  Can patient have PT and mobility trial: no  SLP: no    12. Access/Tubes/Lines: transition to PIV if central access no needed  LDAs (Last charted value/Number of Days):   Introducer 11/18/24 Internal jugular Right (Active)   Specific Qualities Other (Comment) 11/19/24 2000   Dressing Intervention Other (Comment) 11/19/24 1600   Dressing Change Due 11/24/24 11/19/24 1200   Number of days: 2       CVC Single Lumen Right Internal jugular (Active)   Site  Assessment WDL 11/20/24 0800   Dressing Chlorhexidine disk;Transparent 11/20/24 0800   Dressing Status clean;dry;intact 11/20/24 0400   Dressing Intervention other (comment) 11/19/24 1600   Dressing Change Due 11/24/24 11/19/24 0800   Line Necessity Yes, meets criteria 11/20/24 0800   Status saline locked 11/20/24 0800   Line Intervention Flushed 11/20/24 0400   Phlebitis Scale 0-->no symptoms 11/20/24 0800   Infiltration? no 11/20/24 0800   CVC Comment CVP monitored 11/20/24 0400   Number of days: 2       Urethral Catheter 11/18/24 Temperature probe;Latex 16 fr (Active)   Tube Description UTV 11/20/24 0800   Catheter Care Catheter wipes;Done 11/20/24 0800   Collection Container Standard 11/20/24 0800   Securement Method Securing device (Describe) 11/20/24 0800   Rationale for Continued Use ICU only: hourly urine output needed for patient care 11/20/24 0800   Urine Output 5 mL 11/20/24 0800   Number of days: 2       13. Disposition: ICU    14. Code status: Full    Please refer to attending addendum for final recommendation and billing time.     This patient will remain in ICU due to critically ill condition and has elevated risk for imminent or life threatening deterioration which required intensive care level of attention, intervention and personal management.     KP Hardy MD PhD  PGY7 Fellow-In-Training  Critical Care Medicine (Cardiology)  For patient care, please contact via [employer] #: Britany Hardy    11/20/2024  11:25 AM         [facility]  Securely message with [employer] (more info)  Text page via [facility] Paging/Directory     Clinically Significant Risk Factors        # Hyperkalemia: Highest K = 5.7 mmol/L in last 2 days, will monitor as appropriate  # Hypernatremia: Highest Na = 146 mmol/L in last 2 days, will monitor as appropriate  # Hyperchloremia: Highest Cl = 115 mmol/L in last 2 days, will monitor as appropriate      # Hypocalcemia: Lowest iCa = 4.1 mg/dL in last 2 days, will monitor  "and replace as appropriate     # Hypoalbuminemia: Lowest albumin = 3.1 g/dL at 11/20/2024  4:24 AM, will monitor as appropriate  # Coagulation Defect: INR = 1.64 (Ref range: 0.85 - 1.15) and/or PTT = 61 Seconds (Ref range: 22 - 38 Seconds), will monitor for bleeding   # Acute Kidney Injury, unspecified: based on a >150% or 0.3 mg/dL increase in last creatinine compared to past 90 day average, will monitor renal function  # Hypertension: Noted on problem list     # Acute Hypoxic Respiratory Failure: Documented O2 saturation < 90%. Continue supplemental oxygen as needed  # Acute Hypercapnic Respiratory Failure: based on arterial blood gas results.  Continue supplemental oxygen and ventilatory support as indicated.  # Acute Hypercapnic Respiratory Failure: based on venous blood gas results.  Continue supplemental oxygen and ventilatory support as indicated.         # Obesity: Estimated body mass index is 39.63 kg/m  as calculated from the following:    Height as of 11/7/24: 1.7 m (5' 6.93\").    Weight as of this encounter: 114.5 kg (252 lb 8 oz)., PRESENT ON ADMISSION     # Financial/Environmental Concerns:     # History of CABG: noted on surgical history            ______________________________________________________________________    Interval History   AST/ALT in 1000s this am  Resp distress on BiPAP, labile mental status    Physical Exam   Vital Signs: Temp: 97.9  F (36.6  C) Temp src: Axillary BP: (!) 76/52 Pulse: 89   Resp: 30 SpO2: 96 % O2 Device: BiPAP/CPAP Oxygen Delivery: 10 LPM  Weight: 252 lbs 8 oz  Gen: mildly distress, sleepy  HEENT: mmm  CV: RRR, no m/r/g; post sternotomy incision site  Lung: weak breath sounds b/l, no rhonchi  Abd: soft, NDNT  Neuro: sleepy but arousable, able to move all 4 extremities but decreased strength in all; no focal deficit based on my limited neuro exam      Data   I reviewed all medications, new labs and imaging results over the last 24 hours.  Arterial Blood Gases   Recent " Labs   Lab 11/20/24  0900 11/20/24  0739 11/19/24  0547 11/18/24  2102   PH 7.32* 7.29* 7.34* 7.34*   PCO2 42 47* 42 39   PO2 158* 98 71* 83   HCO3 22 22 23 21       Complete Blood Count   Recent Labs   Lab 11/20/24  0424 11/19/24  1338 11/19/24  0501 11/18/24  2206 11/18/24  1458 11/18/24  1418 11/18/24  1242 11/18/24  1241   WBC 19.1*  --  18.8*  --   --  22.6*  --  14.5*   HGB 7.7* 8.0* 7.7* 8.3*   < > 9.0*   < > 7.7*     --  233  --   --  245  --  210    < > = values in this interval not displayed.       Basic Metabolic Panel  Recent Labs   Lab 11/20/24  0900 11/20/24  0808 11/20/24  0424 11/19/24  2238 11/19/24  2021 11/19/24  0702 11/19/24  0501 11/19/24  0001 11/19/24  0000     --  137  --   --   --  145  --  146*   POTASSIUM 5.7*  --  5.7* 5.4*  --   --  5.2  --  5.2   CHLORIDE 107  --  107  --   --   --  114*  --  115*   CO2 23  --  22  --   --   --  22  --  23   BUN 37.9*  --  33.3*  --   --   --  20.1  --  16.6   CR 1.91*  --  1.64*  --   --   --  0.95  --  0.86   * 117* 118*  --  113*   < > 116*  119*   < > 98    < > = values in this interval not displayed.       Liver Function Tests  Recent Labs   Lab 11/20/24  0900 11/20/24 0424 11/19/24  0000 11/18/24  1418 11/18/24  1241 11/18/24  0348 11/14/24  0505   AST  --  1,150* 91* 76*  --  53*  --    ALT  --  820* 54* 57*  --  71*  --    ALKPHOS  --  53 50 55  --  80  --    BILITOTAL  --  0.4 0.2 0.4  --  0.4  --    ALBUMIN  --  3.1* 3.4* 3.6  --  4.0 4.4   INR 1.64*  --   --  1.40* 1.50*  --  1.08       Pancreatic Enzymes  No lab results found in last 7 days.    Coagulation Profile  Recent Labs   Lab 11/20/24  0900 11/18/24  1418 11/18/24  1241 11/15/24  0844 11/15/24  0123 11/14/24  0505   INR 1.64* 1.40* 1.50*  --   --  1.08   PTT  --  61* 49* 45* 88* 59*       IMAGING:  Recent Results (from the past 24 hours)   XR Chest Port 1 View    Narrative    EXAM: XR CHEST PORT 1 VIEW  LOCATION: [facility]  DATE:  11/20/2024    INDICATION: Evaluate etiology of hypoxemia  COMPARISON: Yesterday      Impression    IMPRESSION: Mild to moderate low lung volumes. Sternotomy with mediastinal clips and markers. Bilateral chest tubes stable. No pneumothorax seen. Cardiomegaly. Mild pulmonary vascular prominence. Mild to moderate elevation right hemidiaphragm. Linear   atelectasis left upper lung. Small left pleural effusion or pleural thickening. No focal airspace consolidations.

## 2024-11-20 NOTE — PHARMACY-VANCOMYCIN DOSING SERVICE
"Pharmacy Vancomycin Initial Note  Date of Service 2024  Patient's  1955  69 year old, female    Indication: Sepsis    Current estimated CrCl = Estimated Creatinine Clearance: 34.6 mL/min (A) (based on SCr of 2.03 mg/dL (H)).    Creatinine for last 3 days  2024:  3:48 AM Creatinine 0.94 mg/dL;  2:18 PM Creatinine 0.81 mg/dL  2024: 12:00 AM Creatinine 0.86 mg/dL;  5:01 AM Creatinine 0.95 mg/dL  2024:  4:24 AM Creatinine 1.64 mg/dL;  9:00 AM Creatinine 1.91 mg/dL;  2:00 PM Creatinine 2.03 mg/dL    Recent Vancomycin Level(s) for last 3 days  No results found for requested labs within last 3 days.      Vancomycin IV Administrations (past 72 hours)        No vancomycin orders with administrations in past 72 hours.                    Nephrotoxins and other renal medications (From now, onward)      Start     Dose/Rate Route Frequency Ordered Stop    24 1700  vancomycin (VANCOCIN) 1,000 mg in 200 mL dextrose intermittent infusion        Placed in \"Followed by\" Linked Group    1,000 mg  200 mL/hr over 1 Hours Intravenous EVERY 24 HOURS 24 1647      24 1700  vancomycin (VANCOCIN) 2,500 mg in 0.9% NaCl 525 mL intermittent infusion        Placed in \"Followed by\" Linked Group    2,500 mg  over 120 Minutes Intravenous ONCE 24 1647      24 1600  vancomycin (VANCOCIN) 1,000 mg in 200 mL dextrose intermittent infusion         1,000 mg  200 mL/hr over 1 Hours Intravenous EVERY 24 HOURS 24 1553      24 1200  vasopressin (VASOSTRICT) 20 Units in sodium chloride 0.9 % 100 mL standard conc infusion         0.5-4 Units/hr  2.5-20 mL/hr  Intravenous CONTINUOUS 24 1155              Contrast Orders - past 72 hours (72h ago, onward)      Start     Dose/Rate Route Frequency Stop    24 1130  perflutren lipid microsphere (DEFINITY) injection SUSP 2 mL         2 mL Intravenous ONCE 24 1113    24 0830  iopamidol (ISOVUE-370) solution 117 mL      " "  Placed in \"And\" Linked Group    117 mL Intravenous ONCE              Insight[employer] Prediction of Planned Initial Vancomycin Regimen  Loading dose: 2500 mg at 17:00 11/20/2024.  Regimen: 1000 mg IV every 24 hours.  Start time: 17:00 on 11/21/2024  Exposure target: AUC24 (range)400-600 mg/L.hr   AUC24,ss: 457 mg/L.hr  Probability of AUC24 > 400: 64 %  Ctrough,ss: 15.3 mg/L  Probability of Ctrough,ss > 20: 26 %  Probability of nephrotoxicity (Lodise ANDRES 2009): 11 %        Plan:  Start vancomycin  2500 mg IV x 1 followed by 1000 mg IV q24h.   Vancomycin monitoring method: AUC  Vancomycin therapeutic monitoring goal: 400-600 mg*h/L  Pharmacy will check vancomycin levels as appropriate in 1-3 Days.    Serum creatinine levels will be ordered daily for the first week of therapy and at least twice weekly for subsequent weeks.      Mitesh Wu [facility]    "

## 2024-11-20 NOTE — SIGNIFICANT EVENT
RT responded to a code stroke for PT with altered mental status. Upon arrival PT was sitting in chair and on bipap; 14/8, 20, 50%. PT transferred back to bed. SP02 100%, but had smaller VT, EPAP decreased to 6 cmH20, which increased VT and MV, while maintaining SP02 in the high 90's. ABG to be drawn at 0900. RT will continue to monitor closely.     Alex Avitia, RT on 11/20/2024 at 8:17 AM

## 2024-11-20 NOTE — PROGRESS NOTES
Recheck VBG unchanged. Pt remains very lethargic but opens eyes briefly to voice. R pupil slightly larger than left and has sluggish reaction. Pt able to hold her arms up for 5 seconds but R stays lower than left. Moderate grasps. Not answering all questions d/t lethargy, possible aphasia. ABG's being checked, intensivist present at bedside. See orders.

## 2024-11-20 NOTE — PROGRESS NOTES
Pt became very lethargic this am, RR called and hospitalist present at bedside. VBG sent showing pH 7.22, pCO2 60. Pt changed from CPAP to Bipap  with RR 16 and FiO2 remains at 55%. Pt arouses to voice, answers some questions correctly but then drifts off. Able to state her  but continues to say  when asked about current date etc. Pt has R arm drift, then both arms are weak d/t lethargy.

## 2024-11-20 NOTE — PROCEDURES
[facility]    PA Cather Insertion    Date/Time: 11/20/2024 2:30 PM    Performed by: Britany Haryd MD  Authorized by: Mike Garcia MD  Indications: central pressure monitoring      UNIVERSAL PROTOCOL   Site Marked: Yes  Prior Images Obtained and Reviewed:  Yes  Required items: Required blood products, implants, devices and special equipment available    Patient identity confirmed:  Verbally with patient, arm band, provided demographic data and hospital-assigned identification number  Patient was reevaluated immediately before administering moderate or deep sedation or anesthesia  Confirmation Checklist:  Patient's identity using two indicators, relevant allergies, procedure was appropriate and matched the consent or emergent situation and correct equipment/implants were available  Time out: Immediately prior to the procedure a time out was called    Universal Protocol: the Joint Commission Universal Protocol was followed    Preparation: Patient was prepped and draped in usual sterile fashion    ESBL (mL):  0    SEDATION    Patient Sedated: No          PROCEDURE  Describe Procedure: Patient already has a MAC in the right internal jugular vein. The introducer port was occupied by a 7Fr double-lumen catheter with 1 port connected for CVP. After routine sterilization of the field, this catheter was removed. The inserted port was further sterilized by ChloroPrep x2. A 7.5F triple-lumen PA catheter was inserted and advanced via SVC, RA, RV, PA with balloon inflated. PCWP was obtained and recorded. Balloon was deflated and PAC was locked at 56cm. Good PA waveform was read. PA catheter location was confirmed by CXR.     No sedation was used given no puncture via the skin.   Patient Tolerance:  Patient tolerated the procedure well with no immediate complications  Length of time physician/provider present for 1:1 monitoring during sedation: 30

## 2024-11-20 NOTE — CONSULTS
Care Management Initial Consult    General Information  Assessment completed with: Patient,    Type of CM/SW Visit: Initial Assessment    Primary Care Provider verified and updated as needed: Yes   Readmission within the last 30 days: no previous admission in last 30 days         Advance Care Planning: Advance Care Planning Reviewed:  (no HCD)          Communication Assessment  Patient's communication style: spoken language (English or Bilingual)    Hearing Difficulty or Deaf: no   Wear Glasses or Blind: yes    Cognitive  Cognitive/Neuro/Behavioral: .WDL except, arousability, level of consciousness  Level of Consciousness: lethargic  Arousal Level: arouses to voice  Orientation: disoriented to, situation  Mood/Behavior: calm  Best Language: 0 - No aphasia  Speech: spontaneous, clear, logical    Living Environment:   People in home: spouse  lives with   Current living Arrangements: town home      Able to return to prior arrangements: yes       Family/Social Support:  Care provided by: self  Provides care for: spouse  Marital Status:   Support system: , Children          Description of Support System: Supportive, Involved         Current Resources:   Patient receiving home care services: No        Community Resources: None  Equipment currently used at home: none  Supplies currently used at home: None    Employment/Financial:  Employment Status: retired        Financial Concerns:             Does the patient's insurance plan have a 3 day qualifying hospital stay waiver?  Yes     Which insurance plan 3 day waiver is available? Alternative insurance waiver    Will the waiver be used for post-acute placement? No    Lifestyle & Psychosocial Needs:  Social Drivers of Health     Food Insecurity: Low Risk  (11/18/2024)    Food Insecurity     Within the past 12 months, did you worry that your food would run out before you got money to buy more?: No     Within the past 12 months, did the food you bought  just not last and you didn t have money to get more?: No   Depression: Not at risk (9/9/2024)    PHQ-2     PHQ-2 Score: 2   Housing Stability: Low Risk  (11/18/2024)    Housing Stability     Do you have housing? : Yes     Are you worried about losing your housing?: No   Recent Concern: Housing Stability - High Risk (11/8/2024)    Housing Stability     Do you have housing? : No     Are you worried about losing your housing?: No   Tobacco Use: Medium Risk (11/14/2024)    Patient History     Smoking Tobacco Use: Former     Smokeless Tobacco Use: Never     Passive Exposure: Not on file   Financial Resource Strain: Low Risk  (11/18/2024)    Financial Resource Strain     Within the past 12 months, have you or your family members you live with been unable to get utilities (heat, electricity) when it was really needed?: No   Alcohol Use: Not on file   Transportation Needs: Low Risk  (11/18/2024)    Transportation Needs     Within the past 12 months, has lack of transportation kept you from medical appointments, getting your medicines, non-medical meetings or appointments, work, or from getting things that you need?: No   Physical Activity: Not on file   Interpersonal Safety: Low Risk  (11/18/2024)    Interpersonal Safety     Do you feel physically and emotionally safe where you currently live?: Yes     Within the past 12 months, have you been hit, slapped, kicked or otherwise physically hurt by someone?: No     Within the past 12 months, have you been humiliated or emotionally abused in other ways by your partner or ex-partner?: No   Stress: Not on file   Social Connections: Not on file   Health Literacy: Not on file       Functional Status:  Prior to admission patient needed assistance:   Dependent ADLs:: Independent  Dependent IADLs:: Independent           Discussed  Partnership in Safe Discharge Planning  document with patient/family: No        Additional Information:    Assessment completed with patient's spouse and  daughter-Suyapa.  Patient lives in her town house with spouse. She is independent with ADLs/IADLs, ambulates without devices and has no services in community.  Of note, spouse had a stroke a year ago and has some physical limitation for assisting patient. Spouse is primary family contact. Daughter Suyapa is supportive and involved.            Anca Gimenez RN

## 2024-11-21 ENCOUNTER — APPOINTMENT (OUTPATIENT)
Dept: RADIOLOGY | Facility: HOSPITAL | Age: 69
End: 2024-11-21
Attending: STUDENT IN AN ORGANIZED HEALTH CARE EDUCATION/TRAINING PROGRAM
Payer: COMMERCIAL

## 2024-11-21 ENCOUNTER — APPOINTMENT (OUTPATIENT)
Dept: OCCUPATIONAL THERAPY | Facility: HOSPITAL | Age: 69
DRG: 235 | End: 2024-11-21
Attending: STUDENT IN AN ORGANIZED HEALTH CARE EDUCATION/TRAINING PROGRAM
Payer: COMMERCIAL

## 2024-11-21 VITALS
WEIGHT: 249.4 LBS | DIASTOLIC BLOOD PRESSURE: 59 MMHG | RESPIRATION RATE: 9 BRPM | BODY MASS INDEX: 39.14 KG/M2 | SYSTOLIC BLOOD PRESSURE: 111 MMHG | TEMPERATURE: 98.9 F | OXYGEN SATURATION: 96 % | HEART RATE: 71 BPM

## 2024-11-21 LAB
ALBUMIN SERPL BCG-MCNC: 3.1 G/DL (ref 3.5–5.2)
ALP SERPL-CCNC: 75 U/L (ref 40–150)
ALT SERPL W P-5'-P-CCNC: 581 U/L (ref 0–50)
ANION GAP SERPL CALCULATED.3IONS-SCNC: 11 MMOL/L (ref 7–15)
ANION GAP SERPL CALCULATED.3IONS-SCNC: 12 MMOL/L (ref 7–15)
AST SERPL W P-5'-P-CCNC: 702 U/L (ref 0–45)
ATRIAL RATE - MUSE: 63 BPM
BACTERIA BLD CULT: NORMAL
BACTERIA UR CULT: NO GROWTH
BASE EXCESS BLDA CALC-SCNC: -2.8 MMOL/L (ref -3–3)
BASE EXCESS BLDA CALC-SCNC: -3.3 MMOL/L (ref -3–3)
BASE EXCESS BLDA CALC-SCNC: -4.8 MMOL/L (ref -3–3)
BASE EXCESS BLDA CALC-SCNC: -5 MMOL/L (ref -3–3)
BASE EXCESS BLDA CALC-SCNC: -5.8 MMOL/L (ref -3–3)
BASE EXCESS BLDV CALC-SCNC: -1.2 MMOL/L (ref -3–3)
BASE EXCESS BLDV CALC-SCNC: -7.8 MMOL/L (ref -3–3)
BILIRUB DIRECT SERPL-MCNC: 0.36 MG/DL (ref 0–0.3)
BILIRUB SERPL-MCNC: 0.6 MG/DL
BUN SERPL-MCNC: 37.3 MG/DL (ref 8–23)
BUN SERPL-MCNC: 43.8 MG/DL (ref 8–23)
C PNEUM DNA SPEC QL NAA+PROBE: NOT DETECTED
CA-I BLD-MCNC: 4.8 MG/DL (ref 4.4–5.2)
CALCIUM SERPL-MCNC: 7.6 MG/DL (ref 8.8–10.4)
CALCIUM SERPL-MCNC: 7.8 MG/DL (ref 8.8–10.4)
CHLORIDE SERPL-SCNC: 102 MMOL/L (ref 98–107)
CHLORIDE SERPL-SCNC: 106 MMOL/L (ref 98–107)
COHGB MFR BLD: 92.8 % (ref 95–96)
COHGB MFR BLD: 93.6 % (ref 95–96)
COHGB MFR BLD: 93.7 % (ref 95–96)
COHGB MFR BLD: 93.8 % (ref 95–96)
COHGB MFR BLD: 96.6 % (ref 95–96)
CORTIS SERPL-MCNC: 13.3 UG/DL
CORTIS SERPL-MCNC: 35.6 UG/DL
CREAT SERPL-MCNC: 1.44 MG/DL (ref 0.51–0.95)
CREAT SERPL-MCNC: 1.73 MG/DL (ref 0.51–0.95)
CRP SERPL-MCNC: 154.3 MG/L
DIASTOLIC BLOOD PRESSURE - MUSE: NORMAL MMHG
EGFRCR SERPLBLD CKD-EPI 2021: 32 ML/MIN/1.73M2
EGFRCR SERPLBLD CKD-EPI 2021: 39 ML/MIN/1.73M2
ERYTHROCYTE [DISTWIDTH] IN BLOOD BY AUTOMATED COUNT: 15.2 % (ref 10–15)
FLUAV H1 2009 PAND RNA SPEC QL NAA+PROBE: NOT DETECTED
FLUAV H1 RNA SPEC QL NAA+PROBE: NOT DETECTED
FLUAV H3 RNA SPEC QL NAA+PROBE: NOT DETECTED
FLUAV RNA SPEC QL NAA+PROBE: NOT DETECTED
FLUBV RNA SPEC QL NAA+PROBE: NOT DETECTED
GLUCOSE BLDC GLUCOMTR-MCNC: 117 MG/DL (ref 70–99)
GLUCOSE BLDC GLUCOMTR-MCNC: 86 MG/DL (ref 70–99)
GLUCOSE BLDC GLUCOMTR-MCNC: 88 MG/DL (ref 70–99)
GLUCOSE BLDC GLUCOMTR-MCNC: 92 MG/DL (ref 70–99)
GLUCOSE SERPL-MCNC: 103 MG/DL (ref 70–99)
GLUCOSE SERPL-MCNC: 95 MG/DL (ref 70–99)
HADV DNA SPEC QL NAA+PROBE: NOT DETECTED
HCO3 BLD-SCNC: 20 MMOL/L (ref 21–28)
HCO3 BLD-SCNC: 21 MMOL/L (ref 21–28)
HCO3 BLD-SCNC: 22 MMOL/L (ref 21–28)
HCO3 BLD-SCNC: 22 MMOL/L (ref 21–28)
HCO3 BLD-SCNC: 23 MMOL/L (ref 21–28)
HCO3 BLDV-SCNC: 18 MMOL/L (ref 21–28)
HCO3 BLDV-SCNC: 25 MMOL/L (ref 21–28)
HCO3 SERPL-SCNC: 21 MMOL/L (ref 22–29)
HCO3 SERPL-SCNC: 21 MMOL/L (ref 22–29)
HCOV PNL SPEC NAA+PROBE: NOT DETECTED
HCT VFR BLD AUTO: 25.5 % (ref 35–47)
HGB BLD-MCNC: 8.1 G/DL (ref 11.7–15.7)
HGB BLD-MCNC: 8.1 G/DL (ref 11.7–15.7)
HMPV RNA SPEC QL NAA+PROBE: NOT DETECTED
HPIV1 RNA SPEC QL NAA+PROBE: NOT DETECTED
HPIV2 RNA SPEC QL NAA+PROBE: NOT DETECTED
HPIV3 RNA SPEC QL NAA+PROBE: NOT DETECTED
HPIV4 RNA SPEC QL NAA+PROBE: NOT DETECTED
INTERPRETATION ECG - MUSE: NORMAL
M PNEUMO DNA SPEC QL NAA+PROBE: NOT DETECTED
MAGNESIUM SERPL-MCNC: 2.3 MG/DL (ref 1.7–2.3)
MCH RBC QN AUTO: 29.1 PG (ref 26.5–33)
MCHC RBC AUTO-ENTMCNC: 31.8 G/DL (ref 31.5–36.5)
MCV RBC AUTO: 92 FL (ref 78–100)
MRSA DNA SPEC QL NAA+PROBE: NEGATIVE
O2/TOTAL GAS SETTING VFR VENT: 38 %
O2/TOTAL GAS SETTING VFR VENT: 38 %
O2/TOTAL GAS SETTING VFR VENT: 40 %
O2/TOTAL GAS SETTING VFR VENT: 40 %
O2/TOTAL GAS SETTING VFR VENT: 44 %
O2/TOTAL GAS SETTING VFR VENT: 45 %
O2/TOTAL GAS SETTING VFR VENT: 45 %
OXYHGB MFR BLDV: 42 % (ref 70–75)
OXYHGB MFR BLDV: 44 % (ref 70–75)
P AXIS - MUSE: 270 DEGREES
PCO2 BLD: 40 MM HG (ref 35–45)
PCO2 BLD: 41 MM HG (ref 35–45)
PCO2 BLD: 46 MM HG (ref 35–45)
PCO2 BLDV: 40 MM HG (ref 40–50)
PCO2 BLDV: 47 MM HG (ref 40–50)
PH BLD: 7.28 [PH] (ref 7.35–7.45)
PH BLD: 7.3 [PH] (ref 7.35–7.45)
PH BLD: 7.32 [PH] (ref 7.35–7.45)
PH BLD: 7.35 [PH] (ref 7.35–7.45)
PH BLD: 7.35 [PH] (ref 7.35–7.45)
PH BLDV: 7.27 [PH] (ref 7.32–7.43)
PH BLDV: 7.33 [PH] (ref 7.32–7.43)
PHOSPHATE SERPL-MCNC: 3.3 MG/DL (ref 2.5–4.5)
PLATELET # BLD AUTO: 172 10E3/UL (ref 150–450)
PO2 BLD: 66 MM HG (ref 80–105)
PO2 BLD: 69 MM HG (ref 80–105)
PO2 BLD: 72 MM HG (ref 80–105)
PO2 BLD: 74 MM HG (ref 80–105)
PO2 BLD: 82 MM HG (ref 80–105)
PO2 BLDV: 28 MM HG (ref 25–47)
PO2 BLDV: 29 MM HG (ref 25–47)
POTASSIUM SERPL-SCNC: 4.1 MMOL/L (ref 3.4–5.3)
POTASSIUM SERPL-SCNC: 4.5 MMOL/L (ref 3.4–5.3)
PR INTERVAL - MUSE: 154 MS
PROCALCITONIN SERPL IA-MCNC: 0.95 NG/ML
PROT SERPL-MCNC: 5.3 G/DL (ref 6.4–8.3)
QRS DURATION - MUSE: 100 MS
QT - MUSE: 430 MS
QTC - MUSE: 440 MS
R AXIS - MUSE: 13 DEGREES
RBC # BLD AUTO: 2.78 10E6/UL (ref 3.8–5.2)
RSV RNA SPEC QL NAA+PROBE: NOT DETECTED
RSV RNA SPEC QL NAA+PROBE: NOT DETECTED
RV+EV RNA SPEC QL NAA+PROBE: NOT DETECTED
SA TARGET DNA: NEGATIVE
SAO2 % BLDA: 91 % (ref 92–100)
SAO2 % BLDA: 92 % (ref 92–100)
SAO2 % BLDA: 92 % (ref 92–100)
SAO2 % BLDA: 93 % (ref 92–100)
SAO2 % BLDA: 96 % (ref 92–100)
SAO2 % BLDV: 42 % (ref 70–75)
SAO2 % BLDV: 44.7 % (ref 70–75)
SODIUM SERPL-SCNC: 135 MMOL/L (ref 135–145)
SODIUM SERPL-SCNC: 138 MMOL/L (ref 135–145)
SYSTOLIC BLOOD PRESSURE - MUSE: NORMAL MMHG
T AXIS - MUSE: 74 DEGREES
VENTRICULAR RATE- MUSE: 63 BPM
WBC # BLD AUTO: 17.4 10E3/UL (ref 4–11)

## 2024-11-21 PROCEDURE — 84155 ASSAY OF PROTEIN SERUM: CPT | Performed by: PHYSICIAN ASSISTANT

## 2024-11-21 PROCEDURE — 85018 HEMOGLOBIN: CPT | Performed by: STUDENT IN AN ORGANIZED HEALTH CARE EDUCATION/TRAINING PROGRAM

## 2024-11-21 PROCEDURE — 83735 ASSAY OF MAGNESIUM: CPT | Performed by: STUDENT IN AN ORGANIZED HEALTH CARE EDUCATION/TRAINING PROGRAM

## 2024-11-21 PROCEDURE — 250N000013 HC RX MED GY IP 250 OP 250 PS 637

## 2024-11-21 PROCEDURE — 999N000157 HC STATISTIC RCP TIME EA 10 MIN

## 2024-11-21 PROCEDURE — 82805 BLOOD GASES W/O2 SATURATION: CPT | Performed by: STUDENT IN AN ORGANIZED HEALTH CARE EDUCATION/TRAINING PROGRAM

## 2024-11-21 PROCEDURE — 99291 CRITICAL CARE FIRST HOUR: CPT | Mod: GC | Performed by: STUDENT IN AN ORGANIZED HEALTH CARE EDUCATION/TRAINING PROGRAM

## 2024-11-21 PROCEDURE — 250N000009 HC RX 250: Performed by: STUDENT IN AN ORGANIZED HEALTH CARE EDUCATION/TRAINING PROGRAM

## 2024-11-21 PROCEDURE — 82947 ASSAY GLUCOSE BLOOD QUANT: CPT | Performed by: PHYSICIAN ASSISTANT

## 2024-11-21 PROCEDURE — 250N000013 HC RX MED GY IP 250 OP 250 PS 637: Performed by: PHYSICIAN ASSISTANT

## 2024-11-21 PROCEDURE — 250N000011 HC RX IP 250 OP 636: Performed by: PHYSICIAN ASSISTANT

## 2024-11-21 PROCEDURE — 82330 ASSAY OF CALCIUM: CPT

## 2024-11-21 PROCEDURE — 87641 MR-STAPH DNA AMP PROBE: CPT | Performed by: STUDENT IN AN ORGANIZED HEALTH CARE EDUCATION/TRAINING PROGRAM

## 2024-11-21 PROCEDURE — 85018 HEMOGLOBIN: CPT | Performed by: PHYSICIAN ASSISTANT

## 2024-11-21 PROCEDURE — 71045 X-RAY EXAM CHEST 1 VIEW: CPT

## 2024-11-21 PROCEDURE — 82565 ASSAY OF CREATININE: CPT | Performed by: PHYSICIAN ASSISTANT

## 2024-11-21 PROCEDURE — 80048 BASIC METABOLIC PNL TOTAL CA: CPT | Performed by: STUDENT IN AN ORGANIZED HEALTH CARE EDUCATION/TRAINING PROGRAM

## 2024-11-21 PROCEDURE — 84100 ASSAY OF PHOSPHORUS: CPT | Performed by: STUDENT IN AN ORGANIZED HEALTH CARE EDUCATION/TRAINING PROGRAM

## 2024-11-21 PROCEDURE — 86140 C-REACTIVE PROTEIN: CPT | Performed by: STUDENT IN AN ORGANIZED HEALTH CARE EDUCATION/TRAINING PROGRAM

## 2024-11-21 PROCEDURE — 87486 CHLMYD PNEUM DNA AMP PROBE: CPT | Performed by: STUDENT IN AN ORGANIZED HEALTH CARE EDUCATION/TRAINING PROGRAM

## 2024-11-21 PROCEDURE — 250N000011 HC RX IP 250 OP 636: Performed by: STUDENT IN AN ORGANIZED HEALTH CARE EDUCATION/TRAINING PROGRAM

## 2024-11-21 PROCEDURE — 97110 THERAPEUTIC EXERCISES: CPT | Mod: GO

## 2024-11-21 PROCEDURE — 85049 AUTOMATED PLATELET COUNT: CPT | Performed by: PHYSICIAN ASSISTANT

## 2024-11-21 PROCEDURE — 84520 ASSAY OF UREA NITROGEN: CPT | Performed by: PHYSICIAN ASSISTANT

## 2024-11-21 PROCEDURE — 200N000001 HC R&B ICU

## 2024-11-21 PROCEDURE — 250N000011 HC RX IP 250 OP 636

## 2024-11-21 PROCEDURE — 87640 STAPH A DNA AMP PROBE: CPT | Performed by: STUDENT IN AN ORGANIZED HEALTH CARE EDUCATION/TRAINING PROGRAM

## 2024-11-21 PROCEDURE — 80069 RENAL FUNCTION PANEL: CPT | Performed by: STUDENT IN AN ORGANIZED HEALTH CARE EDUCATION/TRAINING PROGRAM

## 2024-11-21 PROCEDURE — 82247 BILIRUBIN TOTAL: CPT | Performed by: PHYSICIAN ASSISTANT

## 2024-11-21 PROCEDURE — 94660 CPAP INITIATION&MGMT: CPT

## 2024-11-21 PROCEDURE — 87581 M.PNEUMON DNA AMP PROBE: CPT | Performed by: STUDENT IN AN ORGANIZED HEALTH CARE EDUCATION/TRAINING PROGRAM

## 2024-11-21 PROCEDURE — 84145 PROCALCITONIN (PCT): CPT | Performed by: STUDENT IN AN ORGANIZED HEALTH CARE EDUCATION/TRAINING PROGRAM

## 2024-11-21 PROCEDURE — 82533 TOTAL CORTISOL: CPT | Performed by: STUDENT IN AN ORGANIZED HEALTH CARE EDUCATION/TRAINING PROGRAM

## 2024-11-21 RX ORDER — FUROSEMIDE 10 MG/ML
40 INJECTION INTRAMUSCULAR; INTRAVENOUS 3 TIMES DAILY
Status: DISCONTINUED | OUTPATIENT
Start: 2024-11-21 | End: 2024-11-22

## 2024-11-21 RX ORDER — AMLODIPINE BESYLATE 2.5 MG/1
2.5 TABLET ORAL DAILY
Status: DISCONTINUED | OUTPATIENT
Start: 2024-11-21 | End: 2024-12-03 | Stop reason: HOSPADM

## 2024-11-21 RX ORDER — PANTOPRAZOLE SODIUM 40 MG/1
40 TABLET, DELAYED RELEASE ORAL
Status: DISCONTINUED | OUTPATIENT
Start: 2024-11-22 | End: 2024-12-03 | Stop reason: HOSPADM

## 2024-11-21 RX ADMIN — HEPARIN SODIUM 5000 UNITS: 5000 INJECTION, SOLUTION INTRAVENOUS; SUBCUTANEOUS at 06:11

## 2024-11-21 RX ADMIN — HYDROMORPHONE HYDROCHLORIDE 0.2 MG: 0.2 INJECTION, SOLUTION INTRAMUSCULAR; INTRAVENOUS; SUBCUTANEOUS at 00:27

## 2024-11-21 RX ADMIN — FUROSEMIDE 40 MG: 10 INJECTION, SOLUTION INTRAVENOUS at 12:29

## 2024-11-21 RX ADMIN — HEPARIN SODIUM 5000 UNITS: 5000 INJECTION, SOLUTION INTRAVENOUS; SUBCUTANEOUS at 13:58

## 2024-11-21 RX ADMIN — PIPERACILLIN AND TAZOBACTAM 3.38 G: 3; .375 INJECTION, POWDER, FOR SOLUTION INTRAVENOUS at 00:27

## 2024-11-21 RX ADMIN — ASPIRIN 81 MG CHEWABLE TABLET 81 MG: 81 TABLET CHEWABLE at 08:22

## 2024-11-21 RX ADMIN — SENNOSIDES AND DOCUSATE SODIUM 1 TABLET: 8.6; 5 TABLET ORAL at 08:21

## 2024-11-21 RX ADMIN — CLOPIDOGREL BISULFATE 75 MG: 75 TABLET ORAL at 08:22

## 2024-11-21 RX ADMIN — Medication 0.2 MCG/KG/MIN: at 03:31

## 2024-11-21 RX ADMIN — PIPERACILLIN AND TAZOBACTAM 3.38 G: 3; .375 INJECTION, POWDER, FOR SOLUTION INTRAVENOUS at 08:21

## 2024-11-21 RX ADMIN — FUROSEMIDE 40 MG: 10 INJECTION, SOLUTION INTRAVENOUS at 08:21

## 2024-11-21 RX ADMIN — AMLODIPINE BESYLATE 2.5 MG: 2.5 TABLET ORAL at 08:22

## 2024-11-21 RX ADMIN — SENNOSIDES AND DOCUSATE SODIUM 1 TABLET: 8.6; 5 TABLET ORAL at 20:54

## 2024-11-21 RX ADMIN — FUROSEMIDE 40 MG: 10 INJECTION, SOLUTION INTRAVENOUS at 18:55

## 2024-11-21 RX ADMIN — PANTOPRAZOLE SODIUM 40 MG: 40 INJECTION, POWDER, FOR SOLUTION INTRAVENOUS at 06:31

## 2024-11-21 RX ADMIN — LIDOCAINE 1 PATCH: 4 PATCH TOPICAL at 16:42

## 2024-11-21 RX ADMIN — PIPERACILLIN AND TAZOBACTAM 3.38 G: 3; .375 INJECTION, POWDER, FOR SOLUTION INTRAVENOUS at 16:35

## 2024-11-21 RX ADMIN — POLYETHYLENE GLYCOL 3350 17 G: 17 POWDER, FOR SOLUTION ORAL at 08:22

## 2024-11-21 RX ADMIN — HEPARIN SODIUM 5000 UNITS: 5000 INJECTION, SOLUTION INTRAVENOUS; SUBCUTANEOUS at 21:03

## 2024-11-21 ASSESSMENT — ACTIVITIES OF DAILY LIVING (ADL)
ADLS_ACUITY_SCORE: 19.75
ADLS_ACUITY_SCORE: 19.75
ADLS_ACUITY_SCORE: 0
ADLS_ACUITY_SCORE: 0
ADLS_ACUITY_SCORE: 19.75
ADLS_ACUITY_SCORE: 0
ADLS_ACUITY_SCORE: 19.75
ADLS_ACUITY_SCORE: 0
ADLS_ACUITY_SCORE: 0
ADLS_ACUITY_SCORE: 19.75
ADLS_ACUITY_SCORE: 19.75
ADLS_ACUITY_SCORE: 0
ADLS_ACUITY_SCORE: 19.75
ADLS_ACUITY_SCORE: 0
ADLS_ACUITY_SCORE: 19.75

## 2024-11-21 NOTE — PROCEDURES
Critical Care Procedure Note     Pulmonary artery catheter (Randalia-David) placement         Indication: Undifferentiated shock   Consent: Informed consent obtained   Performed by: HANS Garcia MD  Medications: 5 mL Lidocaine 1%    Universal Protocol: Patient Identification was verified. Time out was performed. Site was identified. The skin overlying the vein was sterilized with Chlorhexidine. Sterile barrier precautions were utilized: hands washed, sterile gloves, gown, drape, bouffant cap, and eye protection were applied.     Summary: A time-out was completed verifying correct patient, procedure, site, positioning, and special equipment if applicable. The triple lumen catheter within the right internal jugular transducer port was removed. The port was sterilized. A triple lumen continuous cardiac output Randalia-David catheter was brought onto the field and each line flushed with sterile saline. The catheter was introduced into the Cordis catheter to a distance of 15-17 cm. The balloon was then inflated and the catheter was advanced through the right ventricle and into the pulmonary artery until a wedge position pressure tracing was obtained. The balloon was then deflated and verification of return of a pulmonary artery pressure tracing made. During the floating procedure to position the catheter the position of the catheter tip was determined by continuous pressure monitoring via the distal port. The catheter was locked to the transducer and a sterile dressing applied. The patient tolerated the procedure well without immediate complications. Minimal estimated blood loss. The area was cleansed and a sterile dressing was applied.      Findings: SVO2 35, CO/CI 4.7/2, , PAOP 17, PAP 38/17, CVP 14     HANS Garcia MD  Critical Care Medicine

## 2024-11-21 NOTE — PROGRESS NOTES
[facility] - ICU    RN Progress Note:            Pertinent Assessments:      Please refer to flowsheet rows for full assessment     Pt had eventful 12 hour shift. Pt is on Bipap at 40%. Edenilson gtt and Vaso started. Vaso turned off. Edenilson is currently at 0.8. PRBC x1 unit given. Jasmine changed out and urine culture sent. Blood culture sent as well. Awaiting pt to cough up sputum to send for culture. CT output remains minimal. Urine output extremely low averaging 10cc/hr. Lasix IV push and gtt given. Lasix gtt dc'd in lieu of hypotension.            Key Events - This Shift:       Pt was minimally responsive in the chair this morning. Pt hoyered back to bed and stroke code was called. Pt would awaken to voice and continuous stimulation but fall back asleep. Narcan given as pt renal status had worsened and pain meds given at midnight. BP woke up after Narcan but did not remain awake. Resp status was too tenuous on bipap to go for CT. Intubation was considered. [place] was placed in R radial artery. BP showed 60/30. Cuff on the thigh was showing . Cuff moved to the arms and correlated with the hillary. MD originally wanted to go with the thigh cuff until  made aware of the hypotension and inaccuracy of thigh pressure. Levo gtt started and titrated up to 0.2. Providers switched to Edenilson and Vaso. Lasix gtt was stopped. Sinclair was floated and CARLOS calculation performed. Hillary was replaced in axillary artery as radial stopped working. As BP increased, urine output and mental status improved.   Pt given a break off of Bipap on 6L mask when she was nauseated. Will remain NPO. Cardene started at low dose to prevent vasospasm in L radial harvest.                 Barriers to Discharge / Downgrade:     Pt remains on pressors, on Bipap          Point of Contact Update: YES-OR-NO: Yes  If No, reason: NA  Name:Daughters and friend  Phone Number:see chart  Summary of Conversation:  updated throughout the day about  status.

## 2024-11-21 NOTE — PROCEDURES
Critical Care Procedure Note     Ultrasound guided R-axillary arterial line placement     Indication: Blood pressure monitoring for hemodynamic instability due to shock    Consent: Informed consent obtained   Performed by: HANS Garcia MD  Medications: 5 mL Lidocaine 1%    Universal Protocol: Patient Identification was verified. Time out was performed. Site was identified. The skin overlying the artery was sterilized with Chlorhexidine. Sterile barrier precautions were utilized: hands washed, gloves, sterile drape, and eye protection was applied.     Summary: The L-axillary artery was palpated and visualized on ultrasound.  The artery was successfully cannulated on the first pass. Pulsatile, arterial blood was visualized and the artery was then threaded with a guidewire using the Seldinger technique.  The needle was removed and ultrasound visualized the wire in the artery. A catheter was threaded over the wire. The wire was removed. The catheter was sutured into place. Adequate wave-form was observed. The patient tolerated the procedure well without immediate complications. Minimal estimated blood loss. The area was cleansed and a sterile dressing was applied.     HANS Garcia MD  Critical Care Medicine

## 2024-11-21 NOTE — PROGRESS NOTES
CVTS Daily Progress Note   POD#3 s/p CABG x5 with LIMA to LAD, left radial artery to obtuse marginal, rSVG to RPDA, RPL, and diagonal, endoscopic harvest of left radial artery and right saphenous vein   Attending: Gorge  LOS: 3    SUBJECTIVE/INTERVAL EVENTS:    No acute events overnight. Issues yesterday (see notes) multi-factorial, but largely improving globally. Patient seen up in chair this AM; alert and oriented. Normotensive on nicardipine (radial artery graft protection) and rosy. NSR. CI 2.5. Maintaining oxygen saturation on nasal cannula. Pain well controlled. - BM. Cleared for swallow/diet overnight. UOP remains inadequate as diuresis was held yesterday secondary to hemodynamics. Cr 1.73 and downtrending. LFTs downtrending. Chest tube output appropriate. Hgb 8.1. Remains on empiric Vanc and Zosyn with cultures NGTD. WBC 17.4. Patient denies new chest pain, SOB, calf pain, abdominal pain, nausea. Questions answered.        OBJECTIVE:  Temp:  [96.4  F (35.8  C)-98  F (36.7  C)] 98  F (36.7  C)  Pulse:  [56-89] 69  Resp:  [6-35] 19  BP: ()/(50-67) 111/59  MAP:  [6 mmHg-104 mmHg] 75 mmHg  Arterial Line BP: (-3-162)/(-3-72) 107/57  FiO2 (%):  [40 %] 40 %  SpO2:  [76 %-99 %] 91 %  Vitals:    11/19/24 0600 11/20/24 0530 11/21/24 0548   Weight: 114.3 kg (251 lb 14.4 oz) 114.5 kg (252 lb 8 oz) 113.1 kg (249 lb 6.4 oz)       Clinically Significant Risk Factors        # Hyperkalemia: Highest K = 5.7 mmol/L in last 2 days, will monitor as appropriate        # Hypoalbuminemia: Lowest albumin = 3.1 g/dL at 11/21/2024  4:30 AM, will monitor as appropriate    # Coagulation Defect: INR = 1.64 (Ref range: 0.85 - 1.15) and/or PTT = 61 Seconds (Ref range: 22 - 38 Seconds), will monitor for bleeding   # Acute Kidney Injury, unspecified: based on a >150% or 0.3 mg/dL increase in last creatinine compared to past 90 day average, will monitor renal function  # Hypertension: Noted on problem list     # Acute Hypoxic Respiratory  "Failure: Documented O2 saturation < 90%. Continue supplemental oxygen as needed  # Acute Hypercapnic Respiratory Failure: based on arterial blood gas results.  Continue supplemental oxygen and ventilatory support as indicated.  # Acute Hypercapnic Respiratory Failure: based on venous blood gas results.  Continue supplemental oxygen and ventilatory support as indicated.         # Obesity: Estimated body mass index is 39.14 kg/m  as calculated from the following:    Height as of 11/7/24: 1.7 m (5' 6.93\").    Weight as of this encounter: 113.1 kg (249 lb 6.4 oz)., PRESENT ON ADMISSION       # Financial/Environmental Concerns:     # History of CABG: noted on surgical history              Current Medications:    Scheduled Meds:  Current Facility-Administered Medications   Medication Dose Route Frequency Provider Last Rate Last Admin    [Held by provider] acetaminophen (TYLENOL) tablet 975 mg  975 mg Oral Q8H Jasmin Lemons PA-C   975 mg at 11/19/24 0515    amLODIPine (NORVASC) tablet 2.5 mg  2.5 mg Oral Daily Ly Atwood PA-C   2.5 mg at 11/21/24 0822    aspirin (ASA) chewable tablet 81 mg  81 mg Oral or NG Tube Daily Ly Atwood PA-C   81 mg at 11/21/24 0822    Or    aspirin (ASA) Suppository 300 mg  300 mg Rectal Daily Ly Atwood PA-C   300 mg at 11/20/24 1409    [Held by provider] atorvastatin (LIPITOR) tablet 80 mg  80 mg Oral At Bedtime Jasmin Lemons PA-C   80 mg at 11/19/24 2032    clopidogrel (PLAVIX) tablet 75 mg  75 mg Oral Daily Ly Atwood PA-C   75 mg at 11/21/24 0822    furosemide (LASIX) injection 40 mg  40 mg Intravenous TID Ly Atwood PA-C   40 mg at 11/21/24 0821    heparin ANTICOAGULANT injection 5,000 Units  5,000 Units Subcutaneous Q8H Jasmin Lemons PA-C   5,000 Units at 11/21/24 0611    influenza trivalent vaccine high-dose for ages 65 years and greater (FLUZONE-HD) injection 0.5 mL  0.5 mL Intramuscular Prior to " discharge Mary Pennington MD        insulin aspart (NovoLOG) injection (RAPID ACTING)  1-7 Units Subcutaneous TID AC Ly Atwood PA-C        insulin aspart (NovoLOG) injection (RAPID ACTING)  1-5 Units Subcutaneous At Bedtime Ly Atwood PA-C        iopamidol (ISOVUE-370) solution 117 mL  117 mL Intravenous Once Ly Atwood PA-C        And    sodium chloride 0.9 % bag for CT scan flush use  100 mL As instructed Once Ly Atwood PA-C        Lidocaine (LIDOCARE) 4 % Patch 1-2 patch  1-2 patch Transdermal Q24H Jasmin Lemons PA-C   1 patch at 11/20/24 1613    [Held by provider] metFORMIN (GLUCOPHAGE XR) 24 hr tablet 500 mg  500 mg Oral Daily with supper Jasmin Lemons PA-C        pantoprazole (PROTONIX) 2 mg/mL suspension 40 mg  40 mg Per Feeding Tube QAM AC Mike Garcia MD        Or    pantoprazole (PROTONIX) IV push injection 40 mg  40 mg Intravenous QAM AC Mike Garcia MD   40 mg at 11/21/24 0631    piperacillin-tazobactam (ZOSYN) 3.375 g vial to attach to  mL bag  3.375 g Intravenous Q8H Mike Garcia MD   3.375 g at 11/21/24 0821    polyethylene glycol (MIRALAX) Packet 17 g  17 g Oral Daily Jasmin Lemons PA-C   17 g at 11/21/24 0822    senna-docusate (SENOKOT-S/PERICOLACE) 8.6-50 MG per tablet 1 tablet  1 tablet Oral BID Jasmin Lemons PA-C   1 tablet at 11/21/24 0821    vancomycin (VANCOCIN) 1,000 mg in 200 mL dextrose intermittent infusion  1,000 mg Intravenous Q24H Britany Hardy MD         Continuous Infusions:  Current Facility-Administered Medications   Medication Dose Route Frequency Provider Last Rate Last Admin    niCARdipine 40 mg in 200 mL NS (CARDENE) infusion  0.5 mg/hr Intravenous Continuous Ly Atwood PA-C 2.5 mL/hr at 11/21/24 0822 0.5 mg/hr at 11/21/24 0822    phenylephrine (LISA-SYNEPHRINE) 50 mg in NaCl 0.9 % 250 mL infusion  0.1-6 mcg/kg/min Intravenous Continuous Britany Hardy MD 17.2 mL/hr at  11/21/24 0822 0.5 mcg/kg/min at 11/21/24 0822    vasopressin (VASOSTRICT) 20 Units in sodium chloride 0.9 % 100 mL standard conc infusion  0.5-4 Units/hr Intravenous Continuous Mike Garcia MD   Stopped at 11/20/24 1645     PRN Meds:.  Current Facility-Administered Medications   Medication Dose Route Frequency Provider Last Rate Last Admin    [Held by provider] acetaminophen (TYLENOL) tablet 650 mg  650 mg Oral Q4H PRN Jasmin Lemons PA-C        bisacodyl (DULCOLAX) suppository 10 mg  10 mg Rectal Daily PRN Jasmin Lemons PA-C        calcium gluconate 1 g in 50 mL in sodium chloride intermittent infusion  1 g Intravenous Once PRN Jasmin Lemons PA-C   1 g at 11/20/24 0705    calcium gluconate 2 g in  mL intermittent infusion  2 g Intravenous Once PRN Jasmin Lemons PA-C        calcium gluconate 3 g in sodium chloride 0.9 % 100 mL intermittent infusion  3 g Intravenous Once PRN Jasmin Lemons PA-C        glucose gel 15-30 g  15-30 g Oral Q15 Min PRN Jasmin Lemons PA-C        Or    dextrose 50 % injection 25-50 mL  25-50 mL Intravenous Q15 Min PRN Jasmin Lemons PA-C        Or    glucagon injection 1 mg  1 mg Subcutaneous Q15 Min PRN Jasmin Lemons PA-C        hydrALAZINE (APRESOLINE) injection 10 mg  10 mg Intravenous Q30 Min PRN Jasmin Lemons PA-C        HYDROmorphone (DILAUDID) injection 0.2 mg  0.2 mg Intravenous Q2H PRN Jasmin Leomns PA-C   0.2 mg at 11/21/24 0027    Or    HYDROmorphone (DILAUDID) injection 0.4 mg  0.4 mg Intravenous Q2H PRN Jasmin Lemons PA-C        lactated ringers BOLUS 250 mL  250 mL Intravenous Q15 Min PRN Jasmin Lemons PA-C 500 mL/hr at 11/18/24 1950 250 mL at 11/18/24 1950    magnesium hydroxide (MILK OF MAGNESIA) suspension 30 mL  30 mL Oral Daily PRN Jasmin Lemons PA-C        naloxone (NARCAN) injection 0.2 mg  0.2 mg Intravenous Q2 Min PRN Jasmin Lemons PA-C        Or     naloxone (NARCAN) injection 0.4 mg  0.4 mg Intravenous Q2 Min PRN Jasmin Lemons PA-C        Or    naloxone (NARCAN) injection 0.2 mg  0.2 mg Intramuscular Q2 Min PRN Jasmin Lemons PA-C   0.2 mg at 11/20/24 0812    Or    naloxone (NARCAN) injection 0.4 mg  0.4 mg Intramuscular Q2 Min PRN Jasmin Lemons, PA-C        ondansetron (ZOFRAN ODT) ODT tab 4 mg  4 mg Oral Q6H PRN Jasmin Lemons PA-C        Or    ondansetron (ZOFRAN) injection 4 mg  4 mg Intravenous Q6H PRN Jasmin Lemons PA-C   4 mg at 11/20/24 1635    oxyCODONE (ROXICODONE) tablet 5 mg  5 mg Oral Q4H PRN Jasmin Lemons PA-C        Or    oxyCODONE (ROXICODONE) tablet 10 mg  10 mg Oral Q4H PRN Jasmin Lemons, PA-C   10 mg at 11/20/24 0019    prochlorperazine (COMPAZINE) injection 5 mg  5 mg Intravenous Q6H PRN Jasmin Lemons PA-C        Or    prochlorperazine (COMPAZINE) tablet 5 mg  5 mg Oral Q6H PRN Jasmin Lemons, PA-C           Cardiographics:    Telemetry monitoring demonstrates NSR with rates in the 60s.    Imaging:  Results for orders placed or performed during the hospital encounter of 11/18/24   XR Chest Port 1 View    Impression    IMPRESSION: Endotracheal tube terminates approximately 5.0 cm above the osmin. Right neck central venous catheter sheath with tip in the mid SVC. Bilateral chest tubes, ascending mediastinal drain, and median sternotomy wires also noted.    The lungs are slightly hypoinflated, otherwise negative. No definite pleural effusion or pneumothorax.    Normal size cardiomediastinal silhouette.   XR Chest Port 1 View    Impression    IMPRESSION: Patient has been extubated. Sternotomy with mediastinal clips and markers. Bilateral chest tubes. No pneumothorax seen. Cardiomegaly. Mild pulmonary vascular prominence. Linear atelectasis left upper lung. No focal airspace consolidations.       Labs, personally reviewed.  Hemoglobin   Date Value Ref Range Status   11/21/2024  8.1 (L) 11.7 - 15.7 g/dL Final   11/20/2024 8.4 (L) 11.7 - 15.7 g/dL Final   11/20/2024 7.6 (L) 11.7 - 15.7 g/dL Final   06/08/2019 12.9 11.7 - 15.7 g/dL Final   06/07/2019 13.6 11.7 - 15.7 g/dL Final   10/14/2016 13.2 11.7 - 15.7 g/dL Final     WBC   Date Value Ref Range Status   06/08/2019 8.8 4.0 - 11.0 10e9/L Final   06/07/2019 12.9 (H) 4.0 - 11.0 10e9/L Final   10/14/2016 9.9 4.0 - 11.0 10e9/L Final     WBC Count   Date Value Ref Range Status   11/21/2024 17.4 (H) 4.0 - 11.0 10e3/uL Final   11/20/2024 19.1 (H) 4.0 - 11.0 10e3/uL Final   11/19/2024 18.8 (H) 4.0 - 11.0 10e3/uL Final     Platelet Count   Date Value Ref Range Status   11/21/2024 172 150 - 450 10e3/uL Final   11/20/2024 177 150 - 450 10e3/uL Final   11/19/2024 233 150 - 450 10e3/uL Final   06/08/2019 291 150 - 450 10e9/L Final   06/07/2019 352 150 - 450 10e9/L Final   10/14/2016 297 150 - 450 10e9/L Final     Creatinine   Date Value Ref Range Status   11/21/2024 1.73 (H) 0.51 - 0.95 mg/dL Final   11/20/2024 1.94 (H) 0.51 - 0.95 mg/dL Final   11/20/2024 1.99 (H) 0.51 - 0.95 mg/dL Final   01/15/2021 0.81 0.52 - 1.04 mg/dL Final   06/08/2019 0.67 0.52 - 1.04 mg/dL Final   06/07/2019 0.73 0.52 - 1.04 mg/dL Final     Potassium   Date Value Ref Range Status   11/21/2024 4.5 3.4 - 5.3 mmol/L Final   11/20/2024 5.0 3.4 - 5.3 mmol/L Final   11/20/2024 4.8 3.4 - 5.3 mmol/L Final   01/31/2023 4.2 3.4 - 5.3 mmol/L Final   08/26/2021 3.9 3.4 - 5.3 mmol/L Final   01/15/2021 4.1 3.4 - 5.3 mmol/L Final   06/08/2019 4.0 3.4 - 5.3 mmol/L Final   06/07/2019 3.2 (L) 3.4 - 5.3 mmol/L Final     Potassium POCT   Date Value Ref Range Status   11/18/2024 3.4 3.4 - 5.3 mmol/L Final   11/18/2024 3.5 3.4 - 5.3 mmol/L Final   11/18/2024 4.1 3.4 - 5.3 mmol/L Final     Magnesium   Date Value Ref Range Status   11/21/2024 2.3 1.7 - 2.3 mg/dL Final   11/20/2024 2.2 1.7 - 2.3 mg/dL Final   11/19/2024 2.1 1.7 - 2.3 mg/dL Final   08/12/2011 2.2 1.6 - 2.3 mg/dL Final   05/06/2010 2.3  1.6 - 2.3 mg/dL Final   05/05/2010 2.3 1.6 - 2.3 mg/dL Final          I/O:  I/O last 3 completed shifts:  In: 2261.76 [I.V.:1961.76]  Out: 925 [Urine:595; Chest Tube:330]       Physical Exam:    General: Patient seen up in chair. NAD. Alert and oriented.  CV: NSR on monitor. 2+ peripheral pulses in all extremities. Moderate edema. Incision C/D/I.  Pulm: Non-labored effort on nasal cannula. Chest tubes in place, serosanguinous output, no air leak.  Abd: Soft, NT, ND  : Jasmine with bailey urine  Ext: Mild pedal edema, SCDs in place, warm, distal pulses intact  Neuro: CNs grossly intact      ASSESSMENT/PLAN:    Karyn Gamez is a 69 year old female with a history of CAD who is s/p CABGx5.    Active Problems:    CAD (coronary artery disease)      NEURO:   - Scheduled Tylenol (held)/lidocaine patches and PRN Tylenol (held) /oxycodone/dilaudid for pain  - Had not taken PTA Wellbutrin or Zoloft for several months--can reassess after hospital discharge.    CV:   - Pre-op EF 60-65%  - Normotensive on rosy and nicardipine as below  - Nicardipine for radial artery graft protection--can turn off 2 hours after CCB admin  - Amlodipine 2.5mg daily--do not hold (radial artery graft protection)  - Beta blocker pending weaning from pressors/tolerating CCB  - TCA53ac daily (decreased dose due to Plavix)  - Plavix daily for one year per surgeon request   - Atorvastatin 80mg daily (held in light of LFTs)  - Chest tubes to remain today     PULM:   - Extubated on POD0  - Maintaining oxygen saturations on NC this AM  - Encourage pulmonary toilet    FEN/GI:  - Continue electrolyte replacement protocol  - Diet: Cardiac, ADAT  - Bowel regimen  - Transaminitis improving    RENAL:  - Adequate UOP/hr. Continue to monitor closely.  - Cr 1.73 and downtrending  - BMP q12h  - Jasmine to remain in for close monitoring of I/O   - Diuresis with Lasix 40mg IV TID    HEME:  - Acute blood loss anemia post-op.   - No bleeding concerns. Hep SQ, ASA  - Hgb  8.1 with recheck daily  - 1u PRBC 11/19 for Hgb 7.7    ID:  - Lor op ppx complete, afebrile. No concerns for infection  - Empiric Vanc and Zosyn. Cultures 11/20 NGTD    ENDO:   - SSI  - PTA Metformin held; restart when appropriate    PPx:   - DVT: SCDs, SQ heparin TID, ambulation   - GI: Protonix 40mg IV/PO daily    DISPO:   - Continue critical care in ICU due to pressor requirement  - Medically Ready for Discharge: Anticipated in 5+ Days         Patient discussed with Dr. Pennington.        _______  VERA GraceC  Cardiothoracic Surgery  [phone]

## 2024-11-21 NOTE — PROGRESS NOTES
"  [facility]    Stroke Telephone Note    This patient was handed off to me from the previous shift as the code was still active with imaging pending.  Please see note from my colleague La Gates CNP from earlier today.  It now appears that CT CTA has been canceled.  Called to speak with the intensivist who reported that the patient's mental status has improved with BiPAP.  They no longer have clinical concerns for an acute stroke and stroke code will be canceled.    Vitals  BP: 94/51   Pulse: 68   Resp: 16   Temp: 97.9  F (36.6  C)   Weight: 114.5 kg (252 lb 8 oz)    Stroke Code Data (for stroke code without tele)  Stroke code activated 11/20/24  0751   Stroke provider first response 11/20/24  0754   Last known normal 11/20/24  0300      Time of discovery (or onset of symptoms) 11/20/24  0620   Head CT read by Stroke Neuro Provider     (not obtained) - no longer felt indicated by in house team   Was stroke code de-escalated? Yes  11/20/24  1813       Impression  Acute encephalopathy without report of focal neurologic deficits -Per intensivist patient has improved with BiPAP and symptoms felt to be related to CO2 retention    Recommendations   -CT, CTA head and neck orders had already been canceled  -Advised that we will no longer be following the patient so a stroke code should be recalled if there is clinical concern for stroke in the future      My recommendations are based on the information provided over the phone by Karyn Gamez's in-person providers. They are not intended to replace the clinical judgment of her in-person providers. I was not requested to personally see or examine the patient at this time.     Imani Skelton PA-C  Vascular Neurology    To page me or covering stroke neurology team member, click here: [email or web address]  Choose \"On Call\" tab at top, then select \"NEUROLOGY/ALL SITES\" from middle drop-down box, press Enter, then look for \"stroke\" or \"telestroke\" for your site.    "

## 2024-11-21 NOTE — PROGRESS NOTES
[facility]    ICU Progress Note       Date of Admission:  11/18/2024    Assessment: Critical Care   Karyn Gamez is a 69 year old female admitted on 11/18/2024.   69F h/o R frontal lobe CVA w/ residual deficit (2011), T2DM, HTN, HLD, active tobacco usage, mood disorder, presented 11/7/2024 w/ chest pain c/w NSTEMI, found to have multi-vessel CAD and HCM on CMR, s/p 5v-CABG (11/18/24, Dr. Pennington, GUTIERREZ->LAD, LRA->OM, rSVG->rPDA, rSVG->rPLV, rSVG->diag), w/ hospital course c/b HoTN requiring phenylephrine briefly, initially doing well but 11/20/24 w/ new SHIRA w/ oliguria and shock liver. Bedside PAC suggested vasodilatory effect with congested BiV pressure due to worse LVOT gradient seen on TTE. Pt was started on phenylephrine and vasopressin gtt w/ slow improvement with LFT, Cr and UOP. Remains critically ill in ICU.        Plan: Critical Care   Detailed assessment and plan for this patient are as follows:    1.Neurology:   # CVA (historic) - R frontal CVA w/ residual deficit (2011)  # Risk of ICU delirium (advanced age, multiple comorbidity)  - Monitor neurological status. Notify the MD for any acute changes in exam.    2. Cardiovascular: no acute issues   # Hypotension - likely cardiogenic shock, most likely due to worse LVOT obstruction, improving with phenylephrine and vasopressin  # Asymmetric LV septum w/ LVOT gradient  # CAD s/p 5v-CABG (11/18/24, Dr. Pennington, GUTIERREZ->LAD, LRA->OM, rSVG->rPDA, rSVG->rPLV, rSVG->diag)   # Baseline essential HTN  - Keep PAC; Tiffanie labs q shift  - Goal MAP > 70 to help LVOT gradient; keep HR <80  - ECG if developing new/worse chest pain or arrhythmia  - phenylephrine gtts for inotropic and pressor support, wean as able  - change nicardipine to PO amlodipine (for RA graft)  - Continue ASA, plavix  - Euvolemia    3. Pulmonary/Ventilator Management:   # Acute respiratory failure with hypoxemia +/- hypercapnia - multifactorial, now better  FiO2 (%): 40 %, Resp:  19  - Continue monitoring, currently on NC  - Duonebs PRN, no current indication    4. GI and Nutrition : no acute GI issues, active GIB, liver dysfunction, or constipation/diarrhea; no contraindication for enteral nutrition   # Shock liver, improving  # Risk of constipation  - BM regimen  - hold statin   - Trend LFT, avoid hepatoxicity agents    5. Renal/Fluids/Electrolytes:    # SHIRA - likely poor perfusion and congestion due to cardiogenic shock  # Risk of electrolyte abnormalities (including hypo- and/or hyper- K, Mg, phos, Cl, Na) - likely 2/2 poor PO intake, acute illness phase, medication related; will treat the underlying diseases and replete PRN if low  - Monitor renal function, I/O, UOP  - Avoid overdiuresis  - Replete electrolytes (K, Mg, Ca, Phos) per protocol  - Avoid nephrotoxic agents    6. Infectious Disease: SHIV  - c/w vanc/zosyn for empiric tx; discharge vanc if MRSA negative  - Monitor fever curve, WBC, inflammation    7. Endocrine: no active issues, stable  # DM at baseline not on home insulin  # HLD  # Obesity BMI: Body mass index is 39.14 kg/m .  - ISS    8. Hematology/Oncology:   # Anemia (acute), likely postop  # Coagulopathy due to shock liver  # Risk of thrombocytopenia   ?  - Trend CBC w/ diff & coag  - Transfusion goal: Hgb >7, plt >10 (plt >20 if +fever)  - Iron study    10. MSK: no active issues  # Chronic back pain ?  - Non-opioid pain regimen preferred (Tylenol PRN)  - Ulcer screen and treatment as per ICU protocol  - PT/OT/rehab evaluation     11. ICU chest list  # FAST HUG  Feeding:  Feeding: no.  Patient is receiving ORAL    Jasmine: yes  Analgesia/Sedation:no    Thromboembolic prophylaxis: Yes; Mode:  Heparin and SCDs  HOB>30:  Yes  Stress Ulcer Protocol Active: No; Mode: PPI  Glycemic Control: Any glucose > 180 yes; Mode of Insulin Therapy: Sliding Scale Insulin      # PT/OT/SLP/early mobility:  Restraints? no  Can patient have PT and mobility trial: yes, with caution  SLP:  no    12. Access/Tubes/Lines: transition to PIV if central access no needed  LDAs (Last charted value/Number of Days):   Introducer 11/18/24 Internal jugular Right (Active)   Specific Qualities Other (Comment) 11/19/24 2000   Dressing Intervention Other (Comment) 11/19/24 1600   Dressing Change Due 11/24/24 11/19/24 1200   Number of days: 2       CVC Single Lumen Right Internal jugular (Active)   Site Assessment WDL 11/20/24 0800   Dressing Chlorhexidine disk;Transparent 11/20/24 0800   Dressing Status clean;dry;intact 11/20/24 0400   Dressing Intervention other (comment) 11/19/24 1600   Dressing Change Due 11/24/24 11/19/24 0800   Line Necessity Yes, meets criteria 11/20/24 0800   Status saline locked 11/20/24 0800   Line Intervention Flushed 11/20/24 0400   Phlebitis Scale 0-->no symptoms 11/20/24 0800   Infiltration? no 11/20/24 0800   CVC Comment CVP monitored 11/20/24 0400   Number of days: 2       Urethral Catheter 11/18/24 Temperature probe;Latex 16 fr (Active)   Tube Description UTV 11/20/24 0800   Catheter Care Catheter wipes;Done 11/20/24 0800   Collection Container Standard 11/20/24 0800   Securement Method Securing device (Describe) 11/20/24 0800   Rationale for Continued Use ICU only: hourly urine output needed for patient care 11/20/24 0800   Urine Output 5 mL 11/20/24 0800   Number of days: 2       13. Disposition: ICU    14. Code status: Full    Please refer to attending addendum for final recommendation and billing time.     This patient will remain in ICU due to critically ill condition and has elevated risk for imminent or life threatening deterioration which required intensive care level of attention, intervention and personal management.     KP Hardy MD PhD  PGY7 Fellow-In-Training  Critical Care Medicine (Cardiology)  For patient care, please contact via [employer] #: Britany Hardy    11/21/2024  7:15 AM         [facility]  Securely message with [employer] (more info)  Text page  "via [facility] Paging/Directory     Clinically Significant Risk Factors        # Hyperkalemia: Highest K = 5.7 mmol/L in last 2 days, will monitor as appropriate        # Hypoalbuminemia: Lowest albumin = 3.1 g/dL at 11/21/2024  4:30 AM, will monitor as appropriate    # Coagulation Defect: INR = 1.64 (Ref range: 0.85 - 1.15) and/or PTT = 61 Seconds (Ref range: 22 - 38 Seconds), will monitor for bleeding   # Acute Kidney Injury, unspecified: based on a >150% or 0.3 mg/dL increase in last creatinine compared to past 90 day average, will monitor renal function  # Hypertension: Noted on problem list     # Acute Hypoxic Respiratory Failure: Documented O2 saturation < 90%. Continue supplemental oxygen as needed  # Acute Hypercapnic Respiratory Failure: based on arterial blood gas results.  Continue supplemental oxygen and ventilatory support as indicated.  # Acute Hypercapnic Respiratory Failure: based on venous blood gas results.  Continue supplemental oxygen and ventilatory support as indicated.         # Obesity: Estimated body mass index is 39.14 kg/m  as calculated from the following:    Height as of 11/7/24: 1.7 m (5' 6.93\").    Weight as of this encounter: 113.1 kg (249 lb 6.4 oz)., PRESENT ON ADMISSION       # Financial/Environmental Concerns:     # History of CABG: noted on surgical history            ______________________________________________________________________    Interval History   LVOT 70s on TTE  Started phenylephrine, vaso for MAP>70s  Bedside PAC w/ SvO2 30s, improved with phenylephrine and vaso  Restarted nicardipine for radial graft as per CVS  Given 1u pRBC      Physical Exam   Vital Signs: Temp: (!) 96.4  F (35.8  C) (refusing blankets or warm covers. sts she feels warm most of the time.) Temp src: Axillary BP: 101/56 Pulse: 66   Resp: 19 SpO2: 93 % O2 Device: Nasal cannula Oxygen Delivery: 6 LPM  Weight: 249 lbs 6.4 oz  Gen: sitting in chair, NAD, tired appearing, able to engage " conversations  HEENT: amando, FERNANDO PAC  CV: RRR, soft systolic murmur; post sternotomy incision site  Lung: weak breath sounds b/l, no rhonchi  Abd: soft, NDNT  Neuro: AAOx3, able to move all 4 extremities but decreased strength in all; no focal deficit based on my limited neuro exam      Data   I reviewed all medications, new labs and imaging results over the last 24 hours.  Arterial Blood Gases   Recent Labs   Lab 11/21/24  0611 11/21/24  0038 11/20/24 2021 11/20/24  1834   PH 7.30* 7.28* 7.28* 7.30*   PCO2 41 46* 47* 45   PO2 72* 74* 71* 68*   HCO3 20* 22 22 22       Complete Blood Count   Recent Labs   Lab 11/21/24  0430 11/20/24 2020 11/20/24  1400 11/20/24  0424 11/19/24  1338 11/19/24  0501 11/18/24  1458 11/18/24  1418   WBC 17.4*  --   --  19.1*  --  18.8*  --  22.6*   HGB 8.1* 8.4* 7.6* 7.7*   < > 7.7*   < > 9.0*     --   --  177  --  233  --  245    < > = values in this interval not displayed.       Basic Metabolic Panel  Recent Labs   Lab 11/21/24  0430 11/20/24 2020 11/20/24  2019 11/20/24  1822 11/20/24  1805 11/20/24  1400    139  --   --  138 138   POTASSIUM 4.5 5.0  --   --  4.8 5.1   CHLORIDE 106 107  --   --  107 107   CO2 21* 22  --   --  22 21*   BUN 43.8* 42.9*  --   --  43.1* 40.9*   CR 1.73* 1.94*  --   --  1.99* 2.03*   GLC 95 106* 99 106* 111* 120*       Liver Function Tests  Recent Labs   Lab 11/21/24  0430 11/20/24  1400 11/20/24  1128 11/20/24  0900 11/19/24  0000 11/18/24  1418 11/18/24  1241   * 1,061* 1,131* 1,121*   < > 76*  --    * 813* 875* 933*   < > 57*  --    ALKPHOS 75 64 61 54   < > 55  --    BILITOTAL 0.6 0.6 0.6 0.6   < > 0.4  --    ALBUMIN 3.1* 3.3* 3.2* 3.2*   < > 3.6  --    INR  --   --   --  1.64*  --  1.40* 1.50*    < > = values in this interval not displayed.       Pancreatic Enzymes  No lab results found in last 7 days.    Coagulation Profile  Recent Labs   Lab 11/20/24  0900 11/18/24  1418 11/18/24  1241 11/15/24  0844 11/15/24  0123   INR  1.64* 1.40* 1.50*  --   --    PTT  --  61* 49* 45* 88*       IMAGING:  Recent Results (from the past 24 hours)   Echocardiogram Limited   Result Value    LVEF  65-70%    [facility]    [id number]  [id number]  [id number]  [id number]^[address] [address]     Name: CAILIN CAMPOS  MRN: [id number]  : 1955  Study Date: 2024 10:35 AM  Age: 69 yrs  Gender: Female  Patient Location: [facility]  Reason For Study: Shock  Ordering Physician: ALEXY LANCE  Performed By: KAITLIN     BSA: 2.2 m2  Height: 67 in  Weight: 252 lb  HR: 89  BP: 105/58 mmHg  ______________________________________________________________________________  Procedure  Limited Portable Echo Adult. Definity (NDC #06717-871) given intravenously.  Technically difficult study. Compared to the prior study dated 2024,  there are changes as noted.  ______________________________________________________________________________  Interpretation Summary     1. Left ventricular chamber size is normal. Severe asymmetric septal wall  hypertrophy is present with a maximum thickness of 1.8 cm. There is severe  resting left ventricular outflow tract obstruction with a peak gradient of 66  mmHg. Systolic function is normal with a visually estimated left ventricular  ejection fraction of 60-65%.  2. Right ventricle is not well visualized. Limited view suggest grossly normal  chamber size and sysotlic function.  3. Mild left atrial enlargement.  4. Systolic anterior motion of the mitral valve is noted although this results  in only trivial mitral regurgitation.  5. No significant percardial effusion.  6. Compared to the prior study dated 2024, the patient has since  undergone coronary artery bypass grafting and resting left ventricular outflow  tract obstruction is now present.  ______________________________________________________________________________  I      WMSI = 1.00     % Normal = 100     X - Cannot   0 -                       (2) - Mildly 2 -          Segments  Size  Interpret    Hyperkinetic 1 - Normal  Hypokinetic  Hypokinetic  1-2     small                                                     7 -          3-5      moderate  3 - Akinetic 4 -          5 -         6 - Akinetic Dyskinetic   6-14    large               Dyskinetic   Aneurysmal  w/scar       w/scar       15-16   diffuse     Left Ventricle  The left ventricle is normal in size. There is severe eccentric left  ventricular hypertrophy. The echo findings are consistent with left  ventricular outflow obstruction. Peak resting outflow velocity 4.1 m/s and  peak gradient 66 mmHg. The left ventricular ejection fraction is normal. The  visual ejection fraction is 65-70%. Left ventricular diastolic function is  indeterminate. No regional wall motion abnormalities noted. Septal motion is  consistent with post-operative state.     Right Ventricle  Right ventricle is not well visualized. Limited view suggest grossly normal  chamber size and sysotlic function.     Atria  The left atrium is mildly dilated. Right atrial size is normal.     Mitral Valve  Mitral valve leaflets appear normal. There is systolic anterior motion of the  mitral valve. There is trace mitral regurgitation. There is no mitral valve  stenosis.     Tricuspid Valve  The tricuspid valve is not well visualized, but is grossly normal. There is  trace tricuspid regurgitation. Right ventricular systolic pressure could not  be approximated due to inadequate tricuspid regurgitation. There is no  tricuspid stenosis.     Aortic Valve  The aortic valve is trileaflet with aortic valve sclerosis. No aortic  regurgitation is present. No aortic stenosis is present.     Pulmonic Valve  The pulmonic valve is not well visualized. There is mild (1+) pulmonic  valvular regurgitation. There is no pulmonic valvular stenosis.     Vessels  The aorta root is normal. Inferior vena cava not well visualized for  estimation of  right atrial pressure.     Pericardium  There is no pericardial effusion.     Rhythm  Sinus rhythm was noted.     ______________________________________________________________________________  MMode/2D Measurements & Calculations  IVSd: 2.0 cm  LVIDd: 4.7 cm  LVPWd: 1.2 cm  LV mass(C)d: 327.8 grams  LV mass(C)dI: 147.0 grams/m2     Ao root diam: 3.4 cm  Ao root diam index Ht(cm/m): 2.0  Ao root diam index BSA (cm/m2): 1.5  LA Volume Index (BP): 39.8 ml/m2  RWT: 0.51     Doppler Measurements & Calculations  Peak E' Abel: 4.7 cm/sec  RV S Abel: 6.9 cm/sec     ______________________________________________________________________________  Report approved by: Joan Arguello 11/20/2024 12:15 PM         XR Chest Port 1 View    Narrative    EXAM: XR CHEST PORT 1 VIEW  LOCATION: [facility]  DATE: 11/20/2024    INDICATION: Evaluate position of PA catheter  COMPARISON: Study earlier today and prior exams.      Impression    IMPRESSION: Poststernotomy changes. A Oxford-David catheter has been passed through the cordis sheath and the tip is in the right, main pulmonary artery.    Mediastinal and bilateral pleural chest tubes are again noted. Trace effusions. No pneumothorax. Linear atelectasis overlying the left hilum. Heart is slightly enlarged. No signs of failure.   US Abdomen Limited    Narrative    EXAM: US ABDOMEN LIMITED  LOCATION: [facility]  DATE: 11/20/2024    INDICATION: elevated LFTs  COMPARISON: CT 11/9/2024  TECHNIQUE: Limited abdominal ultrasound.    FINDINGS:    GALLBLADDER: Gallstones in an otherwise normal gallbladder. No wall thickening, or pericholecystic fluid. Negative sonographic He's sign.    BILE DUCTS: No biliary dilatation. The common duct measures 3 mm.    LIVER: Normal parenchyma with smooth contour. No focal mass.    RIGHT KIDNEY: No hydronephrosis.    PANCREAS: The pancreas is obscured by the overlying bandage and patient mobility.    No  ascites.      Impression    IMPRESSION:  1.  Cholelithiasis. No ultrasound evidence of cholecystitis.  2.  No bile duct dilatation.

## 2024-11-21 NOTE — PLAN OF CARE
Goal Outcome Evaluation:       [facility] - ICU    RN Progress Note:Patient has been alert and oriented, at times she does become drowsy.  She was up in chair for about 4 hours.  At that time she was on 6L NC, once back in bed I ut her back on bipap due to being fatigued.  Remains on phenylephrine, currently at 0.4mcg with a stable map greater than 70.  Monitoring labs, daighter, friend and  have been at bedside and updaed.  Complains of little to no pain.            Pertinent Assessments:      Please refer to flowsheet rows for full assessment     Remains on phenylephrine, bipap 40% fio2 when patient becomes droswy, up in chair 6L NC.              Key Events - This Shift:       Up in chair, transfer back to bed assist of 3, patient did get very fatigued.  Bipap placed.  Vitals remained stable.                Barriers to Discharge / Downgrade:     Lines, pressors, bipap

## 2024-11-22 LAB
ALBUMIN SERPL BCG-MCNC: 2.9 G/DL (ref 3.5–5.2)
ALBUMIN SERPL BCG-MCNC: 3.1 G/DL (ref 3.5–5.2)
ALP SERPL-CCNC: 102 U/L (ref 40–150)
ALP SERPL-CCNC: 123 U/L (ref 40–150)
ALT SERPL W P-5'-P-CCNC: 368 U/L (ref 0–50)
ALT SERPL W P-5'-P-CCNC: 387 U/L (ref 0–50)
ANION GAP SERPL CALCULATED.3IONS-SCNC: 12 MMOL/L (ref 7–15)
ANION GAP SERPL CALCULATED.3IONS-SCNC: 9 MMOL/L (ref 7–15)
ANION GAP SERPL CALCULATED.3IONS-SCNC: 9 MMOL/L (ref 7–15)
APTT PPP: 41 SECONDS (ref 22–38)
APTT PPP: 41 SECONDS (ref 22–38)
AST SERPL W P-5'-P-CCNC: 300 U/L (ref 0–45)
AST SERPL W P-5'-P-CCNC: 306 U/L (ref 0–45)
ATRIAL RATE - MUSE: 97 BPM
BASE EXCESS BLDA CALC-SCNC: -0.2 MMOL/L (ref -3–3)
BASE EXCESS BLDA CALC-SCNC: -0.3 MMOL/L (ref -3–3)
BASE EXCESS BLDA CALC-SCNC: 0.3 MMOL/L (ref -3–3)
BASE EXCESS BLDA CALC-SCNC: 2.4 MMOL/L (ref -3–3)
BASE EXCESS BLDV CALC-SCNC: 1.2 MMOL/L (ref -3–3)
BASE EXCESS BLDV CALC-SCNC: 1.7 MMOL/L (ref -3–3)
BILIRUB DIRECT SERPL-MCNC: 0.27 MG/DL (ref 0–0.3)
BILIRUB SERPL-MCNC: 0.6 MG/DL
BILIRUB SERPL-MCNC: 0.6 MG/DL
BUN SERPL-MCNC: 25.9 MG/DL (ref 8–23)
BUN SERPL-MCNC: 30.6 MG/DL (ref 8–23)
BUN SERPL-MCNC: 32.4 MG/DL (ref 8–23)
CA-I BLD-MCNC: 4.2 MG/DL (ref 4.4–5.2)
CA-I BLD-MCNC: 4.6 MG/DL (ref 4.4–5.2)
CALCIUM SERPL-MCNC: 7.5 MG/DL (ref 8.8–10.4)
CALCIUM SERPL-MCNC: 7.8 MG/DL (ref 8.8–10.4)
CALCIUM SERPL-MCNC: 7.9 MG/DL (ref 8.8–10.4)
CHLORIDE SERPL-SCNC: 101 MMOL/L (ref 98–107)
CHLORIDE SERPL-SCNC: 102 MMOL/L (ref 98–107)
CHLORIDE SERPL-SCNC: 102 MMOL/L (ref 98–107)
COHGB MFR BLD: 96.7 % (ref 95–96)
COHGB MFR BLD: 97 % (ref 95–96)
COHGB MFR BLD: 97.3 % (ref 95–96)
COHGB MFR BLD: 97.5 % (ref 95–96)
CREAT SERPL-MCNC: 1.13 MG/DL (ref 0.51–0.95)
CREAT SERPL-MCNC: 1.21 MG/DL (ref 0.51–0.95)
CREAT SERPL-MCNC: 1.31 MG/DL (ref 0.51–0.95)
CRP SERPL-MCNC: 136.3 MG/L
DIASTOLIC BLOOD PRESSURE - MUSE: NORMAL MMHG
EGFRCR SERPLBLD CKD-EPI 2021: 44 ML/MIN/1.73M2
EGFRCR SERPLBLD CKD-EPI 2021: 49 ML/MIN/1.73M2
EGFRCR SERPLBLD CKD-EPI 2021: 53 ML/MIN/1.73M2
ERYTHROCYTE [DISTWIDTH] IN BLOOD BY AUTOMATED COUNT: 14.5 % (ref 10–15)
ERYTHROCYTE [DISTWIDTH] IN BLOOD BY AUTOMATED COUNT: 14.5 % (ref 10–15)
FIBRINOGEN PPP-MCNC: 339 MG/DL (ref 170–510)
GLUCOSE BLDC GLUCOMTR-MCNC: 108 MG/DL (ref 70–99)
GLUCOSE BLDC GLUCOMTR-MCNC: 109 MG/DL (ref 70–99)
GLUCOSE BLDC GLUCOMTR-MCNC: 157 MG/DL (ref 70–99)
GLUCOSE BLDC GLUCOMTR-MCNC: 96 MG/DL (ref 70–99)
GLUCOSE SERPL-MCNC: 101 MG/DL (ref 70–99)
GLUCOSE SERPL-MCNC: 108 MG/DL (ref 70–99)
GLUCOSE SERPL-MCNC: 149 MG/DL (ref 70–99)
HCO3 BLD-SCNC: 25 MMOL/L (ref 21–28)
HCO3 BLD-SCNC: 25 MMOL/L (ref 21–28)
HCO3 BLD-SCNC: 26 MMOL/L (ref 21–28)
HCO3 BLD-SCNC: 27 MMOL/L (ref 21–28)
HCO3 BLDV-SCNC: 27 MMOL/L (ref 21–28)
HCO3 BLDV-SCNC: 28 MMOL/L (ref 21–28)
HCO3 SERPL-SCNC: 23 MMOL/L (ref 22–29)
HCO3 SERPL-SCNC: 24 MMOL/L (ref 22–29)
HCO3 SERPL-SCNC: 25 MMOL/L (ref 22–29)
HCT VFR BLD AUTO: 23.2 % (ref 35–47)
HCT VFR BLD AUTO: 24.5 % (ref 35–47)
HGB BLD-MCNC: 7.7 G/DL (ref 11.7–15.7)
HGB BLD-MCNC: 8.1 G/DL (ref 11.7–15.7)
INR PPP: 1.4 (ref 0.85–1.15)
INR PPP: 1.44 (ref 0.85–1.15)
INTERPRETATION ECG - MUSE: NORMAL
LACTATE SERPL-SCNC: 1.4 MMOL/L (ref 0.7–2)
MAGNESIUM SERPL-MCNC: 1.8 MG/DL (ref 1.7–2.3)
MCH RBC QN AUTO: 29.3 PG (ref 26.5–33)
MCH RBC QN AUTO: 29.6 PG (ref 26.5–33)
MCHC RBC AUTO-ENTMCNC: 33.1 G/DL (ref 31.5–36.5)
MCHC RBC AUTO-ENTMCNC: 33.2 G/DL (ref 31.5–36.5)
MCV RBC AUTO: 88 FL (ref 78–100)
MCV RBC AUTO: 89 FL (ref 78–100)
O2/TOTAL GAS SETTING VFR VENT: 30 %
O2/TOTAL GAS SETTING VFR VENT: 4 %
O2/TOTAL GAS SETTING VFR VENT: 40 %
O2/TOTAL GAS SETTING VFR VENT: 60 %
O2/TOTAL GAS SETTING VFR VENT: 85 %
O2/TOTAL GAS SETTING VFR VENT: 85 %
OXYHGB MFR BLDV: 37 % (ref 70–75)
OXYHGB MFR BLDV: 44 % (ref 70–75)
P AXIS - MUSE: NORMAL DEGREES
PCO2 BLD: 39 MM HG (ref 35–45)
PCO2 BLD: 42 MM HG (ref 35–45)
PCO2 BLD: 42 MM HG (ref 35–45)
PCO2 BLD: 44 MM HG (ref 35–45)
PCO2 BLDV: 50 MM HG (ref 40–50)
PCO2 BLDV: 51 MM HG (ref 40–50)
PH BLD: 7.37 [PH] (ref 7.35–7.45)
PH BLD: 7.38 [PH] (ref 7.35–7.45)
PH BLD: 7.41 [PH] (ref 7.35–7.45)
PH BLD: 7.42 [PH] (ref 7.35–7.45)
PH BLDV: 7.35 [PH] (ref 7.32–7.43)
PH BLDV: 7.35 [PH] (ref 7.32–7.43)
PHOSPHATE SERPL-MCNC: 2.1 MG/DL (ref 2.5–4.5)
PLATELET # BLD AUTO: 177 10E3/UL (ref 150–450)
PLATELET # BLD AUTO: 231 10E3/UL (ref 150–450)
PO2 BLD: 78 MM HG (ref 80–105)
PO2 BLD: 79 MM HG (ref 80–105)
PO2 BLD: 86 MM HG (ref 80–105)
PO2 BLD: 87 MM HG (ref 80–105)
PO2 BLDV: 25 MM HG (ref 25–47)
PO2 BLDV: 28 MM HG (ref 25–47)
POTASSIUM SERPL-SCNC: 3.7 MMOL/L (ref 3.4–5.3)
POTASSIUM SERPL-SCNC: 3.8 MMOL/L (ref 3.4–5.3)
POTASSIUM SERPL-SCNC: 4.1 MMOL/L (ref 3.4–5.3)
PR INTERVAL - MUSE: NORMAL MS
PROCALCITONIN SERPL IA-MCNC: 0.54 NG/ML
PROT SERPL-MCNC: 5.2 G/DL (ref 6.4–8.3)
PROT SERPL-MCNC: 5.3 G/DL (ref 6.4–8.3)
QRS DURATION - MUSE: 114 MS
QT - MUSE: 394 MS
QTC - MUSE: 492 MS
R AXIS - MUSE: 11 DEGREES
RBC # BLD AUTO: 2.63 10E6/UL (ref 3.8–5.2)
RBC # BLD AUTO: 2.74 10E6/UL (ref 3.8–5.2)
SAO2 % BLDA: 95 % (ref 92–100)
SAO2 % BLDA: 96 % (ref 92–100)
SAO2 % BLDV: 37.2 % (ref 70–75)
SAO2 % BLDV: 45.3 % (ref 70–75)
SODIUM SERPL-SCNC: 134 MMOL/L (ref 135–145)
SODIUM SERPL-SCNC: 136 MMOL/L (ref 135–145)
SODIUM SERPL-SCNC: 137 MMOL/L (ref 135–145)
SYSTOLIC BLOOD PRESSURE - MUSE: NORMAL MMHG
T AXIS - MUSE: -7 DEGREES
VENTRICULAR RATE- MUSE: 94 BPM
WBC # BLD AUTO: 14.4 10E3/UL (ref 4–11)
WBC # BLD AUTO: 18 10E3/UL (ref 4–11)

## 2024-11-22 PROCEDURE — 250N000009 HC RX 250: Performed by: INTERNAL MEDICINE

## 2024-11-22 PROCEDURE — 84460 ALANINE AMINO (ALT) (SGPT): CPT | Performed by: PHYSICIAN ASSISTANT

## 2024-11-22 PROCEDURE — 250N000011 HC RX IP 250 OP 636: Performed by: STUDENT IN AN ORGANIZED HEALTH CARE EDUCATION/TRAINING PROGRAM

## 2024-11-22 PROCEDURE — 94799 UNLISTED PULMONARY SVC/PX: CPT

## 2024-11-22 PROCEDURE — 250N000011 HC RX IP 250 OP 636: Performed by: INTERNAL MEDICINE

## 2024-11-22 PROCEDURE — 250N000013 HC RX MED GY IP 250 OP 250 PS 637: Performed by: STUDENT IN AN ORGANIZED HEALTH CARE EDUCATION/TRAINING PROGRAM

## 2024-11-22 PROCEDURE — 82805 BLOOD GASES W/O2 SATURATION: CPT | Performed by: STUDENT IN AN ORGANIZED HEALTH CARE EDUCATION/TRAINING PROGRAM

## 2024-11-22 PROCEDURE — 80053 COMPREHEN METABOLIC PANEL: CPT | Performed by: PHYSICIAN ASSISTANT

## 2024-11-22 PROCEDURE — 93005 ELECTROCARDIOGRAM TRACING: CPT | Performed by: STUDENT IN AN ORGANIZED HEALTH CARE EDUCATION/TRAINING PROGRAM

## 2024-11-22 PROCEDURE — 258N000003 HC RX IP 258 OP 636: Performed by: PHYSICIAN ASSISTANT

## 2024-11-22 PROCEDURE — 250N000011 HC RX IP 250 OP 636: Performed by: PHYSICIAN ASSISTANT

## 2024-11-22 PROCEDURE — 999N000157 HC STATISTIC RCP TIME EA 10 MIN

## 2024-11-22 PROCEDURE — 86140 C-REACTIVE PROTEIN: CPT | Performed by: STUDENT IN AN ORGANIZED HEALTH CARE EDUCATION/TRAINING PROGRAM

## 2024-11-22 PROCEDURE — 84100 ASSAY OF PHOSPHORUS: CPT | Performed by: STUDENT IN AN ORGANIZED HEALTH CARE EDUCATION/TRAINING PROGRAM

## 2024-11-22 PROCEDURE — 82330 ASSAY OF CALCIUM: CPT | Performed by: STUDENT IN AN ORGANIZED HEALTH CARE EDUCATION/TRAINING PROGRAM

## 2024-11-22 PROCEDURE — 82330 ASSAY OF CALCIUM: CPT

## 2024-11-22 PROCEDURE — 85048 AUTOMATED LEUKOCYTE COUNT: CPT | Performed by: PHYSICIAN ASSISTANT

## 2024-11-22 PROCEDURE — 80048 BASIC METABOLIC PNL TOTAL CA: CPT | Performed by: PHYSICIAN ASSISTANT

## 2024-11-22 PROCEDURE — 250N000009 HC RX 250: Performed by: STUDENT IN AN ORGANIZED HEALTH CARE EDUCATION/TRAINING PROGRAM

## 2024-11-22 PROCEDURE — 84145 PROCALCITONIN (PCT): CPT | Performed by: STUDENT IN AN ORGANIZED HEALTH CARE EDUCATION/TRAINING PROGRAM

## 2024-11-22 PROCEDURE — 83735 ASSAY OF MAGNESIUM: CPT | Performed by: STUDENT IN AN ORGANIZED HEALTH CARE EDUCATION/TRAINING PROGRAM

## 2024-11-22 PROCEDURE — 82248 BILIRUBIN DIRECT: CPT | Performed by: PHYSICIAN ASSISTANT

## 2024-11-22 PROCEDURE — 93005 ELECTROCARDIOGRAM TRACING: CPT

## 2024-11-22 PROCEDURE — 999N000287 HC ICU ADULT ROUNDING, EACH 10 MINS

## 2024-11-22 PROCEDURE — 250N000013 HC RX MED GY IP 250 OP 250 PS 637: Performed by: PHYSICIAN ASSISTANT

## 2024-11-22 PROCEDURE — 85018 HEMOGLOBIN: CPT | Performed by: PHYSICIAN ASSISTANT

## 2024-11-22 PROCEDURE — 82247 BILIRUBIN TOTAL: CPT | Performed by: PHYSICIAN ASSISTANT

## 2024-11-22 PROCEDURE — 99291 CRITICAL CARE FIRST HOUR: CPT | Performed by: INTERNAL MEDICINE

## 2024-11-22 PROCEDURE — 83605 ASSAY OF LACTIC ACID: CPT | Performed by: PHYSICIAN ASSISTANT

## 2024-11-22 PROCEDURE — 93010 ELECTROCARDIOGRAM REPORT: CPT | Performed by: INTERNAL MEDICINE

## 2024-11-22 PROCEDURE — 99207 PR APP CREDIT; MD BILLING SHARED VISIT: CPT

## 2024-11-22 PROCEDURE — 250N000013 HC RX MED GY IP 250 OP 250 PS 637

## 2024-11-22 PROCEDURE — 250N000012 HC RX MED GY IP 250 OP 636 PS 637: Performed by: PHYSICIAN ASSISTANT

## 2024-11-22 PROCEDURE — 99223 1ST HOSP IP/OBS HIGH 75: CPT | Mod: FS | Performed by: INTERNAL MEDICINE

## 2024-11-22 PROCEDURE — 85384 FIBRINOGEN ACTIVITY: CPT | Performed by: STUDENT IN AN ORGANIZED HEALTH CARE EDUCATION/TRAINING PROGRAM

## 2024-11-22 PROCEDURE — 250N000011 HC RX IP 250 OP 636: Mod: JZ

## 2024-11-22 PROCEDURE — 250N000009 HC RX 250: Performed by: PHYSICIAN ASSISTANT

## 2024-11-22 PROCEDURE — 85610 PROTHROMBIN TIME: CPT | Performed by: STUDENT IN AN ORGANIZED HEALTH CARE EDUCATION/TRAINING PROGRAM

## 2024-11-22 PROCEDURE — 999N000156 HC STATISTIC RCP CONSULT EA 30 MIN

## 2024-11-22 PROCEDURE — 200N000001 HC R&B ICU

## 2024-11-22 PROCEDURE — 85730 THROMBOPLASTIN TIME PARTIAL: CPT | Performed by: STUDENT IN AN ORGANIZED HEALTH CARE EDUCATION/TRAINING PROGRAM

## 2024-11-22 RX ORDER — DILTIAZEM HYDROCHLORIDE 5 MG/ML
30 INJECTION INTRAVENOUS ONCE
Status: COMPLETED | OUTPATIENT
Start: 2024-11-22 | End: 2024-11-22

## 2024-11-22 RX ORDER — POTASSIUM CHLORIDE 1500 MG/1
40 TABLET, EXTENDED RELEASE ORAL ONCE
Status: COMPLETED | OUTPATIENT
Start: 2024-11-22 | End: 2024-11-22

## 2024-11-22 RX ORDER — MAGNESIUM OXIDE 400 MG/1
400 TABLET ORAL EVERY 4 HOURS
Status: COMPLETED | OUTPATIENT
Start: 2024-11-22 | End: 2024-11-22

## 2024-11-22 RX ORDER — DILTIAZEM HYDROCHLORIDE 5 MG/ML
INJECTION INTRAVENOUS
Status: COMPLETED
Start: 2024-11-22 | End: 2024-11-22

## 2024-11-22 RX ORDER — POTASSIUM CHLORIDE 29.8 MG/ML
20 INJECTION INTRAVENOUS ONCE
Status: COMPLETED | OUTPATIENT
Start: 2024-11-22 | End: 2024-11-22

## 2024-11-22 RX ORDER — METOPROLOL TARTRATE 1 MG/ML
INJECTION, SOLUTION INTRAVENOUS
Status: DISCONTINUED
Start: 2024-11-22 | End: 2024-11-22 | Stop reason: HOSPADM

## 2024-11-22 RX ORDER — FUROSEMIDE 10 MG/ML
20 INJECTION INTRAMUSCULAR; INTRAVENOUS DAILY
Status: DISCONTINUED | OUTPATIENT
Start: 2024-11-22 | End: 2024-11-25

## 2024-11-22 RX ORDER — FENTANYL CITRATE 50 UG/ML
INJECTION, SOLUTION INTRAMUSCULAR; INTRAVENOUS
Status: COMPLETED
Start: 2024-11-22 | End: 2024-11-22

## 2024-11-22 RX ORDER — MAGNESIUM SULFATE HEPTAHYDRATE 40 MG/ML
2 INJECTION, SOLUTION INTRAVENOUS ONCE
Status: COMPLETED | OUTPATIENT
Start: 2024-11-22 | End: 2024-11-22

## 2024-11-22 RX ORDER — FENTANYL CITRATE 50 UG/ML
25-50 INJECTION, SOLUTION INTRAMUSCULAR; INTRAVENOUS ONCE
Status: COMPLETED | OUTPATIENT
Start: 2024-11-22 | End: 2024-11-22

## 2024-11-22 RX ORDER — POTASSIUM CHLORIDE 1500 MG/1
40 TABLET, EXTENDED RELEASE ORAL ONCE
Status: DISCONTINUED | OUTPATIENT
Start: 2024-11-22 | End: 2024-11-22

## 2024-11-22 RX ORDER — POTASSIUM CHLORIDE 1500 MG/1
20 TABLET, EXTENDED RELEASE ORAL ONCE
Status: COMPLETED | OUTPATIENT
Start: 2024-11-22 | End: 2024-11-22

## 2024-11-22 RX ORDER — DILTIAZEM HCL/D5W 125 MG/125
5-15 PLASTIC BAG, INJECTION (ML) INTRAVENOUS CONTINUOUS
Status: DISCONTINUED | OUTPATIENT
Start: 2024-11-22 | End: 2024-11-23

## 2024-11-22 RX ORDER — METOPROLOL TARTRATE 1 MG/ML
5 INJECTION, SOLUTION INTRAVENOUS ONCE
Status: COMPLETED | OUTPATIENT
Start: 2024-11-22 | End: 2024-11-22

## 2024-11-22 RX ORDER — MULTIVITAMIN,THERAPEUTIC
1 TABLET ORAL DAILY
Status: DISCONTINUED | OUTPATIENT
Start: 2024-11-23 | End: 2024-12-03 | Stop reason: HOSPADM

## 2024-11-22 RX ADMIN — PIPERACILLIN AND TAZOBACTAM 3.38 G: 3; .375 INJECTION, POWDER, FOR SOLUTION INTRAVENOUS at 09:24

## 2024-11-22 RX ADMIN — FENTANYL CITRATE 37.5 MCG: 50 INJECTION, SOLUTION INTRAMUSCULAR; INTRAVENOUS at 07:56

## 2024-11-22 RX ADMIN — Medication 0.5 MCG/KG/MIN: at 14:35

## 2024-11-22 RX ADMIN — INSULIN ASPART 1 UNITS: 100 INJECTION, SOLUTION INTRAVENOUS; SUBCUTANEOUS at 08:44

## 2024-11-22 RX ADMIN — METOPROLOL TARTRATE 12.5 MG: 25 TABLET, FILM COATED ORAL at 20:12

## 2024-11-22 RX ADMIN — LIDOCAINE HYDROCHLORIDE 100 MG: 20 INJECTION INTRAVENOUS at 08:44

## 2024-11-22 RX ADMIN — ASPIRIN 81 MG CHEWABLE TABLET 81 MG: 81 TABLET CHEWABLE at 10:19

## 2024-11-22 RX ADMIN — AMLODIPINE BESYLATE 2.5 MG: 2.5 TABLET ORAL at 10:39

## 2024-11-22 RX ADMIN — POTASSIUM CHLORIDE 20 MEQ: 1500 TABLET, EXTENDED RELEASE ORAL at 06:33

## 2024-11-22 RX ADMIN — HEPARIN SODIUM 5000 UNITS: 5000 INJECTION, SOLUTION INTRAVENOUS; SUBCUTANEOUS at 06:34

## 2024-11-22 RX ADMIN — CLOPIDOGREL BISULFATE 75 MG: 75 TABLET ORAL at 10:19

## 2024-11-22 RX ADMIN — MAGNESIUM OXIDE TAB 400 MG (241.3 MG ELEMENTAL MG) 400 MG: 400 (241.3 MG) TAB at 06:35

## 2024-11-22 RX ADMIN — MAGNESIUM SULFATE HEPTAHYDRATE 2 G: 40 INJECTION, SOLUTION INTRAVENOUS at 09:10

## 2024-11-22 RX ADMIN — CALCIUM GLUCONATE 1 G: 20 INJECTION, SOLUTION INTRAVENOUS at 04:49

## 2024-11-22 RX ADMIN — POTASSIUM & SODIUM PHOSPHATES POWDER PACK 280-160-250 MG 1 PACKET: 280-160-250 PACK at 10:19

## 2024-11-22 RX ADMIN — HEPARIN SODIUM 5000 UNITS: 5000 INJECTION, SOLUTION INTRAVENOUS; SUBCUTANEOUS at 14:38

## 2024-11-22 RX ADMIN — MAGNESIUM OXIDE TAB 400 MG (241.3 MG ELEMENTAL MG) 400 MG: 400 (241.3 MG) TAB at 10:18

## 2024-11-22 RX ADMIN — DILTIAZEM HYDROCHLORIDE 30 MG: 5 INJECTION INTRAVENOUS at 08:09

## 2024-11-22 RX ADMIN — POTASSIUM & SODIUM PHOSPHATES POWDER PACK 280-160-250 MG 1 PACKET: 280-160-250 PACK at 06:35

## 2024-11-22 RX ADMIN — Medication 0.2 MCG/KG/MIN: at 01:24

## 2024-11-22 RX ADMIN — HYDROMORPHONE HYDROCHLORIDE 0.2 MG: 0.2 INJECTION, SOLUTION INTRAMUSCULAR; INTRAVENOUS; SUBCUTANEOUS at 09:54

## 2024-11-22 RX ADMIN — Medication 5 MG/HR: at 09:41

## 2024-11-22 RX ADMIN — PIPERACILLIN AND TAZOBACTAM 3.38 G: 3; .375 INJECTION, POWDER, FOR SOLUTION INTRAVENOUS at 16:23

## 2024-11-22 RX ADMIN — MIDAZOLAM HYDROCHLORIDE 2 MG: 1 INJECTION, SOLUTION INTRAMUSCULAR; INTRAVENOUS at 07:55

## 2024-11-22 RX ADMIN — PANTOPRAZOLE SODIUM 40 MG: 40 TABLET, DELAYED RELEASE ORAL at 06:35

## 2024-11-22 RX ADMIN — POTASSIUM CHLORIDE 20 MEQ: 29.8 INJECTION, SOLUTION INTRAVENOUS at 09:04

## 2024-11-22 RX ADMIN — FUROSEMIDE 40 MG: 10 INJECTION, SOLUTION INTRAVENOUS at 06:34

## 2024-11-22 RX ADMIN — AMIODARONE HYDROCHLORIDE 150 MG: 1.5 INJECTION, SOLUTION INTRAVENOUS at 08:21

## 2024-11-22 RX ADMIN — POTASSIUM CHLORIDE 40 MEQ: 1500 TABLET, EXTENDED RELEASE ORAL at 12:09

## 2024-11-22 RX ADMIN — FUROSEMIDE 20 MG: 10 INJECTION, SOLUTION INTRAMUSCULAR; INTRAVENOUS at 12:26

## 2024-11-22 RX ADMIN — Medication 3.5 MCG/KG/MIN: at 09:57

## 2024-11-22 RX ADMIN — POTASSIUM & SODIUM PHOSPHATES POWDER PACK 280-160-250 MG 1 PACKET: 280-160-250 PACK at 14:38

## 2024-11-22 RX ADMIN — METOPROLOL TARTRATE 5 MG: 5 INJECTION INTRAVENOUS at 08:00

## 2024-11-22 RX ADMIN — SENNOSIDES AND DOCUSATE SODIUM 1 TABLET: 8.6; 5 TABLET ORAL at 20:12

## 2024-11-22 RX ADMIN — LIDOCAINE 1 PATCH: 4 PATCH TOPICAL at 16:23

## 2024-11-22 RX ADMIN — CALCIUM CHLORIDE 1 G: 100 INJECTION INTRAVENOUS; INTRAVENTRICULAR at 08:56

## 2024-11-22 RX ADMIN — HEPARIN SODIUM 5000 UNITS: 5000 INJECTION, SOLUTION INTRAVENOUS; SUBCUTANEOUS at 21:08

## 2024-11-22 RX ADMIN — PIPERACILLIN AND TAZOBACTAM 3.38 G: 3; .375 INJECTION, POWDER, FOR SOLUTION INTRAVENOUS at 00:12

## 2024-11-22 ASSESSMENT — ACTIVITIES OF DAILY LIVING (ADL)
ADLS_ACUITY_SCORE: 19.75
ADLS_ACUITY_SCORE: 0
ADLS_ACUITY_SCORE: 19.75
ADLS_ACUITY_SCORE: 0
ADLS_ACUITY_SCORE: 19.75
ADLS_ACUITY_SCORE: 0
ADLS_ACUITY_SCORE: 19.75

## 2024-11-22 NOTE — PLAN OF CARE
Goal Outcome Evaluation:      Plan of Care Reviewed With: patient, spouse, child    Overall Patient Progress: improvingOverall Patient Progress: improving    Outcome Evaluation: VSS on low dose Edenilson; Sats > 92% on 4LPM [facility] - ICU    RN Progress Note:            Pertinent Assessments:      Please refer to flowsheet rows for full assessment     See note below           Key Events - This Shift:       Pt more awake & Ox4. VSS on low dose Edenilson. CV surgeon modified MAP to be greater than 65 instead of 70. Chest tube output appropriate. UOP brisk with diuresis. Pain well controlled with current pain regimen.                 Barriers to Discharge / Downgrade:     Vasoactive gtt, monitoring lines         Point of Contact Update: YES-OR-NO: Yes    Name:Pt's spouse & sone  Phone Number:At bedside  Summary of Conversation: Pt's progress & care plan

## 2024-11-22 NOTE — OP NOTE
DATE OF PROCEDURE: 11/18/2024    PREOPERATIVE DIAGNOSIS: Severe, multivessel coronary artery disease, NSTEMI    POSTOPERATIVE DIAGNOSIS: Same    ATTENDING SURGEON:Johanna Pennington MD    SURGICAL ASSISTANTS: Lolis Rubio CST/JEF, Suyapa Rosales PA-C    PROCEDURES PERFORMED:    1) Median sternotomy  2) Left internal mammary artery harvest  3) Endoscopic harvest of left radial artery and right saphenous vein  4) Epiaortic ultrasound of the ascending aorta  5) Placement on central cardiopulmonary bypass  6) Coronary artery bypass grafting x5 (in-situ left internal mammary artery to left anterior descending, left radial artery to obtuse marginal, and separate reverse saphenous vein grafts to right posterior descending, right posterolateral, and diagonal coronary arteries)   7) Placement of temporary ventricular pacing wires  8) Transesophageal echocardiography    INDICATION:   The patient is a 69-year-old woman with history of DM2 (A1c 6.0), HTN, HLD, obesity (BMI 38), CVA (2011), depression, and tobacco use who presented with an NSTEMI and was found to have severe, multivessel coronary artery disease. Transthoracic echocardiogram shows EF 60-65%, septal hypertrophy to 1.8cm but no LVOT gradient and no ABRAHAM. Cardiac catheterization confirms severe, multivessel coronary artery disease. The remainder of the preoperative workup was performed without contraindication to surgery. The patient was counseled on and understood the potential risks, alternatives, and benefits associated with surgical revascularization and elected to proceed.     FINDINGS:  Preserved biventricular function, NSR at end of case.    DESCRIPTION OF OPERATION:  A timeout procedure was performed confirming the correct patient, surgical site, and procedure. The patient underwent uneventful general endotracheal anesthesia and invasive hemodynamic monitoring lines were placed. The neck, chest, left arm, abdomen, groins, and legs were prepped and draped in the  usual sterile fashion.  Pre-procedure transesophageal echocardiography revealed normal biventricular function, no major valvular abnormalities, and septal hypertrophy but no ABRAHAM, no LVOT gradient.    A median sternotomy was performed and the left internal mammary artery was harvested in a pedicled fashion while the left radial artery and right greater saphenous vein were harvested endoscopically. 300 units per kilogram of sodium heparin was administered. A sternal retractor was inserted and a pericardial well was created. Epiaortic ultrasound was utilized to evaluate the aorta for calcification and guide placement of the aortic cannulation site and cross-clamp. The patient was cannulated for cardiopulmonary bypass using the high ascending aorta for infusion and venous drainage was accomplished via a two-stage cannula in the right atrial appendage. A retrograde cardioplegia catheter was placed in the coronary sinus. An antegrade cardioplegia catheter was placed in the mid ascending aorta. When a satisfactory activating clotting time was confirmed, cardiopulmonary bypass was initiated, and the patient was cooled to 34 degrees. The aortic cross-clamp was applied and cardioplegic arrest was initiated with 1 liter of cold blood cardioplegia delivered antegrade and 300 milliliters delivered retrograde. Satisfactory diastolic arrest was achieved. Ice slush was used for topical hypothermia. Cardioplegia was re-dosed throughout the case with both antegrade and retrograde cardioplegia.    The right posterolateral coronary artery was then identified and incised. The vessel was approximately 1.4 millimeters in size and of fair quality. A reverse saphenous vein graft was anastomosed in a running, end-to-side fashion with 7-0 Prolene suture. The graft was gently distended and found to be widely patent and hemostatic. Cold blood cardioplegia was then delivered in an antegrade fashion and the graft was cut to size. A 4 millimeter  punch was created in the ascending aorta and the proximal anastomosis performed in a running fashion with 6-0 Prolene suture while delivering continuous retrograde cardioplegia.      The right posterior descending coronary artery was then identified and incised. The vessel was approximately 1.5 millimeters in size and of fair quality. A reverse saphenous vein graft was anastomosed in a running, end-to-side fashion with 7-0 Prolene suture. The graft was gently distended and found to be widely patent and hemostatic. Cold blood cardioplegia was then delivered in an antegrade fashion and the graft was cut to size. A 4 millimeter punch was created in the ascending aorta and the proximal anastomosis performed in a running fashion with 6-0 Prolene suture while delivering continuous retrograde cardioplegia.      The obtuse marginal coronary artery was then identified and incised. The vessel was approximately 1.5 millimeters in size and of good quality. A left radial artery graft was anastomosed in a running, end-to-side fashion with 7-0 Prolene suture. The graft was gently distended and found to be widely patent and hemostatic. Cold blood cardioplegia was then delivered in an antegrade fashion and the graft was cut to size. A 4 millimeter punch was created in the ascending aorta and the proximal anastomosis performed in a running fashion with 6-0 Prolene suture while delivering continuous retrograde cardioplegia.      The diagonal coronary artery was then identified and incised. The vessel was approximately 1.4 millimeters in size and of fair quality. A reverse saphenous vein graft was anastomosed in a running, end-to-side fashion with 7-0 Prolene suture. The graft was gently distended and found to be widely patent and hemostatic. Cold blood cardioplegia was then delivered in an antegrade fashion and the graft was cut to size. A 4 millimeter punch was created in the ascending aorta and the proximal anastomosis performed in  a running fashion with 6-0 Prolene suture while delivering continuous retrograde cardioplegia.      The left anterior descending coronary artery was then identified in the middle third and was approximately 1.6 millimeters in size and of good quality. The left internal mammary artery was brought into the field and spatulated. It had excellent flow. The anastomosis was performed in a running, end-to-side fashion with 7-0 Prolene suture. The temporary clamp on the internal mammary artery was released and excellent flow could be seen distally and the anastomosis appeared hemostatic.     A terminal dose of warm cardioplegia was delivered in retrograde fashion, de-airing maneurvers were performed, and the aortic cross-clamp was removed. Total aortic cross-clamp time was 114 minutes. The heart resumed normal sinus rhythm. A ventricular pacing wire was placed on the diaphragmatic surface of the heart. Ventilation was resumed, the patient was systemically warmed and he weaned from cardiopulmonary bypass on the first attempt. Post-procedure transesophageal echocardiography revealed preserved biventricular function and no new valvular abnormalities. Protamine was administered to reverse the anticoagulation and all cannulae for bypass were removed. Hemostasis was satisfactory. Total cardiopulmonary bypass time was 130 minutes. The fat within the superior mediastinum was closed over the aorta. One mediastinal chest tube and bilateral pleural gorge drains were placed. The sternum was closed with stainless steel wires and the remainder of the closure was routine.    The needles, instruments, and sponges were counted and correct at the end of the case. Patient was transferred back to ICU in stable condition.    The assistance of Lolis Rubio CST/JEF and Suyapa Rosales PA-C was required in this case due to their specialized skill set and standard of care. Lolis assisted by performing endoscopic harvest of left radial artery. Suyapa  assisted by performing endoscopic harvest of right saphenous vein. They both first-assisted at different times throughout the case.    Mary Pennington MD

## 2024-11-22 NOTE — PROGRESS NOTES
CLINICAL NUTRITION SERVICES NOTE    Pt in ICU.  Diet: Low Saturated Fat Na < 2400 mg, advanced yesterday evening from clear liquid  Intake: 975 mL oral fluids yesterday per I/O. Clears for breakfast and liquids with jello and grapes last night.  Pt today with significant event A. Fib RVR.  Not ordering food yet today.  Pt with reduced intake or NPO x 7 days.  Will add daily multivitamin with minerals due to decreased intake - Per Staff Recommendation.  Will send Glucerna in the afternoon in case pt is feeling better.  Will continue to monitor.

## 2024-11-22 NOTE — SIGNIFICANT EVENT
Significant events    Around 8am, team called to the bedside for Afib RVR w/ HR 170s while pt was sitting in the chair w/ MAP 50s. Phenylephrine (h/o LVOT gradient 70s) restarted. 150mg amio bolus was administered. After moving patient to the bed, she was given fentanyl/versed (25/1) and underwent 200J DCCV x2 w/o success. Pt was given lidocaine 100mg, metop IVP 2.5mg x2 w/ HR improved to 130s w/ MAP 50-60s on phenylephrine at 3.5. Subsequently, pt was given diltiazem IVP 30mg w/ HR improved to 60-70s (still in Afib). Able to protect airway during DCCV. ECG confirmed Afib.    9:30am pt went into Afib RVR again w/ HR 130s w/ diffuse pain and discomfort. Given diltiazem IVP 10mg x2 and started on diltiazem drip @5. Given 0.5mg dilaudid . HR improved to 80-100s w/ improved sx. Remain on phenylpherine @3.5 for MAP.     Consulted EPS for Afib control. Per CV[facility], given elev LFT,  would prefer avoiding amiodarone.     KP Hardy MD PhD  PGY7 Fellow-In-Training  Critical Care Medicine (Cardiology)  For patient care, please contact via [facility] #: Britany Hardy

## 2024-11-22 NOTE — PLAN OF CARE
[facility] - ICU    RN Progress Note:            Pertinent Assessments:      Please refer to flowsheet rows for full assessment     - Denies pain throughout shift  - Vital signs stable  - Sinus rhythm           Key Events - This Shift:       - Titrated phenylephrine to maintain MAP goal. Currently at 0.01                Barriers to Discharge / Downgrade:     - Vasopressors  - Hemodynamic monitoring

## 2024-11-22 NOTE — CONSULTS
[facility]  Cardiac Electrophysiology  [address]  [place] [address]   Office: [phone]  Fax: [phone]     Cardiac Electrophysiology Consultation    Patient: Karyn Gamez   : 1955     Referring Provider: No ref. provider found    CHIEF COMPLAINT/REASON FOR CONSULTATION  Paroxysmal atrial fibrillation with RVR     Assessment/Recommendations     # Paroxysmal atrial fibrillation with RVR   - Went into A-fib RVR in the 170s around 8:00 this morning with MAP in the 50s.  Phenylephrine was restarted, 150 mg of amio bolus was given.  She was sedated and underwent to shocks at 200 J without success.  She was given lidocaine 100 mg, metoprolol IV push 2-1/2 mg x 2 and heart rate improved into the 130s with maps in the 50s and 60s.  She was then given diltiazem IV push 30 mg with improvement into the 60s and 70s.  Again at 930 she went into A-fib with RVR again and into the 130s with diffuse pain and discomfort.  She was given 2 diltiazem IV pushes of 10 mg each and then subsequently started on diltiazem drip at 5 mg/h.  Heart rate improved to 80s to 100s and symptoms also improved.  -Per telemetry review, she converted back to normal sinus rhythm at about 1120 this morning.  This episode of atrial fibrillation occurred for just over 4 hours (5598-9737)  -Her AST and ALT on last CMP were 303 and 387 respectively.  -[id number] score: At least at least 7 for age, gender, diabetes, hypertension, CVA (), CABG     PLAN:   -Maintaining normal sinus rhythm for now on diltiazem drip.  If atrial fibrillation occurs, would recommend amiodarone infusion initiation as her liver enzymes are downtrending.  -Anticoagulation warranted if and out of A-fib for greater than 48 hours.  She is on DAPT for her NSTEMI and bypass surgery         History of Present Illness   Karyn Gamez is a 69 year old female past medical history significant for type 2 diabetes, prior CVA,  tobacco use, hypertension, hyperlipidemia, depression, possible TL, HCM on cardiac MRI, LVOT on latest echo was found to have NSTEMI and underwent CABG on 11/18/2024 with Dr. Pennington.  EP was consulted for unstable atrial fibrillation morning of 11/22/2024.     Seen lying in bed with her  in the room.  She is grateful that she is feeling better and normal sinus rhythm.  Hopeful that she will stay in normal sinus rhythm as she does not want to repeat the events of this morning.  She denies palpitations or racing heart rates.           Data Review    ECGs (all tracings independently reviewed)  11/22/2024: Atrial fibrillation 94 bpm  11/20/2024: Sinus rhythm 63 bpm  11/18/2024: Sinus rhythm 82 bpm  11/14/2024: Sinus rhythm 68 bpm  11/7/2024: Sinus rhythm 62 bpm     Telemetry shows sinus rhythm 60 bpm     Echo done 11/20/2024:  1. Left ventricular chamber size is normal. Severe asymmetric septal wall  hypertrophy is present with a maximum thickness of 1.8 cm. There is severe  resting left ventricular outflow tract obstruction with a peak gradient of 66  mmHg. Systolic function is normal with a visually estimated left ventricular  ejection fraction of 60-65%.  2. Right ventricle is not well visualized. Limited view suggest grossly normal  chamber size and sysotlic function.  3. Mild left atrial enlargement.  4. Systolic anterior motion of the mitral valve is noted although this results  in only trivial mitral regurgitation.  5. No significant percardial effusion.  6. Compared to the prior study dated 11/8/2024, the patient has since  undergone coronary artery bypass grafting and resting left ventricular outflow  tract obstruction is now present.        Cardiac MRI 11/13/2024:  CONCLUSIONS:   Asymmetric septal hypertrophy with maximal wall thickness 2.0 cm. Systolic anterior motion of the mitral  valve is present. There is apical displacement of the papillary muscles. LGE imaging shows mid-myocardial  enhancement in  a non-ischemic pattern. Collectively, these findings suggest obstructive hypertrophic  cardiomyopathy. Consider genetic testing for HCM.   Normal biventricular systolic function, LVEF 74%, RVEF 70%.               Physical Examination  Review of Systems   VITALS: /59   Pulse 104   Temp 97.9  F (36.6  C) (Oral)   Resp 20   Wt 113.1 kg (249 lb 6.4 oz)   LMP  (LMP Unknown)   SpO2 97%   BMI 39.14 kg/m    Wt Readings from Last 3 Encounters:   11/21/24 113.1 kg (249 lb 6.4 oz)   11/17/24 107.9 kg (237 lb 14 oz)   10/31/23 96.6 kg (213 lb)       Intake/Output Summary (Last 24 hours) at 11/22/2024 1355  Last data filed at 11/22/2024 1232  Gross per 24 hour   Intake 1717.05 ml   Output 5225 ml   Net -3507.95 ml     CONSTITUTIONAL: no distress  RESPIRATORY:  Respiratory effort is normal  CARDIOVASCULAR:  normal S1 and S2  GASTROINTESTINAL:  Abdomen without masses or tenderness  EXTREMITIES:  No clubbing or cyanosis.    SKIN:  Overall, skin warm and dry, no lesions.  NEURO/PSYCH:  Oriented x 3 with normal affect.    ROS: 10 point ROS neg other than the symptoms noted above in the HPI.       Medical History  Surgical History   Past Medical History:   Diagnosis Date    Cervicalgia 6/29/2005 June 29, 2005: Thrown from a horse - wearing a helmet - and struck side of head and neck, heard crepitation, no loc, Able to walk after injury.  persistant pain in neck relieved with ibuprofen, stiff but can move with ROM.  No changes in hands and feet sensation/motor.    Coronary artery disease     Depressive disorder, not elsewhere classified     Hypertension     Obesity, unspecified     Past Surgical History:   Procedure Laterality Date    ANGIOGRAM  2010    negative    COLONOSCOPY N/A 3/24/2023    Procedure: COLONOSCOPY, WITH HOT POLYPECTOMY AND RESEARCH BIOPSY;  Surgeon: Bret Velez MD;  Location: [facility] OR    CORONARY ARTERY BYPASS GRAFT, WITH ENDOSCOPIC VESSEL PROCUREMENT N/A 11/18/2024    Procedure: CORONARY  ARTERY BYPASS GRAFT TIMES FIVE, LEFT INTERNAL MAMMARY ARTERY HARVEST, RIGHT ENDOSCOPIC VESSEL PROCUREMENT,;  Surgeon: Mary Pennington MD;  Location: [facility] OR    CV CORONARY ANGIOGRAM N/A 2024    Procedure: Coronary Angiogram;  Surgeon: Emir Brand MD;  Location:  HEART CARDIAC CATH LAB    HYSTERECTOMY, PAP NO LONGER INDICATED      BSO    PROCURE ARTERY RADIAL  2024    Procedure: LEFT RADIAL ARTERY HARVEST;  Surgeon: Mary Pennington MD;  Location: [facility] OR    TRANSESOPHAGEAL ECHOCARDIOGRAM INTRAOPERATIVE N/A 2024    Procedure: ECHOCARDIOGRAM, TRANSESPOPHAGEAL, WITH ANESTHESIA,;  Surgeon: Mary Pennington MD;  Location: [facility] OR         Family History Social History   Family History   Problem Relation Age of Onset    C.A.D. Father         first MI at 53, stenting x2    C.A.D. Mother         dx in her mid 50's    C.A.D. Brother         MI in mid-50's    Cerebrovascular Disease Brother         in late 50's        Social History     Tobacco Use    Smoking status: Former     Current packs/day: 0.00     Average packs/day: 0.5 packs/day for 30.0 years (15.0 ttl pk-yrs)     Types: Cigarettes     Start date: 1981     Quit date: 2011     Years since quittin.2    Smokeless tobacco: Never    Tobacco comments:     smoking 3-4 cigs per day   Vaping Use    Vaping status: Never Used   Substance Use Topics    Alcohol use: Yes     Comment: rarely    Drug use: No         Medications  Allergies     Current Facility-Administered Medications:     [Held by provider] acetaminophen (TYLENOL) tablet 650 mg, 650 mg, Oral, Q4H PRN, Jasmin Lemons PA-C    amiodarone (NEXTERONE) 150 MG/100ML bolus, , , ,     amLODIPine (NORVASC) tablet 2.5 mg, 2.5 mg, Oral, Daily, Ly Atwood PA-C, 2.5 mg at 24 1039    aspirin (ASA) chewable tablet 81 mg, 81 mg, Oral or NG Tube, Daily, 81 mg at 24 1019 **OR** aspirin (ASA) Suppository 300 mg, 300 mg, Rectal, Daily, Angelic  Ly Tovar PA-C, 300 mg at 11/20/24 1409    [Held by provider] atorvastatin (LIPITOR) tablet 80 mg, 80 mg, Oral, At Bedtime, aJsmin Lemons PA-C, 80 mg at 11/19/24 2032    bisacodyl (DULCOLAX) suppository 10 mg, 10 mg, Rectal, Daily PRN, Jasmin Lemons PA-C    calcium gluconate 1 g in 50 mL in sodium chloride intermittent infusion, 1 g, Intravenous, Once PRN, Jasmin Lemons PA-C, 1 g at 11/22/24 0449    calcium gluconate 2 g in  mL intermittent infusion, 2 g, Intravenous, Once PRN, Jasmin Lemons PA-C    calcium gluconate 3 g in sodium chloride 0.9 % 100 mL intermittent infusion, 3 g, Intravenous, Once PRN, Jasmin Lemons PA-C    clopidogrel (PLAVIX) tablet 75 mg, 75 mg, Oral, Daily, Ly Atwood PA-C, 75 mg at 11/22/24 1019    glucose gel 15-30 g, 15-30 g, Oral, Q15 Min PRN **OR** dextrose 50 % injection 25-50 mL, 25-50 mL, Intravenous, Q15 Min PRN **OR** glucagon injection 1 mg, 1 mg, Subcutaneous, Q15 Min PRN, Jasmin Lemons PA-C    diltiazem (CARDIZEM) 125 mg in dextrose 5 % 125 mL infusion, 5-15 mg/hr, Intravenous, Continuous, Radha Solis MD, Held at 11/22/24 1138    furosemide (LASIX) injection 20 mg, 20 mg, Intravenous, Daily, Suzie Leiva PA-C, 20 mg at 11/22/24 1226    heparin ANTICOAGULANT injection 5,000 Units, 5,000 Units, Subcutaneous, Q8H, Jasmin Lemons PA-C, 5,000 Units at 11/22/24 0634    hydrALAZINE (APRESOLINE) injection 10 mg, 10 mg, Intravenous, Q30 Min PRN, [place], Jasmin K, PA-C    HYDROmorphone (DILAUDID) injection 0.2 mg, 0.2 mg, Intravenous, Q2H PRN, 0.2 mg at 11/22/24 0954 **OR** [DISCONTINUED] HYDROmorphone (DILAUDID) injection 0.4 mg, 0.4 mg, Intravenous, Q2H PRN, Jasmin Lemons, PA-C    influenza trivalent vaccine high-dose for ages 65 years and greater (FLUZONE-HD) injection 0.5 mL, 0.5 mL, Intramuscular, Prior to discharge, Mary Pennington MD    insulin aspart (NovoLOG) injection (RAPID ACTING), 1-7  Units, Subcutaneous, TID AC, Ly Atwood PA-C, 1 Units at 11/22/24 0844    insulin aspart (NovoLOG) injection (RAPID ACTING), 1-5 Units, Subcutaneous, At Bedtime, Ly Atwood PA-C    iopamidol (ISOVUE-370) solution 117 mL, 117 mL, Intravenous, Once **AND** sodium chloride 0.9 % bag for CT scan flush use, 100 mL, As instructed, Once, Ly Atwood PA-C    lactated ringers BOLUS 250 mL, 250 mL, Intravenous, Q15 Min PRN, Jasmin Lemons PA-C, Last Rate: 500 mL/hr at 11/18/24 1950, 250 mL at 11/18/24 1950    Lidocaine (LIDOCARE) 4 % Patch 1-2 patch, 1-2 patch, Transdermal, Q24H, Jasmin Lemons PA-C, 1 patch at 11/21/24 1642    magnesium hydroxide (MILK OF MAGNESIA) suspension 30 mL, 30 mL, Oral, Daily PRN, Jasmin Lemons PA-C    [Held by provider] metFORMIN (GLUCOPHAGE XR) 24 hr tablet 500 mg, 500 mg, Oral, Daily with supper, Jasmin Lemons PA-C    metoprolol (LOPRESSOR) 5 MG/5ML injection, , , ,     [START ON 11/23/2024] multivitamin, therapeutic (THERA-VIT) tablet 1 tablet, 1 tablet, Oral, Daily, Mary Pennington MD    naloxone (NARCAN) injection 0.2 mg, 0.2 mg, Intravenous, Q2 Min PRN **OR** naloxone (NARCAN) injection 0.4 mg, 0.4 mg, Intravenous, Q2 Min PRN **OR** naloxone (NARCAN) injection 0.2 mg, 0.2 mg, Intramuscular, Q2 Min PRN, 0.2 mg at 11/20/24 0812 **OR** naloxone (NARCAN) injection 0.4 mg, 0.4 mg, Intramuscular, Q2 Min PRN, Jasmin Lemons PA-C    ondansetron (ZOFRAN ODT) ODT tab 4 mg, 4 mg, Oral, Q6H PRN **OR** ondansetron (ZOFRAN) injection 4 mg, 4 mg, Intravenous, Q6H PRN, Jasmin Lemons PA-C, 4 mg at 11/20/24 1635    oxyCODONE (ROXICODONE) tablet 5 mg, 5 mg, Oral, Q4H PRN **OR** oxyCODONE (ROXICODONE) tablet 10 mg, 10 mg, Oral, Q4H PRN, Jasmin Lemons PA-C, 10 mg at 11/20/24 0019    pantoprazole (PROTONIX) EC tablet 40 mg, 40 mg, Oral, Gorge ESTRELLA Tessa, MD, 40 mg at 11/22/24 0635    phenylephrine (LISA-SYNEPHRINE) 50 mg in  "NaCl 0.9 % 250 mL infusion, 0.1-6 mcg/kg/min, Intravenous, Continuous, Mary Pennington MD, Last Rate: 34.4 mL/hr at 11/22/24 1159, 1 mcg/kg/min at 11/22/24 1159    piperacillin-tazobactam (ZOSYN) 3.375 g vial to attach to  mL bag, 3.375 g, Intravenous, Q8H, Mike Garcia MD, 3.375 g at 11/22/24 0924    polyethylene glycol (MIRALAX) Packet 17 g, 17 g, Oral, Daily, Jasmin Lemons PA-C, 17 g at 11/21/24 0822    potassium & sodium phosphates (NEUTRA-PHOS) Packet 1 packet, 1 packet, Oral or Feeding Tube, Q4H, Mary Pennington MD, 1 packet at 11/22/24 1019    prochlorperazine (COMPAZINE) injection 5 mg, 5 mg, Intravenous, Q6H PRN **OR** prochlorperazine (COMPAZINE) tablet 5 mg, 5 mg, Oral, Q6H PRN, Jasmin Lemons PA-C    senna-docusate (SENOKOT-S/PERICOLACE) 8.6-50 MG per tablet 1 tablet, 1 tablet, Oral, BID, Jasmin Lemons PA-C, 1 tablet at 11/21/24 2054   No Known Allergies       Lab Results    Chemistry CBC Cardiac Enzymes/BNP/TSH/INR   Recent Labs   Lab Test 11/22/24  1210 11/22/24  0839   NA  --  134*   POTASSIUM  --  3.7   CHLORIDE  --  101   CO2  --  24   * 149*   BUN  --  30.6*   CR  --  1.21*   GFRESTIMATED  --  49*   ANGLEES  --  7.8*     Recent Labs   Lab Test 11/22/24  0839 11/22/24  0438 11/21/24  1916   CR 1.21* 1.31* 1.44*          Recent Labs   Lab Test 11/22/24  0839   WBC 18.0*   HGB 8.1*   HCT 24.5*   MCV 89        Recent Labs   Lab Test 11/22/24  0839 11/22/24 0438 11/21/24  1916   HGB 8.1* 7.7* 8.1*    No results for input(s): \"TROPONINI\" in the last 99268 hours.  No results for input(s): \"BNP\", \"NTBNPI\", \"NTBNP\" in the last 54516 hours.  Recent Labs   Lab Test 11/07/24  1842   TSH 3.54     Recent Labs   Lab Test 11/22/24  0839 11/22/24  0438 11/20/24  0900   INR 1.40* 1.44* 1.64*              "

## 2024-11-22 NOTE — PROGRESS NOTES
CVTS Daily Progress Note   POD#4 s/p CABG x5 with LIMA to LAD, left radial artery to obtuse marginal, rSVG to RPDA, RPL, and diagonal, endoscopic harvest of left radial artery and right saphenous vein   Attending: Gorge  LOS: 4    SUBJECTIVE/INTERVAL EVENTS:    Unstable AF RVR this morning. DCCX x2 without improvement. Was given IV diltiazem bolus and drip and rates improved to 100-120s.     Alert and oriented. Remains on phenylephrine. Rate controlled AF. Maintaining oxygen saturation on face mask. Pain well controlled. - BM. ADAT. UOP 3.8L. Cr 1.21 and downtrending. LFTs downtrending. Chest tube output appropriate. Hgb 8.1. Remains on empiric Vanc and Zosyn with cultures NGTD. WBC 14. Patient denies new chest pain, SOB, calf pain, abdominal pain, nausea. Questions answered.        OBJECTIVE:  Temp:  [97.3  F (36.3  C)-98.9  F (37.2  C)] 97.3  F (36.3  C)  Pulse:  [63-77] 68  Resp:  [5-31] 19  BP: (111)/(59) 111/59  MAP:  [63 mmHg-84 mmHg] 76 mmHg  Arterial Line BP: ()/(46-64) 108/56  FiO2 (%):  [40 %] 40 %  SpO2:  [90 %-100 %] 99 %  Vitals:    11/19/24 0600 11/20/24 0530 11/21/24 0548   Weight: 114.3 kg (251 lb 14.4 oz) 114.5 kg (252 lb 8 oz) 113.1 kg (249 lb 6.4 oz)       Clinically Significant Risk Factors        # Hyperkalemia: Highest K = 5.7 mmol/L in last 2 days, will monitor as appropriate    # Hypocalcemia: Lowest iCa = 4.2 mg/dL in last 2 days, will monitor and replace as appropriate     # Hypoalbuminemia: Lowest albumin = 3.1 g/dL at 11/22/2024  4:38 AM, will monitor as appropriate    # Coagulation Defect: INR = 1.44 (Ref range: 0.85 - 1.15) and/or PTT = 41 Seconds (Ref range: 22 - 38 Seconds), will monitor for bleeding    # Hypertension: Noted on problem list     # Acute Hypercapnic Respiratory Failure: based on arterial blood gas results.  Continue supplemental oxygen and ventilatory support as indicated.  # Acute Hypercapnic Respiratory Failure: based on venous blood gas results.  Continue  "supplemental oxygen and ventilatory support as indicated.         # Obesity: Estimated body mass index is 39.14 kg/m  as calculated from the following:    Height as of 11/7/24: 1.7 m (5' 6.93\").    Weight as of this encounter: 113.1 kg (249 lb 6.4 oz).        # Financial/Environmental Concerns:     # History of CABG: noted on surgical history              Current Medications:    Scheduled Meds:  Current Facility-Administered Medications   Medication Dose Route Frequency Provider Last Rate Last Admin    amiodarone (NEXTERONE) 150 MG/100ML bolus             amiodarone (NEXTERONE) bolus 150 mg  150 mg Intravenous Once Britany Hardy MD        amLODIPine (NORVASC) tablet 2.5 mg  2.5 mg Oral Daily Ly Atwood PA-C   2.5 mg at 11/21/24 0822    aspirin (ASA) chewable tablet 81 mg  81 mg Oral or NG Tube Daily Ly Atwood PA-C   81 mg at 11/21/24 0822    Or    aspirin (ASA) Suppository 300 mg  300 mg Rectal Daily Ly Atwood PA-C   300 mg at 11/20/24 1409    [Held by provider] atorvastatin (LIPITOR) tablet 80 mg  80 mg Oral At Bedtime Jasmin Lemons PA-C   80 mg at 11/19/24 2032    calcium chloride 1 g in sodium chloride 0.9 % 100 mL intermittent infusion  1 g Intravenous Once Suzie Leiva PA-C        clopidogrel (PLAVIX) tablet 75 mg  75 mg Oral Daily Ly Atwood PA-C   75 mg at 11/21/24 0822    diltiazem (CARDIZEM) 25 MG/5ML injection             diltiazem (CARDIZEM) injection 30 mg  30 mg Intravenous Once Radha Solis MD        furosemide (LASIX) injection 40 mg  40 mg Intravenous TID Ly Atwood PA-C   40 mg at 11/22/24 0634    heparin ANTICOAGULANT injection 5,000 Units  5,000 Units Subcutaneous Q8H Jasmin Lemons PA-C   5,000 Units at 11/22/24 0634    influenza trivalent vaccine high-dose for ages 65 years and greater (FLUZONE-HD) injection 0.5 mL  0.5 mL Intramuscular Prior to discharge Mary Pennington MD        insulin aspart (NovoLOG) " injection (RAPID ACTING)  1-7 Units Subcutaneous TID AC Ly Atwood PA-C        insulin aspart (NovoLOG) injection (RAPID ACTING)  1-5 Units Subcutaneous At Bedtime Ly Atwood PA-C        iopamidol (ISOVUE-370) solution 117 mL  117 mL Intravenous Once Ly Atwood PA-C        And    sodium chloride 0.9 % bag for CT scan flush use  100 mL As instructed Once Ly Atwood PA-C        Lidocaine (LIDOCARE) 4 % Patch 1-2 patch  1-2 patch Transdermal Q24H Jasmin Lemons PA-C   1 patch at 11/21/24 1642    magnesium oxide (MAG-OX) tablet 400 mg  400 mg Oral Q4H Mary Pennington MD   400 mg at 11/22/24 0635    magnesium sulfate 2 g in 50 mL sterile water intermittent infusion  2 g Intravenous Once Suzie Leiva PA-C        [Held by provider] metFORMIN (GLUCOPHAGE XR) 24 hr tablet 500 mg  500 mg Oral Daily with supper Jasmin Lemons PA-C        metoprolol (LOPRESSOR) 5 MG/5ML injection             pantoprazole (PROTONIX) EC tablet 40 mg  40 mg Oral QAM AC Mary Pennington MD   40 mg at 11/22/24 0635    piperacillin-tazobactam (ZOSYN) 3.375 g vial to attach to  mL bag  3.375 g Intravenous Q8H Mike Garcia MD   3.375 g at 11/22/24 0012    polyethylene glycol (MIRALAX) Packet 17 g  17 g Oral Daily Jasmin Lemons PA-C   17 g at 11/21/24 0822    potassium & sodium phosphates (NEUTRA-PHOS) Packet 1 packet  1 packet Oral or Feeding Tube Q4H Mary Pennington MD   1 packet at 11/22/24 0635    potassium chloride 20 mEq in 50 mL intermittent infusion  20 mEq Intravenous Once Suzie Leiva PA-C        senna-docusate (SENOKOT-S/PERICOLACE) 8.6-50 MG per tablet 1 tablet  1 tablet Oral BID Jasmin Lemons PA-C   1 tablet at 11/21/24 2054     Continuous Infusions:  Current Facility-Administered Medications   Medication Dose Route Frequency Provider Last Rate Last Admin    diltiazem (CARDIZEM) 125 mg in dextrose 5 % 125 mL infusion  5-15 mg/hr Intravenous Continuous  Radha Solis MD        phenylephrine (LISA-SYNEPHRINE) 50 mg in NaCl 0.9 % 250 mL infusion  0.1-6 mcg/kg/min Intravenous Continuous Mary Pennington MD 3.4 mL/hr at 11/22/24 0623 0.1 mcg/kg/min at 11/22/24 0623     PRN Meds:.  Current Facility-Administered Medications   Medication Dose Route Frequency Provider Last Rate Last Admin    [Held by provider] acetaminophen (TYLENOL) tablet 650 mg  650 mg Oral Q4H PRN Jasmin Lemons PA-C        amiodarone (NEXTERONE) 150 MG/100ML bolus             bisacodyl (DULCOLAX) suppository 10 mg  10 mg Rectal Daily PRN Jasmin Lemons PA-C        calcium gluconate 1 g in 50 mL in sodium chloride intermittent infusion  1 g Intravenous Once PRN Jasmin Lemons PA-C   1 g at 11/22/24 0449    calcium gluconate 2 g in  mL intermittent infusion  2 g Intravenous Once PRN Jasmin Lemons PA-C        calcium gluconate 3 g in sodium chloride 0.9 % 100 mL intermittent infusion  3 g Intravenous Once PRN Jasmin Lemons PA-C        glucose gel 15-30 g  15-30 g Oral Q15 Min PRN Jasmin Lemons PA-C        Or    dextrose 50 % injection 25-50 mL  25-50 mL Intravenous Q15 Min PRN Jasmin Lemons PA-C        Or    glucagon injection 1 mg  1 mg Subcutaneous Q15 Min PRN Jasmin Lemons PA-C        diltiazem (CARDIZEM) 25 MG/5ML injection             hydrALAZINE (APRESOLINE) injection 10 mg  10 mg Intravenous Q30 Min PRN Jasmin Lemons PA-C        HYDROmorphone (DILAUDID) injection 0.2 mg  0.2 mg Intravenous Q2H PRN Jasmin Lemons PA-C   0.2 mg at 11/21/24 0027    Or    HYDROmorphone (DILAUDID) injection 0.4 mg  0.4 mg Intravenous Q2H PRN Jasmin Lemons PA-C        lactated ringers BOLUS 250 mL  250 mL Intravenous Q15 Min PRN Jasmin Lemons PA-C 500 mL/hr at 11/18/24 1950 250 mL at 11/18/24 1950    magnesium hydroxide (MILK OF MAGNESIA) suspension 30 mL  30 mL Oral Daily PRN Jasmin Lemons PA-C        metoprolol  (LOPRESSOR) 5 MG/5ML injection             naloxone (NARCAN) injection 0.2 mg  0.2 mg Intravenous Q2 Min PRN Amanuele, Jasmin K, PA-C        Or    naloxone (NARCAN) injection 0.4 mg  0.4 mg Intravenous Q2 Min PRN Henshue, Jasmin K, PA-C        Or    naloxone (NARCAN) injection 0.2 mg  0.2 mg Intramuscular Q2 Min PRN Antonyhue, Jasmin K, PA-C   0.2 mg at 11/20/24 0812    Or    naloxone (NARCAN) injection 0.4 mg  0.4 mg Intramuscular Q2 Min PRN Amanuele, Jasmin K, PA-C        ondansetron (ZOFRAN ODT) ODT tab 4 mg  4 mg Oral Q6H PRN Amanuele, Jasmin K, PA-C        Or    ondansetron (ZOFRAN) injection 4 mg  4 mg Intravenous Q6H PRN Amanuele, Jasmin K, PA-C   4 mg at 11/20/24 1635    oxyCODONE (ROXICODONE) tablet 5 mg  5 mg Oral Q4H PRN Antonyhue, Jasmin K, PA-C        Or    oxyCODONE (ROXICODONE) tablet 10 mg  10 mg Oral Q4H PRN Amanuele, Jasmin K, PA-C   10 mg at 11/20/24 0019    prochlorperazine (COMPAZINE) injection 5 mg  5 mg Intravenous Q6H PRN Antonyhue, Jasmin K, PA-C        Or    prochlorperazine (COMPAZINE) tablet 5 mg  5 mg Oral Q6H PRN Amanuele, Jasmin K, PA-C           Cardiographics:    Telemetry monitoring demonstrates AF with rates in the 100s.    Imaging:  Results for orders placed or performed during the hospital encounter of 11/18/24   XR Chest Port 1 View    Impression    IMPRESSION: Endotracheal tube terminates approximately 5.0 cm above the osmin. Right neck central venous catheter sheath with tip in the mid SVC. Bilateral chest tubes, ascending mediastinal drain, and median sternotomy wires also noted.    The lungs are slightly hypoinflated, otherwise negative. No definite pleural effusion or pneumothorax.    Normal size cardiomediastinal silhouette.   XR Chest Port 1 View    Impression    IMPRESSION: Patient has been extubated. Sternotomy with mediastinal clips and markers. Bilateral chest tubes. No pneumothorax seen. Cardiomegaly. Mild pulmonary vascular prominence. Linear  atelectasis left upper lung. No focal airspace consolidations.       Labs, personally reviewed.  Hemoglobin   Date Value Ref Range Status   11/22/2024 7.7 (L) 11.7 - 15.7 g/dL Final   11/21/2024 8.1 (L) 11.7 - 15.7 g/dL Final   11/21/2024 8.1 (L) 11.7 - 15.7 g/dL Final   06/08/2019 12.9 11.7 - 15.7 g/dL Final   06/07/2019 13.6 11.7 - 15.7 g/dL Final   10/14/2016 13.2 11.7 - 15.7 g/dL Final     WBC   Date Value Ref Range Status   06/08/2019 8.8 4.0 - 11.0 10e9/L Final   06/07/2019 12.9 (H) 4.0 - 11.0 10e9/L Final   10/14/2016 9.9 4.0 - 11.0 10e9/L Final     WBC Count   Date Value Ref Range Status   11/22/2024 14.4 (H) 4.0 - 11.0 10e3/uL Final   11/21/2024 17.4 (H) 4.0 - 11.0 10e3/uL Final   11/20/2024 19.1 (H) 4.0 - 11.0 10e3/uL Final     Platelet Count   Date Value Ref Range Status   11/22/2024 177 150 - 450 10e3/uL Final   11/21/2024 172 150 - 450 10e3/uL Final   11/20/2024 177 150 - 450 10e3/uL Final   06/08/2019 291 150 - 450 10e9/L Final   06/07/2019 352 150 - 450 10e9/L Final   10/14/2016 297 150 - 450 10e9/L Final     Creatinine   Date Value Ref Range Status   11/22/2024 1.31 (H) 0.51 - 0.95 mg/dL Final   11/21/2024 1.44 (H) 0.51 - 0.95 mg/dL Final   11/21/2024 1.73 (H) 0.51 - 0.95 mg/dL Final   01/15/2021 0.81 0.52 - 1.04 mg/dL Final   06/08/2019 0.67 0.52 - 1.04 mg/dL Final   06/07/2019 0.73 0.52 - 1.04 mg/dL Final     Potassium   Date Value Ref Range Status   11/22/2024 3.8 3.4 - 5.3 mmol/L Final   11/21/2024 4.1 3.4 - 5.3 mmol/L Final   11/21/2024 4.5 3.4 - 5.3 mmol/L Final   01/31/2023 4.2 3.4 - 5.3 mmol/L Final   08/26/2021 3.9 3.4 - 5.3 mmol/L Final   01/15/2021 4.1 3.4 - 5.3 mmol/L Final   06/08/2019 4.0 3.4 - 5.3 mmol/L Final   06/07/2019 3.2 (L) 3.4 - 5.3 mmol/L Final     Potassium POCT   Date Value Ref Range Status   11/18/2024 3.4 3.4 - 5.3 mmol/L Final   11/18/2024 3.5 3.4 - 5.3 mmol/L Final   11/18/2024 4.1 3.4 - 5.3 mmol/L Final     Magnesium   Date Value Ref Range Status   11/22/2024 1.8 1.7 -  2.3 mg/dL Final   11/21/2024 2.3 1.7 - 2.3 mg/dL Final   11/20/2024 2.2 1.7 - 2.3 mg/dL Final   08/12/2011 2.2 1.6 - 2.3 mg/dL Final   05/06/2010 2.3 1.6 - 2.3 mg/dL Final   05/05/2010 2.3 1.6 - 2.3 mg/dL Final          I/O:  I/O last 3 completed shifts:  In: 1681.96 [P.O.:975; I.V.:706.96]  Out: 4885 [Urine:4525; Chest Tube:360]       Physical Exam:    General: Patient seen up in chair. NAD. Alert and oriented.  CV: AF on monitor. 2+ peripheral pulses in all extremities. Moderate edema. Incision C/D/I.  Pulm: Non-labored effort on nasal cannula. Chest tubes in place, serosanguinous output, no air leak.  Abd: Soft, NT, ND  : Jasmine with bailey urine  Ext: Mild pedal edema, SCDs in place, warm, distal pulses intact  Neuro: CNs grossly intact      ASSESSMENT/PLAN:    Karyn Gamez is a 69 year old female with a history of CAD who is s/p CABGx5.    Active Problems:    CAD (coronary artery disease)      NEURO:   - Scheduled Tylenol (held)/lidocaine patches and PRN Tylenol (held) /oxycodone/dilaudid for pain  - Had not taken PTA Wellbutrin or Zoloft for several months--can reassess after hospital discharge.    CV:   - HOCM with ABRAHAM noted in pre-op cardiac MRI  - Pre-op EF 60-65%  - Was normotensive on 0.01 of phenyl prior to AF RVR where she became hypotensive. Now in rate controlled AF, wean as able  - Continue dilt drip for AF  - Amlodipine 2.5mg daily--do not hold (radial artery graft protection)  - Beta blocker pending weaning from pressors/tolerating CCB  - ASA 81mg daily (decreased dose due to Plavix)  - Plavix daily for one year per surgeon request   - Atorvastatin 80mg daily (held in light of LFTs)  - Chest tubes to remain today     PULM:   - Extubated on POD0  - Maintaining oxygen saturations on NC this AM  - Encourage pulmonary toilet    FEN/GI:  - Continue electrolyte replacement protocol  - Diet: Cardiac, ADAT  - Bowel regimen  - Transaminitis improving    RENAL:  - Adequate UOP/hr. Continue to monitor  closely.  - Cr 1.44 and downtrending  - BMP q12h  - Jasmine to remain in for close monitoring of I/O   - Diuresis with Lasix 20 mg IV daily to BID for goal net negative 1 L.  - Dry weight 237 from [facility]. Current weight 249 lbs    HEME:  - Acute blood loss anemia post-op.   - No bleeding concerns. Hep SQ, ASA  - Hgb 7.7 with recheck daily  - 1u PRBC 11/19 for Hgb 7.7    ID:  - Lor op ppx complete, afebrile. No concerns for infection  - Empiric Vanc and Zosyn. Cultures 11/20 NGTD    ENDO:   - SSI  - PTA Metformin held; restart when appropriate    PPx:   - DVT: SCDs, SQ heparin TID, ambulation   - GI: Protonix 40mg IV/PO daily    DISPO:   - Continue critical care in ICU due to pressor requirement  - Medically Ready for Discharge: Anticipated in 5+ Days         Patient discussed with Dr. Pennington.    Suzie Leiva PA-C   Cardiothoracic Surgery   November 22, 2024 at 8:09 AM  Please reach me on [employer] or secure chat

## 2024-11-22 NOTE — PROGRESS NOTES
[facility]    ICU Progress Note       Date of Admission:  11/18/2024    Assessment: Critical Care   Karyn Gamez is a 69 year old female admitted on 11/18/2024.   69F h/o R frontal lobe CVA w/ residual deficit (2011), T2DM, HTN, HLD, active tobacco usage, mood disorder, presented 11/7/2024 w/ chest pain c/w NSTEMI, found to have multi-vessel CAD and HCM on CMR, s/p 5v-CABG (11/18/24, Dr. Pennington, GUTIERREZ->LAD, LRA->OM, rSVG->rPDA, rSVG->rPLV, rSVG->diag), w/ hospital course c/b HoTN requiring phenylephrine briefly, initially doing well but 11/20/24 w/ new SHIRA w/ oliguria and shock liver. Bedside PAC suggested vasodilatory effect with congested BiV pressure due to worse LVOT gradient seen on TTE. Pt was started on phenylephrine and vasopressin gtt w/ slow improvement with LFT, Cr and UOP.   Unfortunately had an episode of Afib RVR 11/22/24 8am w/ HoTN s/p amio 150, lido 100, DCCV x2, metop IVP x2  w/o much HR response, eventually given diltiazem IVP followed by diatiazem drip for rate control. Feeling better once HR normalizes.    Remains critically ill, still on phenylephrine gtt, remains ICU status.        Plan: Critical Care   Detailed assessment and plan for this patient are as follows:    1.Neurology:   # CVA (historic) - R frontal CVA w/ residual deficit (2011)  # Risk of ICU delirium (advanced age, multiple comorbidity)  - Monitor neurological status. Notify the MD for any acute changes in exam.    2. Cardiovascular:   # Afib RVR (11/22/24 am, new) - s/p HoTN s/p amio 150, lido 100, DCCV x2, metop IVP x2, eventually rate controlled by dilt IVP followed by dilt drip    # Asymmetric HCM w/ elev LVOT gradient w/ dynamic obstruction  # Hypotension - likely cardiogenic shock, most likely due to worse LVOT obstruction, improving with phenylephrine and vasopressin  # CAD s/p 5v-CABG (11/18/24, Dr. Pennington, GUTIERREZ->LAD, LRA->OM, rSVG->rPDA, rSVG->rPLV, rSVG->diag)   # Baseline essential HTN  - Keep  PAC; Tiffanie labs q shift  - Goal MAP > 70 to help LVOT gradient; keep HR <80  - c/w diltiazem drip  - EPS consult  - ECG if developing new/worse chest pain or arrhythmia  - phenylephrine gtts for pressor support, wean as able  - c/w PO amlodipine (for RA graft)  - Continue ASA, plavix  - Euvolemia, avoid overdiuresis    3. Pulmonary/Ventilator Management:   # Acute respiratory failure with hypoxemia +/- hypercapnia - multifactorial, now better  FiO2 (%): 40 %, Resp: 19  - Continue monitoring, currently on NC  - Duonebs PRN, no current indication    4. GI and Nutrition : no acute GI issues, active GIB, liver dysfunction, or constipation/diarrhea; no contraindication for enteral nutrition   # Shock liver, improving  # Risk of constipation  - BM regimen  - hold statin   - Trend LFT, avoid hepatoxicity agents    5. Renal/Fluids/Electrolytes:    # SHIRA - likely poor perfusion and congestion due to cardiogenic shock  # Risk of electrolyte abnormalities (including hypo- and/or hyper- K, Mg, phos, Cl, Na) - likely 2/2 poor PO intake, acute illness phase, medication related; will treat the underlying diseases and replete PRN if low  - Monitor renal function, I/O, UOP  - Avoid overdiuresis  - Replete electrolytes (K, Mg, Ca, Phos) per protocol  - Avoid nephrotoxic agents    6. Infectious Disease: SHIV  - c/w zosyn for empiric tx  - Monitor fever curve, WBC, inflammation    7. Endocrine: no active issues, stable  # DM at baseline not on home insulin  # HLD  # Obesity BMI: Body mass index is 39.14 kg/m .  - ISS    8. Hematology/Oncology:   # Anemia (acute), likely postop  # Coagulopathy due to shock liver  # Risk of thrombocytopenia   ?  - Trend CBC w/ diff & coag  - Transfusion goal: Hgb >7, plt >10 (plt >20 if +fever)  - Iron study    10. MSK: no active issues  # Chronic back pain ?  - Non-opioid pain regimen preferred (Tylenol PRN)  - Ulcer screen and treatment as per ICU protocol  - PT/OT/rehab evaluation     11. ICU chest  list  # FAST HUG  Feeding:  Feeding: yes.  Patient is receiving ORAL    Jasmine: yes  Analgesia/Sedation:no    Thromboembolic prophylaxis: Yes; Mode:  Heparin and SCDs  HOB>30:  Yes  Stress Ulcer Protocol Active: No; Mode: PPI  Glycemic Control: Any glucose > 180 yes; Mode of Insulin Therapy: Sliding Scale Insulin      # PT/OT/SLP/early mobility:  Restraints? no  Can patient have PT and mobility trial: yes, with caution  SLP: no    12. Access/Tubes/Lines: transition to PIV if central access no needed  LDAs (Last charted value/Number of Days):   Introducer 11/18/24 Internal jugular Right (Active)   Specific Qualities Other (Comment) 11/19/24 2000   Dressing Intervention Other (Comment) 11/19/24 1600   Dressing Change Due 11/24/24 11/19/24 1200   Number of days: 2       CVC Single Lumen Right Internal jugular (Active)   Site Assessment WDL 11/20/24 0800   Dressing Chlorhexidine disk;Transparent 11/20/24 0800   Dressing Status clean;dry;intact 11/20/24 0400   Dressing Intervention other (comment) 11/19/24 1600   Dressing Change Due 11/24/24 11/19/24 0800   Line Necessity Yes, meets criteria 11/20/24 0800   Status saline locked 11/20/24 0800   Line Intervention Flushed 11/20/24 0400   Phlebitis Scale 0-->no symptoms 11/20/24 0800   Infiltration? no 11/20/24 0800   CVC Comment CVP monitored 11/20/24 0400   Number of days: 2       Urethral Catheter 11/18/24 Temperature probe;Latex 16 fr (Active)   Tube Description UTV 11/20/24 0800   Catheter Care Catheter wipes;Done 11/20/24 0800   Collection Container Standard 11/20/24 0800   Securement Method Securing device (Describe) 11/20/24 0800   Rationale for Continued Use ICU only: hourly urine output needed for patient care 11/20/24 0800   Urine Output 5 mL 11/20/24 0800   Number of days: 2       13. Disposition: ICU    14. Code status: Full    Please refer to attending addendum for final recommendation and billing time.     This patient will remain in ICU due to critically ill  "condition and has elevated risk for imminent or life threatening deterioration which required intensive care level of attention, intervention and personal management.     KP Hardy MD PhD  PGY7 Fellow-In-Training  Critical Care Medicine (Cardiology)  For patient care, please contact via POPVOX #: Britany Hardy    11/22/2024  7:23 AM         [facility]  Securely message with POPVOX (more info)  Text page via [facility] Paging/Directory     Clinically Significant Risk Factors        # Hyperkalemia: Highest K = 5.7 mmol/L in last 2 days, will monitor as appropriate    # Hypocalcemia: Lowest iCa = 4.2 mg/dL in last 2 days, will monitor and replace as appropriate     # Hypoalbuminemia: Lowest albumin = 3.1 g/dL at 11/22/2024  4:38 AM, will monitor as appropriate    # Coagulation Defect: INR = 1.44 (Ref range: 0.85 - 1.15) and/or PTT = 41 Seconds (Ref range: 22 - 38 Seconds), will monitor for bleeding    # Hypertension: Noted on problem list     # Acute Hypercapnic Respiratory Failure: based on arterial blood gas results.  Continue supplemental oxygen and ventilatory support as indicated.  # Acute Hypercapnic Respiratory Failure: based on venous blood gas results.  Continue supplemental oxygen and ventilatory support as indicated.         # Obesity: Estimated body mass index is 39.14 kg/m  as calculated from the following:    Height as of 11/7/24: 1.7 m (5' 6.93\").    Weight as of this encounter: 113.1 kg (249 lb 6.4 oz).        # Financial/Environmental Concerns:     # History of CABG: noted on surgical history            ______________________________________________________________________    Interval History   Doing well yesterday during the day then went into Afib RVR this am at 8am  S/p amio 150, lido 100, DCCV x2, metop IVP x2 w/o significant improvement  Started diltiazem      Physical Exam   Vital Signs: Temp: 97.3  F (36.3  C) Temp src: Oral BP: 111/59 Pulse: 68   Resp: 19 SpO2: 99 % O2 Device: " Nasal cannula Oxygen Delivery: 4 LPM  Weight: 249 lbs 6.4 oz  Gen: sitting in chair, NAD, tired appearing, able to engage conversations  HEENT: agustinm, FERNANDO PAC  CV: RRR, soft systolic murmur; post sternotomy incision site  Lung: weak breath sounds b/l, no rhonchi  Abd: soft, NDNT  Neuro: AAOx3, able to move all 4 extremities but decreased strength in all; no focal deficit based on my limited neuro exam      Data   I reviewed all medications, new labs and imaging results over the last 24 hours.  Arterial Blood Gases   Recent Labs   Lab 11/22/24  0627 11/22/24  0017 11/21/24 1917 11/21/24  1236   PH 7.41 7.38 7.35 7.35   PCO2 39 42 41 40   PO2 79* 87 69* 82   HCO3 25 25 23 22       Complete Blood Count   Recent Labs   Lab 11/22/24 0438 11/21/24 1916 11/21/24  0430 11/20/24 2020 11/20/24  1400 11/20/24  0424 11/19/24  1338 11/19/24  0501   WBC 14.4*  --  17.4*  --   --  19.1*  --  18.8*   HGB 7.7* 8.1* 8.1* 8.4*   < > 7.7*   < > 7.7*     --  172  --   --  177  --  233    < > = values in this interval not displayed.       Basic Metabolic Panel  Recent Labs   Lab 11/22/24 0438 11/21/24  2046 11/21/24 1916 11/21/24  1659 11/21/24  0817 11/21/24  0430 11/20/24 2020     --  135  --   --  138 139   POTASSIUM 3.8  --  4.1  --   --  4.5 5.0   CHLORIDE 102  --  102  --   --  106 107   CO2 23  --  21*  --   --  21* 22   BUN 32.4*  --  37.3*  --   --  43.8* 42.9*   CR 1.31*  --  1.44*  --   --  1.73* 1.94*   * 117* 103* 86   < > 95 106*    < > = values in this interval not displayed.       Liver Function Tests  Recent Labs   Lab 11/22/24  0438 11/21/24  0430 11/20/24  1400 11/20/24  1128 11/20/24  0900 11/19/24  0000 11/18/24  1418 11/18/24  1241   * 702* 1,061* 1,131* 1,121*   < > 76*  --    * 581* 813* 875* 933*   < > 57*  --    ALKPHOS 102 75 64 61 54   < > 55  --    BILITOTAL 0.6 0.6 0.6 0.6 0.6   < > 0.4  --    ALBUMIN 3.1* 3.1* 3.3* 3.2* 3.2*   < > 3.6  --    INR 1.44*  --   --   --   1.64*  --  1.40* 1.50*    < > = values in this interval not displayed.       Pancreatic Enzymes  No lab results found in last 7 days.    Coagulation Profile  Recent Labs   Lab 11/22/24  0438 11/20/24  0900 11/18/24  1418 11/18/24  1241 11/15/24  0844   INR 1.44* 1.64* 1.40* 1.50*  --    PTT 41*  --  61* 49* 45*       IMAGING:  No results found for this or any previous visit (from the past 24 hours).

## 2024-11-22 NOTE — PROGRESS NOTES
Care Management Follow Up    Length of Stay (days): 4    Expected Discharge Date: 11/27/2024    Anticipated Discharge Plan:  TBD      Transportation: TBD    PT Recommendations:    OT Recommendations:  Transitional Care Facility     Barriers to Discharge: medical stability, cardio, pulm, IV meds, post procedure care and monitoring    Prior Living Situation:  Patient lives in her town house with spouse. She is independent with ADLs/IADLs, ambulates without devices and has no services in community. Of note, spouse had a stroke a year ago and has some physical limitation for assisting patient. Spouse is primary family contact. Daughter Suyapa is supportive and involved.     Discussed  Partnership in Safe Discharge Planning  document with patient/family: No     Handoff Completed: Yes, [facility] PCP: Internal handoff referral completed    Patient/Spokesperson Updated: family 11/20    Additional Information:  Medical:  Patient admitted on 11/18/2024.  69F h/o R frontal lobe CVA w/ residual deficit (2011), T2DM, HTN, HLD, active tobacco usage, mood disorder, presented 11/7/2024 w/ chest pain c/w NSTEMI, found to have multi-vessel CAD and HCM on CMR, s/p 5v-CABG.    CT Surgery following, post CABGx5 11/18.  Neurology consulted for Code stroke 11/20.  EP consulted for unstable a-fib.      11/22/24:  Patient is not medically stable to initiate discharge planning.        Anca Gimenez RN

## 2024-11-22 NOTE — PROGRESS NOTES
[facility]  Cardiac Electrophysiology  [address]  [place] [address]   Office: [phone]  Fax: [phone]     Cardiac Electrophysiology Consultation    Patient: Karyn Gamez   : 1955     Referring Provider: No ref. provider found    CHIEF COMPLAINT/REASON FOR CONSULTATION  Paroxysmal atrial fibrillation with RVR    Assessment/Recommendations     # Paroxysmal atrial fibrillation with RVR   - Went into A-fib RVR in the 170s around 8:00 this morning with MAP in the 50s.  Phenylephrine was restarted, 150 mg of amio bolus was given.  She was sedated and underwent to shocks at 200 J without success.  She was given lidocaine 100 mg, metoprolol IV push 2-1/2 mg x 2 and heart rate improved into the 130s with maps in the 50s and 60s.  She was then given diltiazem IV push 30 mg with improvement into the 60s and 70s.  Again at 930 she went into A-fib with RVR again and into the 130s with diffuse pain and discomfort.  She was given 2 diltiazem IV pushes of 10 mg each and then subsequently started on diltiazem drip at 5 mg/h.  Heart rate improved to 80s to 100s and symptoms also improved.  -Per telemetry review, she converted back to normal sinus rhythm at about 1120 this morning.  This episode of atrial fibrillation occurred for just over 4 hours (0909-5020)  -Her AST and ALT on last CMP were 303 and 387 respectively.  -[id number] score: At least at least 7 for age, gender, diabetes, hypertension, CVA (), CABG    PLAN:   -Maintaining normal sinus rhythm for now on diltiazem drip.  If atrial fibrillation occurs, would recommend amiodarone infusion initiation as her liver enzymes are downtrending.  -Anticoagulation warranted if and out of A-fib for greater than 48 hours.  She is on DAPT for her NSTEMI and bypass surgery         History of Present Illness   Karyn Gamez is a 69 year old female past medical history significant for type 2 diabetes, prior CVA,  tobacco use, hypertension, hyperlipidemia, depression, possible TL, HCM on cardiac MRI, LVOT on latest echo was found to have NSTEMI and underwent CABG on 11/18/2024 with Dr. Pennington.  EP was consulted for unstable atrial fibrillation morning of 11/22/2024.    Seen lying in bed with her  in the room.  She is grateful that she is feeling better and normal sinus rhythm.  Hopeful that she will stay in normal sinus rhythm as she does not want to repeat the events of this morning.  She denies palpitations or racing heart rates.        Data Review    ECGs (all tracings independently reviewed)  11/22/2024: Atrial fibrillation 94 bpm  11/20/2024: Sinus rhythm 63 bpm  11/18/2024: Sinus rhythm 82 bpm  11/14/2024: Sinus rhythm 68 bpm  11/7/2024: Sinus rhythm 62 bpm    Telemetry shows sinus rhythm 60 bpm    Echo done 11/20/2024:  1. Left ventricular chamber size is normal. Severe asymmetric septal wall  hypertrophy is present with a maximum thickness of 1.8 cm. There is severe  resting left ventricular outflow tract obstruction with a peak gradient of 66  mmHg. Systolic function is normal with a visually estimated left ventricular  ejection fraction of 60-65%.  2. Right ventricle is not well visualized. Limited view suggest grossly normal  chamber size and sysotlic function.  3. Mild left atrial enlargement.  4. Systolic anterior motion of the mitral valve is noted although this results  in only trivial mitral regurgitation.  5. No significant percardial effusion.  6. Compared to the prior study dated 11/8/2024, the patient has since  undergone coronary artery bypass grafting and resting left ventricular outflow  tract obstruction is now present.      Cardiac MRI 11/13/2024:  CONCLUSIONS:   Asymmetric septal hypertrophy with maximal wall thickness 2.0 cm. Systolic anterior motion of the mitral  valve is present. There is apical displacement of the papillary muscles. LGE imaging shows mid-myocardial  enhancement in a  non-ischemic pattern. Collectively, these findings suggest obstructive hypertrophic  cardiomyopathy. Consider genetic testing for HCM.   Normal biventricular systolic function, LVEF 74%, RVEF 70%.              Physical Examination  Review of Systems   VITALS: /59   Pulse 104   Temp 97.9  F (36.6  C) (Oral)   Resp 20   Wt 113.1 kg (249 lb 6.4 oz)   LMP  (LMP Unknown)   SpO2 97%   BMI 39.14 kg/m    Wt Readings from Last 3 Encounters:   11/21/24 113.1 kg (249 lb 6.4 oz)   11/17/24 107.9 kg (237 lb 14 oz)   10/31/23 96.6 kg (213 lb)       Intake/Output Summary (Last 24 hours) at 11/22/2024 1129  Last data filed at 11/22/2024 1047  Gross per 24 hour   Intake 2001.45 ml   Output 5195 ml   Net -3193.55 ml     CONSTITUTIONAL: no distress  RESPIRATORY:  Respiratory effort is normal  CARDIOVASCULAR:  normal S1 and S2  GASTROINTESTINAL:  Abdomen without masses or tenderness  EXTREMITIES:  No clubbing or cyanosis.    SKIN:  Overall, skin warm and dry, no lesions.  NEURO/PSYCH:  Oriented x 3 with normal affect.    ROS: 10 point ROS neg other than the symptoms noted above in the HPI.       Medical History  Surgical History   Past Medical History:   Diagnosis Date    Cervicalgia 6/29/2005 June 29, 2005: Thrown from a horse - wearing a helmet - and struck side of head and neck, heard crepitation, no loc, Able to walk after injury.  persistant pain in neck relieved with ibuprofen, stiff but can move with ROM.  No changes in hands and feet sensation/motor.    Coronary artery disease     Depressive disorder, not elsewhere classified     Hypertension     Obesity, unspecified     Past Surgical History:   Procedure Laterality Date    ANGIOGRAM  2010    negative    COLONOSCOPY N/A 3/24/2023    Procedure: COLONOSCOPY, WITH HOT POLYPECTOMY AND RESEARCH BIOPSY;  Surgeon: Bret Velez MD;  Location: [facility] OR    CORONARY ARTERY BYPASS GRAFT, WITH ENDOSCOPIC VESSEL PROCUREMENT N/A 11/18/2024    Procedure: CORONARY ARTERY  BYPASS GRAFT TIMES FIVE, LEFT INTERNAL MAMMARY ARTERY HARVEST, RIGHT ENDOSCOPIC VESSEL PROCUREMENT,;  Surgeon: Mary Pennington MD;  Location: [facility] OR    CV CORONARY ANGIOGRAM N/A 2024    Procedure: Coronary Angiogram;  Surgeon: Emir Brand MD;  Location:  HEART CARDIAC CATH LAB    HYSTERECTOMY, PAP NO LONGER INDICATED      BSO    PROCURE ARTERY RADIAL  2024    Procedure: LEFT RADIAL ARTERY HARVEST;  Surgeon: Mary Pennington MD;  Location: [facility] OR    TRANSESOPHAGEAL ECHOCARDIOGRAM INTRAOPERATIVE N/A 2024    Procedure: ECHOCARDIOGRAM, TRANSESPOPHAGEAL, WITH ANESTHESIA,;  Surgeon: Mary Pennington MD;  Location: [facility] OR         Family History Social History   Family History   Problem Relation Age of Onset    C.A.D. Father         first MI at 53, stenting x2    C.A.D. Mother         dx in her mid 50's    C.A.D. Brother         MI in mid-50's    Cerebrovascular Disease Brother         in late 50's        Social History     Tobacco Use    Smoking status: Former     Current packs/day: 0.00     Average packs/day: 0.5 packs/day for 30.0 years (15.0 ttl pk-yrs)     Types: Cigarettes     Start date: 1981     Quit date: 2011     Years since quittin.2    Smokeless tobacco: Never    Tobacco comments:     smoking 3-4 cigs per day   Vaping Use    Vaping status: Never Used   Substance Use Topics    Alcohol use: Yes     Comment: rarely    Drug use: No         Medications  Allergies     Current Facility-Administered Medications:     [Held by provider] acetaminophen (TYLENOL) tablet 650 mg, 650 mg, Oral, Q4H PRN, Jasmin Lemons PA-C    amiodarone (NEXTERONE) 150 MG/100ML bolus, , , ,     amLODIPine (NORVASC) tablet 2.5 mg, 2.5 mg, Oral, Daily, Ly Atwood PA-C, 2.5 mg at 24 1039    aspirin (ASA) chewable tablet 81 mg, 81 mg, Oral or NG Tube, Daily, 81 mg at 24 1019 **OR** aspirin (ASA) Suppository 300 mg, 300 mg, Rectal, Daily, Ly Atwood  DARRIN Tovar, 300 mg at 11/20/24 1409    [Held by provider] atorvastatin (LIPITOR) tablet 80 mg, 80 mg, Oral, At Bedtime, Jasmin Lemons PA-C, 80 mg at 11/19/24 2032    bisacodyl (DULCOLAX) suppository 10 mg, 10 mg, Rectal, Daily PRN, Jasmin Lemons PA-C    calcium gluconate 1 g in 50 mL in sodium chloride intermittent infusion, 1 g, Intravenous, Once PRN, Jasmin Lemons PA-C, 1 g at 11/22/24 0449    calcium gluconate 2 g in  mL intermittent infusion, 2 g, Intravenous, Once PRN, Jasmin Lemons PA-C    calcium gluconate 3 g in sodium chloride 0.9 % 100 mL intermittent infusion, 3 g, Intravenous, Once PRN, Jasmin Lemons PA-C    clopidogrel (PLAVIX) tablet 75 mg, 75 mg, Oral, Daily, Ly Atwood PA-C, 75 mg at 11/22/24 1019    glucose gel 15-30 g, 15-30 g, Oral, Q15 Min PRN **OR** dextrose 50 % injection 25-50 mL, 25-50 mL, Intravenous, Q15 Min PRN **OR** glucagon injection 1 mg, 1 mg, Subcutaneous, Q15 Min PRN, Jasmin Lemons PA-C    diltiazem (CARDIZEM) 125 mg in dextrose 5 % 125 mL infusion, 5-15 mg/hr, Intravenous, Continuous, Radha Solis MD, Held at 11/22/24 1138    furosemide (LASIX) injection 20 mg, 20 mg, Intravenous, Daily, Suzie Leiva PA-C, 20 mg at 11/22/24 1226    heparin ANTICOAGULANT injection 5,000 Units, 5,000 Units, Subcutaneous, Q8H, Jasmin Lemons PA-C, 5,000 Units at 11/22/24 0634    hydrALAZINE (APRESOLINE) injection 10 mg, 10 mg, Intravenous, Q30 Min PRN, Jasmin Lemons PA-C    HYDROmorphone (DILAUDID) injection 0.2 mg, 0.2 mg, Intravenous, Q2H PRN, 0.2 mg at 11/22/24 0954 **OR** [DISCONTINUED] HYDROmorphone (DILAUDID) injection 0.4 mg, 0.4 mg, Intravenous, Q2H PRN, Jasmin Lemons PA-C    influenza trivalent vaccine high-dose for ages 65 years and greater (FLUZONE-HD) injection 0.5 mL, 0.5 mL, Intramuscular, Prior to discharge, Mary Pennington MD    insulin aspart (NovoLOG) injection (RAPID ACTING), 1-7 Units,  Subcutaneous, TID AC, Ly Atwood PA-C, 1 Units at 11/22/24 0844    insulin aspart (NovoLOG) injection (RAPID ACTING), 1-5 Units, Subcutaneous, At Bedtime, Ly Atwood PA-C    iopamidol (ISOVUE-370) solution 117 mL, 117 mL, Intravenous, Once **AND** sodium chloride 0.9 % bag for CT scan flush use, 100 mL, As instructed, Once, Ly Atwood PA-C    lactated ringers BOLUS 250 mL, 250 mL, Intravenous, Q15 Min PRN, Jasmin Lemons PA-C, Last Rate: 500 mL/hr at 11/18/24 1950, 250 mL at 11/18/24 1950    Lidocaine (LIDOCARE) 4 % Patch 1-2 patch, 1-2 patch, Transdermal, Q24H, Jasmin Lemons PA-C, 1 patch at 11/21/24 1642    magnesium hydroxide (MILK OF MAGNESIA) suspension 30 mL, 30 mL, Oral, Daily PRN, Jasmin Lemons PA-C    [Held by provider] metFORMIN (GLUCOPHAGE XR) 24 hr tablet 500 mg, 500 mg, Oral, Daily with supper, Jasmin Lemons PA-C    metoprolol (LOPRESSOR) 5 MG/5ML injection, , , ,     [START ON 11/23/2024] multivitamin, therapeutic (THERA-VIT) tablet 1 tablet, 1 tablet, Oral, Daily, Mary Pennington MD    naloxone (NARCAN) injection 0.2 mg, 0.2 mg, Intravenous, Q2 Min PRN **OR** naloxone (NARCAN) injection 0.4 mg, 0.4 mg, Intravenous, Q2 Min PRN **OR** naloxone (NARCAN) injection 0.2 mg, 0.2 mg, Intramuscular, Q2 Min PRN, 0.2 mg at 11/20/24 0812 **OR** naloxone (NARCAN) injection 0.4 mg, 0.4 mg, Intramuscular, Q2 Min PRN, Jasmin Lemons PA-C    ondansetron (ZOFRAN ODT) ODT tab 4 mg, 4 mg, Oral, Q6H PRN **OR** ondansetron (ZOFRAN) injection 4 mg, 4 mg, Intravenous, Q6H PRN, Jasmin Lemons PA-C, 4 mg at 11/20/24 1635    oxyCODONE (ROXICODONE) tablet 5 mg, 5 mg, Oral, Q4H PRN **OR** oxyCODONE (ROXICODONE) tablet 10 mg, 10 mg, Oral, Q4H PRN, Jasmin Lemons PA-C, 10 mg at 11/20/24 0019    pantoprazole (PROTONIX) EC tablet 40 mg, 40 mg, Oral, Gorge ESTRELLA Tessa, MD, 40 mg at 11/22/24 0635    phenylephrine (LISA-SYNEPHRINE) 50 mg in NaCl 0.9  "% 250 mL infusion, 0.1-6 mcg/kg/min, Intravenous, Continuous, Mary Pennington MD, Last Rate: 34.4 mL/hr at 11/22/24 1159, 1 mcg/kg/min at 11/22/24 1159    piperacillin-tazobactam (ZOSYN) 3.375 g vial to attach to  mL bag, 3.375 g, Intravenous, Q8H, Mike Garcia MD, 3.375 g at 11/22/24 0924    polyethylene glycol (MIRALAX) Packet 17 g, 17 g, Oral, Daily, Jasmin Lemons PA-C, 17 g at 11/21/24 0822    potassium & sodium phosphates (NEUTRA-PHOS) Packet 1 packet, 1 packet, Oral or Feeding Tube, Q4H, Mary Pennington MD, 1 packet at 11/22/24 1019    prochlorperazine (COMPAZINE) injection 5 mg, 5 mg, Intravenous, Q6H PRN **OR** prochlorperazine (COMPAZINE) tablet 5 mg, 5 mg, Oral, Q6H PRN, Jasmin Lemons PA-C    senna-docusate (SENOKOT-S/PERICOLACE) 8.6-50 MG per tablet 1 tablet, 1 tablet, Oral, BID, Jasmin Lemons PA-C, 1 tablet at 11/21/24 2054   No Known Allergies       Lab Results    Chemistry CBC Cardiac Enzymes/BNP/TSH/INR   Recent Labs   Lab Test 11/22/24  0839   *   POTASSIUM 3.7   CHLORIDE 101   CO2 24   *   BUN 30.6*   CR 1.21*   GFRESTIMATED 49*   ANGELES 7.8*     Recent Labs   Lab Test 11/22/24  0839 11/22/24  0438 11/21/24 1916   CR 1.21* 1.31* 1.44*          Recent Labs   Lab Test 11/22/24  0839   WBC 18.0*   HGB 8.1*   HCT 24.5*   MCV 89        Recent Labs   Lab Test 11/22/24  0839 11/22/24  0438 11/21/24 1916   HGB 8.1* 7.7* 8.1*    No results for input(s): \"TROPONINI\" in the last 63002 hours.  No results for input(s): \"BNP\", \"NTBNPI\", \"NTBNP\" in the last 80836 hours.  Recent Labs   Lab Test 11/07/24  1842   TSH 3.54     Recent Labs   Lab Test 11/22/24  0839 11/22/24  0438 11/20/24  0900   INR 1.40* 1.44* 1.64*              "

## 2024-11-23 ENCOUNTER — APPOINTMENT (OUTPATIENT)
Dept: RADIOLOGY | Facility: HOSPITAL | Age: 69
End: 2024-11-23
Attending: PHYSICIAN ASSISTANT
Payer: COMMERCIAL

## 2024-11-23 ENCOUNTER — APPOINTMENT (OUTPATIENT)
Dept: OCCUPATIONAL THERAPY | Facility: HOSPITAL | Age: 69
DRG: 235 | End: 2024-11-23
Attending: STUDENT IN AN ORGANIZED HEALTH CARE EDUCATION/TRAINING PROGRAM
Payer: COMMERCIAL

## 2024-11-23 LAB
ALBUMIN SERPL BCG-MCNC: 3 G/DL (ref 3.5–5.2)
ALP SERPL-CCNC: 115 U/L (ref 40–150)
ALT SERPL W P-5'-P-CCNC: 291 U/L (ref 0–50)
ANION GAP SERPL CALCULATED.3IONS-SCNC: 9 MMOL/L (ref 7–15)
ANION GAP SERPL CALCULATED.3IONS-SCNC: 9 MMOL/L (ref 7–15)
APTT PPP: 33 SECONDS (ref 22–38)
AST SERPL W P-5'-P-CCNC: 168 U/L (ref 0–45)
BASE EXCESS BLDA CALC-SCNC: 2.4 MMOL/L (ref -3–3)
BASE EXCESS BLDA CALC-SCNC: 2.4 MMOL/L (ref -3–3)
BASE EXCESS BLDV CALC-SCNC: 3.2 MMOL/L (ref -3–3)
BILIRUB DIRECT SERPL-MCNC: 0.25 MG/DL (ref 0–0.3)
BILIRUB SERPL-MCNC: 0.5 MG/DL
BUN SERPL-MCNC: 16.5 MG/DL (ref 8–23)
BUN SERPL-MCNC: 22 MG/DL (ref 8–23)
CA-I BLD-MCNC: 4.4 MG/DL (ref 4.4–5.2)
CALCIUM SERPL-MCNC: 7.8 MG/DL (ref 8.8–10.4)
CALCIUM SERPL-MCNC: 7.8 MG/DL (ref 8.8–10.4)
CHLORIDE SERPL-SCNC: 100 MMOL/L (ref 98–107)
CHLORIDE SERPL-SCNC: 101 MMOL/L (ref 98–107)
COHGB MFR BLD: 98.3 % (ref 95–96)
COHGB MFR BLD: 99.4 % (ref 95–96)
CREAT SERPL-MCNC: 0.95 MG/DL (ref 0.51–0.95)
CREAT SERPL-MCNC: 0.98 MG/DL (ref 0.51–0.95)
EGFRCR SERPLBLD CKD-EPI 2021: 63 ML/MIN/1.73M2
EGFRCR SERPLBLD CKD-EPI 2021: 65 ML/MIN/1.73M2
ERYTHROCYTE [DISTWIDTH] IN BLOOD BY AUTOMATED COUNT: 14.3 % (ref 10–15)
GLUCOSE BLDC GLUCOMTR-MCNC: 115 MG/DL (ref 70–99)
GLUCOSE BLDC GLUCOMTR-MCNC: 191 MG/DL (ref 70–99)
GLUCOSE BLDC GLUCOMTR-MCNC: 93 MG/DL (ref 70–99)
GLUCOSE BLDC GLUCOMTR-MCNC: 98 MG/DL (ref 70–99)
GLUCOSE SERPL-MCNC: 143 MG/DL (ref 70–99)
GLUCOSE SERPL-MCNC: 99 MG/DL (ref 70–99)
HCO3 BLD-SCNC: 27 MMOL/L (ref 21–28)
HCO3 BLD-SCNC: 28 MMOL/L (ref 21–28)
HCO3 BLDV-SCNC: 29 MMOL/L (ref 21–28)
HCO3 SERPL-SCNC: 26 MMOL/L (ref 22–29)
HCO3 SERPL-SCNC: 26 MMOL/L (ref 22–29)
HCT VFR BLD AUTO: 23.5 % (ref 35–47)
HGB BLD-MCNC: 7.7 G/DL (ref 11.7–15.7)
INR PPP: 1.32 (ref 0.85–1.15)
MAGNESIUM SERPL-MCNC: 1.9 MG/DL (ref 1.7–2.3)
MCH RBC QN AUTO: 29.2 PG (ref 26.5–33)
MCHC RBC AUTO-ENTMCNC: 32.8 G/DL (ref 31.5–36.5)
MCV RBC AUTO: 89 FL (ref 78–100)
O2/TOTAL GAS SETTING VFR VENT: 40 %
O2/TOTAL GAS SETTING VFR VENT: 58 %
O2/TOTAL GAS SETTING VFR VENT: 58 %
OXYHGB MFR BLDV: 47 % (ref 70–75)
PCO2 BLD: 43 MM HG (ref 35–45)
PCO2 BLD: 44 MM HG (ref 35–45)
PCO2 BLDV: 50 MM HG (ref 40–50)
PEEP: 0 CM H2O
PEEP: 5 CM H2O
PH BLD: 7.4 [PH] (ref 7.35–7.45)
PH BLD: 7.41 [PH] (ref 7.35–7.45)
PH BLDV: 7.37 [PH] (ref 7.32–7.43)
PHOSPHATE SERPL-MCNC: 2.3 MG/DL (ref 2.5–4.5)
PLATELET # BLD AUTO: 201 10E3/UL (ref 150–450)
PO2 BLD: 116 MM HG (ref 80–105)
PO2 BLD: 95 MM HG (ref 80–105)
PO2 BLDV: 29 MM HG (ref 25–47)
POTASSIUM SERPL-SCNC: 3.5 MMOL/L (ref 3.4–5.3)
POTASSIUM SERPL-SCNC: 4 MMOL/L (ref 3.4–5.3)
PROT SERPL-MCNC: 5.2 G/DL (ref 6.4–8.3)
RBC # BLD AUTO: 2.64 10E6/UL (ref 3.8–5.2)
SAO2 % BLDA: 97 % (ref 92–100)
SAO2 % BLDA: 98 % (ref 92–100)
SAO2 % BLDV: 48.1 % (ref 70–75)
SODIUM SERPL-SCNC: 135 MMOL/L (ref 135–145)
SODIUM SERPL-SCNC: 136 MMOL/L (ref 135–145)
WBC # BLD AUTO: 12.7 10E3/UL (ref 4–11)

## 2024-11-23 PROCEDURE — 250N000011 HC RX IP 250 OP 636: Performed by: STUDENT IN AN ORGANIZED HEALTH CARE EDUCATION/TRAINING PROGRAM

## 2024-11-23 PROCEDURE — 250N000011 HC RX IP 250 OP 636

## 2024-11-23 PROCEDURE — 82805 BLOOD GASES W/O2 SATURATION: CPT | Performed by: STUDENT IN AN ORGANIZED HEALTH CARE EDUCATION/TRAINING PROGRAM

## 2024-11-23 PROCEDURE — 94660 CPAP INITIATION&MGMT: CPT

## 2024-11-23 PROCEDURE — 80053 COMPREHEN METABOLIC PANEL: CPT | Performed by: PHYSICIAN ASSISTANT

## 2024-11-23 PROCEDURE — 250N000013 HC RX MED GY IP 250 OP 250 PS 637: Performed by: STUDENT IN AN ORGANIZED HEALTH CARE EDUCATION/TRAINING PROGRAM

## 2024-11-23 PROCEDURE — 85730 THROMBOPLASTIN TIME PARTIAL: CPT | Performed by: STUDENT IN AN ORGANIZED HEALTH CARE EDUCATION/TRAINING PROGRAM

## 2024-11-23 PROCEDURE — 250N000013 HC RX MED GY IP 250 OP 250 PS 637

## 2024-11-23 PROCEDURE — 84100 ASSAY OF PHOSPHORUS: CPT | Performed by: STUDENT IN AN ORGANIZED HEALTH CARE EDUCATION/TRAINING PROGRAM

## 2024-11-23 PROCEDURE — 999N000157 HC STATISTIC RCP TIME EA 10 MIN

## 2024-11-23 PROCEDURE — 85610 PROTHROMBIN TIME: CPT | Performed by: STUDENT IN AN ORGANIZED HEALTH CARE EDUCATION/TRAINING PROGRAM

## 2024-11-23 PROCEDURE — 99232 SBSQ HOSP IP/OBS MODERATE 35: CPT | Performed by: INTERNAL MEDICINE

## 2024-11-23 PROCEDURE — 97110 THERAPEUTIC EXERCISES: CPT | Mod: GO

## 2024-11-23 PROCEDURE — 94799 UNLISTED PULMONARY SVC/PX: CPT

## 2024-11-23 PROCEDURE — 85027 COMPLETE CBC AUTOMATED: CPT | Performed by: PHYSICIAN ASSISTANT

## 2024-11-23 PROCEDURE — 82565 ASSAY OF CREATININE: CPT | Performed by: PHYSICIAN ASSISTANT

## 2024-11-23 PROCEDURE — 74018 RADEX ABDOMEN 1 VIEW: CPT

## 2024-11-23 PROCEDURE — 200N000001 HC R&B ICU

## 2024-11-23 PROCEDURE — 80076 HEPATIC FUNCTION PANEL: CPT | Performed by: PHYSICIAN ASSISTANT

## 2024-11-23 PROCEDURE — 99291 CRITICAL CARE FIRST HOUR: CPT | Performed by: INTERNAL MEDICINE

## 2024-11-23 PROCEDURE — 250N000013 HC RX MED GY IP 250 OP 250 PS 637: Performed by: PHYSICIAN ASSISTANT

## 2024-11-23 PROCEDURE — 82330 ASSAY OF CALCIUM: CPT

## 2024-11-23 PROCEDURE — 250N000011 HC RX IP 250 OP 636: Performed by: PHYSICIAN ASSISTANT

## 2024-11-23 PROCEDURE — 83735 ASSAY OF MAGNESIUM: CPT | Performed by: STUDENT IN AN ORGANIZED HEALTH CARE EDUCATION/TRAINING PROGRAM

## 2024-11-23 PROCEDURE — 82040 ASSAY OF SERUM ALBUMIN: CPT | Performed by: PHYSICIAN ASSISTANT

## 2024-11-23 RX ORDER — MAGNESIUM OXIDE 400 MG/1
400 TABLET ORAL EVERY 4 HOURS
Status: COMPLETED | OUTPATIENT
Start: 2024-11-23 | End: 2024-11-23

## 2024-11-23 RX ORDER — POTASSIUM CHLORIDE 1500 MG/1
20 TABLET, EXTENDED RELEASE ORAL ONCE
Status: COMPLETED | OUTPATIENT
Start: 2024-11-23 | End: 2024-11-23

## 2024-11-23 RX ADMIN — POTASSIUM & SODIUM PHOSPHATES POWDER PACK 280-160-250 MG 1 PACKET: 280-160-250 PACK at 10:57

## 2024-11-23 RX ADMIN — ASPIRIN 81 MG CHEWABLE TABLET 81 MG: 81 TABLET CHEWABLE at 08:07

## 2024-11-23 RX ADMIN — PANTOPRAZOLE SODIUM 40 MG: 40 TABLET, DELAYED RELEASE ORAL at 05:36

## 2024-11-23 RX ADMIN — AMLODIPINE BESYLATE 2.5 MG: 2.5 TABLET ORAL at 08:07

## 2024-11-23 RX ADMIN — THERA TABS 1 TABLET: TAB at 08:07

## 2024-11-23 RX ADMIN — SENNOSIDES AND DOCUSATE SODIUM 1 TABLET: 8.6; 5 TABLET ORAL at 20:16

## 2024-11-23 RX ADMIN — PIPERACILLIN AND TAZOBACTAM 3.38 G: 3; .375 INJECTION, POWDER, FOR SOLUTION INTRAVENOUS at 16:14

## 2024-11-23 RX ADMIN — HEPARIN SODIUM 5000 UNITS: 5000 INJECTION, SOLUTION INTRAVENOUS; SUBCUTANEOUS at 13:17

## 2024-11-23 RX ADMIN — FUROSEMIDE 20 MG: 10 INJECTION, SOLUTION INTRAMUSCULAR; INTRAVENOUS at 08:07

## 2024-11-23 RX ADMIN — POTASSIUM & SODIUM PHOSPHATES POWDER PACK 280-160-250 MG 1 PACKET: 280-160-250 PACK at 13:17

## 2024-11-23 RX ADMIN — METOPROLOL TARTRATE 12.5 MG: 25 TABLET, FILM COATED ORAL at 20:16

## 2024-11-23 RX ADMIN — INSULIN ASPART 2 UNITS: 100 INJECTION, SOLUTION INTRAVENOUS; SUBCUTANEOUS at 12:38

## 2024-11-23 RX ADMIN — LIDOCAINE 1 PATCH: 4 PATCH TOPICAL at 16:14

## 2024-11-23 RX ADMIN — POTASSIUM & SODIUM PHOSPHATES POWDER PACK 280-160-250 MG 1 PACKET: 280-160-250 PACK at 05:36

## 2024-11-23 RX ADMIN — CLOPIDOGREL BISULFATE 75 MG: 75 TABLET ORAL at 08:07

## 2024-11-23 RX ADMIN — HEPARIN SODIUM 5000 UNITS: 5000 INJECTION, SOLUTION INTRAVENOUS; SUBCUTANEOUS at 05:36

## 2024-11-23 RX ADMIN — HEPARIN SODIUM 5000 UNITS: 5000 INJECTION, SOLUTION INTRAVENOUS; SUBCUTANEOUS at 21:32

## 2024-11-23 RX ADMIN — POTASSIUM CHLORIDE 20 MEQ: 1500 TABLET, EXTENDED RELEASE ORAL at 20:16

## 2024-11-23 RX ADMIN — MAGNESIUM OXIDE TAB 400 MG (241.3 MG ELEMENTAL MG) 400 MG: 400 (241.3 MG) TAB at 05:36

## 2024-11-23 RX ADMIN — PIPERACILLIN AND TAZOBACTAM 3.38 G: 3; .375 INJECTION, POWDER, FOR SOLUTION INTRAVENOUS at 00:25

## 2024-11-23 RX ADMIN — MAGNESIUM OXIDE TAB 400 MG (241.3 MG ELEMENTAL MG) 400 MG: 400 (241.3 MG) TAB at 10:58

## 2024-11-23 RX ADMIN — HYDROMORPHONE HYDROCHLORIDE 0.2 MG: 0.2 INJECTION, SOLUTION INTRAMUSCULAR; INTRAVENOUS; SUBCUTANEOUS at 21:32

## 2024-11-23 RX ADMIN — METOPROLOL TARTRATE 12.5 MG: 25 TABLET, FILM COATED ORAL at 08:07

## 2024-11-23 RX ADMIN — PIPERACILLIN AND TAZOBACTAM 3.38 G: 3; .375 INJECTION, POWDER, FOR SOLUTION INTRAVENOUS at 08:07

## 2024-11-23 NOTE — PROGRESS NOTES
ICU PROGRESS NOTE:    Patient Summary:  Ms. Gamez is a 69 year old lady with a past medical history significant for cervicalgia, CAD, depression, HTN and obesity who presented to the hospital with an NSTEMI and noted to have severe multivessel disease now s/p 5V CABG with a complicated postoperative course including hypotension with new SHIRA and shock liver.  Echocardiogram demonstrated LVOT physiology and the patient has continued to have improvement over the last several days.  On the morning of 11/22, she developed A-fib with RVR and was hemodynamically unstable requiring cardioversion x 2, lidocaine, metoprolol and converted with 30 mg push of diltiazem.       Lines/Drains/Tubes:  Introducer 11/18/24 Internal jugular Right (Active)   Specific Qualities Ward in place 11/23/24 0800   Dressing Intervention New dressing 11/20/24 1800   Dressing Change Due 11/24/24 11/21/24 1600   Number of days: 5       Arterial Line 11/20/24 Brachial (Active)   Site Assessment WDL 11/23/24 0800   Line Status Pulsatile blood flow 11/23/24 0800   Art Line Waveform Appropriate 11/23/24 0800   Art Line Interventions Connections checked and tightened;Flushed per protocol 11/23/24 0800   Color/Movement/Sensation Capillary refill less than 3 sec 11/23/24 0800   Line Necessity Yes, meets criteria 11/23/24 0800   Dressing Type Transparent;Other (Comment) 11/22/24 1800   Dressing Status Old drainage 11/22/24 2000   Dressing Intervention Dressing changed/new dressing 11/23/24 0600   Dressing Change Due 11/24/24 11/21/24 2000   Number of days: 3       CVC Single Lumen Right Internal jugular (Active)   Site Assessment WDL 11/23/24 0800   Dressing Chlorhexidine disk;Transparent 11/23/24 0800   Dressing Status clean;dry;intact 11/23/24 0400   Dressing Intervention other (comment) 11/19/24 1600   Dressing Change Due 11/24/24 11/21/24 1600   Line Necessity Yes, meets criteria 11/23/24 0800   Status infusing 11/23/24 0800   Line Intervention Flushed  11/21/24 0000   Phlebitis Scale 0-->no symptoms 11/23/24 0800   Infiltration? no 11/23/24 0800   CVC Comment CVP monitored 11/20/24 0400   Number of days: 5       Pulmonary Artery Catheter - Single Lumen 11/18/24 1000 Right internal jugular vein (Active)   Dressing dressing dry and intact 11/23/24 0800   Securement secured with sutures 11/23/24 0800   PA Catheter Markings (cm) 56 11/23/24 0400   Waveform normal 11/23/24 0800   Number of days: 5     Overnight events:  Diarrhea, abdominal pain overnight.    Subjective:  Complaining of abdominal pain. Otherwise doing ok.    Objective:  Physical Exam:  Vent settings for last 24 hours:  Resp: 27    /55   Pulse 64   Temp 98.1  F (36.7  C) (Axillary)   Resp 27   Wt 117.9 kg (259 lb 14.8 oz)   LMP  (LMP Unknown)   SpO2 99%   BMI 40.80 kg/m      Intake/Output last 3 shifts:  I/O last 3 completed shifts:  In: 1842.7 [P.O.:720; I.V.:1122.7]  Out: 3965 [Urine:3555; Chest Tube:410]  Intake/Output this shift:  I/O this shift:  In: 6.8 [I.V.:6.8]  Out: 850 [Urine:850]    Physical Exam  Constitutional:       Appearance: Normal appearance.   HENT:      Head: Atraumatic.      Mouth/Throat:      Mouth: Mucous membranes are dry.   Cardiovascular:      Rate and Rhythm: Normal rate and regular rhythm.      Pulses: Normal pulses.      Heart sounds: Normal heart sounds.   Pulmonary:      Comments: Diminished BS at the bases.  Abdominal:      General: Abdomen is flat.      Palpations: Abdomen is soft.   Skin:     General: Skin is warm and dry.   Neurological:      General: No focal deficit present.      Mental Status: She is alert and oriented to person, place, and time.           LAB:  Recent Labs   Lab 11/23/24 0429   WBC 12.7*   HGB 7.7*   HCT 23.5*        Recent Labs   Lab 11/23/24 0429 11/22/24  1704 11/22/24  0839 11/22/24  0438    136 134* 137   CO2 26 25 24 23   BUN 22.0 25.9* 30.6* 32.4*   ALKPHOS 115  --  123 102   *  --  387* 368*   *  --   300* 306*       RADIOLOGY:  XR Chest Port 1 View    Result Date: 11/21/2024  EXAM: XR CHEST PORT 1 VIEW LOCATION: [facility] DATE: 11/21/2024 INDICATION: on bipap COMPARISON: 11/20/2024 and older studies.     IMPRESSION: Poststernotomy with mediastinal and pleural chest tubes. Right IJ Lowden-David catheter is in the distal right main pulmonary artery. No pneumothorax. Trace left effusion. No new parenchymal disease. Cardiomegaly is unchanged.     US Abdomen Limited    Result Date: 11/20/2024  EXAM: US ABDOMEN LIMITED LOCATION: [facility] DATE: 11/20/2024 INDICATION: elevated LFTs COMPARISON: CT 11/9/2024 TECHNIQUE: Limited abdominal ultrasound. FINDINGS: GALLBLADDER: Gallstones in an otherwise normal gallbladder. No wall thickening, or pericholecystic fluid. Negative sonographic He's sign. BILE DUCTS: No biliary dilatation. The common duct measures 3 mm. LIVER: Normal parenchyma with smooth contour. No focal mass. RIGHT KIDNEY: No hydronephrosis. PANCREAS: The pancreas is obscured by the overlying bandage and patient mobility. No ascites.     IMPRESSION: 1.  Cholelithiasis. No ultrasound evidence of cholecystitis. 2.  No bile duct dilatation.     XR Chest Port 1 View    Result Date: 11/20/2024  EXAM: XR CHEST PORT 1 VIEW LOCATION: [facility] DATE: 11/20/2024 INDICATION: Evaluate position of PA catheter COMPARISON: Study earlier today and prior exams.     IMPRESSION: Poststernotomy changes. A Lowden-David catheter has been passed through the cordis sheath and the tip is in the right, main pulmonary artery. Mediastinal and bilateral pleural chest tubes are again noted. Trace effusions. No pneumothorax. Linear atelectasis overlying the left hilum. Heart is slightly enlarged. No signs of failure.    Echocardiogram Limited    Result Date: 11/20/2024  [id number]^CASI^[facility]  [address]  [place] [address]  Name: CAILIN CAMPOS MRN: [id number] : 1955 Study Date: 2024 10:35 AM Age: 69 yrs Gender: Female Patient Location: [facility] Reason For Study: Shock Ordering Physician: ALEXY LANCE Performed By: KAITLIN  BSA: 2.2 m2 Height: 67 in Weight: 252 lb HR: 89 BP: 105/58 mmHg ______________________________________________________________________________ Procedure Limited Portable Echo Adult. Definity (NDC #09097-225) given intravenously. Technically difficult study. Compared to the prior study dated 2024, there are changes as noted. ______________________________________________________________________________ Interpretation Summary  1. Left ventricular chamber size is normal. Severe asymmetric septal wall hypertrophy is present with a maximum thickness of 1.8 cm. There is severe resting left ventricular outflow tract obstruction with a peak gradient of 66 mmHg. Systolic function is normal with a visually estimated left ventricular ejection fraction of 60-65%. 2. Right ventricle is not well visualized. Limited view suggest grossly normal chamber size and sysotlic function. 3. Mild left atrial enlargement. 4. Systolic anterior motion of the mitral valve is noted although this results in only trivial mitral regurgitation. 5. No significant percardial effusion. 6. Compared to the prior study dated 2024, the patient has since undergone coronary artery bypass grafting and resting left ventricular outflow tract obstruction is now present. ______________________________________________________________________________ I      WMSI = 1.00     % Normal = 100  X - Cannot   0 -                      (2) - Mildly 2 -          Segments  Size Interpret    Hyperkinetic 1 - Normal  Hypokinetic  Hypokinetic  1-2     small                                                    7 -          3-5    moderate 3 - Akinetic 4 -          5 -         6 - Akinetic Dyskinetic   6-14    large              Dyskinetic   Aneurysmal   w/scar       w/scar       15-16   diffuse  Left Ventricle The left ventricle is normal in size. There is severe eccentric left ventricular hypertrophy. The echo findings are consistent with left ventricular outflow obstruction. Peak resting outflow velocity 4.1 m/s and peak gradient 66 mmHg. The left ventricular ejection fraction is normal. The visual ejection fraction is 65-70%. Left ventricular diastolic function is indeterminate. No regional wall motion abnormalities noted. Septal motion is consistent with post-operative state.  Right Ventricle Right ventricle is not well visualized. Limited view suggest grossly normal chamber size and sysotlic function.  Atria The left atrium is mildly dilated. Right atrial size is normal.  Mitral Valve Mitral valve leaflets appear normal. There is systolic anterior motion of the mitral valve. There is trace mitral regurgitation. There is no mitral valve stenosis.  Tricuspid Valve The tricuspid valve is not well visualized, but is grossly normal. There is trace tricuspid regurgitation. Right ventricular systolic pressure could not be approximated due to inadequate tricuspid regurgitation. There is no tricuspid stenosis.  Aortic Valve The aortic valve is trileaflet with aortic valve sclerosis. No aortic regurgitation is present. No aortic stenosis is present.  Pulmonic Valve The pulmonic valve is not well visualized. There is mild (1+) pulmonic valvular regurgitation. There is no pulmonic valvular stenosis.  Vessels The aorta root is normal. Inferior vena cava not well visualized for estimation of right atrial pressure.  Pericardium There is no pericardial effusion.  Rhythm Sinus rhythm was noted.  ______________________________________________________________________________ MMode/2D Measurements & Calculations IVSd: 2.0 cm LVIDd: 4.7 cm LVPWd: 1.2 cm LV mass(C)d: 327.8 grams LV mass(C)dI: 147.0 grams/m2  Ao root diam: 3.4 cm Ao root diam index Ht(cm/m): 2.0 Ao root diam index  BSA (cm/m2): 1.5 LA Volume Index (BP): 39.8 ml/m2 RWT: 0.51  Doppler Measurements & Calculations Peak E' Abel: 4.7 cm/sec RV S Abel: 6.9 cm/sec  ______________________________________________________________________________ Report approved by: Joan Arguello 11/20/2024 12:15 PM       XR Chest Port 1 View    Result Date: 11/20/2024  EXAM: XR CHEST PORT 1 VIEW LOCATION: [facility] DATE: 11/20/2024 INDICATION: Evaluate etiology of hypoxemia COMPARISON: Yesterday     IMPRESSION: Mild to moderate low lung volumes. Sternotomy with mediastinal clips and markers. Bilateral chest tubes stable. No pneumothorax seen. Cardiomegaly. Mild pulmonary vascular prominence. Mild to moderate elevation right hemidiaphragm. Linear atelectasis left upper lung. Small left pleural effusion or pleural thickening. No focal airspace consolidations.    XR Chest Port 1 View    Result Date: 11/19/2024  EXAM: XR CHEST PORT 1 VIEW LOCATION: [facility] DATE: 11/19/2024 INDICATION: Post Op CVTS Surgery COMPARISON: 11/18/2024     IMPRESSION: Patient has been extubated. Sternotomy with mediastinal clips and markers. Bilateral chest tubes. No pneumothorax seen. Cardiomegaly. Mild pulmonary vascular prominence. Linear atelectasis left upper lung. No focal airspace consolidations.    XR Chest Port 1 View    Result Date: 11/18/2024  EXAM: XR CHEST PORT 1 VIEW LOCATION: [facility] DATE: 11/18/2024 INDICATION: Post Op CVTS Surgery COMPARISON: CT 11/9/2024     IMPRESSION: Endotracheal tube terminates approximately 5.0 cm above the osmin. Right neck central venous catheter sheath with tip in the mid SVC. Bilateral chest tubes, ascending mediastinal drain, and median sternotomy wires also noted. The lungs are slightly hypoinflated, otherwise negative. No definite pleural effusion or pneumothorax. Normal size cardiomediastinal silhouette.    POC US Guidance Needle Placement    Result  "Date: 2024  Ultrasound was performed as guidance to an anesthesia procedure.  Click \"PACS images\" hyperlink below to view any stored images.  For specific procedure details, view procedure note authored by anesthesia.    LESLIE with Report    Result Date: 2024  Ori Landaverde MD     2024  3:22 PM Procedures     MR Cardiac WO & W Contrast    Result Date: 2024                                                   [facility] hospital                                                  CMR Report   MRN:               [id number]                                Name:        CAILIN CAMPOS                                :              1955-Sep-23                                Scan Date:                                      Electronically signed by Hilaria Palma  14:56:07 SUMMARY  ========================================================================================================== Clinical history: 69 year old woman with asymmetric septal hypertrophy and multi vessel obstructive CAD referred for CMR. Comparison CMR: None 1. The left ventricle cavity size is small. There is asymmetric septal hypertrophy with maximal septal thickness 2.0 cm. The global systolic function is normal. The LVEF is 74%. There are no regional wall motion abnormalities. 2. The right ventricle is normal in cavity size. The global systolic function is normal. The RVEF is 70%. 3. Both atria are normal in size. 4. There is systolic anterior motion of the mitral valve and systolic flow acceleration in the LVOT. There is mild mitral regurgitation. There is apical displacement of the papillary muscles. 5. Late gadolinium enhancement imaging shows mid-myocardial enhancement in the basal to mid lateral segments. 6. There is a trivial pericardial effusion. 7. There is no clear intracardiac thrombus, although the left atrial appendage was incompletely visualized. CONCLUSIONS: Asymmetric septal hypertrophy with maximal " wall thickness 2.0 cm. Systolic anterior motion of the mitral valve is present. There is apical displacement of the papillary muscles. LGE imaging shows mid-myocardial enhancement in a non-ischemic pattern. Collectively, these findings suggest obstructive hypertrophic cardiomyopathy. Consider genetic testing for HCM. Normal biventricular systolic function, LVEF 74%, RVEF 70%. CORE EXAM  ========================================================================================================== MEASUREMENTS  ----------------------------------------------------------------------------------------     VOLUMETRIC ANALYSIS      ---------------------------------------------- .--------------------------------------------------------.                  LV     Reference  RV    Reference  +-----+-----------+-------+-----------+------+-----------+  EDV  ml         106.7   ()  92.1   ()        ml/m^2      49.4   (53-87)   42.7   (49-86)    ESV  ml          28.2   (20-57)   28.1   (11-63)         ml/m^2      13.1   (13-31)   13.0   (8-34)     CO   L/min       5.34             4.36                   L/min/m^2   2.47             2.02              SV   ml          78.5   ()  64.1   ()        ml/m^2      36.4   (36-60)   29.7   (34-58)    EF   %           73.6   (60-78)   69.5   (57-81)   '-----+-----------+-------+-----------+------+-----------'         CARDIAC OUTPUT HR:  68 BPM SCAN INFO  ========================================================================================================== GENERAL  ----------------------------------------------------------------------------------------     CONTRAST AGENT      ----------------------------------------------         TYPE:  Gadavist         GD CONCENTRATION:  0.5 M         VOLUME ADMINISTERED:  13 ml         DOSAGE:  0.06 mmol/kg     SEDATION       ----------------------------------------------         SEDATION USED?:  No     VITALS      ----------------------------------------------         HEIGHT:  66.0 in         HEIGHT:  167.6 cm         WEIGHT:  239.0 lbs         WEIGHT:  108.4 kgs         BSA:  2.16 m^2     PULSE SEQUENCES      ----------------------------------------------         SSFP cine, 2D LGE segmented, 2D LGE single-shot, T1-weighted imaging, T2-weighted imaging,         Pre-contrast T1 mapping, Post-contrast T1 mapping     SETUP      ----------------------------------------------         SCAN TYPE:  Clinical         PATIENT TYPE:  Outpatient         INCOMPLETE SCAN:  No         REASON(S) FOR SCAN:  HCM (known/suspect)         ATTENDING PHYSICIAN:  MAKENNA PUENTES Report generated by [employer], a product of [employer]    Cardiac Catheterization    Result Date: 11/13/2024    Ramus lesion is 40% stenosed.   Prox Cx lesion is 95% stenosed.   Mid Cx lesion is 30% stenosed.   Dist LAD-2 lesion is 70% stenosed.   Mid LAD to Dist LAD lesion is 70% stenosed.   3rd Diag lesion is 70% stenosed.   Mid LAD lesion is 80% stenosed.   2nd Diag lesion is 90% stenosed.   Dist LAD-1 lesion is 80% stenosed.   Prox LAD to Mid LAD lesion is 30% stenosed.   Dist RCA lesion is 99% stenosed.   Mid RCA lesion is 40% stenosed.   Prox RCA lesion is 40% stenosed.   RPDA lesion is 80% stenosed. 1.significant three-vessel CAD     US Upper Extremity Arterial Duplex Left    Result Date: 11/12/2024  ULTRASOUND UPPER EXTREMITY ARTERIAL DUPLEX LEFT 11/12/2024 2:17 PM CLINICAL HISTORY: pre CABG - possible radial graft, please assess size as well as presence/absence of calcium. COMPARISONS: None available. REFERRING PROVIDER: ALTHEA HAIR TECHNIQUE: Left brachial, radial, and ulnar arteries evaluated with grayscale, color Doppler, and spectral pulsed wave Doppler ultrasound. Radial side of the palmar arch evaluated without and with radial artery compression.  FINDINGS: LEFT: Brachial artery, antecubital fossa: 171/0 cm/s, triphasic Radial artery, origin: 99/0 cm/s, triphasic, 2.3 mm Radial artery, mid forearm: 93/0 cm/s, triphasic, 2.7 mm Radial artery, wrist: 123/10 cm/s, arterial monophasic, 1.9 mm Ulnar artery, mid forearm: 92/0 cm/s, triphasic Ulnar artery, wrist: 116/8 cm/s, triphasic Palmar arch, without compression: 116/9 cm/s, arterial monophasic Palmar arch, with radial compression: 11/0 cm/s, biphasic, RETROGRADE     IMPRESSION: Patent left radial and ulnar arteries to the wrist with measurements as in the report. Intact palmar arch suggested. JABARI FITZPATRICK MD   SYSTEM ID:  [id number]    US Lower Extremity Venous Mapping Bilateral    Result Date: 11/10/2024  Exam: Ultrasound Doppler vein mapping of bilateral lower extremities dated  11/10/2024 9:15 AM Comparison study: None Clinical history: Bilateral lower extremity vein mapping study Ordering clinician: Reba Whittaker Technique: Grayscale (B-mode) with duplex Doppler ultrasound, along with compression and augmentation, of the lower extremity veins. RIGHT LEG: CFV: Thrombus: No GSV: Proximal thigh: Thrombus: No, Wall thickness: Normal, 5.2 mm Mid thigh: Thrombus: No, Wall thickness: Normal, 4 mm Distal thigh: Thrombus: No, Wall thickness: Normal, 3.7 mm Knee: Thrombus: No, Wall thickness: Normal, 3.5 mm Distal calf: Thrombus: No, Wall thickness: Normal, 2.6 mm Ankle: Thrombus: No, wall thickness: Normal, 2.5 mm LEFT LEG: CFV: Thrombus: No GSV: Proximal thigh: Thrombus: No, Wall thickness: Normal, 7.3 mm Mid thigh: Thrombus: No, Wall thickness: Normal, 3.5 mm Distal thigh: Thrombus: No, Wall thickness: Normal, 3.3 mm Knee: Thrombus: No, Wall thickness: Normal, 3.2 mm Superior calf: Thrombus: No, Wall thickness: Normal, 2.4 mm Mid calf: Thrombus: No, Wall thickness: Normal, 1.7 mm Ankle: Thrombus: No, wall thickness: Normal, 2.8 mm.     Impression: 1. No thrombus noted within either lower extremity. 2. Great saphenous  vein diameter measurements as per above. I have personally reviewed the examination and initial interpretation and I agree with the findings. GREY PAULINO MD   SYSTEM ID:  [id number]    US Carotid Bilateral    Result Date: 11/10/2024  Exam: Bilateral carotid duplex Doppler ultrasound dated 11/10/2024 9:15 AM Clinical history: preop cabg Comparison Study: Ultrasound carotid 8/13/2011 Technique: Grayscale (B-mode) and duplex and spectral Doppler ultrasound of the extracranial internal carotid, external carotid, vertebral artery origins, right brachiocephalic/subclavian and left subclavian arteries. Velocity measurements obtained with angle correction at or less than 60 degrees. Findings: Right side: Plaque Morphology: Predominantly echogenic, Smooth    Proximal CCA: 90/18 cm/sec    Mid CCA: 69/13 cm/sec    Distal CCA: 65/13 cm/sec    External CA: 128/15 cm/sec    Proximal ICA: 43/14 cm/sec    Mid ICA: 59/19 cm/sec    Distal ICA: 72/21 cm/sec    Vertebral artery: Not seen ICA/CCA ratio: 1.5 Left side: Plaque Morphology: Predominantly echogenic, Smooth    Proximal CCA: 92/20 cm/sec    Mid CCA: 107/23 cm/sec    Distal CCA: 94/21 cm/sec       External CA: 119/14 cm/sec    Proximal ICA: 108/28 cm/sec    Mid ICA: 74/24 cm/sec    Distal ICA: 67/22 cm/sec    Vertebral artery: 59 cm/sec ICA/CCA ratio: 0.87     Impression: 1. Right side:     Degree of stenosis of the internal carotid artery: Less than 50 %. . 2. Left side:     Degree of stenosis of the internal carotid artery: Less than 50 %. . [employer] Commission Updated Recommendations for Carotid Stenosis Interpretation Criteria - October 2021. https://intersocietal.org/wp-content/uploads/2021/10/IAC-Vascular-Test xi-Qahzklnqfzoop_Utovsfh-Phapocgoncdglft-for-Carotid-Stenosis-Interpre ation-Criteria.pdf Society for Vascular Surgery Clinical Practice Guidelines for Management of Extracranial Cerebrovascular Disease. Journal of Vascular Surgery Vol. 75,  Issue 1, Supplement [id number] Published online: June 18, 2021 (additional criteria adjustment for >70% ICA stenosis)      Normal           ICA PSV: < 180 cm/s           Plaque: None           ICA/CCA PSV Ratio: < 2.0           ICA EDV: < 40 cm/s      < 50%           ICA PSV: < 180 cm/s           Plaque: < 50%           ICA/CCA PSV Ratio: < 2.0           ICA EDV: < 40 cm/s      50 - 69%           ICA PSV: < 180 - 230 cm/s           ICA/CCA PSV Ratio: 2.0 - 4.0           ICA EDV: 40 - 100 cm/s      > 70%           ICA PSV: > 230 cm/s AND           ICA/CCA PSV Ratio: > 4.0 or ICA EDV: > 100 cm/s      Total occlusion           ICA PSV: Undetectable           ICA/CCA PSV Ratio: N/A           ICA EDV: N/A I have personally reviewed the examination and initial interpretation and I agree with the findings. АНДРЕЙ RODRIGUEZ DO   SYSTEM ID:  [id number]    CT Chest w/o Contrast    Result Date: 11/9/2024  EXAMINATION: CT CHEST W/O CONTRAST, 11/9/2024 11:25 AM CLINICAL HISTORY: preop cabg COMPARISON: None. TECHNIQUE: CT imaging obtained through the chest without contrast. Coronal and axial MIP reformatted images obtained. CONTRAST:  none. FINDINGS: Lines and tubes: None. Mediastinum: No thyroid nodules. Central tracheobronchial tree is patent. Heart size is normal. No pericardial effusion.  Normal caliber thoracic vasculature. No thoracic lymphadenopathy. Calcified mediastinal and hilar lymph nodes. Triple-vessel coronary artery calcifications. Mild calcifications of the aorta. Esophagus appears normal. Lungs: Subpleural calcification in the right upper lobe. 3 mm subpleural nodule in the left lower lobe (4/245. 2 mm nodule in the left lower lobe (4/186). No consolidation. No pleural effusion or pneumothorax. Bones and soft tissues: No suspicious bone findings. Upper abdomen: Cholelithiasis without acute cholecystitis. Calcified granulomas in the spleen.     IMPRESSION: 1. No acute or suspicious abnormalities in the chest. 2. Sequelae  of prior granulomatous infection including pleural and mediastinal calcifications. 3. Small 2mm and 3mm nodules in the left lower lobe. 4. Cholelithiasis without acute cholecystitis. NOLA ROCKWELL MD   SYSTEM ID:  [id number]    XR Chest 2 Views    Result Date: 2024  Exam:  Chest X-ray 2024 9:47 AM History: preop cabg Comparison: 2024 Findings: Frontal and lateral views of the chest. The cardiac silhouette is within normal limits. No acute airspace opacities. No pleural effusion. No pneumothorax. Visualized upper abdomen is unremarkable. No acute osseous abnormality.     Impression: 1.  No acute cardiopulmonary abnormalities. АНДРЕЙ RODRIGUEZ DO   SYSTEM ID:  [id number]    Echo Complete    Result Date: 2024  [id number]^TIMMY^DOROTHY^WILMER  [facility] [address] [place] [id number]  Name: CAILIN CAMPOS MRN: [id number] : 1955 Study Date: 2024 09:55 AM Age: 69 yrs Gender: Female Patient Location: [facility] Reason For Study: Myocardial Infarction Ordering Physician: DOROTHY WARNER Performed By: Kelin Urena  BSA: 2.2 m2 Height: 66 in Weight: 243 lb BP: 121/98 mmHg ______________________________________________________________________________ Procedure Complete Portable Echo Adult. Contrast Optison. Optison (NDC #1412-0334-58) given intravenously. Patient was given 5 ml mixture of 3 ml Optison and 6 ml saline. 4 ml wasted. ______________________________________________________________________________ Interpretation Summary Global and regional left ventricular function is normal with an EF of 60-65%. Asymmetric septal hypertrophy; septal wall is 1.8 cm, posterior wall is 0.8 cm. Left ventricular cavity size is small. No ABRAHAM, or LVOT gradient. Global right ventricular function is normal. The right ventricle is normal size. The inferior vena cava was normal in size with preserved respiratory  variability. No significant valvular abnormalities present.  This study was compared with the study from 2011 . No significant changes noted. Consider cardiac MRI. ______________________________________________________________________________ Left Ventricle Left ventricular cavity size is small. Global and regional left ventricular function is normal with an EF of 60-65%. A mid-cavitary gradient is present. Asymmetric septal hypertrophy; septal wall is 1.8 cm, posterior wall is 0.8 cm. Left ventricular diastolic function is not assessable.  Right Ventricle The right ventricle is normal size. Global right ventricular function is normal.  Atria Both atria appear normal.  Mitral Valve The mitral valve is normal. Trace mitral insufficiency is present.  Aortic Valve Trileaflet aortic sclerosis without stenosis. On Doppler interrogation, there is no significant stenosis or regurgitation.  Tricuspid Valve On Doppler interrogation, there is no significant stenosis or regurgitation. The valve leaflets are not well visualized.  Pulmonic Valve The valve leaflets are not well visualized. On Doppler interrogation, there is no significant stenosis or regurgitation.  Vessels The aorta root is normal. The inferior vena cava was normal in size with preserved respiratory variability. Ascending aorta 3.8 cm.  Pericardium No pericardial effusion is present.  Miscellaneous No significant valvular abnormalities present.  Compared to Previous Study This study was compared with the study from 2011 . No significant changes noted. ______________________________________________________________________________ MMode/2D Measurements & Calculations IVSd: 1.9 cm LVIDd: 4.3 cm LVIDs: 3.0 cm LVPWd: 1.7 cm FS: 29.8 % LV mass(C)d: 336.5 grams LV mass(C)dI: 154.9 grams/m2 Ao root diam: 3.2 cm asc Aorta Diam: 3.8 cm LVOT diam: 2.2 cm LVOT area: 3.7 cm2 Ao root diam index Ht(cm/m): 1.9 Ao root diam index BSA (cm/m2): 1.5 Asc Ao diam index BSA (cm/m2):  1.7 Asc Ao diam index Ht(cm/m): 2.3 LA Volume (BP): 30.7 ml  LA Volume Index (BP): 14.1 ml/m2 RWT: 0.78 TAPSE: 2.9 cm  Doppler Measurements & Calculations MV E max abel: 35.1 cm/sec MV A max abel: 80.7 cm/sec MV E/A: 0.43 MV dec time: 0.27 sec Ao V2 max: 235.0 cm/sec Ao max P.1 mmHg Ao V2 mean: 150.0 cm/sec Ao mean P.0 mmHg Ao V2 VTI: 47.9 cm MELANIE(I,D): 2.1 cm2 MELANIE(V,D): 1.9 cm2 LV V1 max P.9 mmHg LV V1 max: 121.0 cm/sec LV V1 VTI: 27.6 cm SV(LVOT): 100.8 ml SI(LVOT): 46.4 ml/m2  AV Abel Ratio (DI): 0.51 MELANIE Index (cm2/m2): 0.97 E/E' av.2 Lateral E/e': 7.8 Medial E/e': 8.5 RV S Abel: 20.0 cm/sec  ______________________________________________________________________________ Report approved by: Jacquie ESPINOSA 2024 11:21 AM       XR Chest 1 View    Result Date: 2024  EXAM: XR CHEST 1 VIEW LOCATION: [facility] DATE: 2024 INDICATION: Chest pain COMPARISON: 3/23/2016. FINDINGS: The heart size is normal. The thoracic aorta is tortuous. Calcified granuloma in the right upper lobe. The lungs are otherwise clear. No pneumothorax.     IMPRESSION: No acute abnormality.       MICRO:  Date Source Organism    NP Swab Negative for MRSA    Urine NGTD    Blood NGTD    NP swab NGTD    NP Swab Negative Respiratory Viral Panel       Organism Antibiotic Antibiotic Start Date Antibiotic End Date   NGTD Vancomycin     Cefazolin     Pip-Tazo  Ongoing       MAR REVIEWED    ICU DAILY CHECKLIST                           Can patient transfer out of MICU? no    FAST HUG:    Feeding:  Feeding: Yes.  Patient is receiving ORAL    Jasmine:Yes  Analgesia/Sedation:Yes Hydromorphone  Thromboembolic prophylaxis: Yes; Mode:  Heparin  HOB>30:  Yes  Stress Ulcer Protocol Active: Yes; Mode: PPI  Glycemic Control: Any glucose > 180 yes; Mode of Insulin Therapy: Sliding Scale Insulin    INTUBATED:  Can patient have daily waking:  NA  Can  patient have spontaneous breathing trial:  NA    Restraints? no    PHYSICAL THERAPY AND MOBILITY:  Can patient have PT and mobility trial: no  Activity: OOB    Assessment/Plan:  Karyn Gamez is a 69 year old lady with a past medical history significant for with a past medical history significant for cervicalgia, CAD, depression, HTN and obesity who presented to the hospital with an NSTEMI and noted to have severe multivessel disease now s/p 5V CABG with a complicated postoperative course including hypotension with new SHIRA and shock liver.  Echocardiogram demonstrated LVOT physiology and the patient has continued to have improvement over the last several days.    NEURO:No acute issues.   RASS goal of 0-1.  Hydromorphone PRN for pain.  CVS:Has a known medical history of multivessel CAD s/p 5V CABG 11/18/24 (LIMA->LAD, LRA->OM, rSVG->rPDA, rSVG->rPLV, rSVG->diag), post operative course complicated by hypotension, ongoing vasopressor requirement with resolving ATN and acute shock liver. TTE raised concerns for LVOT. On 11/22 developed A-Fib with RVR and required cardioversion *2, Lidocaine, Metoprolol and subsequently converted with Diltiazem. Continues to be in A-fib with rates <100.  Continue telemetry monitoring.  MAP goal of 60mmHg with SBP>90mmHg.  Vasopressors: Phenylephrine.  Continue Amlodipine for grafts.  Afib: As noted above. Has acceptable heart rates now, appreciate EP recommendations. No indication for AVN blocking agents presently.  Need to consider long term anticoagulation but will defer this to CVTS.  Continue Aspirin and Plavix.  RESP:Had developed hypercarbic, hypoxemic respiratory failure; likely has undiagnosed TL/OHS.  Maintain SpO2 of >92%.  BPAP when sleeping.  Resp: 27  GI:Had developed shock liver post acute hypotension, and this is noted to be improving. Diarrhea overnight.  Clear liquids.  Continue Protonix daily for ulcer prophylaxis.  Hold Senna, Miralax, Colace.  RENAL : No acute  "issues.  Would trend renal function daily and closely follow I/O.  Follow electrolytes and correct as abnormalities arise.  ID:No growth on cultures.  On Pip-Tazo, will treat for a total of 7 days.  HEMATOLOGIC:Noted to have post operative anemia and has received PRBC for this.   Daily CBC.  Transfuse for Hb <7gm/dl.  ENDOCRINE:  ICU insulin sliding scale.  ICU PROPHYLAXIS:Protonix, heparin.  DISPO:Unclear. Continues to require ICU levels of care.    Clinically Significant Risk Factors         # Hyponatremia: Lowest Na = 134 mmol/L in last 2 days, will monitor as appropriate   # Hypocalcemia: Lowest iCa = 4.2 mg/dL in last 2 days, will monitor and replace as appropriate     # Hypoalbuminemia: Lowest albumin = 2.9 g/dL at 11/22/2024  8:39 AM, will monitor as appropriate  # Coagulation Defect: INR = 1.32 (Ref range: 0.85 - 1.15) and/or PTT = 33 Seconds (Ref range: 22 - 38 Seconds), will monitor for bleeding    # Hypertension: Noted on problem list     # Acute Hypercapnic Respiratory Failure: based on arterial blood gas results.  Continue supplemental oxygen and ventilatory support as indicated.  # Acute Hypercapnic Respiratory Failure: based on venous blood gas results.  Continue supplemental oxygen and ventilatory support as indicated.         # Severe Obesity: Estimated body mass index is 40.8 kg/m  as calculated from the following:    Height as of 11/7/24: 1.7 m (5' 6.93\").    Weight as of this encounter: 117.9 kg (259 lb 14.8 oz).      # Financial/Environmental Concerns:     # History of CABG: noted on surgical history                Total Critical Care Time : 45 Minutes    Radha Solis MD  Pulmonary and Critical Care  [employer]                "

## 2024-11-23 NOTE — PLAN OF CARE
Goal Outcome Evaluation:      Plan of Care Reviewed With: patient    Overall Patient Progress: improving    Outcome Evaluation: Vitals stable, requires low dose Edenilson, O2 at 7LPM by oxymask    [facility] - ICU    RN Progress Note:            Pertinent Assessments:      Please refer to flowsheet rows for full assessment     Reports no pain when at rest and minimal pain with movement. Sternal precautions maintained. Chest tube atrium changed. Small amount of serosanguinous output continues. On BiPAP overnight while asleep, otherwise maintains sats on 7L Oxymask. Vitals stable on 0.1 of phenylephrine. No dysrhythmias observed overnight. Multiple loose stools.           Key Events - This Shift:                       Barriers to Discharge / Downgrade:     Need for vasopressors.         Point of Contact Update: YES-OR-NO: Yes  If No, reason:   Name:  Phone Number:  Summary of Conversation:

## 2024-11-23 NOTE — TREATMENT PLAN
RCAT Treatment Plan    Patient Score: 9  Patient Acuity: 4    Clinical Indication for Therapy: prevent atelectasis    Therapy Ordered: FV, IS, EZPAP BID    Assessment Summary: pt s/p CABGx5. She is a former 0.5 pack a day tobacco smoker. CXR 11/21 trace left effusion. RR 14-16, LS diminished. NPC. She is on 6L NC SpO2 93%. Doing well independently with IS and FV. IS to 800ml. Will reassess daily or as needed.      Piter Martines, RT  11/23/2024

## 2024-11-23 NOTE — PROGRESS NOTES
[facility]  Cardiac Electrophysiology  [address]  [place] [address]   Office: [phone]  Fax: [phone]     Cardiac Electrophysiology Consultation    Patient: Karyn Gamez   : 1955     Referring Provider: No ref. provider found    CHIEF COMPLAINT/REASON FOR CONSULTATION  Paroxysmal atrial fibrillation with RVR     Assessment/Recommendations     # Paroxysmal atrial fibrillation with RVR  - Single episode of AF lasting approximately 4 hours on       PLAN:   -Recommend weaning Diltiazem drip. Continue metoprolol 12.5 mg BID and up titrate as tolerated by BP.   -Anticoagulation warranted if and out of A-fib for greater than 48 hours.  She is on DAPT for her NSTEMI and bypass surgery. No further episodes of AF since   - if she has further episodes of AF, recommend starting Amiodarone drip         History of Present Illness   Karyn Gamez is a 69 year old female past medical history significant for type 2 diabetes, prior CVA, tobacco use, hypertension, hyperlipidemia, depression, possible TL, HCM on cardiac MRI, LVOT on latest echo was found to have NSTEMI and underwent CABG on 2024 with Dr. Pennington.  EP was consulted for unstable atrial fibrillation morning of 2024.             Data Review    ECGs (all tracings independently reviewed)  2024: Atrial fibrillation 94 bpm  2024: Sinus rhythm 63 bpm  2024: Sinus rhythm 82 bpm  2024: Sinus rhythm 68 bpm  2024: Sinus rhythm 62 bpm     Telemetry shows sinus rhythm 60 bpm     Echo done 2024:  1. Left ventricular chamber size is normal. Severe asymmetric septal wall  hypertrophy is present with a maximum thickness of 1.8 cm. There is severe  resting left ventricular outflow tract obstruction with a peak gradient of 66  mmHg. Systolic function is normal with a visually estimated left ventricular  ejection fraction of 60-65%.  2. Right ventricle is not well  visualized. Limited view suggest grossly normal  chamber size and sysotlic function.  3. Mild left atrial enlargement.  4. Systolic anterior motion of the mitral valve is noted although this results  in only trivial mitral regurgitation.  5. No significant percardial effusion.  6. Compared to the prior study dated 11/8/2024, the patient has since  undergone coronary artery bypass grafting and resting left ventricular outflow  tract obstruction is now present.        Cardiac MRI 11/13/2024:  CONCLUSIONS:   Asymmetric septal hypertrophy with maximal wall thickness 2.0 cm. Systolic anterior motion of the mitral  valve is present. There is apical displacement of the papillary muscles. LGE imaging shows mid-myocardial  enhancement in a non-ischemic pattern. Collectively, these findings suggest obstructive hypertrophic  cardiomyopathy. Consider genetic testing for HCM.   Normal biventricular systolic function, LVEF 74%, RVEF 70%.               Physical Examination  Review of Systems   VITALS: /55   Pulse 64   Temp 98.1  F (36.7  C) (Axillary)   Resp 27   Wt 117.9 kg (259 lb 14.8 oz)   LMP  (LMP Unknown)   SpO2 99%   BMI 40.80 kg/m    Wt Readings from Last 3 Encounters:   11/23/24 117.9 kg (259 lb 14.8 oz)   11/17/24 107.9 kg (237 lb 14 oz)   10/31/23 96.6 kg (213 lb)       Intake/Output Summary (Last 24 hours) at 11/22/2024 1355  Last data filed at 11/22/2024 1232  Gross per 24 hour   Intake 1717.05 ml   Output 5225 ml   Net -3507.95 ml     CONSTITUTIONAL: no distress  RESPIRATORY:  Respiratory effort is normal  CARDIOVASCULAR:  normal S1 and S2  GASTROINTESTINAL:  Abdomen without masses or tenderness  EXTREMITIES:  No clubbing or cyanosis.    SKIN:  Overall, skin warm and dry, no lesions.  NEURO/PSYCH:  Oriented x 3 with normal affect.    ROS: 10 point ROS neg other than the symptoms noted above in the HPI.       Medical History  Surgical History   Past Medical History:   Diagnosis Date    Cervicalgia  6/29/2005 June 29, 2005: Thrown from a horse - wearing a helmet - and struck side of head and neck, heard crepitation, no loc, Able to walk after injury.  persistant pain in neck relieved with ibuprofen, stiff but can move with ROM.  No changes in hands and feet sensation/motor.    Coronary artery disease     Depressive disorder, not elsewhere classified     Hypertension     Obesity, unspecified     Past Surgical History:   Procedure Laterality Date    ANGIOGRAM  2010    negative    COLONOSCOPY N/A 3/24/2023    Procedure: COLONOSCOPY, WITH HOT POLYPECTOMY AND RESEARCH BIOPSY;  Surgeon: Bret Velez MD;  Location: [facility] OR    CORONARY ARTERY BYPASS GRAFT, WITH ENDOSCOPIC VESSEL PROCUREMENT N/A 11/18/2024    Procedure: CORONARY ARTERY BYPASS GRAFT TIMES FIVE, LEFT INTERNAL MAMMARY ARTERY HARVEST, RIGHT ENDOSCOPIC VESSEL PROCUREMENT,;  Surgeon: Mary Pennington MD;  Location: [facility] OR    CV CORONARY ANGIOGRAM N/A 11/8/2024    Procedure: Coronary Angiogram;  Surgeon: Emir Brand MD;  Location:  HEART CARDIAC CATH LAB    HYSTERECTOMY, PAP NO LONGER INDICATED      BSO    PROCURE ARTERY RADIAL  11/18/2024    Procedure: LEFT RADIAL ARTERY HARVEST;  Surgeon: Mary Pennington MD;  Location: [facility] OR    TRANSESOPHAGEAL ECHOCARDIOGRAM INTRAOPERATIVE N/A 11/18/2024    Procedure: ECHOCARDIOGRAM, TRANSESPOPHAGEAL, WITH ANESTHESIA,;  Surgeon: Mary Pennington MD;  Location: [facility] OR         Family History Social History   Family History   Problem Relation Age of Onset    C.A.D. Father         first MI at 53, stenting x2    C.A.D. Mother         dx in her mid 50's    C.A.D. Brother         MI in mid-50's    Cerebrovascular Disease Brother         in late 50's        Social History     Tobacco Use    Smoking status: Former     Current packs/day: 0.00     Average packs/day: 0.5 packs/day for 30.0 years (15.0 ttl pk-yrs)     Types: Cigarettes     Start date: 8/12/1981     Quit date: 8/12/2011     Years  since quittin.2    Smokeless tobacco: Never    Tobacco comments:     smoking 3-4 cigs per day   Vaping Use    Vaping status: Never Used   Substance Use Topics    Alcohol use: Yes     Comment: rarely    Drug use: No         Medications  Allergies     Current Facility-Administered Medications:     [Held by provider] acetaminophen (TYLENOL) tablet 650 mg, 650 mg, Oral, Q4H PRN, Jasmin Lemons PA-C    amLODIPine (NORVASC) tablet 2.5 mg, 2.5 mg, Oral, Daily, Ly tAwood PA-C, 2.5 mg at 24 0807    aspirin (ASA) chewable tablet 81 mg, 81 mg, Oral or NG Tube, Daily, 81 mg at 24 0807 **OR** aspirin (ASA) Suppository 300 mg, 300 mg, Rectal, Daily, Ly Atwood PA-C, 300 mg at 24 1409    [Held by provider] atorvastatin (LIPITOR) tablet 80 mg, 80 mg, Oral, At Bedtime, Jasmin Lemons PA-C, 80 mg at 24    bisacodyl (DULCOLAX) suppository 10 mg, 10 mg, Rectal, Daily PRN, Jasmin Lemons PA-C    calcium gluconate 1 g in 50 mL in sodium chloride intermittent infusion, 1 g, Intravenous, Once PRN, Jasmin Lemons PA-C, 1 g at 24 0449    calcium gluconate 2 g in  mL intermittent infusion, 2 g, Intravenous, Once PRN, Jasmin Lemons PA-C    calcium gluconate 3 g in sodium chloride 0.9 % 100 mL intermittent infusion, 3 g, Intravenous, Once PRN, Jamsin Lemons PA-C    clopidogrel (PLAVIX) tablet 75 mg, 75 mg, Oral, Daily, Ly Atwood PA-C, 75 mg at 24 0807    glucose gel 15-30 g, 15-30 g, Oral, Q15 Min PRN **OR** dextrose 50 % injection 25-50 mL, 25-50 mL, Intravenous, Q15 Min PRN **OR** glucagon injection 1 mg, 1 mg, Subcutaneous, Q15 Min PRN, Jasmin Lemons PA-C    diltiazem (CARDIZEM) 125 mg in dextrose 5 % 125 mL infusion, 5-15 mg/hr, Intravenous, Continuous, Radha Solis MD, Held at 24 1138    furosemide (LASIX) injection 20 mg, 20 mg, Intravenous, Daily, Suzie Leiva PA-C, 20 mg at 24  0807    heparin ANTICOAGULANT injection 5,000 Units, 5,000 Units, Subcutaneous, Q8H, Jasmin Lemons PA-C, 5,000 Units at 11/23/24 0536    hydrALAZINE (APRESOLINE) injection 10 mg, 10 mg, Intravenous, Q30 Min PRN, Jasmin Lemons PA-C    HYDROmorphone (DILAUDID) injection 0.2 mg, 0.2 mg, Intravenous, Q2H PRN, 0.2 mg at 11/22/24 0954 **OR** [DISCONTINUED] HYDROmorphone (DILAUDID) injection 0.4 mg, 0.4 mg, Intravenous, Q2H PRN, Jasmin Lemons PA-C    influenza trivalent vaccine high-dose for ages 65 years and greater (FLUZONE-HD) injection 0.5 mL, 0.5 mL, Intramuscular, Prior to discharge, Mary Pennington MD    insulin aspart (NovoLOG) injection (RAPID ACTING), 1-7 Units, Subcutaneous, TID AC, Ly Atwood PA-C, 1 Units at 11/22/24 0844    insulin aspart (NovoLOG) injection (RAPID ACTING), 1-5 Units, Subcutaneous, At Bedtime, Ly Atwood PA-C    iopamidol (ISOVUE-370) solution 117 mL, 117 mL, Intravenous, Once **AND** sodium chloride 0.9 % bag for CT scan flush use, 100 mL, As instructed, Once, Ly Atwood PA-C    lactated ringers BOLUS 250 mL, 250 mL, Intravenous, Q15 Min PRN, Jasmin Lemons PA-C, Last Rate: 500 mL/hr at 11/18/24 1950, 250 mL at 11/18/24 1950    Lidocaine (LIDOCARE) 4 % Patch 1-2 patch, 1-2 patch, Transdermal, Q24H, Jasmin Lemons PA-C, 1 patch at 11/22/24 1623    magnesium hydroxide (MILK OF MAGNESIA) suspension 30 mL, 30 mL, Oral, Daily PRN, Jasmin Lemons PA-C    magnesium oxide (MAG-OX) tablet 400 mg, 400 mg, Oral, Q4H, Ly Atwood PA-C, 400 mg at 11/23/24 0536    [Held by provider] metFORMIN (GLUCOPHAGE XR) 24 hr tablet 500 mg, 500 mg, Oral, Daily with Vesta brooke Gabrielle K, PA-C    metoprolol tartrate (LOPRESSOR) half-tab 12.5 mg, 12.5 mg, Oral, BID, Britany Hardy MD, 12.5 mg at 11/23/24 0807    multivitamin, therapeutic (THERA-VIT) tablet 1 tablet, 1 tablet, Oral, Daily, Mary Pennington MD, 1 tablet at  11/23/24 0807    naloxone (NARCAN) injection 0.2 mg, 0.2 mg, Intravenous, Q2 Min PRN **OR** naloxone (NARCAN) injection 0.4 mg, 0.4 mg, Intravenous, Q2 Min PRN **OR** naloxone (NARCAN) injection 0.2 mg, 0.2 mg, Intramuscular, Q2 Min PRN, 0.2 mg at 11/20/24 0812 **OR** naloxone (NARCAN) injection 0.4 mg, 0.4 mg, Intramuscular, Q2 Min PRN, Jasmin Lemons PA-C    ondansetron (ZOFRAN ODT) ODT tab 4 mg, 4 mg, Oral, Q6H PRN **OR** ondansetron (ZOFRAN) injection 4 mg, 4 mg, Intravenous, Q6H PRN, Jasmin Lemons PA-C, 4 mg at 11/20/24 1635    oxyCODONE (ROXICODONE) tablet 5 mg, 5 mg, Oral, Q4H PRN **OR** oxyCODONE (ROXICODONE) tablet 10 mg, 10 mg, Oral, Q4H PRN, Jasmin Lemons PA-C, 10 mg at 11/20/24 0019    pantoprazole (PROTONIX) EC tablet 40 mg, 40 mg, Oral, QAM AC, Mary Pennington MD, 40 mg at 11/23/24 0536    phenylephrine (LISA-SYNEPHRINE) 50 mg in NaCl 0.9 % 250 mL infusion, 0.1-6 mcg/kg/min, Intravenous, Continuous, Mary Pennington MD, Last Rate: 3.4 mL/hr at 11/23/24 0600, 0.1 mcg/kg/min at 11/23/24 0600    piperacillin-tazobactam (ZOSYN) 3.375 g vial to attach to  mL bag, 3.375 g, Intravenous, Q8H, Mike Garcia MD, 3.375 g at 11/23/24 0807    polyethylene glycol (MIRALAX) Packet 17 g, 17 g, Oral, Daily, Jasmin Lemons PA-C, 17 g at 11/21/24 0822    potassium & sodium phosphates (NEUTRA-PHOS) Packet 1 packet, 1 packet, Oral or Feeding Tube, Q4H, Ly Atwood PA-C, 1 packet at 11/23/24 0536    prochlorperazine (COMPAZINE) injection 5 mg, 5 mg, Intravenous, Q6H PRN **OR** prochlorperazine (COMPAZINE) tablet 5 mg, 5 mg, Oral, Q6H PRN, Jasmin Lemons PA-C    senna-docusate (SENOKOT-S/PERICOLACE) 8.6-50 MG per tablet 1 tablet, 1 tablet, Oral, BID, Jasmin Lemons PA-C, 1 tablet at 11/22/24 2012   No Known Allergies       Lab Results    Chemistry CBC Cardiac Enzymes/BNP/TSH/INR   Recent Labs   Lab Test 11/22/24  1210 11/22/24  0839   NA  --  134*   POTASSIUM  --  3.7  "  CHLORIDE  --  101   CO2  --  24   * 149*   BUN  --  30.6*   CR  --  1.21*   GFRESTIMATED  --  49*   ANGELES  --  7.8*     Recent Labs   Lab Test 11/22/24  0839 11/22/24 0438 11/21/24 1916   CR 1.21* 1.31* 1.44*          Recent Labs   Lab Test 11/22/24 0839   WBC 18.0*   HGB 8.1*   HCT 24.5*   MCV 89        Recent Labs   Lab Test 11/22/24 0839 11/22/24 0438 11/21/24 1916   HGB 8.1* 7.7* 8.1*    No results for input(s): \"TROPONINI\" in the last 47040 hours.  No results for input(s): \"BNP\", \"NTBNPI\", \"NTBNP\" in the last 36349 hours.  Recent Labs   Lab Test 11/07/24  1842   TSH 3.54     Recent Labs   Lab Test 11/22/24  0839 11/22/24 0438 11/20/24  0900   INR 1.40* 1.44* 1.64*            Ashley Gonzalez MD  Clinical Cardiac Electrophysiology    "

## 2024-11-23 NOTE — TREATMENT PLAN
RCAT Treatment Plan    Patient Score: 9  Patient Acuity: 4    Clinical Indication for Therapy: atelectasis, CABG x 5    Therapy Ordered: FV, EZPAP QID , Bipap at night.    Assessment Summary: Patient seen on 7 L oxy mask spo2 93%.     Jacquelyn Quintana, RT  11/22/2024

## 2024-11-23 NOTE — PROGRESS NOTES
Patient arterial line positional, when patient holds arm up to face, waveform severely dampened, index on hemosphere drops to 1.5 and SVR jumps up to 1800. When arm straightens out, Index returns to >2.   Patient had frequent small loose stools, was requesting bedpan frequently, sometimes only a few minutes after getting off of bedpan. Patient was on bipap from around 11pm to around 0400- then placed back on oxymask.   Attempted to turn on Edenilson gtt, paused for about 10 minutes and MAP went from 76 down to 62- Edenilson gtt restarted at lowest dose.

## 2024-11-23 NOTE — PROGRESS NOTES
CVTS Daily Progress Note   POD#5 s/p CABG x5 with LIMA to LAD, left radial artery to obtuse marginal, rSVG to RPDA, RPL, and diagonal, endoscopic harvest of left radial artery and right saphenous vein   Attending: Gorge  LOS: 5    SUBJECTIVE/INTERVAL EVENTS:    Remains out of AF.   Alert and oriented. Remains on low dose phenylephrine. NSR. Maintaining oxygen saturation on face mask. Pain well controlled. + BM. ADAT. UOP 4.1L. Cr 0.98 and downtrending. LFTs downtrending. Chest tube output appropriate. Hgb 7.7. Remains on empiric Zosyn with cultures NGTD. WBC 12. Patient denies new chest pain, SOB, calf pain, abdominal pain, nausea. Questions answered.        OBJECTIVE:  Temp:  [97.8  F (36.6  C)-98.1  F (36.7  C)] 98.1  F (36.7  C)  Pulse:  [] 63  Resp:  [13-33] 22  BP: ()/(55-60) 104/55  MAP:  [61 mmHg-90 mmHg] 79 mmHg  Arterial Line BP: ()/(45-69) 113/58  SpO2:  [89 %-100 %] 100 %  Vitals:    11/19/24 0600 11/20/24 0530 11/21/24 0548 11/23/24 0451   Weight: 114.3 kg (251 lb 14.4 oz) 114.5 kg (252 lb 8 oz) 113.1 kg (249 lb 6.4 oz) 117.9 kg (259 lb 14.8 oz)       Clinically Significant Risk Factors         # Hyponatremia: Lowest Na = 134 mmol/L in last 2 days, will monitor as appropriate   # Hypocalcemia: Lowest iCa = 4.2 mg/dL in last 2 days, will monitor and replace as appropriate     # Hypoalbuminemia: Lowest albumin = 2.9 g/dL at 11/22/2024  8:39 AM, will monitor as appropriate    # Coagulation Defect: INR = 1.32 (Ref range: 0.85 - 1.15) and/or PTT = 33 Seconds (Ref range: 22 - 38 Seconds), will monitor for bleeding    # Hypertension: Noted on problem list     # Acute Hypercapnic Respiratory Failure: based on arterial blood gas results.  Continue supplemental oxygen and ventilatory support as indicated.  # Acute Hypercapnic Respiratory Failure: based on venous blood gas results.  Continue supplemental oxygen and ventilatory support as indicated.         # Severe Obesity: Estimated body mass  "index is 40.8 kg/m  as calculated from the following:    Height as of 11/7/24: 1.7 m (5' 6.93\").    Weight as of this encounter: 117.9 kg (259 lb 14.8 oz).        # Financial/Environmental Concerns:     # History of CABG: noted on surgical history              Current Medications:    Scheduled Meds:  Current Facility-Administered Medications   Medication Dose Route Frequency Provider Last Rate Last Admin    amLODIPine (NORVASC) tablet 2.5 mg  2.5 mg Oral Daily Ly Atwood PA-C   2.5 mg at 11/23/24 0807    aspirin (ASA) chewable tablet 81 mg  81 mg Oral or NG Tube Daily Ly Atwood PA-C   81 mg at 11/23/24 0807    Or    aspirin (ASA) Suppository 300 mg  300 mg Rectal Daily Ly Atwood PA-C   300 mg at 11/20/24 1409    [Held by provider] atorvastatin (LIPITOR) tablet 80 mg  80 mg Oral At Bedtime Jasmin Lemons PA-C   80 mg at 11/19/24 2032    clopidogrel (PLAVIX) tablet 75 mg  75 mg Oral Daily Ly Atwood PA-C   75 mg at 11/23/24 0807    furosemide (LASIX) injection 20 mg  20 mg Intravenous Daily Suzie Leiva PA-C   20 mg at 11/23/24 0807    heparin ANTICOAGULANT injection 5,000 Units  5,000 Units Subcutaneous Q8H Jasmin Lemons PA-C   5,000 Units at 11/23/24 0536    influenza trivalent vaccine high-dose for ages 65 years and greater (FLUZONE-HD) injection 0.5 mL  0.5 mL Intramuscular Prior to discharge Mary Pennington MD        insulin aspart (NovoLOG) injection (RAPID ACTING)  1-7 Units Subcutaneous TID AC Ly Atwood PA-C   1 Units at 11/22/24 0844    insulin aspart (NovoLOG) injection (RAPID ACTING)  1-5 Units Subcutaneous At Bedtime Ly Atwood PA-C        iopamidol (ISOVUE-370) solution 117 mL  117 mL Intravenous Once Ly Atwood PA-C        And    sodium chloride 0.9 % bag for CT scan flush use  100 mL As instructed Once Ly Atwood PA-C        Lidocaine (LIDOCARE) 4 % Patch 1-2 patch  1-2 patch " Transdermal Q24H Jasmin Lemons PA-C   1 patch at 11/22/24 1623    magnesium oxide (MAG-OX) tablet 400 mg  400 mg Oral Q4H Ly Atwood PA-C   400 mg at 11/23/24 0536    [Held by provider] metFORMIN (GLUCOPHAGE XR) 24 hr tablet 500 mg  500 mg Oral Daily with supper Jasmin Lemons PA-C        metoprolol tartrate (LOPRESSOR) half-tab 12.5 mg  12.5 mg Oral BID Britany Hardy MD   12.5 mg at 11/23/24 0807    multivitamin, therapeutic (THERA-VIT) tablet 1 tablet  1 tablet Oral Daily Mary Pennington MD   1 tablet at 11/23/24 0807    pantoprazole (PROTONIX) EC tablet 40 mg  40 mg Oral QAM AC Mary Pennington MD   40 mg at 11/23/24 0536    piperacillin-tazobactam (ZOSYN) 3.375 g vial to attach to  mL bag  3.375 g Intravenous Q8H Mike Garcia MD   3.375 g at 11/23/24 0807    polyethylene glycol (MIRALAX) Packet 17 g  17 g Oral Daily Jasmin Lemons PA-C   17 g at 11/21/24 0822    potassium & sodium phosphates (NEUTRA-PHOS) Packet 1 packet  1 packet Oral or Feeding Tube Q4H Ly Atwood PA-C   1 packet at 11/23/24 0536    senna-docusate (SENOKOT-S/PERICOLACE) 8.6-50 MG per tablet 1 tablet  1 tablet Oral BID Jasmin Lemons PA-C   1 tablet at 11/22/24 2012     Continuous Infusions:  Current Facility-Administered Medications   Medication Dose Route Frequency Provider Last Rate Last Admin    diltiazem (CARDIZEM) 125 mg in dextrose 5 % 125 mL infusion  5-15 mg/hr Intravenous Continuous IrmaRadha MD   Held at 11/22/24 1138    phenylephrine (LISA-SYNEPHRINE) 50 mg in NaCl 0.9 % 250 mL infusion  0.1-6 mcg/kg/min Intravenous Continuous Mary Pennington MD 3.4 mL/hr at 11/23/24 0600 0.1 mcg/kg/min at 11/23/24 0600     PRN Meds:.  Current Facility-Administered Medications   Medication Dose Route Frequency Provider Last Rate Last Admin    [Held by provider] acetaminophen (TYLENOL) tablet 650 mg  650 mg Oral Q4H PRN Jasmin Lemons PA-C        bisacodyl (DULCOLAX) suppository 10 mg   10 mg Rectal Daily PRN Jasmin Lemons PA-C        calcium gluconate 1 g in 50 mL in sodium chloride intermittent infusion  1 g Intravenous Once PRN Jasmin Lemons PA-C   1 g at 11/22/24 0449    calcium gluconate 2 g in  mL intermittent infusion  2 g Intravenous Once PRN Jasmin Lemons PA-C        calcium gluconate 3 g in sodium chloride 0.9 % 100 mL intermittent infusion  3 g Intravenous Once PRN Jasmin Lemons PA-C        glucose gel 15-30 g  15-30 g Oral Q15 Min PRN Jasmin Lemons PA-C        Or    dextrose 50 % injection 25-50 mL  25-50 mL Intravenous Q15 Min PRN Jasmin Lemons PA-C        Or    glucagon injection 1 mg  1 mg Subcutaneous Q15 Min PRN Jasmin Lemons PA-C        hydrALAZINE (APRESOLINE) injection 10 mg  10 mg Intravenous Q30 Min PRN Jasmin Lemons PA-C        HYDROmorphone (DILAUDID) injection 0.2 mg  0.2 mg Intravenous Q2H PRN Jasmin Lemons PA-C   0.2 mg at 11/22/24 0954    lactated ringers BOLUS 250 mL  250 mL Intravenous Q15 Min PRN Jasmin Lemons PA-C 500 mL/hr at 11/18/24 1950 250 mL at 11/18/24 1950    magnesium hydroxide (MILK OF MAGNESIA) suspension 30 mL  30 mL Oral Daily PRN Jasmin Lemons PA-C        naloxone (NARCAN) injection 0.2 mg  0.2 mg Intravenous Q2 Min PRN Jasmin Lemons PA-C        Or    naloxone (NARCAN) injection 0.4 mg  0.4 mg Intravenous Q2 Min PRN Jasmin Leomns PA-C        Or    naloxone (NARCAN) injection 0.2 mg  0.2 mg Intramuscular Q2 Min PRN Jasmin Lemons PA-C   0.2 mg at 11/20/24 0812    Or    naloxone (NARCAN) injection 0.4 mg  0.4 mg Intramuscular Q2 Min PRN Henshue, Jasmin K, PA-C        ondansetron (ZOFRAN ODT) ODT tab 4 mg  4 mg Oral Q6H PRN Jasmin Lemons PA-C        Or    ondansetron (ZOFRAN) injection 4 mg  4 mg Intravenous Q6H PRN Jasmin Lemons PA-C   4 mg at 11/20/24 1564    oxyCODONE (ROXICODONE) tablet 5 mg  5 mg Oral Q4H PRN Vesta,  Jasmin IRWIN PA-C        Or    oxyCODONE (ROXICODONE) tablet 10 mg  10 mg Oral Q4H PRN Jasmin Lemons PA-C   10 mg at 11/20/24 0019    prochlorperazine (COMPAZINE) injection 5 mg  5 mg Intravenous Q6H PRN Jasmin Lemons PA-C        Or    prochlorperazine (COMPAZINE) tablet 5 mg  5 mg Oral Q6H PRN Jasmin Lemons PA-C           Cardiographics:    Telemetry monitoring demonstrates NSR with rates in the 60s.    Imaging:  Results for orders placed or performed during the hospital encounter of 11/18/24   XR Chest Port 1 View    Impression    IMPRESSION: Endotracheal tube terminates approximately 5.0 cm above the osmin. Right neck central venous catheter sheath with tip in the mid SVC. Bilateral chest tubes, ascending mediastinal drain, and median sternotomy wires also noted.    The lungs are slightly hypoinflated, otherwise negative. No definite pleural effusion or pneumothorax.    Normal size cardiomediastinal silhouette.   XR Chest Port 1 View    Impression    IMPRESSION: Patient has been extubated. Sternotomy with mediastinal clips and markers. Bilateral chest tubes. No pneumothorax seen. Cardiomegaly. Mild pulmonary vascular prominence. Linear atelectasis left upper lung. No focal airspace consolidations.       Labs, personally reviewed.  Hemoglobin   Date Value Ref Range Status   11/23/2024 7.7 (L) 11.7 - 15.7 g/dL Final   11/22/2024 8.1 (L) 11.7 - 15.7 g/dL Final   11/22/2024 7.7 (L) 11.7 - 15.7 g/dL Final   06/08/2019 12.9 11.7 - 15.7 g/dL Final   06/07/2019 13.6 11.7 - 15.7 g/dL Final   10/14/2016 13.2 11.7 - 15.7 g/dL Final     WBC   Date Value Ref Range Status   06/08/2019 8.8 4.0 - 11.0 10e9/L Final   06/07/2019 12.9 (H) 4.0 - 11.0 10e9/L Final   10/14/2016 9.9 4.0 - 11.0 10e9/L Final     WBC Count   Date Value Ref Range Status   11/23/2024 12.7 (H) 4.0 - 11.0 10e3/uL Final   11/22/2024 18.0 (H) 4.0 - 11.0 10e3/uL Final   11/22/2024 14.4 (H) 4.0 - 11.0 10e3/uL Final     Platelet Count    Date Value Ref Range Status   11/23/2024 201 150 - 450 10e3/uL Final   11/22/2024 231 150 - 450 10e3/uL Final   11/22/2024 177 150 - 450 10e3/uL Final   06/08/2019 291 150 - 450 10e9/L Final   06/07/2019 352 150 - 450 10e9/L Final   10/14/2016 297 150 - 450 10e9/L Final     Creatinine   Date Value Ref Range Status   11/23/2024 0.98 (H) 0.51 - 0.95 mg/dL Final   11/22/2024 1.13 (H) 0.51 - 0.95 mg/dL Final   11/22/2024 1.21 (H) 0.51 - 0.95 mg/dL Final   01/15/2021 0.81 0.52 - 1.04 mg/dL Final   06/08/2019 0.67 0.52 - 1.04 mg/dL Final   06/07/2019 0.73 0.52 - 1.04 mg/dL Final     Potassium   Date Value Ref Range Status   11/23/2024 4.0 3.4 - 5.3 mmol/L Final   11/22/2024 4.1 3.4 - 5.3 mmol/L Final   11/22/2024 3.7 3.4 - 5.3 mmol/L Final   01/31/2023 4.2 3.4 - 5.3 mmol/L Final   08/26/2021 3.9 3.4 - 5.3 mmol/L Final   01/15/2021 4.1 3.4 - 5.3 mmol/L Final   06/08/2019 4.0 3.4 - 5.3 mmol/L Final   06/07/2019 3.2 (L) 3.4 - 5.3 mmol/L Final     Potassium POCT   Date Value Ref Range Status   11/18/2024 3.4 3.4 - 5.3 mmol/L Final   11/18/2024 3.5 3.4 - 5.3 mmol/L Final   11/18/2024 4.1 3.4 - 5.3 mmol/L Final     Magnesium   Date Value Ref Range Status   11/23/2024 1.9 1.7 - 2.3 mg/dL Final   11/22/2024 1.8 1.7 - 2.3 mg/dL Final   11/21/2024 2.3 1.7 - 2.3 mg/dL Final   08/12/2011 2.2 1.6 - 2.3 mg/dL Final   05/06/2010 2.3 1.6 - 2.3 mg/dL Final   05/05/2010 2.3 1.6 - 2.3 mg/dL Final          I/O:  I/O last 3 completed shifts:  In: 1842.7 [P.O.:720; I.V.:1122.7]  Out: 3965 [Urine:3555; Chest Tube:410]       Physical Exam:    General: Patient seen up in chair. NAD. Alert and oriented.  CV: AF on monitor. 2+ peripheral pulses in all extremities. Moderate edema. Incision C/D/I.  Pulm: Non-labored effort on nasal cannula. Chest tubes in place, serosanguinous output, no air leak.  Abd: Soft, NT, ND  : Jasmine with bailey urine  Ext: Mild pedal edema, SCDs in place, warm, distal pulses intact  Neuro: CNs grossly  intact      ASSESSMENT/PLAN:    Karyn Gamez is a 69 year old female with a history of CAD who is s/p CABGx5.    Active Problems:    CAD (coronary artery disease)      NEURO:   - Scheduled Tylenol (held)/lidocaine patches and PRN Tylenol (held) /oxycodone/dilaudid for pain  - Had not taken PTA Wellbutrin or Zoloft for several months--can reassess after hospital discharge.    CV:   - HOCM with ABRAHAM noted in pre-op cardiac MRI  - Pre-op EF 60-65%  - Was normotensive on 0.01 of phenyl wean as able for MAP goal 65  - Dilt drip for AF, plan to wean off today per EP recs  - Amlodipine 2.5mg daily--do not hold (radial artery graft protection)  - low dose metoprolol 12.5 mg BID started 11/23  - ASA 81mg daily (decreased dose due to Plavix)  - Plavix daily for one year per surgeon request   - Atorvastatin 80mg daily (held in light of LFTs)  - Chest tubes to remain today     PULM:   - Extubated on POD0  - Maintaining oxygen saturations on NC this AM  - Encourage pulmonary toilet    FEN/GI:  - Continue electrolyte replacement protocol  - Diet: Cardiac, ADAT  - Bowel regimen  - Transaminitis improving    RENAL:  - Adequate UOP/hr. Continue to monitor closely.  - Cr 0.98 and downtrending  - BMP q12h  - Jasmine to remain in for close monitoring of I/O   - Diuresis with Lasix 20 mg IV daily to BID for goal net negative 1 L.  - Dry weight 237 from [facility]. Current weight 249 lbs    HEME:  - Acute blood loss anemia post-op.   - No bleeding concerns. Hep SQ, ASA  - Hgb 7.7 with recheck daily  - 1u PRBC 11/19     ID:  - Lor op ppx complete, afebrile. No concerns for infection  - Empiric Vanc and Zosyn. Cultures 11/20 NGTD    ENDO:   - SSI  - PTA Metformin held; restart when appropriate    PPx:   - DVT: SCDs, SQ heparin TID, ambulation   - GI: Protonix 40mg IV/PO daily    DISPO:   - Continue critical care in ICU due to pressor requirement  - Medically Ready for Discharge: Anticipated in 5+ Days         Patient discussed with   Gorge.    Suzie Leiva PA-C   Cardiothoracic Surgery   November 23, 2024 at 8:20 AM  Please reach me on [employer] or secure chat

## 2024-11-24 LAB
ALBUMIN SERPL BCG-MCNC: 3 G/DL (ref 3.5–5.2)
ALP SERPL-CCNC: 103 U/L (ref 40–150)
ALT SERPL W P-5'-P-CCNC: 207 U/L (ref 0–50)
ANION GAP SERPL CALCULATED.3IONS-SCNC: 8 MMOL/L (ref 7–15)
ANION GAP SERPL CALCULATED.3IONS-SCNC: 9 MMOL/L (ref 7–15)
APTT PPP: 35 SECONDS (ref 22–38)
AST SERPL W P-5'-P-CCNC: 79 U/L (ref 0–45)
BASE EXCESS BLDV CALC-SCNC: 5.4 MMOL/L (ref -3–3)
BILIRUB DIRECT SERPL-MCNC: 0.26 MG/DL (ref 0–0.3)
BILIRUB SERPL-MCNC: 0.6 MG/DL
BUN SERPL-MCNC: 12.2 MG/DL (ref 8–23)
BUN SERPL-MCNC: 9.7 MG/DL (ref 8–23)
CA-I BLD-MCNC: 4.6 MG/DL (ref 4.4–5.2)
CALCIUM SERPL-MCNC: 8 MG/DL (ref 8.8–10.4)
CALCIUM SERPL-MCNC: 8 MG/DL (ref 8.8–10.4)
CHLORIDE SERPL-SCNC: 101 MMOL/L (ref 98–107)
CHLORIDE SERPL-SCNC: 97 MMOL/L (ref 98–107)
CREAT SERPL-MCNC: 0.75 MG/DL (ref 0.51–0.95)
CREAT SERPL-MCNC: 0.82 MG/DL (ref 0.51–0.95)
CRP SERPL-MCNC: 75.3 MG/L
EGFRCR SERPLBLD CKD-EPI 2021: 77 ML/MIN/1.73M2
EGFRCR SERPLBLD CKD-EPI 2021: 86 ML/MIN/1.73M2
ERYTHROCYTE [DISTWIDTH] IN BLOOD BY AUTOMATED COUNT: 14.2 % (ref 10–15)
GLUCOSE BLDC GLUCOMTR-MCNC: 111 MG/DL (ref 70–99)
GLUCOSE BLDC GLUCOMTR-MCNC: 113 MG/DL (ref 70–99)
GLUCOSE BLDC GLUCOMTR-MCNC: 116 MG/DL (ref 70–99)
GLUCOSE BLDC GLUCOMTR-MCNC: 124 MG/DL (ref 70–99)
GLUCOSE SERPL-MCNC: 117 MG/DL (ref 70–99)
GLUCOSE SERPL-MCNC: 162 MG/DL (ref 70–99)
HCO3 BLDV-SCNC: 31 MMOL/L (ref 21–28)
HCO3 SERPL-SCNC: 27 MMOL/L (ref 22–29)
HCO3 SERPL-SCNC: 28 MMOL/L (ref 22–29)
HCT VFR BLD AUTO: 24.5 % (ref 35–47)
HGB BLD-MCNC: 7.9 G/DL (ref 11.7–15.7)
INR PPP: 1.21 (ref 0.85–1.15)
MAGNESIUM SERPL-MCNC: 1.8 MG/DL (ref 1.7–2.3)
MCH RBC QN AUTO: 29.2 PG (ref 26.5–33)
MCHC RBC AUTO-ENTMCNC: 32.2 G/DL (ref 31.5–36.5)
MCV RBC AUTO: 90 FL (ref 78–100)
O2/TOTAL GAS SETTING VFR VENT: 40 %
OXYHGB MFR BLDV: 58 % (ref 70–75)
PCO2 BLDV: 52 MM HG (ref 40–50)
PH BLDV: 7.38 [PH] (ref 7.32–7.43)
PHOSPHATE SERPL-MCNC: 2.2 MG/DL (ref 2.5–4.5)
PLATELET # BLD AUTO: 275 10E3/UL (ref 150–450)
PO2 BLDV: 33 MM HG (ref 25–47)
POTASSIUM SERPL-SCNC: 3.7 MMOL/L (ref 3.4–5.3)
POTASSIUM SERPL-SCNC: 3.7 MMOL/L (ref 3.4–5.3)
PROT SERPL-MCNC: 5.5 G/DL (ref 6.4–8.3)
RBC # BLD AUTO: 2.71 10E6/UL (ref 3.8–5.2)
SAO2 % BLDV: 58.4 % (ref 70–75)
SODIUM SERPL-SCNC: 133 MMOL/L (ref 135–145)
SODIUM SERPL-SCNC: 137 MMOL/L (ref 135–145)
WBC # BLD AUTO: 13.3 10E3/UL (ref 4–11)

## 2024-11-24 PROCEDURE — 86140 C-REACTIVE PROTEIN: CPT | Performed by: PHYSICIAN ASSISTANT

## 2024-11-24 PROCEDURE — 82805 BLOOD GASES W/O2 SATURATION: CPT | Performed by: STUDENT IN AN ORGANIZED HEALTH CARE EDUCATION/TRAINING PROGRAM

## 2024-11-24 PROCEDURE — 250N000011 HC RX IP 250 OP 636

## 2024-11-24 PROCEDURE — 250N000011 HC RX IP 250 OP 636: Performed by: STUDENT IN AN ORGANIZED HEALTH CARE EDUCATION/TRAINING PROGRAM

## 2024-11-24 PROCEDURE — 250N000013 HC RX MED GY IP 250 OP 250 PS 637: Performed by: STUDENT IN AN ORGANIZED HEALTH CARE EDUCATION/TRAINING PROGRAM

## 2024-11-24 PROCEDURE — 80053 COMPREHEN METABOLIC PANEL: CPT | Performed by: PHYSICIAN ASSISTANT

## 2024-11-24 PROCEDURE — 99232 SBSQ HOSP IP/OBS MODERATE 35: CPT | Performed by: INTERNAL MEDICINE

## 2024-11-24 PROCEDURE — 999N000156 HC STATISTIC RCP CONSULT EA 30 MIN

## 2024-11-24 PROCEDURE — 250N000011 HC RX IP 250 OP 636: Performed by: PHYSICIAN ASSISTANT

## 2024-11-24 PROCEDURE — 82330 ASSAY OF CALCIUM: CPT

## 2024-11-24 PROCEDURE — 84100 ASSAY OF PHOSPHORUS: CPT | Performed by: PHYSICIAN ASSISTANT

## 2024-11-24 PROCEDURE — 84132 ASSAY OF SERUM POTASSIUM: CPT | Performed by: PHYSICIAN ASSISTANT

## 2024-11-24 PROCEDURE — 85041 AUTOMATED RBC COUNT: CPT | Performed by: PHYSICIAN ASSISTANT

## 2024-11-24 PROCEDURE — 36569 INSJ PICC 5 YR+ W/O IMAGING: CPT

## 2024-11-24 PROCEDURE — 999N000157 HC STATISTIC RCP TIME EA 10 MIN

## 2024-11-24 PROCEDURE — 94660 CPAP INITIATION&MGMT: CPT

## 2024-11-24 PROCEDURE — 83735 ASSAY OF MAGNESIUM: CPT | Performed by: PHYSICIAN ASSISTANT

## 2024-11-24 PROCEDURE — 94799 UNLISTED PULMONARY SVC/PX: CPT

## 2024-11-24 PROCEDURE — G0008 ADMIN INFLUENZA VIRUS VAC: HCPCS | Performed by: STUDENT IN AN ORGANIZED HEALTH CARE EDUCATION/TRAINING PROGRAM

## 2024-11-24 PROCEDURE — 85730 THROMBOPLASTIN TIME PARTIAL: CPT | Performed by: STUDENT IN AN ORGANIZED HEALTH CARE EDUCATION/TRAINING PROGRAM

## 2024-11-24 PROCEDURE — 200N000001 HC R&B ICU

## 2024-11-24 PROCEDURE — 90662 IIV NO PRSV INCREASED AG IM: CPT | Performed by: STUDENT IN AN ORGANIZED HEALTH CARE EDUCATION/TRAINING PROGRAM

## 2024-11-24 PROCEDURE — 272N000727 HC KIT, CATH 5FR  DUAL LUMEN POWERMIDLINE

## 2024-11-24 PROCEDURE — 82248 BILIRUBIN DIRECT: CPT | Performed by: PHYSICIAN ASSISTANT

## 2024-11-24 PROCEDURE — 87046 STOOL CULTR AEROBIC BACT EA: CPT | Performed by: PHYSICIAN ASSISTANT

## 2024-11-24 PROCEDURE — 82565 ASSAY OF CREATININE: CPT | Performed by: PHYSICIAN ASSISTANT

## 2024-11-24 PROCEDURE — 250N000009 HC RX 250: Performed by: PHYSICIAN ASSISTANT

## 2024-11-24 PROCEDURE — 85014 HEMATOCRIT: CPT | Performed by: PHYSICIAN ASSISTANT

## 2024-11-24 PROCEDURE — 250N000013 HC RX MED GY IP 250 OP 250 PS 637: Performed by: PHYSICIAN ASSISTANT

## 2024-11-24 PROCEDURE — 99233 SBSQ HOSP IP/OBS HIGH 50: CPT | Performed by: INTERNAL MEDICINE

## 2024-11-24 PROCEDURE — 250N000013 HC RX MED GY IP 250 OP 250 PS 637

## 2024-11-24 PROCEDURE — 85610 PROTHROMBIN TIME: CPT | Performed by: STUDENT IN AN ORGANIZED HEALTH CARE EDUCATION/TRAINING PROGRAM

## 2024-11-24 RX ORDER — POTASSIUM CHLORIDE 1500 MG/1
20 TABLET, EXTENDED RELEASE ORAL ONCE
Status: COMPLETED | OUTPATIENT
Start: 2024-11-24 | End: 2024-11-24

## 2024-11-24 RX ORDER — METOPROLOL TARTRATE 25 MG/1
25 TABLET, FILM COATED ORAL 2 TIMES DAILY
Status: DISCONTINUED | OUTPATIENT
Start: 2024-11-24 | End: 2024-11-25

## 2024-11-24 RX ORDER — MAGNESIUM OXIDE 400 MG/1
400 TABLET ORAL EVERY 4 HOURS
Status: COMPLETED | OUTPATIENT
Start: 2024-11-24 | End: 2024-11-24

## 2024-11-24 RX ORDER — LIDOCAINE 40 MG/G
CREAM TOPICAL
Status: DISCONTINUED | OUTPATIENT
Start: 2024-11-24 | End: 2024-12-03 | Stop reason: HOSPADM

## 2024-11-24 RX ADMIN — MAGNESIUM OXIDE TAB 400 MG (241.3 MG ELEMENTAL MG) 400 MG: 400 (241.3 MG) TAB at 08:32

## 2024-11-24 RX ADMIN — POTASSIUM & SODIUM PHOSPHATES POWDER PACK 280-160-250 MG 1 PACKET: 280-160-250 PACK at 12:25

## 2024-11-24 RX ADMIN — PIPERACILLIN AND TAZOBACTAM 3.38 G: 3; .375 INJECTION, POWDER, FOR SOLUTION INTRAVENOUS at 16:25

## 2024-11-24 RX ADMIN — OXYCODONE 10 MG: 5 TABLET ORAL at 08:59

## 2024-11-24 RX ADMIN — AMLODIPINE BESYLATE 2.5 MG: 2.5 TABLET ORAL at 08:32

## 2024-11-24 RX ADMIN — POLYETHYLENE GLYCOL 3350 17 G: 17 POWDER, FOR SOLUTION ORAL at 08:31

## 2024-11-24 RX ADMIN — PIPERACILLIN AND TAZOBACTAM 3.38 G: 3; .375 INJECTION, POWDER, FOR SOLUTION INTRAVENOUS at 01:00

## 2024-11-24 RX ADMIN — POTASSIUM & SODIUM PHOSPHATES POWDER PACK 280-160-250 MG 1 PACKET: 280-160-250 PACK at 16:27

## 2024-11-24 RX ADMIN — POTASSIUM CHLORIDE 20 MEQ: 1500 TABLET, EXTENDED RELEASE ORAL at 08:32

## 2024-11-24 RX ADMIN — METOPROLOL TARTRATE 25 MG: 25 TABLET, FILM COATED ORAL at 21:00

## 2024-11-24 RX ADMIN — CLOPIDOGREL BISULFATE 75 MG: 75 TABLET ORAL at 08:32

## 2024-11-24 RX ADMIN — FUROSEMIDE 20 MG: 10 INJECTION, SOLUTION INTRAMUSCULAR; INTRAVENOUS at 08:32

## 2024-11-24 RX ADMIN — INFLUENZA A VIRUS A/VICTORIA/4897/2022 IVR-238 (H1N1) ANTIGEN (FORMALDEHYDE INACTIVATED), INFLUENZA A VIRUS A/CALIFORNIA/122/2022 SAN-022 (H3N2) ANTIGEN (FORMALDEHYDE INACTIVATED), AND INFLUENZA B VIRUS B/MICHIGAN/01/2021 ANTIGEN (FORMALDEHYDE INACTIVATED) 0.5 ML: 60; 60; 60 INJECTION, SUSPENSION INTRAMUSCULAR at 10:41

## 2024-11-24 RX ADMIN — PIPERACILLIN AND TAZOBACTAM 3.38 G: 3; .375 INJECTION, POWDER, FOR SOLUTION INTRAVENOUS at 08:25

## 2024-11-24 RX ADMIN — ASPIRIN 81 MG CHEWABLE TABLET 81 MG: 81 TABLET CHEWABLE at 08:32

## 2024-11-24 RX ADMIN — POTASSIUM & SODIUM PHOSPHATES POWDER PACK 280-160-250 MG 1 PACKET: 280-160-250 PACK at 08:32

## 2024-11-24 RX ADMIN — HEPARIN SODIUM 5000 UNITS: 5000 INJECTION, SOLUTION INTRAVENOUS; SUBCUTANEOUS at 06:40

## 2024-11-24 RX ADMIN — LIDOCAINE HYDROCHLORIDE 1 ML: 10 INJECTION, SOLUTION EPIDURAL; INFILTRATION; INTRACAUDAL; PERINEURAL at 12:05

## 2024-11-24 RX ADMIN — METFORMIN ER 500 MG 500 MG: 500 TABLET ORAL at 16:27

## 2024-11-24 RX ADMIN — THERA TABS 1 TABLET: TAB at 08:32

## 2024-11-24 RX ADMIN — ATORVASTATIN CALCIUM 80 MG: 40 TABLET, FILM COATED ORAL at 20:55

## 2024-11-24 RX ADMIN — OXYCODONE HYDROCHLORIDE 5 MG: 5 TABLET ORAL at 18:23

## 2024-11-24 RX ADMIN — MAGNESIUM OXIDE TAB 400 MG (241.3 MG ELEMENTAL MG) 400 MG: 400 (241.3 MG) TAB at 12:25

## 2024-11-24 RX ADMIN — HEPARIN SODIUM 5000 UNITS: 5000 INJECTION, SOLUTION INTRAVENOUS; SUBCUTANEOUS at 21:05

## 2024-11-24 RX ADMIN — METOPROLOL TARTRATE 12.5 MG: 25 TABLET, FILM COATED ORAL at 10:32

## 2024-11-24 RX ADMIN — SENNOSIDES AND DOCUSATE SODIUM 1 TABLET: 8.6; 5 TABLET ORAL at 08:31

## 2024-11-24 RX ADMIN — HEPARIN SODIUM 5000 UNITS: 5000 INJECTION, SOLUTION INTRAVENOUS; SUBCUTANEOUS at 13:41

## 2024-11-24 RX ADMIN — PANTOPRAZOLE SODIUM 40 MG: 40 TABLET, DELAYED RELEASE ORAL at 06:40

## 2024-11-24 RX ADMIN — METOPROLOL TARTRATE 12.5 MG: 25 TABLET, FILM COATED ORAL at 08:32

## 2024-11-24 NOTE — PROCEDURES
"Midline Insertion Procedure Note       Pt. Name:   Karyn Gamez     MRN:          [id number]       Procedure: Insertion of a  Dual Lumen  5 fr  BARD (non-valved) Power MIDLINE catheter.  Lot number [id number].        Indications: difficult access, needing zosyn and blood draws     Contraindication(s): left arm used for vein grafting/donor, will use RUE        Procedure Details:     Patient identified with 2 identifiers and \"Time Out\" conducted.         Central line insertion bundle followed: hand hygiene performed prior to procedure, site cleansed with Cholraprep (CHG), hat, mask, sterile gloves, sterile gown worn, patient draped with maximum barrier head to toe drape, sterile field maintained.      Lidocaine 1% one ml administered SQ to the insertion site.      A 5 Fr catheter was inserted into the basilic vein of the right arm with ultrasound guidance. One attempt(s) required to access vein.          Modified Seldinger Technique (MST) used for insertion.     Catheter threaded without difficulty.      Tip placement approximately in the axillary region. Good blood return noted.     Catheter was flushed with 20 cc NS.      Catheter secured with Statlock, Biopatch (CHG), and Tegaderm dressing applied.     The sharps that are included in the insertion kit were accounted for and disposed of in the sharps container prior to breakdown of the sterile field.      Patient tolerated procedure well.       Patient's primary RN notified catheter is a MIDLINE catheter and is ready for use.      Findings:     Total catheter length  12 cm, with 0 cm exposed. Mid upper arm circumference is 37 cm.      Comments:       A midline catheter is a form of peripheral venous access. Not recommended for the infusion of vesicants (Vancomycin, Vasopressors, TPN, etc.) Sign posted.      Alaina Thomason RN BSN  Vascular Access - [facility]   "

## 2024-11-24 NOTE — PLAN OF CARE
[facility] - ICU    RN Progress Note:            Pertinent Assessments:      Please refer to flowsheet rows for full assessment     As charted            Key Events - This Shift:       Pt had multiple episodes of fecal incontinence during shift, BM are loose, clear mucousy  Prn dose of dilaudid given x1                 Barriers to Discharge / Downgrade:     Chest tubes

## 2024-11-24 NOTE — PROGRESS NOTES
CVTS Daily Progress Note   POD#6 s/p CABG x5 with LIMA to LAD, left radial artery to obtuse marginal, rSVG to RPDA, RPL, and diagonal, endoscopic harvest of left radial artery and right saphenous vein   Attending: Gorge  LOS: 6    SUBJECTIVE/INTERVAL EVENTS:    Remains out of AF.   Alert and oriented. Off drips. NSR. Maintaining oxygen saturation on NC. Pain well controlled. + BM. ADAT. UOP 3.6L. Cr 0.75 and downtrending. LFTs downtrending. Chest tube output appropriate. Hgb 7.9. Remains on empiric Zosyn with cultures NGTD. WBC 13.3. Multiple loose Bms, checking Cdiff. Patient denies new chest pain, SOB, calf pain, abdominal pain, nausea. Questions answered.        OBJECTIVE:  Temp:  [97.4  F (36.3  C)-98  F (36.7  C)] 97.4  F (36.3  C)  Pulse:  [62-77] 77  Resp:  [20-27] 26  BP: ()/(57-72) 138/72  MAP:  [71 mmHg-87 mmHg] 73 mmHg  Arterial Line BP: (101-126)/(52-64) 101/56  SpO2:  [85 %-100 %] 95 %  Vitals:    11/19/24 0600 11/20/24 0530 11/21/24 0548 11/23/24 0451   Weight: 114.3 kg (251 lb 14.4 oz) 114.5 kg (252 lb 8 oz) 113.1 kg (249 lb 6.4 oz) 117.9 kg (259 lb 14.8 oz)    11/24/24 0600   Weight: 115.9 kg (255 lb 9.6 oz)       Clinically Significant Risk Factors         # Hyponatremia: Lowest Na = 134 mmol/L in last 2 days, will monitor as appropriate       # Hypoalbuminemia: Lowest albumin = 2.9 g/dL at 11/22/2024  8:39 AM, will monitor as appropriate    # Coagulation Defect: INR = 1.21 (Ref range: 0.85 - 1.15) and/or PTT = 35 Seconds (Ref range: 22 - 38 Seconds), will monitor for bleeding    # Hypertension: Noted on problem list     # Acute Hypercapnic Respiratory Failure: based on arterial blood gas results.  Continue supplemental oxygen and ventilatory support as indicated.  # Acute Hypercapnic Respiratory Failure: based on venous blood gas results.  Continue supplemental oxygen and ventilatory support as indicated.         # Severe Obesity: Estimated body mass index is 40.12 kg/m  as calculated from the  "following:    Height as of 11/7/24: 1.7 m (5' 6.93\").    Weight as of this encounter: 115.9 kg (255 lb 9.6 oz).        # Financial/Environmental Concerns:     # History of CABG: noted on surgical history              Current Medications:    Scheduled Meds:  Current Facility-Administered Medications   Medication Dose Route Frequency Provider Last Rate Last Admin    amLODIPine (NORVASC) tablet 2.5 mg  2.5 mg Oral Daily Ly Atwood PA-C   2.5 mg at 11/23/24 0807    aspirin (ASA) chewable tablet 81 mg  81 mg Oral or NG Tube Daily Ly Atwood PA-C   81 mg at 11/23/24 0807    Or    aspirin (ASA) Suppository 300 mg  300 mg Rectal Daily Ly Atwood PA-C   300 mg at 11/20/24 1409    [Held by provider] atorvastatin (LIPITOR) tablet 80 mg  80 mg Oral At Bedtime Jasmin Lemons PA-C   80 mg at 11/19/24 2032    clopidogrel (PLAVIX) tablet 75 mg  75 mg Oral Daily Ly Atwood PA-C   75 mg at 11/23/24 0807    furosemide (LASIX) injection 20 mg  20 mg Intravenous Daily Suzie Leiva PA-C   20 mg at 11/23/24 0807    heparin ANTICOAGULANT injection 5,000 Units  5,000 Units Subcutaneous Q8H Jasmin Lemons PA-C   5,000 Units at 11/24/24 0640    influenza trivalent vaccine high-dose for ages 65 years and greater (FLUZONE-HD) injection 0.5 mL  0.5 mL Intramuscular Prior to discharge Mary Pennington MD        insulin aspart (NovoLOG) injection (RAPID ACTING)  1-7 Units Subcutaneous TID AC Ly Atwood PA-C   2 Units at 11/23/24 1238    insulin aspart (NovoLOG) injection (RAPID ACTING)  1-5 Units Subcutaneous At Bedtime Ly Atwood PA-C        iopamidol (ISOVUE-370) solution 117 mL  117 mL Intravenous Once Ly Atwood PA-C        And    sodium chloride 0.9 % bag for CT scan flush use  100 mL As instructed Once Ly Atwood PA-C        Lidocaine (LIDOCARE) 4 % Patch 1-2 patch  1-2 patch Transdermal Q24H Jasmin Lemons PA-C   1 " patch at 11/23/24 1614    magnesium oxide (MAG-OX) tablet 400 mg  400 mg Oral Q4H Mary Pennington MD        [Held by provider] metFORMIN (GLUCOPHAGE XR) 24 hr tablet 500 mg  500 mg Oral Daily with supper Jasmin Lemons PA-C        metoprolol tartrate (LOPRESSOR) half-tab 12.5 mg  12.5 mg Oral BID Britany Hardy MD   12.5 mg at 11/23/24 2016    multivitamin, therapeutic (THERA-VIT) tablet 1 tablet  1 tablet Oral Daily Mary Pennington MD   1 tablet at 11/23/24 0807    pantoprazole (PROTONIX) EC tablet 40 mg  40 mg Oral QAM AC Mary Pennington MD   40 mg at 11/24/24 0640    piperacillin-tazobactam (ZOSYN) 3.375 g vial to attach to  mL bag  3.375 g Intravenous Q8H Mike Garcia MD   3.375 g at 11/24/24 0825    polyethylene glycol (MIRALAX) Packet 17 g  17 g Oral Daily Jasmin Lemons PA-C   17 g at 11/21/24 0822    potassium & sodium phosphates (NEUTRA-PHOS) Packet 1 packet  1 packet Oral or Feeding Tube Q4H Mary Pennington MD        potassium chloride travis ER (KLOR-CON M20) CR tablet 20 mEq  20 mEq Oral Once Mary Pennington MD        senna-docusate (SENOKOT-S/PERICOLACE) 8.6-50 MG per tablet 1 tablet  1 tablet Oral BID Jasmin Lemons PA-C   1 tablet at 11/23/24 2016     Continuous Infusions:  Current Facility-Administered Medications   Medication Dose Route Frequency Provider Last Rate Last Admin    phenylephrine (LISA-SYNEPHRINE) 50 mg in NaCl 0.9 % 250 mL infusion  0.1-6 mcg/kg/min Intravenous Continuous Mary Pennington MD   Stopped at 11/23/24 1015     PRN Meds:.  Current Facility-Administered Medications   Medication Dose Route Frequency Provider Last Rate Last Admin    [Held by provider] acetaminophen (TYLENOL) tablet 650 mg  650 mg Oral Q4H PRN Jasmin Lemons PA-C        bisacodyl (DULCOLAX) suppository 10 mg  10 mg Rectal Daily PRN Jasmin Lemons PA-C        calcium gluconate 1 g in 50 mL in sodium chloride intermittent infusion  1 g Intravenous Once PRN Jasmin Lemons PA-C   1 g at  11/22/24 0449    calcium gluconate 2 g in  mL intermittent infusion  2 g Intravenous Once PRN Jasmin Lemons PA-C        calcium gluconate 3 g in sodium chloride 0.9 % 100 mL intermittent infusion  3 g Intravenous Once PRN Jasmin Lemons PA-C        glucose gel 15-30 g  15-30 g Oral Q15 Min PRN Jasmin Lemons PA-C        Or    dextrose 50 % injection 25-50 mL  25-50 mL Intravenous Q15 Min PRN Jasmin Lemons PA-C        Or    glucagon injection 1 mg  1 mg Subcutaneous Q15 Min PRN Jasmin Lemons PA-C        hydrALAZINE (APRESOLINE) injection 10 mg  10 mg Intravenous Q30 Min PRN Jasmin Lemons PA-C        HYDROmorphone (DILAUDID) injection 0.2 mg  0.2 mg Intravenous Q2H PRN Jasmin Lemons PA-C   0.2 mg at 11/23/24 2132    lactated ringers BOLUS 250 mL  250 mL Intravenous Q15 Min PRN Jasmin Lemons PA-C 500 mL/hr at 11/18/24 1950 250 mL at 11/18/24 1950    magnesium hydroxide (MILK OF MAGNESIA) suspension 30 mL  30 mL Oral Daily PRN Jasmin Lemons PA-C        naloxone (NARCAN) injection 0.2 mg  0.2 mg Intravenous Q2 Min PRN Jasmin Lemons PA-C        Or    naloxone (NARCAN) injection 0.4 mg  0.4 mg Intravenous Q2 Min PRN Jasmin Lemons PA-C        Or    naloxone (NARCAN) injection 0.2 mg  0.2 mg Intramuscular Q2 Min PRN Jasmin Lemons PA-C   0.2 mg at 11/20/24 0812    Or    naloxone (NARCAN) injection 0.4 mg  0.4 mg Intramuscular Q2 Min PRN Jasmin Lemons PA-C        ondansetron (ZOFRAN ODT) ODT tab 4 mg  4 mg Oral Q6H PRN Jasmin Lemons PA-C        Or    ondansetron (ZOFRAN) injection 4 mg  4 mg Intravenous Q6H PRN Jasmin Lemons PA-C   4 mg at 11/20/24 1635    oxyCODONE (ROXICODONE) tablet 5 mg  5 mg Oral Q4H PRN Jasmin Lemons PA-C        Or    oxyCODONE (ROXICODONE) tablet 10 mg  10 mg Oral Q4H PRN Jasmin Lemons PA-C   10 mg at 11/20/24 0019    prochlorperazine (COMPAZINE) injection 5 mg  5  mg Intravenous Q6H PRN Jasmin Lemons PA-C        Or    prochlorperazine (COMPAZINE) tablet 5 mg  5 mg Oral Q6H PRN Jasmin Lemons PA-C           Cardiographics:    Telemetry monitoring demonstrates NSR with rates in the 80s.    Imaging:  Results for orders placed or performed during the hospital encounter of 11/18/24   XR Chest Port 1 View    Impression    IMPRESSION: Endotracheal tube terminates approximately 5.0 cm above the osmin. Right neck central venous catheter sheath with tip in the mid SVC. Bilateral chest tubes, ascending mediastinal drain, and median sternotomy wires also noted.    The lungs are slightly hypoinflated, otherwise negative. No definite pleural effusion or pneumothorax.    Normal size cardiomediastinal silhouette.   XR Chest Port 1 View    Impression    IMPRESSION: Patient has been extubated. Sternotomy with mediastinal clips and markers. Bilateral chest tubes. No pneumothorax seen. Cardiomegaly. Mild pulmonary vascular prominence. Linear atelectasis left upper lung. No focal airspace consolidations.       Labs, personally reviewed.  Hemoglobin   Date Value Ref Range Status   11/24/2024 7.9 (L) 11.7 - 15.7 g/dL Final   11/23/2024 7.7 (L) 11.7 - 15.7 g/dL Final   11/22/2024 8.1 (L) 11.7 - 15.7 g/dL Final   06/08/2019 12.9 11.7 - 15.7 g/dL Final   06/07/2019 13.6 11.7 - 15.7 g/dL Final   10/14/2016 13.2 11.7 - 15.7 g/dL Final     WBC   Date Value Ref Range Status   06/08/2019 8.8 4.0 - 11.0 10e9/L Final   06/07/2019 12.9 (H) 4.0 - 11.0 10e9/L Final   10/14/2016 9.9 4.0 - 11.0 10e9/L Final     WBC Count   Date Value Ref Range Status   11/24/2024 13.3 (H) 4.0 - 11.0 10e3/uL Final   11/23/2024 12.7 (H) 4.0 - 11.0 10e3/uL Final   11/22/2024 18.0 (H) 4.0 - 11.0 10e3/uL Final     Platelet Count   Date Value Ref Range Status   11/24/2024 275 150 - 450 10e3/uL Final   11/23/2024 201 150 - 450 10e3/uL Final   11/22/2024 231 150 - 450 10e3/uL Final   06/08/2019 291 150 - 450 10e9/L Final    06/07/2019 352 150 - 450 10e9/L Final   10/14/2016 297 150 - 450 10e9/L Final     Creatinine   Date Value Ref Range Status   11/24/2024 0.75 0.51 - 0.95 mg/dL Final   11/23/2024 0.95 0.51 - 0.95 mg/dL Final   11/23/2024 0.98 (H) 0.51 - 0.95 mg/dL Final   01/15/2021 0.81 0.52 - 1.04 mg/dL Final   06/08/2019 0.67 0.52 - 1.04 mg/dL Final   06/07/2019 0.73 0.52 - 1.04 mg/dL Final     Potassium   Date Value Ref Range Status   11/24/2024 3.7 3.4 - 5.3 mmol/L Final   11/23/2024 3.5 3.4 - 5.3 mmol/L Final   11/23/2024 4.0 3.4 - 5.3 mmol/L Final   01/31/2023 4.2 3.4 - 5.3 mmol/L Final   08/26/2021 3.9 3.4 - 5.3 mmol/L Final   01/15/2021 4.1 3.4 - 5.3 mmol/L Final   06/08/2019 4.0 3.4 - 5.3 mmol/L Final   06/07/2019 3.2 (L) 3.4 - 5.3 mmol/L Final     Potassium POCT   Date Value Ref Range Status   11/18/2024 3.4 3.4 - 5.3 mmol/L Final   11/18/2024 3.5 3.4 - 5.3 mmol/L Final   11/18/2024 4.1 3.4 - 5.3 mmol/L Final     Magnesium   Date Value Ref Range Status   11/24/2024 1.8 1.7 - 2.3 mg/dL Final   11/23/2024 1.9 1.7 - 2.3 mg/dL Final   11/22/2024 1.8 1.7 - 2.3 mg/dL Final   08/12/2011 2.2 1.6 - 2.3 mg/dL Final   05/06/2010 2.3 1.6 - 2.3 mg/dL Final   05/05/2010 2.3 1.6 - 2.3 mg/dL Final          I/O:  I/O last 3 completed shifts:  In: 1156.8 [P.O.:910; I.V.:246.8]  Out: 4375 [Urine:4185; Chest Tube:190]       Physical Exam:    General: Patient seen up in chair. NAD. Alert and oriented.  CV: AF on monitor. 2+ peripheral pulses in all extremities. Moderate edema. Incision C/D/I.  Pulm: Non-labored effort on nasal cannula. Chest tubes in place, serosanguinous output, no air leak.  Abd: Soft, NT, ND  : Jasmine with bailey urine  Ext: Mild pedal edema, SCDs in place, warm, distal pulses intact  Neuro: CNs grossly intact      ASSESSMENT/PLAN:    Karyn Gamez is a 69 year old female with a history of CAD who is s/p CABGx5.    Active Problems:    CAD (coronary artery disease)      NEURO:   - Scheduled Tylenol (held)/lidocaine  patches and PRN Tylenol (held) /oxycodone/dilaudid for pain  - Had not taken PTA Wellbutrin or Zoloft for several months--can reassess after hospital discharge.    CV:   - HOCM with ABRAHAM noted in pre-op cardiac MRI  - Pre-op EF 60-65%  - Normotensive  - Dilt drip for AF, weaned off 12/23  - Amlodipine 2.5mg daily--do not hold (radial artery graft protection)  - Metoprolol 25 mg BID  - ASA 81mg daily (decreased dose due to Plavix)  - Plavix daily for one year per surgeon request   - Atorvastatin 80mg daily  - Chest tubes to remain today     PULM:   - Extubated on POD0  - Maintaining oxygen saturations on NC this AM  - Encourage pulmonary toilet    FEN/GI:  - Continue electrolyte replacement protocol  - Diet: Cardiac, ADAT  - Bowel regimen  - Transaminitis improving    RENAL:  - Adequate UOP/hr. Continue to monitor closely.  - Cr 0.75  - BMP q12h  - Jasmine to be removed  - Diuresis with Lasix 20 mg IV daily to BID for goal net negative 1 L.  - Dry weight 237 from [facility]. Current weight 255 lbs    HEME:  - Acute blood loss anemia post-op.   - No bleeding concerns. Hep SQ, ASA  - Hgb 7.9 with recheck daily  - 1u PRBC 11/19     ID:  - Lor op ppx complete, afebrile. No concerns for infection  - Empiric Vanc and Zosyn. Cultures 11/20 NGTD    ENDO:   - SSI  - PTA Metformin held; restart when appropriate    PPx:   - DVT: SCDs, SQ heparin TID, ambulation   - GI: Protonix 40mg IV/PO daily    DISPO:   - Continue critical care in ICU due to pressor requirement  - Medically Ready for Discharge: Anticipated in 5+ Days         Patient discussed with Dr. Pennington.    Suzie Leiva PA-C   Cardiothoracic Surgery   November 24, 2024 at 8:28 AM  Please reach me on [employer] or secure chat

## 2024-11-24 NOTE — TREATMENT PLAN
RCAT Treatment Plan    Patient Score: 9  Patient Acuity: 4    Clinical Indication for Therapy: prevent atelectasis    Therapy Ordered: IS and FV BID    Assessment Summary: pt s/p CABGx5. She is a former tobacco smoker. CXR 11/21 trace left effusion. RR 14, LS clear/diminished, NPC. She is on 3L NC SpO2 97%. Doing well with IS and FV. IS to 900 ml. Will reassess daily or as needed.      Piter Martines, RT  11/24/2024

## 2024-11-24 NOTE — PROGRESS NOTES
ICU PROGRESS NOTE:    Patient Summary:  Ms. Gamez is a 69 year old lady with a past medical history significant for cervicalgia, CAD, depression, HTN and obesity who presented to the hospital with an NSTEMI and noted to have severe multivessel disease now s/p 5V CABG with a complicated postoperative course including hypotension with new SHIRA and shock liver.  Echocardiogram demonstrated LVOT physiology and the patient has continued to have improvement over the last several days.  On the morning of 11/22, she developed A-fib with RVR and was hemodynamically unstable requiring cardioversion x 2, lidocaine, metoprolol and converted with 30 mg push of diltiazem.       Lines/Drains/Tubes:  CVC Single Lumen Right Internal jugular (Active)   Site Assessment WDL 11/24/24 0900   Dressing Chlorhexidine disk;Transparent 11/24/24 0900   Dressing Status clean 11/24/24 0900   Dressing Intervention other (comment) 11/19/24 1600   Dressing Change Due 11/24/24 11/21/24 1600   Line Necessity Yes, meets criteria 11/24/24 0900   Status infusing 11/24/24 0900   Line Intervention Flushed 11/21/24 0000   Phlebitis Scale 0-->no symptoms 11/24/24 0900   Infiltration? no 11/24/24 0900   CVC Comment CVP monitored 11/20/24 0400   Number of days: 6     Overnight events:  Mucoid rectal discharge overnight.    Subjective:  Feels better this AM.    Objective:  Physical Exam:  Vent settings for last 24 hours:  Resp: 26    /76   Pulse 87   Temp 97.4  F (36.3  C) (Oral)   Resp 26   Wt 115.9 kg (255 lb 9.6 oz)   LMP  (LMP Unknown)   SpO2 93%   BMI 40.12 kg/m      Intake/Output last 3 shifts:  I/O last 3 completed shifts:  In: 1156.8 [P.O.:910; I.V.:246.8]  Out: 4375 [Urine:4185; Chest Tube:190]  Intake/Output this shift:  I/O this shift:  In: 200 [P.O.:200]  Out: 530 [Urine:350; Chest Tube:180]    Physical Exam  Constitutional:       Appearance: Normal appearance.   HENT:      Head: Atraumatic.      Mouth/Throat:      Mouth: Mucous  membranes are dry.   Cardiovascular:      Rate and Rhythm: Normal rate and regular rhythm.      Pulses: Normal pulses.      Heart sounds: Normal heart sounds.   Pulmonary:      Comments: Diminished BS at the bases.  Abdominal:      General: Abdomen is flat.      Palpations: Abdomen is soft.   Skin:     General: Skin is warm and dry.   Neurological:      General: No focal deficit present.      Mental Status: She is alert and oriented to person, place, and time.           LAB:  Recent Labs   Lab 11/24/24  0556   WBC 13.3*   HGB 7.9*   HCT 24.5*        Recent Labs   Lab 11/24/24  0556 11/23/24  1822 11/23/24  0429 11/22/24  1704 11/22/24  0839    135 136   < > 134*   CO2 27 26 26   < > 24   BUN 12.2 16.5 22.0   < > 30.6*   ALKPHOS 103  --  115  --  123   *  --  291*  --  387*   AST 79*  --  168*  --  300*    < > = values in this interval not displayed.       RADIOLOGY:  XR Abdomen Port 1 View    Result Date: 11/23/2024  EXAM: XR ABDOMEN PORT 1 VIEW LOCATION: [facility] DATE: 11/23/2024 INDICATION: assess for ileus COMPARISON: Chest x-ray 11/21/2024.     IMPRESSION: Bilateral subxiphoid pleural drains remain present, postoperative changes from CABG. Bowdon-David catheter is been removed. Abundant wires and leads overlie the epigastric region. Modest amount of air present throughout small bowel more so than colon, nonspecific bowel gas pattern. Postoperative ileus or mild distal small bowel obstruction possible.    XR Chest Port 1 View    Result Date: 11/21/2024  EXAM: XR CHEST PORT 1 VIEW LOCATION: [facility] DATE: 11/21/2024 INDICATION: on bipap COMPARISON: 11/20/2024 and older studies.     IMPRESSION: Poststernotomy with mediastinal and pleural chest tubes. Right IJ Bowdon-David catheter is in the distal right main pulmonary artery. No pneumothorax. Trace left effusion. No new parenchymal disease. Cardiomegaly is unchanged.     US Abdomen  Limited    Result Date: 2024  EXAM: US ABDOMEN LIMITED LOCATION: [facility] DATE: 2024 INDICATION: elevated LFTs COMPARISON: CT 2024 TECHNIQUE: Limited abdominal ultrasound. FINDINGS: GALLBLADDER: Gallstones in an otherwise normal gallbladder. No wall thickening, or pericholecystic fluid. Negative sonographic He's sign. BILE DUCTS: No biliary dilatation. The common duct measures 3 mm. LIVER: Normal parenchyma with smooth contour. No focal mass. RIGHT KIDNEY: No hydronephrosis. PANCREAS: The pancreas is obscured by the overlying bandage and patient mobility. No ascites.     IMPRESSION: 1.  Cholelithiasis. No ultrasound evidence of cholecystitis. 2.  No bile duct dilatation.     XR Chest Port 1 View    Result Date: 2024  EXAM: XR CHEST PORT 1 VIEW LOCATION: [facility] DATE: 2024 INDICATION: Evaluate position of PA catheter COMPARISON: Study earlier today and prior exams.     IMPRESSION: Poststernotomy changes. A Washburn-David catheter has been passed through the cordis sheath and the tip is in the right, main pulmonary artery. Mediastinal and bilateral pleural chest tubes are again noted. Trace effusions. No pneumothorax. Linear atelectasis overlying the left hilum. Heart is slightly enlarged. No signs of failure.    Echocardiogram Limited    Result Date: 2024  [id number]^CASI^[place] [address]  Name: CAILIN CAMPOS MRN: [id number] : 1955 Study Date: 2024 10:35 AM Age: 69 yrs Gender: Female Patient Location: [facility] Reason For Study: Shock Ordering Physician: ALEXY LANCE Performed By: KAITLIN  BSA: 2.2 m2 Height: 67 in Weight: 252 lb HR: 89 BP: 105/58 mmHg ______________________________________________________________________________ Procedure Limited Portable Echo Adult. Definity (NDC #99391-581) given intravenously. Technically difficult study.  Compared to the prior study dated 11/8/2024, there are changes as noted. ______________________________________________________________________________ Interpretation Summary  1. Left ventricular chamber size is normal. Severe asymmetric septal wall hypertrophy is present with a maximum thickness of 1.8 cm. There is severe resting left ventricular outflow tract obstruction with a peak gradient of 66 mmHg. Systolic function is normal with a visually estimated left ventricular ejection fraction of 60-65%. 2. Right ventricle is not well visualized. Limited view suggest grossly normal chamber size and sysotlic function. 3. Mild left atrial enlargement. 4. Systolic anterior motion of the mitral valve is noted although this results in only trivial mitral regurgitation. 5. No significant percardial effusion. 6. Compared to the prior study dated 11/8/2024, the patient has since undergone coronary artery bypass grafting and resting left ventricular outflow tract obstruction is now present. ______________________________________________________________________________ I      WMSI = 1.00     % Normal = 100  X - Cannot   0 -                      (2) - Mildly 2 -          Segments  Size Interpret    Hyperkinetic 1 - Normal  Hypokinetic  Hypokinetic  1-2     small                                                    7 -          3-5    moderate 3 - Akinetic 4 -          5 -         6 - Akinetic Dyskinetic   6-14    large              Dyskinetic   Aneurysmal  w/scar       w/scar       15-16   diffuse  Left Ventricle The left ventricle is normal in size. There is severe eccentric left ventricular hypertrophy. The echo findings are consistent with left ventricular outflow obstruction. Peak resting outflow velocity 4.1 m/s and peak gradient 66 mmHg. The left ventricular ejection fraction is normal. The visual ejection fraction is 65-70%. Left ventricular diastolic function is indeterminate. No regional wall motion abnormalities noted.  Septal motion is consistent with post-operative state.  Right Ventricle Right ventricle is not well visualized. Limited view suggest grossly normal chamber size and sysotlic function.  Atria The left atrium is mildly dilated. Right atrial size is normal.  Mitral Valve Mitral valve leaflets appear normal. There is systolic anterior motion of the mitral valve. There is trace mitral regurgitation. There is no mitral valve stenosis.  Tricuspid Valve The tricuspid valve is not well visualized, but is grossly normal. There is trace tricuspid regurgitation. Right ventricular systolic pressure could not be approximated due to inadequate tricuspid regurgitation. There is no tricuspid stenosis.  Aortic Valve The aortic valve is trileaflet with aortic valve sclerosis. No aortic regurgitation is present. No aortic stenosis is present.  Pulmonic Valve The pulmonic valve is not well visualized. There is mild (1+) pulmonic valvular regurgitation. There is no pulmonic valvular stenosis.  Vessels The aorta root is normal. Inferior vena cava not well visualized for estimation of right atrial pressure.  Pericardium There is no pericardial effusion.  Rhythm Sinus rhythm was noted.  ______________________________________________________________________________ MMode/2D Measurements & Calculations IVSd: 2.0 cm LVIDd: 4.7 cm LVPWd: 1.2 cm LV mass(C)d: 327.8 grams LV mass(C)dI: 147.0 grams/m2  Ao root diam: 3.4 cm Ao root diam index Ht(cm/m): 2.0 Ao root diam index BSA (cm/m2): 1.5 LA Volume Index (BP): 39.8 ml/m2 RWT: 0.51  Doppler Measurements & Calculations Peak E' Abel: 4.7 cm/sec RV S Abel: 6.9 cm/sec  ______________________________________________________________________________ Report approved by: Joan Arguello 11/20/2024 12:15 PM       XR Chest Port 1 View    Result Date: 11/20/2024  EXAM: XR CHEST PORT 1 VIEW LOCATION: [facility] DATE: 11/20/2024 INDICATION: Evaluate etiology of hypoxemia COMPARISON:  "Yesterday     IMPRESSION: Mild to moderate low lung volumes. Sternotomy with mediastinal clips and markers. Bilateral chest tubes stable. No pneumothorax seen. Cardiomegaly. Mild pulmonary vascular prominence. Mild to moderate elevation right hemidiaphragm. Linear atelectasis left upper lung. Small left pleural effusion or pleural thickening. No focal airspace consolidations.    XR Chest Port 1 View    Result Date: 11/19/2024  EXAM: XR CHEST PORT 1 VIEW LOCATION: [facility] DATE: 11/19/2024 INDICATION: Post Op CVTS Surgery COMPARISON: 11/18/2024     IMPRESSION: Patient has been extubated. Sternotomy with mediastinal clips and markers. Bilateral chest tubes. No pneumothorax seen. Cardiomegaly. Mild pulmonary vascular prominence. Linear atelectasis left upper lung. No focal airspace consolidations.    XR Chest Port 1 View    Result Date: 11/18/2024  EXAM: XR CHEST PORT 1 VIEW LOCATION: [facility] DATE: 11/18/2024 INDICATION: Post Op CVTS Surgery COMPARISON: CT 11/9/2024     IMPRESSION: Endotracheal tube terminates approximately 5.0 cm above the osmin. Right neck central venous catheter sheath with tip in the mid SVC. Bilateral chest tubes, ascending mediastinal drain, and median sternotomy wires also noted. The lungs are slightly hypoinflated, otherwise negative. No definite pleural effusion or pneumothorax. Normal size cardiomediastinal silhouette.    POC US Guidance Needle Placement    Result Date: 11/18/2024  Ultrasound was performed as guidance to an anesthesia procedure.  Click \"PACS images\" hyperlink below to view any stored images.  For specific procedure details, view procedure note authored by anesthesia.    LESLIE with Report    Result Date: 11/14/2024  Ori Landaverde MD     11/14/2024  3:22 PM Procedures     MR Cardiac WO & W Contrast    Result Date: 11/13/2024                                                   [facility]                                       "            CMR Report   MRN:               [id number]                                Name:        CAILIN CAMPOS                                :              1955-Sep-23                                Scan Date:                                      Electronically signed by Minerva Hilaria LINO  14:56:07 SUMMARY  ========================================================================================================== Clinical history: 69 year old woman with asymmetric septal hypertrophy and multi vessel obstructive CAD referred for CMR. Comparison CMR: None 1. The left ventricle cavity size is small. There is asymmetric septal hypertrophy with maximal septal thickness 2.0 cm. The global systolic function is normal. The LVEF is 74%. There are no regional wall motion abnormalities. 2. The right ventricle is normal in cavity size. The global systolic function is normal. The RVEF is 70%. 3. Both atria are normal in size. 4. There is systolic anterior motion of the mitral valve and systolic flow acceleration in the LVOT. There is mild mitral regurgitation. There is apical displacement of the papillary muscles. 5. Late gadolinium enhancement imaging shows mid-myocardial enhancement in the basal to mid lateral segments. 6. There is a trivial pericardial effusion. 7. There is no clear intracardiac thrombus, although the left atrial appendage was incompletely visualized. CONCLUSIONS: Asymmetric septal hypertrophy with maximal wall thickness 2.0 cm. Systolic anterior motion of the mitral valve is present. There is apical displacement of the papillary muscles. LGE imaging shows mid-myocardial enhancement in a non-ischemic pattern. Collectively, these findings suggest obstructive hypertrophic cardiomyopathy. Consider genetic testing for HCM. Normal biventricular systolic function, LVEF 74%, RVEF 70%. CORE EXAM   ========================================================================================================== MEASUREMENTS  ----------------------------------------------------------------------------------------     VOLUMETRIC ANALYSIS      ---------------------------------------------- .--------------------------------------------------------.                  LV     Reference  RV    Reference  +-----+-----------+-------+-----------+------+-----------+  EDV  ml         106.7   ()  92.1   ()        ml/m^2      49.4   (53-87)   42.7   (49-86)    ESV  ml          28.2   (20-57)   28.1   (11-63)         ml/m^2      13.1   (13-31)   13.0   (8-34)     CO   L/min       5.34             4.36                   L/min/m^2   2.47             2.02              SV   ml          78.5   ()  64.1   ()        ml/m^2      36.4   (36-60)   29.7   (34-58)    EF   %           73.6   (60-78)   69.5   (57-81)   '-----+-----------+-------+-----------+------+-----------'         CARDIAC OUTPUT HR:  68 BPM SCAN INFO  ========================================================================================================== GENERAL  ----------------------------------------------------------------------------------------     CONTRAST AGENT      ----------------------------------------------         TYPE:  Gadavist         GD CONCENTRATION:  0.5 M         VOLUME ADMINISTERED:  13 ml         DOSAGE:  0.06 mmol/kg     SEDATION      ----------------------------------------------         SEDATION USED?:  No     VITALS      ----------------------------------------------         HEIGHT:  66.0 in         HEIGHT:  167.6 cm         WEIGHT:  239.0 lbs         WEIGHT:  108.4 kgs         BSA:  2.16 m^2     PULSE SEQUENCES      ----------------------------------------------         SSFP cine, 2D LGE segmented, 2D LGE single-shot, T1-weighted imaging, T2-weighted  imaging,         Pre-contrast T1 mapping, Post-contrast T1 mapping     SETUP      ----------------------------------------------         SCAN TYPE:  Clinical         PATIENT TYPE:  Outpatient         INCOMPLETE SCAN:  No         REASON(S) FOR SCAN:  HCM (known/suspect)         ATTENDING PHYSICIAN:  MAKENNA PUENTES Report generated by [employer], a product of [employer]    Cardiac Catheterization    Result Date: 11/13/2024    Ramus lesion is 40% stenosed.   Prox Cx lesion is 95% stenosed.   Mid Cx lesion is 30% stenosed.   Dist LAD-2 lesion is 70% stenosed.   Mid LAD to Dist LAD lesion is 70% stenosed.   3rd Diag lesion is 70% stenosed.   Mid LAD lesion is 80% stenosed.   2nd Diag lesion is 90% stenosed.   Dist LAD-1 lesion is 80% stenosed.   Prox LAD to Mid LAD lesion is 30% stenosed.   Dist RCA lesion is 99% stenosed.   Mid RCA lesion is 40% stenosed.   Prox RCA lesion is 40% stenosed.   RPDA lesion is 80% stenosed. 1.significant three-vessel CAD     US Upper Extremity Arterial Duplex Left    Result Date: 11/12/2024  ULTRASOUND UPPER EXTREMITY ARTERIAL DUPLEX LEFT 11/12/2024 2:17 PM CLINICAL HISTORY: pre CABG - possible radial graft, please assess size as well as presence/absence of calcium. COMPARISONS: None available. REFERRING PROVIDER: ALTHEA HAIR TECHNIQUE: Left brachial, radial, and ulnar arteries evaluated with grayscale, color Doppler, and spectral pulsed wave Doppler ultrasound. Radial side of the palmar arch evaluated without and with radial artery compression. FINDINGS: LEFT: Brachial artery, antecubital fossa: 171/0 cm/s, triphasic Radial artery, origin: 99/0 cm/s, triphasic, 2.3 mm Radial artery, mid forearm: 93/0 cm/s, triphasic, 2.7 mm Radial artery, wrist: 123/10 cm/s, arterial monophasic, 1.9 mm Ulnar artery, mid forearm: 92/0 cm/s, triphasic Ulnar artery, wrist: 116/8 cm/s, triphasic Palmar arch, without compression: 116/9 cm/s, arterial monophasic Palmar arch, with radial  compression: 11/0 cm/s, biphasic, RETROGRADE     IMPRESSION: Patent left radial and ulnar arteries to the wrist with measurements as in the report. Intact palmar arch suggested. JABARI FITZPATRICK MD   SYSTEM ID:  [id number]    US Lower Extremity Venous Mapping Bilateral    Result Date: 11/10/2024  Exam: Ultrasound Doppler vein mapping of bilateral lower extremities dated  11/10/2024 9:15 AM Comparison study: None Clinical history: Bilateral lower extremity vein mapping study Ordering clinician: Reba Whittaker Technique: Grayscale (B-mode) with duplex Doppler ultrasound, along with compression and augmentation, of the lower extremity veins. RIGHT LEG: CFV: Thrombus: No GSV: Proximal thigh: Thrombus: No, Wall thickness: Normal, 5.2 mm Mid thigh: Thrombus: No, Wall thickness: Normal, 4 mm Distal thigh: Thrombus: No, Wall thickness: Normal, 3.7 mm Knee: Thrombus: No, Wall thickness: Normal, 3.5 mm Distal calf: Thrombus: No, Wall thickness: Normal, 2.6 mm Ankle: Thrombus: No, wall thickness: Normal, 2.5 mm LEFT LEG: CFV: Thrombus: No GSV: Proximal thigh: Thrombus: No, Wall thickness: Normal, 7.3 mm Mid thigh: Thrombus: No, Wall thickness: Normal, 3.5 mm Distal thigh: Thrombus: No, Wall thickness: Normal, 3.3 mm Knee: Thrombus: No, Wall thickness: Normal, 3.2 mm Superior calf: Thrombus: No, Wall thickness: Normal, 2.4 mm Mid calf: Thrombus: No, Wall thickness: Normal, 1.7 mm Ankle: Thrombus: No, wall thickness: Normal, 2.8 mm.     Impression: 1. No thrombus noted within either lower extremity. 2. Great saphenous vein diameter measurements as per above. I have personally reviewed the examination and initial interpretation and I agree with the findings. GREY PAULINO MD   SYSTEM ID:  [id number]    US Carotid Bilateral    Result Date: 11/10/2024  Exam: Bilateral carotid duplex Doppler ultrasound dated 11/10/2024 9:15 AM Clinical history: preop cabg Comparison Study: Ultrasound carotid 8/13/2011 Technique: Grayscale (B-mode) and duplex  and spectral Doppler ultrasound of the extracranial internal carotid, external carotid, vertebral artery origins, right brachiocephalic/subclavian and left subclavian arteries. Velocity measurements obtained with angle correction at or less than 60 degrees. Findings: Right side: Plaque Morphology: Predominantly echogenic, Smooth    Proximal CCA: 90/18 cm/sec    Mid CCA: 69/13 cm/sec    Distal CCA: 65/13 cm/sec    External CA: 128/15 cm/sec    Proximal ICA: 43/14 cm/sec    Mid ICA: 59/19 cm/sec    Distal ICA: 72/21 cm/sec    Vertebral artery: Not seen ICA/CCA ratio: 1.5 Left side: Plaque Morphology: Predominantly echogenic, Smooth    Proximal CCA: 92/20 cm/sec    Mid CCA: 107/23 cm/sec    Distal CCA: 94/21 cm/sec       External CA: 119/14 cm/sec    Proximal ICA: 108/28 cm/sec    Mid ICA: 74/24 cm/sec    Distal ICA: 67/22 cm/sec    Vertebral artery: 59 cm/sec ICA/CCA ratio: 0.87     Impression: 1. Right side:     Degree of stenosis of the internal carotid artery: Less than 50 %. . 2. Left side:     Degree of stenosis of the internal carotid artery: Less than 50 %. . [facility] Accreditation Commission Updated Recommendations for Carotid Stenosis Interpretation Criteria - October 2021. https://intersocietal.org/wp-content/uploads/2021/10/IAC-Vascular-Test vn-Rgwmbijdyuuqu_Bvgreeb-Ripwukwiqoljhlh-for-Carotid-Stenosis-Interpre ation-Criteria.pdf Society for Vascular Surgery Clinical Practice Guidelines for Management of Extracranial Cerebrovascular Disease. Journal of Vascular Surgery Vol. 75, Issue 1, Supplement [id number] Published online: June 18, 2021 (additional criteria adjustment for >70% ICA stenosis)      Normal           ICA PSV: < 180 cm/s           Plaque: None           ICA/CCA PSV Ratio: < 2.0           ICA EDV: < 40 cm/s      < 50%           ICA PSV: < 180 cm/s           Plaque: < 50%           ICA/CCA PSV Ratio: < 2.0           ICA EDV: < 40 cm/s      50 - 69%           ICA PSV: < 180 - 230 cm/s            ICA/CCA PSV Ratio: 2.0 - 4.0           ICA EDV: 40 - 100 cm/s      > 70%           ICA PSV: > 230 cm/s AND           ICA/CCA PSV Ratio: > 4.0 or ICA EDV: > 100 cm/s      Total occlusion           ICA PSV: Undetectable           ICA/CCA PSV Ratio: N/A           ICA EDV: N/A I have personally reviewed the examination and initial interpretation and I agree with the findings. АНДРЕЙ RODRIGUEZ DO   SYSTEM ID:  [id number]    CT Chest w/o Contrast    Result Date: 11/9/2024  EXAMINATION: CT CHEST W/O CONTRAST, 11/9/2024 11:25 AM CLINICAL HISTORY: preop cabg COMPARISON: None. TECHNIQUE: CT imaging obtained through the chest without contrast. Coronal and axial MIP reformatted images obtained. CONTRAST:  none. FINDINGS: Lines and tubes: None. Mediastinum: No thyroid nodules. Central tracheobronchial tree is patent. Heart size is normal. No pericardial effusion.  Normal caliber thoracic vasculature. No thoracic lymphadenopathy. Calcified mediastinal and hilar lymph nodes. Triple-vessel coronary artery calcifications. Mild calcifications of the aorta. Esophagus appears normal. Lungs: Subpleural calcification in the right upper lobe. 3 mm subpleural nodule in the left lower lobe (4/245. 2 mm nodule in the left lower lobe (4/186). No consolidation. No pleural effusion or pneumothorax. Bones and soft tissues: No suspicious bone findings. Upper abdomen: Cholelithiasis without acute cholecystitis. Calcified granulomas in the spleen.     IMPRESSION: 1. No acute or suspicious abnormalities in the chest. 2. Sequelae of prior granulomatous infection including pleural and mediastinal calcifications. 3. Small 2mm and 3mm nodules in the left lower lobe. 4. Cholelithiasis without acute cholecystitis. NOLA ROCKWELL MD   SYSTEM ID:  [id number]    XR Chest 2 Views    Result Date: 11/9/2024  Exam:  Chest X-ray 11/9/2024 9:47 AM History: preop cabg Comparison: 11/7/2024 Findings: Frontal and lateral views of the chest. The cardiac silhouette is within  normal limits. No acute airspace opacities. No pleural effusion. No pneumothorax. Visualized upper abdomen is unremarkable. No acute osseous abnormality.     Impression: 1.  No acute cardiopulmonary abnormalities. АНДРЕЙ RODRIGUEZ DO   SYSTEM ID:  [id number]    Echo Complete    Result Date: 2024  [id number]^BRYSONWA^DOROTHY^WILMER  [facility] [address] [place] [address]  Name: CAILIN CAMPOS MRN: [id number] : 1955 Study Date: 2024 09:55 AM Age: 69 yrs Gender: Female Patient Location: [facility] Reason For Study: Myocardial Infarction Ordering Physician: DOROTHY WARNER Performed By: Kelin Urena  BSA: 2.2 m2 Height: 66 in Weight: 243 lb BP: 121/98 mmHg ______________________________________________________________________________ Procedure Complete Portable Echo Adult. Contrast Optison. Optison (NDC #6666-2078-44) given intravenously. Patient was given 5 ml mixture of 3 ml Optison and 6 ml saline. 4 ml wasted. ______________________________________________________________________________ Interpretation Summary Global and regional left ventricular function is normal with an EF of 60-65%. Asymmetric septal hypertrophy; septal wall is 1.8 cm, posterior wall is 0.8 cm. Left ventricular cavity size is small. No ABRAHAM, or LVOT gradient. Global right ventricular function is normal. The right ventricle is normal size. The inferior vena cava was normal in size with preserved respiratory variability. No significant valvular abnormalities present.  This study was compared with the study from  . No significant changes noted. Consider cardiac MRI. ______________________________________________________________________________ Left Ventricle Left ventricular cavity size is small. Global and regional left ventricular function is normal with an EF of 60-65%. A mid-cavitary gradient is present. Asymmetric septal  hypertrophy; septal wall is 1.8 cm, posterior wall is 0.8 cm. Left ventricular diastolic function is not assessable.  Right Ventricle The right ventricle is normal size. Global right ventricular function is normal.  Atria Both atria appear normal.  Mitral Valve The mitral valve is normal. Trace mitral insufficiency is present.  Aortic Valve Trileaflet aortic sclerosis without stenosis. On Doppler interrogation, there is no significant stenosis or regurgitation.  Tricuspid Valve On Doppler interrogation, there is no significant stenosis or regurgitation. The valve leaflets are not well visualized.  Pulmonic Valve The valve leaflets are not well visualized. On Doppler interrogation, there is no significant stenosis or regurgitation.  Vessels The aorta root is normal. The inferior vena cava was normal in size with preserved respiratory variability. Ascending aorta 3.8 cm.  Pericardium No pericardial effusion is present.  Miscellaneous No significant valvular abnormalities present.  Compared to Previous Study This study was compared with the study from  . No significant changes noted. ______________________________________________________________________________ MMode/2D Measurements & Calculations IVSd: 1.9 cm LVIDd: 4.3 cm LVIDs: 3.0 cm LVPWd: 1.7 cm FS: 29.8 % LV mass(C)d: 336.5 grams LV mass(C)dI: 154.9 grams/m2 Ao root diam: 3.2 cm asc Aorta Diam: 3.8 cm LVOT diam: 2.2 cm LVOT area: 3.7 cm2 Ao root diam index Ht(cm/m): 1.9 Ao root diam index BSA (cm/m2): 1.5 Asc Ao diam index BSA (cm/m2): 1.7 Asc Ao diam index Ht(cm/m): 2.3 LA Volume (BP): 30.7 ml  LA Volume Index (BP): 14.1 ml/m2 RWT: 0.78 TAPSE: 2.9 cm  Doppler Measurements & Calculations MV E max linus: 35.1 cm/sec MV A max linus: 80.7 cm/sec MV E/A: 0.43 MV dec time: 0.27 sec Ao V2 max: 235.0 cm/sec Ao max P.1 mmHg Ao V2 mean: 150.0 cm/sec Ao mean P.0 mmHg Ao V2 VTI: 47.9 cm MELANIE(I,D): 2.1 cm2 MELANIE(V,D): 1.9 cm2 LV V1 max P.9 mmHg LV V1 max: 121.0  cm/sec LV V1 VTI: 27.6 cm SV(LVOT): 100.8 ml SI(LVOT): 46.4 ml/m2  AV Abel Ratio (DI): 0.51 MELANIE Index (cm2/m2): 0.97 E/E' av.2 Lateral E/e': 7.8 Medial E/e': 8.5 RV S Abel: 20.0 cm/sec  ______________________________________________________________________________ Report approved by: Jacquie ESPINOSA 2024 11:21 AM       XR Chest 1 View    Result Date: 2024  EXAM: XR CHEST 1 VIEW LOCATION: [facility] DATE: 2024 INDICATION: Chest pain COMPARISON: 3/23/2016. FINDINGS: The heart size is normal. The thoracic aorta is tortuous. Calcified granuloma in the right upper lobe. The lungs are otherwise clear. No pneumothorax.     IMPRESSION: No acute abnormality.       MICRO:  Date Source Organism    NP Swab Negative for MRSA    Urine NGTD    Blood NGTD    NP swab NGTD    NP Swab Negative Respiratory Viral Panel       Organism Antibiotic Antibiotic Start Date Antibiotic End Date   NGTD Vancomycin     Cefazolin     Pip-Tazo  Ongoing       MAR REVIEWED    ICU DAILY CHECKLIST                           Can patient transfer out of MICU? no    FAST HUG:    Feeding:  Feeding: Yes.  Patient is receiving ORAL    Jasmine:Yes  Analgesia/Sedation:Yes Hydromorphone  Thromboembolic prophylaxis: Yes; Mode:  Heparin  HOB>30:  Yes  Stress Ulcer Protocol Active: Yes; Mode: PPI  Glycemic Control: Any glucose > 180 yes; Mode of Insulin Therapy: Sliding Scale Insulin    INTUBATED:  Can patient have daily waking:  NA  Can patient have spontaneous breathing trial:  NA    Restraints? no    PHYSICAL THERAPY AND MOBILITY:  Can patient have PT and mobility trial: no  Activity: OOB    Assessment/Plan:  Karyn Gamez is a 69 year old lady with a past medical history significant for with a past medical history significant for cervicalgia, CAD, depression, HTN and obesity who presented to the hospital with an NSTEMI and noted to have severe multivessel  disease now s/p 5V CABG with a complicated postoperative course including hypotension with new SHIRA and shock liver.  Echocardiogram demonstrated LVOT physiology and the patient has continued to have improvement over the last several days.    NEURO:No acute issues.   RASS goal of 0-1.  Hydromorphone PRN for pain.  CVS:Has a known medical history of multivessel CAD s/p 5V CABG 11/18/24 (LIMA->LAD, LRA->OM, rSVG->rPDA, rSVG->rPLV, rSVG->diag), post operative course complicated by hypotension, ongoing vasopressor requirement with resolving ATN and acute shock liver. TTE raised concerns for LVOT. On 11/22 developed A-Fib with RVR and required cardioversion *2, Lidocaine, Metoprolol and subsequently converted with Diltiazem. Continues to be in A-fib with rates <100.  Continue telemetry monitoring.  MAP goal of 60mmHg with SBP>90mmHg.  Continue Amlodipine for grafts.  Afib: As noted above. Has acceptable heart rates now, appreciate EP recommendations. No indication for AVN blocking agents presently.  Need to consider long term anticoagulation but will defer this to CVTS.  Continue Aspirin and Plavix.  Defer to CVTS for timing of chest tube removal.  RESP:Had developed hypercarbic, hypoxemic respiratory failure; likely has undiagnosed TL/OHS.  Maintain SpO2 of >92%.  BPAP when sleeping.  Resp: 26  GI:Had developed shock liver post acute hypotension, and this is noted to be improving. Mucoid rectal discharge overnight.  Advance diet as tolerated.  Continue Protonix daily for ulcer prophylaxis.  Hold Senna, Miralax, Colace.  C-Diff per CVTS.  RENAL : No acute issues.  Would trend renal function daily and closely follow I/O.  Follow electrolytes and correct as abnormalities arise.  ID:No growth on cultures.  On Pip-Tazo, will treat for a total of 7 days.  HEMATOLOGIC:Noted to have post operative anemia and has received PRBC for this.   Daily CBC.  Transfuse for Hb <7gm/dl.  ENDOCRINE:  ICU insulin sliding scale.  ICU  "PROPHYLAXIS:Protonix, heparin.  DISPO:Critical Care will sign off.    Clinically Significant Risk Factors               # Hypoalbuminemia: Lowest albumin = 2.9 g/dL at 11/22/2024  8:39 AM, will monitor as appropriate    # Coagulation Defect: INR = 1.21 (Ref range: 0.85 - 1.15) and/or PTT = 35 Seconds (Ref range: 22 - 38 Seconds), will monitor for bleeding    # Hypertension: Noted on problem list     # Acute Hypercapnic Respiratory Failure: based on arterial blood gas results.  Continue supplemental oxygen and ventilatory support as indicated.  # Acute Hypercapnic Respiratory Failure: based on venous blood gas results.  Continue supplemental oxygen and ventilatory support as indicated.         # Severe Obesity: Estimated body mass index is 40.12 kg/m  as calculated from the following:    Height as of 11/7/24: 1.7 m (5' 6.93\").    Weight as of this encounter: 115.9 kg (255 lb 9.6 oz).        # Financial/Environmental Concerns:     # History of CABG: noted on surgical history            Total Critical Care Time : 30 Minutes    Radha Solis MD  Pulmonary and Critical Care  [employer]                  "

## 2024-11-24 NOTE — PROGRESS NOTES
[facility]  Cardiac Electrophysiology  [address]  [place] [address]   Office: [phone]  Fax: [phone]     Cardiac Electrophysiology Consultation    Patient: Karyn Gamez   : 1955     Referring Provider: No ref. provider found    CHIEF COMPLAINT/REASON FOR CONSULTATION  Paroxysmal atrial fibrillation with RVR     Assessment/Recommendations     # Paroxysmal atrial fibrillation with RVR  - Single episode of AF lasting approximately 4 hours on       PLAN:   - Continue metoprolol 12.5 mg BID and up titrate as tolerated by BP.   -Anticoagulation warranted if and out of A-fib for greater than 48 hours.  She is on DAPT for her NSTEMI and bypass surgery. No further episodes of AF since   - if she has further episodes of AF, recommend starting Amiodarone drip  - cardiology will sign off at this time         History of Present Illness   Karyn Gamez is a 69 year old female past medical history significant for type 2 diabetes, prior CVA, tobacco use, hypertension, hyperlipidemia, depression, possible TL, HCM on cardiac MRI, LVOT on latest echo was found to have NSTEMI and underwent CABG on 2024 with Dr. Pennington.  EP was consulted for unstable atrial fibrillation morning of 2024.    No further episodes of AF since (4 hours long).         Data Review    ECGs (all tracings independently reviewed)  2024: Atrial fibrillation 94 bpm  2024: Sinus rhythm 63 bpm  2024: Sinus rhythm 82 bpm  2024: Sinus rhythm 68 bpm  2024: Sinus rhythm 62 bpm     Telemetry shows sinus rhythm 60 bpm     Echo done 2024:  1. Left ventricular chamber size is normal. Severe asymmetric septal wall  hypertrophy is present with a maximum thickness of 1.8 cm. There is severe  resting left ventricular outflow tract obstruction with a peak gradient of 66  mmHg. Systolic function is normal with a visually estimated left  ventricular  ejection fraction of 60-65%.  2. Right ventricle is not well visualized. Limited view suggest grossly normal  chamber size and sysotlic function.  3. Mild left atrial enlargement.  4. Systolic anterior motion of the mitral valve is noted although this results  in only trivial mitral regurgitation.  5. No significant percardial effusion.  6. Compared to the prior study dated 11/8/2024, the patient has since  undergone coronary artery bypass grafting and resting left ventricular outflow  tract obstruction is now present.        Cardiac MRI 11/13/2024:  CONCLUSIONS:   Asymmetric septal hypertrophy with maximal wall thickness 2.0 cm. Systolic anterior motion of the mitral  valve is present. There is apical displacement of the papillary muscles. LGE imaging shows mid-myocardial  enhancement in a non-ischemic pattern. Collectively, these findings suggest obstructive hypertrophic  cardiomyopathy. Consider genetic testing for HCM.   Normal biventricular systolic function, LVEF 74%, RVEF 70%.               Physical Examination  Review of Systems   VITALS: /76   Pulse 87   Temp 97.4  F (36.3  C) (Oral)   Resp 26   Wt 115.9 kg (255 lb 9.6 oz)   LMP  (LMP Unknown)   SpO2 93%   BMI 40.12 kg/m    Wt Readings from Last 3 Encounters:   11/24/24 115.9 kg (255 lb 9.6 oz)   11/17/24 107.9 kg (237 lb 14 oz)   10/31/23 96.6 kg (213 lb)       Intake/Output Summary (Last 24 hours) at 11/22/2024 1355  Last data filed at 11/22/2024 1232  Gross per 24 hour   Intake 1717.05 ml   Output 5225 ml   Net -3507.95 ml     CONSTITUTIONAL: no distress  RESPIRATORY:  Respiratory effort is normal  CARDIOVASCULAR:  normal S1 and S2  GASTROINTESTINAL:  Abdomen without masses or tenderness  EXTREMITIES:  No clubbing or cyanosis.    SKIN:  Overall, skin warm and dry, no lesions.  NEURO/PSYCH:  Oriented x 3 with normal affect.    ROS: 10 point ROS neg other than the symptoms noted above in the HPI.       Medical History  Surgical  History   Past Medical History:   Diagnosis Date    Cervicalgia 6/29/2005 June 29, 2005: Thrown from a horse - wearing a helmet - and struck side of head and neck, heard crepitation, no loc, Able to walk after injury.  persistant pain in neck relieved with ibuprofen, stiff but can move with ROM.  No changes in hands and feet sensation/motor.    Coronary artery disease     Depressive disorder, not elsewhere classified     Hypertension     Obesity, unspecified     Past Surgical History:   Procedure Laterality Date    ANGIOGRAM  2010    negative    COLONOSCOPY N/A 3/24/2023    Procedure: COLONOSCOPY, WITH HOT POLYPECTOMY AND RESEARCH BIOPSY;  Surgeon: Bret Velez MD;  Location: [facility] OR    CORONARY ARTERY BYPASS GRAFT, WITH ENDOSCOPIC VESSEL PROCUREMENT N/A 11/18/2024    Procedure: CORONARY ARTERY BYPASS GRAFT TIMES FIVE, LEFT INTERNAL MAMMARY ARTERY HARVEST, RIGHT ENDOSCOPIC VESSEL PROCUREMENT,;  Surgeon: Mary Pennington MD;  Location: [facility] OR    CV CORONARY ANGIOGRAM N/A 11/8/2024    Procedure: Coronary Angiogram;  Surgeon: Emir Brand MD;  Location: [facility] CARDIAC CATH LAB    HYSTERECTOMY, PAP NO LONGER INDICATED      BSO    PROCURE ARTERY RADIAL  11/18/2024    Procedure: LEFT RADIAL ARTERY HARVEST;  Surgeon: Mary Pennington MD;  Location: [facility] OR    TRANSESOPHAGEAL ECHOCARDIOGRAM INTRAOPERATIVE N/A 11/18/2024    Procedure: ECHOCARDIOGRAM, TRANSESPOPHAGEAL, WITH ANESTHESIA,;  Surgeon: Mary Pennington MD;  Location: [facility] OR         Family History Social History   Family History   Problem Relation Age of Onset    C.A.D. Father         first MI at 53, stenting x2    C.A.D. Mother         dx in her mid 50's    C.A.D. Brother         MI in mid-50's    Cerebrovascular Disease Brother         in late 50's        Social History     Tobacco Use    Smoking status: Former     Current packs/day: 0.00     Average packs/day: 0.5 packs/day for 30.0 years (15.0 ttl pk-yrs)     Types:  Cigarettes     Start date: 1981     Quit date: 2011     Years since quittin.2    Smokeless tobacco: Never    Tobacco comments:     smoking 3-4 cigs per day   Vaping Use    Vaping status: Never Used   Substance Use Topics    Alcohol use: Yes     Comment: rarely    Drug use: No         Medications  Allergies     Current Facility-Administered Medications:     acetaminophen (TYLENOL) tablet 650 mg, 650 mg, Oral, Q4H PRN, Suzie Leiva PA-MONALISA    amLODIPine (NORVASC) tablet 2.5 mg, 2.5 mg, Oral, Daily, Ly Atwood PA-MONALISA, 2.5 mg at 24 0832    aspirin (ASA) chewable tablet 81 mg, 81 mg, Oral or NG Tube, Daily, 81 mg at 24 0832 **OR** aspirin (ASA) Suppository 300 mg, 300 mg, Rectal, Daily, Ly Atwood PA-C, 300 mg at 24 1409    atorvastatin (LIPITOR) tablet 80 mg, 80 mg, Oral, At Bedtime, Suzie Leiva PA-MONALISA, 80 mg at 24    bisacodyl (DULCOLAX) suppository 10 mg, 10 mg, Rectal, Daily PRN, Jasmin Lemons PA-C    calcium gluconate 1 g in 50 mL in sodium chloride intermittent infusion, 1 g, Intravenous, Once PRN, Jasmin Lemons PA-C, 1 g at 24 0449    calcium gluconate 2 g in  mL intermittent infusion, 2 g, Intravenous, Once PRN, Jasmin Lemons PA-C    calcium gluconate 3 g in sodium chloride 0.9 % 100 mL intermittent infusion, 3 g, Intravenous, Once PRN, Jasmin Lemons PA-C    clopidogrel (PLAVIX) tablet 75 mg, 75 mg, Oral, Daily, Ly Atwood PA-C, 75 mg at 24 0832    glucose gel 15-30 g, 15-30 g, Oral, Q15 Min PRN **OR** dextrose 50 % injection 25-50 mL, 25-50 mL, Intravenous, Q15 Min PRN **OR** glucagon injection 1 mg, 1 mg, Subcutaneous, Q15 Min PRN, Jasmin Lemons PA-C    furosemide (LASIX) injection 20 mg, 20 mg, Intravenous, Daily, Suzie Leiva PA-C, 20 mg at 24 0832    heparin ANTICOAGULANT injection 5,000 Units, 5,000 Units, Subcutaneous, Q8H, Jasmin Lemons PA-C, 5,000  Units at 11/24/24 0640    hydrALAZINE (APRESOLINE) injection 10 mg, 10 mg, Intravenous, Q30 Min PRN, Jasmin Lemons PA-C    HYDROmorphone (DILAUDID) injection 0.2 mg, 0.2 mg, Intravenous, Q2H PRN, 0.2 mg at 11/23/24 2132 **OR** [DISCONTINUED] HYDROmorphone (DILAUDID) injection 0.4 mg, 0.4 mg, Intravenous, Q2H PRN, Jasmin Lemons PA-C    influenza trivalent vaccine high-dose for ages 65 years and greater (FLUZONE-HD) injection 0.5 mL, 0.5 mL, Intramuscular, Prior to discharge, Mary Pennington MD    insulin aspart (NovoLOG) injection (RAPID ACTING), 1-7 Units, Subcutaneous, TID AC, Ly Atwood PA-C, 2 Units at 11/23/24 1238    insulin aspart (NovoLOG) injection (RAPID ACTING), 1-5 Units, Subcutaneous, At Bedtime, Ly Atwood PA-C    iopamidol (ISOVUE-370) solution 117 mL, 117 mL, Intravenous, Once **AND** sodium chloride 0.9 % bag for CT scan flush use, 100 mL, As instructed, Once, Ly Atwood PA-C    lactated ringers BOLUS 250 mL, 250 mL, Intravenous, Q15 Min PRN, Jasmin Lemons PA-C, Last Rate: 500 mL/hr at 11/18/24 1950, 250 mL at 11/18/24 1950    Lidocaine (LIDOCARE) 4 % Patch 1-2 patch, 1-2 patch, Transdermal, Q24H, Jasmin Lemons PA-C, 1 patch at 11/23/24 1614    lidocaine (LMX4) cream, , Topical, Q1H PRN, Suzie Leiva PA-C    lidocaine 1 % 0.1-1 mL, 0.1-1 mL, Other, Q1H PRN, Suzie Leiva PA-C    magnesium hydroxide (MILK OF MAGNESIA) suspension 30 mL, 30 mL, Oral, Daily PRN, Jasmin Lemons, PA-C    magnesium oxide (MAG-OX) tablet 400 mg, 400 mg, Oral, Q4H, Mary Pennington MD, 400 mg at 11/24/24 0832    metFORMIN (GLUCOPHAGE XR) 24 hr tablet 500 mg, 500 mg, Oral, Daily with supper, Suzie Leiva PA-C    metoprolol tartrate (LOPRESSOR) half-tab 12.5 mg, 12.5 mg, Oral, Once, Suzie Leiva PA-C    metoprolol tartrate (LOPRESSOR) tablet 25 mg, 25 mg, Oral, BID, Suzie Leiva PA-C    multivitamin, therapeutic (THERA-VIT) tablet 1 tablet, 1 tablet,  Oral, Daily, Mary Pennington MD, 1 tablet at 11/24/24 0832    naloxone (NARCAN) injection 0.2 mg, 0.2 mg, Intravenous, Q2 Min PRN **OR** naloxone (NARCAN) injection 0.4 mg, 0.4 mg, Intravenous, Q2 Min PRN **OR** naloxone (NARCAN) injection 0.2 mg, 0.2 mg, Intramuscular, Q2 Min PRN, 0.2 mg at 11/20/24 0812 **OR** naloxone (NARCAN) injection 0.4 mg, 0.4 mg, Intramuscular, Q2 Min PRN, Jasmin Lemons PA-C    ondansetron (ZOFRAN ODT) ODT tab 4 mg, 4 mg, Oral, Q6H PRN **OR** ondansetron (ZOFRAN) injection 4 mg, 4 mg, Intravenous, Q6H PRN, Jasmin Lemons PA-C, 4 mg at 11/20/24 1635    oxyCODONE (ROXICODONE) tablet 5 mg, 5 mg, Oral, Q4H PRN **OR** oxyCODONE (ROXICODONE) tablet 10 mg, 10 mg, Oral, Q4H PRN, Jasmin Lemons PA-C, 10 mg at 11/24/24 0859    pantoprazole (PROTONIX) EC tablet 40 mg, 40 mg, Oral, QAM AC, Mary Pennington MD, 40 mg at 11/24/24 0640    piperacillin-tazobactam (ZOSYN) 3.375 g vial to attach to  mL bag, 3.375 g, Intravenous, Q8H, Mike Garcia MD, 3.375 g at 11/24/24 0825    polyethylene glycol (MIRALAX) Packet 17 g, 17 g, Oral, Daily, Jasmin Lemons PA-C, 17 g at 11/24/24 0831    potassium & sodium phosphates (NEUTRA-PHOS) Packet 1 packet, 1 packet, Oral or Feeding Tube, Q4H, Mary Pennington MD, 1 packet at 11/24/24 0832    prochlorperazine (COMPAZINE) injection 5 mg, 5 mg, Intravenous, Q6H PRN **OR** prochlorperazine (COMPAZINE) tablet 5 mg, 5 mg, Oral, Q6H PRN, Jasmin Lemons PA-C    senna-docusate (SENOKOT-S/PERICOLACE) 8.6-50 MG per tablet 1 tablet, 1 tablet, Oral, BID, Jasmin Lemons PA-C, 1 tablet at 11/24/24 0831    sodium chloride (PF) 0.9% PF flush 10 mL, 10 mL, Intracatheter, Q8H, Suzie Leiva PA-C    sodium chloride (PF) 0.9% PF flush 10-20 mL, 10-20 mL, Intracatheter, q1 min prn, Suzie Leiva PA-C   No Known Allergies       Lab Results    Chemistry CBC Cardiac Enzymes/BNP/TSH/INR   Recent Labs   Lab Test 11/22/24  1210 11/22/24  0839   NA  --   "134*   POTASSIUM  --  3.7   CHLORIDE  --  101   CO2  --  24   * 149*   BUN  --  30.6*   CR  --  1.21*   GFRESTIMATED  --  49*   ANGELES  --  7.8*     Recent Labs   Lab Test 11/22/24  0839 11/22/24 0438 11/21/24 1916   CR 1.21* 1.31* 1.44*          Recent Labs   Lab Test 11/22/24 0839   WBC 18.0*   HGB 8.1*   HCT 24.5*   MCV 89        Recent Labs   Lab Test 11/22/24 0839 11/22/24 0438 11/21/24 1916   HGB 8.1* 7.7* 8.1*    No results for input(s): \"TROPONINI\" in the last 92017 hours.  No results for input(s): \"BNP\", \"NTBNPI\", \"NTBNP\" in the last 20447 hours.  Recent Labs   Lab Test 11/07/24  1842   TSH 3.54     Recent Labs   Lab Test 11/22/24  0839 11/22/24 0438 11/20/24  0900   INR 1.40* 1.44* 1.64*            Ashley Gonzalez MD  Clinical Cardiac Electrophysiology    "

## 2024-11-25 ENCOUNTER — APPOINTMENT (OUTPATIENT)
Dept: OCCUPATIONAL THERAPY | Facility: HOSPITAL | Age: 69
End: 2024-11-25
Attending: STUDENT IN AN ORGANIZED HEALTH CARE EDUCATION/TRAINING PROGRAM
Payer: COMMERCIAL

## 2024-11-25 LAB
ALBUMIN SERPL BCG-MCNC: 2.9 G/DL (ref 3.5–5.2)
ALP SERPL-CCNC: 91 U/L (ref 40–150)
ALT SERPL W P-5'-P-CCNC: 151 U/L (ref 0–50)
ANION GAP SERPL CALCULATED.3IONS-SCNC: 8 MMOL/L (ref 7–15)
AST SERPL W P-5'-P-CCNC: 51 U/L (ref 0–45)
BACTERIA BLD CULT: NO GROWTH
BASE EXCESS BLDV CALC-SCNC: 5.8 MMOL/L (ref -3–3)
BILIRUB DIRECT SERPL-MCNC: 0.25 MG/DL (ref 0–0.3)
BILIRUB SERPL-MCNC: 0.7 MG/DL
BUN SERPL-MCNC: 8.1 MG/DL (ref 8–23)
CA-I BLD-MCNC: 4.5 MG/DL (ref 4.4–5.2)
CALCIUM SERPL-MCNC: 7.8 MG/DL (ref 8.8–10.4)
CHLORIDE SERPL-SCNC: 100 MMOL/L (ref 98–107)
CREAT SERPL-MCNC: 0.71 MG/DL (ref 0.51–0.95)
CRP SERPL-MCNC: 56.8 MG/L
EGFRCR SERPLBLD CKD-EPI 2021: >90 ML/MIN/1.73M2
ERYTHROCYTE [DISTWIDTH] IN BLOOD BY AUTOMATED COUNT: 14.6 % (ref 10–15)
GLUCOSE BLDC GLUCOMTR-MCNC: 106 MG/DL (ref 70–99)
GLUCOSE BLDC GLUCOMTR-MCNC: 126 MG/DL (ref 70–99)
GLUCOSE BLDC GLUCOMTR-MCNC: 127 MG/DL (ref 70–99)
GLUCOSE BLDC GLUCOMTR-MCNC: 133 MG/DL (ref 70–99)
GLUCOSE BLDC GLUCOMTR-MCNC: 134 MG/DL (ref 70–99)
GLUCOSE SERPL-MCNC: 102 MG/DL (ref 70–99)
HCO3 BLDV-SCNC: 32 MMOL/L (ref 21–28)
HCO3 SERPL-SCNC: 28 MMOL/L (ref 22–29)
HCT VFR BLD AUTO: 26.1 % (ref 35–47)
HGB BLD-MCNC: 8.5 G/DL (ref 11.7–15.7)
MAGNESIUM SERPL-MCNC: 1.8 MG/DL (ref 1.7–2.3)
MCH RBC QN AUTO: 29.3 PG (ref 26.5–33)
MCHC RBC AUTO-ENTMCNC: 32.6 G/DL (ref 31.5–36.5)
MCV RBC AUTO: 90 FL (ref 78–100)
O2/TOTAL GAS SETTING VFR VENT: 25 %
OXYHGB MFR BLDV: 62 % (ref 70–75)
PCO2 BLDV: 52 MM HG (ref 40–50)
PH BLDV: 7.39 [PH] (ref 7.32–7.43)
PHOSPHATE SERPL-MCNC: 2.7 MG/DL (ref 2.5–4.5)
PLATELET # BLD AUTO: 300 10E3/UL (ref 150–450)
PO2 BLDV: 35 MM HG (ref 25–47)
POTASSIUM SERPL-SCNC: 3.6 MMOL/L (ref 3.4–5.3)
PROT SERPL-MCNC: 5.4 G/DL (ref 6.4–8.3)
RBC # BLD AUTO: 2.9 10E6/UL (ref 3.8–5.2)
SAO2 % BLDV: 63.3 % (ref 70–75)
SODIUM SERPL-SCNC: 136 MMOL/L (ref 135–145)
WBC # BLD AUTO: 13.7 10E3/UL (ref 4–11)

## 2024-11-25 PROCEDURE — 97110 THERAPEUTIC EXERCISES: CPT | Mod: GO

## 2024-11-25 PROCEDURE — 82310 ASSAY OF CALCIUM: CPT | Performed by: PHYSICIAN ASSISTANT

## 2024-11-25 PROCEDURE — 250N000013 HC RX MED GY IP 250 OP 250 PS 637: Performed by: PHYSICIAN ASSISTANT

## 2024-11-25 PROCEDURE — 250N000011 HC RX IP 250 OP 636: Performed by: PHYSICIAN ASSISTANT

## 2024-11-25 PROCEDURE — 210N000001 HC R&B IMCU HEART CARE

## 2024-11-25 PROCEDURE — 84460 ALANINE AMINO (ALT) (SGPT): CPT | Performed by: PHYSICIAN ASSISTANT

## 2024-11-25 PROCEDURE — 999N000157 HC STATISTIC RCP TIME EA 10 MIN

## 2024-11-25 PROCEDURE — 82805 BLOOD GASES W/O2 SATURATION: CPT | Performed by: STUDENT IN AN ORGANIZED HEALTH CARE EDUCATION/TRAINING PROGRAM

## 2024-11-25 PROCEDURE — 250N000011 HC RX IP 250 OP 636: Performed by: STUDENT IN AN ORGANIZED HEALTH CARE EDUCATION/TRAINING PROGRAM

## 2024-11-25 PROCEDURE — 82248 BILIRUBIN DIRECT: CPT | Performed by: PHYSICIAN ASSISTANT

## 2024-11-25 PROCEDURE — 250N000013 HC RX MED GY IP 250 OP 250 PS 637: Performed by: STUDENT IN AN ORGANIZED HEALTH CARE EDUCATION/TRAINING PROGRAM

## 2024-11-25 PROCEDURE — 250N000013 HC RX MED GY IP 250 OP 250 PS 637

## 2024-11-25 PROCEDURE — 86140 C-REACTIVE PROTEIN: CPT | Performed by: PHYSICIAN ASSISTANT

## 2024-11-25 PROCEDURE — 85041 AUTOMATED RBC COUNT: CPT | Performed by: PHYSICIAN ASSISTANT

## 2024-11-25 PROCEDURE — 84100 ASSAY OF PHOSPHORUS: CPT | Performed by: STUDENT IN AN ORGANIZED HEALTH CARE EDUCATION/TRAINING PROGRAM

## 2024-11-25 PROCEDURE — 85018 HEMOGLOBIN: CPT | Performed by: PHYSICIAN ASSISTANT

## 2024-11-25 PROCEDURE — 82330 ASSAY OF CALCIUM: CPT

## 2024-11-25 PROCEDURE — 83735 ASSAY OF MAGNESIUM: CPT | Performed by: STUDENT IN AN ORGANIZED HEALTH CARE EDUCATION/TRAINING PROGRAM

## 2024-11-25 PROCEDURE — 82374 ASSAY BLOOD CARBON DIOXIDE: CPT | Performed by: PHYSICIAN ASSISTANT

## 2024-11-25 PROCEDURE — 97535 SELF CARE MNGMENT TRAINING: CPT | Mod: GO

## 2024-11-25 PROCEDURE — 250N000011 HC RX IP 250 OP 636

## 2024-11-25 RX ORDER — MAGNESIUM OXIDE 400 MG/1
400 TABLET ORAL EVERY 4 HOURS
Status: COMPLETED | OUTPATIENT
Start: 2024-11-25 | End: 2024-11-25

## 2024-11-25 RX ORDER — FUROSEMIDE 10 MG/ML
20 INJECTION INTRAMUSCULAR; INTRAVENOUS 2 TIMES DAILY
Status: DISCONTINUED | OUTPATIENT
Start: 2024-11-25 | End: 2024-11-27

## 2024-11-25 RX ORDER — POTASSIUM CHLORIDE 1500 MG/1
20 TABLET, EXTENDED RELEASE ORAL ONCE
Status: COMPLETED | OUTPATIENT
Start: 2024-11-25 | End: 2024-11-25

## 2024-11-25 RX ORDER — KETOROLAC TROMETHAMINE 15 MG/ML
15 INJECTION, SOLUTION INTRAMUSCULAR; INTRAVENOUS ONCE
Status: COMPLETED | OUTPATIENT
Start: 2024-11-25 | End: 2024-11-25

## 2024-11-25 RX ADMIN — PIPERACILLIN AND TAZOBACTAM 3.38 G: 3; .375 INJECTION, POWDER, FOR SOLUTION INTRAVENOUS at 08:39

## 2024-11-25 RX ADMIN — ASPIRIN 81 MG CHEWABLE TABLET 81 MG: 81 TABLET CHEWABLE at 08:14

## 2024-11-25 RX ADMIN — PANTOPRAZOLE SODIUM 40 MG: 40 TABLET, DELAYED RELEASE ORAL at 06:35

## 2024-11-25 RX ADMIN — POTASSIUM CHLORIDE 20 MEQ: 1500 TABLET, EXTENDED RELEASE ORAL at 05:56

## 2024-11-25 RX ADMIN — AMIODARONE HYDROCHLORIDE 1 MG/MIN: 1.8 INJECTION, SOLUTION INTRAVENOUS at 09:58

## 2024-11-25 RX ADMIN — MAGNESIUM OXIDE TAB 400 MG (241.3 MG ELEMENTAL MG) 400 MG: 400 (241.3 MG) TAB at 08:15

## 2024-11-25 RX ADMIN — KETOROLAC TROMETHAMINE 15 MG: 15 INJECTION, SOLUTION INTRAMUSCULAR; INTRAVENOUS at 12:56

## 2024-11-25 RX ADMIN — POLYETHYLENE GLYCOL 3350 17 G: 17 POWDER, FOR SOLUTION ORAL at 08:37

## 2024-11-25 RX ADMIN — HEPARIN SODIUM 5000 UNITS: 5000 INJECTION, SOLUTION INTRAVENOUS; SUBCUTANEOUS at 05:56

## 2024-11-25 RX ADMIN — AMIODARONE HYDROCHLORIDE 150 MG: 1.5 INJECTION, SOLUTION INTRAVENOUS at 09:49

## 2024-11-25 RX ADMIN — OXYCODONE HYDROCHLORIDE 5 MG: 5 TABLET ORAL at 20:39

## 2024-11-25 RX ADMIN — OXYCODONE HYDROCHLORIDE 5 MG: 5 TABLET ORAL at 02:09

## 2024-11-25 RX ADMIN — FUROSEMIDE 20 MG: 10 INJECTION, SOLUTION INTRAMUSCULAR; INTRAVENOUS at 08:14

## 2024-11-25 RX ADMIN — THERA TABS 1 TABLET: TAB at 08:39

## 2024-11-25 RX ADMIN — METFORMIN ER 500 MG 500 MG: 500 TABLET ORAL at 17:30

## 2024-11-25 RX ADMIN — FUROSEMIDE 20 MG: 10 INJECTION, SOLUTION INTRAMUSCULAR; INTRAVENOUS at 13:59

## 2024-11-25 RX ADMIN — POTASSIUM & SODIUM PHOSPHATES POWDER PACK 280-160-250 MG 1 PACKET: 280-160-250 PACK at 05:56

## 2024-11-25 RX ADMIN — SENNOSIDES AND DOCUSATE SODIUM 1 TABLET: 8.6; 5 TABLET ORAL at 20:06

## 2024-11-25 RX ADMIN — CLOPIDOGREL BISULFATE 75 MG: 75 TABLET ORAL at 08:37

## 2024-11-25 RX ADMIN — POTASSIUM & SODIUM PHOSPHATES POWDER PACK 280-160-250 MG 1 PACKET: 280-160-250 PACK at 11:02

## 2024-11-25 RX ADMIN — PIPERACILLIN AND TAZOBACTAM 3.38 G: 3; .375 INJECTION, POWDER, FOR SOLUTION INTRAVENOUS at 17:30

## 2024-11-25 RX ADMIN — SENNOSIDES AND DOCUSATE SODIUM 1 TABLET: 8.6; 5 TABLET ORAL at 08:39

## 2024-11-25 RX ADMIN — ONDANSETRON 4 MG: 2 INJECTION INTRAMUSCULAR; INTRAVENOUS at 12:43

## 2024-11-25 RX ADMIN — ATORVASTATIN CALCIUM 80 MG: 40 TABLET, FILM COATED ORAL at 20:09

## 2024-11-25 RX ADMIN — METOPROLOL TARTRATE 37.5 MG: 25 TABLET, FILM COATED ORAL at 20:06

## 2024-11-25 RX ADMIN — AMLODIPINE BESYLATE 2.5 MG: 2.5 TABLET ORAL at 06:35

## 2024-11-25 RX ADMIN — AMIODARONE HYDROCHLORIDE 0.5 MG/MIN: 1.8 INJECTION, SOLUTION INTRAVENOUS at 19:43

## 2024-11-25 RX ADMIN — HEPARIN SODIUM 5000 UNITS: 5000 INJECTION, SOLUTION INTRAVENOUS; SUBCUTANEOUS at 21:43

## 2024-11-25 RX ADMIN — HEPARIN SODIUM 5000 UNITS: 5000 INJECTION, SOLUTION INTRAVENOUS; SUBCUTANEOUS at 13:59

## 2024-11-25 RX ADMIN — MAGNESIUM OXIDE TAB 400 MG (241.3 MG ELEMENTAL MG) 400 MG: 400 (241.3 MG) TAB at 05:56

## 2024-11-25 RX ADMIN — METOPROLOL TARTRATE 37.5 MG: 25 TABLET, FILM COATED ORAL at 08:15

## 2024-11-25 RX ADMIN — PIPERACILLIN AND TAZOBACTAM 3.38 G: 3; .375 INJECTION, POWDER, FOR SOLUTION INTRAVENOUS at 00:11

## 2024-11-25 RX ADMIN — OXYCODONE HYDROCHLORIDE 5 MG: 5 TABLET ORAL at 08:52

## 2024-11-25 ASSESSMENT — ACTIVITIES OF DAILY LIVING (ADL)
ADLS_ACUITY_SCORE: 0
ADLS_ACUITY_SCORE: 47
ADLS_ACUITY_SCORE: 0
ADLS_ACUITY_SCORE: 47
ADLS_ACUITY_SCORE: 44
ADLS_ACUITY_SCORE: 44
ADLS_ACUITY_SCORE: 0
ADLS_ACUITY_SCORE: 44
ADLS_ACUITY_SCORE: 0
ADLS_ACUITY_SCORE: 47
ADLS_ACUITY_SCORE: 47
ADLS_ACUITY_SCORE: 0
ADLS_ACUITY_SCORE: 44
ADLS_ACUITY_SCORE: 47
ADLS_ACUITY_SCORE: 47
ADLS_ACUITY_SCORE: 0

## 2024-11-25 NOTE — PLAN OF CARE
Goal Outcome Evaluation:      Plan of Care Reviewed With: patient, spouse    Overall Patient Progress: improvingOverall Patient Progress: improving     [facility] - ICU    RN Progress Note:            Pertinent Assessments:      Please refer to flowsheet rows for full assessment     Pt AOX4. Chest tubes with 90 ml output. Reports flank pain. Receiving ketorolac. on2L NC post ambulation.            Key Events - This Shift:       Afib RVR at 0937 and converted to SR at 1020.  Straight cath needed this AM. Able to urinate after.                 Barriers to Discharge / Downgrade:     Chest tube removal. Maintaining normal sinus rhythm.          Point of Contact Update: YES-OR-NO: Yes  If No, reason:   Name:  Phone Number:  Summary of Conversation:  at bedside.

## 2024-11-25 NOTE — PROGRESS NOTES
CLINICAL NUTRITION SERVICES - REASSESSMENT + EDUCATION NOTE     Nutrition Prescription    RECOMMENDATIONS FOR MDs/PROVIDERS TO ORDER:  Consider holding scheduled bowel meds with frequent loose stools     Malnutrition Status:    Patient does not meet two of the criteria necessary for diagnosing malnutrition    Recommendations already ordered by Registered Dietitian (RD):  Discontinue Glucerna per pt request     Future/Additional Recommendations:  Monitor po intake, weight, labs, I/Os.      EVALUATION OF THE PROGRESS TOWARD GOALS   Diet: Low Saturated Fat/2400 mg Sodium  Nutrition Supplement: Glucerna daily   Intake/Tolerance:     11/18-21 NPO/CL   11/21 x2 meals ordered, total 334 kcal 0 g protein   11/22 x1 meal ordered, total 220 kcal 10 g protein   11/23 x2 meals ordered, total 1006 kcal 49 g protein   11/24 x2 meals ordered, total 1360 kcal 45 g protein   %po intakes at meals not documented per nsg  Pt notes dislike Glucerna, not taking daily   Pt meeting <75% nutrition needs x7 days      NEW FINDINGS   Met with pt at bedside this afternoon. Pt reports improving po intakes and appetite, on cardiac diet. Pt reports some nausea this AM, notes skipping AM meal, PRN zofran given. Current weight up, likely r/t fluid. Pt reports hx of cardiac diet education, familiar with guidelines. Pt reports dislike Glucerna, requests RD to discontinue. Pt not interested in additional supplements at this time.   Pt with frequent loose/mucous stools. Scheduled miralax and senna given despite frequent loose stools.   FA     ANTHROPOMETRICS  Height: 0 cm (Data Unavailable)  Most Recent Weight: 116.7 kg (257 lb 3.2 oz)    IBW: 61.2 kg  BMI: Obesity Grade III BMI >40  Weight History: Current weight up   Wt Readings from Last 10 Encounters:   11/25/24 116.7 kg (257 lb 3.2 oz)   11/17/24 107.9 kg (237 lb 14 oz)   10/31/23 96.6 kg (213 lb)   03/24/23 95.7 kg (211 lb)   01/31/23 102.4 kg (225 lb 12.8 oz)   03/23/22 103.5 kg (228 lb 3.2 oz)    08/26/21 108.9 kg (240 lb)   01/15/21 108 kg (238 lb)   01/22/20 114.8 kg (253 lb)   06/19/19 112 kg (247 lb)      Dosing Weight: 73 kg, adjusted wt.     ASSESSED NUTRITION NEEDS  Estimated Energy Needs: 1825+ kcals/day (25 +kcals/kg)  Justification: Obese and Post-op  Estimated Protein Needs: 73-88 grams protein/day (1 - 1.2 grams of pro/kg)  Justification: CKD and Post-op  Estimated Fluid Needs: 1825+ mL/day (1 mL/kcal)   Justification: Maintenance    PHYSICAL FINDINGS  See malnutrition section below.  Per chart review  Trace edema arms, BLE  Chest tube, OP: 560 ml 11/24, 300 ml this AM   Surgical incisions   Sternum skin tear   I/Os: -5.5 L since admit  +UOP    GI CONCERNS  Per chart review  Obese abd   BM: x3 11/23, x4 11/24  Missing teeth    LABS  Labs reviewed  (H), AST 51(H)   -127 last 24 hrs     MEDICATIONS  Medications reviewed  Continuous amiodarone   Scheduled lipitor, plavix, lasix, noolog, isovue 370, glucophage xr, MVI, protonix, zosyn, miralax, neutra phos, senna    MALNUTRITION:  % Weight Loss:  None noted  % Intake:  <75% for > 7 days (moderate malnutrition)  Subcutaneous Fat Loss:  None observed  Muscle Loss:  None observed  Fluid Retention:  Trace edema arms, BLE    Malnutrition Diagnosis: Patient does not meet two of the criteria necessary for diagnosing malnutrition    CURRENT NUTRITION DIAGNOSIS  Inadequate oral intake related to acute illness and surgery as evidenced by intermittent NPO status at previous facility and NPO since surgery on 11/18.     Evaluation: Progressing, diet advanced     INTERVENTIONS  Implementation  Discontinue Glucerna per pt request   Encouraged adequate oral intakes kcal and protein, meals TID/snack, supplements     Consider holding scheduled bowel meds with frequent loose stools     Nutrition Education (Content):   A)  Provided handouts: Heart Healthy Nutrition therapy, heart healthy nutrition label reading, sodium free flavoring tips    B)  Discussed  handouts, briefly discussed heart healthy nutrition guidelines and recommendations. Pt reports adequate education hx.   Nutrition Education (Application):   A)  Discussed current eating habits and recommended alternative food choices  Anticipate good compliance  Diet Education - refer to Education Flowsheet    Goals  Diet start vs enteral nutrition in next 48 hrs - Met   Meet nutrition needs - Not Met, progressing   Electrolytes WNL - Met   BG  - Met     Monitoring/Evaluation  Progress toward goals will be monitored and evaluated per protocol.

## 2024-11-25 NOTE — PLAN OF CARE
[facility] - ICU    RN Progress Note:            Pertinent Assessments:      Please refer to flowsheet rows for full assessment     A/Ox4, follows commands and pleasant, BP appropriate, HR 60-90s NSR, pacer on standby @ bedside, pulses palpable, CMS intact bilaterally, LS dim, O2 weaned to 1L NC, BS+, no BM, stool culture pending           Key Events - This Shift:       - pt ambulated to and from chair to bed several times during the shift, ambulation improving to needing Ax1  - pt voided once during shift on bedside commode  - Prn oxy given x1,   - minimal output via CT   - electrolytes replaced                Barriers to Discharge / Downgrade:     Chest tubes                Problem: Adult Inpatient Plan of Care  Goal: Absence of Hospital-Acquired Illness or Injury  Intervention: Prevent and Manage VTE (Venous Thromboembolism) Risk  Recent Flowsheet Documentation  Taken 11/25/2024 0400 by Zhao Templeton RN  VTE Prevention/Management: SCDs off (sequential compression devices)  Taken 11/25/2024 0000 by Zhao Templeton RN  VTE Prevention/Management: SCDs off (sequential compression devices)  Taken 11/24/2024 2000 by Zhao Templeton RN  VTE Prevention/Management: SCDs on (sequential compression devices)     Problem: Adult Inpatient Plan of Care  Goal: Optimal Comfort and Wellbeing  Outcome: Progressing     Problem: Cardiovascular Surgery  Goal: Improved Activity Tolerance  Outcome: Progressing  Intervention: Optimize Tolerance for Activity  Recent Flowsheet Documentation  Taken 11/25/2024 0400 by Zhao Templeton, RN  Environmental Support: calm environment promoted  Taken 11/25/2024 0000 by Zhao Templeton, RN  Environmental Support: calm environment promoted  Taken 11/24/2024 2000 by Zhao Templeton RN  Environmental Support: calm environment promoted     Problem: Cardiovascular Surgery  Goal: Absence of Bleeding  Outcome: Progressing  Intervention: Monitor and Manage  Bleeding  Recent Flowsheet Documentation  Taken 11/25/2024 0400 by Zhao Templeton, RN  Bleeding Management: dressing monitored  Taken 11/25/2024 0000 by Zhao Templeton, RN  Bleeding Management: dressing monitored  Taken 11/24/2024 2000 by Zhao Templeton, RN  Bleeding Management: dressing monitored     Problem: Cardiovascular Surgery  Goal: Fluid and Electrolyte Balance  Outcome: Progressing     Problem: Cardiovascular Surgery  Goal: Blood Glucose Level Within Targeted Range  Outcome: Progressing

## 2024-11-25 NOTE — PROGRESS NOTES
CVTS Daily Progress Note   POD#7 s/p CABG x5 with LIMA to LAD, left radial artery to obtuse marginal, rSVG to RPDA, RPL, and diagonal, endoscopic harvest of left radial artery and right saphenous vein   Attending: Gorge  LOS: 7    SUBJECTIVE/INTERVAL EVENTS:    No acute events overnight. NSR. No a-fib for several days. HTN overnight. Alert and oriented. Maintaining oxygen saturation on NC. Pain well controlled. +BM. Tolerating diet without nausea. Adequate UOP.Cr 0.71. LFTs downtrending. Chest tube output appropriate. Hgb 8.5. Remains on empiric Zosyn with cultures NGTD. WBC 13.7. Patient denies new chest pain, SOB, calf pain, abdominal pain, nausea. Questions answered.        OBJECTIVE:  Temp:  [97.4  F (36.3  C)] 97.4  F (36.3  C)  Pulse:  [61-87] 86  BP: (102-161)/(60-96) 131/83  FiO2 (%):  [30 %-40 %] 30 %  SpO2:  [80 %-100 %] 93 %  Vitals:    11/20/24 0530 11/21/24 0548 11/23/24 0451 11/24/24 0600   Weight: 114.5 kg (252 lb 8 oz) 113.1 kg (249 lb 6.4 oz) 117.9 kg (259 lb 14.8 oz) 115.9 kg (255 lb 9.6 oz)    11/25/24 0400   Weight: 116.7 kg (257 lb 3.2 oz)       Clinically Significant Risk Factors         # Hyponatremia: Lowest Na = 133 mmol/L in last 2 days, will monitor as appropriate  # Hypochloremia: Lowest Cl = 97 mmol/L in last 2 days, will monitor as appropriate      # Hypoalbuminemia: Lowest albumin = 2.9 g/dL at 11/25/2024  4:19 AM, will monitor as appropriate    # Coagulation Defect: INR = 1.21 (Ref range: 0.85 - 1.15) and/or PTT = 35 Seconds (Ref range: 22 - 38 Seconds), will monitor for bleeding    # Hypertension: Noted on problem list     # Acute Hypercapnic Respiratory Failure: based on arterial blood gas results.  Continue supplemental oxygen and ventilatory support as indicated.  # Acute Hypercapnic Respiratory Failure: based on venous blood gas results.  Continue supplemental oxygen and ventilatory support as indicated.         # Severe Obesity: Estimated body mass index is 40.37 kg/m  as  "calculated from the following:    Height as of 11/7/24: 1.7 m (5' 6.93\").    Weight as of this encounter: 116.7 kg (257 lb 3.2 oz).        # Financial/Environmental Concerns:     # History of CABG: noted on surgical history              Current Medications:    Scheduled Meds:  Current Facility-Administered Medications   Medication Dose Route Frequency Provider Last Rate Last Admin    amLODIPine (NORVASC) tablet 2.5 mg  2.5 mg Oral Daily Ly Atwood PA-C   2.5 mg at 11/25/24 0635    aspirin (ASA) chewable tablet 81 mg  81 mg Oral or NG Tube Daily Ly Atwood PA-C   81 mg at 11/25/24 0814    Or    aspirin (ASA) Suppository 300 mg  300 mg Rectal Daily Ly Atwood PA-C   300 mg at 11/20/24 1409    atorvastatin (LIPITOR) tablet 80 mg  80 mg Oral At Bedtime Suzie Leiva PA-C   80 mg at 11/24/24 2055    clopidogrel (PLAVIX) tablet 75 mg  75 mg Oral Daily Ly Atwood PA-C   75 mg at 11/25/24 0837    furosemide (LASIX) injection 20 mg  20 mg Intravenous BID Ly Atwood PA-C   20 mg at 11/25/24 0814    heparin ANTICOAGULANT injection 5,000 Units  5,000 Units Subcutaneous Q8H Jasmin Lemons PA-C   5,000 Units at 11/25/24 0556    insulin aspart (NovoLOG) injection (RAPID ACTING)  1-7 Units Subcutaneous TID AC Ly Atwood PA-C   2 Units at 11/23/24 1238    insulin aspart (NovoLOG) injection (RAPID ACTING)  1-5 Units Subcutaneous At Bedtime Ly Atwood PA-C        iopamidol (ISOVUE-370) solution 117 mL  117 mL Intravenous Once Ly Atwood PA-C        And    sodium chloride 0.9 % bag for CT scan flush use  100 mL As instructed Once Ly Atwood PA-C        metFORMIN (GLUCOPHAGE XR) 24 hr tablet 500 mg  500 mg Oral Daily with supper Suzie Leiva PA-C   500 mg at 11/24/24 1627    metoprolol tartrate (LOPRESSOR) half-tab 12.5 mg  12.5 mg Oral TID Ly Atwood, DARRIN        metoprolol tartrate (LOPRESSOR) " half-tab 37.5 mg  37.5 mg Oral BID Ly Atwood PA-C   37.5 mg at 11/25/24 0815    multivitamin, therapeutic (THERA-VIT) tablet 1 tablet  1 tablet Oral Daily Mary Pennington MD   1 tablet at 11/25/24 0839    pantoprazole (PROTONIX) EC tablet 40 mg  40 mg Oral QAM AC Mary Pennington MD   40 mg at 11/25/24 0635    piperacillin-tazobactam (ZOSYN) 3.375 g vial to attach to  mL bag  3.375 g Intravenous Q8H Mike Garcia MD   3.375 g at 11/25/24 0839    polyethylene glycol (MIRALAX) Packet 17 g  17 g Oral Daily Jasmin Lemons PA-C   17 g at 11/25/24 0837    potassium & sodium phosphates (NEUTRA-PHOS) Packet 1 packet  1 packet Oral or Feeding Tube Q4H Mary Pennington MD   1 packet at 11/25/24 0556    senna-docusate (SENOKOT-S/PERICOLACE) 8.6-50 MG per tablet 1 tablet  1 tablet Oral BID Jasmin Lemons PA-C   1 tablet at 11/25/24 0839    sodium chloride (PF) 0.9% PF flush 10 mL  10 mL Intracatheter Q8H Suzie Leiva PA-C   10 mL at 11/25/24 0210    sodium chloride (PF) 0.9% PF flush 10 mL  10 mL Intracatheter Q8H Alaina Thomason RN   10 mL at 11/25/24 0601     Continuous Infusions:  Current Facility-Administered Medications   Medication Dose Route Frequency Provider Last Rate Last Admin     PRN Meds:.  Current Facility-Administered Medications   Medication Dose Route Frequency Provider Last Rate Last Admin    acetaminophen (TYLENOL) tablet 650 mg  650 mg Oral Q4H PRN Suzie Leiva PA-C        bisacodyl (DULCOLAX) suppository 10 mg  10 mg Rectal Daily PRN Jasmin Lemons PA-C        calcium gluconate 1 g in 50 mL in sodium chloride intermittent infusion  1 g Intravenous Once PRN Jasmin Lemons PA-C   1 g at 11/22/24 0449    calcium gluconate 2 g in  mL intermittent infusion  2 g Intravenous Once PRN Jasmin Lemons PA-C        calcium gluconate 3 g in sodium chloride 0.9 % 100 mL intermittent infusion  3 g Intravenous Once PRN Jasmin Lemons PA-C        glucose gel  15-30 g  15-30 g Oral Q15 Min PRN Jasmin Lemons PA-C        Or    dextrose 50 % injection 25-50 mL  25-50 mL Intravenous Q15 Min PRN Jasmin Lemons PA-C        Or    glucagon injection 1 mg  1 mg Subcutaneous Q15 Min PRN Jasmin Lemons PA-C        hydrALAZINE (APRESOLINE) injection 10 mg  10 mg Intravenous Q30 Min PRN Jasmin Lemons PA-C        lactated ringers BOLUS 250 mL  250 mL Intravenous Q15 Min PRN Jasmin Lemons PA-C 500 mL/hr at 11/18/24 1950 250 mL at 11/18/24 1950    lidocaine (LMX4) cream   Topical Q1H PRN Suzie Leiva PA-C        lidocaine 1 % 0.1-1 mL  0.1-1 mL Other Q1H PRN Suzie Leiva PA-C   1 mL at 11/24/24 1205    magnesium hydroxide (MILK OF MAGNESIA) suspension 30 mL  30 mL Oral Daily PRN Jasmin Lemons PA-C        naloxone (NARCAN) injection 0.2 mg  0.2 mg Intravenous Q2 Min PRN Jasmin Lemons PA-C        Or    naloxone (NARCAN) injection 0.4 mg  0.4 mg Intravenous Q2 Min PRN Jasmin Lemons PA-C        Or    naloxone (NARCAN) injection 0.2 mg  0.2 mg Intramuscular Q2 Min PRN Jasmin Lemons PA-C   0.2 mg at 11/20/24 0812    Or    naloxone (NARCAN) injection 0.4 mg  0.4 mg Intramuscular Q2 Min PRN Jasmin Lemons PA-C        ondansetron (ZOFRAN ODT) ODT tab 4 mg  4 mg Oral Q6H PRN Jasmin Lemons PA-C        Or    ondansetron (ZOFRAN) injection 4 mg  4 mg Intravenous Q6H PRN Jasmin Lemons PA-C   4 mg at 11/20/24 1635    oxyCODONE (ROXICODONE) tablet 5 mg  5 mg Oral Q4H PRN Jasmin Lemons PA-C   5 mg at 11/25/24 0852    Or    oxyCODONE (ROXICODONE) tablet 10 mg  10 mg Oral Q4H PRN Jasmin Lemons PA-C   10 mg at 11/24/24 0859    prochlorperazine (COMPAZINE) injection 5 mg  5 mg Intravenous Q6H PRN Jasmin Lemons PA-C        Or    prochlorperazine (COMPAZINE) tablet 5 mg  5 mg Oral Q6H PRN Jasmin Lemons PA-C        sodium chloride (PF) 0.9% PF flush 10-20 mL  10-20 mL Intracatheter q1  min prn Suzie Leiva PA-C   20 mL at 11/24/24 1256    sodium chloride (PF) 0.9% PF flush 10-20 mL  10-20 mL Intracatheter q1 min prn Alaina Thomason RN           Cardiographics:    Telemetry monitoring demonstrates NSR with rates in the 80s.    Imaging:  Results for orders placed or performed during the hospital encounter of 11/18/24   XR Chest Port 1 View    Impression    IMPRESSION: Endotracheal tube terminates approximately 5.0 cm above the osmin. Right neck central venous catheter sheath with tip in the mid SVC. Bilateral chest tubes, ascending mediastinal drain, and median sternotomy wires also noted.    The lungs are slightly hypoinflated, otherwise negative. No definite pleural effusion or pneumothorax.    Normal size cardiomediastinal silhouette.   XR Chest Port 1 View    Impression    IMPRESSION: Patient has been extubated. Sternotomy with mediastinal clips and markers. Bilateral chest tubes. No pneumothorax seen. Cardiomegaly. Mild pulmonary vascular prominence. Linear atelectasis left upper lung. No focal airspace consolidations.       Labs, personally reviewed.  Hemoglobin   Date Value Ref Range Status   11/25/2024 8.5 (L) 11.7 - 15.7 g/dL Final   11/24/2024 7.9 (L) 11.7 - 15.7 g/dL Final   11/23/2024 7.7 (L) 11.7 - 15.7 g/dL Final   06/08/2019 12.9 11.7 - 15.7 g/dL Final   06/07/2019 13.6 11.7 - 15.7 g/dL Final   10/14/2016 13.2 11.7 - 15.7 g/dL Final     WBC   Date Value Ref Range Status   06/08/2019 8.8 4.0 - 11.0 10e9/L Final   06/07/2019 12.9 (H) 4.0 - 11.0 10e9/L Final   10/14/2016 9.9 4.0 - 11.0 10e9/L Final     WBC Count   Date Value Ref Range Status   11/25/2024 13.7 (H) 4.0 - 11.0 10e3/uL Final   11/24/2024 13.3 (H) 4.0 - 11.0 10e3/uL Final   11/23/2024 12.7 (H) 4.0 - 11.0 10e3/uL Final     Platelet Count   Date Value Ref Range Status   11/25/2024 300 150 - 450 10e3/uL Final   11/24/2024 275 150 - 450 10e3/uL Final   11/23/2024 201 150 - 450 10e3/uL Final   06/08/2019 291 150 - 450 10e9/L  Final   06/07/2019 352 150 - 450 10e9/L Final   10/14/2016 297 150 - 450 10e9/L Final     Creatinine   Date Value Ref Range Status   11/25/2024 0.71 0.51 - 0.95 mg/dL Final   11/24/2024 0.82 0.51 - 0.95 mg/dL Final   11/24/2024 0.75 0.51 - 0.95 mg/dL Final   01/15/2021 0.81 0.52 - 1.04 mg/dL Final   06/08/2019 0.67 0.52 - 1.04 mg/dL Final   06/07/2019 0.73 0.52 - 1.04 mg/dL Final     Potassium   Date Value Ref Range Status   11/25/2024 3.6 3.4 - 5.3 mmol/L Final   11/24/2024 3.7 3.4 - 5.3 mmol/L Final   11/24/2024 3.7 3.4 - 5.3 mmol/L Final   01/31/2023 4.2 3.4 - 5.3 mmol/L Final   08/26/2021 3.9 3.4 - 5.3 mmol/L Final   01/15/2021 4.1 3.4 - 5.3 mmol/L Final   06/08/2019 4.0 3.4 - 5.3 mmol/L Final   06/07/2019 3.2 (L) 3.4 - 5.3 mmol/L Final     Potassium POCT   Date Value Ref Range Status   11/18/2024 3.4 3.4 - 5.3 mmol/L Final   11/18/2024 3.5 3.4 - 5.3 mmol/L Final   11/18/2024 4.1 3.4 - 5.3 mmol/L Final     Magnesium   Date Value Ref Range Status   11/25/2024 1.8 1.7 - 2.3 mg/dL Final   11/24/2024 1.8 1.7 - 2.3 mg/dL Final   11/23/2024 1.9 1.7 - 2.3 mg/dL Final   08/12/2011 2.2 1.6 - 2.3 mg/dL Final   05/06/2010 2.3 1.6 - 2.3 mg/dL Final   05/05/2010 2.3 1.6 - 2.3 mg/dL Final          I/O:  I/O last 3 completed shifts:  In: 1100 [P.O.:1100]  Out: 3020 [Urine:2220; Chest Tube:800]       Physical Exam:    General: Patient seen up in chair. NAD. Alert and oriented.  CV: NSR on monitor. 2+ peripheral pulses in all extremities. Moderate edema. Incision C/D/I.  Pulm: Non-labored effort on nasal cannula. Chest tubes in place, serosanguinous output, no air leak.  Abd: Soft, NT, ND  Ext: Mild pedal edema, SCDs in place, warm, distal pulses intact  Neuro: CNs grossly intact      ASSESSMENT/PLAN:    Karyn Gamez is a 69 year old female with a history of CAD who is s/p CABGx5.    Active Problems:    CAD (coronary artery disease)      NEURO:   - Scheduled Tylenol/lidocaine patches and PRN Tylenol /oxycodone for pain  - Had  not taken PTA Wellbutrin or Zoloft for several months--can reassess after hospital discharge.    CV:   - Pre-op EF 60-65%  - HTN overnight  - Dilt drip for AF, weaned off 12/23  - Amlodipine 2.5mg daily--do not hold (radial artery graft protection)  - Metoprolol 25mg BID increased to 37.5mg BID  - ASA 81mg daily (decreased dose due to Plavix)  - Plavix daily for one year per surgeon request   - Atorvastatin 80mg daily  - Chest tubes to remain today     PULM:   - Extubated on POD0  - Maintaining oxygen saturations on NC this AM  - Encourage pulmonary toilet    FEN/GI:  - Continue electrolyte replacement protocol  - Diet: Cardiac, ADAT  - Bowel regimen  - Transaminitis improving  - Stool culture NGTD; c-diff pending    RENAL:  - Adequate UOP/hr. Continue to monitor closely.  - Cr 0.71  - Daily BMP  - Diuresis with Lasix 20mg IV BID  - Dry weight 237 from [facility]. Current weight 255 lbs    HEME:  - Acute blood loss anemia post-op.   - No bleeding concerns. Hep SQ, ASA  - Hgb 8.5 with recheck daily  - 1u PRBC 11/19     ID:  - Lor op ppx complete, afebrile. No concerns for infection  - Empiric Vanc (ended 11/20) and Zosyn. Cultures 11/20 NGTD  - WBC remains slightly up  - Stool culture and c-diff pending    ENDO:   - SSI  - PTA Metformin restarted    PPx:   - DVT: SCDs, SQ heparin TID, ambulation   - GI: Protonix 40mg IV/PO daily    DISPO:   - Transfer to general telemetry status  - Medically Ready for Discharge: Anticipated in 2-4 Days        Patient discussed with Dr. Pennington.      _______  VERA GraceC  Cardiothoracic Surgery  [phone]

## 2024-11-25 NOTE — CONSULTS
11/25 Test claim for DOAC'S- Both Eliquis and Xarelto are covered $282 for 30 days supply. Patient is meeting a deductible and then once met the cost will be $47 for 30 days supply, first 30 day vouchers avail in the discharge pharmacy if needed. Thank you for allowing me to help with your patient  Laya Harvey [facility]  Pharmacy Discharge Liaison   [facility]

## 2024-11-26 ENCOUNTER — APPOINTMENT (OUTPATIENT)
Dept: OCCUPATIONAL THERAPY | Facility: HOSPITAL | Age: 69
End: 2024-11-26
Attending: STUDENT IN AN ORGANIZED HEALTH CARE EDUCATION/TRAINING PROGRAM
Payer: COMMERCIAL

## 2024-11-26 ENCOUNTER — APPOINTMENT (OUTPATIENT)
Dept: RADIOLOGY | Facility: HOSPITAL | Age: 69
End: 2024-11-26
Attending: PHYSICIAN ASSISTANT
Payer: COMMERCIAL

## 2024-11-26 LAB
ALBUMIN SERPL BCG-MCNC: 2.9 G/DL (ref 3.5–5.2)
ALP SERPL-CCNC: 82 U/L (ref 40–150)
ALT SERPL W P-5'-P-CCNC: 118 U/L (ref 0–50)
ANION GAP SERPL CALCULATED.3IONS-SCNC: 11 MMOL/L (ref 7–15)
AST SERPL W P-5'-P-CCNC: 51 U/L (ref 0–45)
BACTERIA STL CULT: NORMAL
BILIRUB DIRECT SERPL-MCNC: 0.2 MG/DL (ref 0–0.3)
BILIRUB SERPL-MCNC: 0.6 MG/DL
BUN SERPL-MCNC: 8.7 MG/DL (ref 8–23)
CA-I BLD-MCNC: 4.3 MG/DL (ref 4.4–5.2)
CA-I BLD-MCNC: 4.7 MG/DL (ref 4.4–5.2)
CALCIUM SERPL-MCNC: 8.2 MG/DL (ref 8.8–10.4)
CHLORIDE SERPL-SCNC: 97 MMOL/L (ref 98–107)
CREAT SERPL-MCNC: 0.86 MG/DL (ref 0.51–0.95)
EGFRCR SERPLBLD CKD-EPI 2021: 73 ML/MIN/1.73M2
ERYTHROCYTE [DISTWIDTH] IN BLOOD BY AUTOMATED COUNT: 15.1 % (ref 10–15)
GLUCOSE BLDC GLUCOMTR-MCNC: 102 MG/DL (ref 70–99)
GLUCOSE BLDC GLUCOMTR-MCNC: 110 MG/DL (ref 70–99)
GLUCOSE BLDC GLUCOMTR-MCNC: 112 MG/DL (ref 70–99)
GLUCOSE BLDC GLUCOMTR-MCNC: 114 MG/DL (ref 70–99)
GLUCOSE BLDC GLUCOMTR-MCNC: 99 MG/DL (ref 70–99)
GLUCOSE SERPL-MCNC: 93 MG/DL (ref 70–99)
HCO3 SERPL-SCNC: 26 MMOL/L (ref 22–29)
HCT VFR BLD AUTO: 25.2 % (ref 35–47)
HGB BLD-MCNC: 8.1 G/DL (ref 11.7–15.7)
HOLD SPECIMEN: NORMAL
INR PPP: 1.12 (ref 0.85–1.15)
MAGNESIUM SERPL-MCNC: 1.8 MG/DL (ref 1.7–2.3)
MCH RBC QN AUTO: 29.5 PG (ref 26.5–33)
MCHC RBC AUTO-ENTMCNC: 32.1 G/DL (ref 31.5–36.5)
MCV RBC AUTO: 92 FL (ref 78–100)
PHOSPHATE SERPL-MCNC: 3.4 MG/DL (ref 2.5–4.5)
PLATELET # BLD AUTO: 291 10E3/UL (ref 150–450)
POTASSIUM SERPL-SCNC: 4.2 MMOL/L (ref 3.4–5.3)
PROT SERPL-MCNC: 5.5 G/DL (ref 6.4–8.3)
RBC # BLD AUTO: 2.75 10E6/UL (ref 3.8–5.2)
SODIUM SERPL-SCNC: 134 MMOL/L (ref 135–145)
WBC # BLD AUTO: 13.9 10E3/UL (ref 4–11)

## 2024-11-26 PROCEDURE — 82330 ASSAY OF CALCIUM: CPT | Performed by: STUDENT IN AN ORGANIZED HEALTH CARE EDUCATION/TRAINING PROGRAM

## 2024-11-26 PROCEDURE — 85610 PROTHROMBIN TIME: CPT | Performed by: PHYSICIAN ASSISTANT

## 2024-11-26 PROCEDURE — 85048 AUTOMATED LEUKOCYTE COUNT: CPT | Performed by: PHYSICIAN ASSISTANT

## 2024-11-26 PROCEDURE — 210N000001 HC R&B IMCU HEART CARE

## 2024-11-26 PROCEDURE — 84100 ASSAY OF PHOSPHORUS: CPT | Performed by: STUDENT IN AN ORGANIZED HEALTH CARE EDUCATION/TRAINING PROGRAM

## 2024-11-26 PROCEDURE — 97110 THERAPEUTIC EXERCISES: CPT | Mod: GO

## 2024-11-26 PROCEDURE — 250N000013 HC RX MED GY IP 250 OP 250 PS 637: Performed by: STUDENT IN AN ORGANIZED HEALTH CARE EDUCATION/TRAINING PROGRAM

## 2024-11-26 PROCEDURE — 250N000011 HC RX IP 250 OP 636: Performed by: STUDENT IN AN ORGANIZED HEALTH CARE EDUCATION/TRAINING PROGRAM

## 2024-11-26 PROCEDURE — 36415 COLL VENOUS BLD VENIPUNCTURE: CPT | Performed by: PHYSICIAN ASSISTANT

## 2024-11-26 PROCEDURE — 80053 COMPREHEN METABOLIC PANEL: CPT | Performed by: PHYSICIAN ASSISTANT

## 2024-11-26 PROCEDURE — 250N000013 HC RX MED GY IP 250 OP 250 PS 637: Performed by: PHYSICIAN ASSISTANT

## 2024-11-26 PROCEDURE — 97535 SELF CARE MNGMENT TRAINING: CPT | Mod: GO

## 2024-11-26 PROCEDURE — 250N000011 HC RX IP 250 OP 636: Mod: JZ | Performed by: PHYSICIAN ASSISTANT

## 2024-11-26 PROCEDURE — 250N000011 HC RX IP 250 OP 636: Performed by: PHYSICIAN ASSISTANT

## 2024-11-26 PROCEDURE — 71046 X-RAY EXAM CHEST 2 VIEWS: CPT

## 2024-11-26 PROCEDURE — 94660 CPAP INITIATION&MGMT: CPT

## 2024-11-26 PROCEDURE — 83735 ASSAY OF MAGNESIUM: CPT | Performed by: PHYSICIAN ASSISTANT

## 2024-11-26 PROCEDURE — 999N000157 HC STATISTIC RCP TIME EA 10 MIN

## 2024-11-26 PROCEDURE — 82248 BILIRUBIN DIRECT: CPT | Performed by: PHYSICIAN ASSISTANT

## 2024-11-26 PROCEDURE — 36415 COLL VENOUS BLD VENIPUNCTURE: CPT | Performed by: STUDENT IN AN ORGANIZED HEALTH CARE EDUCATION/TRAINING PROGRAM

## 2024-11-26 PROCEDURE — 85014 HEMATOCRIT: CPT | Performed by: PHYSICIAN ASSISTANT

## 2024-11-26 RX ORDER — WARFARIN SODIUM 5 MG/1
5 TABLET ORAL
Status: COMPLETED | OUTPATIENT
Start: 2024-11-26 | End: 2024-11-26

## 2024-11-26 RX ORDER — METOPROLOL TARTRATE 25 MG/1
50 TABLET, FILM COATED ORAL 2 TIMES DAILY
Status: DISCONTINUED | OUTPATIENT
Start: 2024-11-26 | End: 2024-11-30

## 2024-11-26 RX ORDER — MAGNESIUM SULFATE HEPTAHYDRATE 40 MG/ML
2 INJECTION, SOLUTION INTRAVENOUS ONCE
Status: COMPLETED | OUTPATIENT
Start: 2024-11-26 | End: 2024-11-26

## 2024-11-26 RX ADMIN — METFORMIN ER 500 MG 500 MG: 500 TABLET ORAL at 17:53

## 2024-11-26 RX ADMIN — CALCIUM GLUCONATE 1 G: 20 INJECTION, SOLUTION INTRAVENOUS at 06:54

## 2024-11-26 RX ADMIN — OXYCODONE HYDROCHLORIDE 5 MG: 5 TABLET ORAL at 08:46

## 2024-11-26 RX ADMIN — AMLODIPINE BESYLATE 2.5 MG: 2.5 TABLET ORAL at 08:46

## 2024-11-26 RX ADMIN — POLYETHYLENE GLYCOL 3350 17 G: 17 POWDER, FOR SOLUTION ORAL at 08:48

## 2024-11-26 RX ADMIN — OXYCODONE HYDROCHLORIDE 5 MG: 5 TABLET ORAL at 17:52

## 2024-11-26 RX ADMIN — WARFARIN SODIUM 5 MG: 5 TABLET ORAL at 17:53

## 2024-11-26 RX ADMIN — ONDANSETRON 4 MG: 2 INJECTION INTRAMUSCULAR; INTRAVENOUS at 16:57

## 2024-11-26 RX ADMIN — PIPERACILLIN AND TAZOBACTAM 3.38 G: 3; .375 INJECTION, POWDER, FOR SOLUTION INTRAVENOUS at 00:56

## 2024-11-26 RX ADMIN — ONDANSETRON 4 MG: 2 INJECTION INTRAMUSCULAR; INTRAVENOUS at 00:56

## 2024-11-26 RX ADMIN — ATORVASTATIN CALCIUM 80 MG: 40 TABLET, FILM COATED ORAL at 20:35

## 2024-11-26 RX ADMIN — OXYCODONE HYDROCHLORIDE 5 MG: 5 TABLET ORAL at 13:07

## 2024-11-26 RX ADMIN — CLOPIDOGREL BISULFATE 75 MG: 75 TABLET ORAL at 08:46

## 2024-11-26 RX ADMIN — FUROSEMIDE 20 MG: 10 INJECTION, SOLUTION INTRAMUSCULAR; INTRAVENOUS at 08:47

## 2024-11-26 RX ADMIN — FUROSEMIDE 20 MG: 10 INJECTION, SOLUTION INTRAMUSCULAR; INTRAVENOUS at 13:03

## 2024-11-26 RX ADMIN — ASPIRIN 81 MG CHEWABLE TABLET 81 MG: 81 TABLET CHEWABLE at 08:46

## 2024-11-26 RX ADMIN — METOPROLOL TARTRATE 50 MG: 25 TABLET, FILM COATED ORAL at 08:46

## 2024-11-26 RX ADMIN — METOPROLOL TARTRATE 50 MG: 25 TABLET, FILM COATED ORAL at 20:35

## 2024-11-26 RX ADMIN — HEPARIN SODIUM 5000 UNITS: 5000 INJECTION, SOLUTION INTRAVENOUS; SUBCUTANEOUS at 05:00

## 2024-11-26 RX ADMIN — SENNOSIDES AND DOCUSATE SODIUM 1 TABLET: 8.6; 5 TABLET ORAL at 20:35

## 2024-11-26 RX ADMIN — PANTOPRAZOLE SODIUM 40 MG: 40 TABLET, DELAYED RELEASE ORAL at 08:46

## 2024-11-26 RX ADMIN — HEPARIN SODIUM 5000 UNITS: 5000 INJECTION, SOLUTION INTRAVENOUS; SUBCUTANEOUS at 22:10

## 2024-11-26 RX ADMIN — SENNOSIDES AND DOCUSATE SODIUM 1 TABLET: 8.6; 5 TABLET ORAL at 08:46

## 2024-11-26 RX ADMIN — THERA TABS 1 TABLET: TAB at 08:47

## 2024-11-26 RX ADMIN — MAGNESIUM SULFATE HEPTAHYDRATE 2 G: 40 INJECTION, SOLUTION INTRAVENOUS at 08:47

## 2024-11-26 RX ADMIN — HEPARIN SODIUM 5000 UNITS: 5000 INJECTION, SOLUTION INTRAVENOUS; SUBCUTANEOUS at 13:03

## 2024-11-26 RX ADMIN — OXYCODONE HYDROCHLORIDE 5 MG: 5 TABLET ORAL at 00:56

## 2024-11-26 ASSESSMENT — ACTIVITIES OF DAILY LIVING (ADL)
ADLS_ACUITY_SCORE: 44
ADLS_ACUITY_SCORE: 43
ADLS_ACUITY_SCORE: 44
ADLS_ACUITY_SCORE: 43
ADLS_ACUITY_SCORE: 44
ADLS_ACUITY_SCORE: 43
ADLS_ACUITY_SCORE: 44
ADLS_ACUITY_SCORE: 43

## 2024-11-26 NOTE — PROGRESS NOTES
CVTS Daily Progress Note   POD#8 s/p CABG x5 with LIMA to LAD, left radial artery to obtuse marginal, rSVG to RPDA, RPL, and diagonal, endoscopic harvest of left radial artery and right saphenous vein   Attending: Gorge  LOS: 8    SUBJECTIVE/INTERVAL EVENTS:    No acute events overnight. NSR now; did have an episose of a-fib with RVR yesterday. Normotensive. Alert and oriented. Maintaining oxygen saturation on NC. Pain well controlled. +BM. Tolerating diet without nausea. Adequate UOP. Cr 0.86. LFTs downtrending. Chest tube output appropriate. Hgb 8.1. Remains on empiric Zosyn with cultures NGTD. WBC 13.9 and stable. Patient denies new chest pain, SOB, calf pain, abdominal pain, nausea. Questions answered.        OBJECTIVE:  Temp:  [97  F (36.1  C)-98.2  F (36.8  C)] 98  F (36.7  C)  Pulse:  [] 74  Resp:  [20] 20  BP: ()/(56-96) 124/56  SpO2:  [85 %-99 %] 95 %  Vitals:    11/21/24 0548 11/23/24 0451 11/24/24 0600 11/25/24 0400   Weight: 113.1 kg (249 lb 6.4 oz) 117.9 kg (259 lb 14.8 oz) 115.9 kg (255 lb 9.6 oz) 116.7 kg (257 lb 3.2 oz)    11/26/24 0417   Weight: 116.5 kg (256 lb 12.8 oz)       Clinically Significant Risk Factors         # Hyponatremia: Lowest Na = 133 mmol/L in last 2 days, will monitor as appropriate  # Hypochloremia: Lowest Cl = 97 mmol/L in last 2 days, will monitor as appropriate  # Hypocalcemia: Lowest iCa = 4.3 mg/dL in last 2 days, will monitor and replace as appropriate     # Hypoalbuminemia: Lowest albumin = 2.9 g/dL at 11/26/2024  6:17 AM, will monitor as appropriate    # Coagulation Defect: INR = 1.21 (Ref range: 0.85 - 1.15) and/or PTT = 35 Seconds (Ref range: 22 - 38 Seconds), will monitor for bleeding    # Hypertension: Noted on problem list     # Acute Hypoxic Respiratory Failure: Documented O2 saturation < 90%. Continue supplemental oxygen as needed  # Acute Hypercapnic Respiratory Failure: based on arterial blood gas results.  Continue supplemental oxygen and ventilatory  "support as indicated.  # Acute Hypercapnic Respiratory Failure: based on venous blood gas results.  Continue supplemental oxygen and ventilatory support as indicated.         # Severe Obesity: Estimated body mass index is 40.31 kg/m  as calculated from the following:    Height as of 11/7/24: 1.7 m (5' 6.93\").    Weight as of this encounter: 116.5 kg (256 lb 12.8 oz).        # Financial/Environmental Concerns:     # History of CABG: noted on surgical history              Current Medications:    Scheduled Meds:  Current Facility-Administered Medications   Medication Dose Route Frequency Provider Last Rate Last Admin    amLODIPine (NORVASC) tablet 2.5 mg  2.5 mg Oral Daily Ly Atwood PA-C   2.5 mg at 11/26/24 0846    aspirin (ASA) chewable tablet 81 mg  81 mg Oral or NG Tube Daily Ly Atwood PA-C   81 mg at 11/26/24 0846    Or    aspirin (ASA) Suppository 300 mg  300 mg Rectal Daily Ly Atwood PA-C   300 mg at 11/20/24 1409    atorvastatin (LIPITOR) tablet 80 mg  80 mg Oral At Bedtime Ly Atwood PA-C   80 mg at 11/25/24 2009    clopidogrel (PLAVIX) tablet 75 mg  75 mg Oral Daily Ly Atwood PA-C   75 mg at 11/26/24 0846    furosemide (LASIX) injection 20 mg  20 mg Intravenous BID Ly Atwood PA-C   20 mg at 11/26/24 0847    heparin ANTICOAGULANT injection 5,000 Units  5,000 Units Subcutaneous Q8H Ly Atwood PA-C   5,000 Units at 11/26/24 0500    insulin aspart (NovoLOG) injection (RAPID ACTING)  1-7 Units Subcutaneous TID AC Ly Atwood PA-C   2 Units at 11/23/24 1238    insulin aspart (NovoLOG) injection (RAPID ACTING)  1-5 Units Subcutaneous At Bedtime Ly Atwood PA-C        iopamidol (ISOVUE-370) solution 117 mL  117 mL Intravenous Once Ly Atwood PA-C        And    sodium chloride 0.9 % bag for CT scan flush use  100 mL As instructed Once Ly Atwood PA-C        magnesium " sulfate 2 g in 50 mL sterile water intermittent infusion  2 g Intravenous Once Mary Pennington MD 50 mL/hr at 11/26/24 0847 2 g at 11/26/24 0847    metFORMIN (GLUCOPHAGE XR) 24 hr tablet 500 mg  500 mg Oral Daily with supper Ly Atwood PA-C   500 mg at 11/25/24 1730    metoprolol tartrate (LOPRESSOR) half-tab 12.5 mg  12.5 mg Oral TID Ly Atwood PA-C        metoprolol tartrate (LOPRESSOR) tablet 50 mg  50 mg Oral BID Ly Atwood PA-C   50 mg at 11/26/24 0846    multivitamin, therapeutic (THERA-VIT) tablet 1 tablet  1 tablet Oral Daily Ly Atwood PA-C   1 tablet at 11/26/24 0847    pantoprazole (PROTONIX) EC tablet 40 mg  40 mg Oral QAM AC Ly Atwood PA-C   40 mg at 11/26/24 0846    polyethylene glycol (MIRALAX) Packet 17 g  17 g Oral Daily Ly Atwood PA-C   17 g at 11/26/24 0848    senna-docusate (SENOKOT-S/PERICOLACE) 8.6-50 MG per tablet 1 tablet  1 tablet Oral BID Ly Atwood PA-C   1 tablet at 11/26/24 0846    sodium chloride (PF) 0.9% PF flush 10 mL  10 mL Intracatheter Q8H Ly Atwood PA-C   10 mL at 11/25/24 1102    sodium chloride (PF) 0.9% PF flush 10 mL  10 mL Intracatheter Q8H Ly Atwood PA-C   10 mL at 11/25/24 2143    Warfarin Dose Required Daily - Pharmacist Managed  1 each Oral See Admin Instructions Ly Atwood PA-C         Continuous Infusions:  Current Facility-Administered Medications   Medication Dose Route Frequency Provider Last Rate Last Admin     PRN Meds:.  Current Facility-Administered Medications   Medication Dose Route Frequency Provider Last Rate Last Admin    acetaminophen (TYLENOL) tablet 650 mg  650 mg Oral Q4H PRN Ly Atwood PA-C        bisacodyl (DULCOLAX) suppository 10 mg  10 mg Rectal Daily PRN Ly Atwood PA-C        calcium gluconate 1 g in 50 mL in sodium chloride intermittent infusion  1 g Intravenous Once PRN Ly Atwood  DARRIN Tovar   1 g at 11/26/24 0654    calcium gluconate 2 g in  mL intermittent infusion  2 g Intravenous Once PRN Ly Atwood PA-C        calcium gluconate 3 g in sodium chloride 0.9 % 100 mL intermittent infusion  3 g Intravenous Once PRN Ly Atwood PA-C        glucose gel 15-30 g  15-30 g Oral Q15 Min PRN Ly Atwood PA-C        Or    dextrose 50 % injection 25-50 mL  25-50 mL Intravenous Q15 Min PRN Ly Atwood PA-C        Or    glucagon injection 1 mg  1 mg Subcutaneous Q15 Min PRN Ly Atwood PA-C        hydrALAZINE (APRESOLINE) injection 10 mg  10 mg Intravenous Q30 Min PRN Ly Atwood PA-C        lactated ringers BOLUS 250 mL  250 mL Intravenous Q15 Min PRN Ly Atwood PA-C 500 mL/hr at 11/18/24 1950 250 mL at 11/18/24 1950    lidocaine (LMX4) cream   Topical Q1H PRN Ly Atwood PA-C        lidocaine 1 % 0.1-1 mL  0.1-1 mL Other Q1H PRN Ly Atwood PA-C   1 mL at 11/24/24 1205    magnesium hydroxide (MILK OF MAGNESIA) suspension 30 mL  30 mL Oral Daily PRN Ly Atwood PA-C        naloxone (NARCAN) injection 0.2 mg  0.2 mg Intravenous Q2 Min PRN Ly Atwood PA-C        Or    naloxone (NARCAN) injection 0.4 mg  0.4 mg Intravenous Q2 Min PRN Ly Atwood PA-C        Or    naloxone (NARCAN) injection 0.2 mg  0.2 mg Intramuscular Q2 Min PRN Ly Atwood PA-C   0.2 mg at 11/20/24 0812    Or    naloxone (NARCAN) injection 0.4 mg  0.4 mg Intramuscular Q2 Min PRN Ly Atwood PA-C        ondansetron (ZOFRAN ODT) ODT tab 4 mg  4 mg Oral Q6H PRN Ly Atwood PA-C        Or    ondansetron (ZOFRAN) injection 4 mg  4 mg Intravenous Q6H PRN Ly Atwood PA-C   4 mg at 11/26/24 0056    oxyCODONE (ROXICODONE) tablet 5 mg  5 mg Oral Q4H PRN Ly Atwood PA-C   5 mg at 11/26/24 0846    Or    oxyCODONE (ROXICODONE) tablet 10  mg  10 mg Oral Q4H PRN Ly Atwood PA-C   10 mg at 11/24/24 0859    prochlorperazine (COMPAZINE) injection 5 mg  5 mg Intravenous Q6H PRN Ly Atwood PA-C        Or    prochlorperazine (COMPAZINE) tablet 5 mg  5 mg Oral Q6H PRN Ly Atwood PA-C        sodium chloride (PF) 0.9% PF flush 10-20 mL  10-20 mL Intracatheter q1 min prn Ly Atwood PA-C   20 mL at 11/24/24 1256    sodium chloride (PF) 0.9% PF flush 10-20 mL  10-20 mL Intracatheter q1 min prn Ly Atwood PA-C           Cardiographics:    Telemetry monitoring demonstrates NSR with rates in the 60s.    Imaging:  Results for orders placed or performed during the hospital encounter of 11/18/24   XR Chest Port 1 View    Impression    IMPRESSION: Endotracheal tube terminates approximately 5.0 cm above the osmin. Right neck central venous catheter sheath with tip in the mid SVC. Bilateral chest tubes, ascending mediastinal drain, and median sternotomy wires also noted.    The lungs are slightly hypoinflated, otherwise negative. No definite pleural effusion or pneumothorax.    Normal size cardiomediastinal silhouette.   XR Chest Port 1 View    Impression    IMPRESSION: Patient has been extubated. Sternotomy with mediastinal clips and markers. Bilateral chest tubes. No pneumothorax seen. Cardiomegaly. Mild pulmonary vascular prominence. Linear atelectasis left upper lung. No focal airspace consolidations.       Labs, personally reviewed.  Hemoglobin   Date Value Ref Range Status   11/26/2024 8.1 (L) 11.7 - 15.7 g/dL Final   11/25/2024 8.5 (L) 11.7 - 15.7 g/dL Final   11/24/2024 7.9 (L) 11.7 - 15.7 g/dL Final   06/08/2019 12.9 11.7 - 15.7 g/dL Final   06/07/2019 13.6 11.7 - 15.7 g/dL Final   10/14/2016 13.2 11.7 - 15.7 g/dL Final     WBC   Date Value Ref Range Status   06/08/2019 8.8 4.0 - 11.0 10e9/L Final   06/07/2019 12.9 (H) 4.0 - 11.0 10e9/L Final   10/14/2016 9.9 4.0 - 11.0 10e9/L Final     WBC Count    Date Value Ref Range Status   11/26/2024 13.9 (H) 4.0 - 11.0 10e3/uL Final   11/25/2024 13.7 (H) 4.0 - 11.0 10e3/uL Final   11/24/2024 13.3 (H) 4.0 - 11.0 10e3/uL Final     Platelet Count   Date Value Ref Range Status   11/26/2024 291 150 - 450 10e3/uL Final   11/25/2024 300 150 - 450 10e3/uL Final   11/24/2024 275 150 - 450 10e3/uL Final   06/08/2019 291 150 - 450 10e9/L Final   06/07/2019 352 150 - 450 10e9/L Final   10/14/2016 297 150 - 450 10e9/L Final     Creatinine   Date Value Ref Range Status   11/26/2024 0.86 0.51 - 0.95 mg/dL Final   11/25/2024 0.71 0.51 - 0.95 mg/dL Final   11/24/2024 0.82 0.51 - 0.95 mg/dL Final   01/15/2021 0.81 0.52 - 1.04 mg/dL Final   06/08/2019 0.67 0.52 - 1.04 mg/dL Final   06/07/2019 0.73 0.52 - 1.04 mg/dL Final     Potassium   Date Value Ref Range Status   11/26/2024 4.2 3.4 - 5.3 mmol/L Final   11/25/2024 3.6 3.4 - 5.3 mmol/L Final   11/24/2024 3.7 3.4 - 5.3 mmol/L Final   01/31/2023 4.2 3.4 - 5.3 mmol/L Final   08/26/2021 3.9 3.4 - 5.3 mmol/L Final   01/15/2021 4.1 3.4 - 5.3 mmol/L Final   06/08/2019 4.0 3.4 - 5.3 mmol/L Final   06/07/2019 3.2 (L) 3.4 - 5.3 mmol/L Final     Potassium POCT   Date Value Ref Range Status   11/18/2024 3.4 3.4 - 5.3 mmol/L Final   11/18/2024 3.5 3.4 - 5.3 mmol/L Final   11/18/2024 4.1 3.4 - 5.3 mmol/L Final     Magnesium   Date Value Ref Range Status   11/26/2024 1.8 1.7 - 2.3 mg/dL Final   11/25/2024 1.8 1.7 - 2.3 mg/dL Final   11/24/2024 1.8 1.7 - 2.3 mg/dL Final   08/12/2011 2.2 1.6 - 2.3 mg/dL Final   05/06/2010 2.3 1.6 - 2.3 mg/dL Final   05/05/2010 2.3 1.6 - 2.3 mg/dL Final          I/O:  I/O last 3 completed shifts:  In: 2344.77 [P.O.:1660; I.V.:684.77]  Out: 3895 [Urine:3525; Chest Tube:370]       Physical Exam:    General: Patient seen up in chair. NAD. Alert and oriented.  CV: NSR on monitor. 2+ peripheral pulses in all extremities. Moderate edema. Incision C/D/I.  Pulm: Non-labored effort on nasal cannula. Chest tubes in place,  serosanguinous output, no air leak.  Abd: Soft, NT, ND  Ext: Mild pedal edema, SCDs in place, warm, distal pulses intact  Neuro: CNs grossly intact      ASSESSMENT/PLAN:    Karyn Gamez is a 69 year old female with a history of CAD who is s/p CABGx5.    Active Problems:    CAD (coronary artery disease)      NEURO:   - Scheduled Tylenol/lidocaine patches and PRN Tylenol /oxycodone for pain  - Had not taken PTA Wellbutrin or Zoloft for several months--can reassess after hospital discharge.    CV:   - Pre-op EF 60-65%  - Normotensive  - Amlodipine 2.5mg daily--do not hold (radial artery graft protection)  - Metoprolol 37.5mg BID increased to 50mg BID  - ASA 81mg daily (decreased dose due to Plavix)  - Plavix daily for one year per surgeon request (will stop when INR ~1.8)  - Anticoagulation with warfarin for a-fib to begin. INR goal 2-3.  - Atorvastatin 80mg daily  - Chest tubes likely to remain today     PULM:   - Extubated on POD0  - Maintaining oxygen saturations on NC this AM  - Encourage pulmonary toilet  - CXR pending today    FEN/GI:  - Continue electrolyte replacement protocol  - Diet: Cardiac, ADAT  - Bowel regimen  - Transaminitis improving  - Stool culture NGTD    RENAL:  - Adequate UOP/hr. Continue to monitor closely.  - Cr 0.86  - Daily BMP  - Diuresis with Lasix 20mg IV BID  - Dry weight [facility] from [facility]. Current weight 256 lbs    HEME:  - Acute blood loss anemia post-op.   - No bleeding concerns. Hep SQ, ASA  - Hgb 8.1 with recheck daily  - 1u PRBC 11/19     ID:  - Lor op ppx complete, afebrile. No concerns for infection  - Empiric Vanc (ended 11/20) and Zosyn (stopping today). Cultures 11/20 NGTD  - Stool culture NG; c-diff cancelled    ENDO:   - SSI  - PTA Metformin restarted    PPx:   - DVT: SCDs, SQ heparin TID/warfarin, ambulation   - GI: Protonix 40mg IV/PO daily    DISPO:   - General telemetry status  - Medically Ready for Discharge: Anticipated in 2-4 Days        Patient discussed with   Gorge.      _______  Ly Atwood PA-C  Cardiothoracic Surgery  [phone]

## 2024-11-26 NOTE — PLAN OF CARE
sc  Patient Name: Karyn Gamez   MRN: [id number]   Date of Admission: 11/18/2024    Procedure: Procedure(s):  CORONARY ARTERY BYPASS GRAFT TIMES FIVE, LEFT INTERNAL MAMMARY ARTERY HARVEST, RIGHT ENDOSCOPIC VESSEL PROCUREMENT,  ECHOCARDIOGRAM, TRANSESPOPHAGEAL, WITH ANESTHESIA,  LEFT RADIAL ARTERY HARVEST    Post Op day #: 8    Subjective (Patient focus/Primary Problem for shift):  Increasing activity and decreasing pain.          Pain Goal 0 Pain Rating 4           Pain Medication/ Regime effective to reduce patient pain Pt receiving PRN oxycodone and tylenol for pain control, which is effective.     Objective (Physical assessment):           Rhythm: normal sinus rhythm            Bowel Activity: yes if Yes indicate when: 11/26/2024          Bowel Medications: yes            Incision: healing well          Incentive Spirometry Q 1-2 hour when awake:  yes Volume: 1500          Epicardial Pacing Wires:  yes            Patient Activity:           Up to chair for meals: yes          Ambulation with RN x2 (Not including CR): yes           Shower Daily No, chest tubes in place.          Nasal  Nozin BID AM/PM x 10 days: yes              Chest Tubes   Pleural: yes Draining: yes               Suction: yes              Mediastinal: yes Draining: yes               Suction: yes   Dressing Change Daily:yes                      Urinary Catheter: no           Preventative WOC consult (need MD order): not applicable       Assessment (Nursing primary shift focus): Pt on 3L O2, using IS every hour while awake. LS clear, pt denies dyspnea, is breathing shallow due to comfort. Pt c/o 4/10 pain to flanks, PRN oxycodone given and effective for pain. Incisions and chest tube dressings changed this morning. Pt ambulating in room and on unit. VSS, pt wanting to increase activity to get better. Pt c/o nausea this evening PRN zofran given and effective for nausea. Pt c/o difficulty breathing, pt is on 3L O2 and satting well,  using IS and flutter valve as well. Pt requested to be placed on BiPAP, RT assisted with BiPAP.     Plan (Patient Care Plan/focus): Increasing activity and decreasing pain.       Yvrose Wagner RN   11/26/2024   11:41 AM

## 2024-11-26 NOTE — PLAN OF CARE
[facility] - ICU    RN Progress Note:    Pt is alert, oriented x 4, and able to make needs known. Vital signs stable. Room available on Unit P3. Pt transferred off of ICU for destination. Medications/belongings sent with Pt. David Olson RN          Pertinent Assessments:      Please refer to flowsheet rows for full assessment     Vital signs.            Key Events - This Shift:       Transfer to Unit P3                Barriers to Discharge / Downgrade:     None.             ,DirectAddress_Unknown ,[email or web address],DirectAddress_Unknown,DirectAddress_Unknown

## 2024-11-26 NOTE — PLAN OF CARE
Problem: Adult Inpatient Plan of Care  Goal: Optimal Comfort and Wellbeing  Outcome: Progressing     Problem: Cardiovascular Surgery  Goal: Optimal Coping with Heart Surgery  Outcome: Progressing     Problem: Cardiovascular Surgery  Goal: Blood Glucose Level Within Targeted Range  Outcome: Progressing   sc  Patient Name: Karyn Gamez   MRN: [id number]   Date of Admission: 11/18/2024    Procedure: Procedure(s):  CORONARY ARTERY BYPASS GRAFT TIMES FIVE, LEFT INTERNAL MAMMARY ARTERY HARVEST, RIGHT ENDOSCOPIC VESSEL PROCUREMENT,  ECHOCARDIOGRAM, TRANSESPOPHAGEAL, WITH ANESTHESIA,  LEFT RADIAL ARTERY HARVEST    Post Op day #:7    Subjective (Patient focus/Primary Problem for shift): Pain control and rest          Pain Goal 0 Pain Rating 3 at rest and 10 with movement           Pain Medication/ Regime effective to reduce patient pain PRN Oxyodone    Objective (Physical assessment):           Rhythm: normal sinus rhythm            Bowel Activity: yes if Yes indicate when: 11/24/24          Bowel Medications: yes            Incision: healing well          Incentive Spirometry Q 1-2 hour when awake:  not applicable Volume: N/A          Epicardial Pacing Wires:  yes            Patient Activity:           Up to chair for meals: yes          Ambulation with RN x2 (Not including CR): not applicable           Shower Daily N/A          Nasal  Nozin BID AM/PM x 10 days: Yes              Chest Tubes   Pleural: yes Draining: yes               Suction: yes              Mediastinal: not applicable Draining: not applicable               Suction: not applicable   Dressing Change Daily:yes If No, why? N/A                     Urinary Catheter: no           Preventative WOC consult (need MD order): no       Assessment (Nursing primary shift focus): Patient is A&Ox 4. She has no c/o numbness and tingling in any extremities. She is an assist of 1-2 for all cares and transfers. She is continent of bowel and bladder. VSS.   at HS. Heart remains in NSR throughout the shift. LS are diminished bilaterally. She remains on 3 LPM of continuous Oxygen via n/c when awake and on BiPAP overnight. Pt. denies all chest pain, dizziness, and nausea. BS active in A4Q. Skin intact with exception of bruising by graft sites. Right midline has IV Zosyn and Amio running at this time. Patient is lying in bed with call light in reach. Bed alarm on. Staff will continue to monitor.     Metoprolol not given, .    Plan (Patient Care Plan/focus): Use BiPAP overnight, manage pain, and rest      Ana Delgado RN   11/26/2024   2:09 AM

## 2024-11-26 NOTE — PROGRESS NOTES
Care Management Follow Up     Length of Stay (days): 8     Expected Discharge Date: 11/28/2024     Anticipated Discharge Plan:  transitional care      Transportation: TBD     PT Recommendations:    OT Recommendations:  Transitional Care Facility     Barriers to Discharge: medical stability, cardio, pulm, IV meds, post procedure care and monitoring     Prior Living Situation:  Patient lives in her town house with spouse. She is independent with ADLs/IADLs, ambulates without devices and has no services in community. Of note, spouse had a stroke a year ago and has some physical limitation for assisting patient. Spouse is primary family contact. Daughter Suyapa is supportive and involved.      Discussed  Partnership in Safe Discharge Planning  document with patient/family: No      Handoff Completed: Yes, [facility] PCP: Internal handoff referral completed     Patient/Spokesperson Updated: family 11/20     Additional Information:  9:46 AM   SW met with patient and spouse, Jazmyn, at bedside. SW introduce self and CM role. Discussed current recommendation for transitional care at discharge. Pt states understanding and agreement. Provided pt with list of facilities in the area and requested a minimum of 5 choices.     Next steps: Patient reviewing list and reports plan to notify SW of locations.       JONG Torres on 11/26/24

## 2024-11-26 NOTE — PHARMACY-CONSULT NOTE
Clinical Pharmacy - Warfarin Dosing Consult     Pharmacy has been consulted to manage this patient s warfarin therapy.  Indication: Atrial Fibrillation  Therapy Goal: INR 2-3  Provider/Team: CV surgery  OP Anticoag Clinic: None  Warfarin Prior to Admission: No  Warfarin PTA Regimen: N/A  Significant drug interactions: None  Recent documented change in oral intake/nutrition: No  Dose Comments: New start warfarin for AFIB. Baseline INR of 1.12 between age of 40-75 yrs. Patient did receive loading dose of amiodarone yesterday. Will give 5 mg of warfarin today.    INR   Date Value Ref Range Status   11/26/2024 1.12 0.85 - 1.15 Final   11/24/2024 1.21 (H) 0.85 - 1.15 Final       Recommend warfarin 5 mg today.  Pharmacy will monitor Karyn Gamez daily and order warfarin doses to achieve specified goal.      Please contact pharmacy as soon as possible if the warfarin needs to be held for a procedure or if the warfarin goals change.

## 2024-11-26 NOTE — PROGRESS NOTES
Patient has midline, unable to draw morning lab. Phlebotomist called over lindaera to draw patient this morning.

## 2024-11-26 NOTE — PLAN OF CARE
Problem: Adult Inpatient Plan of Care  Goal: Plan of Care Review  Description: The Plan of Care Review/Shift note should be completed every shift.  The Outcome Evaluation is a brief statement about your assessment that the patient is improving, declining, or no change.  This information will be displayed automatically on your shift  note.  Outcome: Progressing  Flowsheets (Taken 11/26/2024 2245)  Plan of Care Reviewed With: patient  Overall Patient Progress: improving   Goal Outcome Evaluation:      Plan of Care Reviewed With: patient    Overall Patient Progress: improvingOverall Patient Progress: improving       Pod 8 CABG X 5.  Chest tube present. Output charted every 2 hours. Patient denies chest pain. Just slight incisional pain.  Was in BIPAP most of the night .  Up in chair now. On 1 liter nc oxygen saturation in the mid 90's.  Sternal precautions.  Tele monitoring. NSR.  Potassium, magnesium and phosphorus protocol. Waiting for morning labs to result.  Patient is diabetic. AC and HS blood glucose. Spot check of blood glucose at 0200 was 110 mg/dl.

## 2024-11-27 ENCOUNTER — APPOINTMENT (OUTPATIENT)
Dept: OCCUPATIONAL THERAPY | Facility: HOSPITAL | Age: 69
End: 2024-11-27
Attending: STUDENT IN AN ORGANIZED HEALTH CARE EDUCATION/TRAINING PROGRAM
Payer: COMMERCIAL

## 2024-11-27 ENCOUNTER — HOSPITAL ENCOUNTER (INPATIENT)
Facility: CLINIC | Age: 69
End: 2024-11-27
Payer: COMMERCIAL

## 2024-11-27 LAB
ALBUMIN SERPL BCG-MCNC: 3.1 G/DL (ref 3.5–5.2)
ALP SERPL-CCNC: 78 U/L (ref 40–150)
ALT SERPL W P-5'-P-CCNC: 102 U/L (ref 0–50)
ANION GAP SERPL CALCULATED.3IONS-SCNC: 9 MMOL/L (ref 7–15)
AST SERPL W P-5'-P-CCNC: 47 U/L (ref 0–45)
BILIRUB DIRECT SERPL-MCNC: 0.24 MG/DL (ref 0–0.3)
BILIRUB SERPL-MCNC: 0.7 MG/DL
BUN SERPL-MCNC: 7.6 MG/DL (ref 8–23)
CALCIUM SERPL-MCNC: 8.4 MG/DL (ref 8.8–10.4)
CHLORIDE SERPL-SCNC: 100 MMOL/L (ref 98–107)
CREAT SERPL-MCNC: 0.87 MG/DL (ref 0.51–0.95)
EGFRCR SERPLBLD CKD-EPI 2021: 72 ML/MIN/1.73M2
ERYTHROCYTE [DISTWIDTH] IN BLOOD BY AUTOMATED COUNT: 15.2 % (ref 10–15)
GLUCOSE BLDC GLUCOMTR-MCNC: 125 MG/DL (ref 70–99)
GLUCOSE BLDC GLUCOMTR-MCNC: 92 MG/DL (ref 70–99)
GLUCOSE BLDC GLUCOMTR-MCNC: 92 MG/DL (ref 70–99)
GLUCOSE SERPL-MCNC: 120 MG/DL (ref 70–99)
HCO3 SERPL-SCNC: 29 MMOL/L (ref 22–29)
HCT VFR BLD AUTO: 23.5 % (ref 35–47)
HGB BLD-MCNC: 7.7 G/DL (ref 11.7–15.7)
INR PPP: 1.22 (ref 0.85–1.15)
MAGNESIUM SERPL-MCNC: 1.9 MG/DL (ref 1.7–2.3)
MCH RBC QN AUTO: 30 PG (ref 26.5–33)
MCHC RBC AUTO-ENTMCNC: 32.8 G/DL (ref 31.5–36.5)
MCV RBC AUTO: 91 FL (ref 78–100)
PHOSPHATE SERPL-MCNC: 3.7 MG/DL (ref 2.5–4.5)
PLATELET # BLD AUTO: 355 10E3/UL (ref 150–450)
POTASSIUM SERPL-SCNC: 3.7 MMOL/L (ref 3.4–5.3)
PROT SERPL-MCNC: 5.7 G/DL (ref 6.4–8.3)
RBC # BLD AUTO: 2.57 10E6/UL (ref 3.8–5.2)
SODIUM SERPL-SCNC: 138 MMOL/L (ref 135–145)
WBC # BLD AUTO: 11.2 10E3/UL (ref 4–11)

## 2024-11-27 PROCEDURE — 97535 SELF CARE MNGMENT TRAINING: CPT | Mod: GO

## 2024-11-27 PROCEDURE — 82565 ASSAY OF CREATININE: CPT | Performed by: PHYSICIAN ASSISTANT

## 2024-11-27 PROCEDURE — 250N000013 HC RX MED GY IP 250 OP 250 PS 637: Performed by: PHYSICIAN ASSISTANT

## 2024-11-27 PROCEDURE — 250N000011 HC RX IP 250 OP 636: Performed by: PHYSICIAN ASSISTANT

## 2024-11-27 PROCEDURE — 85014 HEMATOCRIT: CPT | Performed by: PHYSICIAN ASSISTANT

## 2024-11-27 PROCEDURE — 84075 ASSAY ALKALINE PHOSPHATASE: CPT | Performed by: PHYSICIAN ASSISTANT

## 2024-11-27 PROCEDURE — 82247 BILIRUBIN TOTAL: CPT | Performed by: PHYSICIAN ASSISTANT

## 2024-11-27 PROCEDURE — 210N000001 HC R&B IMCU HEART CARE

## 2024-11-27 PROCEDURE — 85610 PROTHROMBIN TIME: CPT | Performed by: PHYSICIAN ASSISTANT

## 2024-11-27 PROCEDURE — 97110 THERAPEUTIC EXERCISES: CPT | Mod: GO

## 2024-11-27 PROCEDURE — 83735 ASSAY OF MAGNESIUM: CPT | Performed by: STUDENT IN AN ORGANIZED HEALTH CARE EDUCATION/TRAINING PROGRAM

## 2024-11-27 PROCEDURE — 36415 COLL VENOUS BLD VENIPUNCTURE: CPT | Performed by: PHYSICIAN ASSISTANT

## 2024-11-27 PROCEDURE — 82374 ASSAY BLOOD CARBON DIOXIDE: CPT | Performed by: PHYSICIAN ASSISTANT

## 2024-11-27 PROCEDURE — 250N000013 HC RX MED GY IP 250 OP 250 PS 637: Performed by: STUDENT IN AN ORGANIZED HEALTH CARE EDUCATION/TRAINING PROGRAM

## 2024-11-27 PROCEDURE — 84100 ASSAY OF PHOSPHORUS: CPT | Performed by: STUDENT IN AN ORGANIZED HEALTH CARE EDUCATION/TRAINING PROGRAM

## 2024-11-27 RX ORDER — FUROSEMIDE 40 MG/1
40 TABLET ORAL
Status: DISCONTINUED | OUTPATIENT
Start: 2024-11-27 | End: 2024-11-30

## 2024-11-27 RX ORDER — WARFARIN SODIUM 2 MG/1
4 TABLET ORAL
Status: COMPLETED | OUTPATIENT
Start: 2024-11-27 | End: 2024-11-27

## 2024-11-27 RX ORDER — POTASSIUM CHLORIDE 1500 MG/1
20 TABLET, EXTENDED RELEASE ORAL ONCE
Status: COMPLETED | OUTPATIENT
Start: 2024-11-27 | End: 2024-11-27

## 2024-11-27 RX ORDER — MAGNESIUM OXIDE 400 MG/1
400 TABLET ORAL EVERY 4 HOURS
Status: COMPLETED | OUTPATIENT
Start: 2024-11-27 | End: 2024-11-27

## 2024-11-27 RX ADMIN — POTASSIUM CHLORIDE 20 MEQ: 1500 TABLET, EXTENDED RELEASE ORAL at 06:49

## 2024-11-27 RX ADMIN — FUROSEMIDE 40 MG: 40 TABLET ORAL at 13:16

## 2024-11-27 RX ADMIN — SENNOSIDES AND DOCUSATE SODIUM 1 TABLET: 8.6; 5 TABLET ORAL at 08:15

## 2024-11-27 RX ADMIN — HEPARIN SODIUM 5000 UNITS: 5000 INJECTION, SOLUTION INTRAVENOUS; SUBCUTANEOUS at 22:05

## 2024-11-27 RX ADMIN — METOPROLOL TARTRATE 50 MG: 25 TABLET, FILM COATED ORAL at 08:15

## 2024-11-27 RX ADMIN — HEPARIN SODIUM 5000 UNITS: 5000 INJECTION, SOLUTION INTRAVENOUS; SUBCUTANEOUS at 06:49

## 2024-11-27 RX ADMIN — CLOPIDOGREL BISULFATE 75 MG: 75 TABLET ORAL at 08:14

## 2024-11-27 RX ADMIN — FUROSEMIDE 20 MG: 10 INJECTION, SOLUTION INTRAMUSCULAR; INTRAVENOUS at 08:15

## 2024-11-27 RX ADMIN — METOPROLOL TARTRATE 50 MG: 25 TABLET, FILM COATED ORAL at 20:28

## 2024-11-27 RX ADMIN — MAGNESIUM OXIDE TAB 400 MG (241.3 MG ELEMENTAL MG) 400 MG: 400 (241.3 MG) TAB at 11:15

## 2024-11-27 RX ADMIN — ONDANSETRON 4 MG: 2 INJECTION INTRAMUSCULAR; INTRAVENOUS at 10:59

## 2024-11-27 RX ADMIN — ASPIRIN 81 MG CHEWABLE TABLET 81 MG: 81 TABLET CHEWABLE at 08:14

## 2024-11-27 RX ADMIN — THERA TABS 1 TABLET: TAB at 08:15

## 2024-11-27 RX ADMIN — POLYETHYLENE GLYCOL 3350 17 G: 17 POWDER, FOR SOLUTION ORAL at 08:14

## 2024-11-27 RX ADMIN — ACETAMINOPHEN 650 MG: 325 TABLET ORAL at 08:15

## 2024-11-27 RX ADMIN — HEPARIN SODIUM 5000 UNITS: 5000 INJECTION, SOLUTION INTRAVENOUS; SUBCUTANEOUS at 13:16

## 2024-11-27 RX ADMIN — PANTOPRAZOLE SODIUM 40 MG: 40 TABLET, DELAYED RELEASE ORAL at 06:48

## 2024-11-27 RX ADMIN — METFORMIN ER 500 MG 500 MG: 500 TABLET ORAL at 17:02

## 2024-11-27 RX ADMIN — AMLODIPINE BESYLATE 2.5 MG: 2.5 TABLET ORAL at 06:48

## 2024-11-27 RX ADMIN — MAGNESIUM OXIDE TAB 400 MG (241.3 MG ELEMENTAL MG) 400 MG: 400 (241.3 MG) TAB at 06:49

## 2024-11-27 RX ADMIN — ATORVASTATIN CALCIUM 80 MG: 40 TABLET, FILM COATED ORAL at 20:28

## 2024-11-27 RX ADMIN — WARFARIN SODIUM 4 MG: 2 TABLET ORAL at 17:03

## 2024-11-27 RX ADMIN — ACETAMINOPHEN 650 MG: 325 TABLET ORAL at 23:25

## 2024-11-27 RX ADMIN — SENNOSIDES AND DOCUSATE SODIUM 1 TABLET: 8.6; 5 TABLET ORAL at 20:28

## 2024-11-27 ASSESSMENT — ACTIVITIES OF DAILY LIVING (ADL)
ADLS_ACUITY_SCORE: 43

## 2024-11-27 NOTE — PROGRESS NOTES
sc  Patient Name: Karyn aGmez   MRN: [id number]   Date of Admission: 11/18/2024    Procedure: Procedure(s):  CORONARY ARTERY BYPASS GRAFT TIMES FIVE, LEFT INTERNAL MAMMARY ARTERY HARVEST, RIGHT ENDOSCOPIC VESSEL PROCUREMENT,  ECHOCARDIOGRAM, TRANSESPOPHAGEAL, WITH ANESTHESIA,  LEFT RADIAL ARTERY HARVEST    Post Op day #:9    Subjective (Patient focus/Primary Problem for shift): mobility, work of breathing and pain control           Pain Goal 0 Pain Rating 0           Pain Medication/ Regime effective to reduce patient pain tylenol     Objective (Physical assessment):           Rhythm: normal sinus rhythm            Bowel Activity: yes if Yes indicate when: 11/27          Bowel Medications: yes            Incision: healing well          Incentive Spirometry Q 1-2 hour when awake:  yes Volume: 1500          Epicardial Pacing Wires:  yes            Patient Activity:           Up to chair for meals: yes          Ambulation with RN x2 (Not including CR): yes           Shower Daily NA          Nasal  Nozin BID AM/PM x 10 days: yes              Chest Tubes   Pleural: no Draining: not applicable               Suction: not applicable              Mediastinal: yes Draining: yes               Suction: yes   Dressing Change Daily:yes If No, why?                      Urinary Catheter: no           Preventative WOC consult (need MD order): no       Assessment (Nursing primary shift focus): Pt reports no pain this shift. VSS on 3 L O2 nasal cannula. Assist of 1 up to the bathroom/chair. NSR on tele, jam overnight. K Mg and Phos protocols ran. Recheck tomorrow.   Only 40cc out from chest tubes overnight. Up to the chair for the morning.     Plan (Patient Care Plan/focus): work on tolerating mobility with cardiac rehab.       Linda Nichols RN   11/27/2024   6:40 AM

## 2024-11-27 NOTE — PROGRESS NOTES
CVTS Daily Progress Note   POD#9 s/p CABG x5 with LIMA to LAD, left radial artery to obtuse marginal, rSVG to RPDA, RPL, and diagonal, endoscopic harvest of left radial artery and right saphenous vein   Attending: Gorge  LOS: 9    SUBJECTIVE/INTERVAL EVENTS:    No acute events overnight. NSR. Normotensive. Alert and oriented. Maintaining oxygen saturation on NC. Pain well controlled. +BM. Tolerating diet without nausea. Adequate UOP. Cr 0.87. LFTs continue to downtrend. Chest tube output appropriate and finally mininimal. Hgb 7.7 (8.1). WBC 11.2 and stable. Patient denies new chest pain, SOB, calf pain, abdominal pain, nausea. Questions answered.        OBJECTIVE:  Temp:  [97.6  F (36.4  C)-98.6  F (37  C)] 98.1  F (36.7  C)  Pulse:  [56-68] 68  Resp:  [18-20] 18  BP: ()/(54-83) 102/55  SpO2:  [90 %-99 %] 99 %  Vitals:    11/23/24 0451 11/24/24 0600 11/25/24 0400 11/26/24 0417   Weight: 117.9 kg (259 lb 14.8 oz) 115.9 kg (255 lb 9.6 oz) 116.7 kg (257 lb 3.2 oz) 116.5 kg (256 lb 12.8 oz)    11/27/24 0532   Weight: 116.2 kg (256 lb 3.2 oz)       Clinically Significant Risk Factors         # Hyponatremia: Lowest Na = 134 mmol/L in last 2 days, will monitor as appropriate  # Hypochloremia: Lowest Cl = 97 mmol/L in last 2 days, will monitor as appropriate  # Hypocalcemia: Lowest iCa = 4.3 mg/dL in last 2 days, will monitor and replace as appropriate     # Hypoalbuminemia: Lowest albumin = 2.9 g/dL at 11/26/2024  6:17 AM, will monitor as appropriate       # Hypertension: Noted on problem list     # Acute Hypercapnic Respiratory Failure: based on arterial blood gas results.  Continue supplemental oxygen and ventilatory support as indicated.  # Acute Hypercapnic Respiratory Failure: based on venous blood gas results.  Continue supplemental oxygen and ventilatory support as indicated.         # Severe Obesity: Estimated body mass index is 40.21 kg/m  as calculated from the following:    Height as of 11/7/24: 1.7 m (5'  "6.93\").    Weight as of this encounter: 116.2 kg (256 lb 3.2 oz).        # Financial/Environmental Concerns:     # History of CABG: noted on surgical history              Current Medications:    Scheduled Meds:  Current Facility-Administered Medications   Medication Dose Route Frequency Provider Last Rate Last Admin    amLODIPine (NORVASC) tablet 2.5 mg  2.5 mg Oral Daily Ly Atwood PA-C   2.5 mg at 11/27/24 0648    aspirin (ASA) chewable tablet 81 mg  81 mg Oral or NG Tube Daily Ly Atwood PA-C   81 mg at 11/27/24 0814    Or    aspirin (ASA) Suppository 300 mg  300 mg Rectal Daily Ly Atwood PA-C   300 mg at 11/20/24 1409    atorvastatin (LIPITOR) tablet 80 mg  80 mg Oral At Bedtime Ly Atwood PA-C   80 mg at 11/26/24 2035    clopidogrel (PLAVIX) tablet 75 mg  75 mg Oral Daily Ly Atwood PA-C   75 mg at 11/27/24 0814    furosemide (LASIX) injection 20 mg  20 mg Intravenous BID Ly Atwood PA-C   20 mg at 11/27/24 0815    heparin ANTICOAGULANT injection 5,000 Units  5,000 Units Subcutaneous Q8H Ly Atwood PA-C   5,000 Units at 11/27/24 0649    insulin aspart (NovoLOG) injection (RAPID ACTING)  1-7 Units Subcutaneous TID AC Ly Atwood PA-C   2 Units at 11/23/24 1238    insulin aspart (NovoLOG) injection (RAPID ACTING)  1-5 Units Subcutaneous At Bedtime Ly Atwood PA-C        iopamidol (ISOVUE-370) solution 117 mL  117 mL Intravenous Once Ly Atwood PA-C        And    sodium chloride 0.9 % bag for CT scan flush use  100 mL As instructed Once Ly Atwood PA-C        magnesium oxide (MAG-OX) tablet 400 mg  400 mg Oral Q4H Mary Pennington MD   400 mg at 11/27/24 0649    metFORMIN (GLUCOPHAGE XR) 24 hr tablet 500 mg  500 mg Oral Daily with supper Ly Atwood La, PA-C   500 mg at 11/26/24 9605    metoprolol tartrate (LOPRESSOR) half-tab 12.5 mg  12.5 mg Oral TID Ly Atwood " DARRIN Tovar        metoprolol tartrate (LOPRESSOR) tablet 50 mg  50 mg Oral BID Ly Atwood PA-C   50 mg at 11/27/24 0815    multivitamin, therapeutic (THERA-VIT) tablet 1 tablet  1 tablet Oral Daily Ly Atwood PA-C   1 tablet at 11/27/24 0815    pantoprazole (PROTONIX) EC tablet 40 mg  40 mg Oral QAM AC Ly Atwood PA-C   40 mg at 11/27/24 0648    polyethylene glycol (MIRALAX) Packet 17 g  17 g Oral Daily Ly Atwood PA-C   17 g at 11/27/24 0814    senna-docusate (SENOKOT-S/PERICOLACE) 8.6-50 MG per tablet 1 tablet  1 tablet Oral BID Ly Atwood PA-C   1 tablet at 11/27/24 0815    sodium chloride (PF) 0.9% PF flush 10 mL  10 mL Intracatheter Q8H Ly Atwood PA-C   10 mL at 11/27/24 0816    sodium chloride (PF) 0.9% PF flush 10 mL  10 mL Intracatheter Q8H Ly Atwood PA-C   10 mL at 11/26/24 1305    warfarin ANTICOAGULANT (COUMADIN) tablet 4 mg  4 mg Oral ONCE at 18:00 WattMary MD        Warfarin Dose Required Daily - Pharmacist Managed  1 each Oral See Admin Instructions Ly Atwood PA-C         Continuous Infusions:  Current Facility-Administered Medications   Medication Dose Route Frequency Provider Last Rate Last Admin     PRN Meds:.  Current Facility-Administered Medications   Medication Dose Route Frequency Provider Last Rate Last Admin    acetaminophen (TYLENOL) tablet 650 mg  650 mg Oral Q4H PRN Ly Atwood PA-C   650 mg at 11/27/24 0815    bisacodyl (DULCOLAX) suppository 10 mg  10 mg Rectal Daily PRN Ly Atwood PA-C        glucose gel 15-30 g  15-30 g Oral Q15 Min PRN Ly Atwood PA-C        Or    dextrose 50 % injection 25-50 mL  25-50 mL Intravenous Q15 Min PRN Ly Atwood PA-C        Or    glucagon injection 1 mg  1 mg Subcutaneous Q15 Min PRN Ly Atwood PA-C        hydrALAZINE (APRESOLINE) injection 10 mg  10 mg Intravenous Q30 Min PRN  Ly Atwood PA-C        lactated ringers BOLUS 250 mL  250 mL Intravenous Q15 Min PRN Ly Atwood PA-C 500 mL/hr at 11/18/24 1950 250 mL at 11/18/24 1950    lidocaine (LMX4) cream   Topical Q1H PRN Ly Atwood PA-C        lidocaine 1 % 0.1-1 mL  0.1-1 mL Other Q1H PRN Ly Atwood PA-C   1 mL at 11/24/24 1205    magnesium hydroxide (MILK OF MAGNESIA) suspension 30 mL  30 mL Oral Daily PRN Ly Atwood PA-C        naloxone (NARCAN) injection 0.2 mg  0.2 mg Intravenous Q2 Min PRN Ly Atwood PA-C        Or    naloxone (NARCAN) injection 0.4 mg  0.4 mg Intravenous Q2 Min PRN Ly Atwood PA-C        Or    naloxone (NARCAN) injection 0.2 mg  0.2 mg Intramuscular Q2 Min PRN Ly Atwood PA-C   0.2 mg at 11/20/24 0812    Or    naloxone (NARCAN) injection 0.4 mg  0.4 mg Intramuscular Q2 Min PRN Ly Atwood PA-C        ondansetron (ZOFRAN ODT) ODT tab 4 mg  4 mg Oral Q6H PRN Ly Atwood PA-C        Or    ondansetron (ZOFRAN) injection 4 mg  4 mg Intravenous Q6H PRN Ly Atwood PA-C   4 mg at 11/26/24 1657    oxyCODONE (ROXICODONE) tablet 5 mg  5 mg Oral Q4H PRN Ly Atwood PA-C   5 mg at 11/26/24 1752    prochlorperazine (COMPAZINE) injection 5 mg  5 mg Intravenous Q6H PRN Ly Atwood PA-C        Or    prochlorperazine (COMPAZINE) tablet 5 mg  5 mg Oral Q6H PRN Ly Atwood PA-C        sodium chloride (PF) 0.9% PF flush 10-20 mL  10-20 mL Intracatheter q1 min prn Ly Atwood PA-C   20 mL at 11/24/24 1256    sodium chloride (PF) 0.9% PF flush 10-20 mL  10-20 mL Intracatheter q1 min prn Ly Atwood PA-C           Cardiographics:    Telemetry monitoring demonstrates NSR with rates in the 60s.    Imaging:  Results for orders placed or performed during the hospital encounter of 11/18/24   XR Chest Port 1 View    Impression    IMPRESSION:  Endotracheal tube terminates approximately 5.0 cm above the osmin. Right neck central venous catheter sheath with tip in the mid SVC. Bilateral chest tubes, ascending mediastinal drain, and median sternotomy wires also noted.    The lungs are slightly hypoinflated, otherwise negative. No definite pleural effusion or pneumothorax.    Normal size cardiomediastinal silhouette.   XR Chest Port 1 View    Impression    IMPRESSION: Patient has been extubated. Sternotomy with mediastinal clips and markers. Bilateral chest tubes. No pneumothorax seen. Cardiomegaly. Mild pulmonary vascular prominence. Linear atelectasis left upper lung. No focal airspace consolidations.       Labs, personally reviewed.  Hemoglobin   Date Value Ref Range Status   11/27/2024 7.7 (L) 11.7 - 15.7 g/dL Final   11/26/2024 8.1 (L) 11.7 - 15.7 g/dL Final   11/25/2024 8.5 (L) 11.7 - 15.7 g/dL Final   06/08/2019 12.9 11.7 - 15.7 g/dL Final   06/07/2019 13.6 11.7 - 15.7 g/dL Final   10/14/2016 13.2 11.7 - 15.7 g/dL Final     WBC   Date Value Ref Range Status   06/08/2019 8.8 4.0 - 11.0 10e9/L Final   06/07/2019 12.9 (H) 4.0 - 11.0 10e9/L Final   10/14/2016 9.9 4.0 - 11.0 10e9/L Final     WBC Count   Date Value Ref Range Status   11/27/2024 11.2 (H) 4.0 - 11.0 10e3/uL Final   11/26/2024 13.9 (H) 4.0 - 11.0 10e3/uL Final   11/25/2024 13.7 (H) 4.0 - 11.0 10e3/uL Final     Platelet Count   Date Value Ref Range Status   11/27/2024 355 150 - 450 10e3/uL Final   11/26/2024 291 150 - 450 10e3/uL Final   11/25/2024 300 150 - 450 10e3/uL Final   06/08/2019 291 150 - 450 10e9/L Final   06/07/2019 352 150 - 450 10e9/L Final   10/14/2016 297 150 - 450 10e9/L Final     Creatinine   Date Value Ref Range Status   11/27/2024 0.87 0.51 - 0.95 mg/dL Final   11/26/2024 0.86 0.51 - 0.95 mg/dL Final   11/25/2024 0.71 0.51 - 0.95 mg/dL Final   01/15/2021 0.81 0.52 - 1.04 mg/dL Final   06/08/2019 0.67 0.52 - 1.04 mg/dL Final   06/07/2019 0.73 0.52 - 1.04 mg/dL Final      Potassium   Date Value Ref Range Status   11/27/2024 3.7 3.4 - 5.3 mmol/L Final   11/26/2024 4.2 3.4 - 5.3 mmol/L Final   11/25/2024 3.6 3.4 - 5.3 mmol/L Final   01/31/2023 4.2 3.4 - 5.3 mmol/L Final   08/26/2021 3.9 3.4 - 5.3 mmol/L Final   01/15/2021 4.1 3.4 - 5.3 mmol/L Final   06/08/2019 4.0 3.4 - 5.3 mmol/L Final   06/07/2019 3.2 (L) 3.4 - 5.3 mmol/L Final     Potassium POCT   Date Value Ref Range Status   11/18/2024 3.4 3.4 - 5.3 mmol/L Final   11/18/2024 3.5 3.4 - 5.3 mmol/L Final   11/18/2024 4.1 3.4 - 5.3 mmol/L Final     Magnesium   Date Value Ref Range Status   11/27/2024 1.9 1.7 - 2.3 mg/dL Final   11/26/2024 1.8 1.7 - 2.3 mg/dL Final   11/25/2024 1.8 1.7 - 2.3 mg/dL Final   08/12/2011 2.2 1.6 - 2.3 mg/dL Final   05/06/2010 2.3 1.6 - 2.3 mg/dL Final   05/05/2010 2.3 1.6 - 2.3 mg/dL Final          I/O:  I/O last 3 completed shifts:  In: 950 [P.O.:950]  Out: 3810 [Urine:3600; Chest Tube:210]       Physical Exam:    General: Patient seen up in chair. NAD. Alert and oriented.  CV: NSR on monitor. 2+ peripheral pulses in all extremities. Moderate edema. Incision C/D/I.  Pulm: Non-labored effort on nasal cannula. Chest tubes in place, serosanguinous output, no air leak.  Abd: Soft, NT, ND  Ext: Mild pedal edema, SCDs in place, warm, distal pulses intact  Neuro: CNs grossly intact      ASSESSMENT/PLAN:    Karyn Gamez is a 69 year old female with a history of CAD who is s/p CABGx5.    Active Problems:    CAD (coronary artery disease)      NEURO:   - Scheduled Tylenol/lidocaine patches and PRN Tylenol /oxycodone for pain  - Had not taken PTA Wellbutrin or Zoloft for several months--can reassess after hospital discharge.    CV:   - Pre-op EF 60-65%  - Normotensive  - Amlodipine 2.5mg daily--do not hold (radial artery graft protection)  - Metoprolol 350mg BID  - ASA 81mg daily (decreased dose due to Plavix)  - Plavix daily for one year per surgeon request (stop when INR ~1.8)  - Anticoagulation with  warfarin for a-fib. INR goal 2-3. INR today 1.22.  - Atorvastatin 80mg daily  - Chest tubes and TPW removed this AM    PULM:   - Extubated on POD0  - Maintaining oxygen saturations on NC   - Encourage pulmonary toilet  - CXR 11/26 WNL    FEN/GI:  - Continue electrolyte replacement protocol  - Diet: Cardiac, ADAT  - Bowel regimen  - Transaminitis improving  - Stool culture NGTD    RENAL:  - Adequate UOP/hr. Continue to monitor closely.  - Cr 0.86  - Daily BMP  - Diuresis with Lasix 20mg IV BID  - Dry weight 237 from [facility]. Current weight 256 lbs    HEME:  - Acute blood loss anemia post-op.   - No bleeding concerns. Hep SQ, ASA  - Hgb 7.7 with recheck daily  - 1u PRBC 11/19     ID:  - Lor op ppx complete, afebrile. No concerns for infection  - Empiric Vanc (ended 11/20) and Zosyn (ended 11/26). Cultures 11/20 NGTD  - Stool culture NG; c-diff cancelled    ENDO:   - SSI  - PTA Metformin restarted    PPx:   - DVT: SCDs, SQ heparin TID/warfarin, ambulation   - GI: Protonix 40mg IV/PO daily    DISPO:   - General telemetry status  - OT recs: TCU.  - Medically Ready for Discharge: Anticipated in 2-4 Days; awaiting facility choices per care management.        Patient discussed with Dr. Pennington.      _______  VERA GraceC  Cardiothoracic Surgery  [phone]

## 2024-11-27 NOTE — PROGRESS NOTES
"Care Management Follow Up    Length of Stay (days): 9    Expected Discharge Date: 11/29/2024    Anticipated Discharge Plan: Acute Rehab      Transportation: TBD    PT Recommendations:    OT Recommendations:  (S) Acute Rehab Center-Motivated patient will benefit from intensive, interdisciplinary therapy.  Anticipate will be able to tolerate 3 hours of therapy per day     Barriers to Discharge: medical stability, placement, IV pain meds    Prior Living Situation: \"Patient lives in her town house with spouse. She is independent with ADLs/IADLs, ambulates without devices and has no services in community. Of note, spouse had a stroke a year ago and has some physical limitation for assisting patient. Spouse is primary family contact. Daughter Suyapa is supportive and involved.\"     Discussed  Partnership in Safe Discharge Planning  document with patient/family: No     Handoff Completed: Yes, [facility] PCP: Internal handoff referral completed    Patient/Spokesperson Updated: Yes. Who? Patient and Spouse    Additional Information:  Therapy is recommending TCU at this time. CM provided Pt with a TCU list to review for choices yesterday (11/26). CM will follow-up with Pt for TCU preferences today.     10:13 AM  SW met with Pt and discussed TCU preferences for CM to send referrals. Pt requested for CM to send TCU referrals to [facility], [facility], [facility], [facility], and [facility]. Pt was understanding and agreeable to the additional private room fees charged at some of the facilities she requested.     11:08 AM  SW met with Pt and discussed therapy's updated recommendations for acute rehab at discharge. SW provided Pt with a list of acute rehab centers in the [facility] [place]. Pt was agreeable with CM sending a referral to [facility] for placement. Referral sent.    11:48 AM  SW received a message from [facility] Admissions Liaison Tevin who reported that she reached out to OT for additional " assessments of Pt's ADLs given that PT is not following. Tevin also reported that Pt will need to be stable off the IV Lasix and IV Zofran for at least 24 hours before they can admit.     [facility] will not have anyone working Admissions tomorrow (11/28) due to the Thanksgiving Holiday but will return on Friday (11/29).    1:16 PM  SW stopped by Pt's room and updated her that AR is reviewing her referral but CM would not have an update until Friday due to admissions not working on Thanksgiving. Pt reported understanding and thanked CM for the update.     Next Steps: CM to follow-up with [facility] Admissions on Friday (11/29) to see if they can accept Pt for acute rehab placement.       JONG Dickerson

## 2024-11-27 NOTE — PROGRESS NOTES
CLINICAL NUTRITION SERVICES - BRIEF NOTE    Diet: Low Saturated Fat/2400 mg Sodium  Nutrition Support: none  Intake: 100% of 2 meals yesterday (857 kcals, 40 gm protein) and 100% of breakfast (549 kcals, 22 gm protein) today per flowsheets       NEW FINDINGS   Per previous RD note no longer wanted Glucerna    Noted issues with nausea yesterday    Date/Time Weight Drug Calculation Weight Who   11/27/24 0532 116.2 kg (256 lb 3.2 oz) -- MILADY   11/26/24 0417 116.5 kg (256 lb 12.8 oz) -- MILADY   11/25/24 0400 116.7 kg (257 lb 3.2 oz) -- JCA   11/24/24 0600 115.9 kg (255 lb 9.6 oz) -- JCA   11/23/24 0451 117.9 kg (259 lb 14.8 oz) -- DS   11/21/24 0548 113.1 kg (249 lb 6.4 oz) -- BV   11/20/24 0530 114.5 kg (252 lb 8 oz) -- BR   11/19/24 0600 114.3 kg (251 lb 14.4 oz) -- KR     BM today    Continue to follow

## 2024-11-27 NOTE — PLAN OF CARE
sc  Patient Name: Karyn Gamez   MRN: [id number]   Date of Admission: 11/18/2024    Procedure: Procedure(s):  CORONARY ARTERY BYPASS GRAFT TIMES FIVE, LEFT INTERNAL MAMMARY ARTERY HARVEST, RIGHT ENDOSCOPIC VESSEL PROCUREMENT,  ECHOCARDIOGRAM, TRANSESPOPHAGEAL, WITH ANESTHESIA,  LEFT RADIAL ARTERY HARVEST    Post Op day #: 9    Subjective (Patient focus/Primary Problem for shift):  Increasing activity and decreasing pain.           Pain Goal 0 Pain Rating 2           Pain Medication/ Regime effective to reduce patient pain PRN tylenol and oxycodone.     Objective (Physical assessment):           Rhythm: normal sinus rhythm            Bowel Activity: yes if Yes indicate when: 11/27          Bowel Medications: yes            Incision: healing well          Incentive Spirometry Q 1-2 hour when awake:  yes Volume: 1000          Epicardial Pacing Wires:  no            Patient Activity:           Up to chair for meals: yes          Ambulation with RN x2 (Not including CR): yes           Shower Daily {YES/NO/NA:[id number]          Nasal  Nozin BID AM/PM x 10 days: yes              Chest Tubes   Pleural: not applicable Draining: not applicable               Suction: not applicable              Mediastinal: no Draining: not applicable               Suction: not applicable   Dressing Change Daily:no If No, why? Chest tubes removed 11/27 and dressing placed by CV surgery.                      Urinary Catheter: no           Preventative WOC consult (need MD order): no       Assessment (Nursing primary shift focus): Pt had chest tubes and pacer wires removed this morning. Pt up SBA 1 in room and on the unit. Pt is on 3L O2. All incisions cleaned and dressings to skin tears were changed. +BM this morning. Pt is A&Ox4, VSS.     Plan (Patient Care Plan/focus): Increasing activity and decreasing pain.       Yvrose Wagner RN   11/27/2024   9:27 AM

## 2024-11-27 NOTE — PROGRESS NOTES
Patient refusing BIPAP tonight. Patient states she is waking up startled with BIPAP. Will provided supplemental O2 tonight. Patient is currently resting comfortably on 3L oxymask.     Pippa Killian, RT

## 2024-11-28 ENCOUNTER — APPOINTMENT (OUTPATIENT)
Dept: RADIOLOGY | Facility: HOSPITAL | Age: 69
End: 2024-11-28
Attending: SURGERY
Payer: COMMERCIAL

## 2024-11-28 VITALS
OXYGEN SATURATION: 97 % | RESPIRATION RATE: 18 BRPM | HEART RATE: 77 BPM | WEIGHT: 254.8 LBS | TEMPERATURE: 98.5 F | BODY MASS INDEX: 39.99 KG/M2 | SYSTOLIC BLOOD PRESSURE: 147 MMHG | DIASTOLIC BLOOD PRESSURE: 70 MMHG

## 2024-11-28 LAB
ALBUMIN SERPL BCG-MCNC: 3 G/DL (ref 3.5–5.2)
ALP SERPL-CCNC: 77 U/L (ref 40–150)
ALT SERPL W P-5'-P-CCNC: 88 U/L (ref 0–50)
ANION GAP SERPL CALCULATED.3IONS-SCNC: 8 MMOL/L (ref 7–15)
AST SERPL W P-5'-P-CCNC: 49 U/L (ref 0–45)
BILIRUB DIRECT SERPL-MCNC: 0.21 MG/DL (ref 0–0.3)
BILIRUB SERPL-MCNC: 0.6 MG/DL
BUN SERPL-MCNC: 8 MG/DL (ref 8–23)
CALCIUM SERPL-MCNC: 8.4 MG/DL (ref 8.8–10.4)
CHLORIDE SERPL-SCNC: 99 MMOL/L (ref 98–107)
CREAT SERPL-MCNC: 0.92 MG/DL (ref 0.51–0.95)
EGFRCR SERPLBLD CKD-EPI 2021: 67 ML/MIN/1.73M2
ERYTHROCYTE [DISTWIDTH] IN BLOOD BY AUTOMATED COUNT: 15.5 % (ref 10–15)
GLUCOSE BLDC GLUCOMTR-MCNC: 103 MG/DL (ref 70–99)
GLUCOSE BLDC GLUCOMTR-MCNC: 151 MG/DL (ref 70–99)
GLUCOSE BLDC GLUCOMTR-MCNC: 93 MG/DL (ref 70–99)
GLUCOSE SERPL-MCNC: 101 MG/DL (ref 70–99)
HCO3 SERPL-SCNC: 31 MMOL/L (ref 22–29)
HCT VFR BLD AUTO: 23.5 % (ref 35–47)
HGB BLD-MCNC: 7.3 G/DL (ref 11.7–15.7)
INR PPP: 1.91 (ref 0.85–1.15)
MAGNESIUM SERPL-MCNC: 2 MG/DL (ref 1.7–2.3)
MCH RBC QN AUTO: 28.6 PG (ref 26.5–33)
MCHC RBC AUTO-ENTMCNC: 31.1 G/DL (ref 31.5–36.5)
MCV RBC AUTO: 92 FL (ref 78–100)
PHOSPHATE SERPL-MCNC: 3.7 MG/DL (ref 2.5–4.5)
PLATELET # BLD AUTO: 358 10E3/UL (ref 150–450)
POTASSIUM SERPL-SCNC: 4.1 MMOL/L (ref 3.4–5.3)
PROT SERPL-MCNC: 5.6 G/DL (ref 6.4–8.3)
RBC # BLD AUTO: 2.55 10E6/UL (ref 3.8–5.2)
SODIUM SERPL-SCNC: 138 MMOL/L (ref 135–145)
WBC # BLD AUTO: 10.5 10E3/UL (ref 4–11)

## 2024-11-28 PROCEDURE — 82310 ASSAY OF CALCIUM: CPT | Performed by: PHYSICIAN ASSISTANT

## 2024-11-28 PROCEDURE — 71046 X-RAY EXAM CHEST 2 VIEWS: CPT

## 2024-11-28 PROCEDURE — 84520 ASSAY OF UREA NITROGEN: CPT | Performed by: PHYSICIAN ASSISTANT

## 2024-11-28 PROCEDURE — 210N000001 HC R&B IMCU HEART CARE

## 2024-11-28 PROCEDURE — 36415 COLL VENOUS BLD VENIPUNCTURE: CPT | Performed by: PHYSICIAN ASSISTANT

## 2024-11-28 PROCEDURE — 84075 ASSAY ALKALINE PHOSPHATASE: CPT | Performed by: PHYSICIAN ASSISTANT

## 2024-11-28 PROCEDURE — 250N000013 HC RX MED GY IP 250 OP 250 PS 637: Performed by: PHYSICIAN ASSISTANT

## 2024-11-28 PROCEDURE — 83735 ASSAY OF MAGNESIUM: CPT | Performed by: STUDENT IN AN ORGANIZED HEALTH CARE EDUCATION/TRAINING PROGRAM

## 2024-11-28 PROCEDURE — 84100 ASSAY OF PHOSPHORUS: CPT | Performed by: STUDENT IN AN ORGANIZED HEALTH CARE EDUCATION/TRAINING PROGRAM

## 2024-11-28 PROCEDURE — 250N000013 HC RX MED GY IP 250 OP 250 PS 637: Performed by: STUDENT IN AN ORGANIZED HEALTH CARE EDUCATION/TRAINING PROGRAM

## 2024-11-28 PROCEDURE — 85041 AUTOMATED RBC COUNT: CPT | Performed by: PHYSICIAN ASSISTANT

## 2024-11-28 PROCEDURE — 250N000013 HC RX MED GY IP 250 OP 250 PS 637: Performed by: SURGERY

## 2024-11-28 PROCEDURE — 85014 HEMATOCRIT: CPT | Performed by: PHYSICIAN ASSISTANT

## 2024-11-28 PROCEDURE — 80053 COMPREHEN METABOLIC PANEL: CPT | Performed by: PHYSICIAN ASSISTANT

## 2024-11-28 PROCEDURE — 250N000011 HC RX IP 250 OP 636: Performed by: PHYSICIAN ASSISTANT

## 2024-11-28 PROCEDURE — 85610 PROTHROMBIN TIME: CPT | Performed by: PHYSICIAN ASSISTANT

## 2024-11-28 PROCEDURE — 84155 ASSAY OF PROTEIN SERUM: CPT | Performed by: PHYSICIAN ASSISTANT

## 2024-11-28 RX ORDER — ASPIRIN 81 MG/1
81 TABLET, CHEWABLE ORAL DAILY
Status: DISCONTINUED | OUTPATIENT
Start: 2024-11-28 | End: 2024-12-03 | Stop reason: HOSPADM

## 2024-11-28 RX ORDER — WARFARIN SODIUM 1 MG/1
1 TABLET ORAL
Status: DISCONTINUED | OUTPATIENT
Start: 2024-11-28 | End: 2024-11-28 | Stop reason: DRUGHIGH

## 2024-11-28 RX ORDER — MAGNESIUM OXIDE 400 MG/1
400 TABLET ORAL EVERY 4 HOURS
Status: COMPLETED | OUTPATIENT
Start: 2024-11-28 | End: 2024-11-28

## 2024-11-28 RX ADMIN — METOPROLOL TARTRATE 50 MG: 25 TABLET, FILM COATED ORAL at 20:05

## 2024-11-28 RX ADMIN — ATORVASTATIN CALCIUM 80 MG: 40 TABLET, FILM COATED ORAL at 20:05

## 2024-11-28 RX ADMIN — INSULIN ASPART 1 UNITS: 100 INJECTION, SOLUTION INTRAVENOUS; SUBCUTANEOUS at 17:13

## 2024-11-28 RX ADMIN — OXYCODONE HYDROCHLORIDE 5 MG: 5 TABLET ORAL at 20:05

## 2024-11-28 RX ADMIN — AMLODIPINE BESYLATE 2.5 MG: 2.5 TABLET ORAL at 06:42

## 2024-11-28 RX ADMIN — MAGNESIUM OXIDE TAB 400 MG (241.3 MG ELEMENTAL MG) 400 MG: 400 (241.3 MG) TAB at 09:26

## 2024-11-28 RX ADMIN — FUROSEMIDE 40 MG: 40 TABLET ORAL at 15:27

## 2024-11-28 RX ADMIN — THERA TABS 1 TABLET: TAB at 09:26

## 2024-11-28 RX ADMIN — PANTOPRAZOLE SODIUM 40 MG: 40 TABLET, DELAYED RELEASE ORAL at 06:42

## 2024-11-28 RX ADMIN — HEPARIN SODIUM 5000 UNITS: 5000 INJECTION, SOLUTION INTRAVENOUS; SUBCUTANEOUS at 06:42

## 2024-11-28 RX ADMIN — MAGNESIUM OXIDE TAB 400 MG (241.3 MG ELEMENTAL MG) 400 MG: 400 (241.3 MG) TAB at 12:27

## 2024-11-28 RX ADMIN — ASPIRIN 81 MG CHEWABLE TABLET 81 MG: 81 TABLET CHEWABLE at 09:34

## 2024-11-28 RX ADMIN — FUROSEMIDE 40 MG: 40 TABLET ORAL at 09:26

## 2024-11-28 RX ADMIN — METOPROLOL TARTRATE 50 MG: 25 TABLET, FILM COATED ORAL at 09:26

## 2024-11-28 RX ADMIN — SENNOSIDES AND DOCUSATE SODIUM 1 TABLET: 8.6; 5 TABLET ORAL at 09:34

## 2024-11-28 RX ADMIN — METFORMIN ER 500 MG 500 MG: 500 TABLET ORAL at 17:13

## 2024-11-28 ASSESSMENT — ACTIVITIES OF DAILY LIVING (ADL)
ADLS_ACUITY_SCORE: 40
ADLS_ACUITY_SCORE: 43
ADLS_ACUITY_SCORE: 43
ADLS_ACUITY_SCORE: 40
ADLS_ACUITY_SCORE: 43
ADLS_ACUITY_SCORE: 40
ADLS_ACUITY_SCORE: 40
ADLS_ACUITY_SCORE: 43
ADLS_ACUITY_SCORE: 40
ADLS_ACUITY_SCORE: 43
ADLS_ACUITY_SCORE: 40
ADLS_ACUITY_SCORE: 43
ADLS_ACUITY_SCORE: 43
ADLS_ACUITY_SCORE: 40
ADLS_ACUITY_SCORE: 43
ADLS_ACUITY_SCORE: 43
ADLS_ACUITY_SCORE: 40

## 2024-11-28 NOTE — PLAN OF CARE
Goal Outcome Evaluation:       sc  Patient Name: Karyn Gamez   MRN: [id number]   Date of Admission: 11/18/2024    Procedure: Procedure(s):  CORONARY ARTERY BYPASS GRAFT TIMES FIVE, LEFT INTERNAL MAMMARY ARTERY HARVEST, RIGHT ENDOSCOPIC VESSEL PROCUREMENT,  ECHOCARDIOGRAM, TRANSESPOPHAGEAL, WITH ANESTHESIA,  LEFT RADIAL ARTERY HARVEST    Post Op day #:10    Subjective (Patient focus/Primary Problem for shift):             Pain Goal0 Pain Ratingdenies           Pain Medication/ Regime effective to reduce patient pain prn pain meds not needed    Objective (Physical assessment):           Rhythm: normal sinus rhythm            Bowel Activity: yes if Yes indicate when: 11/27            Bowel Medications: yes            Incision: healing well          Incentive Spirometry Q 1-2 hour when awake:  yes Volume: 1000          Epicardial Pacing Wires:  no            Patient Activity:           Up to chair for meals: yes          Ambulation with RN x2 (Not including CR): yes           Shower Daily {YES/NO/NA:[id number]          Nasal  Nozin BID AM/PM x 10 days: yes              Chest Tubes   Pleural: not applicable Draining: not applicable               Suction: not applicable              Mediastinal: not applicable Draining: not applicable               Suction: not applicable   Dressing Change Daily:not applicable If No, why?ASHUTOSH, mepliex applied to tape burn areas that are oozing.                      Urinary Catheter: no           Preventative WOC consult (need MD order): no       Assessment (Nursing primary shift focus): Shower, walk in queen with nursing as cardiac rehab not available on the holiday.     Plan (Patient Care Plan/focus): Pt A&OX4, up with SBA, following sternal precautions. Shower done with antibacterial soap. Mepilex applied to tape burn areas that are oozing. Skin prep applied before tele patches applied due to sensitivity with adhesive. Pt walked in queen X2 with nursing, tolerated well. Oxygen  weaned from 3L/NC to RA. Sats 92-95%. Does require oxygen with ambulation. Sats with ambulation mids 80s. Went down for CXR, results reviewed by [facility]. IS and flutter valve encouarged hourly. Pt denies pain or discomfort. Voiding well, had BM yesterday. Appetite good. Tele: NSR.  Plan of care ongoing. Heart surgery discharge education reinforced. [facility].       Jeanne Jacobson RN   11/28/2024   2:57 PM

## 2024-11-28 NOTE — PROGRESS NOTES
"CVTS Daily Progress Note   POD 10 s/p CABG x5 (LIMA-LAD, rad-OM, rSVG-PDA, rSVG-RPL, rSVG-diag)  Attending: Gorge  LOS: 10    SUBJECTIVE/INTERVAL EVENTS:    No acute events overnight. Patient progressing well. Maintaining oxygen saturations on supplemental oxygen via NC. Normotensive. Ambulating with therapy. Pain well controlled. +BM, tolerating PO intake - good appetite. UOP adequate. Hgb stable. Patient denies new chest pain, shortness of breath, abdominal pain, and nausea. Patient has no questions today.    OBJECTIVE:  Temp:  [97.6  F (36.4  C)-99  F (37.2  C)] 98  F (36.7  C)  Pulse:  [58-72] 62  Resp:  [16-24] 16  BP: ()/(53-66) 118/59  SpO2:  [96 %-100 %] 97 %  Vitals:    11/24/24 0600 11/25/24 0400 11/26/24 0417 11/27/24 0532   Weight: 115.9 kg (255 lb 9.6 oz) 116.7 kg (257 lb 3.2 oz) 116.5 kg (256 lb 12.8 oz) 116.2 kg (256 lb 3.2 oz)    11/28/24 0624   Weight: 115.6 kg (254 lb 12.8 oz)       Clinically Significant Risk Factors               # Hypoalbuminemia: Lowest albumin = 2.9 g/dL at 11/26/2024  6:17 AM, will monitor as appropriate     # Hypertension: Noted on problem list     # Acute Hypercapnic Respiratory Failure: based on arterial blood gas results.  Continue supplemental oxygen and ventilatory support as indicated.  # Acute Hypercapnic Respiratory Failure: based on venous blood gas results.  Continue supplemental oxygen and ventilatory support as indicated.         # Obesity: Estimated body mass index is 39.99 kg/m  as calculated from the following:    Height as of 11/7/24: 1.7 m (5' 6.93\").    Weight as of this encounter: 115.6 kg (254 lb 12.8 oz).      # Financial/Environmental Concerns:     # History of CABG: noted on surgical history        Current Medications:    Scheduled Meds:  Current Facility-Administered Medications   Medication Dose Route Frequency Provider Last Rate Last Admin    amLODIPine (NORVASC) tablet 2.5 mg  2.5 mg Oral Daily Ly Atwood PA-C   2.5 mg at 11/28/24 " 0642    aspirin (ASA) chewable tablet 81 mg  81 mg Oral Daily Ladonna Camargo, NP        atorvastatin (LIPITOR) tablet 80 mg  80 mg Oral At Bedtime Ly Atwood PA-C   80 mg at 11/27/24 2028    furosemide (LASIX) tablet 40 mg  40 mg Oral BID Ly Atwood PA-C   40 mg at 11/27/24 1316    heparin ANTICOAGULANT injection 5,000 Units  5,000 Units Subcutaneous Q8H Ly Atwood PA-C   5,000 Units at 11/28/24 0642    insulin aspart (NovoLOG) injection (RAPID ACTING)  1-7 Units Subcutaneous TID AC Ly Atwood PA-C   2 Units at 11/23/24 1238    insulin aspart (NovoLOG) injection (RAPID ACTING)  1-5 Units Subcutaneous At Bedtime Ly Atwood PA-C        iopamidol (ISOVUE-370) solution 117 mL  117 mL Intravenous Once Ly Atwood PA-C        And    sodium chloride 0.9 % bag for CT scan flush use  100 mL As instructed Once Ly Atwood PA-C        magnesium oxide (MAG-OX) tablet 400 mg  400 mg Oral Q4H Mary Pennington MD        metFORMIN (GLUCOPHAGE XR) 24 hr tablet 500 mg  500 mg Oral Daily with supper Ly Atwood PA-C   500 mg at 11/27/24 1702    metoprolol tartrate (LOPRESSOR) half-tab 12.5 mg  12.5 mg Oral TID Ly Atwood PA-C        metoprolol tartrate (LOPRESSOR) tablet 50 mg  50 mg Oral BID Ly Atwood PA-C   50 mg at 11/27/24 2028    multivitamin, therapeutic (THERA-VIT) tablet 1 tablet  1 tablet Oral Daily Ly Atwood PA-C   1 tablet at 11/27/24 0815    pantoprazole (PROTONIX) EC tablet 40 mg  40 mg Oral QAM AC Ly Atwood PA-C   40 mg at 11/28/24 0642    polyethylene glycol (MIRALAX) Packet 17 g  17 g Oral Daily Ly Atwood PA-C   17 g at 11/27/24 0814    senna-docusate (SENOKOT-S/PERICOLACE) 8.6-50 MG per tablet 1 tablet  1 tablet Oral BID Ly Atwood PA-C   1 tablet at 11/27/24 2028    sodium chloride (PF) 0.9% PF flush 10 mL  10 mL Intracatheter Q8H Angelic  Ly Tovar PA-C   10 mL at 11/28/24 0334    sodium chloride (PF) 0.9% PF flush 10 mL  10 mL Intracatheter Q8H Ly Atwood PA-C   10 mL at 11/28/24 0433    Warfarin Dose Required Daily - Pharmacist Managed  1 each Oral See Admin Instructions Ly Atwood PA-C         Continuous Infusions:  Current Facility-Administered Medications   Medication Dose Route Frequency Provider Last Rate Last Admin     PRN Meds:  Current Facility-Administered Medications   Medication Dose Route Frequency Provider Last Rate Last Admin    acetaminophen (TYLENOL) tablet 650 mg  650 mg Oral Q4H PRN Ly Atwood PA-C   650 mg at 11/27/24 2325    bisacodyl (DULCOLAX) suppository 10 mg  10 mg Rectal Daily PRN Ly Atwood PA-C        glucose gel 15-30 g  15-30 g Oral Q15 Min PRN Ly Atwood PA-C        Or    dextrose 50 % injection 25-50 mL  25-50 mL Intravenous Q15 Min PRN Ly Atwood PA-C        Or    glucagon injection 1 mg  1 mg Subcutaneous Q15 Min PRN Ly Atwood PA-C        hydrALAZINE (APRESOLINE) injection 10 mg  10 mg Intravenous Q30 Min PRN Ly Atwood PA-C        lidocaine (LMX4) cream   Topical Q1H PRN Ly Atwood PA-C        lidocaine 1 % 0.1-1 mL  0.1-1 mL Other Q1H PRN Ly Atwood PA-C   1 mL at 11/24/24 1205    magnesium hydroxide (MILK OF MAGNESIA) suspension 30 mL  30 mL Oral Daily PRN Ly Atwood PA-C        naloxone (NARCAN) injection 0.2 mg  0.2 mg Intravenous Q2 Min PRN Ly Atwood PA-C        Or    naloxone (NARCAN) injection 0.4 mg  0.4 mg Intravenous Q2 Min PRN Ly Atwood PA-C        Or    naloxone (NARCAN) injection 0.2 mg  0.2 mg Intramuscular Q2 Min PRN Ly Atwood PA-C   0.2 mg at 11/20/24 0812    Or    naloxone (NARCAN) injection 0.4 mg  0.4 mg Intramuscular Q2 Min PRN Ly Atwood PA-C        ondansetron (ZOFRAN ODT) ODT tab 4 mg  4  mg Oral Q6H PRN Ly Atwood PA-C        oxyCODONE (ROXICODONE) tablet 5 mg  5 mg Oral Q4H PRN Ly Atwood PA-C   5 mg at 11/26/24 1752    sodium chloride (PF) 0.9% PF flush 10-20 mL  10-20 mL Intracatheter q1 min prn Ly Atwood PA-C   20 mL at 11/24/24 1256    sodium chloride (PF) 0.9% PF flush 10-20 mL  10-20 mL Intracatheter q1 min prn Ly Atwood PA-C           Cardiographics:    Telemetry monitoring demonstrates SR with rates in the 60-70s per my personal review.    Imaging:  No results found for this or any previous visit (from the past 24 hours).    Labs, personally reviewed.  Hemoglobin   Date Value Ref Range Status   11/28/2024 7.3 (L) 11.7 - 15.7 g/dL Final   11/27/2024 7.7 (L) 11.7 - 15.7 g/dL Final   11/26/2024 8.1 (L) 11.7 - 15.7 g/dL Final   06/08/2019 12.9 11.7 - 15.7 g/dL Final   06/07/2019 13.6 11.7 - 15.7 g/dL Final   10/14/2016 13.2 11.7 - 15.7 g/dL Final     WBC   Date Value Ref Range Status   06/08/2019 8.8 4.0 - 11.0 10e9/L Final   06/07/2019 12.9 (H) 4.0 - 11.0 10e9/L Final   10/14/2016 9.9 4.0 - 11.0 10e9/L Final     WBC Count   Date Value Ref Range Status   11/28/2024 10.5 4.0 - 11.0 10e3/uL Final   11/27/2024 11.2 (H) 4.0 - 11.0 10e3/uL Final   11/26/2024 13.9 (H) 4.0 - 11.0 10e3/uL Final     Platelet Count   Date Value Ref Range Status   11/28/2024 358 150 - 450 10e3/uL Final   11/27/2024 355 150 - 450 10e3/uL Final   11/26/2024 291 150 - 450 10e3/uL Final   06/08/2019 291 150 - 450 10e9/L Final   06/07/2019 352 150 - 450 10e9/L Final   10/14/2016 297 150 - 450 10e9/L Final     Creatinine   Date Value Ref Range Status   11/28/2024 0.92 0.51 - 0.95 mg/dL Final   11/27/2024 0.87 0.51 - 0.95 mg/dL Final   11/26/2024 0.86 0.51 - 0.95 mg/dL Final   01/15/2021 0.81 0.52 - 1.04 mg/dL Final   06/08/2019 0.67 0.52 - 1.04 mg/dL Final   06/07/2019 0.73 0.52 - 1.04 mg/dL Final     Potassium   Date Value Ref Range Status   11/28/2024 4.1 3.4 - 5.3 mmol/L  Final   11/27/2024 3.7 3.4 - 5.3 mmol/L Final   11/26/2024 4.2 3.4 - 5.3 mmol/L Final   01/31/2023 4.2 3.4 - 5.3 mmol/L Final   08/26/2021 3.9 3.4 - 5.3 mmol/L Final   01/15/2021 4.1 3.4 - 5.3 mmol/L Final   06/08/2019 4.0 3.4 - 5.3 mmol/L Final   06/07/2019 3.2 (L) 3.4 - 5.3 mmol/L Final     Potassium POCT   Date Value Ref Range Status   11/18/2024 3.4 3.4 - 5.3 mmol/L Final   11/18/2024 3.5 3.4 - 5.3 mmol/L Final   11/18/2024 4.1 3.4 - 5.3 mmol/L Final     Magnesium   Date Value Ref Range Status   11/28/2024 2.0 1.7 - 2.3 mg/dL Final   11/27/2024 1.9 1.7 - 2.3 mg/dL Final   11/26/2024 1.8 1.7 - 2.3 mg/dL Final   08/12/2011 2.2 1.6 - 2.3 mg/dL Final   05/06/2010 2.3 1.6 - 2.3 mg/dL Final   05/05/2010 2.3 1.6 - 2.3 mg/dL Final          I/O:  I/O last 3 completed shifts:  In: 1890 [P.O.:1890]  Out: 3095 [Urine:3095]       Physical Exam:    General: Patient seen up in chair, NAD. Conversant, pleasant, calm, cooperative on exam.  HEENT: no sclera icterus, moist mucosa  CV: RRR on monitor. Mild edema.  Incision C/D/I, no erythema or induration  Pulm: Unlabored breathing on supplemental oxygen via NC  Abd: SNTND, obese  Ext: Mild pedal edema, warm, LE incisions C/D/I without erythema or induration  Neuro: A&O, CNs grossly intact, face symmetric, speech clear      ASSESSMENT/PLAN:    Karyn Gamez is a 69 year old female who is s/p CABG x5; postop course c/b afib but now back in SR.    Active Problems:    CAD (coronary artery disease)        NEURO:  - Scheduled Tylenol/lidocaine patches and PRN Tylenol /oxycodone for pain  - Had not taken PTA Wellbutrin or Zoloft for several months--can reassess after hospital discharge.    CV:  - Pre-op EF 60-65%   - Normotensive  - Metoprolol 50 mg two times a day  - ASA 81 mg (reduced in s/o therapeutic AC)  - Discontinue Plavix today in s/o near therapeutic INR  - Atorvastatin 80mg daily  - Chest tubes & TPW removed 11/27    PULM:  - Extubated on POD 0   - Maintaining oxygen  saturations on supplemental oxygen via NC  - Encourage pulmonary toilet  - CXR with modest L pleural effusion and small R pleural effusion    GI  NUTRITION:  - Diet: Cardiac  - Bowel regimen  - Transaminitis continuing to improve  - Stool cx neg    RENAL  FE:  - Adequate UOP  - Diuresis with 40mg PO Lasix BID  - Continue electrolyte replacement protocol  - [facility] weight [facility] from [facility]     HEME:  Acute blood loss anemia  - Hgb stable, no bleeding concerns  - Warfarin AC (afib), INR goal 2-3; INR subtherapeutic but rising  - ASA 81mg  - Discontinue Plavix    ID:  - Lor op ppx complete, afebrile; no concerns for infection  - Empiric Vanc (ended 11/20) and Zosyn (ended 11/26). Cultures 11/20 neg  - Stool culture NG; c-diff cancelled    ENDO:  - Discontinue SSI - maintaining euglycemia on oral medication  - PTA Metformin restarted     PPx:  - DVT: SCDs, warfarin, OOB/ambulation   - GI: Protonix 40mg PO daily    DISPO:  - General telemetry status  - Medically Ready for Discharge: Anticipated in 2-4 Days   - Therapies recommending ARU at discharge      Patient seen & discussed with Dr. Arias.      Ladonna Camargo, CNP, ACNPC-AG  Cardiothoracic Surgery  [employer] Pager [phone]

## 2024-11-28 NOTE — PLAN OF CARE
sc  Patient Name: Karyn Gamez   MRN: [id number]   Date of Admission: 11/18/2024    Procedure: Procedure(s):  CORONARY ARTERY BYPASS GRAFT TIMES FIVE, LEFT INTERNAL MAMMARY ARTERY HARVEST, RIGHT ENDOSCOPIC VESSEL PROCUREMENT,  ECHOCARDIOGRAM, TRANSESPOPHAGEAL, WITH ANESTHESIA,  LEFT RADIAL ARTERY HARVEST    Post Op day #:10    Subjective (Patient focus/Primary Problem for shift): rest/sleep          Pain Goal 0 Pain Rating 2          Pain Medication/ Regime effective to reduce patient pain PRN Tylenol    Objective (Physical assessment):           Rhythm: normal sinus rhythm            Bowel Activity: yes if Yes indicate when: 11/27/24          Bowel Medications: yes            Incision: healing well          Incentive Spirometry Q 1-2 hour when awake:  yes Volume: 750          Epicardial Pacing Wires:  no            Patient Activity:           Up to chair for meals: not applicable          Ambulation with RN x2 (Not including CR): not applicable           Shower Daily To be completed on Day shift          Nasal  Nozin BID AM/PM x 10 days: yes              Chest Tubes   Pleural: not applicable Draining: not applicable               Suction: not applicable              Mediastinal: not applicable Draining: not applicable               Suction: not applicable   Dressing Change Daily:no If No, why? To be completed on day shift                     Urinary Catheter: no           Preventative WOC consult (need MD order): no       Assessment (Nursing primary shift focus): Patient reported incisional pain at bedtime. PRN Tylenol given with relief. Patient slept in recliner overnight.  Denied nausea or dizziness.  Sating well on 3LPM NC.      Plan (Patient Care Plan/focus):   IS and Flutter valve while awake  Ambulation  Shower and incision care  Wean O2 as able      Dallas Killian RN   11/28/2024   5:19 AM

## 2024-11-29 ENCOUNTER — APPOINTMENT (OUTPATIENT)
Dept: OCCUPATIONAL THERAPY | Facility: HOSPITAL | Age: 69
End: 2024-11-29
Attending: STUDENT IN AN ORGANIZED HEALTH CARE EDUCATION/TRAINING PROGRAM
Payer: COMMERCIAL

## 2024-11-29 LAB
ANION GAP SERPL CALCULATED.3IONS-SCNC: 8 MMOL/L (ref 7–15)
BUN SERPL-MCNC: 9.2 MG/DL (ref 8–23)
CALCIUM SERPL-MCNC: 8.5 MG/DL (ref 8.8–10.4)
CHLORIDE SERPL-SCNC: 99 MMOL/L (ref 98–107)
CREAT SERPL-MCNC: 0.94 MG/DL (ref 0.51–0.95)
EGFRCR SERPLBLD CKD-EPI 2021: 66 ML/MIN/1.73M2
ERYTHROCYTE [DISTWIDTH] IN BLOOD BY AUTOMATED COUNT: 15.4 % (ref 10–15)
GLUCOSE BLDC GLUCOMTR-MCNC: 111 MG/DL (ref 70–99)
GLUCOSE BLDC GLUCOMTR-MCNC: 118 MG/DL (ref 70–99)
GLUCOSE BLDC GLUCOMTR-MCNC: 89 MG/DL (ref 70–99)
GLUCOSE SERPL-MCNC: 96 MG/DL (ref 70–99)
HCO3 SERPL-SCNC: 32 MMOL/L (ref 22–29)
HCT VFR BLD AUTO: 24.3 % (ref 35–47)
HGB BLD-MCNC: 7.5 G/DL (ref 11.7–15.7)
INR PPP: 1.61 (ref 0.85–1.15)
MAGNESIUM SERPL-MCNC: 2.1 MG/DL (ref 1.7–2.3)
MCH RBC QN AUTO: 28.5 PG (ref 26.5–33)
MCHC RBC AUTO-ENTMCNC: 30.9 G/DL (ref 31.5–36.5)
MCV RBC AUTO: 92 FL (ref 78–100)
PLATELET # BLD AUTO: 421 10E3/UL (ref 150–450)
POTASSIUM SERPL-SCNC: 3.9 MMOL/L (ref 3.4–5.3)
RBC # BLD AUTO: 2.63 10E6/UL (ref 3.8–5.2)
SODIUM SERPL-SCNC: 139 MMOL/L (ref 135–145)
WBC # BLD AUTO: 10.1 10E3/UL (ref 4–11)

## 2024-11-29 PROCEDURE — 83735 ASSAY OF MAGNESIUM: CPT | Performed by: STUDENT IN AN ORGANIZED HEALTH CARE EDUCATION/TRAINING PROGRAM

## 2024-11-29 PROCEDURE — 250N000013 HC RX MED GY IP 250 OP 250 PS 637: Performed by: PHYSICIAN ASSISTANT

## 2024-11-29 PROCEDURE — 93010 ELECTROCARDIOGRAM REPORT: CPT | Performed by: INTERNAL MEDICINE

## 2024-11-29 PROCEDURE — 97535 SELF CARE MNGMENT TRAINING: CPT | Mod: GO

## 2024-11-29 PROCEDURE — 85610 PROTHROMBIN TIME: CPT | Performed by: PHYSICIAN ASSISTANT

## 2024-11-29 PROCEDURE — 36415 COLL VENOUS BLD VENIPUNCTURE: CPT | Performed by: PHYSICIAN ASSISTANT

## 2024-11-29 PROCEDURE — 250N000011 HC RX IP 250 OP 636: Performed by: PHYSICIAN ASSISTANT

## 2024-11-29 PROCEDURE — 97110 THERAPEUTIC EXERCISES: CPT | Mod: GO

## 2024-11-29 PROCEDURE — 85041 AUTOMATED RBC COUNT: CPT | Performed by: PHYSICIAN ASSISTANT

## 2024-11-29 PROCEDURE — 85018 HEMOGLOBIN: CPT | Performed by: PHYSICIAN ASSISTANT

## 2024-11-29 PROCEDURE — 250N000013 HC RX MED GY IP 250 OP 250 PS 637: Performed by: STUDENT IN AN ORGANIZED HEALTH CARE EDUCATION/TRAINING PROGRAM

## 2024-11-29 PROCEDURE — 80048 BASIC METABOLIC PNL TOTAL CA: CPT | Performed by: PHYSICIAN ASSISTANT

## 2024-11-29 PROCEDURE — 210N000001 HC R&B IMCU HEART CARE

## 2024-11-29 PROCEDURE — 250N000013 HC RX MED GY IP 250 OP 250 PS 637: Performed by: SURGERY

## 2024-11-29 PROCEDURE — 93005 ELECTROCARDIOGRAM TRACING: CPT

## 2024-11-29 PROCEDURE — G0463 HOSPITAL OUTPT CLINIC VISIT: HCPCS

## 2024-11-29 RX ORDER — WARFARIN SODIUM 2 MG/1
2 TABLET ORAL
Status: COMPLETED | OUTPATIENT
Start: 2024-11-29 | End: 2024-11-29

## 2024-11-29 RX ADMIN — FUROSEMIDE 40 MG: 40 TABLET ORAL at 08:35

## 2024-11-29 RX ADMIN — ASPIRIN 81 MG CHEWABLE TABLET 81 MG: 81 TABLET CHEWABLE at 08:35

## 2024-11-29 RX ADMIN — METOPROLOL TARTRATE 50 MG: 25 TABLET, FILM COATED ORAL at 18:08

## 2024-11-29 RX ADMIN — AMLODIPINE BESYLATE 2.5 MG: 2.5 TABLET ORAL at 06:37

## 2024-11-29 RX ADMIN — SENNOSIDES AND DOCUSATE SODIUM 1 TABLET: 8.6; 5 TABLET ORAL at 20:20

## 2024-11-29 RX ADMIN — METOPROLOL TARTRATE 50 MG: 25 TABLET, FILM COATED ORAL at 08:35

## 2024-11-29 RX ADMIN — ATORVASTATIN CALCIUM 80 MG: 40 TABLET, FILM COATED ORAL at 20:21

## 2024-11-29 RX ADMIN — SENNOSIDES AND DOCUSATE SODIUM 1 TABLET: 8.6; 5 TABLET ORAL at 08:35

## 2024-11-29 RX ADMIN — THERA TABS 1 TABLET: TAB at 08:35

## 2024-11-29 RX ADMIN — WARFARIN SODIUM 2 MG: 2 TABLET ORAL at 17:15

## 2024-11-29 RX ADMIN — PANTOPRAZOLE SODIUM 40 MG: 40 TABLET, DELAYED RELEASE ORAL at 06:37

## 2024-11-29 RX ADMIN — FUROSEMIDE 40 MG: 40 TABLET ORAL at 14:33

## 2024-11-29 RX ADMIN — METFORMIN ER 500 MG 500 MG: 500 TABLET ORAL at 17:15

## 2024-11-29 RX ADMIN — ONDANSETRON 4 MG: 4 TABLET, ORALLY DISINTEGRATING ORAL at 19:39

## 2024-11-29 ASSESSMENT — ACTIVITIES OF DAILY LIVING (ADL)
ADLS_ACUITY_SCORE: 43

## 2024-11-29 NOTE — PROGRESS NOTES
"Care Management Follow Up    Length of Stay (days): 11    Expected Discharge Date: 11/29/2024    Anticipated Discharge Plan: Acute Rehab      Transportation: TBD    PT Recommendations:    OT Recommendations:  Acute Rehab Center-Motivated patient will benefit from intensive, interdisciplinary therapy.  Anticipate will be able to tolerate 3 hours of therapy per day     Barriers to Discharge: medical stability    Prior Living Situation: \"Patient lives in her town house with spouse. She is independent with ADLs/IADLs, ambulates without devices and has no services in community. Of note, spouse had a stroke a year ago and has some physical limitation for assisting patient. Spouse is primary family contact. Daughter Suyapa is supportive and involved.\"     Discussed  Partnership in Safe Discharge Planning  document with patient/family: No     Handoff Completed: Yes, [facility] PCP: Internal handoff referral completed    Patient/Spokesperson Updated: Yes. Who? Patient and family    Additional Information:  Therapy is recommending Acute Rehab at discharge. Referral is pending with [facility] at this time.    8:49 AM  KB reached out to Tevin with [facility] to follow up on Pt's pending referral. CM waiting for response.     9:28 AM  Tevin responded saying that Pt looks appropriate at this time but also is progressing with therapy quite well so Pt may improve enough to not need ARC depending on when Pt is medically ready.     10:49 AM  CM updated Pt will likely not be medically ready until Sunday (12/2). KB updated Tevin regarding Pt's BRANDON and asked about anticipated bed availability on Sunday pending Pt still meets criteria. Tevin reported that Ely Madden will be the admissions contact over the weekend for updates but they will follow Pt's progression with therapy.    11:32 AM  SW met with Pt and family to discuss updates with acute rehab placement. Pt reported understanding and will wait to hear updates from " CM this weekend. SW mentioned that CM can coordinate home care at discharge instead if she has progressed to the point where she no longer meet criteria for acute rehab. Pt reported that she would consider home care if not going to acute rehab.     Next Steps: CM to follow-up with [facility] once Pt is closer to being medically ready for discharge. CM to inform Pt of any updates with discharge planning.       JONG Dickerson

## 2024-11-29 NOTE — PLAN OF CARE
Goal Outcome Evaluation:               sc  Patient Name: Karyn Gamez   MRN: [id number]   Date of Admission: 11/18/2024    Procedure: Procedure(s):  CORONARY ARTERY BYPASS GRAFT TIMES FIVE, LEFT INTERNAL MAMMARY ARTERY HARVEST, RIGHT ENDOSCOPIC VESSEL PROCUREMENT,  ECHOCARDIOGRAM, TRANSESPOPHAGEAL, WITH ANESTHESIA,  LEFT RADIAL ARTERY HARVEST    Post Op day #:11    Subjective (Patient focus/Primary Problem for shift): activity          Pain Goal0 Pain Rating0           Pain Medication/ Regime effective to reduce patient painyes    Objective (Physical assessment):           Rhythm: normal sinus rhythm            Bowel Activity: yes if Yes indicate when:           Bowel Medications: yes            Incision: healing well          Incentive Spirometry Q 1-2 hour when awake:  yes Volume: 1250          Epicardial Pacing Wires:  not applicable            Patient Activity:           Up to chair for meals: yes          Ambulation with RN x2 (Not including CR): yes            Is patient in home clothes:yes             Chest Tubes   Pleural: not applicable Draining: not applicable               Suction: not applicable              Mediastinal: not applicable Draining: not applicable               Suction: not applicable   Dressing Change Daily:yes If No, why?                     Urinary Catheter: not applicable           Preventative WOC consult (need MD order): not applicable       Assessment (Nursing primary shift focus): skin     Plan (Patient Care Plan/focus): WOC nurse seen and orders  placed, up with assist, will shower this afternoon      Lisa Paulino RN   11/29/2024   1:29 PM

## 2024-11-29 NOTE — PROGRESS NOTES
"CVTS Daily Progress Note   POD#11 s/p CABG x5 (LIMA-LAD, rad-OM, rSVG-PDA, rSVG-RPL, rSVG-diag)  Attending: Gorge  LOS: 11    SUBJECTIVE/INTERVAL EVENTS:    No acute events overnight. Patient progressing well. Maintaining oxygen saturations on 0-1L NC. Normotensive. Ambulating with therapy. Pain well controlled. +BM, tolerating PO intake - good appetite. UOP adequate. Hgb stable. Patient denies new chest pain, shortness of breath, abdominal pain, and nausea. Patient has no questions today although would like some of her tape burn wounds evaluated.    OBJECTIVE:  Temp:  [98.1  F (36.7  C)-98.9  F (37.2  C)] 98.1  F (36.7  C)  Pulse:  [59-77] 75  Resp:  [18-20] 18  BP: ()/(50-70) 107/55  SpO2:  [90 %-97 %] 91 %  Vitals:    11/25/24 0400 11/26/24 0417 11/27/24 0532 11/28/24 0624   Weight: 116.7 kg (257 lb 3.2 oz) 116.5 kg (256 lb 12.8 oz) 116.2 kg (256 lb 3.2 oz) 115.6 kg (254 lb 12.8 oz)    11/29/24 0543   Weight: 114.4 kg (252 lb 4.8 oz)       Clinically Significant Risk Factors               # Hypoalbuminemia: Lowest albumin = 2.9 g/dL at 11/26/2024  6:17 AM, will monitor as appropriate     # Hypertension: Noted on problem list     # Acute Hypercapnic Respiratory Failure: based on arterial blood gas results.  Continue supplemental oxygen and ventilatory support as indicated.  # Acute Hypercapnic Respiratory Failure: based on venous blood gas results.  Continue supplemental oxygen and ventilatory support as indicated.         # Obesity: Estimated body mass index is 39.6 kg/m  as calculated from the following:    Height as of 11/7/24: 1.7 m (5' 6.93\").    Weight as of this encounter: 114.4 kg (252 lb 4.8 oz).        # Financial/Environmental Concerns:     # History of CABG: noted on surgical history        Current Medications:    Scheduled Meds:  Current Facility-Administered Medications   Medication Dose Route Frequency Provider Last Rate Last Admin    amLODIPine (NORVASC) tablet 2.5 mg  2.5 mg Oral Daily Angelic, " Ly Tovar PA-C   2.5 mg at 11/29/24 0637    aspirin (ASA) chewable tablet 81 mg  81 mg Oral Daily Ladonna Camargo NP   81 mg at 11/28/24 0934    atorvastatin (LIPITOR) tablet 80 mg  80 mg Oral At Bedtime Ly Atwood PA-C   80 mg at 11/28/24 2005    furosemide (LASIX) tablet 40 mg  40 mg Oral BID Ly Atwood PA-C   40 mg at 11/28/24 1527    iopamidol (ISOVUE-370) solution 117 mL  117 mL Intravenous Once Ly Atwood PA-C        And    sodium chloride 0.9 % bag for CT scan flush use  100 mL As instructed Once Ly Atwood PA-C        metFORMIN (GLUCOPHAGE XR) 24 hr tablet 500 mg  500 mg Oral Daily with supper Ly Atwood PA-C   500 mg at 11/28/24 1713    metoprolol tartrate (LOPRESSOR) tablet 50 mg  50 mg Oral BID Ly Atwood PA-C   50 mg at 11/28/24 2005    multivitamin, therapeutic (THERA-VIT) tablet 1 tablet  1 tablet Oral Daily Ly Atwood PA-C   1 tablet at 11/28/24 0926    pantoprazole (PROTONIX) EC tablet 40 mg  40 mg Oral QAM AC Ly Atwood PA-C   40 mg at 11/29/24 0637    polyethylene glycol (MIRALAX) Packet 17 g  17 g Oral Daily Ly Atwood PA-C   17 g at 11/27/24 0814    senna-docusate (SENOKOT-S/PERICOLACE) 8.6-50 MG per tablet 1 tablet  1 tablet Oral BID Ly Atwood PA-C   1 tablet at 11/28/24 0934    sodium chloride (PF) 0.9% PF flush 10 mL  10 mL Intracatheter Q8H Ly Atwood PA-C   10 mL at 11/29/24 0321    sodium chloride (PF) 0.9% PF flush 10 mL  10 mL Intracatheter Q8H yL Atwood PA-C   10 mL at 11/29/24 0430    Warfarin Dose Required Daily - Pharmacist Managed  1 each Oral See Admin Instructions Ly Atwood PA-C         Continuous Infusions:  Current Facility-Administered Medications   Medication Dose Route Frequency Provider Last Rate Last Admin     PRN Meds:  Current Facility-Administered Medications   Medication Dose Route Frequency  Provider Last Rate Last Admin    acetaminophen (TYLENOL) tablet 650 mg  650 mg Oral Q4H PRN Ly Atwood PA-C   650 mg at 11/27/24 2325    bisacodyl (DULCOLAX) suppository 10 mg  10 mg Rectal Daily PRN Ly Atwood PA-C        glucose gel 15-30 g  15-30 g Oral Q15 Min PRN Ly Atwood PA-C        Or    dextrose 50 % injection 25-50 mL  25-50 mL Intravenous Q15 Min PRN Ly Atwood PA-C        Or    glucagon injection 1 mg  1 mg Subcutaneous Q15 Min PRN Ly Atwood PA-C        hydrALAZINE (APRESOLINE) injection 10 mg  10 mg Intravenous Q30 Min PRN Ly Atwood PA-C        lidocaine (LMX4) cream   Topical Q1H PRN Ly Atwood PA-C        lidocaine 1 % 0.1-1 mL  0.1-1 mL Other Q1H PRN Ly Atwood PA-C   1 mL at 11/24/24 1205    magnesium hydroxide (MILK OF MAGNESIA) suspension 30 mL  30 mL Oral Daily PRN Ly Atwood PA-C        naloxone (NARCAN) injection 0.2 mg  0.2 mg Intravenous Q2 Min PRN yL Atwood PA-C        Or    naloxone (NARCAN) injection 0.4 mg  0.4 mg Intravenous Q2 Min PRN Ly Atwood PA-C        Or    naloxone (NARCAN) injection 0.2 mg  0.2 mg Intramuscular Q2 Min PRN Ly Atwood PA-C   0.2 mg at 11/20/24 0812    Or    naloxone (NARCAN) injection 0.4 mg  0.4 mg Intramuscular Q2 Min PRN Ly Atwood PA-C        ondansetron (ZOFRAN ODT) ODT tab 4 mg  4 mg Oral Q6H PRN Ly Atwood PA-C        oxyCODONE (ROXICODONE) tablet 5 mg  5 mg Oral Q4H PRN Ly Atwood PA-C   5 mg at 11/28/24 2005    sodium chloride (PF) 0.9% PF flush 10-20 mL  10-20 mL Intracatheter q1 min prn Ly Atwood PA-C   20 mL at 11/24/24 1256    sodium chloride (PF) 0.9% PF flush 10-20 mL  10-20 mL Intracatheter q1 min prn Ly Atwood PA-C           Cardiographics:    Telemetry monitoring demonstrates SR with rates in the 60s per my personal  review.    Imaging:  Recent Results (from the past 24 hours)   XR Chest 2 Views    Narrative    EXAM: XR CHEST 2 VIEWS  LOCATION: [facility]  DATE: 11/28/2024    INDICATION: hypoxia  COMPARISON: November 26, 2024      Impression    IMPRESSION: Mild to moderate left pleural effusion, increased from previous exam. Overlying basilar opacity. Small right pleural effusion, also increased from previous exam. No pneumothorax. Median sternotomy wires unchanged.       Labs, personally reviewed.  Hemoglobin   Date Value Ref Range Status   11/29/2024 7.5 (L) 11.7 - 15.7 g/dL Final   11/28/2024 7.3 (L) 11.7 - 15.7 g/dL Final   11/27/2024 7.7 (L) 11.7 - 15.7 g/dL Final   06/08/2019 12.9 11.7 - 15.7 g/dL Final   06/07/2019 13.6 11.7 - 15.7 g/dL Final   10/14/2016 13.2 11.7 - 15.7 g/dL Final     WBC   Date Value Ref Range Status   06/08/2019 8.8 4.0 - 11.0 10e9/L Final   06/07/2019 12.9 (H) 4.0 - 11.0 10e9/L Final   10/14/2016 9.9 4.0 - 11.0 10e9/L Final     WBC Count   Date Value Ref Range Status   11/29/2024 10.1 4.0 - 11.0 10e3/uL Final   11/28/2024 10.5 4.0 - 11.0 10e3/uL Final   11/27/2024 11.2 (H) 4.0 - 11.0 10e3/uL Final     Platelet Count   Date Value Ref Range Status   11/29/2024 421 150 - 450 10e3/uL Final   11/28/2024 358 150 - 450 10e3/uL Final   11/27/2024 355 150 - 450 10e3/uL Final   06/08/2019 291 150 - 450 10e9/L Final   06/07/2019 352 150 - 450 10e9/L Final   10/14/2016 297 150 - 450 10e9/L Final     Creatinine   Date Value Ref Range Status   11/29/2024 0.94 0.51 - 0.95 mg/dL Final   11/28/2024 0.92 0.51 - 0.95 mg/dL Final   11/27/2024 0.87 0.51 - 0.95 mg/dL Final   01/15/2021 0.81 0.52 - 1.04 mg/dL Final   06/08/2019 0.67 0.52 - 1.04 mg/dL Final   06/07/2019 0.73 0.52 - 1.04 mg/dL Final     Potassium   Date Value Ref Range Status   11/29/2024 3.9 3.4 - 5.3 mmol/L Final   11/28/2024 4.1 3.4 - 5.3 mmol/L Final   11/27/2024 3.7 3.4 - 5.3 mmol/L Final   01/31/2023 4.2 3.4 - 5.3 mmol/L Final    08/26/2021 3.9 3.4 - 5.3 mmol/L Final   01/15/2021 4.1 3.4 - 5.3 mmol/L Final   06/08/2019 4.0 3.4 - 5.3 mmol/L Final   06/07/2019 3.2 (L) 3.4 - 5.3 mmol/L Final     Potassium POCT   Date Value Ref Range Status   11/18/2024 3.4 3.4 - 5.3 mmol/L Final   11/18/2024 3.5 3.4 - 5.3 mmol/L Final   11/18/2024 4.1 3.4 - 5.3 mmol/L Final     Magnesium   Date Value Ref Range Status   11/29/2024 2.1 1.7 - 2.3 mg/dL Final   11/28/2024 2.0 1.7 - 2.3 mg/dL Final   11/27/2024 1.9 1.7 - 2.3 mg/dL Final   08/12/2011 2.2 1.6 - 2.3 mg/dL Final   05/06/2010 2.3 1.6 - 2.3 mg/dL Final   05/05/2010 2.3 1.6 - 2.3 mg/dL Final          I/O:  I/O last 3 completed shifts:  In: 2330 [P.O.:2330]  Out: 4150 [Urine:4150]       Physical Exam:    General: Patient seen up in chair, NAD. Conversant, pleasant, calm, cooperative on exam.  HEENT: No sclera icterus, moist mucosa  CV: RRR on monitor. Mild edema.  Incision C/D/I, no erythema or induration  Pulm: Unlabored breathing on supplemental oxygen via NC  Abd: SNTND, obese  Ext: Mild pedal edema, warm, LE incisions C/D/I without erythema or induration  Neuro: A&O, CNs grossly intact, face symmetric, speech clear      ASSESSMENT/PLAN:    Karyn Gamez is a 69 year old female who is s/p CABGx5    Active Problems:    CAD (coronary artery disease)        NEURO:  - Scheduled Tylenol/lidocaine patches and PRN Tylenol /oxycodone for pain  - Had not taken PTA Wellbutrin or Zoloft for several months--can reassess after hospital discharge.    CV:  - Pre-op EF 60-65%   - Normotensive  - Metoprolol 50mg two times a day  - ASA 81mg (reduced in s/o therapeutic AC)  - Discontinued Plavix 11/28 s/o near therapeutic INR  - Atorvastatin 80mg daily  - Chest tubes & TPW removed 11/27  - A-fib clinic follow up arranged    PULM:  - Extubated on POD 0   - Maintaining oxygen saturations on 0-1L NC  - Encourage pulmonary toilet  - CXR with modest L pleural effusion and small R pleural effusion--repeat CXR tomorrow    GI   NUTRITION:  - Diet: Cardiac  - Bowel regimen  - Transaminitis continuing to improve  - Stool cx neg    RENAL  FE:  - Adequate UOP  - Diuresis with 40mg PO Lasix BID  - Continue electrolyte replacement protocol  - Dry weight 237 from [facility]     HEME:  Acute blood loss anemia  - Hgb stable, no bleeding concerns  - Warfarin AC (afib), INR goal 2-3; INR subtherapeutic but rising  - ASA 81mg    ID:  - Lor op ppx complete, afebrile; no concerns for infection  - Empiric Vanc (ended 11/20) and Zosyn (ended 11/26). Cultures 11/20 neg  - Stool culture NG; c-diff cancelled    ENDO:  - Discontinued SSI - maintaining euglycemia on oral medication  - PTA Metformin restarted     PPx:  - DVT: SCDs, warfarin, OOB/ambulation   - GI: Protonix 40mg PO daily    DISPO:  - General telemetry status  - WOC consult today  - Medically Ready for Discharge: Anticipated in 2-4 Days   - Therapies recommending ARU at discharge      Patient seen & discussed with Dr. Arias.        _______  VERA GraceC  Cardiothoracic Surgery  [phone]

## 2024-11-29 NOTE — DISCHARGE INSTRUCTIONS
WOC DISCHARGE INSTRUCTIONS:  3 lap sites - mid upper abdomen  Soak wound with vashe moistened gauze for 5 minutes, pat dry  Apply nickel thick layer of medihoney gel to wound  Cover with mepilex  Change dressing every MWF + PRN if soiled, saturated, falls off    LUQ wound  Cleanse the area with vashe moistened gauze and pat dry.  Apply No sting film barrier to periwound skin.  Cover wound with  Mepilex  Change dressing Q MWF    Daily: César    Interdry(order#[id number]):    1.  Wash skin gently with vashe moistened gauze. Pat, do not rub.  2.  With clean scissors, cut enough fabric to cover the affected area, allowing for a minimum of 2 inches to extend beyond the skin fold for moisture evaporation.  3.  Lay a single layer of fabric in the skin fold, placing one edge into the base of the fold. Gently smooth the rest of the fabric over the skin, keeping it flat.  4.  Leave at least 2 inches of fabric exposed outside the skin fold.  5.  Secure the fabric in one of several ways: with the skin fold, with a small amount of skin-friendly tape, or tucked under clothing.  6.  Remove the fabric before bathing and reuse it when finished. When removing, gently separate the skin fold and lift away.    Helpful Hints    1. InterDry  can be written on with a ballpoint pen. It may be helpful to label each piece of InterDry  with the date you started using it.  2.  Each piece of InterDry  may be used up to 5 days, depending on fabric soiling, odor, amount of moisture and general skin condition. Replace InterDry  if it becomes soiled with blood, urine or stool.  3.  Do not use creams, ointments, or powders with InterDry  as it may interfere with product efficacy.

## 2024-11-29 NOTE — SIGNIFICANT EVENT
Significant Event Note    Time of event: 5:47 PM November 29, 2024    Description of event:  Responded to rapid response on patient.    POD#11 s/p CABG x5 (LIMA-LAD, rad-OM, rSVG-PDA, rSVG-RPL, rSVG-diag)    Patient had elevated HR to 130-150 with symptoms of shortness of breath, palpitations and anxiety.       /58 (BP Location: Left arm)   Pulse 74   Temp 98.6  F (37  C) (Oral)   Resp 20   Wt 114.4 kg (252 lb 4.8 oz)   LMP  (LMP Unknown)   SpO2 92%   BMI 39.60 kg/m    GENERAL: Healthy, alert and no distress  EYES: Eyes grossly normal to inspection.  No discharge or erythema, or obvious scleral/conjunctival abnormalities.  CV: irregularly irregular.       Plan:  Afib with RVR  Recent CABG x 5  -150 while maintaining normotension. Patient symptomatic with SOB and heart palpitations which are improving on my arrival. Chest tube removed 2 days ago per patient. Patient free of afib for last 7 days and just went back into afib. Unknown what could have caused it other than recent CABG. Patient was weaned off of O2 just today and so there may be slight cardiac hypoxia putting patient at risk to enter afib.   - EKG with afib with RVR  - Page out to CV surgery  - Awaiting response  - Safe to await response as patient is feeling subjectively much better and is normotensive  - If patient becomes symptomatic again or pressures begin to drop, may need to intervene without cardiothoracic blessing    Discussed with: bedside nurse    Chandler Pineda MD    Addendum  6:16 PM  Patient now back in NSR after evening metoprolol PO dose.

## 2024-11-29 NOTE — SIGNIFICANT EVENT
Notified patient went into afib with RVR, checking on patient she was short of breath, states she felt jump and awful, rapid response called, placed oxygen on at 2l/nc seen by resident, EKG done, CV surgery paged, feeling not as jump still having some shortness of breath with exertion, HR rate still afib in the 150s      Converted back to NSR in the 80s without intervention, gave evening dose of metoprolol, discuss with Dr Angel from CV surgery, no changes since converted back to NSR

## 2024-11-29 NOTE — PROGRESS NOTES
Pt walked in queen to elevator and back and tolerated fairly well. Did feel slightly SOB. 2L/NC applied during ambulation, sats maintained in low 90s during walk on oxygen. Difficulty getting good oxygen waveform due to colder fingers, oxygen applied at 2L/NC after walk until adequate oxygen reading, Oxygen weaned down to 1LNC, sats 94-96%. Pt up to bathroom and noted to have mod amt of bleeding/oozing from right lower abdomen, appears oozing from previous heparin injection site. No hard hematoma felt, area soft.Plavix and heparin discontinued, and did not get coumadin dose tonight per order. Pressure dressing and ice pack applied to oozing site. Message sent earlier to NP regarding oozing from tape burn spots on abdomen and chest. Mod amt of bruising noted on abdomen. No hard hematoma felt. Pt sensitive to adhesive, skin prep used prior to application of tape. Will have oncoming RN follow up oozing site.

## 2024-11-29 NOTE — PLAN OF CARE
sc  Patient Name: Karyn Gamez   MRN: [id number]   Date of Admission: 11/18/2024    Procedure: Procedure(s):  CORONARY ARTERY BYPASS GRAFT TIMES FIVE, LEFT INTERNAL MAMMARY ARTERY HARVEST, RIGHT ENDOSCOPIC VESSEL PROCUREMENT,  ECHOCARDIOGRAM, TRANSESPOPHAGEAL, WITH ANESTHESIA,  LEFT RADIAL ARTERY HARVEST    Post Op day #:11    Subjective (Patient focus/Primary Problem for shift): rest/sleep          Pain Goal 0 Pain Rating 7           Pain Medication/ Regime effective to reduce patient pain PRN oxycodone    Objective (Physical assessment):           Rhythm: sinus bradycardia/Normal Sinus             Bowel Activity: yes if Yes indicate when: 11/28/24          Bowel Medications: no            Incision: healing well          Incentive Spirometry Q 1-2 hour when awake:  yes Volume: 750          Epicardial Pacing Wires:  no            Patient Activity:           Up to chair for meals: not applicable          Ambulation with RN x2 (Not including CR): no           Shower Daily Yes          Nasal  Nozin BID AM/PM x 10 days: yes              Chest Tubes   Pleural: not applicable Draining: not applicable               Suction: not applicable              Mediastinal: not applicable Draining: not applicable               Suction: not applicable   Dressing Change Daily:no If No, why? To Be completed on Day Shift                     Urinary Catheter: no           Preventative WOC consult (need MD order): no       Assessment (Nursing primary shift focus): Patient reported incisional chest pain after sneezing around 2000; relieved with PRN oxycodone. Educated/Reinforced patient on use of heart pillow.  Patient slept in recliner overnight, stated it was more comfortable for her.   Sating well on 1LPM NC.   Skin sensitivity to adhesive; no drainage or oozing noted.    Plan (Patient Care Plan/focus):   Ambulation  Shower  Incision care  Wean O2  Flutter valve and IS       Dallas Killian RN   11/29/2024   5:20 AM

## 2024-11-29 NOTE — CONSULTS
[facility] Nurse Inpatient Assessment     Consulted for: several --chest and groin    Summary: patient sitting up in chair,  at bedside    Patient History (according to provider note(s):      Ms. Gamez is a 69 year old lady with a past medical history significant for cervicalgia, CAD, depression, HTN and obesity who presented to the hospital with an NSTEMI and noted to have severe multivessel disease now s/p 5V CABG with a complicated postoperative course including hypotension with new SHIRA and shock liver. Echocardiogram demonstrated LVOT physiology and the patient has continued to have improvement over the last several days. On the morning of 11/22, she developed A-fib with RVR and was hemodynamically unstable requiring cardioversion x 2, lidocaine, metoprolol and converted with 30 mg push of diltiazem.     Assessment:      Wound location: abdomen      11/29       abd                                               RL pannus    Last photo: 11/29  Wound due to: Skin Tear and Surgical Wound; pannus wound appears to be bleeding from a recent injection site. Wood was actively bleeding between cleansing and taking photo.   Wound history/plan of care: see above. LUQ likely from a MARSI, with 3 lap sited to the midline upper abdomen  Wound base: 50 % Serous scabbing, Superficial scab, and Dry, 50 % Adipose tissue     Palpation of the wound bed: normal      Drainage: scant     Description of drainage: serous     Measurements (length x width x depth, in cm): 3 lap sites 0.4  x 1.2  x  0.3 cm ; MARSI 2.3 x 3.1 x 0cm     Tunneling: N/A     Undermining: N/A  Periwound skin: Erythema- blanchable      Color: normal and consistent with surrounding tissue and red      Temperature: warm  Odor: none  Pain: denies , none  Pain interventions prior to dressing change: slow and gentle cares   Treatment goal: Heal , Increase moisture , and Promote epidermal migration  STATUS: initial  assessment  Supplies ordered: ordered vashe, medihoney    Patient stated her skin under her panus can become sweaty and itchy sometimes, will order interdry as well       Treatment Plan:     3 lap sites - mid upper abdomen  Soak wound with vashe moistened gauze for 5 minutes, pat dry  Apply nickel thick layer of medihoney gel to wound  Cover with mepilex  Change dressing every MWF + PRN if soiled, saturated, falls off    LUQ wound  Cleanse the area with vashe moistened gauze and pat dry.  Apply No sting film barrier to periwound skin.  Cover wound with  Mepilex  Change dressing Q MWF    Daily: Pannus    Interdry(order#[id number]):    1.  Wash skin gently with vashe moistened gauze. Pat, do not rub.  2.  With clean scissors, cut enough fabric to cover the affected area, allowing for a minimum of 2 inches to extend beyond the skin fold for moisture evaporation.  3.  Lay a single layer of fabric in the skin fold, placing one edge into the base of the fold. Gently smooth the rest of the fabric over the skin, keeping it flat.  4.  Leave at least 2 inches of fabric exposed outside the skin fold.  5.  Secure the fabric in one of several ways: with the skin fold, with a small amount of skin-friendly tape, or tucked under clothing.  6.  Remove the fabric before bathing and reuse it when finished. When removing, gently separate the skin fold and lift away.    Helpful Hints    1. InterDry  can be written on with a ballpoint pen. It may be helpful to label each piece of InterDry  with the date you started using it.  2.  Each piece of InterDry  may be used up to 5 days, depending on fabric soiling, odor, amount of moisture and general skin condition. Replace InterDry  if it becomes soiled with blood, urine or stool.  3.  Do not use creams, ointments, or powders with InterDry  as it may interfere with product efficacy.      Orders: Written    RECOMMEND PRIMARY TEAM ORDER: None, at this time  Education provided: plan of care and wound  progress  Discussed plan of care with: Patient and Family  [facility] nurse follow-up plan: weekly  Notify WOC if wound(s) deteriorate.  Nursing to notify the Provider(s) and re-consult the [facility] Nurse if new skin concern.    DATA:     Current support surface: Standard  Standard gel mattress (Isoflex)  Containment of urine/stool: Incontinence Protocol  BMI: Body mass index is 39.6 kg/m .   Active diet order: Orders Placed This Encounter      Combination Diet Low Saturated Fat Na <2400mg Diet     Output: I/O last 3 completed shifts:  In: 2330 [P.O.:2330]  Out: 4150 [Urine:4150]     Labs:   Recent Labs   Lab 11/29/24  0457 11/28/24  0424   ALBUMIN  --  3.0*   HGB 7.5* 7.3*   INR 1.61* 1.91*   WBC 10.1 10.5     Pressure injury risk assessment:   Sensory Perception: 4-->no impairment  Moisture: 3-->occasionally moist  Activity: 3-->walks occasionally  Mobility: 3-->slightly limited  Nutrition: 3-->adequate  Friction and Shear: 2-->potential problem  Sedrick Score: 18    PAULA Ordoñez RN CWOCN  Pager no longer in use, please contact through [employer] group: [facility] [employer]

## 2024-11-30 ENCOUNTER — APPOINTMENT (OUTPATIENT)
Dept: OCCUPATIONAL THERAPY | Facility: HOSPITAL | Age: 69
End: 2024-11-30
Attending: STUDENT IN AN ORGANIZED HEALTH CARE EDUCATION/TRAINING PROGRAM
Payer: COMMERCIAL

## 2024-11-30 ENCOUNTER — APPOINTMENT (OUTPATIENT)
Dept: RADIOLOGY | Facility: HOSPITAL | Age: 69
End: 2024-11-30
Attending: PHYSICIAN ASSISTANT
Payer: COMMERCIAL

## 2024-11-30 LAB
ANION GAP SERPL CALCULATED.3IONS-SCNC: 9 MMOL/L (ref 7–15)
ATRIAL RATE - MUSE: 144 BPM
BUN SERPL-MCNC: 11.9 MG/DL (ref 8–23)
CALCIUM SERPL-MCNC: 8.8 MG/DL (ref 8.8–10.4)
CHLORIDE SERPL-SCNC: 99 MMOL/L (ref 98–107)
CREAT SERPL-MCNC: 1 MG/DL (ref 0.51–0.95)
DIASTOLIC BLOOD PRESSURE - MUSE: NORMAL MMHG
EGFRCR SERPLBLD CKD-EPI 2021: 61 ML/MIN/1.73M2
ERYTHROCYTE [DISTWIDTH] IN BLOOD BY AUTOMATED COUNT: 15.4 % (ref 10–15)
GLUCOSE BLDC GLUCOMTR-MCNC: 101 MG/DL (ref 70–99)
GLUCOSE SERPL-MCNC: 106 MG/DL (ref 70–99)
HCO3 SERPL-SCNC: 33 MMOL/L (ref 22–29)
HCT VFR BLD AUTO: 25.6 % (ref 35–47)
HGB BLD-MCNC: 8 G/DL (ref 11.7–15.7)
INR PPP: 1.43 (ref 0.85–1.15)
INTERPRETATION ECG - MUSE: NORMAL
MAGNESIUM SERPL-MCNC: 2.1 MG/DL (ref 1.7–2.3)
MCH RBC QN AUTO: 28.9 PG (ref 26.5–33)
MCHC RBC AUTO-ENTMCNC: 31.3 G/DL (ref 31.5–36.5)
MCV RBC AUTO: 92 FL (ref 78–100)
P AXIS - MUSE: NORMAL DEGREES
PLATELET # BLD AUTO: 478 10E3/UL (ref 150–450)
POTASSIUM SERPL-SCNC: 4 MMOL/L (ref 3.4–5.3)
PR INTERVAL - MUSE: NORMAL MS
QRS DURATION - MUSE: 106 MS
QT - MUSE: 314 MS
QTC - MUSE: 486 MS
R AXIS - MUSE: 32 DEGREES
RBC # BLD AUTO: 2.77 10E6/UL (ref 3.8–5.2)
SODIUM SERPL-SCNC: 141 MMOL/L (ref 135–145)
SYSTOLIC BLOOD PRESSURE - MUSE: NORMAL MMHG
T AXIS - MUSE: 207 DEGREES
VENTRICULAR RATE- MUSE: 144 BPM
WBC # BLD AUTO: 11.3 10E3/UL (ref 4–11)

## 2024-11-30 PROCEDURE — 71046 X-RAY EXAM CHEST 2 VIEWS: CPT

## 2024-11-30 PROCEDURE — 250N000013 HC RX MED GY IP 250 OP 250 PS 637: Performed by: PHYSICIAN ASSISTANT

## 2024-11-30 PROCEDURE — 36415 COLL VENOUS BLD VENIPUNCTURE: CPT | Performed by: PHYSICIAN ASSISTANT

## 2024-11-30 PROCEDURE — 97110 THERAPEUTIC EXERCISES: CPT | Mod: GO

## 2024-11-30 PROCEDURE — 80048 BASIC METABOLIC PNL TOTAL CA: CPT | Performed by: PHYSICIAN ASSISTANT

## 2024-11-30 PROCEDURE — 85610 PROTHROMBIN TIME: CPT | Performed by: PHYSICIAN ASSISTANT

## 2024-11-30 PROCEDURE — 250N000013 HC RX MED GY IP 250 OP 250 PS 637: Performed by: SURGERY

## 2024-11-30 PROCEDURE — 83735 ASSAY OF MAGNESIUM: CPT | Performed by: STUDENT IN AN ORGANIZED HEALTH CARE EDUCATION/TRAINING PROGRAM

## 2024-11-30 PROCEDURE — 82310 ASSAY OF CALCIUM: CPT | Performed by: PHYSICIAN ASSISTANT

## 2024-11-30 PROCEDURE — 250N000011 HC RX IP 250 OP 636: Performed by: SURGERY

## 2024-11-30 PROCEDURE — 85027 COMPLETE CBC AUTOMATED: CPT | Performed by: PHYSICIAN ASSISTANT

## 2024-11-30 PROCEDURE — 210N000001 HC R&B IMCU HEART CARE

## 2024-11-30 PROCEDURE — 250N000011 HC RX IP 250 OP 636: Performed by: PHYSICIAN ASSISTANT

## 2024-11-30 PROCEDURE — 250N000013 HC RX MED GY IP 250 OP 250 PS 637: Performed by: STUDENT IN AN ORGANIZED HEALTH CARE EDUCATION/TRAINING PROGRAM

## 2024-11-30 RX ORDER — FUROSEMIDE 40 MG/1
40 TABLET ORAL
Status: DISCONTINUED | OUTPATIENT
Start: 2024-11-30 | End: 2024-12-03 | Stop reason: HOSPADM

## 2024-11-30 RX ORDER — WARFARIN SODIUM 2 MG/1
4 TABLET ORAL
Status: COMPLETED | OUTPATIENT
Start: 2024-11-30 | End: 2024-11-30

## 2024-11-30 RX ORDER — HEPARIN SODIUM 5000 [USP'U]/.5ML
5000 INJECTION, SOLUTION INTRAVENOUS; SUBCUTANEOUS EVERY 8 HOURS
Status: COMPLETED | OUTPATIENT
Start: 2024-11-30 | End: 2024-12-01

## 2024-11-30 RX ORDER — CEPHALEXIN 500 MG/1
500 CAPSULE ORAL EVERY 12 HOURS SCHEDULED
Status: DISCONTINUED | OUTPATIENT
Start: 2024-11-30 | End: 2024-12-03 | Stop reason: HOSPADM

## 2024-11-30 RX ORDER — FUROSEMIDE 40 MG/1
40 TABLET ORAL DAILY
Status: DISCONTINUED | OUTPATIENT
Start: 2024-12-01 | End: 2024-11-30

## 2024-11-30 RX ADMIN — CEPHALEXIN 500 MG: 500 CAPSULE ORAL at 16:34

## 2024-11-30 RX ADMIN — ONDANSETRON 4 MG: 4 TABLET, ORALLY DISINTEGRATING ORAL at 16:30

## 2024-11-30 RX ADMIN — SENNOSIDES AND DOCUSATE SODIUM 1 TABLET: 8.6; 5 TABLET ORAL at 08:03

## 2024-11-30 RX ADMIN — HEPARIN SODIUM 5000 UNITS: 5000 INJECTION, SOLUTION INTRAVENOUS; SUBCUTANEOUS at 21:41

## 2024-11-30 RX ADMIN — ASPIRIN 81 MG CHEWABLE TABLET 81 MG: 81 TABLET CHEWABLE at 08:03

## 2024-11-30 RX ADMIN — SENNOSIDES AND DOCUSATE SODIUM 1 TABLET: 8.6; 5 TABLET ORAL at 21:41

## 2024-11-30 RX ADMIN — METOPROLOL TARTRATE 50 MG: 25 TABLET, FILM COATED ORAL at 08:03

## 2024-11-30 RX ADMIN — CEPHALEXIN 500 MG: 500 CAPSULE ORAL at 21:41

## 2024-11-30 RX ADMIN — AMLODIPINE BESYLATE 2.5 MG: 2.5 TABLET ORAL at 07:09

## 2024-11-30 RX ADMIN — PANTOPRAZOLE SODIUM 40 MG: 40 TABLET, DELAYED RELEASE ORAL at 07:09

## 2024-11-30 RX ADMIN — HEPARIN SODIUM 5000 UNITS: 5000 INJECTION, SOLUTION INTRAVENOUS; SUBCUTANEOUS at 13:42

## 2024-11-30 RX ADMIN — FUROSEMIDE 40 MG: 40 TABLET ORAL at 16:29

## 2024-11-30 RX ADMIN — WARFARIN SODIUM 4 MG: 2 TABLET ORAL at 16:35

## 2024-11-30 RX ADMIN — METFORMIN ER 500 MG 500 MG: 500 TABLET ORAL at 16:29

## 2024-11-30 RX ADMIN — OXYCODONE HYDROCHLORIDE 5 MG: 5 TABLET ORAL at 18:24

## 2024-11-30 RX ADMIN — ATORVASTATIN CALCIUM 80 MG: 40 TABLET, FILM COATED ORAL at 21:41

## 2024-11-30 RX ADMIN — METOPROLOL TARTRATE 62.5 MG: 25 TABLET, FILM COATED ORAL at 21:41

## 2024-11-30 RX ADMIN — FUROSEMIDE 40 MG: 40 TABLET ORAL at 08:03

## 2024-11-30 RX ADMIN — THERA TABS 1 TABLET: TAB at 08:02

## 2024-11-30 ASSESSMENT — ACTIVITIES OF DAILY LIVING (ADL)
ADLS_ACUITY_SCORE: 43

## 2024-11-30 NOTE — PROGRESS NOTES
"CVTS Daily Progress Note   POD 12 s/p CABG x5 (LIMA-LAD, rad-OM, rSVG-PDA, rSVG-RPL, rSVG-diag)  Attending: Gorge  LOS: 12    SUBJECTIVE/INTERVAL EVENTS:    Brief period of symptomatic afib RVR overnight that resolved spontaneously after her evening dose of metoprolol. Patient progressing well. Maintaining oxygen saturations on RA (except when she was symptomatic from her afib). Normotensive. Ambulating with therapy. Pain well controlled. +BM, tolerating PO intake - good appetite. UOP adequate. Hgb stable. Patient denies new chest pain, shortness of breath, abdominal pain, and nausea. Patient has no questions today.    OBJECTIVE:  Temp:  [98.3  F (36.8  C)-99.2  F (37.3  C)] 98.3  F (36.8  C)  Pulse:  [67-83] 76  Resp:  [16-22] 16  BP: (107-144)/(55-64) 112/59  SpO2:  [86 %-94 %] 93 %  Vitals:    11/26/24 0417 11/27/24 0532 11/28/24 0624 11/29/24 0543   Weight: 116.5 kg (256 lb 12.8 oz) 116.2 kg (256 lb 3.2 oz) 115.6 kg (254 lb 12.8 oz) 114.4 kg (252 lb 4.8 oz)    11/30/24 0653   Weight: 113.5 kg (250 lb 4.8 oz)       Clinically Significant Risk Factors               # Hypoalbuminemia: Lowest albumin = 2.9 g/dL at 11/26/2024  6:17 AM, will monitor as appropriate     # Hypertension: Noted on problem list     # Acute Hypercapnic Respiratory Failure: based on arterial blood gas results.  Continue supplemental oxygen and ventilatory support as indicated.  # Acute Hypercapnic Respiratory Failure: based on venous blood gas results.  Continue supplemental oxygen and ventilatory support as indicated.         # Obesity: Estimated body mass index is 39.29 kg/m  as calculated from the following:    Height as of 11/7/24: 1.7 m (5' 6.93\").    Weight as of this encounter: 113.5 kg (250 lb 4.8 oz).        # Financial/Environmental Concerns:     # History of CABG: noted on surgical history        Current Medications:    Scheduled Meds:  Current Facility-Administered Medications   Medication Dose Route Frequency Provider Last Rate " Last Admin    amLODIPine (NORVASC) tablet 2.5 mg  2.5 mg Oral Daily Ly Atwood PA-C   2.5 mg at 11/30/24 0709    aspirin (ASA) chewable tablet 81 mg  81 mg Oral Daily Ladonna Camargo NP   81 mg at 11/30/24 0803    atorvastatin (LIPITOR) tablet 80 mg  80 mg Oral At Bedtime Ly Atwood PA-C   80 mg at 11/29/24 2021    [START ON 12/1/2024] furosemide (LASIX) tablet 40 mg  40 mg Oral Daily Ladonna Camargo NP        heparin ANTICOAGULANT injection 5,000 Units  5,000 Units Subcutaneous Q8H Ladonna Camargo NP        iopamidol (ISOVUE-370) solution 117 mL  117 mL Intravenous Once Ly Atwood PA-C        And    sodium chloride 0.9 % bag for CT scan flush use  100 mL As instructed Once Ly Atwood PA-C        metFORMIN (GLUCOPHAGE XR) 24 hr tablet 500 mg  500 mg Oral Daily with supper Ly Atwood PA-C   500 mg at 11/29/24 1715    [START ON 12/1/2024] metoprolol succinate ER (TOPROL XL) 24 hr tablet 125 mg  125 mg Oral Daily Ladonna Camargo NP        metoprolol tartrate (LOPRESSOR) half-tab 62.5 mg  62.5 mg Oral Once Ladonna Camargo NP        multivitamin, therapeutic (THERA-VIT) tablet 1 tablet  1 tablet Oral Daily Ly Atwood PA-C   1 tablet at 11/30/24 0802    pantoprazole (PROTONIX) EC tablet 40 mg  40 mg Oral QAM AC Ly Atwood PA-C   40 mg at 11/30/24 0709    polyethylene glycol (MIRALAX) Packet 17 g  17 g Oral Daily Ly Atwood PA-C   17 g at 11/27/24 0814    senna-docusate (SENOKOT-S/PERICOLACE) 8.6-50 MG per tablet 1 tablet  1 tablet Oral BID Ly Atwood PA-C   1 tablet at 11/30/24 0803    sodium chloride (PF) 0.9% PF flush 10 mL  10 mL Intracatheter Q8H Ly Atwood PA-C   10 mL at 11/30/24 0430    Warfarin Dose Required Daily - Pharmacist Managed  1 each Oral See Admin Instructions Ly Atwood PA-C         Continuous Infusions:  Current Facility-Administered Medications    Medication Dose Route Frequency Provider Last Rate Last Admin     PRN Meds:  Current Facility-Administered Medications   Medication Dose Route Frequency Provider Last Rate Last Admin    acetaminophen (TYLENOL) tablet 650 mg  650 mg Oral Q4H PRN Ly Atwood PA-C   650 mg at 11/27/24 2325    bisacodyl (DULCOLAX) suppository 10 mg  10 mg Rectal Daily PRN Ly Atwood PA-C        glucose gel 15-30 g  15-30 g Oral Q15 Min PRN Ly Atwood PA-C        Or    dextrose 50 % injection 25-50 mL  25-50 mL Intravenous Q15 Min PRN Ly Atwood PA-C        Or    glucagon injection 1 mg  1 mg Subcutaneous Q15 Min PRN Ly Atwood PA-C        hydrALAZINE (APRESOLINE) injection 10 mg  10 mg Intravenous Q30 Min PRN Ly Atwood PA-C        lidocaine (LMX4) cream   Topical Q1H PRN Ly Atwood PA-C        lidocaine 1 % 0.1-1 mL  0.1-1 mL Other Q1H PRN Ly Atwood PA-C   1 mL at 11/24/24 1205    magnesium hydroxide (MILK OF MAGNESIA) suspension 30 mL  30 mL Oral Daily PRN Ly Atwood PA-C        naloxone (NARCAN) injection 0.2 mg  0.2 mg Intravenous Q2 Min PRN Ly Atwood PA-C        Or    naloxone (NARCAN) injection 0.4 mg  0.4 mg Intravenous Q2 Min PRN Ly Atwood PA-C        Or    naloxone (NARCAN) injection 0.2 mg  0.2 mg Intramuscular Q2 Min PRN Ly Atwood PA-C   0.2 mg at 11/20/24 0812    Or    naloxone (NARCAN) injection 0.4 mg  0.4 mg Intramuscular Q2 Min PRN Ly Atwood PA-C        ondansetron (ZOFRAN ODT) ODT tab 4 mg  4 mg Oral Q6H PRN Ly Atwood PA-C   4 mg at 11/29/24 1939    oxyCODONE (ROXICODONE) tablet 5 mg  5 mg Oral Q4H PRN Ly Atwood PA-C   5 mg at 11/28/24 2005    sodium chloride (PF) 0.9% PF flush 10-20 mL  10-20 mL Intracatheter q1 min prn Ly Atwood PA-C   20 mL at 11/24/24 1256    sodium chloride (PF) 0.9% PF flush 10-20 mL   10-20 mL Intracatheter q1 min Ly Nowak PA-C           Cardiographics:    Telemetry monitoring demonstrates SR with rates in the 60-70s per my personal review.    Imaging:  Recent Results (from the past 24 hours)   XR Chest 2 Views    Narrative    EXAM: XR CHEST 2 VIEWS  LOCATION: [facility]  DATE: 11/30/2024    INDICATION: eval L pleural effusion  COMPARISON: 11/28/2024 and multiple prior studies      Impression    IMPRESSION: Poststernotomy changes. No change in the small basilar effusions, left greater than right with left lower lobe atelectasis silhouetting the left hemidiaphragm. Mild cardiomegaly is stable. No pneumothorax or signs of failure.       Labs, personally reviewed.  Hemoglobin   Date Value Ref Range Status   11/30/2024 8.0 (L) 11.7 - 15.7 g/dL Final   11/29/2024 7.5 (L) 11.7 - 15.7 g/dL Final   11/28/2024 7.3 (L) 11.7 - 15.7 g/dL Final   06/08/2019 12.9 11.7 - 15.7 g/dL Final   06/07/2019 13.6 11.7 - 15.7 g/dL Final   10/14/2016 13.2 11.7 - 15.7 g/dL Final     WBC   Date Value Ref Range Status   06/08/2019 8.8 4.0 - 11.0 10e9/L Final   06/07/2019 12.9 (H) 4.0 - 11.0 10e9/L Final   10/14/2016 9.9 4.0 - 11.0 10e9/L Final     WBC Count   Date Value Ref Range Status   11/30/2024 11.3 (H) 4.0 - 11.0 10e3/uL Final   11/29/2024 10.1 4.0 - 11.0 10e3/uL Final   11/28/2024 10.5 4.0 - 11.0 10e3/uL Final     Platelet Count   Date Value Ref Range Status   11/30/2024 478 (H) 150 - 450 10e3/uL Final   11/29/2024 421 150 - 450 10e3/uL Final   11/28/2024 358 150 - 450 10e3/uL Final   06/08/2019 291 150 - 450 10e9/L Final   06/07/2019 352 150 - 450 10e9/L Final   10/14/2016 297 150 - 450 10e9/L Final     Creatinine   Date Value Ref Range Status   11/30/2024 1.00 (H) 0.51 - 0.95 mg/dL Final   11/29/2024 0.94 0.51 - 0.95 mg/dL Final   11/28/2024 0.92 0.51 - 0.95 mg/dL Final   01/15/2021 0.81 0.52 - 1.04 mg/dL Final   06/08/2019 0.67 0.52 - 1.04 mg/dL Final   06/07/2019 0.73  0.52 - 1.04 mg/dL Final     Potassium   Date Value Ref Range Status   11/30/2024 4.0 3.4 - 5.3 mmol/L Final   11/29/2024 3.9 3.4 - 5.3 mmol/L Final   11/28/2024 4.1 3.4 - 5.3 mmol/L Final   01/31/2023 4.2 3.4 - 5.3 mmol/L Final   08/26/2021 3.9 3.4 - 5.3 mmol/L Final   01/15/2021 4.1 3.4 - 5.3 mmol/L Final   06/08/2019 4.0 3.4 - 5.3 mmol/L Final   06/07/2019 3.2 (L) 3.4 - 5.3 mmol/L Final     Potassium POCT   Date Value Ref Range Status   11/18/2024 3.4 3.4 - 5.3 mmol/L Final   11/18/2024 3.5 3.4 - 5.3 mmol/L Final   11/18/2024 4.1 3.4 - 5.3 mmol/L Final     Magnesium   Date Value Ref Range Status   11/30/2024 2.1 1.7 - 2.3 mg/dL Final   11/29/2024 2.1 1.7 - 2.3 mg/dL Final   11/28/2024 2.0 1.7 - 2.3 mg/dL Final   08/12/2011 2.2 1.6 - 2.3 mg/dL Final   05/06/2010 2.3 1.6 - 2.3 mg/dL Final   05/05/2010 2.3 1.6 - 2.3 mg/dL Final          I/O:  I/O last 3 completed shifts:  In: 350 [P.O.:350]  Out: 1800 [Urine:1800]       Physical Exam:    General: Patient seen up in chair, NAD. Conversant, pleasant, calm, cooperative on exam.  HEENT: No sclera icterus, moist mucosa  CV: RRR on monitor - SR with occasional PACs. Minimal edema.  Incision C/D/I, no erythema or induration  Pulm: Unlabored breathing on RA  Abd: SNTND, obese  Ext: Minimal pedal edema, warm, RLE incisions C/D/I, mild localized erythema at knee incision  Neuro: A&O, CNs grossly intact, face symmetric, speech clear      ASSESSMENT/PLAN:    Karyn Gamez is a 69 year old female who is s/p CABG x5; postop course c/b afib but now back in SR.    Active Problems:    CAD (coronary artery disease)        NEURO:  - Scheduled Tylenol/lidocaine patches and PRN Tylenol /oxycodone for pain  - Had not taken PTA Wellbutrin or Zoloft for several months--can reassess after hospital discharge.    CV:  - Pre-op EF 60-65%   - Normotensive  - Increase Metoprolol to 62.5 mg this evening and then transition to metoprolol succinate 125 mg daily in AM  - ASA 81 mg (reduced in s/o  therapeutic AC)  - Plavix discontinued 11/28 in s/o near therapeutic INR  - Atorvastatin 80mg daily  - Chest tubes & TPW removed 11/27  - A-fib clinic follow up arranged     PULM:  - Extubated on POD 0   - Maintaining oxygen saturations on RA  - Encourage pulmonary toilet  - CXR with small pleural effusions L>R - no indication for thoracentesis    GI  NUTRITION:  - Diet: Cardiac/consistent carb  - Bowel regimen  - Stool cx neg    RENAL  FE:  - Adequate UOP  - Diuresis with 40mg PO Lasix BID  - Continue electrolyte replacement protocol  - Dry weight 237 from [facility]     HEME:  Acute blood loss anemia  - Hgb stable, no bleeding concerns  - Warfarin AC (afib), INR goal 2-3; INR subtherapeutic and falling  - ASA 81mg    ID:  - Lor op ppx complete, afebrile; no concerns for infection  - Empiric Vanc (ended 11/20) and Zosyn (ended 11/26). Cultures 11/20 neg  - Will start on a course of Keflex for mild erythema at posterior knee EVH incision  - Stool culture NG; c-diff cancelled    ENDO:  - SSI discontinued as maintaining euglycemia on oral medication  - PTA Metformin restarted     PPx:  - DVT: SCDs, warfarin, OOB/ambulation, resume subQ heparin x3 doses today given subtherapeutic INR and low-moderate mobilization   - GI: Protonix 40mg PO daily    DISPO:  - General telemetry status  - Medically Ready for Discharge: Anticipated Today   - Therapies recommending ARU at discharge - medically ready for discharge pending bed availability      Patient seen & discussed with Dr. Arias.      Ladonna Camargo, CNP, ACNPC-AG  Cardiothoracic Surgery  [employer] Pager [phone]

## 2024-11-30 NOTE — PLAN OF CARE
Goal Outcome Evaluation:             sc  Patient Name: Karyn Gamez   MRN: [id number]   Date of Admission: 11/18/2024    Procedure: Procedure(s):  CORONARY ARTERY BYPASS GRAFT TIMES FIVE, LEFT INTERNAL MAMMARY ARTERY HARVEST, RIGHT ENDOSCOPIC VESSEL PROCUREMENT,  ECHOCARDIOGRAM, TRANSESPOPHAGEAL, WITH ANESTHESIA,  LEFT RADIAL ARTERY HARVEST    Post Op day #:12    Subjective (Patient focus/Primary Problem for shift): nausea          Pain Goal0 Pain Rating0           Pain Medication/ Regime effective to reduce patient pain0    Objective (Physical assessment):           Rhythm: normal sinus rhythm            Bowel Activity: yes if Yes indicate when: 11/30/2024          Bowel Medications: yes            Incision: healing well          Incentive Spirometry Q 1-2 hour when awake:  yes Volume: 950          Epicardial Pacing Wires:  no            Patient Activity:           Up to chair for meals: yes          Ambulation with RN x2 (Not including CR): yes            Is patient in home clothes:no             Chest Tubes   Pleural: no Draining: no               Suction: no              Mediastinal: no Draining: no               Suction: no   Dressing Change Daily:not applicable If No, why?                     Urinary Catheter: no           Preventative WOC consult (need MD order): no       Assessment (Nursing primary shift focus): nausea    Plan (Patient Care Plan/focus):complain of nausea, zofran given, up in chair and ambulating with SBA      Lisa Paulino RN   11/30/2024   5:36 PM

## 2024-11-30 NOTE — PLAN OF CARE
Goal Outcome Evaluation:      Plan of Care Reviewed With: patient    Overall Patient Progress: improvingOverall Patient Progress: improving       sc  Patient Name: Karyn Gamez   MRN: [id number]   Date of Admission: 11/18/2024    Procedure: Procedure(s):  CORONARY ARTERY BYPASS GRAFT TIMES FIVE, LEFT INTERNAL MAMMARY ARTERY HARVEST, RIGHT ENDOSCOPIC VESSEL PROCUREMENT,  ECHOCARDIOGRAM, TRANSESPOPHAGEAL, WITH ANESTHESIA,  LEFT RADIAL ARTERY HARVEST    Post Op day #:12    Subjective (Patient focus/Primary Problem for shift): rest, heart rate monitor          Pain Goal 0 Pain Rating 0           Pain Medication/ Regime effective to reduce patient pain yes    Objective (Physical assessment):           Rhythm: normal sinus rhythm            Bowel Activity: yes if Yes indicate when: 11/28          Bowel Medications: yes            Incision: healing well          Incentive Spirometry Q 1-2 hour when awake:  yes Volume: 900          Epicardial Pacing Wires:  not applicable            Patient Activity:           Up to chair for meals: yes          Ambulation with RN x2 (Not including CR): not applicable           Shower Daily {YES/NO/NA:[id number]          Nasal  Nozin BID AM/PM x 10 days: yes              Chest Tubes   Pleural: not applicable Draining: not applicable               Suction: not applicable              Mediastinal: not applicable Draining: not applicable               Suction: not applicable   Dressing Change Daily:not applicable If No, why?                     Urinary Catheter: not applicable           Preventative WOC consult (need MD order): yes       Assessment (Nursing primary shift focus): A & O. VSS. Desating into the upper 80s, placed on 2L of O2 this morning. Denies pain or chest or sob. Sinus rhythm. Up to bathroom with assist of 1. Voiding well. K+ and Mag protocol, recheck tomorrow.    Plan (Patient Care Plan/focus): Wean off O2. Encourage activity and IS use.      Shanique Juarez, RN    11/30/2024   6:18 AM

## 2024-11-30 NOTE — PLAN OF CARE
Goal Outcome Evaluation:         sc  Patient Name: Karyn Gamez   MRN: [id number]   Date of Admission: 11/18/2024    Procedure: Procedure(s):  CORONARY ARTERY BYPASS GRAFT TIMES FIVE, LEFT INTERNAL MAMMARY ARTERY HARVEST, RIGHT ENDOSCOPIC VESSEL PROCUREMENT,  ECHOCARDIOGRAM, TRANSESPOPHAGEAL, WITH ANESTHESIA,  LEFT RADIAL ARTERY HARVEST    Post Op day #:12    Subjective (Patient focus/Primary Problem for shift): Activity          Pain Goal0 Pain Rating0           Pain Medication/ Regime effective to reduce patient pain    Objective (Physical assessment):           Rhythm: normal sinus rhythm            Bowel Activity: yes if Yes indicate when: 11/30/2024          Bowel Medications: yes            Incision: healing well          Incentive Spirometry Q 1-2 hour when awake:  yes Volume: 900          Epicardial Pacing Wires:  not applicable            Patient Activity:           Up to chair for meals: yes          Ambulation with RN x2 (Not including CR): yes            Is patient in home clothes:no             Chest Tubes   Pleural: not applicable Draining: not applicable               Suction: not applicable              Mediastinal: not applicable Draining: not applicable               Suction: not applicable   Dressing Change Daily:not applicable If No, why?open to air                     Urinary Catheter: not applicable           Preventative WOC consult (need MD order): not applicable       Assessment (Nursing primary shift focus): Activity     Plan (Patient Care Plan/focus): up ambulated in, ambulating to bathroom, up in chair for meals, moves with SBA      Lisa Paulino RN   11/30/2024   1:33 PM

## 2024-12-01 ENCOUNTER — APPOINTMENT (OUTPATIENT)
Dept: OCCUPATIONAL THERAPY | Facility: HOSPITAL | Age: 69
End: 2024-12-01
Attending: STUDENT IN AN ORGANIZED HEALTH CARE EDUCATION/TRAINING PROGRAM
Payer: COMMERCIAL

## 2024-12-01 LAB
ANION GAP SERPL CALCULATED.3IONS-SCNC: 8 MMOL/L (ref 7–15)
BUN SERPL-MCNC: 11.7 MG/DL (ref 8–23)
CALCIUM SERPL-MCNC: 8.5 MG/DL (ref 8.8–10.4)
CHLORIDE SERPL-SCNC: 99 MMOL/L (ref 98–107)
CREAT SERPL-MCNC: 0.98 MG/DL (ref 0.51–0.95)
EGFRCR SERPLBLD CKD-EPI 2021: 63 ML/MIN/1.73M2
ERYTHROCYTE [DISTWIDTH] IN BLOOD BY AUTOMATED COUNT: 15.7 % (ref 10–15)
GLUCOSE BLDC GLUCOMTR-MCNC: 99 MG/DL (ref 70–99)
GLUCOSE SERPL-MCNC: 100 MG/DL (ref 70–99)
HCO3 SERPL-SCNC: 34 MMOL/L (ref 22–29)
HCT VFR BLD AUTO: 24.9 % (ref 35–47)
HGB BLD-MCNC: 8 G/DL (ref 11.7–15.7)
INR PPP: 1.69 (ref 0.85–1.15)
MAGNESIUM SERPL-MCNC: 2.1 MG/DL (ref 1.7–2.3)
MCH RBC QN AUTO: 29.4 PG (ref 26.5–33)
MCHC RBC AUTO-ENTMCNC: 32.1 G/DL (ref 31.5–36.5)
MCV RBC AUTO: 92 FL (ref 78–100)
PLATELET # BLD AUTO: 471 10E3/UL (ref 150–450)
POTASSIUM SERPL-SCNC: 3.6 MMOL/L (ref 3.4–5.3)
RBC # BLD AUTO: 2.72 10E6/UL (ref 3.8–5.2)
SODIUM SERPL-SCNC: 141 MMOL/L (ref 135–145)
WBC # BLD AUTO: 11.1 10E3/UL (ref 4–11)

## 2024-12-01 PROCEDURE — 82435 ASSAY OF BLOOD CHLORIDE: CPT | Performed by: PHYSICIAN ASSISTANT

## 2024-12-01 PROCEDURE — 36415 COLL VENOUS BLD VENIPUNCTURE: CPT | Performed by: PHYSICIAN ASSISTANT

## 2024-12-01 PROCEDURE — 83735 ASSAY OF MAGNESIUM: CPT | Performed by: STUDENT IN AN ORGANIZED HEALTH CARE EDUCATION/TRAINING PROGRAM

## 2024-12-01 PROCEDURE — 250N000013 HC RX MED GY IP 250 OP 250 PS 637: Performed by: PHYSICIAN ASSISTANT

## 2024-12-01 PROCEDURE — 85610 PROTHROMBIN TIME: CPT | Performed by: PHYSICIAN ASSISTANT

## 2024-12-01 PROCEDURE — 80048 BASIC METABOLIC PNL TOTAL CA: CPT | Performed by: PHYSICIAN ASSISTANT

## 2024-12-01 PROCEDURE — 210N000001 HC R&B IMCU HEART CARE

## 2024-12-01 PROCEDURE — 250N000013 HC RX MED GY IP 250 OP 250 PS 637: Performed by: SURGERY

## 2024-12-01 PROCEDURE — 85027 COMPLETE CBC AUTOMATED: CPT | Performed by: PHYSICIAN ASSISTANT

## 2024-12-01 PROCEDURE — 250N000013 HC RX MED GY IP 250 OP 250 PS 637: Performed by: STUDENT IN AN ORGANIZED HEALTH CARE EDUCATION/TRAINING PROGRAM

## 2024-12-01 PROCEDURE — 250N000011 HC RX IP 250 OP 636: Performed by: SURGERY

## 2024-12-01 PROCEDURE — 250N000009 HC RX 250: Performed by: THORACIC SURGERY (CARDIOTHORACIC VASCULAR SURGERY)

## 2024-12-01 PROCEDURE — 97110 THERAPEUTIC EXERCISES: CPT | Mod: GO

## 2024-12-01 PROCEDURE — 82565 ASSAY OF CREATININE: CPT | Performed by: PHYSICIAN ASSISTANT

## 2024-12-01 RX ORDER — POTASSIUM CHLORIDE 1500 MG/1
20 TABLET, EXTENDED RELEASE ORAL ONCE
Status: COMPLETED | OUTPATIENT
Start: 2024-12-01 | End: 2024-12-01

## 2024-12-01 RX ORDER — OXYCODONE HYDROCHLORIDE 5 MG/1
5 TABLET ORAL EVERY 8 HOURS PRN
Status: DISCONTINUED | OUTPATIENT
Start: 2024-12-01 | End: 2024-12-03 | Stop reason: HOSPADM

## 2024-12-01 RX ORDER — AMIODARONE HYDROCHLORIDE 200 MG/1
400 TABLET ORAL 2 TIMES DAILY
Status: DISCONTINUED | OUTPATIENT
Start: 2024-12-01 | End: 2024-12-03 | Stop reason: HOSPADM

## 2024-12-01 RX ORDER — METOPROLOL TARTRATE 1 MG/ML
2.5 INJECTION, SOLUTION INTRAVENOUS ONCE
Status: COMPLETED | OUTPATIENT
Start: 2024-12-01 | End: 2024-12-01

## 2024-12-01 RX ORDER — AMIODARONE HYDROCHLORIDE 200 MG/1
200 TABLET ORAL 2 TIMES DAILY
Status: DISCONTINUED | OUTPATIENT
Start: 2024-12-05 | End: 2024-12-03 | Stop reason: HOSPADM

## 2024-12-01 RX ORDER — AMIODARONE HYDROCHLORIDE 200 MG/1
200 TABLET ORAL DAILY
Status: DISCONTINUED | OUTPATIENT
Start: 2024-12-07 | End: 2024-12-03 | Stop reason: HOSPADM

## 2024-12-01 RX ORDER — WARFARIN SODIUM 2 MG/1
4 TABLET ORAL
Status: COMPLETED | OUTPATIENT
Start: 2024-12-01 | End: 2024-12-01

## 2024-12-01 RX ADMIN — POTASSIUM CHLORIDE 20 MEQ: 1500 TABLET, EXTENDED RELEASE ORAL at 08:04

## 2024-12-01 RX ADMIN — THERA TABS 1 TABLET: TAB at 08:03

## 2024-12-01 RX ADMIN — SENNOSIDES AND DOCUSATE SODIUM 1 TABLET: 8.6; 5 TABLET ORAL at 20:25

## 2024-12-01 RX ADMIN — PANTOPRAZOLE SODIUM 40 MG: 40 TABLET, DELAYED RELEASE ORAL at 06:39

## 2024-12-01 RX ADMIN — SENNOSIDES AND DOCUSATE SODIUM 1 TABLET: 8.6; 5 TABLET ORAL at 08:04

## 2024-12-01 RX ADMIN — AMIODARONE HYDROCHLORIDE 150 MG: 1.5 INJECTION, SOLUTION INTRAVENOUS at 06:20

## 2024-12-01 RX ADMIN — METOPROLOL TARTRATE 2.5 MG: 5 INJECTION INTRAVENOUS at 03:05

## 2024-12-01 RX ADMIN — HEPARIN SODIUM 5000 UNITS: 5000 INJECTION, SOLUTION INTRAVENOUS; SUBCUTANEOUS at 03:15

## 2024-12-01 RX ADMIN — ATORVASTATIN CALCIUM 80 MG: 40 TABLET, FILM COATED ORAL at 20:25

## 2024-12-01 RX ADMIN — FUROSEMIDE 40 MG: 40 TABLET ORAL at 08:04

## 2024-12-01 RX ADMIN — ASPIRIN 81 MG CHEWABLE TABLET 81 MG: 81 TABLET CHEWABLE at 08:04

## 2024-12-01 RX ADMIN — FUROSEMIDE 40 MG: 40 TABLET ORAL at 18:04

## 2024-12-01 RX ADMIN — AMLODIPINE BESYLATE 2.5 MG: 2.5 TABLET ORAL at 06:39

## 2024-12-01 RX ADMIN — METFORMIN ER 500 MG 500 MG: 500 TABLET ORAL at 18:03

## 2024-12-01 RX ADMIN — AMIODARONE HYDROCHLORIDE 400 MG: 200 TABLET ORAL at 20:25

## 2024-12-01 RX ADMIN — CEPHALEXIN 500 MG: 500 CAPSULE ORAL at 20:25

## 2024-12-01 RX ADMIN — AMIODARONE HYDROCHLORIDE 400 MG: 200 TABLET ORAL at 08:40

## 2024-12-01 RX ADMIN — METOPROLOL SUCCINATE 125 MG: 25 TABLET, EXTENDED RELEASE ORAL at 08:03

## 2024-12-01 RX ADMIN — AMIODARONE HYDROCHLORIDE 1 MG/MIN: 1.8 INJECTION, SOLUTION INTRAVENOUS at 06:42

## 2024-12-01 RX ADMIN — CEPHALEXIN 500 MG: 500 CAPSULE ORAL at 08:03

## 2024-12-01 RX ADMIN — WARFARIN SODIUM 4 MG: 2 TABLET ORAL at 18:04

## 2024-12-01 ASSESSMENT — ACTIVITIES OF DAILY LIVING (ADL)
ADLS_ACUITY_SCORE: 40
ADLS_ACUITY_SCORE: 43
ADLS_ACUITY_SCORE: 43
ADLS_ACUITY_SCORE: 40
ADLS_ACUITY_SCORE: 40
ADLS_ACUITY_SCORE: 43
ADLS_ACUITY_SCORE: 43
ADLS_ACUITY_SCORE: 40
ADLS_ACUITY_SCORE: 43
ADLS_ACUITY_SCORE: 43
ADLS_ACUITY_SCORE: 40
ADLS_ACUITY_SCORE: 43
ADLS_ACUITY_SCORE: 40
ADLS_ACUITY_SCORE: 43

## 2024-12-01 NOTE — PLAN OF CARE
Goal Outcome Evaluation:      Plan of Care Reviewed With: patient    Overall Patient Progress: improvingOverall Patient Progress: improving     sc  Patient Name: Karyn Gamez   MRN: [id number]   Date of Admission: 11/18/2024    Procedure: Procedure(s):  CORONARY ARTERY BYPASS GRAFT TIMES FIVE, LEFT INTERNAL MAMMARY ARTERY HARVEST, RIGHT ENDOSCOPIC VESSEL PROCUREMENT,  ECHOCARDIOGRAM, TRANSESPOPHAGEAL, WITH ANESTHESIA,  LEFT RADIAL ARTERY HARVEST    Post Op day #:13    Subjective (Patient focus/Primary Problem for shift): rest          Pain Goal 0 Pain Rating 0           Pain Medication/ Regime effective to reduce patient pain yes    Objective (Physical assessment):           Rhythm: normal sinus rhythm            Bowel Activity: yes if Yes indicate when: 11/30          Bowel Medications: yes            Incision: healing well          Incentive Spirometry Q 1-2 hour when awake:  yes Volume: 750          Epicardial Pacing Wires:  not applicable            Patient Activity:           Up to chair for meals: yes          Ambulation with RN x2 (Not including CR): not applicable           Shower Daily {YES/NO/NA:[id number]          Nasal  Nozin BID AM/PM x 10 days: yes              Chest Tubes   Pleural: not applicable Draining: not applicable               Suction: not applicable              Mediastinal: not applicable Draining: not applicable               Suction: not applicable   Dressing Change Daily:not applicable If No, why?                     Urinary Catheter: not applicable           Preventative WOC consult (need MD order): yes       Assessment (Nursing primary shift focus): A & O. VSS. On RA during the day, 2L of O2 while sleeping. Converted to afib, then converted back to sinus after receiving IV metoprolol. Was in afib for approximately 30 minutes. Denies pain and nausea. Noted a small blister on pt abdomen during shower last evening, blister covered with mepilex. K+ and Mag protocol, recheck  tomorrow.      Plan (Patient Care Plan/focus): Monitor heart rhythm, encourage IS use      Shanique Juarez RN   12/1/2024   2:40 AM

## 2024-12-01 NOTE — PROGRESS NOTES
Care Management Follow Up    Length of Stay (days): 13    Expected Discharge Date: 12/02/2024     Concerns to be Addressed:     Care progression   Patient plan of care discussed at interdisciplinary rounds: Yes    Anticipated Discharge Disposition: Acute Rehab     Anticipated Discharge Services:  TBD  Anticipated Discharge DME:  [facility]    Patient/family educated on Medicare website which has current facility and service quality ratings:  NA  Education Provided on the Discharge Plan:  Yes per team  Patient/Family in Agreement with the Plan:  NA    Referrals Placed by CM/SW:  NA  Private pay costs discussed: Not applicable    Discussed  Partnership in Safe Discharge Planning  document with patient/family: No     Handoff Completed: No, handoff not indicated or clinically appropriate    Additional Information:  1500 rec'd a message from [employer] with [facility], patient is doing too well, walking without a walker and doing stairs, so patient does not qualify for [facility].    Attempted to page [facility], OT, but not logged in  Met with patient at bedside to discuss the above    Social Hx:  Assessment: Follow. Live w/spouse in a town house. Ind w/ADLs/IADLs, no mobility devices or community services. Spouse is primary contact.   Last note: 12/1/24  Plan: Acute Rehab declined  Needs: Medical progression  Hand off: Completed  Transport: TBD    Next Steps: RNCM to follow for medical progression, recommendations, and final discharge plan.     Kadie Quiroz RN

## 2024-12-01 NOTE — PROGRESS NOTES
"CVTS Daily Progress Note   POD 14 s/p CABG x5 (LIMA-LAD, rad-OM, rSVG-PDA, rSVG-RPL, rSVG-diag)  Attending: Gorge  LOS: 13    SUBJECTIVE/INTERVAL EVENTS:    Brief period of afib RVR overnight that resolved following a small dose of IV metoprolol. Mildly bradycardic following amio bolus.  Patient progressing well. Maintaining oxygen saturations on RA but supplemental oxygen placed by nursing for comfort. Normotensive. Ambulating with therapy. Pain well controlled. +BM, tolerating PO intake - good appetite. UOP adequate. Hgb stable. Patient denies new chest pain, shortness of breath, abdominal pain, and nausea.    OBJECTIVE:  Temp:  [98.4  F (36.9  C)-99  F (37.2  C)] 99  F (37.2  C)  Pulse:  [] 60  Resp:  [18-20] 20  BP: (104-141)/(56-77) 112/60  SpO2:  [90 %-96 %] 96 %  Vitals:    11/27/24 0532 11/28/24 0624 11/29/24 0543 11/30/24 0653   Weight: 116.2 kg (256 lb 3.2 oz) 115.6 kg (254 lb 12.8 oz) 114.4 kg (252 lb 4.8 oz) 113.5 kg (250 lb 4.8 oz)    12/01/24 0320   Weight: 112.3 kg (247 lb 9.6 oz)       Clinically Significant Risk Factors               # Hypoalbuminemia: Lowest albumin = 2.9 g/dL at 11/26/2024  6:17 AM, will monitor as appropriate     # Hypertension: Noted on problem list     # Acute Hypercapnic Respiratory Failure: based on arterial blood gas results.  Continue supplemental oxygen and ventilatory support as indicated.  # Acute Hypercapnic Respiratory Failure: based on venous blood gas results.  Continue supplemental oxygen and ventilatory support as indicated.         # Obesity: Estimated body mass index is 38.86 kg/m  as calculated from the following:    Height as of 11/7/24: 1.7 m (5' 6.93\").    Weight as of this encounter: 112.3 kg (247 lb 9.6 oz).        # Financial/Environmental Concerns:     # History of CABG: noted on surgical history        Current Medications:    Scheduled Meds:  Current Facility-Administered Medications   Medication Dose Route Frequency Provider Last Rate Last Admin    " amiodarone (PACERONE) tablet 400 mg  400 mg Oral BID Ladonna Camargo NP   400 mg at 12/01/24 0840    Followed by    [START ON 12/5/2024] amiodarone (PACERONE) tablet 200 mg  200 mg Oral BID Ladonna Camargo NP        Followed by    [START ON 12/7/2024] amiodarone (PACERONE) tablet 200 mg  200 mg Oral Daily Ladonna Camargo NP        amLODIPine (NORVASC) tablet 2.5 mg  2.5 mg Oral Daily Ly Atwood PA-C   2.5 mg at 12/01/24 0639    aspirin (ASA) chewable tablet 81 mg  81 mg Oral Daily Ladonna Camargo NP   81 mg at 12/01/24 0804    atorvastatin (LIPITOR) tablet 80 mg  80 mg Oral At Bedtime Ly Atwood PA-C   80 mg at 11/30/24 2141    cephALEXin (KEFLEX) capsule 500 mg  500 mg Oral Q12H MIESHA (08/20) Ladonna Camargo NP   500 mg at 12/01/24 0803    furosemide (LASIX) tablet 40 mg  40 mg Oral BID Ladonna Camargo NP   40 mg at 12/01/24 0804    iopamidol (ISOVUE-370) solution 117 mL  117 mL Intravenous Once Ly Atwood PA-C        And    sodium chloride 0.9 % bag for CT scan flush use  100 mL As instructed Once Ly Atwood PA-C        metFORMIN (GLUCOPHAGE XR) 24 hr tablet 500 mg  500 mg Oral Daily with supper Ly Atwood PA-C   500 mg at 11/30/24 1629    metoprolol succinate ER (TOPROL XL) 24 hr tablet 125 mg  125 mg Oral Daily Ladonna Camargo NP   125 mg at 12/01/24 0803    multivitamin, therapeutic (THERA-VIT) tablet 1 tablet  1 tablet Oral Daily Ly Atwood PA-C   1 tablet at 12/01/24 0803    pantoprazole (PROTONIX) EC tablet 40 mg  40 mg Oral QAM AC Ly Atwood PA-C   40 mg at 12/01/24 0639    polyethylene glycol (MIRALAX) Packet 17 g  17 g Oral Daily Ly Atwood PA-MONALISA   17 g at 11/27/24 0814    senna-docusate (SENOKOT-S/PERICOLACE) 8.6-50 MG per tablet 1 tablet  1 tablet Oral BID Ly Atwood PA-C   1 tablet at 12/01/24 0804    sodium chloride (PF) 0.9% PF flush 10 mL  10 mL Intracatheter Q8H Angelic  Ly Tovar PA-C   10 mL at 12/01/24 0804    warfarin ANTICOAGULANT (COUMADIN) tablet 4 mg  4 mg Oral ONCE at 18:00 Mary Pennington MD        Warfarin Dose Required Daily - Pharmacist Managed  1 each Oral See Admin Instructions yL Atwood PA-C         Continuous Infusions:  Current Facility-Administered Medications   Medication Dose Route Frequency Provider Last Rate Last Admin     PRN Meds:  Current Facility-Administered Medications   Medication Dose Route Frequency Provider Last Rate Last Admin    acetaminophen (TYLENOL) tablet 650 mg  650 mg Oral Q4H PRN Ly Atwood PA-C   650 mg at 11/27/24 2325    bisacodyl (DULCOLAX) suppository 10 mg  10 mg Rectal Daily PRN Ly Atwood PA-C        glucose gel 15-30 g  15-30 g Oral Q15 Min PRN Ly Atwood PA-C        Or    dextrose 50 % injection 25-50 mL  25-50 mL Intravenous Q15 Min PRN Ly Atwood PA-C        Or    glucagon injection 1 mg  1 mg Subcutaneous Q15 Min PRN Ly Atwood PA-C        hydrALAZINE (APRESOLINE) injection 10 mg  10 mg Intravenous Q30 Min PRN Ly Atwood PA-C        lidocaine (LMX4) cream   Topical Q1H PRN Ly Atwood PA-C        lidocaine 1 % 0.1-1 mL  0.1-1 mL Other Q1H PRN Ly Atwood PA-C   1 mL at 11/24/24 1205    magnesium hydroxide (MILK OF MAGNESIA) suspension 30 mL  30 mL Oral Daily PRN Ly Atwood PA-C        naloxone (NARCAN) injection 0.2 mg  0.2 mg Intravenous Q2 Min PRN Ly Atwood PA-C        Or    naloxone (NARCAN) injection 0.4 mg  0.4 mg Intravenous Q2 Min PRN Ly Atwood PA-C        Or    naloxone (NARCAN) injection 0.2 mg  0.2 mg Intramuscular Q2 Min PRN Ly Atwood PA-C   0.2 mg at 11/20/24 0812    Or    naloxone (NARCAN) injection 0.4 mg  0.4 mg Intramuscular Q2 Min PRN Ly Atwood PA-C        ondansetron (ZOFRAN ODT) ODT tab 4 mg  4 mg Oral Q6H PRN Ly Atwood  DARRIN Tovar   4 mg at 11/30/24 1630    oxyCODONE (ROXICODONE) tablet 5 mg  5 mg Oral Q4H PRN Ly Atwood PA-C   5 mg at 11/30/24 1824    sodium chloride (PF) 0.9% PF flush 10-20 mL  10-20 mL Intracatheter q1 min prn Ly Atwood PA-C   20 mL at 11/24/24 1256    sodium chloride (PF) 0.9% PF flush 10-20 mL  10-20 mL Intracatheter q1 min prn Ly Atwood PA-C           Cardiographics:    Telemetry monitoring demonstrates SR with rates in the 60-70s per my personal review.    Imaging:  No results found for this or any previous visit (from the past 24 hours).      Labs, personally reviewed.  Hemoglobin   Date Value Ref Range Status   12/01/2024 8.0 (L) 11.7 - 15.7 g/dL Final   11/30/2024 8.0 (L) 11.7 - 15.7 g/dL Final   11/29/2024 7.5 (L) 11.7 - 15.7 g/dL Final   06/08/2019 12.9 11.7 - 15.7 g/dL Final   06/07/2019 13.6 11.7 - 15.7 g/dL Final   10/14/2016 13.2 11.7 - 15.7 g/dL Final     WBC   Date Value Ref Range Status   06/08/2019 8.8 4.0 - 11.0 10e9/L Final   06/07/2019 12.9 (H) 4.0 - 11.0 10e9/L Final   10/14/2016 9.9 4.0 - 11.0 10e9/L Final     WBC Count   Date Value Ref Range Status   12/01/2024 11.1 (H) 4.0 - 11.0 10e3/uL Final   11/30/2024 11.3 (H) 4.0 - 11.0 10e3/uL Final   11/29/2024 10.1 4.0 - 11.0 10e3/uL Final     Platelet Count   Date Value Ref Range Status   12/01/2024 471 (H) 150 - 450 10e3/uL Final   11/30/2024 478 (H) 150 - 450 10e3/uL Final   11/29/2024 421 150 - 450 10e3/uL Final   06/08/2019 291 150 - 450 10e9/L Final   06/07/2019 352 150 - 450 10e9/L Final   10/14/2016 297 150 - 450 10e9/L Final     Creatinine   Date Value Ref Range Status   12/01/2024 0.98 (H) 0.51 - 0.95 mg/dL Final   11/30/2024 1.00 (H) 0.51 - 0.95 mg/dL Final   11/29/2024 0.94 0.51 - 0.95 mg/dL Final   01/15/2021 0.81 0.52 - 1.04 mg/dL Final   06/08/2019 0.67 0.52 - 1.04 mg/dL Final   06/07/2019 0.73 0.52 - 1.04 mg/dL Final     Potassium   Date Value Ref Range Status   12/01/2024 3.6 3.4 - 5.3  mmol/L Final   11/30/2024 4.0 3.4 - 5.3 mmol/L Final   11/29/2024 3.9 3.4 - 5.3 mmol/L Final   01/31/2023 4.2 3.4 - 5.3 mmol/L Final   08/26/2021 3.9 3.4 - 5.3 mmol/L Final   01/15/2021 4.1 3.4 - 5.3 mmol/L Final   06/08/2019 4.0 3.4 - 5.3 mmol/L Final   06/07/2019 3.2 (L) 3.4 - 5.3 mmol/L Final     Potassium POCT   Date Value Ref Range Status   11/18/2024 3.4 3.4 - 5.3 mmol/L Final   11/18/2024 3.5 3.4 - 5.3 mmol/L Final   11/18/2024 4.1 3.4 - 5.3 mmol/L Final     Magnesium   Date Value Ref Range Status   12/01/2024 2.1 1.7 - 2.3 mg/dL Final   11/30/2024 2.1 1.7 - 2.3 mg/dL Final   11/29/2024 2.1 1.7 - 2.3 mg/dL Final   08/12/2011 2.2 1.6 - 2.3 mg/dL Final   05/06/2010 2.3 1.6 - 2.3 mg/dL Final   05/05/2010 2.3 1.6 - 2.3 mg/dL Final          I/O:  I/O last 3 completed shifts:  In: 1070 [P.O.:1070]  Out: 4350 [Urine:4350]       Physical Exam:    General: Patient seen up in chair, NAD. Conversant, pleasant, calm, cooperative on exam.  HEENT: No scleral icterus, moist mucosa  CV: RRR on monitor - SR. Mild LE edema.  Incision C/D/I, no erythema or induration  Pulm: Unlabored breathing on RA  Abd: SNTND, obese  Ext: Minimal pedal edema, warm, RLE incisions C/D/I, mild localized erythema at knee incision  Neuro: A&O, CNs grossly intact, face symmetric, speech clear      ASSESSMENT/PLAN:    Karyn Gamez is a 69 year old female who is s/p CABG x5; postop course c/b paroxysmal afib with RVR.    Active Problems:    CAD (coronary artery disease)        NEURO:  - Scheduled Tylenol/lidocaine patches and PRN Tylenol /oxycodone for pain  - Had not taken PTA Wellbutrin or Zoloft for several months--can reassess after hospital discharge.    CV:  - Pre-op EF 60-65%   - Normotensive  - Transition to metoprolol succinate 125 mg daily  - ASA 81 mg (reduced in s/o therapeutic AC)  - Plavix discontinued 11/28 in s/o near therapeutic INR  - Atorvastatin 80mg daily  - Chest tubes & TPW removed 11/27  - A-fib clinic follow up arranged    - Partial IV amiodarone load (150 mg bolus + ~60 mg infusion), then immediately started PO amio taper    PULM:  - Extubated on POD 0   - Maintaining oxygen saturations on RA  - Encourage pulmonary toilet  - CXR with small pleural effusions L>R - no indication for thoracentesis    GI  NUTRITION:  - Diet: Cardiac/consistent carb  - Bowel regimen  - Stool cx neg  - LFT check in AM with initiation of amio therapy    RENAL  FE:  - Adequate UOP  - Diuresis with 40mg PO Lasix BID  - Continue electrolyte replacement protocol  - Dry weight 237 lb from [facility]     HEME:  Acute blood loss anemia  - Hgb stable, no bleeding concerns  - Warfarin AC (afib), INR goal 2-3; INR subtherapeutic but rising  - ASA 81mg    ID:  - Lor op ppx complete, afebrile  - Empiric Vanc (ended 11/20) and Zosyn (ended 11/26). Cultures 11/20 neg  - Course of Keflex started 11/30 for mild erythema at medial knee EVH incision  - Stool culture NG; c-diff canceled    ENDO:  - SSI discontinued as maintaining euglycemia on oral medication  - PTA Metformin resumed    PPx:  - DVT: SCDs, warfarin, OOB/ambulation  - GI: Protonix 40mg PO daily    DISPO:  - General telemetry status  - Medically Ready for Discharge: Anticipated Today   - Therapies recommending ARU at discharge - medically ready for discharge pending bed availability      Patient seen & discussed with Dr. Arias.      Ladonna Camargo, CNP, [id number]-AG  Cardiothoracic Surgery  [employer] Pager [phone]

## 2024-12-01 NOTE — PLAN OF CARE
Goal Outcome Evaluation:             sc  Patient Name: Karyn Gamez   MRN: [id number]   Date of Admission: 11/18/2024    Procedure: Procedure(s):  CORONARY ARTERY BYPASS GRAFT TIMES FIVE, LEFT INTERNAL MAMMARY ARTERY HARVEST, RIGHT ENDOSCOPIC VESSEL PROCUREMENT,  ECHOCARDIOGRAM, TRANSESPOPHAGEAL, WITH ANESTHESIA,  LEFT RADIAL ARTERY HARVEST    Post Op day #:12    Subjective (Patient focus/Primary Problem for shift): activity          Pain Goal0 Pain Rating0           Pain Medication/ Regime effective to reduce patient pain    Objective (Physical assessment):           Rhythm: normal sinus rhythm            Bowel Activity: yes if Yes indicate when: 12/1/2024          Bowel Medications: yes            Incision: healing well          Incentive Spirometry Q 1-2 hour when awake:  yes Volume: 950          Epicardial Pacing Wires:  not applicable            Patient Activity:           Up to chair for meals: yes          Ambulation with RN x2 (Not including CR): yes            Is patient in home clothes:no             Chest Tubes   Pleural: not applicable Draining: not applicable               Suction: not applicable              Mediastinal: not applicable Draining: not applicable               Suction: not applicable   Dressing Change Daily:not applicable If No, why?                     Urinary Catheter: no           Preventative WOC consult (need MD order): not applicable       Assessment (Nursing primary shift focus): Activity    Plan (Patient Care Plan/focus): up with SBA, denies discomfort, rhythm NSR      Lisa Paulino RN   12/1/2024   11:54 AM

## 2024-12-01 NOTE — SIGNIFICANT EVENT
NA notified writer that pt had her call light on to use the bathroom, then pt noticed her heart rate from the monitor is high and fluctuating, called for RN.    Upon arrival to pt room, pt was in afib, HR 100s to 140s. Pt states that she woke up from her sleep because she needed to pee and saw on the monitor that are HR is high. Dr. Hugo moura, gave an order for one time dose of metoprolol 2.5mg IV.     Metoprolol was given, and pt converted back to sinus rhythm @ 0315.    Addendum- Ladonna Camargo NP, ordered amio drip for pt this morning. Clarified the amino order with her since pt is currently in sinus rhythm. Response was to start the amiodarone drip.

## 2024-12-01 NOTE — PROVIDER NOTIFICATION
After pt received amiodarone bolus, pt HR dropped to 57-59. NP, Ladonna Camargo, paged and aware.    Response was okay to start the amiodarone continuous infusion and if HR drops into low 50's, the amiodarone dose can be switched to 0.5mg/min.   Patient here for INR. INR 2.5.   Patient takes Coumadin 8 mg daily

## 2024-12-02 ENCOUNTER — APPOINTMENT (OUTPATIENT)
Dept: OCCUPATIONAL THERAPY | Facility: HOSPITAL | Age: 69
End: 2024-12-02
Attending: STUDENT IN AN ORGANIZED HEALTH CARE EDUCATION/TRAINING PROGRAM
Payer: COMMERCIAL

## 2024-12-02 LAB
ALBUMIN SERPL BCG-MCNC: 3.3 G/DL (ref 3.5–5.2)
ALP SERPL-CCNC: 87 U/L (ref 40–150)
ALT SERPL W P-5'-P-CCNC: 66 U/L (ref 0–50)
ANION GAP SERPL CALCULATED.3IONS-SCNC: 10 MMOL/L (ref 7–15)
AST SERPL W P-5'-P-CCNC: 47 U/L (ref 0–45)
BILIRUB DIRECT SERPL-MCNC: <0.2 MG/DL (ref 0–0.3)
BILIRUB SERPL-MCNC: 0.6 MG/DL
BUN SERPL-MCNC: 14.5 MG/DL (ref 8–23)
CALCIUM SERPL-MCNC: 8.7 MG/DL (ref 8.8–10.4)
CHLORIDE SERPL-SCNC: 98 MMOL/L (ref 98–107)
CREAT SERPL-MCNC: 1.03 MG/DL (ref 0.51–0.95)
EGFRCR SERPLBLD CKD-EPI 2021: 59 ML/MIN/1.73M2
ERYTHROCYTE [DISTWIDTH] IN BLOOD BY AUTOMATED COUNT: 15.8 % (ref 10–15)
GLUCOSE SERPL-MCNC: 98 MG/DL (ref 70–99)
HCO3 SERPL-SCNC: 31 MMOL/L (ref 22–29)
HCT VFR BLD AUTO: 25.7 % (ref 35–47)
HGB BLD-MCNC: 8.2 G/DL (ref 11.7–15.7)
INR PPP: 1.88 (ref 0.85–1.15)
MAGNESIUM SERPL-MCNC: 2.2 MG/DL (ref 1.7–2.3)
MCH RBC QN AUTO: 29.3 PG (ref 26.5–33)
MCHC RBC AUTO-ENTMCNC: 31.9 G/DL (ref 31.5–36.5)
MCV RBC AUTO: 92 FL (ref 78–100)
PLATELET # BLD AUTO: 515 10E3/UL (ref 150–450)
POTASSIUM SERPL-SCNC: 3.7 MMOL/L (ref 3.4–5.3)
PROT SERPL-MCNC: 6.3 G/DL (ref 6.4–8.3)
RBC # BLD AUTO: 2.8 10E6/UL (ref 3.8–5.2)
SODIUM SERPL-SCNC: 139 MMOL/L (ref 135–145)
WBC # BLD AUTO: 10.6 10E3/UL (ref 4–11)

## 2024-12-02 PROCEDURE — 82565 ASSAY OF CREATININE: CPT | Performed by: PHYSICIAN ASSISTANT

## 2024-12-02 PROCEDURE — 84520 ASSAY OF UREA NITROGEN: CPT | Performed by: PHYSICIAN ASSISTANT

## 2024-12-02 PROCEDURE — 97110 THERAPEUTIC EXERCISES: CPT | Mod: GO

## 2024-12-02 PROCEDURE — 250N000013 HC RX MED GY IP 250 OP 250 PS 637: Performed by: PHYSICIAN ASSISTANT

## 2024-12-02 PROCEDURE — 36415 COLL VENOUS BLD VENIPUNCTURE: CPT | Performed by: PHYSICIAN ASSISTANT

## 2024-12-02 PROCEDURE — 210N000001 HC R&B IMCU HEART CARE

## 2024-12-02 PROCEDURE — 82040 ASSAY OF SERUM ALBUMIN: CPT | Performed by: SURGERY

## 2024-12-02 PROCEDURE — 85048 AUTOMATED LEUKOCYTE COUNT: CPT | Performed by: PHYSICIAN ASSISTANT

## 2024-12-02 PROCEDURE — 250N000013 HC RX MED GY IP 250 OP 250 PS 637: Performed by: SURGERY

## 2024-12-02 PROCEDURE — 83735 ASSAY OF MAGNESIUM: CPT | Performed by: STUDENT IN AN ORGANIZED HEALTH CARE EDUCATION/TRAINING PROGRAM

## 2024-12-02 PROCEDURE — 85018 HEMOGLOBIN: CPT | Performed by: PHYSICIAN ASSISTANT

## 2024-12-02 PROCEDURE — 250N000013 HC RX MED GY IP 250 OP 250 PS 637: Performed by: STUDENT IN AN ORGANIZED HEALTH CARE EDUCATION/TRAINING PROGRAM

## 2024-12-02 PROCEDURE — 84155 ASSAY OF PROTEIN SERUM: CPT | Performed by: SURGERY

## 2024-12-02 PROCEDURE — 80048 BASIC METABOLIC PNL TOTAL CA: CPT | Performed by: PHYSICIAN ASSISTANT

## 2024-12-02 PROCEDURE — 85610 PROTHROMBIN TIME: CPT | Performed by: PHYSICIAN ASSISTANT

## 2024-12-02 PROCEDURE — 82247 BILIRUBIN TOTAL: CPT | Performed by: SURGERY

## 2024-12-02 RX ORDER — POTASSIUM CHLORIDE 1500 MG/1
20 TABLET, EXTENDED RELEASE ORAL 2 TIMES DAILY
Status: DISCONTINUED | OUTPATIENT
Start: 2024-12-02 | End: 2024-12-03 | Stop reason: HOSPADM

## 2024-12-02 RX ORDER — WARFARIN SODIUM 3 MG/1
6 TABLET ORAL
Status: COMPLETED | OUTPATIENT
Start: 2024-12-02 | End: 2024-12-02

## 2024-12-02 RX ORDER — POTASSIUM CHLORIDE 1500 MG/1
20 TABLET, EXTENDED RELEASE ORAL ONCE
Status: COMPLETED | OUTPATIENT
Start: 2024-12-02 | End: 2024-12-02

## 2024-12-02 RX ADMIN — ATORVASTATIN CALCIUM 80 MG: 40 TABLET, FILM COATED ORAL at 21:07

## 2024-12-02 RX ADMIN — AMLODIPINE BESYLATE 2.5 MG: 2.5 TABLET ORAL at 06:35

## 2024-12-02 RX ADMIN — WARFARIN SODIUM 6 MG: 3 TABLET ORAL at 17:37

## 2024-12-02 RX ADMIN — CEPHALEXIN 500 MG: 500 CAPSULE ORAL at 21:08

## 2024-12-02 RX ADMIN — ASPIRIN 81 MG CHEWABLE TABLET 81 MG: 81 TABLET CHEWABLE at 08:24

## 2024-12-02 RX ADMIN — FUROSEMIDE 40 MG: 40 TABLET ORAL at 17:37

## 2024-12-02 RX ADMIN — METFORMIN ER 500 MG 500 MG: 500 TABLET ORAL at 17:37

## 2024-12-02 RX ADMIN — AMIODARONE HYDROCHLORIDE 400 MG: 200 TABLET ORAL at 21:07

## 2024-12-02 RX ADMIN — POTASSIUM CHLORIDE 20 MEQ: 1500 TABLET, EXTENDED RELEASE ORAL at 08:25

## 2024-12-02 RX ADMIN — AMIODARONE HYDROCHLORIDE 400 MG: 200 TABLET ORAL at 08:25

## 2024-12-02 RX ADMIN — POLYETHYLENE GLYCOL 3350 17 G: 17 POWDER, FOR SOLUTION ORAL at 08:25

## 2024-12-02 RX ADMIN — METOPROLOL SUCCINATE 150 MG: 100 TABLET, EXTENDED RELEASE ORAL at 08:25

## 2024-12-02 RX ADMIN — FUROSEMIDE 40 MG: 40 TABLET ORAL at 08:26

## 2024-12-02 RX ADMIN — PANTOPRAZOLE SODIUM 40 MG: 40 TABLET, DELAYED RELEASE ORAL at 06:35

## 2024-12-02 RX ADMIN — THERA TABS 1 TABLET: TAB at 08:25

## 2024-12-02 RX ADMIN — SENNOSIDES AND DOCUSATE SODIUM 1 TABLET: 8.6; 5 TABLET ORAL at 21:08

## 2024-12-02 RX ADMIN — POTASSIUM CHLORIDE 20 MEQ: 1500 TABLET, EXTENDED RELEASE ORAL at 08:26

## 2024-12-02 RX ADMIN — POTASSIUM CHLORIDE 20 MEQ: 1500 TABLET, EXTENDED RELEASE ORAL at 21:08

## 2024-12-02 RX ADMIN — CEPHALEXIN 500 MG: 500 CAPSULE ORAL at 08:25

## 2024-12-02 RX ADMIN — SENNOSIDES AND DOCUSATE SODIUM 1 TABLET: 8.6; 5 TABLET ORAL at 08:25

## 2024-12-02 ASSESSMENT — ACTIVITIES OF DAILY LIVING (ADL)
ADLS_ACUITY_SCORE: 43
ADLS_ACUITY_SCORE: 40
ADLS_ACUITY_SCORE: 43
ADLS_ACUITY_SCORE: 40
ADLS_ACUITY_SCORE: 40
ADLS_ACUITY_SCORE: 43
ADLS_ACUITY_SCORE: 43
ADLS_ACUITY_SCORE: 40
ADLS_ACUITY_SCORE: 43

## 2024-12-02 NOTE — PROGRESS NOTES
"CVTS Daily Progress Note   POD#14 s/p CABG x5 (LIMA-LAD, rad-OM, rSVG-PDA, rSVG-RPL, rSVG-diag)  Attending: Gorge  LOS: 14    SUBJECTIVE/INTERVAL EVENTS:    No acute events overnight. Normotensive. NSR--no more a-fib for ~24 hours. Patient progressing overall well. Maintaining oxygen saturations on room air. Up to chair this AM and ambulating with therapy. Pain well controlled. +BM, tolerating PO intake. UOP adequate. Hgb stable. Patient denies new chest pain, shortness of breath, abdominal pain, and nausea.    OBJECTIVE:  Temp:  [97.5  F (36.4  C)-98.7  F (37.1  C)] 98.7  F (37.1  C)  Pulse:  [60-87] 64  Resp:  [16-20] 16  BP: (109-146)/(56-67) 111/59  SpO2:  [91 %-93 %] 93 %  Vitals:    11/28/24 0624 11/29/24 0543 11/30/24 0653 12/01/24 0320   Weight: 115.6 kg (254 lb 12.8 oz) 114.4 kg (252 lb 4.8 oz) 113.5 kg (250 lb 4.8 oz) 112.3 kg (247 lb 9.6 oz)    12/02/24 0432   Weight: 111.6 kg (246 lb 1.6 oz)       Clinically Significant Risk Factors           # Hypocalcemia: Lowest Ca = 8.5 mg/dL in last 2 days, will monitor and replace as appropriate     # Hypoalbuminemia: Lowest albumin = 2.9 g/dL at 11/26/2024  6:17 AM, will monitor as appropriate     # Hypertension: Noted on problem list     # Acute Hypercapnic Respiratory Failure: based on arterial blood gas results.  Continue supplemental oxygen and ventilatory support as indicated.  # Acute Hypercapnic Respiratory Failure: based on venous blood gas results.  Continue supplemental oxygen and ventilatory support as indicated.         # Obesity: Estimated body mass index is 38.63 kg/m  as calculated from the following:    Height as of 11/7/24: 1.7 m (5' 6.93\").    Weight as of this encounter: 111.6 kg (246 lb 1.6 oz).        # Financial/Environmental Concerns:     # History of CABG: noted on surgical history        Current Medications:    Scheduled Meds:  Current Facility-Administered Medications   Medication Dose Route Frequency Provider Last Rate Last Admin    " amiodarone (PACERONE) tablet 400 mg  400 mg Oral BID Ladonna Camargo NP   400 mg at 12/01/24 2025    Followed by    [START ON 12/5/2024] amiodarone (PACERONE) tablet 200 mg  200 mg Oral BID Ladonna Camargo NP        Followed by    [START ON 12/7/2024] amiodarone (PACERONE) tablet 200 mg  200 mg Oral Daily Ladonna Camargo NP        amLODIPine (NORVASC) tablet 2.5 mg  2.5 mg Oral Daily Ly Atwood PA-C   2.5 mg at 12/02/24 0635    aspirin (ASA) chewable tablet 81 mg  81 mg Oral Daily Ladonna Camargo NP   81 mg at 12/01/24 0804    atorvastatin (LIPITOR) tablet 80 mg  80 mg Oral At Bedtime Ly Atwood PA-C   80 mg at 12/01/24 2025    cephALEXin (KEFLEX) capsule 500 mg  500 mg Oral Q12H MIESHA (08/20) Ladonna Camargo NP   500 mg at 12/01/24 2025    furosemide (LASIX) tablet 40 mg  40 mg Oral BID Ladonna Camargo NP   40 mg at 12/01/24 1804    iopamidol (ISOVUE-370) solution 117 mL  117 mL Intravenous Once Ly Atwood PA-C        And    sodium chloride 0.9 % bag for CT scan flush use  100 mL As instructed Once Ly Atwood PA-C        metFORMIN (GLUCOPHAGE XR) 24 hr tablet 500 mg  500 mg Oral Daily with supper Ly Atwood PA-C   500 mg at 12/01/24 1803    metoprolol succinate ER (TOPROL XL) 24 hr tablet 150 mg  150 mg Oral Daily Ly Atwood PA-C        multivitamin, therapeutic (THERA-VIT) tablet 1 tablet  1 tablet Oral Daily Ly Atwood PA-C   1 tablet at 12/01/24 0803    pantoprazole (PROTONIX) EC tablet 40 mg  40 mg Oral QAM AC Ly Atwood PA-C   40 mg at 12/02/24 0635    polyethylene glycol (MIRALAX) Packet 17 g  17 g Oral Daily Ly Atwood PA-C   17 g at 11/27/24 0814    potassium chloride travis ER (KLOR-CON M20) CR tablet 20 mEq  20 mEq Oral Once Mary Pennington MD        potassium chloride travis ER (KLOR-CON M20) CR tablet 20 mEq  20 mEq Oral BID Ly Atwood PA-C        senna-docusate  (SENOKOT-S/PERICOLACE) 8.6-50 MG per tablet 1 tablet  1 tablet Oral BID Ly Atwood PA-C   1 tablet at 12/01/24 2025    sodium chloride (PF) 0.9% PF flush 10 mL  10 mL Intracatheter Q8H Ly Atwood PA-C   10 mL at 12/01/24 2329    Warfarin Dose Required Daily - Pharmacist Managed  1 each Oral See Admin Instructions Ly Atwood PA-C         Continuous Infusions:  Current Facility-Administered Medications   Medication Dose Route Frequency Provider Last Rate Last Admin     PRN Meds:  Current Facility-Administered Medications   Medication Dose Route Frequency Provider Last Rate Last Admin    acetaminophen (TYLENOL) tablet 650 mg  650 mg Oral Q4H PRN Ly Atwood PA-C   650 mg at 11/27/24 2325    bisacodyl (DULCOLAX) suppository 10 mg  10 mg Rectal Daily PRN Ly Atwood PA-C        glucose gel 15-30 g  15-30 g Oral Q15 Min PRN Ly Atwood PA-C        Or    dextrose 50 % injection 25-50 mL  25-50 mL Intravenous Q15 Min PRN Ly Atwood PA-C        Or    glucagon injection 1 mg  1 mg Subcutaneous Q15 Min PRN Ly Atwood PA-C        hydrALAZINE (APRESOLINE) injection 10 mg  10 mg Intravenous Q30 Min PRN Ly Atwood PA-C        lidocaine (LMX4) cream   Topical Q1H PRN Ly Atwood PA-C        lidocaine 1 % 0.1-1 mL  0.1-1 mL Other Q1H PRN Ly Atwood PA-C   1 mL at 11/24/24 1205    magnesium hydroxide (MILK OF MAGNESIA) suspension 30 mL  30 mL Oral Daily PRN Ly Atwood PA-C        naloxone (NARCAN) injection 0.2 mg  0.2 mg Intravenous Q2 Min PRN Ly Atwood PA-C        Or    naloxone (NARCAN) injection 0.4 mg  0.4 mg Intravenous Q2 Min PRN Ly Atwood PA-C        Or    naloxone (NARCAN) injection 0.2 mg  0.2 mg Intramuscular Q2 Min PRN Ly Atwood PA-C   0.2 mg at 11/20/24 0812    Or    naloxone (NARCAN) injection 0.4 mg  0.4 mg Intramuscular Q2 Min  PRN Ly Atwood PA-C        ondansetron (ZOFRAN ODT) ODT tab 4 mg  4 mg Oral Q6H PRN Ly Atwood PA-C   4 mg at 11/30/24 1630    oxyCODONE (ROXICODONE) tablet 5 mg  5 mg Oral Q8H PRN Ladonna Camargo, NP        sodium chloride (PF) 0.9% PF flush 10-20 mL  10-20 mL Intracatheter q1 min prn Ly Atwood PA-C   20 mL at 11/24/24 1256    sodium chloride (PF) 0.9% PF flush 10-20 mL  10-20 mL Intracatheter q1 min prn Ly Atwood PA-C           Cardiographics:    Telemetry monitoring demonstrates SR with rates in the 60s per my personal review.    Imaging:  No results found for this or any previous visit (from the past 24 hours).      Labs, personally reviewed.  Hemoglobin   Date Value Ref Range Status   12/02/2024 8.2 (L) 11.7 - 15.7 g/dL Final   12/01/2024 8.0 (L) 11.7 - 15.7 g/dL Final   11/30/2024 8.0 (L) 11.7 - 15.7 g/dL Final   06/08/2019 12.9 11.7 - 15.7 g/dL Final   06/07/2019 13.6 11.7 - 15.7 g/dL Final   10/14/2016 13.2 11.7 - 15.7 g/dL Final     WBC   Date Value Ref Range Status   06/08/2019 8.8 4.0 - 11.0 10e9/L Final   06/07/2019 12.9 (H) 4.0 - 11.0 10e9/L Final   10/14/2016 9.9 4.0 - 11.0 10e9/L Final     WBC Count   Date Value Ref Range Status   12/02/2024 10.6 4.0 - 11.0 10e3/uL Final   12/01/2024 11.1 (H) 4.0 - 11.0 10e3/uL Final   11/30/2024 11.3 (H) 4.0 - 11.0 10e3/uL Final     Platelet Count   Date Value Ref Range Status   12/02/2024 515 (H) 150 - 450 10e3/uL Final   12/01/2024 471 (H) 150 - 450 10e3/uL Final   11/30/2024 478 (H) 150 - 450 10e3/uL Final   06/08/2019 291 150 - 450 10e9/L Final   06/07/2019 352 150 - 450 10e9/L Final   10/14/2016 297 150 - 450 10e9/L Final     Creatinine   Date Value Ref Range Status   12/02/2024 1.03 (H) 0.51 - 0.95 mg/dL Final   12/01/2024 0.98 (H) 0.51 - 0.95 mg/dL Final   11/30/2024 1.00 (H) 0.51 - 0.95 mg/dL Final   01/15/2021 0.81 0.52 - 1.04 mg/dL Final   06/08/2019 0.67 0.52 - 1.04 mg/dL Final   06/07/2019 0.73 0.52 -  1.04 mg/dL Final     Potassium   Date Value Ref Range Status   12/02/2024 3.7 3.4 - 5.3 mmol/L Final   12/01/2024 3.6 3.4 - 5.3 mmol/L Final   11/30/2024 4.0 3.4 - 5.3 mmol/L Final   01/31/2023 4.2 3.4 - 5.3 mmol/L Final   08/26/2021 3.9 3.4 - 5.3 mmol/L Final   01/15/2021 4.1 3.4 - 5.3 mmol/L Final   06/08/2019 4.0 3.4 - 5.3 mmol/L Final   06/07/2019 3.2 (L) 3.4 - 5.3 mmol/L Final     Potassium POCT   Date Value Ref Range Status   11/18/2024 3.4 3.4 - 5.3 mmol/L Final   11/18/2024 3.5 3.4 - 5.3 mmol/L Final   11/18/2024 4.1 3.4 - 5.3 mmol/L Final     Magnesium   Date Value Ref Range Status   12/02/2024 2.2 1.7 - 2.3 mg/dL Final   12/01/2024 2.1 1.7 - 2.3 mg/dL Final   11/30/2024 2.1 1.7 - 2.3 mg/dL Final   08/12/2011 2.2 1.6 - 2.3 mg/dL Final   05/06/2010 2.3 1.6 - 2.3 mg/dL Final   05/05/2010 2.3 1.6 - 2.3 mg/dL Final          I/O:  I/O last 3 completed shifts:  In: 1020 [P.O.:1020]  Out: 5100 [Urine:5100]       Physical Exam:    General: Patient seen up in chair, NAD. Conversant, pleasant, calm, cooperative on exam.  CV: RRR on monitor - SR. Mild LE edema.  Incision C/D/I, no erythema or induration  Pulm: Unlabored breathing on RA  Abd: SNTND, obese  Ext: Minimal pedal edema, warm, RLE incisions C/D/I, mild localized erythema at knee incision  Neuro: A&O, CNs grossly intact, face symmetric, speech clear      ASSESSMENT/PLAN:    Karyn Gamez is a 69 year old female who is s/p CABG x5; postop course c/b paroxysmal afib with RVR.    Active Problems:    CAD (coronary artery disease)        NEURO:  - Scheduled Tylenol/lidocaine patches and PRN Tylenol /oxycodone for pain  - Had not taken PTA Wellbutrin or Zoloft for several months--can reassess after hospital discharge.    CV:  - Pre-op EF 60-65%   - Normotensive  - Toprol 125mg daily increased to 150mg daily  - ASA 81 mg (reduced in s/o therapeutic AC)  - Plavix discontinued 11/28 in s/o near therapeutic INR  - Atorvastatin 80mg daily  - Chest tubes & TPW removed  11/27  - A-fib clinic follow up arranged   - PO amiodarone taper    PULM:  - Extubated on POD 0   - Maintaining oxygen saturations on RA  - Encourage pulmonary toilet  - CXR with small pleural effusions L>R - no indication for thoracentesis    GI  NUTRITION:  - Diet: Cardiac/consistent carb  - Bowel regimen  - Stool cx neg    RENAL  FE:  - Adequate UOP  - Diuresis with 40mg PO Lasix BID  - Continue electrolyte replacement protocol  - Dry weight 237 lb from [facility]     HEME:  Acute blood loss anemia  - Hgb stable, no bleeding concerns  - Warfarin AC (afib), INR goal 2-3; INR subtherapeutic but rising  - ASA 81mg    ID:  - Lor op ppx complete, afebrile  - Empiric Vanc (ended 11/20) and Zosyn (ended 11/26). Cultures 11/20 neg  - Course of Keflex started 11/30 for mild erythema at medial knee EVH incision  - Stool culture NG; c-diff canceled    ENDO:  - SSI discontinued as maintaining euglycemia on oral medication  - PTA Metformin resumed    PPx:  - DVT: SCDs, warfarin, OOB/ambulation  - GI: Protonix 40mg PO daily    DISPO:  - General telemetry status  - Medically Ready for Discharge: Anticipated in 2-4 Days        Patient discussed with Dr. Pennington.      _______  VERA GraceC  Cardiothoracic Surgery  [phone]

## 2024-12-02 NOTE — PROGRESS NOTES
Care Management Follow Up    Length of Stay (days): 14    Expected Discharge Date: 12/03/2024    Anticipated Discharge Plan:  TCU    Transportation: TBD    PT Recommendations:    OT Recommendations:  (S) Acute [facility]-Motivated patient will benefit from intensive, interdisciplinary therapy.  Anticipate will be able to tolerate 3 hours of therapy per day     Barriers to Discharge: medical stability, placement    Prior Living Situation: town home with spouse    Discussed  Partnership in Safe Discharge Planning  document with patient/family: No     Handoff Completed: No, handoff not indicated or clinically appropriate    Patient/Spokesperson Updated: Yes. Who? patient    Additional Information:  Pt is POD#14 s/p CABGx5. Per CV surgery the pt will likely need TCU at discharge due to lack of support in the home. CM went to pt's room to discuss this and we went over TCU list. Pt gave CM her top TCU choices and CM then sent referrals to these locations.    Next Steps: medical progression, TCU placement, PAS, transport, per pt she may need transport set up or a family member may be able to transport her just depending on timing.    1:48 PM  Pt has been accepted to [facility] and [facility]. Pt would like to go to [facility]. [facility] has been updated and would like pt to come around 11AM tomorrow. Pt has been told of the timing and she is fairly confident a family member can transport her to the TCU tomorrow at 11AM. PAS done.    Alaina Ng RN

## 2024-12-02 NOTE — PLAN OF CARE
Goal Outcome Evaluation:             sc  Patient Name: Karyn Gamez   MRN: [id number]   Date of Admission: 11/18/2024    Procedure: Procedure(s):  CORONARY ARTERY BYPASS GRAFT TIMES FIVE, LEFT INTERNAL MAMMARY ARTERY HARVEST, RIGHT ENDOSCOPIC VESSEL PROCUREMENT,  ECHOCARDIOGRAM, TRANSESPOPHAGEAL, WITH ANESTHESIA,  LEFT RADIAL ARTERY HARVEST    Post Op day #:12    Subjective (Patient focus/Primary Problem for shift): activity          Pain Goal0 Pain Rating0           Pain Medication/ Regime effective to reduce patient pain0    Objective (Physical assessment):           Rhythm: normal sinus rhythm            Bowel Activity: yes if Yes indicate when: 12/1/2024          Bowel Medications: yes            Incision: healing well          Incentive Spirometry Q 1-2 hour when awake:  yes Volume: 950          Epicardial Pacing Wires:  not applicable            Patient Activity:           Up to chair for meals: yes          Ambulation with RN x2 (Not including CR): yes            Is patient in home clothes:not applicable             Chest Tubes   Pleural: no Draining: no               Suction: no              Mediastinal: no Draining: no               Suction: no   Dressing Change Daily:yes If No, why?open to air                     Urinary Catheter: no           Preventative WOC consult (need MD order): not applicable       Assessment (Nursing primary shift focus): activity    Plan (Patient Care Plan/focus): up in chair all, day, ambulates with SBA      Lisa Paulino RN   12/1/2024   6:22 PM

## 2024-12-02 NOTE — PLAN OF CARE
Goal Outcome Evaluation:      Plan of Care Reviewed With: patient    Overall Patient Progress: improvingOverall Patient Progress: improving         sc  Patient Name: Karyn Gamez   MRN: [id number]   Date of Admission: 11/18/2024    Procedure: Procedure(s):  CORONARY ARTERY BYPASS GRAFT TIMES FIVE, LEFT INTERNAL MAMMARY ARTERY HARVEST, RIGHT ENDOSCOPIC VESSEL PROCUREMENT,  ECHOCARDIOGRAM, TRANSESPOPHAGEAL, WITH ANESTHESIA,  LEFT RADIAL ARTERY HARVEST    Post Op day #:14    Subjective (Patient focus/Primary Problem for shift): rest          Pain Goal 0 Pain Rating 0           Pain Medication/ Regime effective to reduce patient pain yes    Objective (Physical assessment):           Rhythm: normal sinus rhythm            Bowel Activity: yes if Yes indicate when:  12/1          Bowel Medications: yes            Incision: healing well          Incentive Spirometry Q 1-2 hour when awake:  yes Volume: 750          Epicardial Pacing Wires:  not applicable            Patient Activity:           Up to chair for meals: yes          Ambulation with RN x2 (Not including CR): not applicable           Shower Daily {YES/NO/NA:[id number]          Nasal  Nozin BID AM/PM x 10 days: yes              Chest Tubes   Pleural: not applicable Draining: not applicable               Suction: not applicable              Mediastinal: not applicable Draining: not applicable               Suction: not applicable   Dressing Change Daily:not applicable If No, why?                     Urinary Catheter: not applicable           Preventative WOC consult (need MD order): not applicable       Assessment (Nursing primary shift focus): A & O. VSS on RA. Up to bathroom via SBA. Denies pain. K+ and Mag protocol, recheck tomorrow.    Plan (Patient Care Plan/focus): Increase activity      Shanique Juarez RN   12/2/2024   4:46 AM

## 2024-12-03 ENCOUNTER — LAB REQUISITION (OUTPATIENT)
Dept: LAB | Facility: CLINIC | Age: 69
End: 2024-12-03
Payer: COMMERCIAL

## 2024-12-03 ENCOUNTER — DOCUMENTATION ONLY (OUTPATIENT)
Dept: ANTICOAGULATION | Facility: CLINIC | Age: 69
End: 2024-12-03

## 2024-12-03 VITALS
TEMPERATURE: 98.8 F | DIASTOLIC BLOOD PRESSURE: 61 MMHG | WEIGHT: 244.3 LBS | RESPIRATION RATE: 18 BRPM | BODY MASS INDEX: 38.34 KG/M2 | SYSTOLIC BLOOD PRESSURE: 120 MMHG | HEART RATE: 54 BPM | OXYGEN SATURATION: 94 %

## 2024-12-03 DIAGNOSIS — Z95.1 S/P CABG (CORONARY ARTERY BYPASS GRAFT): Primary | ICD-10-CM

## 2024-12-03 DIAGNOSIS — I97.89 POSTOPERATIVE ATRIAL FIBRILLATION (H): ICD-10-CM

## 2024-12-03 DIAGNOSIS — I48.91 POSTOPERATIVE ATRIAL FIBRILLATION (H): ICD-10-CM

## 2024-12-03 DIAGNOSIS — I48.91 POSTOPERATIVE ATRIAL FIBRILLATION (H): Primary | ICD-10-CM

## 2024-12-03 DIAGNOSIS — Z79.01 LONG TERM (CURRENT) USE OF ANTICOAGULANTS: ICD-10-CM

## 2024-12-03 DIAGNOSIS — I97.89 POSTOPERATIVE ATRIAL FIBRILLATION (H): Primary | ICD-10-CM

## 2024-12-03 LAB
ANION GAP SERPL CALCULATED.3IONS-SCNC: 10 MMOL/L (ref 7–15)
BUN SERPL-MCNC: 14.8 MG/DL (ref 8–23)
CALCIUM SERPL-MCNC: 8.8 MG/DL (ref 8.8–10.4)
CHLORIDE SERPL-SCNC: 99 MMOL/L (ref 98–107)
CREAT SERPL-MCNC: 1.03 MG/DL (ref 0.51–0.95)
EGFRCR SERPLBLD CKD-EPI 2021: 59 ML/MIN/1.73M2
ERYTHROCYTE [DISTWIDTH] IN BLOOD BY AUTOMATED COUNT: 15.9 % (ref 10–15)
GLUCOSE SERPL-MCNC: 103 MG/DL (ref 70–99)
HCO3 SERPL-SCNC: 30 MMOL/L (ref 22–29)
HCT VFR BLD AUTO: 25.5 % (ref 35–47)
HGB BLD-MCNC: 8.1 G/DL (ref 11.7–15.7)
INR PPP: 2.54 (ref 0.85–1.15)
MAGNESIUM SERPL-MCNC: 2.2 MG/DL (ref 1.7–2.3)
MCH RBC QN AUTO: 29.3 PG (ref 26.5–33)
MCHC RBC AUTO-ENTMCNC: 31.8 G/DL (ref 31.5–36.5)
MCV RBC AUTO: 92 FL (ref 78–100)
PLATELET # BLD AUTO: 538 10E3/UL (ref 150–450)
POTASSIUM SERPL-SCNC: 3.9 MMOL/L (ref 3.4–5.3)
RBC # BLD AUTO: 2.76 10E6/UL (ref 3.8–5.2)
SODIUM SERPL-SCNC: 139 MMOL/L (ref 135–145)
WBC # BLD AUTO: 10 10E3/UL (ref 4–11)

## 2024-12-03 PROCEDURE — 250N000013 HC RX MED GY IP 250 OP 250 PS 637: Performed by: PHYSICIAN ASSISTANT

## 2024-12-03 PROCEDURE — 250N000013 HC RX MED GY IP 250 OP 250 PS 637: Performed by: SURGERY

## 2024-12-03 PROCEDURE — 85610 PROTHROMBIN TIME: CPT | Performed by: PHYSICIAN ASSISTANT

## 2024-12-03 PROCEDURE — 36415 COLL VENOUS BLD VENIPUNCTURE: CPT | Performed by: PHYSICIAN ASSISTANT

## 2024-12-03 PROCEDURE — 83735 ASSAY OF MAGNESIUM: CPT | Performed by: STUDENT IN AN ORGANIZED HEALTH CARE EDUCATION/TRAINING PROGRAM

## 2024-12-03 PROCEDURE — 80048 BASIC METABOLIC PNL TOTAL CA: CPT | Performed by: PHYSICIAN ASSISTANT

## 2024-12-03 PROCEDURE — 85014 HEMATOCRIT: CPT | Performed by: PHYSICIAN ASSISTANT

## 2024-12-03 RX ORDER — AMIODARONE HYDROCHLORIDE 200 MG/1
200 TABLET ORAL 2 TIMES DAILY
Qty: 60 TABLET | Refills: 0 | DISCHARGE
Start: 2024-12-03

## 2024-12-03 RX ORDER — WARFARIN SODIUM 4 MG/1
4 TABLET ORAL DAILY
DISCHARGE
Start: 2024-12-03 | End: 2024-12-11

## 2024-12-03 RX ORDER — CEPHALEXIN 500 MG/1
500 CAPSULE ORAL EVERY 12 HOURS
DISCHARGE
Start: 2024-12-03 | End: 2024-12-06

## 2024-12-03 RX ORDER — ACETAMINOPHEN 325 MG/1
650 TABLET ORAL EVERY 4 HOURS PRN
DISCHARGE
Start: 2024-12-03

## 2024-12-03 RX ORDER — POTASSIUM CHLORIDE 1500 MG/1
20 TABLET, EXTENDED RELEASE ORAL 2 TIMES DAILY
DISCHARGE
Start: 2024-12-03 | End: 2024-12-09

## 2024-12-03 RX ORDER — SERTRALINE HYDROCHLORIDE 100 MG/1
100 TABLET, FILM COATED ORAL DAILY
DISCHARGE
Start: 2024-12-03

## 2024-12-03 RX ORDER — FUROSEMIDE 40 MG/1
40 TABLET ORAL 2 TIMES DAILY
DISCHARGE
Start: 2024-12-03 | End: 2024-12-09

## 2024-12-03 RX ORDER — ASPIRIN 81 MG/1
81 TABLET, CHEWABLE ORAL DAILY
DISCHARGE
Start: 2024-12-03

## 2024-12-03 RX ORDER — METOPROLOL SUCCINATE 50 MG/1
150 TABLET, EXTENDED RELEASE ORAL DAILY
DISCHARGE
Start: 2024-12-03

## 2024-12-03 RX ORDER — SERTRALINE HYDROCHLORIDE 100 MG/1
100 TABLET, FILM COATED ORAL DAILY
Status: DISCONTINUED | OUTPATIENT
Start: 2024-12-03 | End: 2024-12-03 | Stop reason: HOSPADM

## 2024-12-03 RX ORDER — MULTIVITAMIN,THERAPEUTIC
1 TABLET ORAL DAILY
DISCHARGE
Start: 2024-12-03

## 2024-12-03 RX ORDER — AMOXICILLIN 250 MG
1 CAPSULE ORAL 2 TIMES DAILY
DISCHARGE
Start: 2024-12-03

## 2024-12-03 RX ORDER — WARFARIN SODIUM 2 MG/1
4 TABLET ORAL
Status: DISCONTINUED | OUTPATIENT
Start: 2024-12-03 | End: 2024-12-03 | Stop reason: HOSPADM

## 2024-12-03 RX ADMIN — METOPROLOL SUCCINATE 150 MG: 100 TABLET, EXTENDED RELEASE ORAL at 08:51

## 2024-12-03 RX ADMIN — THERA TABS 1 TABLET: TAB at 08:51

## 2024-12-03 RX ADMIN — PANTOPRAZOLE SODIUM 40 MG: 40 TABLET, DELAYED RELEASE ORAL at 06:57

## 2024-12-03 RX ADMIN — CEPHALEXIN 500 MG: 500 CAPSULE ORAL at 08:53

## 2024-12-03 RX ADMIN — POTASSIUM CHLORIDE 20 MEQ: 1500 TABLET, EXTENDED RELEASE ORAL at 08:52

## 2024-12-03 RX ADMIN — FUROSEMIDE 40 MG: 40 TABLET ORAL at 08:51

## 2024-12-03 RX ADMIN — SENNOSIDES AND DOCUSATE SODIUM 1 TABLET: 8.6; 5 TABLET ORAL at 08:51

## 2024-12-03 RX ADMIN — AMLODIPINE BESYLATE 2.5 MG: 2.5 TABLET ORAL at 06:57

## 2024-12-03 RX ADMIN — AMIODARONE HYDROCHLORIDE 400 MG: 200 TABLET ORAL at 08:52

## 2024-12-03 RX ADMIN — SERTRALINE HYDROCHLORIDE 100 MG: 100 TABLET ORAL at 08:51

## 2024-12-03 RX ADMIN — ASPIRIN 81 MG CHEWABLE TABLET 81 MG: 81 TABLET CHEWABLE at 08:51

## 2024-12-03 ASSESSMENT — ACTIVITIES OF DAILY LIVING (ADL)
ADLS_ACUITY_SCORE: 40
ADLS_ACUITY_SCORE: 39
ADLS_ACUITY_SCORE: 43
ADLS_ACUITY_SCORE: 43
ADLS_ACUITY_SCORE: 39
ADLS_ACUITY_SCORE: 43
ADLS_ACUITY_SCORE: 39
ADLS_ACUITY_SCORE: 43

## 2024-12-03 NOTE — PROGRESS NOTES
Cristiana Davis,    Your patient, Karyn Gamez, is newly referred to [facility] at hospital discharge by the inpatient team. Anticoagulation clinic needing a new referring provider that will follow patient in ambulatory setting.     Indication(s): Atrial Fibrillation   Goal Range: 2.0-3.0  Duration: indefinite     Anticoagulation clinic requires a referral from one of Karyn's [facility] ambulatory provider (PCP or specialist) in order to provide anticoagulation management. The referring provider should anticipate seeing the patient on an ongoing basis, will have INRs and warfarin Rx ordered under their name per protocol, and will be point of contact for [facility] questions on patient's anticoagulation care (procedural holds, critical results, etc).     Please review and sign the pended referral order for Karyn Gamez if you are willing to oversee anticoagulation care.         Thank you,  Denisa Barcenas RN  Anticoagulation clinic
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
## 2024-12-03 NOTE — PLAN OF CARE
sc  Patient Name: Karyn Gamez   MRN: [id number]   Date of Admission: 11/18/2024    Procedure: Procedure(s):  CORONARY ARTERY BYPASS GRAFT TIMES FIVE, LEFT INTERNAL MAMMARY ARTERY HARVEST, RIGHT ENDOSCOPIC VESSEL PROCUREMENT,  ECHOCARDIOGRAM, TRANSESPOPHAGEAL, WITH ANESTHESIA,  LEFT RADIAL ARTERY HARVEST    Post Op day #:14    Subjective (Patient focus/Primary Problem for shift): Find TCU.          Pain Goal 2-3 Pain Rating 0           Pain Medication/ Regime effective to reduce patient pain Patient denies pain.     Objective (Physical assessment):           Rhythm: normal sinus rhythm            Bowel Activity: yes if Yes indicate when: 12/1/24          Bowel Medications: yes            Incision: healing well          Incentive Spirometry Q 1-2 hour when awake:  yes Volume: 500-750.          Epicardial Pacing Wires:  not applicable            Patient Activity:           Up to chair for meals: yes          Ambulation with RN x2 (Not including CR): yes           Shower Daily {YES/NO/NA:[id number]          Nasal  Nozin BID AM/PM x 10 days: No POD 14.              Chest Tubes   Pleural: no Draining: no               Suction: not applicable              Mediastinal: no Draining: not applicable               Suction: no   Dressing Change Daily:no If No, why? Incision ASHUTOSH, daily showers.                      Urinary Catheter: no           Preventative WOC consult (need MD order): no       Assessment (Nursing primary shift focus): Patient is doing very well. She denies pain. She has mild SOB with activity, recovers quickly. 02 saturation stable on room air. Incisions are healing well, no drainage. She is using her heart pillow appropriately. Vitals are stable.     Plan (Patient Care Plan/focus): Discharge to TCU tomorrow.       Ana Prieto RN   12/2/2024   6:38 PM

## 2024-12-03 NOTE — PROGRESS NOTES
"ANTICOAGULATION  MANAGEMENT: NEW REFERRAL      SUBJECTIVE/OBJECTIVE     Karyn Gamez, a 69 year old female  is newly referred to [facility].    Anticoagulation:    Previously on warfarin: No, new to anticoagulation  Warfarin initiation date (approximate): 11/26/24   Indication(s): Atrial Fibrillation - post CABG   Goal Range: 2.0-3.0   Anticoagulation Bridge/Overlap: No   Referring provider: from inpatient provider; ambulatory responsible provider from primary or specialty care needed.  Request sent to Dr. Davis to oversee management.    Results:        Recent labs: (last 7 days)     12/03/24  0444   INR 2.54*       Wt Readings from Last 2 Encounters:   12/03/24 110.8 kg (244 lb 4.8 oz)   11/17/24 107.9 kg (237 lb 14 oz)      Estimated body mass index is 38.34 kg/m  as calculated from the following:    Height as of 11/7/24: 1.7 m (5' 6.93\").    Weight as of an earlier encounter on 12/3/24: 110.8 kg (244 lb 4.8 oz).  Lab Results   Component Value Date    AST 47 (H) 12/02/2024    ALT 66 (H) 12/02/2024    ALBUMIN 3.3 (L) 12/02/2024     Lab Results   Component Value Date    CR 1.03 (H) 12/03/2024     Estimated Creatinine Clearance: 67 mL/min (A) (based on SCr of 1.03 mg/dL (H)).    ASSESSMENT     Goal INR 2-3, standard for indication(s) above  Establishing initial warfarin maintenance dose (on warfarin < 30 days)   Potential significant drug interactions with home medications: None noted and Amiodarone  Starting warfarin dose is appropriate for patient's anticipated sensitivity to warfarin      ANTICOAGULATION  MANAGEMENT: Discharge Review    Karyn Gamez chart reviewed for anticoagulation continuity of care    Hospital Admission on 11/18-12/3 for CABG.    Discharge disposition: [facility]    Results:    Recent labs: (last 7 days)     11/27/24  0601 11/28/24  0424 11/29/24  0457 11/30/24  0435 12/01/24  0435 12/02/24  0432 12/03/24  0444   INR 1.22* 1.91* 1.61* 1.43* 1.69* " 1.88* 2.54*     Anticoagulation inpatient management:     anticoagulation calendar updated with inpatient dosing    Anticoagulation discharge instructions:     Warfarin dosin mg daily   Bridging: No   INR goal change: No      Medication changes affecting anticoagulation: Yes: Amiodarone and keflex    Additional factors affecting anticoagulation: No     PLAN     Agree with discharge plan for follow up in TCU    ACC will resume monitoring upon discharge from TCU    Anticoagulation Calendar updated    Standing orders placed in Epic: Point of Care INR (Lab 5000) and POCT INR (POC 15)--for [facility] testing only    Plan made per [facility] anticoagulation protocol    Denisa Barcenas RN  Anticoagulation Clinic  12/3/2024

## 2024-12-03 NOTE — PLAN OF CARE
Occupational Therapy Discharge Summary    Reason for therapy discharge:    Discharged to TCU.    Progress towards therapy goal(s). See goals on Care Plan in [facility] electronic health record for goal details.  Progressing towards goals.    Therapy recommendation(s):    Recommend continued therapy @ TCU.

## 2024-12-03 NOTE — PROGRESS NOTES
Care Management Discharge Note    Discharge Date: 12/03/2024       Discharge Disposition: Transitional Care ([facility])    Discharge Services:  per TCU    Discharge DME:  none    Discharge Transportation:  [facility]  transport between 1168-1834    Private pay costs discussed: transportation costs    Does the patient's insurance plan have a 3 day qualifying hospital stay waiver?  No    PAS Confirmation Code: [id number]  Patient/family educated on Medicare website which has current facility and service quality ratings:      Education Provided on the Discharge Plan:    Persons Notified of Discharge Plans: yes  Patient/Family in Agreement with the Plan:      Handoff Referral Completed: No, handoff not indicated or clinically appropriate    Additional Information:  Pt adequate for discharge. Discharge orders placed. Therapy recommendation is TCU and pt has been accepted to [facility]. Pt is accepting of this TCU and discharge plan. Transport has been discussed with the pt. She would like  to set up transport and does not have a transport agency that she prefers to use. CM discussed  [employer] transport and went over possible out of pocket cost with the pt and she is accepting.  then set up WC ride with  [employer] for [id number]. Pt, Nurse, and TCU aware of ride time. Orders faxed to TCU. No further CM needs at this time. CM will sign off.     Alaina Ng RN

## 2024-12-03 NOTE — PLAN OF CARE
sc  Patient Name: Karyn Gamez   MRN: [id number]   Date of Admission: 11/18/2024    Procedure: Procedure(s):  CORONARY ARTERY BYPASS GRAFT TIMES FIVE, LEFT INTERNAL MAMMARY ARTERY HARVEST, RIGHT ENDOSCOPIC VESSEL PROCUREMENT,  ECHOCARDIOGRAM, TRANSESPOPHAGEAL, WITH ANESTHESIA,  LEFT RADIAL ARTERY HARVEST    Post Op day #:14    Subjective (Patient focus/Primary Problem for shift): Increase mobility          Pain Goal 0 Pain Rating 0           Pain Medication/ Regime effective to reduce patient pain Scheduled medication    Objective (Physical assessment):           Rhythm: normal sinus rhythm            Bowel Activity: yes if Yes indicate when: 11/30          Bowel Medications: yes            Incision: healing well          Incentive Spirometry Q 1-2 hour when awake:  yes Volume:           Epicardial Pacing Wires:  no            Patient Activity:           Up to chair for meals: yes          Ambulation with RN x2 (Not including CR): yes           Shower Daily Yes          Nasal  Nozin BID AM/PM x 10 days: yes              Chest Tubes   Pleural: no Draining: not applicable               Suction: not applicable              Mediastinal: yes Draining: not applicable               Suction: not applicable   Dressing Change Daily:yes If No, why?                     Urinary Catheter: no           Preventative WOC consult (need MD order): no       Assessment (Nursing primary shift focus): Assumed care 1900 to 0730. A&O x 4. Assist x 1 with a gait belt and walker. Tele is NSR. Denies pain. Room air. Up to toilet. Ambulated in room. Patient slept in the recliner overnight. Call light within reach, able to make needs known. Bed alarm on for safety.    Plan (Patient Care Plan/focus): Increase mobility and independence with ADL's.      Ana Robles RN   12/3/2024   6:04 AM

## 2024-12-03 NOTE — PLAN OF CARE
Problem: Adult Inpatient Plan of Care  Goal: Readiness for Transition of Care  Outcome: Adequate for Care Transition  Patient is discharging to TCU this morning via [place] transport. Discharge instructions reviewed with patient.   Patient and family aware of plan.

## 2024-12-04 ENCOUNTER — PATIENT OUTREACH (OUTPATIENT)
Dept: CARE COORDINATION | Facility: CLINIC | Age: 69
End: 2024-12-04
Payer: COMMERCIAL

## 2024-12-04 LAB — INR PPP: 3.18 (ref 0.85–1.15)

## 2024-12-04 PROCEDURE — 85610 PROTHROMBIN TIME: CPT | Mod: ORL | Performed by: NURSE PRACTITIONER

## 2024-12-04 PROCEDURE — P9604 ONE-WAY ALLOW PRORATED TRIP: HCPCS | Mod: ORL | Performed by: NURSE PRACTITIONER

## 2024-12-04 PROCEDURE — 36415 COLL VENOUS BLD VENIPUNCTURE: CPT | Mod: ORL | Performed by: NURSE PRACTITIONER

## 2024-12-04 NOTE — PROGRESS NOTES
Clinic Care Coordination Contact  Care Coordination Transition Communication    Clinical Data: Patient was hospitalized at [facility] from 11/18/24 to 12/3/24 with diagnosis of     Principal Diagnosis:   Severe multi-vessel coronary artery disease s/p CABG     Discharge Diagnoses:    Active Problems:          Patient Active Problem List   Diagnosis    Hypertension goal BP (blood pressure) < 140/90    Hyperlipidemia LDL goal <100    Hypertensive hypertrophic cardiomyopathy (H)    Moderate obesity    Cerebral embolism with cerebral infarction (H)    Type 2 diabetes mellitus with complication, without long-term current use of insulin (H)    Major depressive disorder, recurrent episode, moderate (H)    Morbid obesity (H)    Chronic kidney disease, stage 2 (mild)    NSTEMI (non-ST elevated myocardial infarction) (H)    CAD (coronary artery disease)    S/P CABG (coronary artery bypass graft)   .     Assessment: Patient has transitioned to TCU/ARU for short term rehabilitation:    [facility] [phone]  Transition Communication:  Notified facility of Primary Care- Care Coordination support via Telephone.    Plan: Care Coordinator will await notification from facility staff informing of patient's discharge plans/needs. Care Coordinator will review chart and outreach to facility staff every 4 weeks and as needed.     Kulwinder Carrera MSN, RN, PHN, CCM   Primary Care Clinical RN Care Coordinator  [facility]  12/4/2024   11:04 AM  [email or web address]  Office: [phone]

## 2024-12-05 ENCOUNTER — LAB REQUISITION (OUTPATIENT)
Dept: LAB | Facility: CLINIC | Age: 69
End: 2024-12-05
Payer: COMMERCIAL

## 2024-12-05 DIAGNOSIS — Z79.01 LONG TERM (CURRENT) USE OF ANTICOAGULANTS: ICD-10-CM

## 2024-12-06 ENCOUNTER — LAB REQUISITION (OUTPATIENT)
Dept: LAB | Facility: CLINIC | Age: 69
End: 2024-12-06
Payer: COMMERCIAL

## 2024-12-06 ENCOUNTER — ANTICOAGULATION THERAPY VISIT (OUTPATIENT)
Dept: ANTICOAGULATION | Facility: CLINIC | Age: 69
End: 2024-12-06

## 2024-12-06 DIAGNOSIS — I48.91 POSTOPERATIVE ATRIAL FIBRILLATION (H): ICD-10-CM

## 2024-12-06 DIAGNOSIS — I97.89 POSTOPERATIVE ATRIAL FIBRILLATION (H): ICD-10-CM

## 2024-12-06 DIAGNOSIS — Z79.01 LONG TERM (CURRENT) USE OF ANTICOAGULANTS: ICD-10-CM

## 2024-12-06 DIAGNOSIS — Z95.1 S/P CABG (CORONARY ARTERY BYPASS GRAFT): Primary | ICD-10-CM

## 2024-12-06 LAB — INR PPP: 4.42 (ref 0.85–1.15)

## 2024-12-06 PROCEDURE — 36415 COLL VENOUS BLD VENIPUNCTURE: CPT | Mod: ORL | Performed by: NURSE PRACTITIONER

## 2024-12-06 PROCEDURE — P9603 ONE-WAY ALLOW PRORATED MILES: HCPCS | Mod: ORL | Performed by: NURSE PRACTITIONER

## 2024-12-06 PROCEDURE — 85610 PROTHROMBIN TIME: CPT | Mod: ORL | Performed by: NURSE PRACTITIONER

## 2024-12-06 NOTE — PROGRESS NOTES
ANTICOAGULATION MANAGEMENT     Karyn Gamez 69 year old female is on warfarin with supratherapeutic INR result. (Goal INR 2.0-3.0)    Recent labs: (last 7 days)     12/06/24  1230   INR 4.42*       ASSESSMENT     Source(s): Chart Review and Patient/Caregiver Call     Warfarin doses taken: Reviewed in chart  Diet: No new diet changes identified  Medication/supplement changes:  IV loaded amiodaron 11/25/24, then again 12/1 and discharged on oral 200 mg BID x30 days, likely driving the INR up today  New illness, injury, or hospitalization: No, though s/p CABG, discharged 12/3/24  Signs or symptoms of bleeding or clotting: No  Previous result: Supratherapeutic  Additional findings:  Consult with Laya Wagner RPH for RAPID RISE: Reduce dose 21.4%, recheck INR Mon or Tue        PLAN     Unable to reach Karyn today.    Left message to hold tonight's dose then reduce dose this weekend, taking 2 mg Sat, 4 mg Sun, and ACC RN to try and call again on Mon, though should check another INR early next week - could check when at clinic Tue 12/10/24.  Request call back for assessment.    Follow up required to discuss out of range result     Angie Antonio, RN  12/6/2024  Anticoagulation Clinic  [facility] for routing messages: hernan ZAMORA  ACC patient phone line: [phone]

## 2024-12-07 ENCOUNTER — LAB REQUISITION (OUTPATIENT)
Dept: LAB | Facility: CLINIC | Age: 69
End: 2024-12-07
Payer: COMMERCIAL

## 2024-12-07 DIAGNOSIS — D50.8 OTHER IRON DEFICIENCY ANEMIAS: ICD-10-CM

## 2024-12-09 ENCOUNTER — TELEPHONE (OUTPATIENT)
Dept: FAMILY MEDICINE | Facility: CLINIC | Age: 69
End: 2024-12-09

## 2024-12-09 LAB
BASOPHILS # BLD AUTO: 0 10E3/UL (ref 0–0.2)
BASOPHILS NFR BLD AUTO: 0 %
EOSINOPHIL # BLD AUTO: 0.1 10E3/UL (ref 0–0.7)
EOSINOPHIL NFR BLD AUTO: 1 %
ERYTHROCYTE [DISTWIDTH] IN BLOOD BY AUTOMATED COUNT: 16.5 % (ref 10–15)
HCT VFR BLD AUTO: 31.6 % (ref 35–47)
HGB BLD-MCNC: 9.7 G/DL (ref 11.7–15.7)
IMM GRANULOCYTES # BLD: 0.1 10E3/UL
IMM GRANULOCYTES NFR BLD: 1 %
INR PPP: 2.16 (ref 0.85–1.15)
LYMPHOCYTES # BLD AUTO: 0.8 10E3/UL (ref 0.8–5.3)
LYMPHOCYTES NFR BLD AUTO: 7 %
MCH RBC QN AUTO: 29.5 PG (ref 26.5–33)
MCHC RBC AUTO-ENTMCNC: 30.7 G/DL (ref 31.5–36.5)
MCV RBC AUTO: 96 FL (ref 78–100)
MONOCYTES # BLD AUTO: 0.6 10E3/UL (ref 0–1.3)
MONOCYTES NFR BLD AUTO: 5 %
NEUTROPHILS # BLD AUTO: 10.4 10E3/UL (ref 1.6–8.3)
NEUTROPHILS NFR BLD AUTO: 87 %
NRBC # BLD AUTO: 0 10E3/UL
NRBC BLD AUTO-RTO: 0 /100
PLATELET # BLD AUTO: 613 10E3/UL (ref 150–450)
RBC # BLD AUTO: 3.29 10E6/UL (ref 3.8–5.2)
WBC # BLD AUTO: 12.1 10E3/UL (ref 4–11)

## 2024-12-09 PROCEDURE — 85025 COMPLETE CBC W/AUTO DIFF WBC: CPT | Mod: ORL | Performed by: NURSE PRACTITIONER

## 2024-12-09 PROCEDURE — 36415 COLL VENOUS BLD VENIPUNCTURE: CPT | Mod: ORL | Performed by: NURSE PRACTITIONER

## 2024-12-09 PROCEDURE — P9604 ONE-WAY ALLOW PRORATED TRIP: HCPCS | Mod: ORL | Performed by: NURSE PRACTITIONER

## 2024-12-09 PROCEDURE — 85610 PROTHROMBIN TIME: CPT | Mod: ORL | Performed by: NURSE PRACTITIONER

## 2024-12-09 NOTE — PROGRESS NOTES
I spoke with Trinity at [facility] and at this time Karyn's INRs will be managed by the TCU through [facility]. Trinity did make note that they are to contact [facility] at [phone] when she discharges.     Efrain Miles, RN, BSN  [facility] Anticoagulation Team

## 2024-12-09 NOTE — TELEPHONE ENCOUNTER
General Call      Reason for Call:  returning call     What are your questions or concerns:  Discuss inr from last week    Date of last appointment with provider: n/a    Could we send this information to you in [employer] or would you prefer to receive a phone call?:   Patient would prefer a phone call   Okay to leave a detailed message?: Yes at Cell number on file:    Telephone Information:   Mobile [phone]

## 2024-12-09 NOTE — PROGRESS NOTES
I spoke with Karyn and she currently is in the TCU at [facility] and it looks like they are managing her INR. I have a call out the nursing staff there to confirm. Will await call back.     Efrain Miles RN, BSN  [facility] Anticoagulation Team

## 2024-12-10 ENCOUNTER — OFFICE VISIT (OUTPATIENT)
Dept: CARDIOLOGY | Facility: CLINIC | Age: 69
End: 2024-12-10
Payer: COMMERCIAL

## 2024-12-10 ENCOUNTER — LAB REQUISITION (OUTPATIENT)
Dept: LAB | Facility: CLINIC | Age: 69
End: 2024-12-10
Payer: COMMERCIAL

## 2024-12-10 VITALS
RESPIRATION RATE: 18 BRPM | BODY MASS INDEX: 38.14 KG/M2 | HEART RATE: 48 BPM | SYSTOLIC BLOOD PRESSURE: 108 MMHG | WEIGHT: 243 LBS | DIASTOLIC BLOOD PRESSURE: 52 MMHG

## 2024-12-10 DIAGNOSIS — Z79.01 LONG TERM (CURRENT) USE OF ANTICOAGULANTS: ICD-10-CM

## 2024-12-10 DIAGNOSIS — Z95.1 S/P CABG (CORONARY ARTERY BYPASS GRAFT): Primary | ICD-10-CM

## 2024-12-10 PROCEDURE — 99024 POSTOP FOLLOW-UP VISIT: CPT

## 2024-12-10 NOTE — PROGRESS NOTES
CARDIOTHORACIC SURGERY FOLLOW-UP VISIT     Karyn Gamez   1955   [id number]      Reason for visit: Post operative clinic visit. Patient underwent CABGx5 with Dr. Pennington on 11/18/2024.     HPI: Karyn Gamez is a 69 year old year old female seen in clinic for a routine follow-up appointment after surgery. Patient has past medical history as below. Hospital course was remarkable for post op a-fib, Keflex for erythema of vein harvest site, and tape wounds for which WOC was consulted. Patient was discharged to TCU and remains there.    Patient has been doing overall well since discharge. Patient did not yet follow-up with primary care since leaving the hospital due to being in TCU. Reports that incision is healing well. Wounds healing slowly. Denies fevers. Peripheral edema improving/resolved. Appetite is improving and patient is voiding without difficulty. Weight has been downtrending back to baseline. Pain management at this point with occasional Tylenol. Patient has not yet been attending cardiac rehab although does plan to do so when she is back home. Patient is on warfarin for post-op a-fib and has appropriate follow-up in a-fib clinic.     PAST MEDICAL HISTORY:  Past Medical History:   Diagnosis Date    Cervicalgia 6/29/2005 June 29, 2005: Thrown from a horse - wearing a helmet - and struck side of head and neck, heard crepitation, no loc, Able to walk after injury.  persistant pain in neck relieved with ibuprofen, stiff but can move with ROM.  No changes in hands and feet sensation/motor.    Coronary artery disease     Depressive disorder, not elsewhere classified     Hypertension     Obesity, unspecified        PAST SURGICAL HISTORY:  Past Surgical History:   Procedure Laterality Date    ANGIOGRAM  2010    negative    COLONOSCOPY N/A 3/24/2023    Procedure: COLONOSCOPY, WITH HOT POLYPECTOMY AND RESEARCH BIOPSY;  Surgeon: Bret Velez MD;  Location: [facility] OR    CORONARY ARTERY BYPASS GRAFT, WITH  ENDOSCOPIC VESSEL PROCUREMENT N/A 11/18/2024    Procedure: CORONARY ARTERY BYPASS GRAFT TIMES FIVE, LEFT INTERNAL MAMMARY ARTERY HARVEST, RIGHT ENDOSCOPIC VESSEL PROCUREMENT,;  Surgeon: Mary Pennington MD;  Location: [facility] OR    CV CORONARY ANGIOGRAM N/A 11/8/2024    Procedure: Coronary Angiogram;  Surgeon: Emir Brand MD;  Location:  [facility]    HYSTERECTOMY, PAP NO LONGER INDICATED      BSO    MIDLINE DOUBLE LUMEN PLACEMENT  11/24/2024    PROCURE ARTERY RADIAL  11/18/2024    Procedure: LEFT RADIAL ARTERY HARVEST;  Surgeon: Mary Pennington MD;  Location: [facility] OR    TRANSESOPHAGEAL ECHOCARDIOGRAM INTRAOPERATIVE N/A 11/18/2024    Procedure: ECHOCARDIOGRAM, TRANSESPOPHAGEAL, WITH ANESTHESIA,;  Surgeon: Mary Pennington MD;  Location: [facility] OR       CURRENT MEDICATIONS:     Current Outpatient Medications:     acetaminophen (TYLENOL) 325 MG tablet, Take 2 tablets (650 mg) by mouth every 4 hours as needed for other (For optimal non-opioid multimodal pain management to improve pain control.)., Disp: , Rfl:     amiodarone (PACERONE) 200 MG tablet, Take 1 tablet (200 mg) by mouth 2 times daily. For 30 days, then stop, Disp: 60 tablet, Rfl: 0    amLODIPine (NORVASC) 2.5 MG tablet, Take 1 tablet (2.5 mg) by mouth daily, Disp: 90 tablet, Rfl: 1    aspirin (ASA) 81 MG chewable tablet, Take 1 tablet (81 mg) by mouth daily., Disp: , Rfl:     atorvastatin (LIPITOR) 80 MG tablet, Take 1 tablet (80 mg) by mouth daily For cholesterol., Disp: 90 tablet, Rfl: 1    blood glucose monitoring (ONE TOUCH DELICA) lancets, Use to test blood sugar 1 times daily, Disp: 100 each, Rfl: 11    blood glucose monitoring (ONETOUCH VERIO IQ SYSTEM) meter device kit, Use to test blood sugar 1 times daily, Disp: 1 kit, Rfl: 0    metFORMIN (GLUCOPHAGE XR) 500 MG 24 hr tablet, TAKE 1 TABLET BY MOUTH DAILY WITH DINNER FOR BLOOD SUGARS, Disp: 90 tablet, Rfl: 1    metoprolol succinate ER (TOPROL XL) 50 MG 24 hr  tablet, Take 3 tablets (150 mg) by mouth daily., Disp: , Rfl:     multivitamin, therapeutic (THERA-VIT) TABS tablet, Take 1 tablet by mouth daily., Disp: , Rfl:     senna-docusate (SENOKOT-S/PERICOLACE) 8.6-50 MG tablet, Take 1 tablet by mouth 2 times daily., Disp: , Rfl:     sertraline (ZOLOFT) 100 MG tablet, Take 1 tablet (100 mg) by mouth daily., Disp: , Rfl:     warfarin ANTICOAGULANT (COUMADIN) 4 MG tablet, Take 1 tablet (4 mg) by mouth daily., Disp: , Rfl:     furosemide (LASIX) 40 MG tablet, Take 1 tablet (40 mg) by mouth 2 times daily for 6 days. For 7 days; will re-eval in clinic 12/10, Disp: , Rfl:     ALLERGIES:    No Known Allergies    ROS:  Gen: No fevers, weight loss/gain  CV: Denies chest pain, peripheral edema  Pulm: Denies SOB  GI/: Voiding without problems, appetite improving.     LABS:  None    IMAGING:  None    PHYSICAL EXAM:   /52 (BP Location: Left arm, Patient Position: Sitting, Cuff Size: Adult Regular)   Pulse (!) 48   Resp 18   Wt 110.2 kg (243 lb)   LMP  (LMP Unknown)   BMI 38.14 kg/m    General: Alert and oriented, pleasant, no acute distress.  CV:  No peripheral edema.  Pulm: Easy work of breathing on room air.   GI: Soft, non-tender, and non-distended  Incision: Chest, arm, and leg incisions clean dry and intact without erythema, swelling or drainage  Neuro: CNs grossly intact.      ASSESSMENT/PLAN:  Karyn Gamez is a 69 year old year old female status post CABG who returns to clinic for postop visit.     - Surgically doing well. Incisions are largely healing well--watch left leg incision and call if drainage or increasing redness. Finish course of Keflex.  - Hemodynamics are stable. No medication changes were needed today.  - OK to stop amiodarone after 30 days (Dec 31)  - Will trial off Lasix today--call if gaining weight or with swelling in your left leg  - Patient will be maintained on warfarin until cleared by a-fib clinic. If/when warfarin is stopped, ASA should be  increased to 162mg daily indefinitely s/p CABG  - You are on amlodipine for radial artery graft protection. This should be maintained for at least 6 months post-op, ideally one year.  - Schedule a follow-up with your family doctor (will route this note to them)  - Follow up with your cardiologist as scheduled (Dr. Jerez on 01/02/2025)  - Follow up with a-fib clinic as scheduled (01/13/2025)  - Start and continue Cardiac Rehab until completed--call them to schedule at [phone]  - May start driving 12/16/2024 (4 weeks post-op) if not using narcotic pain medications.  - Continue strict sternal precautions until 01/13/2025. No lifting >10bs; may gradually increase at this point (8 weeks post-op).   - No need for further follow-up with CV surgery unless concerns. Feel free to call our office with questions, needs for meds etc. [phone].         _______  Ly Atwood PA-C  Cardiothoracic Surgery  [phone]

## 2024-12-10 NOTE — PATIENT INSTRUCTIONS
- Surgically doing well. Incisions are largely healing well--watch left leg incision and call if drainage or increasing redness. Finish course of Keflex.  - Hemodynamics are stable. No medication changes were needed today.  - OK to stop amiodarone after 30 days (Dec 31)  - Will trial off Lasix today--call if gaining weight or with swelling in your left leg  - Patient will be maintained on warfarin until cleared by a-fib clinic. If/when warfarin is stopped, ASA should be increased to 162mg daily indefinitely s/p CABG  - You are on amlodipine for radial artery graft protection. This should be maintained for at least 6 months post-op, ideally one year.  - Schedule a follow-up with your family doctor (will route this note to them)  - Follow up with your cardiologist as scheduled (Dr. Jerez on 01/02/2025)  - Follow up with a-fib clinic as scheduled (01/13/2025)  - Start and continue Cardiac Rehab until completed--call them to schedule at [phone]  - May start driving 12/16/2024 (4 weeks post-op) if not using narcotic pain medications.  - Continue strict sternal precautions until 01/13/2025. No lifting >10bs; may gradually increase at this point (8 weeks post-op).   - No need for further follow-up with CV surgery unless concerns. Feel free to call our office with questions, needs for meds etc. [phone].

## 2024-12-11 ENCOUNTER — ANTICOAGULATION THERAPY VISIT (OUTPATIENT)
Dept: ANTICOAGULATION | Facility: CLINIC | Age: 69
End: 2024-12-11

## 2024-12-11 ENCOUNTER — TELEPHONE (OUTPATIENT)
Dept: FAMILY MEDICINE | Facility: CLINIC | Age: 69
End: 2024-12-11

## 2024-12-11 ENCOUNTER — DOCUMENTATION ONLY (OUTPATIENT)
Dept: ANTICOAGULATION | Facility: CLINIC | Age: 69
End: 2024-12-11

## 2024-12-11 DIAGNOSIS — I48.91 POSTOPERATIVE ATRIAL FIBRILLATION (H): ICD-10-CM

## 2024-12-11 DIAGNOSIS — Z95.1 S/P CABG (CORONARY ARTERY BYPASS GRAFT): Primary | ICD-10-CM

## 2024-12-11 DIAGNOSIS — I97.89 POSTOPERATIVE ATRIAL FIBRILLATION (H): ICD-10-CM

## 2024-12-11 LAB — INR PPP: 2.28 (ref 0.85–1.15)

## 2024-12-11 RX ORDER — WARFARIN SODIUM 4 MG/1
TABLET ORAL
Qty: 90 TABLET | Refills: 1 | Status: SHIPPED | OUTPATIENT
Start: 2024-12-11 | End: 2024-12-12 | Stop reason: ALTCHOICE

## 2024-12-11 NOTE — TELEPHONE ENCOUNTER
General Call      Reason for Call:  INR    What are your questions or concerns:  pts discharge date from [facility] is tomorrow 12/12. Please contact pt with INR instructions.    Date of last appointment with provider: 2/11/12 INR    Could we send this information to you in [employer] or would you prefer to receive a phone call?:   Patient would prefer a phone call   Okay to leave a detailed message?: Yes at Cell number on file:    Telephone Information:   Mobile [phone]

## 2024-12-11 NOTE — PROGRESS NOTES
Incoming FYI call from Jacquelyn MOSELEY at [facility].   She would like to provide an update on discharge planning.   Karyn will likely be discharged tomorrow to home, no home care.   INR is stable, Jacquelyn will fax over their flowsheet/INR tracking with results and recent dosing.   Karyn will be instructed to get an INR set up within a week of discharge.     She states for any questions at all [facility] should feel free to call them.   [phone]

## 2024-12-11 NOTE — TELEPHONE ENCOUNTER
Talked with Patient in acc encounter, doses and scheduled inr at [facility] for next week

## 2024-12-11 NOTE — TELEPHONE ENCOUNTER
"Received call from patient. She is calling regarding her discharge tomorrow from [facility]. They advised that patient reach out to determine her Warfarin dosing prior to discharge according to INR results.    Per Anticoagulation therapy visit notes from 12/6:  \"I spoke with Trinity at [facility] and at this time Karyn's INRs will be managed by the TCU through [facility]. Trinity did make note that they are to contact [facility] at [phone] when she discharges. \"    Informed patient. She will plan on contacting [facility].     PAULA Kaur RN  [facility]  "

## 2024-12-11 NOTE — PROGRESS NOTES
ANTICOAGULATION MANAGEMENT     Karyn Gamez 69 year old female is on warfarin with therapeutic INR result. (Goal INR 2.0-3.0)    Recent labs: (last 7 days)     12/11/24  0704   INR 2.28*       ASSESSMENT     Source(s): Chart Review and Patient/Caregiver Call     Warfarin doses taken: Warfarin taken as instructed  Diet: No new diet changes identified  Medication/supplement changes: None noted  New illness, injury, or hospitalization: No  Signs or symptoms of bleeding or clotting: No  Previous result: Therapeutic last visit; previously outside of goal range  Additional findings:  going home from [facility] tomorrow. Warfarin rx sent to pharmacy, inr scheduled       PLAN     Recommended plan for no diet, medication or health factor changes affecting INR     Dosing Instructions: Continue your current warfarin dose with next INR in 10 days       Summary  As of 12/11/2024      Full warfarin instructions:  2 mg every Mon, Wed, Sat; 4 mg all other days   Next INR check:  12/20/2024               Telephone call with Karyn who verbalizes understanding and agrees to plan    Lab visit scheduled     Education provided: Please call back if any changes to your diet, medications or how you've been taking warfarin    Plan made per [facility] anticoagulation protocol    Magaly Brokos RN  12/11/2024  Anticoagulation Clinic  [employer] for routing messages: hernan ZAMORA  [facility] patient phone line: [phone]        _______________________________________________________________________     Anticoagulation Episode Summary       Current INR goal:  2.0-3.0   TTR:  --   Target end date:  Indefinite   Send INR reminders to:  ERASMO ZAMORA    Indications    S/P CABG (coronary artery bypass graft) [Z95.1]  Postoperative atrial fibrillation (H) [I97.89  I48.91]             Comments:  --             Anticoagulation Care Providers       Provider Role Specialty Phone number    Ly Atwood PA-C Referring Cardiovascular & Thoracic Surgery  [phone]    Cristiana Davis MD [facility] Medicine [phone]             Lumbar Puncture

## 2024-12-12 ENCOUNTER — ANTICOAGULATION THERAPY VISIT (OUTPATIENT)
Dept: ANTICOAGULATION | Facility: CLINIC | Age: 69
End: 2024-12-12
Payer: COMMERCIAL

## 2024-12-12 ENCOUNTER — TELEPHONE (OUTPATIENT)
Dept: ANTICOAGULATION | Facility: CLINIC | Age: 69
End: 2024-12-12
Payer: COMMERCIAL

## 2024-12-12 DIAGNOSIS — I97.89 POSTOPERATIVE ATRIAL FIBRILLATION (H): ICD-10-CM

## 2024-12-12 DIAGNOSIS — I48.91 POSTOPERATIVE ATRIAL FIBRILLATION (H): ICD-10-CM

## 2024-12-12 DIAGNOSIS — Z95.1 S/P CABG (CORONARY ARTERY BYPASS GRAFT): Primary | ICD-10-CM

## 2024-12-12 RX ORDER — WARFARIN SODIUM 1 MG/1
TABLET ORAL
Qty: 45 TABLET | Refills: 1 | Status: SHIPPED | OUTPATIENT
Start: 2024-12-12

## 2024-12-12 NOTE — TELEPHONE ENCOUNTER
See [facility] encounter from today for dosing instructions. [facility] calendar has been updated.     Anne Marie Sebastian RN  [facility] Anticoagulation Clinic

## 2024-12-12 NOTE — PROGRESS NOTES
ANTICOAGULATION MANAGEMENT     Karyn Gamez 69 year old female is on warfarin with therapeutic INR result. (Goal INR 2.0-3.0)    Recent labs: (last 7 days)     12/11/24  0704   INR 2.28*       ASSESSMENT     Source(s): Chart Review and Patient/Caregiver Call     Warfarin doses taken: While hospitalized on was in TCU and discharged today, 12/12/24: anticoagulation calendar updated with inpatient dosing.  Discharged on: Next INR 12/16/24 and will start warfarin 1mg  and take 1 tab on Friday and 1.5 tabs  all other days   Diet: No new diet changes identified  Medication/supplement changes:  continues on amiodarone x 30 days   New illness, injury, or hospitalization: Yes: discharged from TCU today was being managed by their ACC team.  The TCU did contact us for dosing yesterday and then they also dosed her on discharge.  This has been discussed with [facility] and [facility] calendar updated with dosing.  New RX for warfarn 1 MG sent to the pharmacy   Signs or symptoms of bleeding or clotting: No  Previous result: Therapeutic last visit; previously outside of goal range  Additional findings: None new RX for warfarin 1 mg tabs called to [facility]         PLAN     Recommended plan for ongoing change(s) affecting INR     Dosing Instructions:  see [facility] calendar with dosing   with next INR in 4 days       Summary  As of 12/12/2024      Full warfarin instructions:  1 mg every Fri; 1.5 mg all other days   Next INR check:  12/16/2024               Telephone call with Karyn who verbalizes understanding and agrees to plan    Lab visit scheduled    Education provided: Taking warfarin: purpose of warfarin and how it works, take warfarin at same time each day; preferably in the evening, prescribed tablet strength and color, importance of following ACC instructions vs instructions on the prescription bottle, and Importance of taking warfarin as instructed  Goal range and lab monitoring: goal range and significance of current result, Importance  of therapeutic range, Importance of following up at instructed interval, and frequency of lab work when starting warfarin and importance of following up when instructed (extends after stability established)  Dietary considerations: importance of consistent vitamin K intake, impact of vitamin K foods on INR, and importance of notifying [facility] to changes in diet  Symptom monitoring: monitoring for bleeding signs and symptoms, monitoring for clotting signs and symptoms, monitoring for stroke signs and symptoms, and when to seek medical attention/emergency care  Written instructions provided  Contact [phone] with any changes, questions or concerns.   New patient packet to be mailed to patient.     Plan made with [facility] Pharmacist Laya Sebastian RN  12/12/2024  Anticoagulation Clinic  [employer] for routing messages: hernan ZAMORA  [facility] patient phone line: [phone]        _______________________________________________________________________     Anticoagulation Episode Summary       Current INR goal:  2.0-3.0   TTR:  --   Target end date:  Indefinite   Send INR reminders to:  ERASMO ZAMORA    Indications    S/P CABG (coronary artery bypass graft) [Z95.1]  Postoperative atrial fibrillation (H) [I97.89  I48.91]             Comments:  --             Anticoagulation Care Providers       Provider Role Specialty Phone number    Ly Atwood PA-C Referring Cardiovascular & Thoracic Surgery [phone]    Cristiana Davis MD Referring Family Medicine [phone]

## 2024-12-16 ENCOUNTER — TELEPHONE (OUTPATIENT)
Dept: CARDIOLOGY | Facility: CLINIC | Age: 69
End: 2024-12-16

## 2024-12-16 ENCOUNTER — LAB (OUTPATIENT)
Dept: LAB | Facility: CLINIC | Age: 69
End: 2024-12-16
Payer: COMMERCIAL

## 2024-12-16 ENCOUNTER — ANTICOAGULATION THERAPY VISIT (OUTPATIENT)
Dept: ANTICOAGULATION | Facility: CLINIC | Age: 69
End: 2024-12-16

## 2024-12-16 ENCOUNTER — MYC MEDICAL ADVICE (OUTPATIENT)
Dept: FAMILY MEDICINE | Facility: CLINIC | Age: 69
End: 2024-12-16

## 2024-12-16 DIAGNOSIS — I48.91 POSTOPERATIVE ATRIAL FIBRILLATION (H): ICD-10-CM

## 2024-12-16 DIAGNOSIS — Z95.1 S/P CABG (CORONARY ARTERY BYPASS GRAFT): Primary | ICD-10-CM

## 2024-12-16 DIAGNOSIS — I97.89 POSTOPERATIVE ATRIAL FIBRILLATION (H): ICD-10-CM

## 2024-12-16 LAB — INR BLD: 1.8 (ref 0.9–1.1)

## 2024-12-16 PROCEDURE — 85610 PROTHROMBIN TIME: CPT

## 2024-12-16 PROCEDURE — 36416 COLLJ CAPILLARY BLOOD SPEC: CPT

## 2024-12-16 RX ORDER — POTASSIUM CHLORIDE 750 MG/1
10 TABLET, EXTENDED RELEASE ORAL DAILY
Qty: 30 TABLET | Refills: 0 | Status: SHIPPED | OUTPATIENT
Start: 2024-12-16

## 2024-12-16 RX ORDER — METOPROLOL SUCCINATE 200 MG/1
200 TABLET, EXTENDED RELEASE ORAL DAILY
Qty: 30 TABLET | Refills: 0 | Status: SHIPPED | OUTPATIENT
Start: 2024-12-16

## 2024-12-16 RX ORDER — FUROSEMIDE 20 MG/1
20 TABLET ORAL DAILY
Qty: 30 TABLET | Refills: 0 | Status: SHIPPED | OUTPATIENT
Start: 2024-12-16

## 2024-12-16 NOTE — TELEPHONE ENCOUNTER
Pt is currently 238 lbs.  160 consistent SBP's. Pt noticing her exercise tolerance has decreased over the last couple days.    Increasing Metoprolol 200 mg daily per CV NANCY.  Restart Lasix 20 mg daily.    Will check in again on Thursday 12/19 for     ----- Message from Diana LINO sent at 12/16/2024 12:02 PM CST -----  PT called and left message and has serveral questions regarding medications, she said in voicemail particularly regarding Lasix for weight gain. Please give pt a call to discuss.  Thank you,  Diana

## 2024-12-16 NOTE — PROGRESS NOTES
ANTICOAGULATION MANAGEMENT     Karyn Gamez 69 year old female is on warfarin with subtherapeutic INR result. (Goal INR 2.0-3.0)    Recent labs: (last 7 days)     12/16/24  1045   INR 1.8*       ASSESSMENT     Warfarin Lab Questionnaire    Warfarin Doses Last 7 Days      12/16/2024    10:48 AM   Dose in Tablet or Mg   TAB or MG? tablet (tab)     Pt Rptd Dose SUNDAY MONDAY TUESDAY WED THURS FRIDAY SATURDAY 12/16/2024  10:48 AM 1.5 1 1.5 1 1.5 1 1.5         12/16/2024   Warfarin Lab Questionnaire   Missed doses within past 14 days? No   Changes in diet or alcohol within past 14 days? No   Medication changes since last result? No   Injuries or illness since last result? No   New shortness of breath, severe headaches or sudden changes in vision since last result? No   Abnormal bleeding since last result? No   Upcoming surgery, procedure? No        Previous result: Therapeutic last 2(+) visits  Additional findings: None       PLAN     Recommended plan for no diet, medication or health factor changes affecting INR     Dosing Instructions: Increase your warfarin dose (10% change) with next INR in 4 days       Summary  As of 12/16/2024      Full warfarin instructions:  2 mg every Mon; 1.5 mg all other days   Next INR check:  12/20/2024               Telephone call with Karyn who verbalizes understanding and agrees to plan and who agrees to plan and repeated back plan correctly    Lab visit scheduled    Education provided: Goal range and lab monitoring: goal range and significance of current result, Importance of therapeutic range, and Importance of following up at instructed interval  Symptom monitoring: monitoring for clotting signs and symptoms, monitoring for stroke signs and symptoms, and when to seek medical attention/emergency care  Contact [phone] with any changes, questions or concerns.     Plan made with [facility] Pharmacist Laya Miles, RN  12/16/2024  Anticoagulation Clinic  [facility]  for routing messages: p ANTICOELVIN FREITASINE  [facility] patient phone line: [phone]        _______________________________________________________________________     Anticoagulation Episode Summary       Current INR goal:  2.0-3.0   TTR:  37.7% (3 d)   Target end date:  Indefinite   Send INR reminders to:  ERASMO ZAMORA    Indications    S/P CABG (coronary artery bypass graft) [Z95.1]  Postoperative atrial fibrillation (H) [I97.89  I48.91]             Comments:  --             Anticoagulation Care Providers       Provider Role Specialty Phone number    Ly Atwood PA-C Referring Cardiovascular & Thoracic Surgery [phone]    Cristiana Davis MD Referring Family Medicine [phone]

## 2024-12-17 ENCOUNTER — LAB REQUISITION (OUTPATIENT)
Dept: LAB | Facility: CLINIC | Age: 69
End: 2024-12-17
Payer: COMMERCIAL

## 2024-12-17 DIAGNOSIS — Z79.01 LONG TERM (CURRENT) USE OF ANTICOAGULANTS: ICD-10-CM

## 2024-12-19 ENCOUNTER — TELEPHONE (OUTPATIENT)
Dept: CARDIOLOGY | Facility: CLINIC | Age: 69
End: 2024-12-19
Payer: COMMERCIAL

## 2024-12-19 NOTE — TELEPHONE ENCOUNTER
Today's weight is 236 lbs.    Recent BP readings:  151/66  110/57  117/54  122/57  165/71  124/41    HR has been running 45-65. Advised pt to to continue 200 mg metoprolol and to call if she needs refills prior to seeing Dr. Jerez on 1/2. Pt verbalized understanding.      ----- Message from Maki JORGE sent at 12/16/2024 12:41 PM CST -----    F/up on BP and weight  ----- Message -----  From: Diana Lizama  Sent: 12/16/2024  12:03 PM CST  To: Maki Mejia RN    PT called and left message and has serveral questions regarding medications, she said in voicemail particularly regarding Lasix for weight gain. Please give pt a call to discuss.  Thank you,  Diana

## 2024-12-23 ENCOUNTER — ANTICOAGULATION THERAPY VISIT (OUTPATIENT)
Dept: ANTICOAGULATION | Facility: CLINIC | Age: 69
End: 2024-12-23

## 2024-12-23 ENCOUNTER — LAB (OUTPATIENT)
Dept: LAB | Facility: CLINIC | Age: 69
End: 2024-12-23
Payer: COMMERCIAL

## 2024-12-23 DIAGNOSIS — I48.91 POSTOPERATIVE ATRIAL FIBRILLATION (H): ICD-10-CM

## 2024-12-23 DIAGNOSIS — I97.89 POSTOPERATIVE ATRIAL FIBRILLATION (H): ICD-10-CM

## 2024-12-23 DIAGNOSIS — Z95.1 S/P CABG (CORONARY ARTERY BYPASS GRAFT): Primary | ICD-10-CM

## 2024-12-23 LAB — INR BLD: 2.4 (ref 0.9–1.1)

## 2024-12-23 PROCEDURE — 85610 PROTHROMBIN TIME: CPT

## 2024-12-23 PROCEDURE — 36416 COLLJ CAPILLARY BLOOD SPEC: CPT

## 2024-12-23 NOTE — PROGRESS NOTES
ANTICOAGULATION MANAGEMENT     Karyn Gamez 69 year old female is on warfarin with therapeutic INR result. (Goal INR 2.0-3.0)    Recent labs: (last 7 days)     12/23/24  1054   INR 2.4*       ASSESSMENT     Warfarin Lab Questionnaire    Warfarin Doses Last 7 Days      12/23/2024    10:46 AM   Dose in Tablet or Mg   TAB or MG? milligram (mg)     Pt Rptd Dose SUNDAY MONDAY TUESDAY WED THURS FRIDAY SATURDAY 12/23/2024  10:46 AM 2.5 1.5 1.5 3 2.5 2.5 2.5         12/23/2024   Warfarin Lab Questionnaire   Missed doses within past 14 days? No. More warfarin taken than advised. Took 2.5 mg Sat, Sun   Changes in diet or alcohol within past 14 days? No   Medication changes since last result? No   Injuries or illness since last result? No   New shortness of breath, severe headaches or sudden changes in vision since last result? No   Abnormal bleeding since last result? No   Upcoming surgery, procedure? No     Previous result: Subtherapeutic  Additional findings:  Pt will be stopping amiodarone around 1/1/24       PLAN     Recommended plan for temporary change(s) and ongoing change(s) affecting INR     Dosing Instructions: Continue your current warfarin dose with next INR in 3 days       Summary  As of 12/23/2024      Full warfarin instructions:  2 mg every day   Next INR check:  12/26/2024               Telephone call with Karyn who verbalizes understanding and agrees to plan and who agrees to plan and repeated back plan correctly    Lab visit scheduled    Education provided: Taking warfarin: Importance of taking warfarin as instructed    Plan made with [facility] Pharmacist Laya Dasilva RN  12/23/2024  Anticoagulation Clinic  [employer] for routing messages: hernan ZAMORA  [facility] patient phone line: [phone]        _______________________________________________________________________     Anticoagulation Episode Summary       Current INR goal:  2.0-3.0   TTR:  26.7% (1.4 wk)   Target end date:   Indefinite   Send INR reminders to:  ERASMO ZAMORA    Indications    S/P CABG (coronary artery bypass graft) [Z95.1]  Postoperative atrial fibrillation (H) [I97.89  I48.91]             Comments:  --             Anticoagulation Care Providers       Provider Role Specialty Phone number    Ly Atwood PA-C Referring Cardiovascular & Thoracic Surgery [phone]    Cristiana Davis MD Referring Family Medicine [phone]

## 2024-12-26 ENCOUNTER — OFFICE VISIT (OUTPATIENT)
Dept: FAMILY MEDICINE | Facility: CLINIC | Age: 69
End: 2024-12-26
Payer: COMMERCIAL

## 2024-12-26 ENCOUNTER — LAB (OUTPATIENT)
Dept: LAB | Facility: CLINIC | Age: 69
End: 2024-12-26
Payer: COMMERCIAL

## 2024-12-26 ENCOUNTER — ANTICOAGULATION THERAPY VISIT (OUTPATIENT)
Dept: ANTICOAGULATION | Facility: CLINIC | Age: 69
End: 2024-12-26

## 2024-12-26 VITALS
SYSTOLIC BLOOD PRESSURE: 116 MMHG | BODY MASS INDEX: 37.45 KG/M2 | RESPIRATION RATE: 20 BRPM | HEIGHT: 66 IN | WEIGHT: 233 LBS | TEMPERATURE: 98.3 F | OXYGEN SATURATION: 90 % | DIASTOLIC BLOOD PRESSURE: 68 MMHG | HEART RATE: 50 BPM

## 2024-12-26 DIAGNOSIS — I48.91 POSTOPERATIVE ATRIAL FIBRILLATION (H): ICD-10-CM

## 2024-12-26 DIAGNOSIS — Z95.1 S/P CABG (CORONARY ARTERY BYPASS GRAFT): ICD-10-CM

## 2024-12-26 DIAGNOSIS — N18.30 TYPE 2 DIABETES MELLITUS WITH STAGE 3 CHRONIC KIDNEY DISEASE, WITHOUT LONG-TERM CURRENT USE OF INSULIN, UNSPECIFIED WHETHER STAGE 3A OR 3B CKD (H): ICD-10-CM

## 2024-12-26 DIAGNOSIS — I25.10 CORONARY ARTERY DISEASE INVOLVING NATIVE CORONARY ARTERY OF NATIVE HEART WITHOUT ANGINA PECTORIS: Primary | ICD-10-CM

## 2024-12-26 DIAGNOSIS — E11.22 TYPE 2 DIABETES MELLITUS WITH STAGE 3 CHRONIC KIDNEY DISEASE, WITHOUT LONG-TERM CURRENT USE OF INSULIN, UNSPECIFIED WHETHER STAGE 3A OR 3B CKD (H): ICD-10-CM

## 2024-12-26 DIAGNOSIS — I11.9 HYPERTENSIVE HYPERTROPHIC CARDIOMYOPATHY, WITHOUT HEART FAILURE (H): ICD-10-CM

## 2024-12-26 DIAGNOSIS — I42.2 HYPERTENSIVE HYPERTROPHIC CARDIOMYOPATHY, WITHOUT HEART FAILURE (H): ICD-10-CM

## 2024-12-26 DIAGNOSIS — I97.89 POSTOPERATIVE ATRIAL FIBRILLATION (H): ICD-10-CM

## 2024-12-26 DIAGNOSIS — Z95.1 S/P CABG (CORONARY ARTERY BYPASS GRAFT): Primary | ICD-10-CM

## 2024-12-26 DIAGNOSIS — N18.2 CHRONIC KIDNEY DISEASE, STAGE 2 (MILD): ICD-10-CM

## 2024-12-26 DIAGNOSIS — F33.1 MAJOR DEPRESSIVE DISORDER, RECURRENT EPISODE, MODERATE (H): ICD-10-CM

## 2024-12-26 PROBLEM — E66.01 MORBID OBESITY (H): Status: RESOLVED | Noted: 2022-03-23 | Resolved: 2024-12-26

## 2024-12-26 LAB — INR BLD: 3.2 (ref 0.9–1.1)

## 2024-12-26 PROCEDURE — G2211 COMPLEX E/M VISIT ADD ON: HCPCS | Performed by: FAMILY MEDICINE

## 2024-12-26 PROCEDURE — 99214 OFFICE O/P EST MOD 30 MIN: CPT | Mod: 24 | Performed by: FAMILY MEDICINE

## 2024-12-26 RX ORDER — SERTRALINE HYDROCHLORIDE 100 MG/1
100 TABLET, FILM COATED ORAL DAILY
Qty: 90 TABLET | Refills: 1 | Status: SHIPPED | OUTPATIENT
Start: 2024-12-26 | End: 2025-01-02

## 2024-12-26 ASSESSMENT — PATIENT HEALTH QUESTIONNAIRE - PHQ9
SUM OF ALL RESPONSES TO PHQ QUESTIONS 1-9: 3
10. IF YOU CHECKED OFF ANY PROBLEMS, HOW DIFFICULT HAVE THESE PROBLEMS MADE IT FOR YOU TO DO YOUR WORK, TAKE CARE OF THINGS AT HOME, OR GET ALONG WITH OTHER PEOPLE: NOT DIFFICULT AT ALL
SUM OF ALL RESPONSES TO PHQ QUESTIONS 1-9: 3

## 2024-12-26 ASSESSMENT — PAIN SCALES - GENERAL: PAINLEVEL_OUTOF10: NO PAIN (0)

## 2024-12-26 NOTE — PROGRESS NOTES
Assessment & Plan     (I25.10) Coronary artery disease involving native coronary artery of native heart without angina pectoris  (primary encounter diagnosis)  Comment: Patient presented with acute coronary syndrome with subsequent elective coronary bypass 1 month ago.  She states she is doing well, still has some incisional and anterior chest wall pain.  Plan: Appears to be having routine postoperative recovery.  Advise close follow-up with cardiac surgery and cardiology.    (Z95.1) S/P CABG (coronary artery bypass graft)  Comment: 5 vessel bypass, extensive multivessel disease.  Plan: Appears to be doing well.  Discussed secondary prevention.    (I97.89,  I48.91) Postoperative atrial fibrillation (H)  Comment: Today, on exam, appears to be in a sinus rhythm with a heart rate of 50.  Currently on amiodarone plus higher dose of metoprolol.  Patient is on appropriate anticoagulation.  Plan: Advised to decrease the dose of her metoprolol XL from 200 mg daily to 150 mg XL daily.  Again, advise close follow-up with cardiology.    (F33.1) Major depressive disorder, recurrent episode, moderate (H)  Comment: Reviewed that heart attacks and major surgeries can lead to depression.  She appears to be doing reasonably well.  Plan: sertraline (ZOLOFT) 100 MG tablet        Continue SSRI therapy.    (E11.22,  N18.30) Type 2 diabetes mellitus with stage 3 chronic kidney disease, without long-term current use of insulin, unspecified whether stage 3a or 3b CKD (H)  Comment: Very good control using single drug therapy, metformin.  Lab Results   Component Value Date    A1C 6.0 11/07/2024    A1C 5.4 10/31/2023    A1C 5.7 01/31/2023    A1C 6.0 08/26/2021    A1C 6.2 01/15/2021    A1C 6.0 01/22/2020    A1C 6.8 06/07/2019    A1C 6.2 05/24/2018    A1C 6.1 02/15/2018      Plan: Continue.    (N18.2) Chronic kidney disease, stage 2 (mild)  Comment: Most recent GFR 59, patient is not on an ACE inhibitor or ARB therapy.  Blood pressure  "management includes amlodipine, beta-blocker, and loop diuretic.  Low normal potassium, currently taking oral potassium supplements.  Plan: For now, continue current management.    (I11.9,  I42.2) Hypertensive hypertrophic cardiomyopathy, without heart failure (H)  Comment: Probable large element of ischemic heart disease.  Plan: Clinically, does not appear to be in failure.  Advise close follow-up with cardiology.    Results for orders placed or performed in visit on 12/26/24   INR point of care     Status: Abnormal   Result Value Ref Range    INR 3.2 (H) 0.9 - 1.1    Narrative    This test is intended for monitoring Coumadin therapy. Results are not accurate in patients with prolonged INR due to factor deficiency.        Patient Instructions     Lab Results   Component Value Date    A1C 6.0 11/07/2024    A1C 5.4 10/31/2023    A1C 5.7 01/31/2023    A1C 6.0 08/26/2021    A1C 6.2 01/15/2021    A1C 6.0 01/22/2020    A1C 6.8 06/07/2019    A1C 6.2 05/24/2018    A1C 6.1 02/15/2018      Nothing serious with your lungs.     Refill for your sertraline.     Go back taking 150 mg of the metoprolol instead of the 200 mg.     No other changes with your medications.     It will be 3-6 months before you feel well.     The bypass should keep you heart healthy for another 10 -15 years. It will depend on how well you take care of yourself.            MED REC REQUIRED urinary tract infection.   -Start ceftriaxone IV  -Await urine culture results  Post Medication [place].  Nciliation Status:     BMI  Estimated body mass index is 37.61 kg/m  as calculated from the following:    Height as of this encounter: 1.676 m (5' 6\").    Weight as of this encounter: 105.7 kg (233 lb).     Tarik Boss is a 69 year old, presenting for the following health issues:  Follow Up        12/26/2024     1:14 PM   Additional Questions   Roomed by Jacquelyn Cross CMA   Accompanied by      History of Present Illness       Reason for visit:  Check up " "following surgery  Symptom onset:  More than a month  Symptoms include:  Shortness of breath  Symptom intensity:  Moderate  Symptom progression:  Staying the same  Had these symptoms before:  Yes  Has tried/received treatment for these symptoms:  Yes  Previous treatment was successful:  Yes  Prior treatment description:  Medication  What makes it worse:  No  What makes it better:  Rest   She is taking medications regularly.             Review of Systems  Constitutional, neuro, ENT, endocrine, pulmonary, cardiac, gastrointestinal, genitourinary, musculoskeletal, integument and psychiatric systems are negative, except as otherwise noted.      Objective    /68   Pulse 50   Temp 98.3  F (36.8  C) (Oral)   Resp 20   Ht 1.676 m (5' 6\")   Wt 105.7 kg (233 lb)   LMP  (LMP Unknown)   SpO2 90%   BMI 37.61 kg/m    Body mass index is 37.61 kg/m .  Physical Exam   GENERAL: Healthy, alert and no distress  EYES: Eyes grossly normal to inspection, conjunctivae and sclerae normal  RESP: Lungs clear to auscultation - no rales, rhonchi or wheezes  CV: Regular rate and rhythm, normal S1 S2, no murmur  MS: No gross musculoskeletal defects noted, no edema  NEURO: Normal strength and tone, mentation intact and speech normal  PSYCH: Mentation appears normal, affect normal/bright     The longitudinal plan of care for the diagnosis(es)/condition(s) as documented were addressed during this visit. Due to the added complexity in care, I will continue to support Karyn in the subsequent management and with ongoing continuity of care.         Signed Electronically by: Cristiana Davis MD    "

## 2024-12-26 NOTE — PATIENT INSTRUCTIONS
Lab Results   Component Value Date    A1C 6.0 11/07/2024    A1C 5.4 10/31/2023    A1C 5.7 01/31/2023    A1C 6.0 08/26/2021    A1C 6.2 01/15/2021    A1C 6.0 01/22/2020    A1C 6.8 06/07/2019    A1C 6.2 05/24/2018    A1C 6.1 02/15/2018      Nothing serious with your lungs.     Refill for your sertraline.     Go back taking 150 mg of the metoprolol instead of the 200 mg.     No other changes with your medications.     It will be 3-6 months before you feel well.     The bypass should keep you heart healthy for another 10 -15 years. It will depend on how well you take care of yourself.

## 2024-12-26 NOTE — PROGRESS NOTES
ANTICOAGULATION MANAGEMENT     Karyn Gamez 69 year old female is on warfarin with supratherapeutic INR result. (Goal INR 2.0-3.0)    Recent labs: (last 7 days)     12/26/24  1247   INR 3.2*       ASSESSMENT     Warfarin Lab Questionnaire    Warfarin Doses Last 7 Days      12/26/2024     8:24 AM   Dose in Tablet or Mg   TAB or MG? tablet (tab)     Pt Rptd Dose SUNDAY MONDAY TUESDAY WED 12/26/2024   8:24 AM 2.5 2 2 2         12/26/2024   Warfarin Lab Questionnaire   Missed doses within past 14 days? No   Changes in diet or alcohol within past 14 days? No   Medication changes since last result? No   Injuries or illness since last result? No   New shortness of breath, severe headaches or sudden changes in vision since last result? No   Abnormal bleeding since last result? No   Upcoming surgery, procedure? No   Best number to call with results? [phone]     Previous result: Therapeutic last visit; previously outside of goal range  Additional findings:  Should be done with Amiodarone on 1/1/25 - seeing cardiology on 1/2/25       PLAN     Recommended plan for temporary change(s) affecting INR     Dosing Instructions: Continue your current warfarin dose with next INR in 1 week       Summary  As of 12/26/2024      Full warfarin instructions:  2 mg every day   Next INR check:  1/2/2025               Telephone call with Karyn who verbalizes understanding and agrees to plan and who agrees to plan and repeated back plan correctly    Check at provider office visit    Education provided: Goal range and lab monitoring: goal range and significance of current result, Importance of therapeutic range, and Importance of following up at instructed interval  Contact [phone] with any changes, questions or concerns.     Plan made per [facility] anticoagulation protocol    Efrain Miles, RN  12/26/2024  Anticoagulation Clinic  [place] for routing messages: hernan ZAMORA  [facility] patient phone line:  [phone]        _______________________________________________________________________     Anticoagulation Episode Summary       Current INR goal:  2.0-3.0   TTR:  37.7% (1.9 wk)   Target end date:  Indefinite   Send INR reminders to:  ERASMO ZAMORA    Indications    S/P CABG (coronary artery bypass graft) [Z95.1]  Postoperative atrial fibrillation (H) [I97.89  I48.91]             Comments:  --             Anticoagulation Care Providers       Provider Role Specialty Phone number    Ly Atwood PA-C Referring Cardiovascular & Thoracic Surgery [phone]    Cristiana Davis MD Referring Family Medicine [phone]

## 2025-01-02 ENCOUNTER — PATIENT OUTREACH (OUTPATIENT)
Dept: CARE COORDINATION | Facility: CLINIC | Age: 70
End: 2025-01-02

## 2025-01-02 ENCOUNTER — TELEPHONE (OUTPATIENT)
Dept: CARDIOLOGY | Facility: CLINIC | Age: 70
End: 2025-01-02

## 2025-01-02 ENCOUNTER — HOSPITAL ENCOUNTER (OUTPATIENT)
Facility: HOSPITAL | Age: 70
Setting detail: OBSERVATION
Discharge: HOME | End: 2025-01-03
Attending: EMERGENCY MEDICINE | Admitting: EMERGENCY MEDICINE
Payer: COMMERCIAL

## 2025-01-02 ENCOUNTER — APPOINTMENT (OUTPATIENT)
Dept: RADIOLOGY | Facility: HOSPITAL | Age: 70
End: 2025-01-02
Attending: EMERGENCY MEDICINE
Payer: COMMERCIAL

## 2025-01-02 DIAGNOSIS — I97.89 POSTOPERATIVE ATRIAL FIBRILLATION (H): ICD-10-CM

## 2025-01-02 DIAGNOSIS — I10 HYPERTENSION GOAL BP (BLOOD PRESSURE) < 140/90: Primary | ICD-10-CM

## 2025-01-02 DIAGNOSIS — R09.02 HYPOXIA: ICD-10-CM

## 2025-01-02 DIAGNOSIS — I48.91 POSTOPERATIVE ATRIAL FIBRILLATION (H): ICD-10-CM

## 2025-01-02 PROBLEM — N18.2 CHRONIC KIDNEY DISEASE, STAGE 2 (MILD): Status: ACTIVE | Noted: 2023-10-31

## 2025-01-02 PROBLEM — E11.8 TYPE 2 DIABETES MELLITUS WITH COMPLICATION, WITHOUT LONG-TERM CURRENT USE OF INSULIN (H): Status: ACTIVE | Noted: 2020-01-28

## 2025-01-02 PROBLEM — J96.01 ACUTE RESPIRATORY FAILURE WITH HYPOXIA (H): Status: ACTIVE | Noted: 2025-01-02

## 2025-01-02 PROBLEM — Z95.1 S/P CABG (CORONARY ARTERY BYPASS GRAFT): Status: ACTIVE | Noted: 2024-12-03

## 2025-01-02 LAB
ALBUMIN SERPL BCG-MCNC: 4.1 G/DL (ref 3.5–5.2)
ALP SERPL-CCNC: 100 U/L (ref 40–150)
ALT SERPL W P-5'-P-CCNC: 34 U/L (ref 0–50)
ANION GAP SERPL CALCULATED.3IONS-SCNC: 10 MMOL/L (ref 7–15)
AST SERPL W P-5'-P-CCNC: 28 U/L (ref 0–45)
BASOPHILS # BLD AUTO: 0.1 10E3/UL (ref 0–0.2)
BASOPHILS NFR BLD AUTO: 1 %
BILIRUB SERPL-MCNC: 0.5 MG/DL
BUN SERPL-MCNC: 23.2 MG/DL (ref 8–23)
CALCIUM SERPL-MCNC: 9.3 MG/DL (ref 8.8–10.4)
CHLORIDE SERPL-SCNC: 103 MMOL/L (ref 98–107)
CREAT SERPL-MCNC: 0.87 MG/DL (ref 0.51–0.95)
EGFRCR SERPLBLD CKD-EPI 2021: 72 ML/MIN/1.73M2
EOSINOPHIL # BLD AUTO: 0.1 10E3/UL (ref 0–0.7)
EOSINOPHIL NFR BLD AUTO: 2 %
ERYTHROCYTE [DISTWIDTH] IN BLOOD BY AUTOMATED COUNT: 15.3 % (ref 10–15)
GLUCOSE BLDC GLUCOMTR-MCNC: 187 MG/DL (ref 70–99)
GLUCOSE SERPL-MCNC: 108 MG/DL (ref 70–99)
HCO3 SERPL-SCNC: 28 MMOL/L (ref 22–29)
HCT VFR BLD AUTO: 32.8 % (ref 35–47)
HGB BLD-MCNC: 10.5 G/DL (ref 11.7–15.7)
IMM GRANULOCYTES # BLD: 0 10E3/UL
IMM GRANULOCYTES NFR BLD: 0 %
INR PPP: 2.06 (ref 0.85–1.15)
LYMPHOCYTES # BLD AUTO: 2.5 10E3/UL (ref 0.8–5.3)
LYMPHOCYTES NFR BLD AUTO: 31 %
MAGNESIUM SERPL-MCNC: 1.9 MG/DL (ref 1.7–2.3)
MCH RBC QN AUTO: 28.7 PG (ref 26.5–33)
MCHC RBC AUTO-ENTMCNC: 32 G/DL (ref 31.5–36.5)
MCV RBC AUTO: 90 FL (ref 78–100)
MONOCYTES # BLD AUTO: 0.7 10E3/UL (ref 0–1.3)
MONOCYTES NFR BLD AUTO: 9 %
NEUTROPHILS # BLD AUTO: 4.5 10E3/UL (ref 1.6–8.3)
NEUTROPHILS NFR BLD AUTO: 57 %
NRBC # BLD AUTO: 0 10E3/UL
NRBC BLD AUTO-RTO: 0 /100
NT-PROBNP SERPL-MCNC: 3547 PG/ML (ref 0–900)
PLATELET # BLD AUTO: 407 10E3/UL (ref 150–450)
POTASSIUM SERPL-SCNC: 3.7 MMOL/L (ref 3.4–5.3)
PROT SERPL-MCNC: 7.3 G/DL (ref 6.4–8.3)
RBC # BLD AUTO: 3.66 10E6/UL (ref 3.8–5.2)
SODIUM SERPL-SCNC: 141 MMOL/L (ref 135–145)
TROPONIN T SERPL HS-MCNC: 27 NG/L
TROPONIN T SERPL HS-MCNC: 28 NG/L
WBC # BLD AUTO: 7.9 10E3/UL (ref 4–11)

## 2025-01-02 PROCEDURE — 93005 ELECTROCARDIOGRAM TRACING: CPT | Performed by: EMERGENCY MEDICINE

## 2025-01-02 PROCEDURE — 80053 COMPREHEN METABOLIC PANEL: CPT | Performed by: EMERGENCY MEDICINE

## 2025-01-02 PROCEDURE — 96365 THER/PROPH/DIAG IV INF INIT: CPT

## 2025-01-02 PROCEDURE — 83735 ASSAY OF MAGNESIUM: CPT | Performed by: INTERNAL MEDICINE

## 2025-01-02 PROCEDURE — 85610 PROTHROMBIN TIME: CPT | Performed by: EMERGENCY MEDICINE

## 2025-01-02 PROCEDURE — 71046 X-RAY EXAM CHEST 2 VIEWS: CPT

## 2025-01-02 PROCEDURE — 99285 EMERGENCY DEPT VISIT HI MDM: CPT | Mod: 25

## 2025-01-02 PROCEDURE — 250N000013 HC RX MED GY IP 250 OP 250 PS 637: Performed by: INTERNAL MEDICINE

## 2025-01-02 PROCEDURE — 36415 COLL VENOUS BLD VENIPUNCTURE: CPT | Performed by: EMERGENCY MEDICINE

## 2025-01-02 PROCEDURE — 250N000011 HC RX IP 250 OP 636: Performed by: EMERGENCY MEDICINE

## 2025-01-02 PROCEDURE — 85025 COMPLETE CBC W/AUTO DIFF WBC: CPT | Performed by: EMERGENCY MEDICINE

## 2025-01-02 PROCEDURE — 250N000009 HC RX 250: Performed by: INTERNAL MEDICINE

## 2025-01-02 PROCEDURE — 250N000009 HC RX 250: Performed by: EMERGENCY MEDICINE

## 2025-01-02 PROCEDURE — 82962 GLUCOSE BLOOD TEST: CPT

## 2025-01-02 PROCEDURE — 96375 TX/PRO/DX INJ NEW DRUG ADDON: CPT

## 2025-01-02 PROCEDURE — 84484 ASSAY OF TROPONIN QUANT: CPT | Performed by: EMERGENCY MEDICINE

## 2025-01-02 PROCEDURE — 99223 1ST HOSP IP/OBS HIGH 75: CPT | Performed by: INTERNAL MEDICINE

## 2025-01-02 PROCEDURE — 83880 ASSAY OF NATRIURETIC PEPTIDE: CPT | Performed by: EMERGENCY MEDICINE

## 2025-01-02 PROCEDURE — 250N000011 HC RX IP 250 OP 636: Performed by: INTERNAL MEDICINE

## 2025-01-02 PROCEDURE — G0378 HOSPITAL OBSERVATION PER HR: HCPCS

## 2025-01-02 RX ORDER — AMOXICILLIN 250 MG
1 CAPSULE ORAL 2 TIMES DAILY PRN
Status: DISCONTINUED | OUTPATIENT
Start: 2025-01-02 | End: 2025-01-03 | Stop reason: HOSPADM

## 2025-01-02 RX ORDER — SERTRALINE HYDROCHLORIDE 100 MG/1
200 TABLET, FILM COATED ORAL DAILY
COMMUNITY

## 2025-01-02 RX ORDER — METHYLPREDNISOLONE SODIUM SUCCINATE 125 MG/2ML
80 INJECTION INTRAMUSCULAR; INTRAVENOUS ONCE
Status: COMPLETED | OUTPATIENT
Start: 2025-01-02 | End: 2025-01-02

## 2025-01-02 RX ORDER — FUROSEMIDE 10 MG/ML
40 INJECTION INTRAMUSCULAR; INTRAVENOUS ONCE
Status: COMPLETED | OUTPATIENT
Start: 2025-01-02 | End: 2025-01-02

## 2025-01-02 RX ORDER — METFORMIN HYDROCHLORIDE 500 MG/1
500 TABLET, EXTENDED RELEASE ORAL
Status: DISCONTINUED | OUTPATIENT
Start: 2025-01-03 | End: 2025-01-03 | Stop reason: HOSPADM

## 2025-01-02 RX ORDER — NICOTINE POLACRILEX 4 MG
15-30 LOZENGE BUCCAL
Status: DISCONTINUED | OUTPATIENT
Start: 2025-01-02 | End: 2025-01-03 | Stop reason: HOSPADM

## 2025-01-02 RX ORDER — ALBUTEROL SULFATE 0.83 MG/ML
2.5 SOLUTION RESPIRATORY (INHALATION) EVERY 6 HOURS PRN
Status: DISCONTINUED | OUTPATIENT
Start: 2025-01-02 | End: 2025-01-03 | Stop reason: HOSPADM

## 2025-01-02 RX ORDER — HYDRALAZINE HYDROCHLORIDE 20 MG/ML
10 INJECTION INTRAMUSCULAR; INTRAVENOUS EVERY 4 HOURS PRN
Status: DISCONTINUED | OUTPATIENT
Start: 2025-01-02 | End: 2025-01-03 | Stop reason: HOSPADM

## 2025-01-02 RX ORDER — ONDANSETRON 4 MG/1
4 TABLET, ORALLY DISINTEGRATING ORAL EVERY 6 HOURS PRN
Status: DISCONTINUED | OUTPATIENT
Start: 2025-01-02 | End: 2025-01-03 | Stop reason: HOSPADM

## 2025-01-02 RX ORDER — POTASSIUM CHLORIDE 750 MG/1
10 TABLET, EXTENDED RELEASE ORAL DAILY
Status: DISCONTINUED | OUTPATIENT
Start: 2025-01-03 | End: 2025-01-03 | Stop reason: HOSPADM

## 2025-01-02 RX ORDER — AMLODIPINE BESYLATE 5 MG/1
5 TABLET ORAL DAILY
Status: DISCONTINUED | OUTPATIENT
Start: 2025-01-03 | End: 2025-01-03 | Stop reason: HOSPADM

## 2025-01-02 RX ORDER — ONDANSETRON 4 MG/1
4 TABLET, FILM COATED ORAL EVERY 8 HOURS PRN
COMMUNITY

## 2025-01-02 RX ORDER — PREDNISONE 20 MG/1
40 TABLET ORAL DAILY
Status: DISCONTINUED | OUTPATIENT
Start: 2025-01-03 | End: 2025-01-03 | Stop reason: HOSPADM

## 2025-01-02 RX ORDER — IPRATROPIUM BROMIDE AND ALBUTEROL SULFATE 2.5; .5 MG/3ML; MG/3ML
3 SOLUTION RESPIRATORY (INHALATION) ONCE
Status: COMPLETED | OUTPATIENT
Start: 2025-01-02 | End: 2025-01-02

## 2025-01-02 RX ORDER — SERTRALINE HYDROCHLORIDE 100 MG/1
200 TABLET, FILM COATED ORAL DAILY
Status: DISCONTINUED | OUTPATIENT
Start: 2025-01-03 | End: 2025-01-03 | Stop reason: HOSPADM

## 2025-01-02 RX ORDER — IPRATROPIUM BROMIDE AND ALBUTEROL SULFATE 2.5; .5 MG/3ML; MG/3ML
3 SOLUTION RESPIRATORY (INHALATION) 3 TIMES DAILY
Status: DISCONTINUED | OUTPATIENT
Start: 2025-01-02 | End: 2025-01-03

## 2025-01-02 RX ORDER — HYDRALAZINE HYDROCHLORIDE 10 MG/1
10 TABLET, FILM COATED ORAL EVERY 4 HOURS PRN
Status: DISCONTINUED | OUTPATIENT
Start: 2025-01-02 | End: 2025-01-03 | Stop reason: HOSPADM

## 2025-01-02 RX ORDER — AMOXICILLIN 250 MG
1 CAPSULE ORAL DAILY
COMMUNITY

## 2025-01-02 RX ORDER — ASPIRIN 81 MG/1
81 TABLET, CHEWABLE ORAL DAILY
Status: DISCONTINUED | OUTPATIENT
Start: 2025-01-03 | End: 2025-01-03 | Stop reason: HOSPADM

## 2025-01-02 RX ORDER — MAGNESIUM SULFATE HEPTAHYDRATE 40 MG/ML
2 INJECTION, SOLUTION INTRAVENOUS ONCE
Status: COMPLETED | OUTPATIENT
Start: 2025-01-02 | End: 2025-01-02

## 2025-01-02 RX ORDER — POTASSIUM CHLORIDE 1500 MG/1
20 TABLET, EXTENDED RELEASE ORAL ONCE
Status: DISCONTINUED | OUTPATIENT
Start: 2025-01-02 | End: 2025-01-02

## 2025-01-02 RX ORDER — METOPROLOL SUCCINATE 100 MG/1
200 TABLET, EXTENDED RELEASE ORAL DAILY
Status: DISCONTINUED | OUTPATIENT
Start: 2025-01-03 | End: 2025-01-03

## 2025-01-02 RX ORDER — AMOXICILLIN 250 MG
2 CAPSULE ORAL 2 TIMES DAILY PRN
Status: DISCONTINUED | OUTPATIENT
Start: 2025-01-02 | End: 2025-01-03 | Stop reason: HOSPADM

## 2025-01-02 RX ORDER — AMLODIPINE BESYLATE 2.5 MG/1
2.5 TABLET ORAL ONCE
Status: COMPLETED | OUTPATIENT
Start: 2025-01-02 | End: 2025-01-02

## 2025-01-02 RX ORDER — FUROSEMIDE 10 MG/ML
40 INJECTION INTRAMUSCULAR; INTRAVENOUS
Status: DISCONTINUED | OUTPATIENT
Start: 2025-01-03 | End: 2025-01-03 | Stop reason: HOSPADM

## 2025-01-02 RX ORDER — ATORVASTATIN CALCIUM 40 MG/1
80 TABLET, FILM COATED ORAL DAILY
Status: DISCONTINUED | OUTPATIENT
Start: 2025-01-03 | End: 2025-01-03 | Stop reason: HOSPADM

## 2025-01-02 RX ORDER — PROCHLORPERAZINE MALEATE 5 MG/1
5 TABLET ORAL EVERY 6 HOURS PRN
Status: DISCONTINUED | OUTPATIENT
Start: 2025-01-02 | End: 2025-01-03 | Stop reason: HOSPADM

## 2025-01-02 RX ORDER — WARFARIN SODIUM 2 MG/1
2 TABLET ORAL
Status: COMPLETED | OUTPATIENT
Start: 2025-01-02 | End: 2025-01-02

## 2025-01-02 RX ORDER — ACETAMINOPHEN 650 MG/1
650 SUPPOSITORY RECTAL EVERY 4 HOURS PRN
Status: DISCONTINUED | OUTPATIENT
Start: 2025-01-02 | End: 2025-01-03 | Stop reason: HOSPADM

## 2025-01-02 RX ORDER — ACETAMINOPHEN 325 MG/1
650 TABLET ORAL EVERY 4 HOURS PRN
Status: DISCONTINUED | OUTPATIENT
Start: 2025-01-02 | End: 2025-01-03 | Stop reason: HOSPADM

## 2025-01-02 RX ORDER — DEXTROSE MONOHYDRATE 25 G/50ML
25-50 INJECTION, SOLUTION INTRAVENOUS
Status: DISCONTINUED | OUTPATIENT
Start: 2025-01-02 | End: 2025-01-03 | Stop reason: HOSPADM

## 2025-01-02 RX ORDER — LIDOCAINE 40 MG/G
CREAM TOPICAL
Status: DISCONTINUED | OUTPATIENT
Start: 2025-01-02 | End: 2025-01-03 | Stop reason: HOSPADM

## 2025-01-02 RX ORDER — ONDANSETRON 2 MG/ML
4 INJECTION INTRAMUSCULAR; INTRAVENOUS EVERY 6 HOURS PRN
Status: DISCONTINUED | OUTPATIENT
Start: 2025-01-02 | End: 2025-01-03 | Stop reason: HOSPADM

## 2025-01-02 RX ORDER — WARFARIN SODIUM 1 MG/1
2 TABLET ORAL DAILY
COMMUNITY

## 2025-01-02 RX ORDER — AMOXICILLIN 250 MG
1 CAPSULE ORAL DAILY
Status: DISCONTINUED | OUTPATIENT
Start: 2025-01-03 | End: 2025-01-03 | Stop reason: HOSPADM

## 2025-01-02 RX ADMIN — MAGNESIUM SULFATE HEPTAHYDRATE 2 G: 40 INJECTION, SOLUTION INTRAVENOUS at 19:35

## 2025-01-02 RX ADMIN — IPRATROPIUM BROMIDE AND ALBUTEROL SULFATE 3 ML: .5; 3 SOLUTION RESPIRATORY (INHALATION) at 20:00

## 2025-01-02 RX ADMIN — IPRATROPIUM BROMIDE AND ALBUTEROL SULFATE 3 ML: .5; 3 SOLUTION RESPIRATORY (INHALATION) at 16:35

## 2025-01-02 RX ADMIN — METHYLPREDNISOLONE SODIUM SUCCINATE 81.25 MG: 125 INJECTION, POWDER, FOR SOLUTION INTRAMUSCULAR; INTRAVENOUS at 16:41

## 2025-01-02 RX ADMIN — AMLODIPINE BESYLATE 2.5 MG: 2.5 TABLET ORAL at 19:34

## 2025-01-02 RX ADMIN — FUROSEMIDE 40 MG: 10 INJECTION, SOLUTION INTRAMUSCULAR; INTRAVENOUS at 17:23

## 2025-01-02 RX ADMIN — WARFARIN SODIUM 2 MG: 2 TABLET ORAL at 19:40

## 2025-01-02 ASSESSMENT — ACTIVITIES OF DAILY LIVING (ADL)
DEPENDENT_IADLS:: CLEANING;COOKING;LAUNDRY;SHOPPING;MEAL PREPARATION
ADLS_ACUITY_SCORE: 57

## 2025-01-02 ASSESSMENT — COLUMBIA-SUICIDE SEVERITY RATING SCALE - C-SSRS
2. HAVE YOU ACTUALLY HAD ANY THOUGHTS OF KILLING YOURSELF IN THE PAST MONTH?: NO
1. IN THE PAST MONTH, HAVE YOU WISHED YOU WERE DEAD OR WISHED YOU COULD GO TO SLEEP AND NOT WAKE UP?: NO
6. HAVE YOU EVER DONE ANYTHING, STARTED TO DO ANYTHING, OR PREPARED TO DO ANYTHING TO END YOUR LIFE?: NO

## 2025-01-02 NOTE — ED PROVIDER NOTES
EMERGENCY DEPARTMENT ENCOUNTER      NAME: Karyn Gamez  AGE: 69 year old female  YOB: 1955  MRN: [id number]  EVALUATION DATE & TIME: 2025  3:58 PM    PCP: Cristiana Davis    ED PROVIDER: Devante Tarango M.D.      Chief Complaint   Patient presents with    Hypertension    Hypoxic       FINAL IMPRESSION:  1. Hypoxia          ED COURSE & MEDICAL DECISION MAKIN year old female presents to the Emergency Department for evaluation of primarily elevated blood pressure concern but also mild shortness of breath and cough.  Patient arrives to the emergency department moderately hypertensive.  She has a significant history of coronary artery bypass surgery 1 month ago.  She is hypoxic consistently here, requiring 1 to 2 L supplemental oxygen to maintain saturations in the low to mid 90s.  She does not really complain of much for dyspnea.  She also reports a mild cough.  She does have a longstanding smoking history leading up to her bypass surgery 1 month ago  She has some expiratory wheezing on arrival.  She underwent a lab  head imaging evaluation for hypertension, hypoxia.  This included a chest x-ray with her persistent left basilar opacity unchanged from recent hospitalization.  BNP is moderately elevated without a baseline for comparison.  High-sensitivity troponin mildly elevated but significantly improved from recent hospitalization, nonspecific in the setting.  I do think the patient could have some component of bronchospasm.  She was given a DuoNeb and some Solu-Medrol.  Also could be developing post bypass surgery congestive heart failure episode.  She was given 40 mg of IV Lasix.  Persistently unable to wean off of oxygen here.  Will require admission.  Discussed case with hospitalist Dr. Zuniga.    At the conclusion of the encounter I discussed the results of all of the tests and the disposition. The questions were answered. The patient or family acknowledged understanding and was  agreeable with the care plan.       Medical Decision Making  Obtained supplemental history:Supplemental history obtained?: No  Reviewed external records: External records reviewed?: Inpatient Record: 11/18-12/3/2024  Care impacted by chronic illness:Cerebrovascular Disease, Diabetes, Heart Disease, Hyperlipidemia, Hypertension, and Mental Health  Did you consider but not order tests?: Work up considered but not performed and documented in chart, if applicable  Did you interpret images independently?: Independent interpretation of ECG and images noted in documentation, when applicable.  Consultation discussion with other provider:Did you involve another provider (consultant, , pharmacy, etc.)?: I discussed the care with another health care provider, see documentation for details.  Admit.    MIPS: Not Applicable          MEDICATIONS GIVEN IN THE EMERGENCY:  Medications   senna-docusate (SENOKOT-S/PERICOLACE) 8.6-50 MG per tablet 1 tablet (has no administration in time range)     Or   senna-docusate (SENOKOT-S/PERICOLACE) 8.6-50 MG per tablet 2 tablet (has no administration in time range)   ondansetron (ZOFRAN ODT) ODT tab 4 mg (has no administration in time range)     Or   ondansetron (ZOFRAN) injection 4 mg (has no administration in time range)   prochlorperazine (COMPAZINE) injection 5 mg (has no administration in time range)     Or   prochlorperazine (COMPAZINE) tablet 5 mg (has no administration in time range)   Patient is already receiving anticoagulation with heparin, enoxaparin (LOVENOX), warfarin (COUMADIN)  or other anticoagulant medication (has no administration in time range)   hydrALAZINE (APRESOLINE) tablet 10 mg (has no administration in time range)     Or   hydrALAZINE (APRESOLINE) injection 10 mg (has no administration in time range)   acetaminophen (TYLENOL) tablet 650 mg (has no administration in time range)     Or   acetaminophen (TYLENOL) Suppository 650 mg (has no administration in time range)    lidocaine 1 % 0.1-1 mL (has no administration in time range)   lidocaine (LMX4) cream (has no administration in time range)   sodium chloride (PF) 0.9% PF flush 3 mL (has no administration in time range)   sodium chloride (PF) 0.9% PF flush 3 mL (has no administration in time range)   furosemide (LASIX) injection 40 mg (has no administration in time range)   ipratropium - albuterol 0.5 mg/2.5 mg/3 mL (DUONEB) neb solution 3 mL (has no administration in time range)   albuterol (PROVENTIL) neb solution 2.5 mg (has no administration in time range)   predniSONE (DELTASONE) tablet 40 mg (has no administration in time range)   glucose gel 15-30 g (has no administration in time range)     Or   dextrose 50 % injection 25-50 mL (has no administration in time range)     Or   glucagon injection 1 mg (has no administration in time range)   insulin aspart (NovoLOG) injection (RAPID ACTING) (has no administration in time range)   insulin aspart (NovoLOG) injection (RAPID ACTING) (has no administration in time range)   Warfarin Dose Required Daily - Pharmacist Managed (has no administration in time range)   amLODIPine (NORVASC) tablet 5 mg (has no administration in time range)   aspirin (ASA) chewable tablet 81 mg (has no administration in time range)   atorvastatin (LIPITOR) tablet 80 mg (has no administration in time range)   metoprolol succinate ER (TOPROL XL) 24 hr tablet 200 mg (has no administration in time range)   senna-docusate (SENOKOT-S/PERICOLACE) 8.6-50 MG per tablet 1 tablet (has no administration in time range)   sertraline (ZOLOFT) tablet 200 mg (has no administration in time range)   potassium chloride travis ER (KLOR-CON M10) CR tablet 10 mEq (has no administration in time range)   metFORMIN (GLUCOPHAGE XR) 24 hr tablet 500 mg ( Oral Automatically Held 1/6/25 1700)   amLODIPine (NORVASC) tablet 2.5 mg (has no administration in time range)   warfarin ANTICOAGULANT (COUMADIN) tablet 2 mg (has no administration in  time range)   ipratropium - albuterol 0.5 mg/2.5 mg/3 mL (DUONEB) neb solution 3 mL (3 mLs Nebulization $Given 1/2/25 1635)   methylPREDNISolone Na Suc (solu-MEDROL) injection 81.25 mg (81.25 mg Intravenous $Given 1/2/25 1641)   furosemide (LASIX) injection 40 mg (40 mg Intravenous $Given 1/2/25 1723)       NEW PRESCRIPTIONS STARTED AT TODAY'S ER VISIT  New Prescriptions    No medications on file          =================================================================    HPI    Patient information was obtained from: patient     Use of : N/A       Karyn Gamez is a 69 year old female with a pertinent history of s/p CABG 11/18, T2DM, hypertension, hyperlipidemia, CKD stage 2, and mental health diagnoses who presents to this ED via walk-in for evaluation of hypertension.     Per chart review, the patient was admitted from 11/18-12/3/2024. She had a CABG x5. She did have several episodes of post-op atrial fibrillation that resolved with amiodarone. She was discharged with amiodarone and anticoagulation. She restarted Zoloft during this admission as well. She was discharged with 6 months of amlodipine, 7 days of Lasix, and a course of keflex.       The patient had a CABG x5 on 11/18/2024. She has been taking her blood pressure at home and it has stayed in the 190s recently. She is relatively asymptomatic and states that her blood pressure is her primary concern. On further questioning, the patient admits that she has had a non-productive cough since admission 11/18-12/3 and has shortness of breath.     She denies any chest pain, leg swelling, lightheadedness, or any other complaints at this time.     REVIEW OF SYSTEMS   All systems reviewed and negative except as noted in HPI.    PAST MEDICAL HISTORY:  Past Medical History:   Diagnosis Date    Cervicalgia 6/29/2005 June 29, 2005: Thrown from a horse - wearing a helmet - and struck side of head and neck, heard crepitation, no loc, Able to walk after  injury.  persistant pain in neck relieved with ibuprofen, stiff but can move with ROM.  No changes in hands and feet sensation/motor.    Coronary artery disease     Depressive disorder, not elsewhere classified     Hypertension     Obesity, unspecified        PAST SURGICAL HISTORY:  Past Surgical History:   Procedure Laterality Date    ANGIOGRAM  2010    negative    COLONOSCOPY N/A 3/24/2023    Procedure: COLONOSCOPY, WITH HOT POLYPECTOMY AND RESEARCH BIOPSY;  Surgeon: Bret Velez MD;  Location: [facility] OR    CORONARY ARTERY BYPASS GRAFT, WITH ENDOSCOPIC VESSEL PROCUREMENT N/A 11/18/2024    Procedure: CORONARY ARTERY BYPASS GRAFT TIMES FIVE, LEFT INTERNAL MAMMARY ARTERY HARVEST, RIGHT ENDOSCOPIC VESSEL PROCUREMENT,;  Surgeon: Mary Pennington MD;  Location: [facility] OR    CV CORONARY ANGIOGRAM N/A 11/8/2024    Procedure: Coronary Angiogram;  Surgeon: Emir Brand MD;  Location:  [facility]    HYSTERECTOMY, PAP NO LONGER INDICATED      BSO    MIDLINE DOUBLE LUMEN PLACEMENT  11/24/2024    PROCURE ARTERY RADIAL  11/18/2024    Procedure: LEFT RADIAL ARTERY HARVEST;  Surgeon: Mary Pennington MD;  Location: [facility] OR    TRANSESOPHAGEAL ECHOCARDIOGRAM INTRAOPERATIVE N/A 11/18/2024    Procedure: ECHOCARDIOGRAM, TRANSESPOPHAGEAL, WITH ANESTHESIA,;  Surgeon: Mary Pennington MD;  Location: [facility] OR           CURRENT MEDICATIONS:    Current Facility-Administered Medications   Medication Dose Route Frequency Provider Last Rate Last Admin    acetaminophen (TYLENOL) tablet 650 mg  650 mg Oral Q4H PRN Rashaad SOLORZANO MD        Or    acetaminophen (TYLENOL) Suppository 650 mg  650 mg Rectal Q4H PRN Rashaad SOLORZANO MD        albuterol (PROVENTIL) neb solution 2.5 mg  2.5 mg Nebulization Q6H PRN Rashaad SOLORZANO MD        amLODIPine (NORVASC) tablet 2.5 mg  2.5 mg Oral Once Rashaad SOLORZANO MD        [START ON 1/3/2025] amLODIPine (NORVASC) tablet 5 mg  5 mg Oral Daily Rashaad SOLORZANO MD        [START ON 1/3/2025] aspirin  (ASA) chewable tablet 81 mg  81 mg Oral Daily Rashaad SOLORZANO MD        [START ON 1/3/2025] atorvastatin (LIPITOR) tablet 80 mg  80 mg Oral Daily Rashaad SOLORZANO MD        glucose gel 15-30 g  15-30 g Oral Q15 Min PRN Rashaad SOLORZANO MD        Or    dextrose 50 % injection 25-50 mL  25-50 mL Intravenous Q15 Min PRN Rashaad SOLORZANO MD        Or    glucagon injection 1 mg  1 mg Subcutaneous Q15 Min PRN Rashaad SOLORZANO MD        [START ON 1/3/2025] furosemide (LASIX) injection 40 mg  40 mg Intravenous BID Rashaad SOLORZANO MD        hydrALAZINE (APRESOLINE) tablet 10 mg  10 mg Oral Q4H PRN Rashaad SOLORZANO MD        Or    hydrALAZINE (APRESOLINE) injection 10 mg  10 mg Intravenous Q4H PRN Rashaad SOLORZANO MD        [START ON 1/3/2025] insulin aspart (NovoLOG) injection (RAPID ACTING)  1-10 Units Subcutaneous TID AC Rashaad SOLORZANO MD        insulin aspart (NovoLOG) injection (RAPID ACTING)  1-7 Units Subcutaneous At Bedtime Rashaad SOLORZANO MD        ipratropium - albuterol 0.5 mg/2.5 mg/3 mL (DUONEB) neb solution 3 mL  3 mL Nebulization TID Rashaad SOLORZANO MD        lidocaine (LMX4) cream   Topical Q1H PRN Rashaad SOLORZANO MD        lidocaine 1 % 0.1-1 mL  0.1-1 mL Other Q1H PRN Rashaad SOLORZANO MD        [Held by provider] metFORMIN (GLUCOPHAGE XR) 24 hr tablet 500 mg  500 mg Oral Daily with supper Rashaad SOLORZNAO MD        [START ON 1/3/2025] metoprolol succinate ER (TOPROL XL) 24 hr tablet 200 mg  200 mg Oral Daily Rashaad SOLORZANO MD        ondansetron (ZOFRAN ODT) ODT tab 4 mg  4 mg Oral Q6H PRN Rashaad SOLORZANO MD        Or    ondansetron (ZOFRAN) injection 4 mg  4 mg Intravenous Q6H PRN Rashaad SOLORZANO MD        Patient is already receiving anticoagulation with heparin, enoxaparin (LOVENOX), warfarin (COUMADIN)  or other anticoagulant medication   Does not apply Continuous PRN Rashaad SOLORZANO MD        [START ON 1/3/2025] potassium chloride travis ER (KLOR-CON M10) CR tablet 10 mEq  10 mEq Oral Daily Rashaad SOLORZANO MD        [START ON 1/3/2025] predniSONE (DELTASONE) tablet 40 mg  40 mg Oral Daily Rashaad SOLORZANO MD         prochlorperazine (COMPAZINE) injection 5 mg  5 mg Intravenous Q6H PRN Rashaad SOLORZANO MD        Or    prochlorperazine (COMPAZINE) tablet 5 mg  5 mg Oral Q6H PRN Rashaad SOLORZANO MD        senna-docusate (SENOKOT-S/PERICOLACE) 8.6-50 MG per tablet 1 tablet  1 tablet Oral BID PRN Rashaad SOLORZANO MD        Or    senna-docusate (SENOKOT-S/PERICOLACE) 8.6-50 MG per tablet 2 tablet  2 tablet Oral BID PRN Rashaad SOLORZANO MD        [START ON 1/3/2025] senna-docusate (SENOKOT-S/PERICOLACE) 8.6-50 MG per tablet 1 tablet  1 tablet Oral Daily Rashaad SOLORZANO MD        [START ON 1/3/2025] sertraline (ZOLOFT) tablet 200 mg  200 mg Oral Daily Rashaad SOLORZANO MD        sodium chloride (PF) 0.9% PF flush 3 mL  3 mL Intracatheter q1 min prn Rashaad SOLORZANO MD        sodium chloride (PF) 0.9% PF flush 3 mL  3 mL Intracatheter Q8H Rashaad SOLORZANO MD        warfarin ANTICOAGULANT (COUMADIN) tablet 2 mg  2 mg Oral ONCE at 18:00 Rashaad SOLORZANO MD        Warfarin Dose Required Daily - Pharmacist Managed  1 each Oral See Admin Instructions Rashaad SOLORZANO MD         Current Outpatient Medications   Medication Sig Dispense Refill    acetaminophen (TYLENOL) 325 MG tablet Take 2 tablets (650 mg) by mouth every 4 hours as needed for other (For optimal non-opioid multimodal pain management to improve pain control.).      amLODIPine (NORVASC) 2.5 MG tablet Take 1 tablet (2.5 mg) by mouth daily 90 tablet 1    aspirin (ASA) 81 MG chewable tablet Take 1 tablet (81 mg) by mouth daily.      atorvastatin (LIPITOR) 80 MG tablet Take 1 tablet (80 mg) by mouth daily For cholesterol. 90 tablet 1    furosemide (LASIX) 20 MG tablet Take 1 tablet (20 mg) by mouth daily. 30 tablet 0    metFORMIN (GLUCOPHAGE XR) 500 MG 24 hr tablet TAKE 1 TABLET BY MOUTH DAILY WITH DINNER FOR BLOOD SUGARS 90 tablet 1    metoprolol succinate ER (TOPROL XL) 200 MG 24 hr tablet Take 1 tablet (200 mg) by mouth daily. 30 tablet 0    ondansetron (ZOFRAN) 4 MG tablet Take 4 mg by mouth every 8 hours as needed for nausea or  vomiting.      potassium chloride travis ER (KLOR-CON M10) 10 MEQ CR tablet Take 1 tablet (10 mEq) by mouth daily. 30 tablet 0    senna-docusate (SENOKOT-S/PERICOLACE) 8.6-50 MG tablet Take 1 tablet by mouth daily.      sertraline (ZOLOFT) 100 MG tablet Take 200 mg by mouth daily.      warfarin ANTICOAGULANT (COUMADIN) 1 MG tablet Take 2 mg by mouth daily.           ALLERGIES:  No Known Allergies    FAMILY HISTORY:  Family History   Problem Relation Age of Onset    C.A.D. Father         first MI at 53, stenting x2    C.A.D. Mother         dx in her mid 50's    C.A.D. Brother         MI in mid-50's    Cerebrovascular Disease Brother         in late 50's       SOCIAL HISTORY:   Social History     Socioeconomic History    Marital status:    Tobacco Use    Smoking status: Former     Current packs/day: 0.00     Average packs/day: 0.5 packs/day for 30.0 years (15.0 ttl pk-yrs)     Types: Cigarettes     Start date: 1981     Quit date: 2011     Years since quittin.4     Passive exposure: Past    Smokeless tobacco: Never    Tobacco comments:     smoking 3-4 cigs per day   Vaping Use    Vaping status: Never Used   Substance and Sexual Activity    Alcohol use: Yes     Comment: rarely    Drug use: No    Sexual activity: Never   Other Topics Concern    Parent/sibling w/ CABG, MI or angioplasty before 65F 55M? Yes     Comment: mother, father and 2 brothers     Social Drivers of Health     Financial Resource Strain: Low Risk  (2024)    Financial Resource Strain     Within the past 12 months, have you or your family members you live with been unable to get utilities (heat, electricity) when it was really needed?: No   Food Insecurity: Low Risk  (2024)    Food Insecurity     Within the past 12 months, did you worry that your food would run out before you got money to buy more?: No     Within the past 12 months, did the food you bought just not last and you didn t have money to get more?: No    Transportation Needs: Low Risk  (11/18/2024)    Transportation Needs     Within the past 12 months, has lack of transportation kept you from medical appointments, getting your medicines, non-medical meetings or appointments, work, or from getting things that you need?: No   Interpersonal Safety: Low Risk  (11/18/2024)    Interpersonal Safety     Do you feel physically and emotionally safe where you currently live?: Yes     Within the past 12 months, have you been hit, slapped, kicked or otherwise physically hurt by someone?: No     Within the past 12 months, have you been humiliated or emotionally abused in other ways by your partner or ex-partner?: No   Housing Stability: Low Risk  (11/18/2024)    Housing Stability     Do you have housing? : Yes     Are you worried about losing your housing?: No   Recent Concern: Housing Stability - High Risk (11/8/2024)    Housing Stability     Do you have housing? : No     Are you worried about losing your housing?: No       VITALS:  BP (!) 149/68   Pulse (!) 49   Temp 98.4  F (36.9  C)   Resp 23   Wt 105.2 kg (232 lb)   LMP  (LMP Unknown)   SpO2 97%   BMI 37.45 kg/m      PHYSICAL EXAM    Constitutional: Well developed, Well nourished, NAD.  HENT: Normocephalic, Atraumatic. Neck Supple.  Eyes: EOMI, Conjunctiva normal.  Respiratory: Breathing is unlabored on 2 L supplemental oxygen via nasal cannula.  There is scattered expiratory wheezing throughout all lung fields.  No significant respiratory distress.  Cardiovascular: Normal heart rate, Regular rhythm. Very trace lower extremity peripheral edema.  Abdomen: Soft, nontender  Musculoskeletal: Good range of motion in all major joints. No major deformities noted.  Integument: Warm, Dry.  Neurologic: Alert & awake, Normal motor function, Normal sensory function, No focal deficits noted.   Psychiatric: Cooperative. Affect appropriate.     LAB:  All pertinent labs reviewed and interpreted.  Labs Ordered and Resulted from Time  of ED Arrival to Time of ED Departure   COMPREHENSIVE METABOLIC PANEL - Abnormal       Result Value    Sodium 141      Potassium 3.7      Carbon Dioxide (CO2) 28      Anion Gap 10      Urea Nitrogen 23.2 (*)     Creatinine 0.87      GFR Estimate 72      Calcium 9.3      Chloride 103      Glucose 108 (*)     Alkaline Phosphatase 100      AST 28      ALT 34      Protein Total 7.3      Albumin 4.1      Bilirubin Total 0.5     INR - Abnormal    INR 2.06 (*)    NT PROBNP INPATIENT - Abnormal    N terminal Pro BNP Inpatient 3,547 (*)    TROPONIN T, HIGH SENSITIVITY - Abnormal    Troponin T, High Sensitivity 27 (*)    CBC WITH PLATELETS AND DIFFERENTIAL - Abnormal    WBC Count 7.9      RBC Count 3.66 (*)     Hemoglobin 10.5 (*)     Hematocrit 32.8 (*)     MCV 90      MCH 28.7      MCHC 32.0      RDW 15.3 (*)     Platelet Count 407      % Neutrophils 57      % Lymphocytes 31      % Monocytes 9      % Eosinophils 2      % Basophils 1      % Immature Granulocytes 0      NRBCs per 100 WBC 0      Absolute Neutrophils 4.5      Absolute Lymphocytes 2.5      Absolute Monocytes 0.7      Absolute Eosinophils 0.1      Absolute Basophils 0.1      Absolute Immature Granulocytes 0.0      Absolute NRBCs 0.0     TROPONIN T, HIGH SENSITIVITY   GLUCOSE MONITOR NURSING POCT   GLUCOSE MONITOR NURSING POCT   MAGNESIUM       RADIOLOGY:  Reviewed all pertinent imaging. Please see official radiology report.  XR Chest 2 Views   Final Result   IMPRESSION: No significant change of left base opacities and small pleural effusion since 11/30/2024. Right lung is clear. Stable cardiomediastinal silhouette. Sternotomy. No pneumothorax.         Echocardiogram Complete    (Results Pending)       EKG:    Performed at: 1523    Impression: Sinus bradycardia, nonspecific ST-T wave abnormality, prolonged QT    Rate: 48  Rhythm: Sinus  Axis: Normal  AR Interval: 138  QRS Interval: 96  QTc Interval: 518  ST Changes: Nonspecific ST abnormality  Comparison: Compared  to November 29, 2024, sinus rhythm has replaced atrial fibrillation, ventricular rate is increased    I have independently reviewed and interpreted the EKG(s) documented above.    PROCEDURES:   None      I, Blu Moreno, am serving as a scribe to document services personally performed by Dr. Devante Tarango, based on my observation and the provider's statements to me. I, Devante Tarango MD attest that Blu Moreno is acting in a scribe capacity, has observed my performance of the services and has documented them in accordance with my direction.    Devante Tarango M.D.  Emergency Medicine  [facility] EMERGENCY DEPARTMENT  [address] [place] [address] [phone] [phone]  Dept: [phone]      Devante Tarango MD  01/02/25 1965

## 2025-01-02 NOTE — ED NOTES
Hx cab. Recently finished amiodarone. Today notice BP elevated. Did take her metoprolol. Told to come in by cardiologist office. States just feels off. C/o sob and feels off kilter. Denies n/v, n/t, diaphoresis, leg pain or swelling, ha, visual changes.

## 2025-01-02 NOTE — ED NOTES
Bed: [facility]  Expected date:   Expected time:   Means of arrival: Walked  Comments:

## 2025-01-02 NOTE — TELEPHONE ENCOUNTER
Patient called with SBP sustaining in 190's. It was in 190's when she got up this morning. She took her morning meds including 200 mg of metoprolol and her SBP has remained in the 190's. Advised patient to go to the ED for sustained HTN. Verbalized understanding.

## 2025-01-02 NOTE — ED TRIAGE NOTES
Patient arrives by private car for evaluation of hypertension.  Patient states she had a CABG in November.  Patient was on Amiodarone last dose was yesterday.  Takes her BP at home and was found to be 191/82.  Cardiology advised patient come in.  Patient is hypoxic at 88% on RA.  Reports she has had a cough since her surgery.

## 2025-01-03 ENCOUNTER — APPOINTMENT (OUTPATIENT)
Dept: PHYSICAL THERAPY | Facility: HOSPITAL | Age: 70
End: 2025-01-03
Attending: INTERNAL MEDICINE
Payer: COMMERCIAL

## 2025-01-03 ENCOUNTER — APPOINTMENT (OUTPATIENT)
Dept: CARDIOLOGY | Facility: HOSPITAL | Age: 70
End: 2025-01-03
Attending: INTERNAL MEDICINE
Payer: COMMERCIAL

## 2025-01-03 VITALS
OXYGEN SATURATION: 93 % | BODY MASS INDEX: 39.41 KG/M2 | TEMPERATURE: 97.1 F | WEIGHT: 230.82 LBS | DIASTOLIC BLOOD PRESSURE: 69 MMHG | HEART RATE: 60 BPM | RESPIRATION RATE: 20 BRPM | HEIGHT: 64 IN | SYSTOLIC BLOOD PRESSURE: 158 MMHG

## 2025-01-03 LAB
ANION GAP SERPL CALCULATED.3IONS-SCNC: 9 MMOL/L (ref 7–15)
BUN SERPL-MCNC: 24.4 MG/DL (ref 8–23)
CALCIUM SERPL-MCNC: 9.3 MG/DL (ref 8.8–10.4)
CHLORIDE SERPL-SCNC: 101 MMOL/L (ref 98–107)
CREAT SERPL-MCNC: 0.77 MG/DL (ref 0.51–0.95)
EGFRCR SERPLBLD CKD-EPI 2021: 84 ML/MIN/1.73M2
GLUCOSE BLDC GLUCOMTR-MCNC: 139 MG/DL (ref 70–99)
GLUCOSE BLDC GLUCOMTR-MCNC: 146 MG/DL (ref 70–99)
GLUCOSE BLDC GLUCOMTR-MCNC: 150 MG/DL (ref 70–99)
GLUCOSE SERPL-MCNC: 141 MG/DL (ref 70–99)
HCO3 SERPL-SCNC: 28 MMOL/L (ref 22–29)
INR PPP: 2.06 (ref 0.85–1.15)
LVEF ECHO: NORMAL
MAGNESIUM SERPL-MCNC: 2.4 MG/DL (ref 1.7–2.3)
POTASSIUM SERPL-SCNC: 3.6 MMOL/L (ref 3.4–5.3)
SODIUM SERPL-SCNC: 138 MMOL/L (ref 135–145)

## 2025-01-03 PROCEDURE — 82962 GLUCOSE BLOOD TEST: CPT

## 2025-01-03 PROCEDURE — 97116 GAIT TRAINING THERAPY: CPT | Mod: GP

## 2025-01-03 PROCEDURE — 250N000013 HC RX MED GY IP 250 OP 250 PS 637: Performed by: INTERNAL MEDICINE

## 2025-01-03 PROCEDURE — 85610 PROTHROMBIN TIME: CPT | Performed by: INTERNAL MEDICINE

## 2025-01-03 PROCEDURE — 82310 ASSAY OF CALCIUM: CPT | Performed by: INTERNAL MEDICINE

## 2025-01-03 PROCEDURE — 94640 AIRWAY INHALATION TREATMENT: CPT

## 2025-01-03 PROCEDURE — 96376 TX/PRO/DX INJ SAME DRUG ADON: CPT

## 2025-01-03 PROCEDURE — 250N000012 HC RX MED GY IP 250 OP 636 PS 637: Performed by: INTERNAL MEDICINE

## 2025-01-03 PROCEDURE — 36415 COLL VENOUS BLD VENIPUNCTURE: CPT | Performed by: INTERNAL MEDICINE

## 2025-01-03 PROCEDURE — 97161 PT EVAL LOW COMPLEX 20 MIN: CPT | Mod: GP

## 2025-01-03 PROCEDURE — 999N000157 HC STATISTIC RCP TIME EA 10 MIN

## 2025-01-03 PROCEDURE — 99214 OFFICE O/P EST MOD 30 MIN: CPT | Performed by: INTERNAL MEDICINE

## 2025-01-03 PROCEDURE — 93306 TTE W/DOPPLER COMPLETE: CPT | Mod: 26 | Performed by: INTERNAL MEDICINE

## 2025-01-03 PROCEDURE — 83735 ASSAY OF MAGNESIUM: CPT | Performed by: INTERNAL MEDICINE

## 2025-01-03 PROCEDURE — G0378 HOSPITAL OBSERVATION PER HR: HCPCS

## 2025-01-03 PROCEDURE — 80048 BASIC METABOLIC PNL TOTAL CA: CPT | Performed by: INTERNAL MEDICINE

## 2025-01-03 PROCEDURE — 93306 TTE W/DOPPLER COMPLETE: CPT

## 2025-01-03 PROCEDURE — 250N000009 HC RX 250: Performed by: INTERNAL MEDICINE

## 2025-01-03 PROCEDURE — 250N000011 HC RX IP 250 OP 636: Performed by: INTERNAL MEDICINE

## 2025-01-03 PROCEDURE — 99239 HOSP IP/OBS DSCHRG MGMT >30: CPT | Mod: FS

## 2025-01-03 PROCEDURE — 999N000156 HC STATISTIC RCP CONSULT EA 30 MIN

## 2025-01-03 RX ORDER — METOPROLOL SUCCINATE 100 MG/1
100 TABLET, EXTENDED RELEASE ORAL DAILY
Status: DISCONTINUED | OUTPATIENT
Start: 2025-01-04 | End: 2025-01-03 | Stop reason: HOSPADM

## 2025-01-03 RX ORDER — WARFARIN SODIUM 2 MG/1
2 TABLET ORAL
Status: DISCONTINUED | OUTPATIENT
Start: 2025-01-03 | End: 2025-01-03 | Stop reason: HOSPADM

## 2025-01-03 RX ORDER — AMLODIPINE BESYLATE 5 MG/1
5 TABLET ORAL DAILY
Qty: 60 TABLET | Refills: 0 | Status: SHIPPED | OUTPATIENT
Start: 2025-01-04 | End: 2025-01-07

## 2025-01-03 RX ORDER — METOPROLOL SUCCINATE 100 MG/1
100 TABLET, EXTENDED RELEASE ORAL DAILY
Qty: 30 TABLET | Refills: 0 | Status: SHIPPED | OUTPATIENT
Start: 2025-01-04

## 2025-01-03 RX ORDER — IPRATROPIUM BROMIDE AND ALBUTEROL SULFATE 2.5; .5 MG/3ML; MG/3ML
3 SOLUTION RESPIRATORY (INHALATION)
Status: DISCONTINUED | OUTPATIENT
Start: 2025-01-03 | End: 2025-01-03 | Stop reason: HOSPADM

## 2025-01-03 RX ADMIN — INSULIN ASPART 1 UNITS: 100 INJECTION, SOLUTION INTRAVENOUS; SUBCUTANEOUS at 12:55

## 2025-01-03 RX ADMIN — SENNOSIDES AND DOCUSATE SODIUM 1 TABLET: 50; 8.6 TABLET ORAL at 08:14

## 2025-01-03 RX ADMIN — IPRATROPIUM BROMIDE AND ALBUTEROL SULFATE 3 ML: .5; 3 SOLUTION RESPIRATORY (INHALATION) at 07:40

## 2025-01-03 RX ADMIN — POTASSIUM CHLORIDE 10 MEQ: 750 TABLET, EXTENDED RELEASE ORAL at 08:14

## 2025-01-03 RX ADMIN — ATORVASTATIN CALCIUM 80 MG: 40 TABLET, FILM COATED ORAL at 08:14

## 2025-01-03 RX ADMIN — FUROSEMIDE 40 MG: 10 INJECTION, SOLUTION INTRAMUSCULAR; INTRAVENOUS at 06:46

## 2025-01-03 RX ADMIN — SERTRALINE HYDROCHLORIDE 200 MG: 100 TABLET ORAL at 08:14

## 2025-01-03 RX ADMIN — ASPIRIN 81 MG CHEWABLE TABLET 81 MG: 81 TABLET CHEWABLE at 08:14

## 2025-01-03 RX ADMIN — AMLODIPINE BESYLATE 5 MG: 5 TABLET ORAL at 08:14

## 2025-01-03 RX ADMIN — PREDNISONE 40 MG: 20 TABLET ORAL at 08:14

## 2025-01-03 ASSESSMENT — ACTIVITIES OF DAILY LIVING (ADL)
ADLS_ACUITY_SCORE: 38
ADLS_ACUITY_SCORE: 38
ADLS_ACUITY_SCORE: 58
ADLS_ACUITY_SCORE: 38
ADLS_ACUITY_SCORE: 58
ADLS_ACUITY_SCORE: 37
ADLS_ACUITY_SCORE: 38
ADLS_ACUITY_SCORE: 38
ADLS_ACUITY_SCORE: 37
ADLS_ACUITY_SCORE: 58

## 2025-01-03 NOTE — CONSULTS
Cardiology Consult Note    Thank you, Dr. RASHAD Neil, for asking the [facility] team to see Karyn Gamez in consultation at [facility] to evaluate elevated BNP.      Assessment:   1.  Elevated BNP, possibly secondary to acute on chronic HFpEF due to poor blood pressure control and profound bradycardia.  Previous echocardiogram did demonstrate asymmetric hypertrophy of the left ventricle with left ventricular outflow tract obstruction which could increase risk of diastolic heart failure.  No significant valve disease.  Echocardiogram has been ordered to reassess LV function.  2.  Coronary artery disease, status post CABG November 2024.  No report of recurrent anginal symptoms.  Has been riding exercise bike at home without exertional chest discomfort.  3.  Essential hypertension, poorly controlled.  Agree with increased dose of amlodipine.  Would resume metoprolol but at lower dose and monitor heart rates.  4.  Postoperative atrial fibrillation, resolved.  Patient had been on amiodarone therapy until December 31 at which point it was discontinued.  No evidence of recurrent atrial fibrillation this admission.  5.  Marked sinus bradycardia, likely due to residual effects of amiodarone which was discontinued 3 days ago, along with high-dose beta-blocker.  Would continue to hold metoprolol for heart rate less than 60.  Will restart at 100 mg daily and monitor response.    Clinically Significant Risk Factors Present on Admission                # Drug Induced Coagulation Defect: home medication list includes an anticoagulant medication  # Drug Induced Platelet Defect: home medication list includes an antiplatelet medication   # Hypertension: Noted on problem list     # Acute Hypoxic Respiratory Failure: Documented O2 saturation < 90%. Continue supplemental oxygen as needed   # Anemia: based on hgb <11       # Obesity: Estimated body mass index is 39.62 kg/m  as calculated from the  "following:    Height as of this encounter: 1.626 m (5' 4\").    Weight as of this encounter: 104.7 kg (230 lb 13.2 oz).       # Financial/Environmental Concerns: none   # History of CABG: noted on surgical history       Plan:   1.  Agree with increase amlodipine dose  2.  Restart metoprolol succinate at 100 mg daily and monitor heart rates.  3.  Avoid hypotension given evidence of possible hypertrophic cardiomyopathy with asymmetric thickening of the LV septum and evidence of LV outflow tract obstruction on echocardiogram following surgery.  Repeat echocardiogram pending.  4.  Could discharge home today if able to wean off O2       Primary cardiologist: Dr. Lucretia Jerez     Current History:   Ms. Karyn Gamez is a 69 year old female with history of essential hypertension, depressive disorder, coronary artery disease status post CABG x 5 on November 18, 2024 with LIMA to the LAD, left radial artery to obtuse marginal, SVG to right PDA, right ASHA and diagonal branch complicated by postoperative atrial fibrillation which resolved with amiodarone therapy who presented to the hospital on January 2 due to increased blood pressures at home.  Patient has been monitoring her weights and blood pressures had noted her blood pressures were not coming down with taking her blood pressure medications.  She was advised to come to the ED for evaluation.  Weight had remained fairly stable although there was 1 day where her weight went up 1 pound and she did take an extra 20 mg of furosemide with decrease in her weight back to baseline.  At the time of presentation, was noted to have elevated BNP as well as profound sinus bradycardia with heart rates in the mid 40s as well as elevated blood pressures..  Has been admitted for treatment of heart failure and cardiac consult requested.  Had been on amiodarone until it was discontinued on December 31.  Has been on high dose of beta-blocker since discharge from the hospital due to evidence " of possible hypertrophic cardiomyopathy on her echocardiogram.  Denies any lightheadedness or near syncope.    Past Medical History:     Past Medical History:   Diagnosis Date    Cervicalgia 6/29/2005 June 29, 2005: Thrown from a horse - wearing a helmet - and struck side of head and neck, heard crepitation, no loc, Able to walk after injury.  persistant pain in neck relieved with ibuprofen, stiff but can move with ROM.  No changes in hands and feet sensation/motor.    Coronary artery disease     Depressive disorder, not elsewhere classified     Hypertension     Obesity, unspecified        Past Surgical History:     Past Surgical History:   Procedure Laterality Date    ANGIOGRAM  2010    negative    COLONOSCOPY N/A 3/24/2023    Procedure: COLONOSCOPY, WITH HOT POLYPECTOMY AND RESEARCH BIOPSY;  Surgeon: Bret Velez MD;  [place] [facility] [place]    CORONARY ARTERY BYPASS GRAFT, WITH ENDOSCOPIC VESSEL PROCUREMENT N/A 11/18/2024    Procedure: CORONARY ARTERY BYPASS GRAFT TIMES FIVE, LEFT INTERNAL MAMMARY ARTERY HARVEST, RIGHT ENDOSCOPIC VESSEL PROCUREMENT,;  Surgeon: Mary Pennington MD;  [place] [facility] [place]    [place] CORONARY ANGIOGRAM N/A 11/8/2024    Procedure: Coronary Angiogram;  Surgeon: Emir Brand MD;  Location:  HEART CARDIAC CATH LAB    HYSTERECTOMY, PAP NO LONGER INDICATED      BSO    MIDLINE DOUBLE LUMEN PLACEMENT  11/24/2024    PROCURE ARTERY RADIAL  11/18/2024    Procedure: LEFT RADIAL ARTERY HARVEST;  Surgeon: Mary Pennington MD;  [place] [facility] [place]    TRANSESOPHAGEAL ECHOCARDIOGRAM INTRAOPERATIVE N/A 11/18/2024    Procedure: ECHOCARDIOGRAM, TRANSESPOPHAGEAL, WITH ANESTHESIA,;  Surgeon: Mary Pennington MD;  [place] [facility] [place]       Family History:     Family History   Problem Relation Age of Onset    C.A.D. Father         first MI at 53, stenting x2    C.A.D. Mother         dx in her mid 50's    C.A.D. Brother         MI in mid-50's    Cerebrovascular Disease Brother         in  late 50's       Social History:    reports that she quit smoking about 13 years ago. Her smoking use included cigarettes. She started smoking about 43 years ago. She has a 15 pack-year smoking history. She has been exposed to tobacco smoke. She has never used smokeless tobacco. She reports current alcohol use. She reports that she does not use drugs.    Meds:     Current Facility-Administered Medications:     acetaminophen (TYLENOL) tablet 650 mg, 650 mg, Oral, Q4H PRN **OR** acetaminophen (TYLENOL) Suppository 650 mg, 650 mg, Rectal, Q4H PRN, Rashaad SOLORZANO MD    albuterol (PROVENTIL) neb solution 2.5 mg, 2.5 mg, Nebulization, Q6H PRN, Rashaad SOLORZANO MD    amLODIPine (NORVASC) tablet 5 mg, 5 mg, Oral, Daily, Rashaad SOLORZANO MD, 5 mg at 01/03/25 0814    aspirin (ASA) chewable tablet 81 mg, 81 mg, Oral, Daily, Rashaad SOLORZANO MD, 81 mg at 01/03/25 0814    atorvastatin (LIPITOR) tablet 80 mg, 80 mg, Oral, Daily, Rashaad SOLORZANO MD, 80 mg at 01/03/25 0814    glucose gel 15-30 g, 15-30 g, Oral, Q15 Min PRN **OR** dextrose 50 % injection 25-50 mL, 25-50 mL, Intravenous, Q15 Min PRN **OR** glucagon injection 1 mg, 1 mg, Subcutaneous, Q15 Min PRN, Rashaad SOLORZANO MD    furosemide (LASIX) injection 40 mg, 40 mg, Intravenous, BID, Rashaad SOLORZANO MD, 40 mg at 01/03/25 0646    hydrALAZINE (APRESOLINE) tablet 10 mg, 10 mg, Oral, Q4H PRN **OR** hydrALAZINE (APRESOLINE) injection 10 mg, 10 mg, Intravenous, Q4H PRN, Rashaad SOLORZANO MD    insulin aspart (NovoLOG) injection (RAPID ACTING), 1-10 Units, Subcutaneous, TID AC, Rashaad SOLORZANO MD    insulin aspart (NovoLOG) injection (RAPID ACTING), 1-7 Units, Subcutaneous, At Bedtime, Rashaad SOLORZANO MD    ipratropium - albuterol 0.5 mg/2.5 mg/3 mL (DUONEB) neb solution 3 mL, 3 mL, Nebulization, 3 times daily, Silvano Concepcion MD    lidocaine (LMX4) cream, , Topical, Q1H PRN, Rashaad SOLORZANO MD    lidocaine 1 % 0.1-1 mL, 0.1-1 mL, Other, Q1H PRN, Rashaad SOLORZANO MD    [Held by provider] metFORMIN (GLUCOPHAGE XR) 24 hr tablet 500 mg, 500 mg,  Oral, Daily with supper, Rashaad SOLORZANO MD    [Held by provider] metoprolol succinate ER (TOPROL XL) 24 hr tablet 200 mg, 200 mg, Oral, Daily, Rashaad SOLORZANO MD    ondansetron (ZOFRAN ODT) ODT tab 4 mg, 4 mg, Oral, Q6H PRN **OR** ondansetron (ZOFRAN) injection 4 mg, 4 mg, Intravenous, Q6H PRN, Rashaad SOLORZANO MD    Patient is already receiving anticoagulation with heparin, enoxaparin (LOVENOX), warfarin (COUMADIN)  or other anticoagulant medication, , Does not apply, Continuous PRN, Rashaad SOLORZANO MD    potassium chloride travis ER (KLOR-CON M10) CR tablet 10 mEq, 10 mEq, Oral, Daily, Rashaad SOLORZANO MD, 10 mEq at 01/03/25 0814    predniSONE (DELTASONE) tablet 40 mg, 40 mg, Oral, Daily, Rashaad SOLORZANO MD, 40 mg at 01/03/25 0814    prochlorperazine (COMPAZINE) injection 5 mg, 5 mg, Intravenous, Q6H PRN **OR** prochlorperazine (COMPAZINE) tablet 5 mg, 5 mg, Oral, Q6H PRN, Rashaad SOLORZANO MD    senna-docusate (SENOKOT-S/PERICOLACE) 8.6-50 MG per tablet 1 tablet, 1 tablet, Oral, BID PRN **OR** senna-docusate (SENOKOT-S/PERICOLACE) 8.6-50 MG per tablet 2 tablet, 2 tablet, Oral, BID PRN, Rashaad SOLORZANO MD    senna-docusate (SENOKOT-S/PERICOLACE) 8.6-50 MG per tablet 1 tablet, 1 tablet, Oral, Daily, Rashaad SOLORZANO MD, 1 tablet at 01/03/25 0814    sertraline (ZOLOFT) tablet 200 mg, 200 mg, Oral, Daily, Rashaad SOLORZANO MD, 200 mg at 01/03/25 0814    sodium chloride (PF) 0.9% PF flush 3 mL, 3 mL, Intracatheter, q1 min prn, Rashaad SOLORZANO MD    sodium chloride (PF) 0.9% PF flush 3 mL, 3 mL, Intracatheter, Q8H, Rashaad SOLORZANO MD, 3 mL at 01/03/25 0654    Warfarin Dose Required Daily - Pharmacist Managed, 1 each, Oral, See Admin Instructions, Rashaad SOLORZANO MD  Current Facility-Administered Medications   Medication Dose Route Frequency Provider Last Rate Last Admin    amLODIPine (NORVASC) tablet 5 mg  5 mg Oral Daily Rashaad SOLORZANO MD   5 mg at 01/03/25 0814    aspirin (ASA) chewable tablet 81 mg  81 mg Oral Daily Rashaad SOLORZANO MD   81 mg at 01/03/25 0814    atorvastatin (LIPITOR) tablet 80  "mg  80 mg Oral Daily Rashaad SOLORZANO MD   80 mg at 01/03/25 0814    furosemide (LASIX) injection 40 mg  40 mg Intravenous BID Rashaad SOLORZANO MD   40 mg at 01/03/25 0646    insulin aspart (NovoLOG) injection (RAPID ACTING)  1-10 Units Subcutaneous TID AC Rashaad SOLORZANO MD        insulin aspart (NovoLOG) injection (RAPID ACTING)  1-7 Units Subcutaneous At Bedtime Rashaad SOLORZANO MD        ipratropium - albuterol 0.5 mg/2.5 mg/3 mL (DUONEB) neb solution 3 mL  3 mL Nebulization 3 times daily Silvano Concepcion MD        [Held by provider] metFORMIN (GLUCOPHAGE XR) 24 hr tablet 500 mg  500 mg Oral Daily with supper Rashaad SOLORZANO MD        [Held by provider] metoprolol succinate ER (TOPROL XL) 24 hr tablet 200 mg  200 mg Oral Daily Rashaad SOLORZANO MD        potassium chloride travis ER (KLOR-CON M10) CR tablet 10 mEq  10 mEq Oral Daily Rashaad SOLORZANO MD   10 mEq at 01/03/25 0814    predniSONE (DELTASONE) tablet 40 mg  40 mg Oral Daily Rashaad SOLORZANO MD   40 mg at 01/03/25 0814    senna-docusate (SENOKOT-S/PERICOLACE) 8.6-50 MG per tablet 1 tablet  1 tablet Oral Daily Rashaad SOLORZANO MD   1 tablet at 01/03/25 0814    sertraline (ZOLOFT) tablet 200 mg  200 mg Oral Daily Rashaad SOLORZANO MD   200 mg at 01/03/25 0814    sodium chloride (PF) 0.9% PF flush 3 mL  3 mL Intracatheter Q8H Rashaad SOLORZANO MD   3 mL at 01/03/25 0654    Warfarin Dose Required Daily - Pharmacist Managed  1 each Oral See Admin Instructions Rashaad SOLORZANO MD           Allergies:   Patient has no known allergies.    Review of Systems:   A 12 point comprehensive review of systems was negative except as noted in the current history.    Objective:      Physical Exam  Body mass index is 39.62 kg/m .  BP (!) 158/69   Pulse 60   Temp 97.1  F (36.2  C) (Axillary)   Resp 20   Ht 1.626 m (5' 4\")   Wt 104.7 kg (230 lb 13.2 oz)   LMP  (LMP Unknown)   SpO2 93%   BMI 39.62 kg/m      General Appearance:   Well-developed, well-nourished middle-age elderly female in no acute distress   HEENT:  Normocephalic, atraumatic.  " "Sclera nonicteric.  Mucous membranes moist.   Neck: No jugular venous distention or carotid bruits   Chest: Symmetric with normal AP diameter.  Midline sternal incision appears well-healed.   Lungs:   Clear to auscultation bilaterally   Cardiovascular:   Mildly bradycardic rhythm which is regular.  S1, S2 normal.  No murmur or gallop   Abdomen:  Obese, soft, nondistended, nontender.  No organomegaly or mass   Extremities: No peripheral edema, clubbing or cyanosis   Skin: No rash or lesions   Neurologic: Alert and oriented x 3.  No gross focal deficit             Cardiographics (personally reviewed)  ECG demonstrates marked sinus bradycardia rate 48, T wave inversions consistent with lateral ischemia.  Prolonged QT interval.  When compared to prior ECG, atrial fibrillation has resolved.    Imaging (personally reviewed)  Echocardiogram currently pending.    Lab Review (personally reviewed all results)  Recent Labs   Lab Test 11/07/24  1842   CHOL 312*   HDL 39*   *   TRIG 205*     Recent Labs   Lab Test 11/07/24  1842 01/31/23  1426 08/26/21  1509   * 130* 179*     Recent Labs   Lab Test 01/03/25  0840 01/03/25  0532   NA  --  138   POTASSIUM  --  3.6   CHLORIDE  --  101   CO2  --  28   * 141*   BUN  --  24.4*   CR  --  0.77   GFRESTIMATED  --  84   ANGELES  --  9.3     Recent Labs   Lab Test 01/03/25  0532 01/02/25  1606 12/03/24  0444   CR 0.77 0.87 1.03*     Recent Labs   Lab Test 11/07/24  1654 10/31/23  1557 01/31/23  1426   A1C 6.0* 5.4 5.7*          Recent Labs   Lab Test 01/02/25  1606   WBC 7.9   HGB 10.5*   HCT 32.8*   MCV 90        Recent Labs   Lab Test 01/02/25  1606 12/09/24  0824 12/03/24  0444   HGB 10.5* 9.7* 8.1*    No results for input(s): \"TROPONINI\" in the last 65003 hours.  Recent Labs   Lab Test 01/02/25  1606   NTBNPI 3,547*     Recent Labs   Lab Test 11/07/24  1842   TSH 3.54     Recent Labs   Lab Test 01/03/25  0532 01/02/25  1606 12/26/24  1247   INR 2.06* 2.06* 3.2* "

## 2025-01-03 NOTE — PROGRESS NOTES
No further care management intervention anticipated at this time.  Please re-consult if further needs arise.  Care management signing off.

## 2025-01-03 NOTE — PROGRESS NOTES
"Care Management Follow Up    Length of Stay (days): 0    Expected Discharge Date: 01/03/2025    Anticipated Discharge Plan:  Home    Transportation: Anticipate Family/friend    PT Recommendations: home with assist  OT Recommendations:        Barriers to Discharge: medical stability    Prior Living Situation: town home (\"2 level townhouse. But I have moved where I stay on the main level. I have a bedroom and full bath on that main level. There is laundry on the main level and upper level also.\") with spouse    Discussed  Partnership in Safe Discharge Planning  document with patient/family: No     Handoff Completed: No, handoff not indicated or clinically appropriate    Patient/Spokesperson Updated: No    Additional Information:  CM got a new consult for SDOH for housing stability. Assessment completed by previous CM and they signed off.CM went to pt room to discuss this and pt has no concerns with housing.therapy rec is home so no CM needs identified. CM will sign off.    Next Steps: medical progression.    Alaina Ng RN      "

## 2025-01-03 NOTE — CONSULTS
"Care Management Initial Consult    General Information  Assessment completed with: Patient, Spouse or significant other, Karyn and  Jazmyn  Type of CM/SW Visit: Initial Assessment    Primary Care Provider verified and updated as needed: Yes   Readmission within the last 30 days: no previous admission in last 30 days      Reason for Consult: discharge planning  Advance Care Planning: Advance Care Planning Reviewed: no concerns identified          Communication Assessment  Patient's communication style: spoken language (English or Bilingual)           Cognitive  Cognitive/Neuro/Behavioral: WDL                      Living Environment:   People in home: spouse  Karyn and  Jazmyn  Current living Arrangements: town home (\"2 level townhouse. But I have moved where I stay on the main level. I have a bedroom and full bath on that main level. There is laundry on the main level and upper level also.\")      Able to return to prior arrangements: yes       Family/Social Support:  Care provided by: self  Provides care for: no one  Marital Status:   Support system: , Children  Jazmyn       Description of Support System: Supportive, Involved    Support Assessment: Adequate family and caregiver support, Adequate social supports, Patient communicates needs well met    Current Resources:   Patient receiving home care services: No        Community Resources: None  Equipment currently used at home: glucometer (\"I'm working on getting a new glucometer because it isn't working well\")  Supplies currently used at home: Diabetic Supplies, Other (\"BP monitor. Dentures. Glasses)    Employment/Financial:  Employment Status: retired     Employment/ Comments: \"no  history\"  Financial Concerns: none   Referral to Financial Worker: No       Does the patient's insurance plan have a 3 day qualifying hospital stay waiver?  Yes     Which insurance plan 3 day waiver is available? Alternative insurance " waiver    Will the waiver be used for post-acute placement? Undetermined at this time    Lifestyle & Psychosocial Needs:  Social Drivers of Health     Food Insecurity: Low Risk  (11/18/2024)    Food Insecurity     Within the past 12 months, did you worry that your food would run out before you got money to buy more?: No     Within the past 12 months, did the food you bought just not last and you didn t have money to get more?: No   Depression: Not at risk (12/26/2024)    PHQ-2     PHQ-2 Score: 2   Housing Stability: Low Risk  (11/18/2024)    Housing Stability     Do you have housing? : Yes     Are you worried about losing your housing?: No   Recent Concern: Housing Stability - High Risk (11/8/2024)    Housing Stability     Do you have housing? : No     Are you worried about losing your housing?: No   Tobacco Use: Medium Risk (1/2/2025)    Patient History     Smoking Tobacco Use: Former     Smokeless Tobacco Use: Never     Passive Exposure: Past   Financial Resource Strain: Low Risk  (11/18/2024)    Financial Resource Strain     Within the past 12 months, have you or your family members you live with been unable to get utilities (heat, electricity) when it was really needed?: No   Alcohol Use: Not on file   Transportation Needs: Low Risk  (11/18/2024)    Transportation Needs     Within the past 12 months, has lack of transportation kept you from medical appointments, getting your medicines, non-medical meetings or appointments, work, or from getting things that you need?: No   Physical Activity: Not on file   Interpersonal Safety: Low Risk  (11/18/2024)    Interpersonal Safety     Do you feel physically and emotionally safe where you currently live?: Yes     Within the past 12 months, have you been hit, slapped, kicked or otherwise physically hurt by someone?: No     Within the past 12 months, have you been humiliated or emotionally abused in other ways by your partner or ex-partner?: No   Stress: Not on file  "  Social Connections: Not on file   Health Literacy: Not on file       Functional Status:  Prior to admission patient needed assistance:   Dependent ADLs:: Independent, Ambulation-no assistive device  Dependent IADLs:: Cleaning, Cooking, Laundry, Shopping, Meal Preparation (\" helps around the house. I drive and he doesn't drive. My daughter and son in law can also help with transportation as needed\".)  Assesssment of Functional Status:  (unknown at this time)    Mental Health Status:  Mental Health Status: Past Concern  Mental Health Management: Medication, Individual Therapy, Group Therapy, Psychiatrist    Chemical Dependency Status:  Chemical Dependency Status: No Current Concerns             Values/Beliefs:  Spiritual, Cultural Beliefs, Jewish Practices, Values that affect care: no               Discussed  Partnership in Safe Discharge Planning  document with patient/family: No    Additional Information:  Assessment: Signed off    Karyn lives in a townhouse with her  Jazmyn. It is a \"2 level townhouse. But I have moved to where I stay on the main level. I have a bedroom and full bath on that main level. There is laundry on the main level and upper level also.\"     She is independent with ADLs and gets help with most IADLs. \" helps around the house. He does all the housekeeping, laundry, and meal prep. I drive and he doesn't drive. My daughter and son in law can also help with transportation as needed\".    \"My daughter or son in law will be able to transport at discharge.\"    CHERRY was given and discussed. All questions were answered.    Writer verified patient demographics and updated any changes needed in the patient chart.    Next Steps:   Plan: Likely able to return home at discharge.  Needs: No new needs known at this time.     No further care management intervention anticipated at this time.  Please re-consult if further needs arise.  Care management signing off.    Maegan Cole, " RN

## 2025-01-03 NOTE — PROGRESS NOTES
01/03/25 0900   Appointment Info   Signing Clinician's Name / Credentials (PT) Selene Banks PT   Quick Adds   Quick Adds Certification   Living Environment   People in Home spouse   Current Living Arrangements house  (TH)   Home Accessibility stairs to enter home;stairs within home   Number of Stairs, Main Entrance 1   Stair Railings, Main Entrance none   Number of Stairs, Within Home, Primary greater than 10 stairs   Stair Railings, Within Home, Primary railings safe and in good condition   Living Environment Comments Pt said she can stay on one level.   Self-Care   Usual Activity Tolerance good   Current Activity Tolerance moderate   Regular Exercise   (Pt does ex at home and she said she is going to out patient cardiac therapy soon.)   Fall history within last six months no   General Information   Onset of Illness/Injury or Date of Surgery 01/02/25   Referring Physician Rashaad SOLORZANO MD   Patient/Family Therapy Goals Statement (PT) Pt wants to go home   Pertinent History of Current Problem (include personal factors and/or comorbidities that impact the POC) Per the chart, Karyn Gamez is a 69 year old female past medical history of type II DM, hypertension, hyperlipidemia, major depression, CKD stage II, tobacco use disorder, multivessel CAD status post CABG on 11/18/2024, baseline dyspnea on exertion who has been having elevated blood pressure at home.  Patient supposed to follow-up with cardiology today and they advised to come to ED because of high blood pressure.  In ED, patient was hypoxic with saturation 88% on room air.  ED provider gave patient 40 mg IV Lasix, IV Solu-Medrol, nebulizer.  However, unable to wean O2 and admitted for observation.     Acute respiratory failure with hypoxia; possibly due to CHF exacerbation, possible undiagnosed COPD with mild exacerbation (toba   Existing Precautions/Restrictions cardiac  (s/p CABG 11/18/24)   Weight-Bearing Status - LUE   (cardiac lifting restriction)    Weight-Bearing Status - RUE   (cardiac lifting retriction)   Weight-Bearing Status - LLE weight-bearing as tolerated   Weight-Bearing Status - RLE weight-bearing as tolerated   Cognition   Affect/Mental Status (Cognition) WNL   Orientation Status (Cognition) oriented x 4   Follows Commands (Cognition) WFL   Pain Assessment   Patient Currently in Pain No  (Some SOB.)   Integumentary/Edema   Integumentary/Edema Comments Sore on the med pro R LE   Posture    Posture Comments good postue   Range of Motion (ROM)   Range of Motion ROM is WNL   ROM Comment bilat LEs   Strength (Manual Muscle Testing)   Strength (Manual Muscle Testing) strength is WNL   Strength Comments bilat LEs   Bed Mobility   Comment, (Bed Mobility) Supine>sit indep with pt using good cardiac UE precautions.   Transfers   Comment, (Transfers) indep with sit<>stand x 2 reps with pt using good cardiac UE precautions.   Gait/Stairs (Locomotion)   [place] Level (Gait)   (supervision.)   Assistive Device (Gait) other (see comments)  (none)   Distance in Feet (Gait) 10' x2   Pattern (Gait) swing-through   Deviations/Abnormal Patterns (Gait) gait speed decreased   Balance   Balance Comments supervison with gait.  (no LOB.)   Sensory Examination   Sensory Perception WNL   Sensory Perception Comments bilat LEs   Clinical Impression   Criteria for Skilled Therapeutic Intervention Yes, treatment indicated  (gait)   PT Diagnosis (PT) Impaired functional mobility.   Influenced by the following impairments some SOB, dec endurance.   Functional limitations due to impairments gait and steps.   Clinical Presentation (PT Evaluation Complexity) evolving   Clinical Presentation Rationale Pt presents medically diagnosed.   Clinical Decision Making (Complexity) low complexity   Planned Therapy Interventions (PT) gait training;stair training   Risk & Benefits of therapy have been explained evaluation/treatment results reviewed;care plan/treatment goals  reviewed;risks/benefits reviewed;patient;current/potential barriers reviewed;participants voiced agreement with care plan   PT Total Evaluation Time   PT Eval, Low Complexity Minutes (54734) 13   Therapy Certification   Start of care date 01/03/25   Certification date from 01/03/25   Certification date to 01/04/25   Medical Diagnosis acute respiratory failure   Physical Therapy Goals   PT Frequency One time eval and treatment only   PT Predicted Duration/Target Date for Goal Attainment 01/03/25   PT Goals Gait;Stairs   PT: Gait Supervision/stand-by assist   PT: Stairs Supervision/stand-by assist;1 stair   Interventions   Interventions Quick Adds Gait Training;Therapeutic Procedure   Therapeutic Procedure/Exercise   Ther. Procedure: strength, endurance, ROM, flexibillity Minutes (24361) 8   Treatment Detail/Skilled Intervention Seated bilat LE ex x 10 reps with cues for technique.  HR and O2 SATs stable  (no chest pain or SOB with ex.)   Gait Training   Gait Training Minutes (74111) 13   Symptoms Noted During/After Treatment (Gait Training) fatigue   Treatment Detail/Skilled Intervention Pt walked slowly without AD and has no LOB.  Pt O2 SATs did dec to 86% with gait and HR was 67 bpm   Distance in Feet 100'x2   Physical Assistance Level (Gait Training) supervision;1 person assist  (O2 SATs did go down to 86% with a 20 second recovery with cues for PLB. HR stable.)   Weight Bearing (Gait Training) weight-bearing as tolerated   Assistive Device (Gait Training) other (see comments)  (none)   Pattern Analysis (Gait Training) swing-through gait   Gait Analysis Deviations decreased alexandra   Impairments (Gait Analysis/Training) strength decreased   Stair Railings other (see comments)   Physical Assist/Nonphysical Assist (Stairs) supervision;verbal cues;1 person assist   Level of [place] (Stairs)   (Pt did go up and down one step without rail and she did hang on going down.)   PT Discharge Planning   PT Plan gait w no  AD, ex.   PT Discharge Recommendation (DC Rec) home with assist   PT Rationale for DC Rec The pt is moving well and had no LOB.  O2 SATs did dec to 87% with a 20 recovery with cues for PLB.  No O2 on at this time.   PT Brief overview of current status PT eval, indep with bed mobiliity, transfers without AD indep with pt making sure to use cardiac precautions from recent CABG.  LE ex x 10 reps with HR and O2 SATS stable.  Ambulated 75' x2 with no AD w supervision.   PT Equipment Needed at Discharge   (Pt does have a cane and a 4WW at home.)   Physical Therapy Time and Intention   Timed Code Treatment Minutes 21   Total Session Time (sum of timed and untimed services) 34   [facility]                                                                                   OUTPATIENT PHYSICAL THERAPY    PLAN OF TREATMENT FOR OUTPATIENT REHABILITATION   Patient's Last Name, First Name, Karyn Rob YOB: 1955   Provider's Name   [facility]   Medical Record No.  [id number]     Onset Date: 01/02/25 Start of Care Date: 01/03/25     Medical Diagnosis:  acute respiratory failure               PT Diagnosis:  Impaired functional mobility. Certification Dates:  From: 01/03/25  To: 01/04/25       See note for plan of treatment, functional goals, and certification details.    I CERTIFY THE NEED FOR THESE SERVICES FURNISHED UNDER        THIS PLAN OF TREATMENT AND WHILE UNDER MY CARE (Physician co-signature of this document indicates review and certification of the therapy plan).

## 2025-01-03 NOTE — H&P
[facility]    History and Physical - Hospitalist Service       Date of Admission:  1/2/2025    Assessment & Plan      Karyn Gamez is a 69 year old female past medical history of type II DM, hypertension, hyperlipidemia, major depression, CKD stage II, tobacco use disorder, multivessel CAD status post CABG on 11/18/2024, baseline dyspnea on exertion who has been having elevated blood pressure at home.  Patient supposed to follow-up with cardiology today and they advised to come to ED because of high blood pressure.  In ED, patient was hypoxic with saturation 88% on room air.  ED provider gave patient 40 mg IV Lasix, IV Solu-Medrol, nebulizer.  However, unable to wean O2 and admitted for observation.    Acute respiratory failure with hypoxia; possibly due to CHF exacerbation, possible undiagnosed COPD with mild exacerbation (tobacco use disorder, 1/2 pack >45 yrs, quit when had NSTEMI on 11/2024)  -- Patient reports baseline dyspnea on exertion and denied worsening recently.  Patient also reports that she has been losing weight.  Per discharge summary from 12/3/2024, weight was 110.8 kg, on admission 105.2 kg.  However, CXR reported stable small pleural effusion on left side and clinically still has lower extremity swelling though patient reports improving.  In ED, BNP 3547, no previous BNP to compare.  Received 40 mg IV Lasix, continue 40 g IV Lasix twice daily.  --Echocardiogram ordered, cardiology consult pending echo results.    -- Per ED provider wheezing on examination on presentation.  Patient does reports breathing better with nebulizer.  Did not appreciate wheezing on my examination.   -- Continue few doses of DuoNeb, consider albuterol inhaler on discharge.    --Added incentive spirometry and flutter valve.  Currently on 1 to 2 L nasal cannula, titrate O2 as able  -- Received IV Solu-Medrol in ED, start short course of prednisone.  Recommend PCP for outpatient pulmonary function  test    Uncontrolled hypertension;  -- Patient reported her systolic blood pressure in the morning has been running in 190s and after medications in 140s to 150s in the evening times.  --Per discharge summary, amlodipine for radial artery graft protection.  Increase amlodipine from 2.5 to 5 mg daily.  --Currently on IV diuretics.  Added as needed hydralazine.  Monitor vitals and adjust medications accordingly.    CAD s/p CABG on 11/18/2024;  -- Currently denied anginal symptoms.  --PTA aspirin, statin    Postoperative atrial fibrillation;  -- Heart rate controlled rather bradycardic, PTA metoprolol with holding parameters.  --In ED, INR therapeutic, pharmacy consulted for Coumadin dosing and INR check.    EKG with prolonged Qtc;  -- Discontinued PTA Zofran.  --Potassium 3.7, ordered 20mEq K+.  Check magnesium  --Continue telemetry    Type II DM;  -- Hold PTA metformin while inpatient.  --Insulin sliding scale and hypoglycemia protocol       Observation Goals: -diagnostic tests and consults completed and resulted, -vital signs normal or at patient baseline, -dyspnea improved and O2 sats greater than 88% on room air or prior home oxygen levels, -returns to baseline functional status, -safe disposition plan has been identified, Nurse to notify provider when observation goals have been met and patient is ready for discharge.  Diet: Combination Diet Moderate Consistent Carb (60 g CHO per Meal) Diet; Low Saturated Fat Na <2400mg Diet    DVT Prophylaxis: Warfarin  Jasmine Catheter: Not present  Lines: None     Cardiac Monitoring: ACTIVE order. Indication: Acute decompensated heart failure (48 hours)  Code Status: Full Code      Clinically Significant Risk Factors Present on Admission                # Drug Induced Coagulation Defect: home medication list includes an anticoagulant medication  # Drug Induced Platelet Defect: home medication list includes an antiplatelet medication   # Hypertension: Noted on problem list     #  "Acute Hypoxic Respiratory Failure: Documented O2 saturation < 90%. Continue supplemental oxygen as needed   # Anemia: based on hgb <11       # Obesity: Estimated body mass index is 37.45 kg/m  as calculated from the following:    Height as of 12/26/24: 1.676 m (5' 6\").    Weight as of this encounter: 105.2 kg (232 lb).         # Financial/Environmental Concerns: none   # History of CABG: noted on surgical history       Disposition Plan     Medically Ready for Discharge: Anticipated Tomorrow           Rashaad SOLORZANO MD  Hospitalist Service  [facility]  Securely message with [employer] (more info)  Text page via [facility] Paging/Directory     ______________________________________________________________________    Chief Complaint   Elevated blood pressure    History is obtained from the patient, from ED provider.  Reviewed previous medical record especially recent discharge summary from CT surgery team    History of Present Illness   Karyn Gamez is a 69 year old female past medical history of type II DM, hypertension, hyperlipidemia, major depression, CKD stage II, tobacco use disorder, multivessel CAD status post CABG on 11/18/2024, baseline dyspnea on exertion who has been having elevated blood pressure at home.  Patient supposed to follow-up with cardiology today and they advised to come to ED because of high blood pressure.  In ED, patient was hypoxic with saturation 88% on room air.  ED provider gave patient 40 mg IV Lasix, IV Solu-Medrol, nebulizer.  However, unable to wean O2 and admitted for observation.    Patient reported her systolic blood pressure in the morning has been running in 190s and after medications in 140s to 150s in the evening.  Patient however denied symptoms like headache, dizziness, chest pain.    Patient reports baseline dyspnea on exertion and denied worsening recently.  Patient also reports that she has been losing weight.  Per discharge summary from 12/3/2024, weight was " 110.8 kg, on admission 105.2 kg.  However, CXR reported stable small pleural effusion on left side and clinically still has lower extremity swelling though patient reports improving.  Patient denied recent febrile illness, flulike symptoms, cough.  Patient denied abdomen pain, nausea, vomiting.  Denied urinary symptoms.    Past Medical History    Past Medical History:   Diagnosis Date    Cervicalgia 6/29/2005 June 29, 2005: Thrown from a horse - wearing a helmet - and struck side of head and neck, heard crepitation, no loc, Able to walk after injury.  persistant pain in neck relieved with ibuprofen, stiff but can move with ROM.  No changes in hands and feet sensation/motor.    Coronary artery disease     Depressive disorder, not elsewhere classified     Hypertension     Obesity, unspecified        Past Surgical History   Past Surgical History:   Procedure Laterality Date    ANGIOGRAM  2010    negative    COLONOSCOPY N/A 3/24/2023    Procedure: COLONOSCOPY, WITH HOT POLYPECTOMY AND RESEARCH BIOPSY;  Surgeon: Bret Velez MD;  Location: [facility] OR    CORONARY ARTERY BYPASS GRAFT, WITH ENDOSCOPIC VESSEL PROCUREMENT N/A 11/18/2024    Procedure: CORONARY ARTERY BYPASS GRAFT TIMES FIVE, LEFT INTERNAL MAMMARY ARTERY HARVEST, RIGHT ENDOSCOPIC VESSEL PROCUREMENT,;  Surgeon: Mary Pennington MD;  Location: [facility] OR    CV CORONARY ANGIOGRAM N/A 11/8/2024    Procedure: Coronary Angiogram;  Surgeon: Emir Brand MD;  Location:  [facility]    HYSTERECTOMY, PAP NO LONGER INDICATED      BSO    MIDLINE DOUBLE LUMEN PLACEMENT  11/24/2024    PROCURE ARTERY RADIAL  11/18/2024    Procedure: LEFT RADIAL ARTERY HARVEST;  Surgeon: Mary Pennington MD;  Location: [facility] OR    TRANSESOPHAGEAL ECHOCARDIOGRAM INTRAOPERATIVE N/A 11/18/2024    Procedure: ECHOCARDIOGRAM, TRANSESPOPHAGEAL, WITH ANESTHESIA,;  Surgeon: Mary Pennington MD;  Location: [facility] OR       Prior to Admission Medications   Prior to  Admission Medications   Prescriptions Last Dose Informant Patient Reported? Taking?   acetaminophen (TYLENOL) 325 MG tablet   No Yes   Sig: Take 2 tablets (650 mg) by mouth every 4 hours as needed for other (For optimal non-opioid multimodal pain management to improve pain control.).   amLODIPine (NORVASC) 2.5 MG tablet 1/2/2025 Morning Self No Yes   Sig: Take 1 tablet (2.5 mg) by mouth daily   aspirin (ASA) 81 MG chewable tablet 1/2/2025 Morning  No Yes   Sig: Take 1 tablet (81 mg) by mouth daily.   atorvastatin (LIPITOR) 80 MG tablet 1/2/2025 Morning Self No Yes   Sig: Take 1 tablet (80 mg) by mouth daily For cholesterol.   furosemide (LASIX) 20 MG tablet 1/2/2025 Morning  No Yes   Sig: Take 1 tablet (20 mg) by mouth daily.   metFORMIN (GLUCOPHAGE XR) 500 MG 24 hr tablet 1/1/2025 Evening Self No Yes   Sig: TAKE 1 TABLET BY MOUTH DAILY WITH DINNER FOR BLOOD SUGARS   metoprolol succinate ER (TOPROL XL) 200 MG 24 hr tablet 1/2/2025 Morning  No Yes   Sig: Take 1 tablet (200 mg) by mouth daily.   ondansetron (ZOFRAN) 4 MG tablet   Yes Yes   Sig: Take 4 mg by mouth every 8 hours as needed for nausea or vomiting.   potassium chloride travis ER (KLOR-CON M10) 10 MEQ CR tablet 1/2/2025 Morning  No Yes   Sig: Take 1 tablet (10 mEq) by mouth daily.   senna-docusate (SENOKOT-S/PERICOLACE) 8.6-50 MG tablet 1/2/2025 Morning  Yes Yes   Sig: Take 1 tablet by mouth daily.   sertraline (ZOLOFT) 100 MG tablet 1/2/2025 Morning  Yes Yes   Sig: Take 200 mg by mouth daily.   warfarin ANTICOAGULANT (COUMADIN) 1 MG tablet 1/1/2025 Evening  Yes Yes   Sig: Take 2 mg by mouth daily.      Facility-Administered Medications: None        Review of Systems    The 10 point Review of Systems is negative other than noted in the HPI or here.      Physical Exam   Vital Signs: Temp: 98.4  F (36.9  C)   BP: (!) 157/67 Pulse: 51   Resp: (!) 31 SpO2: 98 % O2 Device: Nasal cannula Oxygen Delivery: 1 LPM  Weight: 232 lbs 0 oz      General: Not in obvious  distress.  HEENT: Normocephalic, supple neck  Chest: Decreased but breath clear to auscultation bilateral anteriorly, no wheezing, decreased breath sound in bases mostly on left side with crackles  Heart: S1S2 normal, regular  Abdomen: Soft. NT, ND. Bowel sounds- active.  Extremities: + legs swelling  Neuro: alert and awake, grossly non-focal        Medical Decision Making             Data     I have personally reviewed the following data over the past 24 hrs:    7.9  \   10.5 (L)   / 407     141 103 23.2 (H) /  108 (H)   3.7 28 0.87 \     ALT: 34 AST: 28 AP: 100 TBILI: 0.5   ALB: 4.1 TOT PROTEIN: 7.3 LIPASE: N/A     Trop: 27 (H) BNP: 3,547 (H)     INR:  2.06 (H) PTT:  N/A   D-dimer:  N/A Fibrinogen:  N/A       Imaging results reviewed over the past 24 hrs:   Recent Results (from the past 24 hours)   XR Chest 2 Views    Narrative    EXAM: XR CHEST 2 VIEWS  LOCATION: [facility]  DATE: 1/2/2025    INDICATION: Shortness of breath.  COMPARISON: 11/30/2024.      Impression    IMPRESSION: No significant change of left base opacities and small pleural effusion since 11/30/2024. Right lung is clear. Stable cardiomediastinal silhouette. Sternotomy. No pneumothorax.

## 2025-01-03 NOTE — PHARMACY-ANTICOAGULATION SERVICE
Clinical Pharmacy - Warfarin Dosing Consult     Pharmacy has been consulted to manage this patient s warfarin therapy.  Indication: Atrial Fibrillation  Therapy Goal: INR 2-3  Provider/Team: Dr SOLORZANO  Warfarin Prior to Admission: Yes  Warfarin PTA Regimen: warfarin 2mg daily  Significant drug interactions: amiodarone (1/1 was her final dose), aspirin  Recent documented change in oral intake/nutrition: Unknown  Dose Comments: Will continue with the home dose of 2mg tonight. Her last dose of amiodarone was yesterday and this has a significant drug/drug interaction with warfarin which can persist for days to weeks after amiodarone is stopped. She will need close monitoring in the interim.    INR   Date Value Ref Range Status   01/02/2025 2.06 (H) 0.85 - 1.15 Final   12/26/2024 3.2 (H) 0.9 - 1.1 Final       Recommend warfarin 2 mg today.  Pharmacy will monitor Karyn Gamez daily and order warfarin doses to achieve specified goal.      Please contact pharmacy as soon as possible if the warfarin needs to be held for a procedure or if the warfarin goals change.

## 2025-01-03 NOTE — PROGRESS NOTES
[facility]AT Treatment Plan    Patient Score: 11  Patient Acuity: 3    Clinical Indication for Therapy: bronchospasm and prevent atelectasis    Therapy Ordered: Duoneb scheduled, IS, Flutter    Assessment Summary: Patient has no formal diagnosis of COPD, has long history of smoking. States the nebulizer's help her breathing but does not endorse any shortness of breath or difficulty breathing. Increased aeration after nebulizer's. Will continue with scheduled TID Duoneb's and IS and Flutter valve per [facility] protocol.     Norma Velarde, RT  1/3/2025

## 2025-01-03 NOTE — PLAN OF CARE
Goal Outcome Evaluation:      Plan of Care Reviewed With: patient, spouse          Outcome Evaluation: Likely able to return home at discharge.

## 2025-01-03 NOTE — DISCHARGE SUMMARY
"[facility]  Hospitalist Discharge Summary      Date of Admission:  1/2/2025  Date of Discharge:  1/3/2025  Discharging Provider: Tiffanie Shell PA-C  Discharge Service: Hospitalist Service    Discharge Diagnoses   Acute hypoxic respiratory failure  Suspected mild CHF exacerbation   Uncontrolled essential hypertension  Sinus bradycardia  Postoperative atrial fibrillation  CAD s/p CABG on 11/18/2024  Prolonged QT  Type II DM    Clinically Significant Risk Factors     # Obesity: Estimated body mass index is 39.62 kg/m  as calculated from the following:    Height as of this encounter: 1.626 m (5' 4\").    Weight as of this encounter: 104.7 kg (230 lb 13.2 oz).       Follow-ups Needed After Discharge   Follow-up Appointments       Follow Up      Follow up with cardiology as previously scheduled        Hospital Follow-up with Existing Primary Care Provider (PCP)      Please discuss the following: recommend PFTs and sleep study for possible sleep apnea    Schedule Primary Care visit within: 7 Days             Unresulted Labs Ordered in the Past 30 Days of this Admission       Date and Time Order Name Status Description    11/18/2024  6:12 AM Prepare red blood cells (unit) Preliminary     11/18/2024  6:12 AM Prepare red blood cells (unit) Preliminary           Discharge Disposition   Discharged to home  Condition at discharge: Stable    Hospital Course   Karyn Gamez is a 69 year old female past medical history of type II DM, hypertension, hyperlipidemia, major depression, CKD stage II, tobacco use disorder, multivessel CAD status post CABG on 11/18/2024, baseline dyspnea on exertion who has been having elevated blood pressure at home.  Patient supposed to follow-up with cardiology PTA and they advised to come to ED because of high blood pressure.  In ED, patient was found to be hypoxic with saturation 88% on room air.  ED provider gave patient 40 mg IV Lasix, IV Solu-Medrol, nebulizer.  However, " unable to wean O2 and admitted for observation.  Patient was weaned off O2 the following morning and ambulated without exacerbation of symptoms.  Hospital course was complicated by sinus bradycardia in the 40s.  Cardiology was consulted, suspect hypoxia secondary to possible mild CHF exacerbation.  Adjustments were made to home hypertension medications and metoprolol decreased.  Echo done, no significant changes from prior.  Discussed with cardiology who signed off for patient discharge with outpatient follow-up.  Recommend outpatient follow-up with PCP for PFT, possible sleep study eval.  Follow-up with cardiology for medication changes and hospital follow-up.     Acute respiratory failure with hypoxia (resolved);   Possibly due to CHF exacerbation, possible undiagnosed COPD with mild exacerbation (tobacco use disorder, 1/2 pack >45 yrs, quit when had NSTEMI on 11/2024) +/- possible ? sleep apnea  -- Patient reports baseline dyspnea on exertion following recent CABG and denied worsening recently. However, CXR reported stable small pleural effusion on left side and clinically still has lower extremity swelling though patient reports improving.  In ED, BNP 3547, no previous BNP to compare.  Received 40 mg IV Lasix in the ED, continued 40 g IV Lasix twice daily during hospital stay (PTA regimen 20 mg daily).  -- Echo done 1/3 ; findings consistent with hypertrophic cardiomyopathy, EF 65-70%, no significant change from study on 11/20/2024  -- Given DuoNebs during hospital stay and incentive spirometry with flutter valve  -- Received IV Solu-Medrol in ED, start short course of prednisone.  Recommend PCP for outpatient pulmonary function test  -- Was admitted on 1-2 L supplemental O2, needed mostly while patient was asleep, ? Component of sleep apnea.  Would consider sleep study outpatient  -- Recommend outpatient follow-up with PCP/pulm for PFT  -- Cardiology consulted, reviewed echo, S/O for discharge home and  outpatient follow-up     Postoperative atrial fibrillation  Sinus Bradycardia   Heart rate controlled rather bradycardic, patient will dip into the 40s,   -- Cards consult ; recommend decreasing metoprolol to 100 mg daily  -- Continue PTA warfarin     Uncontrolled hypertension  -- Patient reported her systolic blood pressure in the morning has been running in 190s and after medications in 140s to 150s in the evening times.  -- Per discharge summary, amlodipine for radial artery graft protection.  Increase amlodipine from 2.5 to 5 mg daily.  -- Cards consult ; agree with 5 Mg amlodipine daily, decreasing metoprolol as above     CAD s/p CABG on 11/18/2024  -- Currently denied anginal symptoms.  -- PTA aspirin, statin     EKG with prolonged Qtc (518)  -- Discontinued PTA Zofran.  -- Potassium 3.7, ordered 20mEq K+.  Check magnesium  -- Continue telemetry     Type II DM;  -- Hold PTA metformin while inpatient.  -- Insulin sliding scale and hypoglycemia protocol    Consultations This Hospital Stay   CORE CLINIC EVALUATION IP CONSULT  NUTRITION SERVICES ADULT IP CONSULT  CARE MANAGEMENT / SOCIAL WORK IP CONSULT  PHYSICAL THERAPY ADULT IP CONSULT  OCCUPATIONAL THERAPY ADULT IP CONSULT  PHARMACY TO DOSE WARFARIN  CARE MANAGEMENT / SOCIAL WORK IP CONSULT  CARDIOLOGY IP CONSULT    Code Status   Full Code    Time Spent on this Encounter   I, Tiffanie Shell PA-C, personally saw the patient today and spent greater than 30 minutes discharging this patient.     This case was discussed with the Attending Physician, Dr. Silvano Concepcion, who independently met with and assessed the patient and who is in agreement with the disposition and discharge.    Tiffanie Shell PA-C  [facility] EXTENDED RECOVERY AND SHORT STAY  [address] [place] [id number]  Phone: [phone]  Fax: [phone]  ______________________________________________________________________    Physical Exam   Vital Signs: Temp: 97.1  F (36.2  C)  Temp src: Axillary BP: (!) 158/69 Pulse: 60   Resp: 20 SpO2: 93 % O2 Device: None (Room air) Oxygen Delivery: 1 LPM  Weight: 230 lbs 13.15 oz    Constitutional: awake, alert, no apparent distress, and appears stated age  Respiratory: No increased work of breathing, no accessory muscle use, clear to auscultation bilaterally, no crackles or wheezing  Cardiovascular: Regular rate and rhythm, normal S1 and S2  Skin: Normal skin color, texture, turgor. No rashes and no jaundice  Musculoskeletal: no lower extremity pitting edema present. 2+ DP pulses  Neurologic: Awake, alert.   Neuropsychiatric: Appropriate mood, affect and eye contact. Cooperative.       Primary Care Physician   Cristiana Davis    Discharge Orders      Primary Care - Care Coordination Referral      Reason for your hospital stay    Essential Hypertension (Elevated Blood Pressures) - the cardiologist has increased your dosing of the amlodipine to help control your blood pressure. You were also noted to be bradycardic (slow heart rate) during your hospital stay so the dose of your metoprolol was decreased. Please be sure to follow-up with cardiology as previously directed or sooner if you continue to notice abnormal vital signs at home     Hypoxia (Low oxygen saturation) - Your oxygen levels were low while you were in the ER. This could be from a variety of things including an exacerbation of heart failure (for which you were treated with Lasix) or possible underlying COPD. Please be sure to follow-up with your primary care physician to check your lung function. Return to the ER if you experience return of shortness of breath, difficulty breathing, etc.     Activity    Your activity upon discharge: activity as tolerated     Follow Up    Follow up with cardiology as previously scheduled     Diet    Follow this diet upon discharge: Combination Diet Moderate Consistent Carb (60 g CHO per Meal) Diet; Low Saturated Fat Na <2400mg Diet     Hospital Follow-up  with Existing Primary Care Provider (PCP)    Please discuss the following: recommend PFTs and sleep study for possible sleep apnea       Significant Results and Procedures   Most Recent 3 CBC's:  Recent Labs   Lab Test 25  1606 24  0824 24  0444   WBC 7.9 12.1* 10.0   HGB 10.5* 9.7* 8.1*   MCV 90 96 92    613* 538*     Most Recent 3 BMP's:  Recent Labs   Lab Test 25  1218 25  0840 25  0532 25  2146 25  1606 24  0444   NA  --   --  138  --  141 139   POTASSIUM  --   --  3.6  --  3.7 3.9   CHLORIDE  --   --  101  --  103 99   CO2  --   --  28  --  28 30*   BUN  --   --  24.4*  --  23.2* 14.8   CR  --   --  0.77  --  0.87 1.03*   ANIONGAP  --   --  9  --  10 10   ANGELES  --   --  9.3  --  9.3 8.8   * 146* 141*   < > 108* 103*    < > = values in this interval not displayed.   ,   Results for orders placed or performed during the hospital encounter of 25   XR Chest 2 Views    Narrative    EXAM: XR CHEST 2 VIEWS  LOCATION: [facility]  DATE: 2025    INDICATION: Shortness of breath.  COMPARISON: 2024.      Impression    IMPRESSION: No significant change of left base opacities and small pleural effusion since 2024. Right lung is clear. Stable cardiomediastinal silhouette. Sternotomy. No pneumothorax.     Echocardiogram Complete     Value    LVEF  65-70%    Narrative    [id number]  [id number]  [id number]  [id number]^RASHAD^[place] [address]     Name: CAILIN CAMPOS  MRN: [id number]  : 1955  Study Date: 2025 10:28 AM  Age: 69 yrs  Gender: Female  Patient Location: [facility]  Reason For Study: Heart Failure  Ordering Physician: CORTEZ SOLORZANO  Performed By: KAITLIN     BSA: 2.1 m2  Height: 64 in  Weight: 230 lb  HR: 52  BP: 158/69 mmHg  ______________________________________________________________________________  Procedure  Echocardiogram with two-dimensional, color and  spectral Doppler.  ______________________________________________________________________________  Interpretation Summary     The echo findings are consistent with hypertrophic cardiomyopathy. Asymmetric  left ventricular hypertrophy with septum measuring 1.6 cm. There is a resting  left ventricular outflow gradient of 34 mmHg which increases to 57 mmHg with  Valsalva.  The visual ejection fraction is 65-70% without wall motion abnormality.  Mildly decreased right ventricular systolic function  There is mild (1+) mitral regurgitation.  There is systolic anterior motion of the mitral valve.  There is trace to mild tricuspid regurgitation. No pulmonary HTN.  Compared to the prior study dated 11/20/2024, there have been no changes.  ______________________________________________________________________________  Left Ventricle  The left ventricle is normal in size. The echo findings are consistent with  hypertrophic cardiomyopathy. Asymmetric left ventricular hypertrophy with  septum measuring 1.6 cm. There is a resting left ventricular outflow gradient  of 34 mmHg which increases to 57 mmHg with Valsalva. The visual ejection  fraction is 65-70%. No regional wall motion abnormalities noted.     Right Ventricle  The right ventricle is normal size. TAPSE is abnormal, which is consistent  with abnormal right ventricular systolic function. Mildly decreased right  ventricular systolic function.     Atria  The left atrium is severely dilated. The right atrium is mildly dilated. There  is no color Doppler evidence of an atrial shunt.     Mitral Valve  Mitral valve leaflets appear normal. There is systolic anterior motion of the  mitral valve. There is mild (1+) mitral regurgitation.     Tricuspid Valve  The tricuspid valve is not well visualized, but is grossly normal. There is  trace to mild tricuspid regurgitation. The right ventricular systolic pressure  is approximated at 26mmHg plus the right atrial pressure.     Aortic  Valve  There is mild trileaflet aortic sclerosis. No aortic regurgitation is present.  No aortic stenosis is present.     Pulmonic Valve  The pulmonic valve is not well seen, but is grossly normal. There is trace  pulmonic valvular regurgitation.     Vessels  The aorta root is normal. Mildly dilated ascending aorta. IVC diameter <2.1 cm  collapsing >50% with sniff suggests a normal RA pressure of 3 mmHg.     Pericardium  There is no pericardial effusion.     ______________________________________________________________________________  MMode/2D Measurements & Calculations  IVSd: 1.6 cm  LVIDd: 5.2 cm  LVIDs: 3.2 cm  LVPWd: 1.2 cm  FS: 38.6 %     LV mass(C)d: 315.4 grams  LV mass(C)dI: 151.9 grams/m2  Ao root diam: 3.5 cm  asc Aorta Diam: 4.0 cm  LVOT diam: 2.3 cm  LVOT area: 4.3 cm2  Ao root diam index Ht(cm/m): 2.2  Ao root diam index BSA (cm/m2): 1.7  Asc Ao diam index BSA (cm/m2): 1.9  Asc Ao diam index Ht(cm/m): 2.4  LA Volume (BP): 111.0 ml  LA Volume Index (BP): 53.4 ml/m2     LA Volume Indexed (AL/bp): 54.0 ml/m2  RV Base: 4.2 cm  RWT: 0.45  TAPSE: 1.3 cm     Doppler Measurements & Calculations  MV E max abel: 65.5 cm/sec  MV A max abel: 59.3 cm/sec  MV E/A: 1.1  MV dec slope: 275.5 cm/sec2  MV dec time: 0.24 sec  Ao V2 max: 283.6 cm/sec  Ao max P.0 mmHg  Ao V2 mean: 192.2 cm/sec  Ao mean P.7 mmHg  Ao V2 VTI: 58.4 cm  MELANIE(I,D): 2.4 cm2  MELANIE(V,D): 2.2 cm2     LV V1 max P.8 mmHg  LV V1 max: 148.2 cm/sec  LV V1 VTI: 32.3 cm  SV(LVOT): 139.0 ml  SI(LVOT): 67.0 ml/m2  PA V2 max: 109.1 cm/sec  PA max P.8 mmHg  PA acc time: 0.08 sec  TR max abel: 255.8 cm/sec  TR max P.2 mmHg  AV Abel Ratio (DI): 0.52  MELANIE Index (cm2/m2): 1.1  E/E': 20.8  E/E' av.5  Lateral E/e': 12.3  Medial E/e': 20.8  Peak E' Abel: 3.1 cm/sec  RV S Abel: 9.8 cm/sec     ______________________________________________________________________________  Report approved by: Jojo Milian MD on 2025 12:17 PM              Discharge Medications   Current Discharge Medication List        CONTINUE these medications which have CHANGED    Details   amLODIPine (NORVASC) 5 MG tablet Take 1 tablet (5 mg) by mouth daily.  Qty: 60 tablet, Refills: 0    Associated Diagnoses: Hypertension goal BP (blood pressure) < 140/90      metoprolol succinate ER (TOPROL XL) 100 MG 24 hr tablet Take 1 tablet (100 mg) by mouth daily.  Qty: 30 tablet, Refills: 0    Associated Diagnoses: Hypertension goal BP (blood pressure) < 140/90; Postoperative atrial fibrillation (H)           CONTINUE these medications which have NOT CHANGED    Details   acetaminophen (TYLENOL) 325 MG tablet Take 2 tablets (650 mg) by mouth every 4 hours as needed for other (For optimal non-opioid multimodal pain management to improve pain control.).    Associated Diagnoses: S/P CABG (coronary artery bypass graft)      aspirin (ASA) 81 MG chewable tablet Take 1 tablet (81 mg) by mouth daily.    Associated Diagnoses: S/P CABG (coronary artery bypass graft)      atorvastatin (LIPITOR) 80 MG tablet Take 1 tablet (80 mg) by mouth daily For cholesterol.  Qty: 90 tablet, Refills: 1    Associated Diagnoses: Hyperlipidemia LDL goal <70      furosemide (LASIX) 20 MG tablet Take 1 tablet (20 mg) by mouth daily.  Qty: 30 tablet, Refills: 0    Associated Diagnoses: S/P CABG (coronary artery bypass graft)      metFORMIN (GLUCOPHAGE XR) 500 MG 24 hr tablet TAKE 1 TABLET BY MOUTH DAILY WITH DINNER FOR BLOOD SUGARS  Qty: 90 tablet, Refills: 1    Associated Diagnoses: Controlled type 2 diabetes mellitus with microalbuminuria, without long-term current use of insulin (H)      ondansetron (ZOFRAN) 4 MG tablet Take 4 mg by mouth every 8 hours as needed for nausea or vomiting.      potassium chloride travis ER (KLOR-CON M10) 10 MEQ CR tablet Take 1 tablet (10 mEq) by mouth daily.  Qty: 30 tablet, Refills: 0    Associated Diagnoses: S/P CABG (coronary artery bypass graft)      senna-docusate  (SENOKOT-S/PERICOLACE) 8.6-50 MG tablet Take 1 tablet by mouth daily.      sertraline (ZOLOFT) 100 MG tablet Take 200 mg by mouth daily.      warfarin ANTICOAGULANT (COUMADIN) 1 MG tablet Take 2 mg by mouth daily.           Allergies   No Known Allergies

## 2025-01-03 NOTE — PLAN OF CARE
"PRIMARY DIAGNOSIS: \"GENERIC\" NURSING  OUTPATIENT/OBSERVATION GOALS TO BE MET BEFORE DISCHARGE:  ADLs back to baseline: Yes    Activity and level of assistance: Up with standby assistance.    Pain status: Pain free.    Return to near baseline physical activity: Yes     Discharge Planner Nurse   Safe discharge environment identified: Yes  Barriers to discharge: Yes       Entered by: Mckenna Fuentes RN 01/02/2025 11:51 PM   On 2L O2 stating in 90s.   Please review provider order for any additional goals.   Nurse to notify provider when observation goals have been met and patient is ready for discharge.Goal Outcome Evaluation:                        "

## 2025-01-03 NOTE — PLAN OF CARE
"PRIMARY DIAGNOSIS: \"GENERIC\" NURSING  OUTPATIENT/OBSERVATION GOALS TO BE MET BEFORE DISCHARGE:  ADLs back to baseline: Yes    Activity and level of assistance: Up with standby assistance.    Pain status: Pain free.    Return to near baseline physical activity: Yes     Discharge Planner Nurse   Safe discharge environment identified: Yes  Barriers to discharge: Yes       Entered by: Mckenna Fuentes RN 01/03/2025 2:18 AM   Pt ambulated to bathroom. Pt on 2L O2.   Please review provider order for any additional goals.   Nurse to notify provider when observation goals have been met and patient is ready for discharge.Goal Outcome Evaluation:                        "

## 2025-01-03 NOTE — PROGRESS NOTES
Patient admitted to room 16 at approximately 0300 via cart from emergency room.  Reason for Admission: High BP, acute respiratory failure with hypoxia.  Report received from: ED nurse  Patient was accompanied by Self.  Discharge transportation provided by:  Patient ambulated/transferred:  with stand-by assist. self.  Patient is alert and orientated x 3.  Outpatient Observation education provided to: (patient, family, friend)  MDRO Education done if applicable (MRSA, VRE, etc)  Safety risks were identified during admission:  none.   Yellow risk/fall band applied:  No  Home meds sent home: No  Home meds sent to pharmacy:No IF YES add 1/2 sheet laminated page reminder to chart/clipboard   Detailed Belongings: clothing, glasses. Upper denture, cash/credit card, purse/wallet and cell phone.

## 2025-01-03 NOTE — PLAN OF CARE
Problem: Adult Inpatient Plan of Care  Goal: Optimal Comfort and Wellbeing  Outcome: Progressing  Intervention: Monitor Pain and Promote Comfort  Recent Flowsheet Documentation  Taken 1/3/2025 0342 by Angela Townsend RN  Pain Management Interventions:   rest   quiet environment facilitated     Problem: Pain Acute  Goal: Optimal Pain Control and Function  Outcome: Progressing  Intervention: Develop Pain Management Plan  Recent Flowsheet Documentation  Taken 1/3/2025 0342 by Angela Townsend RN  Pain Management Interventions:   rest   quiet environment facilitated  Intervention: Prevent or Manage Pain  Recent Flowsheet Documentation  Taken 1/3/2025 0331 by Angela Townsend RN  Medication Review/Management: medications reviewed     Problem: Diabetes  Goal: Blood Glucose Level Within Target Range  Outcome: Progressing     Problem: Adult Inpatient Plan of Care  Goal: Absence of Hospital-Acquired Illness or Injury  Intervention: Prevent Skin Injury  Recent Flowsheet Documentation  Taken 1/3/2025 0331 by Angela Townsend RN  Body Position: position changed independently   Goal Outcome Evaluation:       Patient admitted for acute respiratory failure, and high BP. BP in the 170s at the moment. Hydralazine to be administered when systolic higher than 180. Denies pain. Right PIV saline locked. On Mg protocol. Last Mg 1.9. Cardiac diet. On tele, sinus jam with prolonged QT. Trop 28. Stand by assist with mobility. On 1L 02. BS @ 0200 was 139. Strict intake and output. PCD in place. Slept well. PT/OT and nutrition services consult.

## 2025-01-03 NOTE — PHARMACY-ADMISSION MEDICATION HISTORY
Pharmacist Admission Medication History    Admission medication history is complete. The information provided in this note is only as accurate as the sources available at the time of the update.    Information Source(s): Patient, Hospital records, and CareEverywhere/SureScripts via in-person    Pertinent Information:   -- Patient had not taken medications for over a year prior to admission in which she eventually had a CABG. Since that time she has resumed those medications in addition to starting more, which helps explain the lack of fill history for many of her medications.  -- The only medication that has been removed from her list is amiodarone as she was supposed to take it for 30 days and then stop. Yesterday was her final dose of that course.    Changes made to PTA medication list:  Added: Zofran  Deleted: amiodarone, multivitamin  Changed: senna/docusate, sertraline, warfarin    Allergies reviewed with patient and updates made in EHR: yes    Medication History Completed By: Cinda Allred [facility] 1/2/2025 6:34 PM    PTA Med List   Medication Sig Last Dose/Taking    acetaminophen (TYLENOL) 325 MG tablet Take 2 tablets (650 mg) by mouth every 4 hours as needed for other (For optimal non-opioid multimodal pain management to improve pain control.). Taking As Needed    amLODIPine (NORVASC) 2.5 MG tablet Take 1 tablet (2.5 mg) by mouth daily 1/2/2025 Morning    aspirin (ASA) 81 MG chewable tablet Take 1 tablet (81 mg) by mouth daily. 1/2/2025 Morning    atorvastatin (LIPITOR) 80 MG tablet Take 1 tablet (80 mg) by mouth daily For cholesterol. 1/2/2025 Morning    furosemide (LASIX) 20 MG tablet Take 1 tablet (20 mg) by mouth daily. 1/2/2025 Morning    metFORMIN (GLUCOPHAGE XR) 500 MG 24 hr tablet TAKE 1 TABLET BY MOUTH DAILY WITH DINNER FOR BLOOD SUGARS 1/1/2025 Evening    metoprolol succinate ER (TOPROL XL) 200 MG 24 hr tablet Take 1 tablet (200 mg) by mouth daily. 1/2/2025 Morning    ondansetron (ZOFRAN) 4 MG tablet  Take 4 mg by mouth every 8 hours as needed for nausea or vomiting. Taking As Needed    potassium chloride travis ER (KLOR-CON M10) 10 MEQ CR tablet Take 1 tablet (10 mEq) by mouth daily. 1/2/2025 Morning    senna-docusate (SENOKOT-S/PERICOLACE) 8.6-50 MG tablet Take 1 tablet by mouth daily. 1/2/2025 Morning    sertraline (ZOLOFT) 100 MG tablet Take 200 mg by mouth daily. 1/2/2025 Morning    warfarin ANTICOAGULANT (COUMADIN) 1 MG tablet Take 2 mg by mouth daily. 1/1/2025 Evening

## 2025-01-04 NOTE — PLAN OF CARE
Physical Therapy Discharge Summary    Reason for therapy discharge:    Discharged to home.    Progress towards therapy goal(s). See goals on Care Plan in [facility] electronic health record for goal details.  Goals not met.  Barriers to achieving goals:   discharge from facility.    Therapy recommendation(s):    Pt will continue to walk at home.

## 2025-01-06 ENCOUNTER — PATIENT OUTREACH (OUTPATIENT)
Dept: CARE COORDINATION | Facility: CLINIC | Age: 70
End: 2025-01-06
Payer: COMMERCIAL

## 2025-01-06 LAB
ATRIAL RATE - MUSE: 48 BPM
DIASTOLIC BLOOD PRESSURE - MUSE: NORMAL MMHG
INTERPRETATION ECG - MUSE: NORMAL
P AXIS - MUSE: 19 DEGREES
PR INTERVAL - MUSE: 138 MS
QRS DURATION - MUSE: 96 MS
QT - MUSE: 580 MS
QTC - MUSE: 518 MS
R AXIS - MUSE: 74 DEGREES
SYSTOLIC BLOOD PRESSURE - MUSE: NORMAL MMHG
T AXIS - MUSE: 104 DEGREES
VENTRICULAR RATE- MUSE: 48 BPM

## 2025-01-06 ASSESSMENT — ACTIVITIES OF DAILY LIVING (ADL): DEPENDENT_IADLS:: CLEANING;COOKING;LAUNDRY;SHOPPING;TRANSPORTATION

## 2025-01-06 NOTE — PROGRESS NOTES
Clinic Care Coordination Contact  Clinic Care Coordination Contact  OUTREACH with Post Discharge Assessment    Referral Information:  Referral Source: IP Handoff    Primary Diagnosis: Cardiovascular - other    Chief Complaint   Patient presents with    Clinic Care Coordination - Post Hospital        [place] Utilization: The patient uses the [facility] system and the [facility].   Clinic Utilization  Difficulty keeping appointments:: No  Compliance Concerns: No  No-Show Concerns: No  No PCP office visit in Past Year: No  Utilization      No Show Count (past year)  1             ED Visits  2             Hospital Admissions  3                    Current as of: 1/6/2025  6:10 AM                Clinical Concerns:  Current Medical Concerns:  The patient was recently hospitalized at [facility] for the following    Discharge Diagnoses  Acute hypoxic respiratory failure  Suspected mild CHF exacerbation   Uncontrolled essential hypertension  Sinus bradycardia  Postoperative atrial fibrillation  CAD s/p CABG on 11/18/2024  Prolonged QT  Type II DM    The patient states that her blood pressure is not well controlled yet.  She is taking her blood pressure daily.  The RN CC encouraged the patient to send her blood pressure readings to the PCP via [employer].    Current Behavioral Concerns: None currently noted     Education Provided to patient: Options for care coordination, review of goal.   Pain  Pain (GOAL):: No  Health Maintenance Reviewed: Due/Overdue   Health Maintenance Due   Topic Date Due    DEXA  Never done    HF ACTION PLAN  Never done    RSV VACCINE (1 - Risk 60-74 years 1-dose series) Never done    MEDICARE ANNUAL WELLNESS VISIT  09/23/2021    DIABETIC FOOT EXAM  08/26/2022    MICROALBUMIN  01/31/2024    EYE EXAM  02/06/2024    MAMMO SCREENING  02/01/2025       Clinical Pathway: None    Transitions of Care Outreach    Most Recent Admission Date: 1/2/2025   Most Recent Admission  Diagnosis: Hypoxia - R09.02     Most Recent Discharge Date: 1/3/2025   Most Recent Discharge Diagnosis: Hypoxia - R09.02  Hypertension goal BP (blood pressure) < 140/90 - I10  Postoperative atrial fibrillation (H) - I97.89, I48.91     Transitions of Care Assessment    Discharge Assessment  How are you doing now that you are home?: still fighting high blood pressure.  How are your symptoms? (Red Flag symptoms escalate to triage hotline per guidelines): Improved  Do you know how to contact your clinic care team if you have future questions or changes to your health status? : Yes  Does the patient have their discharge instructions? : Yes  Does the patient have questions regarding their discharge instructions? : No  Were you started on any new medications or were there changes to any of your previous medications? : No  Does the patient have all of their medications?: Yes  Do you have questions regarding any of your medications? : No  Do you have all of your needed medical supplies or equipment (DME)?  (i.e. oxygen tank, CPAP, cane, etc.): Yes         Post-op (Clinicians Only)  Did the patient have surgery or a procedure: No  Fever: No  Chills: No  Eating & Drinking: eating and drinking without complaints/concerns  PO Intake: other (cardiac, diabetic diet)  Bowel Function: normal  Urinary Status: voiding without complaint/concerns    Care Management       Care Mgmt General Assessment  Referral  Referral Source: IP Handoff  Health Care Home/Utilization  Preferred Hospital: [facility]  [phone]  Living Situation  Current living arrangement:: I live in a private home with spouse  Type of residence:: Private home - stairs  Resources  Patient receiving home care services:: No  Community Resources: None  Supplies Currently Used at Home: Diabetic Supplies, Other  Equipment Currently Used at Home: glucometer  Referrals Placed: None  Employment Status: retired  Psychosocial  Scientologist or spiritual beliefs  that impact treatment:: No  Mental health DX:: No  Mental health management concern (GOAL):: No  Chemical Dependency Status: No Current Concerns  Informal Support system:: Family, Spouse  Functional Status  Dependent ADLs:: Independent  Dependent IADLs:: Cleaning, Cooking, Laundry, Shopping, Transportation  Bed or wheelchair confined:: No  Mobility Status: Independent  Fallen 2 or more times in the past year?: No  Any fall with injury in the past year?: No  Advance Care Plan/Directive  Advanced Care Plans/Directives on file:: No  Discussed with patient/caregiver:: Other (Comment) and     Care Mgmt Encounter Assessment  Preventative Care  Routine Health maintenance Reviewed: Due/Overdue   Health Maintenance Due   Topic Date Due    DEXA  Never done    HF ACTION PLAN  Never done    RSV VACCINE (1 - Risk 60-74 years 1-dose series) Never done    MEDICARE ANNUAL WELLNESS VISIT  09/23/2021    DIABETIC FOOT EXAM  08/26/2022    MICROALBUMIN  01/31/2024    EYE EXAM  02/06/2024    MAMMO SCREENING  02/01/2025       Clinic Utilization  Difficulty keeping appointments:: No  Compliance Concerns: No  No-Show Concerns: No  No PCP office visit in Past Year: No  Transportation  Transportation means:: Family, Friend, Regular car     Primary Diagnosis  Primary Diagnosis: Cardiovascular - other  Barriers in Communication  Other concerns:: Glasses  Pain  Pain (GOAL):: No  Medication Review  Medication adherence problem (GOAL):: No  Knowledgeable about how to use meds:: Yes  Medication side effects suspected:: No  Diet/Exercise/Sleep  Diet:: Diabetic diet  Inadequate nutrition (GOAL):: No  Tube Feeding: No  Inadequate activity/exercise (GOAL):: No  Significant changes in sleep pattern (GOAL): No    Medication Management:  Medication review status: Medications reviewed.  Changes noted per patient report.  Patient is knowledgeable on medications and is adherent.  No financial concerns reported at this time.  Medication review was completed  with the patient and there are no questions or concerns at this time.       Functional Status:  Dependent ADLs:: Independent  Dependent IADLs:: Cleaning, Cooking, Laundry, Shopping, Transportation  Bed or wheelchair confined:: No  Mobility Status: Independent  Fallen 2 or more times in the past year?: No  Any fall with injury in the past year?: No    Living Situation:  Current living arrangement:: I live in a private home with spouse  Type of residence:: Private home - stairs    Lifestyle & Psychosocial Needs:    Social Drivers of Health     Food Insecurity: Low Risk  (1/3/2025)    Food Insecurity     Within the past 12 months, did you worry that your food would run out before you got money to buy more?: No     Within the past 12 months, did the food you bought just not last and you didn t have money to get more?: No   Depression: Not at risk (12/26/2024)    PHQ-2     PHQ-2 Score: 2   Housing Stability: Low Risk  (1/3/2025)    Housing Stability     Do you have housing? : Yes     Are you worried about losing your housing?: No   Recent Concern: Housing Stability - High Risk (1/3/2025)    Housing Stability     Do you have housing? : No     Are you worried about losing your housing?: No   Tobacco Use: Medium Risk (1/2/2025)    Patient History     Smoking Tobacco Use: Former     Smokeless Tobacco Use: Never     Passive Exposure: Past   Financial Resource Strain: Low Risk  (1/3/2025)    Financial Resource Strain     Within the past 12 months, have you or your family members you live with been unable to get utilities (heat, electricity) when it was really needed?: No   Alcohol Use: Not on file   Transportation Needs: Low Risk  (1/3/2025)    Transportation Needs     Within the past 12 months, has lack of transportation kept you from medical appointments, getting your medicines, non-medical meetings or appointments, work, or from getting things that you need?: No   Physical Activity: Not on file   Interpersonal Safety: Low  Risk  (11/18/2024)    Interpersonal Safety     Do you feel physically and emotionally safe where you currently live?: Yes     Within the past 12 months, have you been hit, slapped, kicked or otherwise physically hurt by someone?: No     Within the past 12 months, have you been humiliated or emotionally abused in other ways by your partner or ex-partner?: No   Stress: Not on file   Social Connections: Not on file   Health Literacy: Not on file     Diet:: Diabetic diet  Inadequate nutrition (GOAL):: No  Tube Feeding: No  Inadequate activity/exercise (GOAL):: No  Significant changes in sleep pattern (GOAL): No  Transportation means:: Family, Friend, Regular car     Sabianism or spiritual beliefs that impact treatment:: No  Mental health DX:: No  Mental health management concern (GOAL):: No  Chemical Dependency Status: No Current Concerns  Informal Support system:: Family, Spouse      Resources and Interventions:  Current Resources:      Community Resources: None  Supplies Currently Used at Home: Diabetic Supplies, Other  Equipment Currently Used at Home: glucometer  Employment Status: retired         Advance Care Plan/Directive  Advanced Care Plans/Directives on file:: No  Discussed with patient/caregiver:: Other (Comment)    Referrals Placed: None     Care Plan:   Care Plan: Physical Activity - I want to regain the strength as I had prior to my surgery.       Problem: Patient is inactive       Goal: Increase Physical Activity - I want to regain the strength I had before my surgery.       Start Date: 12/13/2024 Expected End Date: 12/13/2025    This Visit's Progress: 10%    Note:     Barriers: Recent CABG X5 surgery  Strengths: engaged in care coordination  Patient expressed understanding of goal: yes  Action steps to achieve this goal:  1. I will attend all cardiac rehab sessions at [facility]  2. I will practice the exercises between sessions.  3. I will work out with my friend who is a cardiac nurse.                                 Patient/Caregiver understanding: The patient and her spouse have a good understanding of the disease process.    Outreach Frequency: monthly, more frequently as needed  Future Appointments                In 1 week Kari Mehta NP [facility]    In 1 week [facility] CARDIAC REHAB RESOURCE 2 [facility] Cardiac and Pulmonary Rehabilitation [facility]    In 2 weeks Miguel Schultz, APRN CNP [facility]    In 2 weeks [facility] HCC HEART FAILURE RN [facility]    In 2 weeks Lucretia Jerez MD [facility]            Follow up Plan          Future Appointments   Date Time Provider Department Center   1/13/2025  7:50 AM [facility]   1/15/2025 10:00 AM [facility]   1/23/2025  8:30 AM Miguel Schultz APRN CNP [facility]   1/23/2025  9:10 AM [facility] VALDEZ HEART FAILURE RN [facility]   1/24/2025 11:20 AM Lucretia Jerez MD [facility]       Outpatient Plan as outlined on AVS reviewed with patient.    The patient chooses to remain a part of primary care care coordination.    For any urgent concerns, please contact our 24 hour nurse triage line: [phone] ([phone])         Kulwinder Carrera MSN, RN, PHN, [facility]   Primary Care Clinical RN Care Coordinator  [facility]  1/6/2025   11:11 AM  [email or web address]  Office: [phone]

## 2025-01-07 ENCOUNTER — TELEPHONE (OUTPATIENT)
Dept: CARDIOLOGY | Facility: CLINIC | Age: 70
End: 2025-01-07
Payer: COMMERCIAL

## 2025-01-07 ENCOUNTER — HOSPITAL ENCOUNTER (EMERGENCY)
Facility: HOSPITAL | Age: 70
Discharge: LEFT BEFORE DISCHARGE | End: 2025-01-07
Attending: EMERGENCY MEDICINE | Admitting: EMERGENCY MEDICINE
Payer: COMMERCIAL

## 2025-01-07 ENCOUNTER — MYC MEDICAL ADVICE (OUTPATIENT)
Dept: ANTICOAGULATION | Facility: CLINIC | Age: 70
End: 2025-01-07
Payer: COMMERCIAL

## 2025-01-07 ENCOUNTER — DOCUMENTATION ONLY (OUTPATIENT)
Dept: ANTICOAGULATION | Facility: CLINIC | Age: 70
End: 2025-01-07
Payer: COMMERCIAL

## 2025-01-07 VITALS
HEIGHT: 64 IN | TEMPERATURE: 98.6 F | RESPIRATION RATE: 20 BRPM | WEIGHT: 225 LBS | DIASTOLIC BLOOD PRESSURE: 85 MMHG | HEART RATE: 69 BPM | SYSTOLIC BLOOD PRESSURE: 160 MMHG | BODY MASS INDEX: 38.41 KG/M2 | OXYGEN SATURATION: 91 %

## 2025-01-07 DIAGNOSIS — Z53.9 ERRONEOUS ENCOUNTER--DISREGARD: Primary | ICD-10-CM

## 2025-01-07 DIAGNOSIS — I10 HYPERTENSION, UNSPECIFIED TYPE: ICD-10-CM

## 2025-01-07 DIAGNOSIS — I10 HYPERTENSION GOAL BP (BLOOD PRESSURE) < 140/90: Primary | ICD-10-CM

## 2025-01-07 PROCEDURE — 99282 EMERGENCY DEPT VISIT SF MDM: CPT

## 2025-01-07 RX ORDER — LOSARTAN POTASSIUM 25 MG/1
25 TABLET ORAL DAILY
Qty: 30 TABLET | Refills: 1 | Status: CANCELLED | OUTPATIENT
Start: 2025-01-07

## 2025-01-07 RX ORDER — AMLODIPINE BESYLATE 10 MG/1
10 TABLET ORAL DAILY
Qty: 30 TABLET | Refills: 1 | Status: SHIPPED | OUTPATIENT
Start: 2025-01-07

## 2025-01-07 RX ORDER — AMLODIPINE BESYLATE 5 MG/1
5 TABLET ORAL ONCE
Status: DISCONTINUED | OUTPATIENT
Start: 2025-01-07 | End: 2025-01-07

## 2025-01-07 ASSESSMENT — COLUMBIA-SUICIDE SEVERITY RATING SCALE - C-SSRS
1. IN THE PAST MONTH, HAVE YOU WISHED YOU WERE DEAD OR WISHED YOU COULD GO TO SLEEP AND NOT WAKE UP?: NO
6. HAVE YOU EVER DONE ANYTHING, STARTED TO DO ANYTHING, OR PREPARED TO DO ANYTHING TO END YOUR LIFE?: NO

## 2025-01-07 NOTE — ED NOTES
Bed: [facility]  Expected date:   Expected time:   Means of arrival: Walked  Comments:

## 2025-01-07 NOTE — TELEPHONE ENCOUNTER
Called patient to touch base after she was seen in the ED today. They advised her to take a second tablet of her 5 mg norvasc when she gets home. I think this is reasonable and plan to send in a script of 10 mg daily of norvasc which she will start tomorrow. I plan to call to check in with patient tomorrow to see how this is going, and since I'm off on Thurs/Fri this week, will send a message to our RNCC to check in with her later this week. Discussed return to ER precautions w/ patient who verbalizes understanding.     Radha Lemons PA-C  [facility] Cardiothoracic Surgery  Vocera or Secure Chat  January 7, 2025

## 2025-01-07 NOTE — ED PROVIDER NOTES
Emergency Department Encounter   NAME: Karyn Gamez ; AGE: 69 year old female ; YOB: 1955 ; MRN: [id number] ; PCP: Cristiana Davis   ED PROVIDER: Yvrose Simmons PA-C    Evaluation Date & Time:   No admission date for patient encounter.    CHIEF COMPLAINT:  Hypertension      Impression and Plan   MDM: Karyn Gamez is a 69 year old female who presents to the ED for evaluation of hypertension. Patient states she took her BP at home today after taking her medications, like she always does, and her BP was in the 170s so she called nurse triage who recommended she be seen in the ED. She denies any other symptoms; chest pain, SOB, abdominal pain, bowel/bladder changes, nausea or vomiting.      Patient is hypertensive at 160/85 and has a SpO2 of 91%, otherwise vitally normal, no acute distress, ambulating appropriately without dyspnea. Physical exam is significant for benign exam, normal heart and lung sounds, abdomen benign, neurologically intact.     Per chart review, patient was admitted to the hospital on 1/2 after a visit to the ED for hypertension but was coincidentally found to be hypoxic, after supplemental oxygen and observation was discharged from the hospital with SpO2 of 93%. No clear findings in patient chart as in to what is causing this.     Recommended labs and imaging for further evaluation of patient's hypoxia, which patient declines and asked to be discharged. Recommended taking another dose of her amlodipine at home to help with her blood pressure today and to schedule an appointment with her cardiology team for further follow up. Patient left AMA.     Return precautions to the ED were discussed, patient verbalized understanding.    Per chart review:  - See above  - Nurse triage line call stating SBPs in 170-190s, should be evaluated in the ED  - Recent labs and imaging reviewed  - Care everywhere reviewed    Medical Decision Making  Obtained supplemental history:Supplemental  history obtained?: No  Reviewed external records: External records reviewed?: Documented in chart  Care impacted by chronic illness:Documented in Chart  Did you consider but not order tests?: Work up considered but not performed and documented in chart, if applicable  Did you interpret images independently?: Independent interpretation of ECG and images noted in documentation, when applicable.  Consultation discussion with other provider:Did you involve another provider (consultant, MH, pharmacy, etc.)?: I discussed the care with another health care provider, see documentation for details.  Discharge. No recommendations on prescription strength medication(s). I considered admission, but discharged patient after significant clinical improvement. - AMA    MIPS:  Not Applicable    ED COURSE:  12:15 PM I met and introduced myself to the patient. I gathered initial history and performed my physical exam. We discussed plan for initial workup.   12:07 PM Spoke with Radha NEVES, from cardiac clinic who states she spoke with patient earlier today who stated her BP was in the 190s and she was having a headache, which prompted her to refer patient to ED.  12:55 PM I have staffed the patient with Dr. Bee, ED MD, who has evaluated the patient and agrees with all aspects of today's care.   1:22 PM I rechecked the patient and discussed results, discharge, follow up, and reasons to return to the ED.       FINAL IMPRESSION:    ICD-10-CM    1. Hypertension, unspecified type  I10             MEDICATIONS GIVEN IN THE EMERGENCY DEPARTMENT:  Medications - No data to display      NEW PRESCRIPTIONS STARTED AT TODAY'S ED VISIT:  Discharge Medication List as of 1/7/2025  1:39 PM            HPI   Use of Intrepreter: N/A     Karyn Gamez is a 69 year old female with a pertinent history of HTN, T2DM, depression, NSTEMI s/p CABG, CAD, atrial fibrillation, acute respiratory failure with hypoxia who presents to the ED by walk in for evaluation of  hypertension. Patient states she took her BP at home today after taking her medications, like she always does, and her BP was in the 170s so she called nurse triage who recommended she be seen in the ED. She denies any other symptoms; chest pain, SOB, abdominal pain, bowel/bladder changes, nausea or vomiting.      Patient called nurse triage line earlier today stating her BP was high and they recommended she be seen in the ED.       REVIEW OF SYSTEMS:  Pertinent positive and negative symptoms per HPI.       Medical History     Past Medical History:   Diagnosis Date    Cervicalgia 6/29/2005    Coronary artery disease     Depressive disorder, not elsewhere classified     Hypertension     Obesity, unspecified        Past Surgical History:   Procedure Laterality Date    ANGIOGRAM  2010    negative    COLONOSCOPY N/A 3/24/2023    Procedure: COLONOSCOPY, WITH HOT POLYPECTOMY AND RESEARCH BIOPSY;  Surgeon: Bret Velez MD;  [place] [facility] [place]    CORONARY ARTERY BYPASS GRAFT, WITH ENDOSCOPIC VESSEL PROCUREMENT N/A 11/18/2024    Procedure: CORONARY ARTERY BYPASS GRAFT TIMES FIVE, LEFT INTERNAL MAMMARY ARTERY HARVEST, RIGHT ENDOSCOPIC VESSEL PROCUREMENT,;  Surgeon: Mary Pennington MD;  Location: [facility] OR    CV CORONARY ANGIOGRAM N/A 11/8/2024    Procedure: Coronary Angiogram;  Surgeon: Emir Brand MD;  Location:  HEART CARDIAC CATH LAB    HYSTERECTOMY, PAP NO LONGER INDICATED      BSO    MIDLINE DOUBLE LUMEN PLACEMENT  11/24/2024    PROCURE ARTERY RADIAL  11/18/2024    Procedure: LEFT RADIAL ARTERY HARVEST;  Surgeon: Mary Pennington MD;  Location: [facility] OR    TRANSESOPHAGEAL ECHOCARDIOGRAM INTRAOPERATIVE N/A 11/18/2024    Procedure: ECHOCARDIOGRAM, TRANSESPOPHAGEAL, WITH ANESTHESIA,;  Surgeon: Mary Pennington MD;  Location: [facility] OR       Family History   Problem Relation Age of Onset    C.A.D. Father         first MI at 53, stenting x2    C.A.D. Mother         dx in her mid 50's    C.A.D.  "Brother         MI in mid-50's    Cerebrovascular Disease Brother         in late 50's       Social History     Tobacco Use    Smoking status: Former     Current packs/day: 0.00     Average packs/day: 0.5 packs/day for 30.0 years (15.0 ttl pk-yrs)     Types: Cigarettes     Start date: 1981     Quit date: 2011     Years since quittin.4     Passive exposure: Past    Smokeless tobacco: Never    Tobacco comments:     smoking 3-4 cigs per day   Vaping Use    Vaping status: Never Used   Substance Use Topics    Alcohol use: Yes     Comment: rarely    Drug use: No       acetaminophen (TYLENOL) 325 MG tablet  amLODIPine (NORVASC) 10 MG tablet  aspirin (ASA) 81 MG chewable tablet  atorvastatin (LIPITOR) 80 MG tablet  furosemide (LASIX) 20 MG tablet  metFORMIN (GLUCOPHAGE XR) 500 MG 24 hr tablet  metoprolol succinate ER (TOPROL XL) 100 MG 24 hr tablet  ondansetron (ZOFRAN) 4 MG tablet  potassium chloride travis ER (KLOR-CON M10) 10 MEQ CR tablet  senna-docusate (SENOKOT-S/PERICOLACE) 8.6-50 MG tablet  sertraline (ZOLOFT) 100 MG tablet  warfarin ANTICOAGULANT (COUMADIN) 1 MG tablet          Physical Exam     First Vitals:  Patient Vitals for the past 24 hrs:   BP Temp Pulse Resp SpO2 Height Weight   25 1128 (!) 160/85 98.6  F (37  C) 69 20 91 % 1.626 m (5' 4\") 102.1 kg (225 lb)         PHYSICAL EXAM:   Physical Exam  Constitutional:       General: She is not in acute distress.     Appearance: She is well-developed. She is not ill-appearing.   HENT:      Head: Normocephalic.      Nose: Nose normal. No congestion.      Mouth/Throat:      Mouth: Mucous membranes are moist.   Eyes:      Conjunctiva/sclera: Conjunctivae normal.   Cardiovascular:      Rate and Rhythm: Normal rate and regular rhythm.      Heart sounds: Normal heart sounds.   Pulmonary:      Effort: Pulmonary effort is normal.      Breath sounds: Normal breath sounds. No wheezing or rales.   Chest:      Chest wall: No tenderness.   Abdominal:      " General: Abdomen is flat.      Palpations: There is no mass.      Tenderness: There is no abdominal tenderness. There is no guarding.   Musculoskeletal:         General: No swelling.      Cervical back: Normal range of motion and neck supple. No rigidity or tenderness.      Right lower leg: No edema.      Left lower leg: No edema.   Skin:     General: Skin is warm.      Capillary Refill: Capillary refill takes less than 2 seconds.      Findings: No bruising or erythema.   Neurological:      General: No focal deficit present.      Mental Status: She is alert and oriented to person, place, and time.   Psychiatric:         Mood and Affect: Mood normal.         Behavior: Behavior normal.             Results     LAB:  All pertinent labs reviewed and interpreted  Labs Ordered and Resulted from Time of ED Arrival to Time of ED Departure - No data to display    RADIOLOGY:  No orders to display           Yvrose Simmons PA-C   Emergency Medicine   [facility] EMERGENCY DEPARTMENT       Yvrose Simmons PA-C  01/07/25 2009

## 2025-01-07 NOTE — PROGRESS NOTES
ANTICOAGULATION  MANAGEMENT: Discharge Review    Karyn Gamez chart reviewed for anticoagulation continuity of care    Emergency room visit on 1/7/25 for Hypertension. Patient stated she was sent to the ED from nurse triage for hypertension. She had a systolic pressure in the 180's this morning and told the triage nurse she was having a headache when she had the high pressure. Currently she is not having any symptoms and is now saying she never had a headache. Recommended additional dose of her amlodipine today. I also recommended further evaluation of her hypoxia however patient has declined and will be discharged AMA     Discharge disposition: Home    Results:    Recent labs: (last 7 days)     01/02/25  1606 01/03/25  0532   INR 2.06* 2.06*     Anticoagulation inpatient management:     not applicable     Anticoagulation discharge instructions:     Warfarin dosing: home regimen continued   Bridging: No   INR goal change: No      Medication changes affecting anticoagulation: No    Additional factors affecting anticoagulation: No     PLAN     No adjustment to anticoagulation plan needed    My chart sent to patient. INR was due today.    No adjustment to Anticoagulation Calendar was required    Kumar BALLESTEROS RN  1/7/2025  Anticoagulation Clinic  [facility] [place] for routing messages: hernan ZAMORA  [facility] patient phone line: [phone]

## 2025-01-07 NOTE — ED TRIAGE NOTES
Patient with history of HTN and is on medications. Here today for increased blood pressure. Normally / and currently at 170/. No pain.

## 2025-01-07 NOTE — ED PROVIDER NOTES
Emergency Department Midlevel Supervisory Note     I personally saw the patient and performed a substantive portion of the visit including all aspects of the medical decision making.    ED Course:  12:55 PM Yvrose Box PA-C staffed patient with me. I agree with their assessment and plan of management, and I will see the patient.  1:22 PM I met with the patient to introduce myself, gather additional history, perform my initial exam, and discuss the plan.     Brief HPI:     Karyn Gamez is a 69 year old female who presents for evaluation of hypertension.     Patient stated she was sent to the ED from nurse triage for hypertension. She had a systolic pressure in the 180's this morning and told the triage nurse she was having a headache when she had the high pressure. Currently she is not having any symptoms and is now saying she never had a headache. Feels completely at her baseline and denies any shortness of breath.     I, Myra Booth, am serving as a scribe to document services personally performed by Bin Bee MD, based on my observations and the provider's statements to me.   I, Bin Bee MD, attest that Myra Booth was acting in a scribe capacity, has observed my performance of the services and has documented them in accordance with my direction.    Brief Physical Exam:  Constitutional:  Alert, in no acute distress  EYES: Conjunctivae clear  HENT:  Atraumatic, normocephalic  Respiratory: Unlabored  Cardiovascular: Good color  Musculoskeletal: Full function       MDM:  Patient referred to emergency department for evaluation of elevated blood pressure.  She has a history of hypertension and was recently admitted for workup of hypoxia.  Treatment and evaluation discussed the patient.  Recommended additional dose of her amlodipine today.  I also recommended further evaluation of her hypoxia however patient has declined and will be discharged AMA       No diagnosis found.      Bin Bee MD  [facility]  [facility] EMERGENCY DEPARTMENT  [address] [place] [id number]  [phone]       Bin Bee MD  01/07/25 1338

## 2025-01-07 NOTE — TELEPHONE ENCOUNTER
Patient called with SBP's 170's-190's. Spoke with CVTS who agreed patient needs to be evaluated in the ED. CVTS will reach out to ED to discuss better blood pressure management. Patient verbalized understanding and will find a ride to ED.

## 2025-01-08 ENCOUNTER — LAB (OUTPATIENT)
Dept: LAB | Facility: CLINIC | Age: 70
End: 2025-01-08
Payer: COMMERCIAL

## 2025-01-08 ENCOUNTER — ANTICOAGULATION THERAPY VISIT (OUTPATIENT)
Dept: ANTICOAGULATION | Facility: CLINIC | Age: 70
End: 2025-01-08

## 2025-01-08 ENCOUNTER — TELEPHONE (OUTPATIENT)
Dept: FAMILY MEDICINE | Facility: CLINIC | Age: 70
End: 2025-01-08

## 2025-01-08 DIAGNOSIS — I48.91 POSTOPERATIVE ATRIAL FIBRILLATION (H): ICD-10-CM

## 2025-01-08 DIAGNOSIS — Z95.1 S/P CABG (CORONARY ARTERY BYPASS GRAFT): Primary | ICD-10-CM

## 2025-01-08 DIAGNOSIS — I97.89 POSTOPERATIVE ATRIAL FIBRILLATION (H): ICD-10-CM

## 2025-01-08 DIAGNOSIS — Z95.1 S/P CABG (CORONARY ARTERY BYPASS GRAFT): ICD-10-CM

## 2025-01-08 LAB — INR BLD: 2.5 (ref 0.9–1.1)

## 2025-01-08 PROCEDURE — 85610 PROTHROMBIN TIME: CPT

## 2025-01-08 PROCEDURE — 36416 COLLJ CAPILLARY BLOOD SPEC: CPT

## 2025-01-08 NOTE — PROGRESS NOTES
ANTICOAGULATION MANAGEMENT     Karyn Gamez 69 year old female is on warfarin with therapeutic INR result. (Goal INR 2.0-3.0)    Recent labs: (last 7 days)     01/08/25  1041   INR 2.5*       ASSESSMENT     Warfarin Lab Questionnaire    Warfarin Doses Last 7 Days      1/8/2025    10:34 AM   Dose in Tablet or Mg   TAB or MG? milligram (mg)     Pt Rptd Dose SUNDAY MONDAY TUESDAY WED THURS FRIDAY SATURDAY 1/8/2025  10:34 AM 2 2 2 2 2 2 2         1/8/2025   Warfarin Lab Questionnaire   Missed doses within past 14 days? No   Changes in diet or alcohol within past 14 days? No   Medication changes since last result? No- amio was to be stopped 1/1/25? Need to verify.   Injuries or illness since last result? No   New shortness of breath, severe headaches or sudden changes in vision since last result? No   Abnormal bleeding since last result? No   Upcoming surgery, procedure? No   Best number to call with results? [phone]     Previous result: Therapeutic last visit; previously outside of goal range  Additional findings: None       PLAN     Recommended plan for ongoing change(s) affecting INR     Dosing Instructions: Continue your current warfarin dose with next INR in 1 week       Summary  As of 1/8/2025      Full warfarin instructions:  2 mg every day   Next INR check:  1/15/2025               Detailed voice message left for Karyn with dosing instructions and follow up date.     Contact [phone] to schedule and with any changes, questions or concerns.     Education provided: Please call back if any changes to your diet, medications or how you've been taking warfarin    Plan made with [facility] Pharmacist Laya Dasilva RN  1/8/2025  Anticoagulation Clinic  [facility] for routing messages: hernan ZAMORA  [facility] patient phone line: [phone]        _______________________________________________________________________     Anticoagulation Episode Summary       Current INR goal:  2.0-3.0   TTR:   63.4% (3.7 wk)   Target end date:  Indefinite   Send INR reminders to:  ERASMO ZAMORA    Indications    S/P CABG (coronary artery bypass graft) [Z95.1]  Postoperative atrial fibrillation (H) [I97.89  I48.91]             Comments:  --             Anticoagulation Care Providers       Provider Role Specialty Phone number    Ly Atwood PA-C Referring Cardiovascular & Thoracic Surgery [phone]    Cristiana Davis MD Referring Family Medicine [phone]

## 2025-01-08 NOTE — TELEPHONE ENCOUNTER
Patient Returning Call    Reason for call:  Pt is returning a call    Information relayed to patient:      Patient has additional questions:        Could we send this information to you in [employer] or would you prefer to receive a phone call?:   Patient would prefer a phone call   Okay to leave a detailed message?: Yes at Cell number on file:    Telephone Information:   Mobile [phone]

## 2025-01-08 NOTE — TELEPHONE ENCOUNTER
Return calls today [phone] (internal staff only), Owen Mcgowan.     Linda Dasilva, BSN, RN

## 2025-01-09 ENCOUNTER — MYC MEDICAL ADVICE (OUTPATIENT)
Dept: FAMILY MEDICINE | Facility: CLINIC | Age: 70
End: 2025-01-09
Payer: COMMERCIAL

## 2025-01-09 ENCOUNTER — TELEPHONE (OUTPATIENT)
Dept: CARDIOLOGY | Facility: CLINIC | Age: 70
End: 2025-01-09
Payer: COMMERCIAL

## 2025-01-09 DIAGNOSIS — I42.2 HYPERTENSIVE HYPERTROPHIC CARDIOMYOPATHY, WITHOUT HEART FAILURE (H): ICD-10-CM

## 2025-01-09 DIAGNOSIS — J96.01 ACUTE RESPIRATORY FAILURE WITH HYPOXIA (H): ICD-10-CM

## 2025-01-09 DIAGNOSIS — I11.9 HYPERTENSIVE HYPERTROPHIC CARDIOMYOPATHY, WITHOUT HEART FAILURE (H): ICD-10-CM

## 2025-01-09 DIAGNOSIS — R06.83 SNORING: Primary | ICD-10-CM

## 2025-01-10 PROBLEM — J96.01 ACUTE RESPIRATORY FAILURE WITH HYPOXIA (H): Status: RESOLVED | Noted: 2025-01-02 | Resolved: 2025-01-10

## 2025-01-10 PROBLEM — E11.8 TYPE 2 DIABETES MELLITUS WITH COMPLICATION, WITHOUT LONG-TERM CURRENT USE OF INSULIN (H): Status: RESOLVED | Noted: 2020-01-28 | Resolved: 2025-01-10

## 2025-01-13 ENCOUNTER — PATIENT OUTREACH (OUTPATIENT)
Dept: CARE COORDINATION | Facility: CLINIC | Age: 70
End: 2025-01-13

## 2025-01-13 ENCOUNTER — TELEPHONE (OUTPATIENT)
Dept: FAMILY MEDICINE | Facility: CLINIC | Age: 70
End: 2025-01-13

## 2025-01-13 DIAGNOSIS — J96.01 ACUTE RESPIRATORY FAILURE WITH HYPOXIA (H): Primary | ICD-10-CM

## 2025-01-13 DIAGNOSIS — Z95.1 S/P CABG (CORONARY ARTERY BYPASS GRAFT): ICD-10-CM

## 2025-01-13 RX ORDER — POTASSIUM CHLORIDE 750 MG/1
10 TABLET, EXTENDED RELEASE ORAL DAILY
Qty: 90 TABLET | Refills: 0 | Status: SHIPPED | OUTPATIENT
Start: 2025-01-13

## 2025-01-13 NOTE — TELEPHONE ENCOUNTER
Potassium prescription with refill sent to pharmacy.  Please inform patient.    Calin Daugherty MD

## 2025-01-13 NOTE — TELEPHONE ENCOUNTER
Medication Question or Refill    Contacts       Contact Date/Time Type Contact Phone/Fax    01/13/2025 01:22 PM CST Phone (Incoming) Karyn Gamez (Self) [phone] (M)            What medication are you calling about (include dose and sig)?: Potassium    Preferred Pharmacy:   [id number] [place], [place] - [address] [place] [address]  AT [place] [address]  [address]  [place] [address]  Phone: [phone] Fax: [phone]      Controlled Substance Agreement on file:   CSA -- Patient Level:    CSA: None found at the patient level.       Who prescribed the medication?: Dr. Modi    Do you need a refill? Yes    When did you use the medication last? This morning, 1/13/2025.    Patient offered an appointment? No    Do you have any questions or concerns?  No. Patient is waiting for renewal for potasium at pharmacy. Need Dr to verify that it's okay for patient to get prescription at the pharmacy.       Could we send this information to you in [employer] or would you prefer to receive a phone call?:   Patient would like to be contacted via [employer]

## 2025-01-14 ENCOUNTER — LAB (OUTPATIENT)
Dept: LAB | Facility: CLINIC | Age: 70
End: 2025-01-14
Payer: COMMERCIAL

## 2025-01-14 ENCOUNTER — ANTICOAGULATION THERAPY VISIT (OUTPATIENT)
Dept: ANTICOAGULATION | Facility: CLINIC | Age: 70
End: 2025-01-14

## 2025-01-14 DIAGNOSIS — I97.89 POSTOPERATIVE ATRIAL FIBRILLATION (H): ICD-10-CM

## 2025-01-14 DIAGNOSIS — I48.91 POSTOPERATIVE ATRIAL FIBRILLATION (H): ICD-10-CM

## 2025-01-14 DIAGNOSIS — Z95.1 S/P CABG (CORONARY ARTERY BYPASS GRAFT): Primary | ICD-10-CM

## 2025-01-14 LAB — INR BLD: 2 (ref 0.9–1.1)

## 2025-01-14 PROCEDURE — 85610 PROTHROMBIN TIME: CPT

## 2025-01-14 PROCEDURE — 36416 COLLJ CAPILLARY BLOOD SPEC: CPT

## 2025-01-14 NOTE — PROGRESS NOTES
ANTICOAGULATION MANAGEMENT     Karyn Gamez 69 year old female is on warfarin with therapeutic INR result. (Goal INR 2.0-3.0)    Recent labs: (last 7 days)     01/14/25  1055   INR 2.0*       ASSESSMENT     Warfarin Lab Questionnaire    Warfarin Doses Last 7 Days      1/14/2025    10:51 AM   Dose in Tablet or Mg   TAB or MG? tablet (tab)     Pt Rptd Dose SUNDAY MONDAY TUESDAY WED THURS FRIDAY SATURDAY 1/14/2025  10:51 AM 2 2 2 2 2 2 2         1/14/2025   Warfarin Lab Questionnaire   Missed doses within past 14 days? No   Changes in diet or alcohol within past 14 days? No   Medication changes since last result? No--started on Keflex x 7 days on 1/10/25 and lasix    Injuries or illness since last result? No   New shortness of breath, severe headaches or sudden changes in vision since last result? No   Abnormal bleeding since last result? No   Upcoming surgery, procedure? No   Best number to call with results? [phone]     Previous result: Therapeutic last 2(+) visits  Additional findings: None        PLAN     Recommended plan for ongoing change(s) affecting INR     Dosing Instructions: Increase your warfarin dose (14% change) with next INR in 1 week       Summary  As of 1/14/2025      Full warfarin instructions:  3 mg every Tue, Fri; 2 mg all other days   Next INR check:  1/21/2025               Telephone call with Karyn who verbalizes understanding and agrees to plan    Lab visit scheduled    Education provided: Goal range and lab monitoring: goal range and significance of current result  Interaction IS anticipated between warfarin and stopping amiodarone  Contact [phone] with any changes, questions or concerns.     Plan made with [facility] Pharmacist Laya Sebastian, RN  1/14/2025  Anticoagulation Clinic  [employer] for routing messages: hernan EDWARDS  [facility] patient phone line: [phone]        _______________________________________________________________________      Anticoagulation Episode Summary       Current INR goal:  2.0-3.0   TTR:  70.2% (1.1 mo)   Target end date:  Indefinite   Send INR reminders to:  ERASMO RILEYMIGUEL ÁNGEL EDWARDS    Indications    S/P CABG (coronary artery bypass graft) [Z95.1]  Postoperative atrial fibrillation (H) [I97.89  I48.91]             Comments:  --             Anticoagulation Care Providers       Provider Role Specialty Phone number    Ly Atwood PA-C Referring Cardiovascular & Thoracic Surgery [phone]    Cristiana Davis MD Referring Family Medicine [phone]

## 2025-01-15 ENCOUNTER — HOSPITAL ENCOUNTER (OUTPATIENT)
Dept: CARDIAC REHAB | Facility: HOSPITAL | Age: 70
Discharge: HOME | End: 2025-01-15
Attending: STUDENT IN AN ORGANIZED HEALTH CARE EDUCATION/TRAINING PROGRAM
Payer: COMMERCIAL

## 2025-01-15 DIAGNOSIS — Z95.1 S/P CABG (CORONARY ARTERY BYPASS GRAFT): ICD-10-CM

## 2025-01-15 PROCEDURE — 93798 PHYS/QHP OP CAR RHAB W/ECG: CPT

## 2025-01-15 PROCEDURE — 93797 PHYS/QHP OP CAR RHAB WO ECG: CPT

## 2025-01-20 ENCOUNTER — HOSPITAL ENCOUNTER (OUTPATIENT)
Dept: CARDIAC REHAB | Facility: HOSPITAL | Age: 70
Discharge: HOME | End: 2025-01-20
Attending: INTERNAL MEDICINE
Payer: COMMERCIAL

## 2025-01-20 PROCEDURE — 93798 PHYS/QHP OP CAR RHAB W/ECG: CPT

## 2025-01-21 ENCOUNTER — TELEPHONE (OUTPATIENT)
Dept: FAMILY MEDICINE | Facility: CLINIC | Age: 70
End: 2025-01-21

## 2025-01-21 ENCOUNTER — LAB (OUTPATIENT)
Dept: LAB | Facility: CLINIC | Age: 70
End: 2025-01-21
Payer: COMMERCIAL

## 2025-01-21 ENCOUNTER — ANTICOAGULATION THERAPY VISIT (OUTPATIENT)
Dept: ANTICOAGULATION | Facility: CLINIC | Age: 70
End: 2025-01-21

## 2025-01-21 DIAGNOSIS — I48.91 POSTOPERATIVE ATRIAL FIBRILLATION (H): ICD-10-CM

## 2025-01-21 DIAGNOSIS — Z95.1 S/P CABG (CORONARY ARTERY BYPASS GRAFT): Primary | ICD-10-CM

## 2025-01-21 DIAGNOSIS — I97.89 POSTOPERATIVE ATRIAL FIBRILLATION (H): ICD-10-CM

## 2025-01-21 LAB — INR BLD: 1.7 (ref 0.9–1.1)

## 2025-01-21 PROCEDURE — 85610 PROTHROMBIN TIME: CPT

## 2025-01-21 PROCEDURE — 36416 COLLJ CAPILLARY BLOOD SPEC: CPT

## 2025-01-21 NOTE — TELEPHONE ENCOUNTER
FYI - Status Update    Who is Calling: patient    Update: Pt returning call to INR nurse Davina    Does caller want a call/response back: Yes     Could we send this information to you in [employer] or would you prefer to receive a phone call?:   Patient would prefer a phone call   Okay to leave a detailed message?: Yes at Home number on file [phone] (home)

## 2025-01-21 NOTE — PROGRESS NOTES
ANTICOAGULATION MANAGEMENT     Karyn Gamez 69 year old female is on warfarin with subtherapeutic INR result. (Goal INR 2.0-3.0)    Recent labs: (last 7 days)     01/21/25  0924   INR 1.7*       ASSESSMENT     Warfarin Lab Questionnaire    Warfarin Doses Last 7 Days      1/21/2025     8:33 AM   Dose in Tablet or Mg   TAB or MG? - 1mg warfarin tabs tablet (tab)     Pt Rptd Dose SUNDAY MONDAY TUESDAY WED THURS FRIDAY SATURDAY 1/21/2025   8:33 AM 2 2 3 2 2 3 2         1/21/2025   Warfarin Lab Questionnaire   Missed doses within past 14 days? No - no missed doses of warfarin.  She uses a pill box.     Changes in diet or alcohol within past 14 days? No - reported this week ate less Vitamin-K foods.     Medication changes since last result? No - a review of her meds - Amiodarone 200mg 2x/day was done and stopped on 1/1/25, could be reason INR is  low today.        - Yes.  Weekly warfarin dose was increased by 14% on 1/14/25.         - yes.  Completed Cephalexin 500mg 2x/day for 7 days, from 1/10 - 17.     Injuries or illness since last result? No  - 1/10/25 OV for follow-up open wound lower left leg after CABG x5 grafts surgery in 11/18/24     New shortness of breath, severe headaches or sudden changes in vision since last result? No   Abnormal bleeding since last result? No   Upcoming surgery, procedure? No     Best number to call with results? [phone]     Previous result: Therapeutic last 3 INR visits.    Additional findings:  NOTE:  going on vacation on 1/25 - 2/1.       PLAN     Recommended plan for temporary change(s) and ongoing change(s) affecting INR     Dosing Instructions: booster dose then Increase your warfarin dose (18.8% change) with next INR in 3 days       Summary  As of 1/21/2025      Full warfarin instructions:  1/21: 5 mg; Otherwise 2 mg every Mon, Fri; 3 mg all other days   Next INR check:  2/4/2025               Telephone call with Karyn who verbalizes understanding and agrees to  plan    Lab visit scheduled - INR on 1/24/25 during Cardiology appt @ [facility].    Education provided: Taking warfarin: Importance of taking warfarin as instructed  Goal range and lab monitoring: goal range and significance of current result  Dietary considerations: importance of consistent vitamin K intake  Interaction IS anticipated between warfarin and Amiodarone    Plan made per [facility] anticoagulation protocol    Davina Galarza RN  1/21/2025  Anticoagulation Clinic  [employer] [facility] for routing messages: p ERASMO EDWARDS  [facility] patient phone line: [phone]        _______________________________________________________________________     Anticoagulation Episode Summary       Current INR goal:  2.0-3.0   TTR:  57.8% (1.3 mo)   Target end date:  Indefinite   Send INR reminders to:  ERASMO EDWARDS    Indications    S/P CABG (coronary artery bypass graft) [Z95.1]  Postoperative atrial fibrillation (H) [I97.89  I48.91]             Comments:  --             Anticoagulation Care Providers       Provider Role Specialty Phone number    Ly Atwood PA-C Referring Cardiovascular & Thoracic Surgery [phone]    Cristiana Davis MD Referring Family Medicine [phone]

## 2025-01-22 ENCOUNTER — HOSPITAL ENCOUNTER (OUTPATIENT)
Dept: CARDIAC REHAB | Facility: HOSPITAL | Age: 70
Discharge: HOME | End: 2025-01-22
Attending: INTERNAL MEDICINE
Payer: COMMERCIAL

## 2025-01-22 DIAGNOSIS — Z95.1 S/P CABG (CORONARY ARTERY BYPASS GRAFT): ICD-10-CM

## 2025-01-22 DIAGNOSIS — I97.89 POSTOPERATIVE ATRIAL FIBRILLATION (H): Primary | ICD-10-CM

## 2025-01-22 DIAGNOSIS — I48.91 POSTOPERATIVE ATRIAL FIBRILLATION (H): Primary | ICD-10-CM

## 2025-01-22 DIAGNOSIS — Z79.01 LONG TERM (CURRENT) USE OF ANTICOAGULANTS: ICD-10-CM

## 2025-01-22 PROCEDURE — 93798 PHYS/QHP OP CAR RHAB W/ECG: CPT

## 2025-01-22 RX ORDER — WARFARIN SODIUM 1 MG/1
TABLET ORAL
Qty: 235 TABLET | Refills: 1 | Status: SHIPPED | OUTPATIENT
Start: 2025-01-22

## 2025-01-23 ENCOUNTER — TELEPHONE (OUTPATIENT)
Dept: CARDIOLOGY | Facility: CLINIC | Age: 70
End: 2025-01-23

## 2025-01-23 ENCOUNTER — ORDERS ONLY (AUTO-RELEASED) (OUTPATIENT)
Dept: CARDIOLOGY | Facility: CLINIC | Age: 70
End: 2025-01-23

## 2025-01-23 ENCOUNTER — OFFICE VISIT (OUTPATIENT)
Dept: CARDIOLOGY | Facility: CLINIC | Age: 70
End: 2025-01-23
Attending: INTERNAL MEDICINE
Payer: COMMERCIAL

## 2025-01-23 VITALS
HEIGHT: 64 IN | RESPIRATION RATE: 18 BRPM | WEIGHT: 229 LBS | DIASTOLIC BLOOD PRESSURE: 60 MMHG | BODY MASS INDEX: 39.09 KG/M2 | SYSTOLIC BLOOD PRESSURE: 124 MMHG | HEART RATE: 58 BPM

## 2025-01-23 DIAGNOSIS — T81.89XD NON-HEALING SURGICAL WOUND, SUBSEQUENT ENCOUNTER: ICD-10-CM

## 2025-01-23 DIAGNOSIS — I50.33 ACUTE ON CHRONIC HEART FAILURE WITH PRESERVED EJECTION FRACTION (H): ICD-10-CM

## 2025-01-23 DIAGNOSIS — I50.9 ACUTE DECOMPENSATED HEART FAILURE (H): ICD-10-CM

## 2025-01-23 DIAGNOSIS — I50.9 ACUTE DECOMPENSATED HEART FAILURE (H): Primary | ICD-10-CM

## 2025-01-23 DIAGNOSIS — I50.33 ACUTE ON CHRONIC HEART FAILURE WITH PRESERVED EJECTION FRACTION (H): Primary | ICD-10-CM

## 2025-01-23 DIAGNOSIS — I51.7 HYPERTROPHIC CARDIOMEGALY: ICD-10-CM

## 2025-01-23 LAB
ANION GAP SERPL CALCULATED.3IONS-SCNC: 7 MMOL/L (ref 7–15)
BUN SERPL-MCNC: 26.7 MG/DL (ref 8–23)
CALCIUM SERPL-MCNC: 9.9 MG/DL (ref 8.8–10.4)
CHLORIDE SERPL-SCNC: 104 MMOL/L (ref 98–107)
CREAT SERPL-MCNC: 0.73 MG/DL (ref 0.51–0.95)
EGFRCR SERPLBLD CKD-EPI 2021: 89 ML/MIN/1.73M2
GLUCOSE SERPL-MCNC: 102 MG/DL (ref 70–99)
HCO3 SERPL-SCNC: 30 MMOL/L (ref 22–29)
NT-PROBNP SERPL-MCNC: 1185 PG/ML (ref 0–900)
POTASSIUM SERPL-SCNC: 4.1 MMOL/L (ref 3.4–5.3)
SODIUM SERPL-SCNC: 141 MMOL/L (ref 135–145)

## 2025-01-23 RX ORDER — SPIRONOLACTONE 25 MG/1
12.5 TABLET ORAL DAILY
Qty: 45 TABLET | Refills: 2 | Status: SHIPPED | OUTPATIENT
Start: 2025-01-23

## 2025-01-23 NOTE — TELEPHONE ENCOUNTER
----- Message from Maria Elena Yancey sent at 1/23/2025  2:15 PM CST -----  Stable BMP. BNP trending down (was previously 3547 on 1/2). Offer patient to start on low dose spironolactone 12.5 mg daily as adjunct to help with volume management and thus also her blood pressure. Remind her to maintain adequate fluid intake. Recheck BMP in ~2-weeks.    It is recommended that she remain on lasix at this time. Recently when trying to cut back, she began to retain fluid. Tell her to call for s/sx of fluid rentention.

## 2025-01-23 NOTE — PROGRESS NOTES
[facility]  [address], [place] [address]  Office: [phone]     Assessment/Recommendations   Assessment: Ms. Gamez presents to [facility] today for post hospitalization heart failure visit.    # Hypertrophic cardiomyopathy  # RV dysfunction  # Heart failure with preserved ejection fraction (EF 65-70% per echo 1/3/2025)  Stage C. NYHA Class II.  Most recent echo (1/3/2025) findings are consistent with hypertrophic cardiomyopathy with asymmetric  left ventricular hypertrophy with septum measuring 1.6 cm and a resting left ventricular outflow gradient of 34 mmHg which increases to 57 mmHg with Valsalva, LVEF 65-70% and mild RV dysfunction. Her weight on the clinic scale today is today is 229 lbs which is the same as her weight on her home scale this AM. Upon exam, patient is well-compensated.     BP: controlled   Fluid status: near euvolemic; Diuretic: Lasix 20 mg w/KCL 10 mEq  Aldosterone antagonist: deferred while other medical therapy is prioritized  SGLT2i:  deferred while other medical therapy is prioritized  Ischemia evaluation: significant multivessel disease per coronary angiogram 11/2024 s/p CABG 11/18/2024  NSAID use: contraindicated  -Sleep apnea evaluation: not discussed today  -Metoprolol succinate 100 mg for HCM  -Repeat TTE every 1-2 years to assess degree of hypertrophy, dynamic LVOT obstruction, MR, and LVEF; earlier if symptoms arise.   -Zio monitor ordered today to assess for Afib/ventricular arrhythmias  -ICD consideration for HCM, resting left ventricular outflow gradient of 34 mmHg which increases to 57 mmHg with  Valsalva.  -Introduced family screening/genetic testing    Heart failure education including medication compliance and lifestyle management discussed today: low sodium diet <2g Na/day, fluid intake <2L/day, daily weight monitoring, and physical activity as tolerated. Additionally, patient met with the HF  CORE nurse clinician, LORELEI Su, for in depth heart failure teaching.    # Valvular heart disease  -Mild (1+) mitral regurgitation per echo 1/3/2025    # Hypertension  -BP today 124/60, HR 58  -Amlodipine 10 mg (low dose initially prescribed for radial artery graft protection, has been increased to help managed BP, caution may worsen LVOT)  -Metoprolol succinate 100 mg    # Coronary artery disease s/p CABG x5 11/18/2024  # Dyslipidemia  -Amlodipine for radial artery graft protection, goal to maintain for at least 6-months post op (caution may worsen LVOT)  -Denies chest pain and anginal equivalents  -Antiplatelet therapy with ASA 81 mg for secondary ASCVD prevention  -LDL goal <70; high intensity statin therapy with atorvastatin 80 mg for secondary ASCVD prevention    # Postoperative Atrial fib  -Onset: postoperative CABG 11/2024  -Interventions: Initially on amiodarone during hospitalization since discontinued 12/31, continues on metoprolol succinate 100 mg   -Rate/rhythm control: Metoprolol succinate 100 mg  -[id number] score of 6 for female sex, age 65-74 and h/o HTN, CHF, vascular disease, DM  -Continue warfarin for stroke prophylaxis-- EP to review switch to DOAC  *In patients with HCM and subclinical AF detected by internal or external cardiac device or monitor of >24 hours  duration for a given episode, anticoagulation is recommended with DOACs as first-line option and vitamin K antagonists as second-line option, independent of [id number] score  -EP follow-up scheduled 2/4    # Obesity  -Body mass index is 39.31 kg/m .    # Diabetes Mellitus II  -Most recent A1C 6.0 (11/7/2024)  -Managed by PCP    # Non-healing surgical wound  -R medial knee leg graft site, scabbed/non-healed, no drainage, mild erythema surrounding  -Patient has completed 2 courses of keflex   -Continue to keep clean and monitor, if worsening would defer to CV Surgery team for further recs       Plan:  -BMP and NtproBNP today, consider  increasing diuretic or adjunct like MRA for volume management  -Zio patch     Follow up with General Cardiology, Dr. Jerez- scheduled 1/24- further discuss need for family screening/genetic testing, review if indication for ICD  Follow up with EP- scheduled 2/4.  Follow up in the Heart Failure Clinic with NANCY in ~3-months.       The longitudinal plan of care for the condition(s) below were addressed during this visit. Due to the added complexity in care, I will continue to support Ms. Gamez in the subsequent management of this condition(s) and with the ongoing continuity of care of this condition(s): HFpEF.     History of Present Illness/Subjective    Ms. Gamez is a 69 year old female with a past medical history significant for HCM, heart failure with preserved ejection fraction, HTN, HLD, depression, CKD, multivessel CAD s/p CABG. Today patient presents to [facility] for post hospitalization heart failure visit.     Hospitalized, [facility] 11/18-12/3/204. Coronary angiogram 11/2024 demonstrated severe multi-vessel coronary artery disease. She is now s/p CABG x5 on 11/18/2024. She had several episodes of post-operative Afib and was discharged on amiodarone and warfarin and metoprolol. Patient had moderate bilateral lower extremity edema and is weight up (~10lb) at discharge; due to this she was sent home on lasix therapy. On amlodipine 2.5 mg for graft site protection. Discharge from hospital weight was 244 lbs     -CV Surgery follow-up 12/10: trialed off of lasix.  -12/16 lasix resumed by CV Surgery, pt reported elevated BP. She was also instructed to increase metoprolol to 200 mg daily  -12/26 PCP decreased metoprolol succinate to 150 mg, SR HR 50  -ED visit 1/2-1/3 for elevated BP,  and hypoxia. SB 40s, metoprolol decreased to 100 mg. It was suspected that hypoxia 2/2 mild CHF exacerbation. NtproBNP elevated to Amlodipine increased to 5 mg (from 2.5).   -ED visit 1/7-  "Elevated SBP 180s, Amlodipine was increased to 10 mg    Today, patient endorses a few episodes of palpitations that last only a few seconds. She has mild lower extremity edema and abdominal bloating that is present and has improved. She continues to have shortness of breath with exertion and decreased activity tolerance, this is also improving. She has been attending cardiac rehab to and improving strength and endurance. Her weight on her home scale this AM was 229 lbs. She reports that she has been as low as 220 lbs after surgery.     She denies chest pain, palpitations, lightheadedness/dizziness, shortness of breath at rest, orthopnea, PND, and bleeding.     She reports strong family history of cardiac disease. Her father and brother have had coronary artery bypass surgeries. She has another brother more estranged from the family with history of \"cardiac problems\".     Patient has a trip planned to [place] coming up and she will be gone for 1-week.       Recent test results & labs below (personally reviewed):    Echocardiogram 1/3/2025  Interpretation Summary  The echo findings are consistent with hypertrophic cardiomyopathy. Asymmetric  left ventricular hypertrophy with septum measuring 1.6 cm. There is a resting  left ventricular outflow gradient of 34 mmHg which increases to 57 mmHg with  Valsalva.  The visual ejection fraction is 65-70% without wall motion abnormality.  Mildly decreased right ventricular systolic function  There is mild (1+) mitral regurgitation.  There is systolic anterior motion of the mitral valve.  There is trace to mild tricuspid regurgitation. No pulmonary HTN.  Compared to the prior study dated 11/20/2024, there have been no changes.    Coronary Angiogram 11/8/2024  Conclusion    Ramus lesion is 40% stenosed.    Prox Cx lesion is 95% stenosed.    Mid Cx lesion is 30% stenosed.    Dist LAD-2 lesion is 70% stenosed.    Mid LAD to Dist LAD lesion is 70% stenosed.    3rd Diag lesion is 70% " "stenosed.    Mid LAD lesion is 80% stenosed.    2nd Diag lesion is 90% stenosed.    Dist LAD-1 lesion is 80% stenosed.    Prox LAD to Mid LAD lesion is 30% stenosed.    Dist RCA lesion is 99% stenosed.    Mid RCA lesion is 40% stenosed.    Prox RCA lesion is 40% stenosed.    RPDA lesion is 80% stenosed.  1.significant three-vessel CAD  Plan   Follow bedrest per protocol   Return to the primary inpatient team for further evaluation and managmenet  1.continue therapeutic anticoagulation with heparin drip or Lovenox for ACS  2.continue aspirin  3.consult cardiothoracic surgery service for CABG     Cardiac MRI 11/13/2024  CONCLUSIONS:   Asymmetric septal hypertrophy with maximal wall thickness 2.0 cm. Systolic anterior motion of the mitral  valve is present. There is apical displacement of the papillary muscles. LGE imaging shows mid-myocardial  enhancement in a non-ischemic pattern. Collectively, these findings suggest obstructive hypertrophic  cardiomyopathy. Consider genetic testing for HCM.   Normal biventricular systolic function, LVEF 74%, RVEF 70%.     Physical Examination Review of Systems   /60 (BP Location: Left arm, Patient Position: Sitting, Cuff Size: Adult Large)   Pulse 58   Resp 18   Ht 1.626 m (5' 4\")   Wt 103.9 kg (229 lb)   LMP  (LMP Unknown)   BMI 39.31 kg/m    Body mass index is 39.31 kg/m .  Wt Readings from Last 3 Encounters:   01/23/25 103.9 kg (229 lb)   01/10/25 103.1 kg (227 lb 6 oz)   01/07/25 102.1 kg (225 lb)     General Appearance:   Appears comfortable, in no acute distress   HEENT: Eyes symmetrical, no discharge or icterus bilaterally. Mucous membranes moist and without lesions   Cardiovascular: RRR, +S1S2, no murmur, rub, or gallop. JVP not visible at 90 degrees      Respiratory:   Respirations regular, even, and unlabored. Lungs CTA throughout   GI:  Soft and non distended   Musculoskeletal: No joint swelling or tenderness   Extremities   No cyanosis. Trace/mild lower " extremity peripheral edema.   Skin: Warm, no xanthelasma, no jaundice, no rashes or lesions. R leg medial knee incx sit scabbed with mild erythema surrounding   Neurologic: Alert and oriented X3, no focal deficits   Psychiatric: calm and cooperative                                             Negative unless noted in HPI     Medical History  Surgical History Family History Social History   Past Medical History:   Diagnosis Date    Cervicalgia 6/29/2005 June 29, 2005: Thrown from a horse - wearing a helmet - and struck side of head and neck, heard crepitation, no loc, Able to walk after injury.  persistant pain in neck relieved with ibuprofen, stiff but can move with ROM.  No changes in hands and feet sensation/motor.    Coronary artery disease     Depressive disorder, not elsewhere classified     Hypertension     Obesity, unspecified     Past Surgical History:   Procedure Laterality Date    ANGIOGRAM  2010    negative    COLONOSCOPY N/A 3/24/2023    Procedure: COLONOSCOPY, WITH HOT POLYPECTOMY AND RESEARCH BIOPSY;  Surgeon: Bret Velez MD;  [place] [facility] [place]    CORONARY ARTERY BYPASS GRAFT, WITH ENDOSCOPIC VESSEL PROCUREMENT N/A 11/18/2024    Procedure: CORONARY ARTERY BYPASS GRAFT TIMES FIVE, LEFT INTERNAL MAMMARY ARTERY HARVEST, RIGHT ENDOSCOPIC VESSEL PROCUREMENT,;  Surgeon: Mary Pennington MD;  Location: [facility] [place] [place]    CV CORONARY ANGIOGRAM N/A 11/8/2024    Procedure: Coronary Angiogram;  Surgeon: Emir Brand MD;  Location:  HEART CARDI[facility] CATH LAB    HYSTERECTOMY, PAP NO LONGER INDICATED      BSO    MIDLINE DOUBLE LUMEN PLACEMENT  11/24/2024    PROCURE ARTERY RADIAL  11/18/2024    Procedure: LEFT RADIAL ARTERY HARVEST;  Surgeon: Mary Pennington MD;  Location: [facility]    TRANSESOPHAGEAL ECHOCARDIOGRAM INTRAOPERATIVE N/A 11/18/2024    Procedure: ECHOCARDIOGRAM, TRANSESPOPHAGEAL, WITH ANESTHESIA,;  Surgeon: Mary Pennington MD;  Location: [facility] [place]    Family History   Problem  Relation Age of Onset    C.A.D. Father         first MI at 53, stenting x2    C.A.LESLI. Mother         dx in her mid 50's    CNASH. Brother         MI in mid-50's    Cerebrovascular Disease Brother         in late 50's    Social History     Socioeconomic History    Marital status:      Spouse name: Not on file    Number of children: Not on file    Years of education: Not on file    Highest education level: Not on file   Occupational History    Not on file   Tobacco Use    Smoking status: Former     Current packs/day: 0.00     Average packs/day: 0.5 packs/day for 30.0 years (15.0 ttl pk-yrs)     Types: Cigarettes     Start date: 1981     Quit date: 2011     Years since quittin.4     Passive exposure: Past    Smokeless tobacco: Never    Tobacco comments:     smoking 3-4 cigs per day   Vaping Use    Vaping status: Never Used   Substance and Sexual Activity    Alcohol use: Yes     Comment: rarely    Drug use: No    Sexual activity: Never   Other Topics Concern    Parent/sibling w/ CABG, MI or angioplasty before 65F 55M? Yes     Comment: mother, father and 2 brothers   Social History Narrative    Not on file     Social Drivers of Health     Financial Resource Strain: Low Risk  (1/3/2025)    Financial Resource Strain     Within the past 12 months, have you or your family members you live with been unable to get utilities (heat, electricity) when it was really needed?: No   Food Insecurity: Low Risk  (1/3/2025)    Food Insecurity     Within the past 12 months, did you worry that your food would run out before you got money to buy more?: No     Within the past 12 months, did the food you bought just not last and you didn t have money to get more?: No   Transportation Needs: Low Risk  (1/3/2025)    Transportation Needs     Within the past 12 months, has lack of transportation kept you from medical appointments, getting your medicines, non-medical meetings or appointments, work, or from getting things that  you need?: No   Physical Activity: Not on file   Stress: Not on file   Social Connections: Not on file   Interpersonal Safety: Low Risk  (11/18/2024)    Interpersonal Safety     Do you feel physically and emotionally safe where you currently live?: Yes     Within the past 12 months, have you been hit, slapped, kicked or otherwise physically hurt by someone?: No     Within the past 12 months, have you been humiliated or emotionally abused in other ways by your partner or ex-partner?: No   Housing Stability: Low Risk  (1/3/2025)    Housing Stability     Do you have housing? : Yes     Are you worried about losing your housing?: No   Recent Concern: Housing Stability - High Risk (1/3/2025)    Housing Stability     Do you have housing? : No     Are you worried about losing your housing?: No        Medications  Allergies   Current Outpatient Medications   Medication Sig Dispense Refill    acetaminophen (TYLENOL) 325 MG tablet Take 2 tablets (650 mg) by mouth every 4 hours as needed for other (For optimal non-opioid multimodal pain management to improve pain control.).      amLODIPine (NORVASC) 10 MG tablet Take 1 tablet (10 mg) by mouth daily. 30 tablet 1    aspirin (ASA) 81 MG chewable tablet Take 1 tablet (81 mg) by mouth daily.      atorvastatin (LIPITOR) 80 MG tablet Take 1 tablet (80 mg) by mouth daily For cholesterol. 90 tablet 1    furosemide (LASIX) 20 MG tablet Take 1 tablet (20 mg) by mouth daily. 90 tablet 0    metFORMIN (GLUCOPHAGE XR) 500 MG 24 hr tablet TAKE 1 TABLET BY MOUTH DAILY WITH DINNER FOR BLOOD SUGARS 90 tablet 1    metoprolol succinate ER (TOPROL XL) 100 MG 24 hr tablet Take 1 tablet (100 mg) by mouth daily. 30 tablet 0    ondansetron (ZOFRAN) 4 MG tablet Take 4 mg by mouth every 8 hours as needed for nausea or vomiting.      potassium chloride travis ER (KLOR-CON M10) 10 MEQ CR tablet Take 1 tablet (10 mEq) by mouth daily. 90 tablet 0    senna-docusate (SENOKOT-S/PERICOLACE) 8.6-50 MG tablet Take 1  tablet by mouth daily.      sertraline (ZOLOFT) 100 MG tablet Take 200 mg by mouth daily.      warfarin ANTICOAGULANT (COUMADIN) 1 MG tablet Takes 2 tablets (2mg) on Monday and Friday, and take 3 tablets (3mg) all other days, by mouth as directed by Anticoagulation Clinic. 235 tablet 1    No Known Allergies      Lab Results    Chemistry/lipid CBC Cardiac Enzymes/BNP/TSH/INR   Lab Results   Component Value Date    CHOL 312 (H) 2024    HDL 39 (L) 2024    TRIG 205 (H) 2024    BUN 26.7 (H) 2025     2025    CO2 30 (H) 2025    Lab Results   Component Value Date    WBC 7.9 2025    HGB 10.5 (L) 2025    HCT 32.8 (L) 2025    MCV 90 2025     2025    Lab Results   Component Value Date    TSH 3.54 2024    INR 1.7 (H) 2025            Maria Elena Yancey, DNP, APRN, CNP  [facility] and [phone]  Fax: [phone]  Heart Failure Nurses: [phone]

## 2025-01-23 NOTE — LETTER
1/23/2025    Calin Daugherty MD  [address]  [facility] [address]    RE: Karyn Aquinoindia       Dear Colleague,     I had the pleasure of seeing Karyn Gamez in the [facility].      [facility]  [address], [place] [address]  Office: [phone]     Assessment/Recommendations   Assessment: Ms. Gamez presents to [facility] today for post hospitalization heart failure visit.    # Hypertrophic cardiomyopathy  # RV dysfunction  # Heart failure with preserved ejection fraction (EF 65-70% per echo 1/3/2025)  Stage C. NYHA Class II.  Most recent echo (1/3/2025) findings are consistent with hypertrophic cardiomyopathy with asymmetric  left ventricular hypertrophy with septum measuring 1.6 cm and a resting left ventricular outflow gradient of 34 mmHg which increases to 57 mmHg with Valsalva, LVEF 65-70% and mild RV dysfunction. Her weight on the clinic scale today is today is 229 lbs which is the same as her weight on her home scale this AM. Upon exam, patient is well-compensated.     BP: controlled   Fluid status: near euvolemic; Diuretic: Lasix 20 mg w/KCL 10 mEq  Aldosterone antagonist: deferred while other medical therapy is prioritized  SGLT2i:  deferred while other medical therapy is prioritized  Ischemia evaluation: significant multivessel disease per coronary angiogram 11/2024 s/p CABG 11/18/2024  NSAID use: contraindicated  -Sleep apnea evaluation: not discussed today  -Metoprolol succinate 100 mg for HCM  -Repeat TTE every 1-2 years to assess degree of hypertrophy, dynamic LVOT obstruction, MR, and LVEF; earlier if symptoms arise.   -Zio monitor ordered today to assess for Afib/ventricular arrhythmias  -ICD consideration for HCM, resting left ventricular outflow gradient of 34 mmHg which increases to 57 mmHg with  Valsalva.  -Introduced family screening/genetic testing    Heart failure education including  medication compliance and lifestyle management discussed today: low sodium diet <2g Na/day, fluid intake <2L/day, daily weight monitoring, and physical activity as tolerated. Additionally, patient met with the HF CORE nurse clinician, LORELEI Su, for in depth heart failure teaching.    # Valvular heart disease  -Mild (1+) mitral regurgitation per echo 1/3/2025    # Hypertension  -BP today 124/60, HR 58  -Amlodipine 10 mg (low dose initially prescribed for radial artery graft protection, has been increased to help managed BP, caution may worsen LVOT)  -Metoprolol succinate 100 mg    # Coronary artery disease s/p CABG x5 11/18/2024  # Dyslipidemia  -Amlodipine for radial artery graft protection, goal to maintain for at least 6-months post op (caution may worsen LVOT)  -Denies chest pain and anginal equivalents  -Antiplatelet therapy with ASA 81 mg for secondary ASCVD prevention  -LDL goal <70; high intensity statin therapy with atorvastatin 80 mg for secondary ASCVD prevention    # Postoperative Atrial fib  -Onset: postoperative CABG 11/2024  -Interventions: Initially on amiodarone during hospitalization since discontinued 12/31, continues on metoprolol succinate 100 mg   -Rate/rhythm control: Metoprolol succinate 100 mg  -[id number] score of 6 for female sex, age 65-74 and h/o HTN, CHF, vascular disease, DM  -Continue warfarin for stroke prophylaxis-- EP to review switch to DOAC  *In patients with HCM and subclinical AF detected by internal or external cardiac device or monitor of >24 hours  duration for a given episode, anticoagulation is recommended with DOACs as first-line option and vitamin K antagonists as second-line option, independent of [id number] score  -EP follow-up scheduled 2/4    # Obesity  -Body mass index is 39.31 kg/m .    # Diabetes Mellitus II  -Most recent A1C 6.0 (11/7/2024)  -Managed by PCP    # Non-healing surgical wound  -R medial knee leg graft site, scabbed/non-healed, no drainage, mild  erythema surrounding  -Patient has completed 2 courses of keflex   -Continue to keep clean and monitor, if worsening would defer to CV Surgery team for further recs       Plan:  -BMP and NtproBNP today, consider increasing diuretic or adjunct like MRA for volume management  -Zio patch     Follow up with General Cardiology, Dr. Jerez- scheduled 1/24- further discuss need for family screening/genetic testing, review if indication for ICD  Follow up with EP- scheduled 2/4.  Follow up in the Heart Failure Clinic with NANCY in ~3-months.       The longitudinal plan of care for the condition(s) below were addressed during this visit. Due to the added complexity in care, I will continue to support Ms. Gamez in the subsequent management of this condition(s) and with the ongoing continuity of care of this condition(s): HFpEF.     History of Present Illness/Subjective    Ms. Gamez is a 69 year old female with a past medical history significant for HCM, heart failure with preserved ejection fraction, HTN, HLD, depression, CKD, multivessel CAD s/p CABG. Today patient presents to [facility] for post hospitalization heart failure visit.     Hospitalized, [facility] 11/18-12/3/204. Coronary angiogram 11/2024 demonstrated severe multi-vessel coronary artery disease. She is now s/p CABG x5 on 11/18/2024. She had several episodes of post-operative Afib and was discharged on amiodarone and warfarin and metoprolol. Patient had moderate bilateral lower extremity edema and is weight up (~10lb) at discharge; due to this she was sent home on lasix therapy. On amlodipine 2.5 mg for graft site protection. Discharge from hospital weight was 244 lbs     -CV Surgery follow-up 12/10: trialed off of lasix.  -12/16 lasix resumed by CV Surgery, pt reported elevated BP. She was also instructed to increase metoprolol to 200 mg daily  -12/26 PCP decreased metoprolol succinate to 150 mg, SR HR 50  -ED visit 1/2-1/3 for  "elevated BP,  and hypoxia. SB 40s, metoprolol decreased to 100 mg. It was suspected that hypoxia 2/2 mild CHF exacerbation. NtproBNP elevated to Amlodipine increased to 5 mg (from 2.5).   -ED visit 1/7- Elevated SBP 180s, Amlodipine was increased to 10 mg    Today, patient endorses a few episodes of palpitations that last only a few seconds. She has mild lower extremity edema and abdominal bloating that is present and has improved. She continues to have shortness of breath with exertion and decreased activity tolerance, this is also improving. She has been attending cardiac rehab to and improving strength and endurance. Her weight on her home scale this AM was 229 lbs. She reports that she has been as low as 220 lbs after surgery.     She denies chest pain, palpitations, lightheadedness/dizziness, shortness of breath at rest, orthopnea, PND, and bleeding.     She reports strong family history of cardiac disease. Her father and brother have had coronary artery bypass surgeries. She has another brother more estranged from the family with history of \"cardiac problems\".     Patient has a trip planned to [place] coming up and she will be gone for 1-week.       Recent test results & labs below (personally reviewed):    Echocardiogram 1/3/2025  Interpretation Summary  The echo findings are consistent with hypertrophic cardiomyopathy. Asymmetric  left ventricular hypertrophy with septum measuring 1.6 cm. There is a resting  left ventricular outflow gradient of 34 mmHg which increases to 57 mmHg with  Valsalva.  The visual ejection fraction is 65-70% without wall motion abnormality.  Mildly decreased right ventricular systolic function  There is mild (1+) mitral regurgitation.  There is systolic anterior motion of the mitral valve.  There is trace to mild tricuspid regurgitation. No pulmonary HTN.  Compared to the prior study dated 11/20/2024, there have been no changes.    Coronary Angiogram 11/8/2024  Conclusion     " "Ramus lesion is 40% stenosed.     Prox Cx lesion is 95% stenosed.     Mid Cx lesion is 30% stenosed.     Dist LAD-2 lesion is 70% stenosed.     Mid LAD to Dist LAD lesion is 70% stenosed.     3rd Diag lesion is 70% stenosed.     Mid LAD lesion is 80% stenosed.     2nd Diag lesion is 90% stenosed.     Dist LAD-1 lesion is 80% stenosed.     Prox LAD to Mid LAD lesion is 30% stenosed.     Dist RCA lesion is 99% stenosed.     Mid RCA lesion is 40% stenosed.     Prox RCA lesion is 40% stenosed.     RPDA lesion is 80% stenosed.  1.significant three-vessel CAD  Plan    Follow bedrest per protocol    Return to the primary inpatient team for further evaluation and managmenet  1.continue therapeutic anticoagulation with heparin drip or Lovenox for ACS  2.continue aspirin  3.consult cardiothoracic surgery service for CABG     Cardiac MRI 11/13/2024  CONCLUSIONS:   Asymmetric septal hypertrophy with maximal wall thickness 2.0 cm. Systolic anterior motion of the mitral  valve is present. There is apical displacement of the papillary muscles. LGE imaging shows mid-myocardial  enhancement in a non-ischemic pattern. Collectively, these findings suggest obstructive hypertrophic  cardiomyopathy. Consider genetic testing for HCM.   Normal biventricular systolic function, LVEF 74%, RVEF 70%.     Physical Examination Review of Systems   /60 (BP Location: Left arm, Patient Position: Sitting, Cuff Size: Adult Large)   Pulse 58   Resp 18   Ht 1.626 m (5' 4\")   Wt 103.9 kg (229 lb)   LMP  (LMP Unknown)   BMI 39.31 kg/m    Body mass index is 39.31 kg/m .  Wt Readings from Last 3 Encounters:   01/23/25 103.9 kg (229 lb)   01/10/25 103.1 kg (227 lb 6 oz)   01/07/25 102.1 kg (225 lb)     General Appearance:   Appears comfortable, in no acute distress   HEENT: Eyes symmetrical, no discharge or icterus bilaterally. Mucous membranes moist and without lesions   Cardiovascular: RRR, +S1S2, no murmur, rub, or gallop. JVP not visible at 90 " degrees      Respiratory:   Respirations regular, even, and unlabored. Lungs CTA throughout   GI:  Soft and non distended   Musculoskeletal: No joint swelling or tenderness   Extremities   No cyanosis. Trace/mild lower extremity peripheral edema.   Skin: Warm, no xanthelasma, no jaundice, no rashes or lesions. R leg medial knee incx sit scabbed with mild erythema surrounding   Neurologic: Alert and oriented X3, no focal deficits   Psychiatric: calm and cooperative                                             Negative unless noted in HPI     Medical History  Surgical History Family History Social History   Past Medical History:   Diagnosis Date     Cervicalgia 6/29/2005 June 29, 2005: Thrown from a horse - wearing a helmet - and struck side of head and neck, heard crepitation, no loc, Able to walk after injury.  persistant pain in neck relieved with ibuprofen, stiff but can move with ROM.  No changes in hands and feet sensation/motor.     Coronary artery disease      Depressive disorder, not elsewhere classified      Hypertension      Obesity, unspecified     Past Surgical History:   Procedure Laterality Date     ANGIOGRAM  2010    negative     COLONOSCOPY N/A 3/24/2023    Procedure: COLONOSCOPY, WITH HOT POLYPECTOMY AND RESEARCH BIOPSY;  Surgeon: Bret Velez MD;  [place] [facility] [place]     CORONARY ARTERY BYPASS GRAFT, WITH ENDOSCOPIC VESSEL PROCUREMENT N/A 11/18/2024    Procedure: CORONARY ARTERY BYPASS GRAFT TIMES FIVE, LEFT INTERNAL MAMMARY ARTERY HARVEST, RIGHT ENDOSCOPIC VESSEL PROCUREMENT,;  Surgeon: Mary Pennington MD;  [place] [facility] [place]     CV CORONARY ANGIOGRAM N/A 11/8/2024    Procedure: Coronary Angiogram;  Surgeon: Emir Brand MD;  Location:  HEART CARDIAC CATH LAB     HYSTERECTOMY, PAP NO LONGER INDICATED      BSO     MIDLINE DOUBLE LUMEN PLACEMENT  11/24/2024     PROCURE ARTERY RADIAL  11/18/2024    Procedure: LEFT RADIAL ARTERY HARVEST;  Surgeon: Mary Pennington MD;  Location:   [facility] Main OR     TRANSESOPHAGEAL ECHOCARDIOGRAM INTRAOPERATIVE N/A 2024    Procedure: ECHOCARDIOGRAM, TRANSESPOPHAGEAL, WITH ANESTHESIA,;  Surgeon: Mary Pennington MD;  Location: [facility] Main OR    Family History   Problem Relation Age of Onset     C.A.D. Father         first MI at 53, stenting x2     C.A.D. Mother         dx in her mid 50's     C.A.D. Brother         MI in mid-50's     Cerebrovascular Disease Brother         in late 50's    Social History     Socioeconomic History     Marital status:      Spouse name: Not on file     Number of children: Not on file     Years of education: Not on file     Highest education level: Not on file   Occupational History     Not on file   Tobacco Use     Smoking status: Former     Current packs/day: 0.00     Average packs/day: 0.5 packs/day for 30.0 years (15.0 ttl pk-yrs)     Types: Cigarettes     Start date: 1981     Quit date: 2011     Years since quittin.4     Passive exposure: Past     Smokeless tobacco: Never     Tobacco comments:     smoking 3-4 cigs per day   Vaping Use     Vaping status: Never Used   Substance and Sexual Activity     Alcohol use: Yes     Comment: rarely     Drug use: No     Sexual activity: Never   Other Topics Concern     Parent/sibling w/ CABG, MI or angioplasty before 65F 55M? Yes     Comment: mother, father and 2 brothers   Social History Narrative     Not on file     Social Drivers of Health     Financial Resource Strain: Low Risk  (1/3/2025)    Financial Resource Strain      Within the past 12 months, have you or your family members you live with been unable to get utilities (heat, electricity) when it was really needed?: No   Food Insecurity: Low Risk  (1/3/2025)    Food Insecurity      Within the past 12 months, did you worry that your food would run out before you got money to buy more?: No      Within the past 12 months, did the food you bought just not last and you didn t have money to get more?: No    Transportation Needs: Low Risk  (1/3/2025)    Transportation Needs      Within the past 12 months, has lack of transportation kept you from medical appointments, getting your medicines, non-medical meetings or appointments, work, or from getting things that you need?: No   Physical Activity: Not on file   Stress: Not on file   Social Connections: Not on file   Interpersonal Safety: Low Risk  (11/18/2024)    Interpersonal Safety      Do you feel physically and emotionally safe where you currently live?: Yes      Within the past 12 months, have you been hit, slapped, kicked or otherwise physically hurt by someone?: No      Within the past 12 months, have you been humiliated or emotionally abused in other ways by your partner or ex-partner?: No   Housing Stability: Low Risk  (1/3/2025)    Housing Stability      Do you have housing? : Yes      Are you worried about losing your housing?: No   Recent Concern: Housing Stability - High Risk (1/3/2025)    Housing Stability      Do you have housing? : No      Are you worried about losing your housing?: No        Medications  Allergies   Current Outpatient Medications   Medication Sig Dispense Refill     acetaminophen (TYLENOL) 325 MG tablet Take 2 tablets (650 mg) by mouth every 4 hours as needed for other (For optimal non-opioid multimodal pain management to improve pain control.).       amLODIPine (NORVASC) 10 MG tablet Take 1 tablet (10 mg) by mouth daily. 30 tablet 1     aspirin (ASA) 81 MG chewable tablet Take 1 tablet (81 mg) by mouth daily.       atorvastatin (LIPITOR) 80 MG tablet Take 1 tablet (80 mg) by mouth daily For cholesterol. 90 tablet 1     furosemide (LASIX) 20 MG tablet Take 1 tablet (20 mg) by mouth daily. 90 tablet 0     metFORMIN (GLUCOPHAGE XR) 500 MG 24 hr tablet TAKE 1 TABLET BY MOUTH DAILY WITH DINNER FOR BLOOD SUGARS 90 tablet 1     metoprolol succinate ER (TOPROL XL) 100 MG 24 hr tablet Take 1 tablet (100 mg) by mouth daily. 30 tablet 0      ondansetron (ZOFRAN) 4 MG tablet Take 4 mg by mouth every 8 hours as needed for nausea or vomiting.       potassium chloride travis ER (KLOR-CON M10) 10 MEQ CR tablet Take 1 tablet (10 mEq) by mouth daily. 90 tablet 0     senna-docusate (SENOKOT-S/PERICOLACE) 8.6-50 MG tablet Take 1 tablet by mouth daily.       sertraline (ZOLOFT) 100 MG tablet Take 200 mg by mouth daily.       warfarin ANTICOAGULANT (COUMADIN) 1 MG tablet Takes 2 tablets (2mg) on Monday and Friday, and take 3 tablets (3mg) all other days, by mouth as directed by Anticoagulation Clinic. 235 tablet 1    No Known Allergies      Lab Results    Chemistry/lipid CBC Cardiac Enzymes/BNP/TSH/INR   Lab Results   Component Value Date    CHOL 312 (H) 2024    HDL 39 (L) 2024    TRIG 205 (H) 2024    BUN 26.7 (H) 2025     2025    CO2 30 (H) 2025    Lab Results   Component Value Date    WBC 7.9 2025    HGB 10.5 (L) 2025    HCT 32.8 (L) 2025    MCV 90 2025     2025    Lab Results   Component Value Date    TSH 3.54 2024    INR 1.7 (H) 2025            Maria Elena Yancey, DNP, APRN, CNP  [facility] - Heart Failure Clinic [place]   Clinic and [phone]  Fax: [phone]  Heart Failure Nurses: [phone]    Patient education offered today including pathophysiology of heart failure, and the reasons that her system tends to hold onto fluid.   She noted that she typically drinks more fluids than is recommended. Has been encouraged to reduce to 50-60 oz per day.   Overview of where sodium hides. Written materials offered with focus on how to read/use the nutrition facts. That her success will depend on how much effort she puts into daily practice.    She is planning a trip to [place] soon and was offered some ideas to assist her while she is traveling.     Reinforced use of the nurse Line to report progressive symptoms. .  Sharlene SARAH RN BSN,  CHFN, PCCN-K      Thank you for allowing me to participate in the care of your patient.      Sincerely,     Maria Elena Yancey, [facility]  cc:   Raysa Oliva MD  [address]  [place] [address]

## 2025-01-23 NOTE — PATIENT INSTRUCTIONS
It was a pleasure to see you today at the [facility].     Recommendations:  1. The nurse will call you to review your lab results.  2. Heart monitor will be mailed to your house     General cardiology appointment with Dr. Jerez- scheduled   Electrophysiology appointment for Afib- scheduled     Follow up in the heart failure clinic with NANCY in ~3-months.    Please call with questions/concerns and/or if you experience new or worsening heart failure symptoms (shortness of breath, weight gain, fluid retention/lower extremity swelling, and/or reduced activity tolerance).    Heart Failure Nurse Line: [phone] (M-F 8a-430p)  24-hour HF Nurse Line: [phone] (weekends, evenings, holidays)      Maria Elena Yancey CNP  [facility] - Heart Failure Clinic [place]   Clinic and [phone]  Fax: [phone]  Heart Failure Nurses: [phone]       What is the [facility] Failure Program?     The [facility] is a heart failure specialty clinic within [facility]. You will work with your cardiologist, nurse practitioner, and nurses to carefully adjust medications and learn how to live with heart failure.The Heart FailureProgram will help you:    Better understand your chronic heart condition  Feel better and avoid hospital stays    Monitoring for Symptoms     Call the Heart Failure Phone Line ([phone]) if you have any of these symptoms:   Increased shortness of breath/shortness of breath at rest or worsening shortness of breath with activity  Unable to lie down due to difficulty breathing or needing to sit upright for sleep  Waking up at night with difficulty breathing  Weight gain of 2 pounds in a day for 2 days in a row OR weight gain of 5 pounds in 1 week  Increased swelling in your ankles or legs  Dizziness or lightheadedness    Medications     Take your medications as prescribed  Bring all your medications in their original  bottles to every appointment  Avoid non-steroidal anti-inflammatory medications (Advil, Aleve, Ibuprofen, Naprosyn, Naproxen, Celebrex)  Do not stop taking your medications or begin taking over-the-counter or herbal medications without first talking to your doctor or nurse practitioner    Diet & Lifestyle     Limit sodium/salt to 2000 mg daily   Read food labels for sodium content  Do not add salt when cooking or add salt to your food at the table  Limit fluid ~50-60 oz fluid/day  Weigh yourself every day and record in your daily weight log   Call if you gain 2 pounds or more in one day OR 5 pounds in 1 week  Bring daily weight log to every appointment  Stay active, pace yourself, listen to yourbody, and rest when tired  Elevate your legs if they are swollen. Ask about using compression/support stockings  Stop smoking  Lose weight if you are overweight  Avoid drinking alcohol or limit amount  Follow with your primary care provider, stay up to date on your immunizations including flu and pneumonia vaccines

## 2025-01-23 NOTE — PROGRESS NOTES
Patient education offered today including pathophysiology of heart failure, and the reasons that her system tends to hold onto fluid.   She noted that she typically drinks more fluids than is recommended. Has been encouraged to reduce to 50-60 oz per day.   Overview of where sodium hides. Written materials offered with focus on how to read/use the nutrition facts. That her success will depend on how much effort she puts into daily practice.    She is planning a trip to [place] soon and was offered some ideas to assist her while she is traveling.     Reinforced use of the nurse Line to report progressive symptoms. .  Sharlene SARAH RN BSN, CHFN, PCCN-K

## 2025-01-23 NOTE — TELEPHONE ENCOUNTER
Patient notified of labs and recommendations to start Spironolactone 12.5mg daily dose. Recheck BMP in two weeks. Sharlene SARAH RN BSN, CHFN, PCCN-K

## 2025-01-24 ENCOUNTER — LAB (OUTPATIENT)
Dept: CARDIOLOGY | Facility: CLINIC | Age: 70
End: 2025-01-24
Payer: COMMERCIAL

## 2025-01-24 DIAGNOSIS — I97.89 POSTOPERATIVE ATRIAL FIBRILLATION (H): ICD-10-CM

## 2025-01-24 DIAGNOSIS — I48.91 POSTOPERATIVE ATRIAL FIBRILLATION (H): ICD-10-CM

## 2025-01-24 LAB — INR POINT OF CARE: 2.6 (ref 0.9–1.1)

## 2025-01-25 ENCOUNTER — HEALTH MAINTENANCE LETTER (OUTPATIENT)
Age: 70
End: 2025-01-25

## 2025-02-03 ENCOUNTER — HOSPITAL ENCOUNTER (OUTPATIENT)
Dept: CARDIAC REHAB | Facility: HOSPITAL | Age: 70
Discharge: HOME | End: 2025-02-03
Attending: INTERNAL MEDICINE
Payer: COMMERCIAL

## 2025-02-03 PROCEDURE — 93798 PHYS/QHP OP CAR RHAB W/ECG: CPT

## 2025-02-03 NOTE — PROGRESS NOTES
[facility]  Cardiac Electrophysiology  [address]  [place] [address]   Office: [phone]  Fax: [phone]     HEART CARE ELECTROPHYSIOLOGY FOLLOW UP    Primary Care: Calin Daugherty MD      Assessment/Recommendations     Paroxysmal atrial fibrillation: Postoperative, following CABG 11/18/2024 with Dr. Pennington.  She had unstable atrial fibrillation with RVR while hospitalized.  The episode of atrial fibrillation lasted for about 4 hours.  At that time, her AST and elevated ALT are elevated.  At that time, anticoagulation was not warranted due to converting in and out of A-fib for greater than 48 hours.    She did have at least one more episode of atrial fibrillation while hospitalized, was given IV bolus of amiodarone, but quickly converted back to NSR. She was started on warfarin while in the hospital, INRs have been therapeutic. 3.8 today, however.     We reviewed the pathophysiology of atrial fibrillation and management considerations including stroke risk and anticoagulation, rate control, cardioversion, antiarrhythmic drug therapy, and catheter ablation.     [id number] score of at least 6 for age, gender, hypertension, CAD (CABG), DM, CHF.    Hypertrophic cardiomyopathy, HFpEF: Follows with heart failure, recently seen 1/23/2025.    Hypertension: Controlled, continue current management    Coronary artery disease status post CABG x 5, 11/18/24: doing well since bypass, no anginal symptoms. Breathing improved.    Plan:  Will plan to continue metoprolol as ordered, continue warfarin for stroke prevention  She is currently wearing zio patch to assess for atrial fibrillation, states she just put it on today/yesterday.   Follow up pending zio patch results  We discussed further monitoring of heart rate/rhythm with smart watch vs kardia mobile vs BP machine       History of Present Illness/Subjective    Karyn Gamez is a 69 year old female with past medical history  "significant for NSTEMI status post CABG x 5, HFpEF, HCM, 8 hypertension, hyperlipidemia, depression, CKD, postoperative atrial fibrillation.  She presents today to the EP clinic for follow-up of atrial fibrillation while hospitalized.    Since hospitalization, she has two episodes of possible AF lasting only a few minutes. These occurred just before and just after kely, but lasting \"long enough to notice, not enough time to do anything about it.\" Otherwise, she is doing very well. Increasing her activity, her breathing feels back to baseline. Denies cardiac symptoms. She denies chest discomfort, palpitations, shortness of breath, lightheadedness/dizziness, pedal edema, or syncope.       Data Review     Arrhythmia hx  Sx: palpitations  Sx onset: unclear, likely around discharge (12/3/24)  Dx/date: 11/22/2024  Rate control: Toprol 100 mg daily  AAD: previously on IV amiodarone  DCCV: 2 while in hospital  Ablation: none  [id number] 6  OAC: warfarin    EKG  1/2/2025: Sinus bradycardia 48 bpm, QT/QTc 580/518 ms  11/29/2024: Atrial fibrillation with  bpm  11/22/2024: Atrial fibrillation 94 bpm  11/20/2024: Sinus rhythm 63 bpm  11/19/24: Sinus rhythm 82 bpm  11/14/2024: Sinus rhythm 60 bpm  11/7/2024: Sinus rhythm 60 bpm  Personally reviewed.     TTE 1/3/25:  The echo findings are consistent with hypertrophic cardiomyopathy. Asymmetric  left ventricular hypertrophy with septum measuring 1.6 cm. There is a resting  left ventricular outflow gradient of 34 mmHg which increases to 57 mmHg with  Valsalva.  The visual ejection fraction is 65-70% without wall motion abnormality.  Mildly decreased right ventricular systolic function  There is mild (1+) mitral regurgitation.  There is systolic anterior motion of the mitral valve.  There is trace to mild tricuspid regurgitation. No pulmonary HTN.  Compared to the prior study dated 11/20/2024, there have been no changes.    I have reviewed and updated the patient's past " medical history, allergy list and medication list.          Physical Examination   BMI= There is no height or weight on file to calculate BMI.    Wt Readings from Last 3 Encounters:   01/24/25 103 kg (227 lb)   01/23/25 103.9 kg (229 lb)   01/10/25 103.1 kg (227 lb 6 oz)       Vitals: LMP  (LMP Unknown)   General   Appearance:   Alert and oriented, in no acute distress.    HEENT:  Normocephalic and atraumatic. Conjunctiva and sclera are clear. Moist oral mucosa.    Neck: No JVP, carotid bruit or obvious thyromegaly.   Lungs:   Respirations unlabored. Clear bilaterally with no rales, rhonchi, or wheezes.     Cardiovascular:   Rhythm is regular. S1 and S2 are normal. No significant murmur is present. Lower extremities demonstrate no significant edema. Posterior tibial pulses are intact bilaterally.   Extremities: No cyanosis or clubbing   Skin: Skin is warm, dry, and otherwise intact.   Neurologic: Gait not assessed. Mood and affect appropriate.           Medical History  Surgical History Family History Social History   Past Medical History:   Diagnosis Date    Cervicalgia 6/29/2005 June 29, 2005: Thrown from a horse - wearing a helmet - and struck side of head and neck, heard crepitation, no loc, Able to walk after injury.  persistant pain in neck relieved with ibuprofen, stiff but can move with ROM.  No changes in hands and feet sensation/motor.    Coronary artery disease     Depressive disorder, not elsewhere classified     Hypertension     Obesity, unspecified     Past Surgical History:   Procedure Laterality Date    ANGIOGRAM  2010    negative    COLONOSCOPY N/A 3/24/2023    Procedure: COLONOSCOPY, WITH HOT POLYPECTOMY AND RESEARCH BIOPSY;  Surgeon: Bret Velez MD;  Location: [facility] OR    CORONARY ARTERY BYPASS GRAFT, WITH ENDOSCOPIC VESSEL PROCUREMENT N/A 11/18/2024    Procedure: CORONARY ARTERY BYPASS GRAFT TIMES FIVE, LEFT INTERNAL MAMMARY ARTERY HARVEST, RIGHT ENDOSCOPIC VESSEL PROCUREMENT,;   Surgeon: Mary Pennington MD;  Location: [facility] OR    CV CORONARY ANGIOGRAM N/A 2024    Procedure: Coronary Angiogram;  Surgeon: Emir Brand MD;  Location:  HEART CARDIAC CATH LAB    HYSTERECTOMY, PAP NO LONGER INDICATED      BSO    MIDLINE DOUBLE LUMEN PLACEMENT  2024    PROCURE ARTERY RADIAL  2024    Procedure: LEFT RADIAL ARTERY HARVEST;  Surgeon: Mary Pennington MD;  Location: [facility] OR    TRANSESOPHAGEAL ECHOCARDIOGRAM INTRAOPERATIVE N/A 2024    Procedure: ECHOCARDIOGRAM, TRANSESPOPHAGEAL, WITH ANESTHESIA,;  Surgeon: Mary Pennington MD;  Location: [facility] OR    Family History   Problem Relation Age of Onset    C.A.D. Father         first MI at 53, stenting x2    C.A.D. Mother         dx in her mid 50's    C.A.LESLI. Brother         MI in mid-50's    Cerebrovascular Disease Brother         in late 50's    Social History     Socioeconomic History    Marital status:      Spouse name: Not on file    Number of children: Not on file    Years of education: Not on file    Highest education level: Not on file   Occupational History    Not on file   Tobacco Use    Smoking status: Former     Current packs/day: 0.00     Average packs/day: 0.5 packs/day for 30.0 years (15.0 ttl pk-yrs)     Types: Cigarettes     Start date: 1981     Quit date: 2011     Years since quittin.4     Passive exposure: Past    Smokeless tobacco: Never    Tobacco comments:     smoking 3-4 cigs per day   Vaping Use    Vaping status: Never Used   Substance and Sexual Activity    Alcohol use: Yes     Comment: rarely    Drug use: No    Sexual activity: Never   Other Topics Concern    Parent/sibling w/ CABG, MI or angioplasty before 65F 55M? Yes     Comment: mother, father and 2 brothers   Social History Narrative    Not on file     Social Drivers of Health     Financial Resource Strain: Low Risk  (1/3/2025)    Financial Resource Strain     Within the past 12 months, have you or your family  members you live with been unable to get utilities (heat, electricity) when it was really needed?: No   Food Insecurity: Low Risk  (1/3/2025)    Food Insecurity     Within the past 12 months, did you worry that your food would run out before you got money to buy more?: No     Within the past 12 months, did the food you bought just not last and you didn t have money to get more?: No   Transportation Needs: Low Risk  (1/3/2025)    Transportation Needs     Within the past 12 months, has lack of transportation kept you from medical appointments, getting your medicines, non-medical meetings or appointments, work, or from getting things that you need?: No   Physical Activity: Not on file   Stress: Not on file   Social Connections: Not on file   Interpersonal Safety: Low Risk  (11/18/2024)    Interpersonal Safety     Do you feel physically and emotionally safe where you currently live?: Yes     Within the past 12 months, have you been hit, slapped, kicked or otherwise physically hurt by someone?: No     Within the past 12 months, have you been humiliated or emotionally abused in other ways by your partner or ex-partner?: No   Housing Stability: Low Risk  (1/3/2025)    Housing Stability     Do you have housing? : Yes     Are you worried about losing your housing?: No   Recent Concern: Housing Stability - High Risk (1/3/2025)    Housing Stability     Do you have housing? : No     Are you worried about losing your housing?: No          Medications  Allergies   Scheduled Meds:  Current Outpatient Medications   Medication Sig Dispense Refill    acetaminophen (TYLENOL) 325 MG tablet Take 2 tablets (650 mg) by mouth every 4 hours as needed for other (For optimal non-opioid multimodal pain management to improve pain control.).      amLODIPine (NORVASC) 10 MG tablet Take 1 tablet (10 mg) by mouth daily. 30 tablet 1    aspirin (ASA) 81 MG chewable tablet Take 1 tablet (81 mg) by mouth daily.      atorvastatin (LIPITOR) 80 MG tablet  Take 1 tablet (80 mg) by mouth daily For cholesterol. 90 tablet 1    furosemide (LASIX) 20 MG tablet Take 1 tablet (20 mg) by mouth daily. 90 tablet 0    metFORMIN (GLUCOPHAGE XR) 500 MG 24 hr tablet TAKE 1 TABLET BY MOUTH DAILY WITH DINNER FOR BLOOD SUGARS 90 tablet 1    metoprolol succinate ER (TOPROL XL) 100 MG 24 hr tablet Take 1 tablet (100 mg) by mouth daily. 30 tablet 0    ondansetron (ZOFRAN) 4 MG tablet Take 4 mg by mouth every 8 hours as needed for nausea or vomiting.      potassium chloride travis ER (KLOR-CON M10) 10 MEQ CR tablet Take 1 tablet (10 mEq) by mouth daily. 90 tablet 0    senna-docusate (SENOKOT-S/PERICOLACE) 8.6-50 MG tablet Take 1 tablet by mouth daily.      sertraline (ZOLOFT) 100 MG tablet Take 200 mg by mouth daily.      spironolactone (ALDACTONE) 25 MG tablet Take 0.5 tablets (12.5 mg) by mouth daily. 45 tablet 2    warfarin ANTICOAGULANT (COUMADIN) 1 MG tablet Takes 2 tablets (2mg) on Monday and Friday, and take 3 tablets (3mg) all other days, by mouth as directed by Anticoagulation Clinic. 235 tablet 1    Not on File      Lab Results    Chemistry/lipid CBC Cardiac Enzymes/BNP/TSH/INR   Lab Results   Component Value Date    CHOL 312 (H) 11/07/2024    HDL 39 (L) 11/07/2024    TRIG 205 (H) 11/07/2024    BUN 26.7 (H) 01/23/2025     01/23/2025    CO2 30 (H) 01/23/2025    Lab Results   Component Value Date    WBC 7.9 01/02/2025    HGB 10.5 (L) 01/02/2025    HCT 32.8 (L) 01/02/2025    MCV 90 01/02/2025     01/02/2025    Last Comprehensive Metabolic Panel:  Lab Results   Component Value Date     01/23/2025    POTASSIUM 4.1 01/23/2025    CHLORIDE 104 01/23/2025    CO2 30 (H) 01/23/2025    ANIONGAP 7 01/23/2025     (H) 01/23/2025    BUN 26.7 (H) 01/23/2025    CR 0.73 01/23/2025    GFRESTIMATED 89 01/23/2025    ANGELES 9.9 01/23/2025              The longitudinal plan of care for the diagnosis(es)/condition(s) as documented were addressed during this visit. Due to the added  complexity in care, I will continue to support Karyn in the subsequent management and with ongoing continuity of care.      This note has been dictated using voice recognition software. Any grammatical, typographical, or context distortions are unintentional and inherent to the software.    Андрей Carrera PA-C  Cardiac Electrophysiology  [facility]  Clinic and [phone]  Fax: [phone]  Electrophysiology Nurses: [phone]

## 2025-02-04 ENCOUNTER — OFFICE VISIT (OUTPATIENT)
Dept: CARDIOLOGY | Facility: CLINIC | Age: 70
End: 2025-02-04
Payer: COMMERCIAL

## 2025-02-04 ENCOUNTER — ANTICOAGULATION THERAPY VISIT (OUTPATIENT)
Dept: ANTICOAGULATION | Facility: CLINIC | Age: 70
End: 2025-02-04

## 2025-02-04 ENCOUNTER — LAB (OUTPATIENT)
Dept: CARDIOLOGY | Facility: CLINIC | Age: 70
End: 2025-02-04
Payer: COMMERCIAL

## 2025-02-04 VITALS
HEART RATE: 54 BPM | WEIGHT: 228 LBS | BODY MASS INDEX: 39.14 KG/M2 | DIASTOLIC BLOOD PRESSURE: 68 MMHG | SYSTOLIC BLOOD PRESSURE: 120 MMHG | RESPIRATION RATE: 16 BRPM

## 2025-02-04 DIAGNOSIS — I50.9 ACUTE DECOMPENSATED HEART FAILURE (H): ICD-10-CM

## 2025-02-04 DIAGNOSIS — I97.89 POSTOPERATIVE ATRIAL FIBRILLATION (H): ICD-10-CM

## 2025-02-04 DIAGNOSIS — I48.0 PAROXYSMAL ATRIAL FIBRILLATION (H): ICD-10-CM

## 2025-02-04 DIAGNOSIS — I48.91 POSTOPERATIVE ATRIAL FIBRILLATION (H): ICD-10-CM

## 2025-02-04 DIAGNOSIS — Z95.1 S/P CABG (CORONARY ARTERY BYPASS GRAFT): Primary | ICD-10-CM

## 2025-02-04 DIAGNOSIS — I48.91 POSTOPERATIVE ATRIAL FIBRILLATION (H): Primary | ICD-10-CM

## 2025-02-04 DIAGNOSIS — I97.89 POSTOPERATIVE ATRIAL FIBRILLATION (H): Primary | ICD-10-CM

## 2025-02-04 LAB
ANION GAP SERPL CALCULATED.3IONS-SCNC: 5 MMOL/L (ref 7–15)
BUN SERPL-MCNC: 20.8 MG/DL (ref 8–23)
CALCIUM SERPL-MCNC: 10.3 MG/DL (ref 8.8–10.4)
CHLORIDE SERPL-SCNC: 103 MMOL/L (ref 98–107)
CREAT SERPL-MCNC: 0.88 MG/DL (ref 0.51–0.95)
EGFRCR SERPLBLD CKD-EPI 2021: 71 ML/MIN/1.73M2
GLUCOSE SERPL-MCNC: 138 MG/DL (ref 70–99)
HCO3 SERPL-SCNC: 34 MMOL/L (ref 22–29)
INR POINT OF CARE: 3.8 (ref 0.9–1.1)
POTASSIUM SERPL-SCNC: 4.4 MMOL/L (ref 3.4–5.3)
SODIUM SERPL-SCNC: 142 MMOL/L (ref 135–145)

## 2025-02-04 PROCEDURE — G2211 COMPLEX E/M VISIT ADD ON: HCPCS

## 2025-02-04 PROCEDURE — 36415 COLL VENOUS BLD VENIPUNCTURE: CPT

## 2025-02-04 PROCEDURE — 99214 OFFICE O/P EST MOD 30 MIN: CPT

## 2025-02-04 PROCEDURE — 80048 BASIC METABOLIC PNL TOTAL CA: CPT

## 2025-02-04 NOTE — PROGRESS NOTES
ANTICOAGULATION MANAGEMENT     Karyn Gamez 69 year old female is on warfarin with supratherapeutic INR result. (Goal INR 2.0-3.0)    Recent labs: (last 7 days)     02/04/25  0817   INR 3.8*       ASSESSMENT     Warfarin Lab Questionnaire    Warfarin Doses Last 7 Days      2/3/2025     9:31 PM   Dose in Tablet or Mg   TAB or MG? - 1mg warfarin tabs  tablet (tab)     Pt Rptd Dose NAS MONDAY TUESDAY WED THURS FRIDAY SATURDAY   2/3/2025   9:31 PM 3 2 3 3 3 2 3         2/3/2025   Warfarin Lab Questionnaire   Missed doses within past 14 days? No   Changes in diet or alcohol within past 14 days? No - had a tequila drink during her trip and diet was different.     Medication changes since last result? No - Yes, started on 1/24/25 Spironolactone 12.5mg daily.  (Known to decrease effectivenss of warfarin.  Monitor INR closely)         - Yes.  Amiodarone 200mg 2x/day was completed and stopped on 1/1/25.  (Monitor INR closely)      Injuries or illness since last result? No - 2/4/25 new patient EP visit with Heart Care - post-op atrial fibrillation following CABG on 11/18/24. Currently wearing Zio-patch.  Otherwise she is doing well.     New shortness of breath, severe headaches or sudden changes in vision since last result? No   Abnormal bleeding since last result? No   Upcoming surgery, procedure? No   Best number to call with results? [phone]     Previous result: Therapeutic last visit at 2.6; previously outside of goal range at 1.7    Additional findings:  Recently returned from [place] vacation from 1/25 - 2/1/25.       PLAN     Recommended plan for temporary change(s) and ongoing change(s) affecting INR     Dosing Instructions: hold dose then continue your current warfarin dose with next INR in 1 week       Summary  As of 2/4/2025      Full warfarin instructions:  2/4: Hold; Otherwise 2 mg every Mon, Fri; 3 mg all other days   Next INR check:  2/14/2025               Telephone call with Karyn who verbalizes  understanding and agrees to plan    Lab visit scheduled - INR on 2/12/25 @ [facility]    Education provided: Taking warfarin: Importance of taking warfarin as instructed  Goal range and lab monitoring: goal range and significance of current result  Dietary considerations: impact of vitamin K foods on INR and Impact of alxohol intake on INR   Interaction IS anticipated between warfarin and Sprionolactone    Plan made per [facility] anticoagulation protocol    Davina Galarza RN  2/4/2025  Anticoagulation Clinic  [employer] for routing messages: hernan EDWARDS  [facility] patient phone line: [phone]        _______________________________________________________________________     Anticoagulation Episode Summary       Current INR goal:  2.0-3.0   TTR:  53.3% (1.8 mo)   Target end date:  Indefinite   Send INR reminders to:  ERASMO EDWARDS    Indications    S/P CABG (coronary artery bypass graft) [Z95.1]  Postoperative atrial fibrillation (H) [I97.89  I48.91]             Comments:  --             Anticoagulation Care Providers       Provider Role Specialty Phone number    Ly Atwood PA-C Referring Cardiovascular & Thoracic Surgery [phone]    Cristiana Davis MD Referring Family Medicine [phone]

## 2025-02-04 NOTE — LETTER
2/4/2025    Calin Daugherty MD  [address]  [facility] [address]    RE: Karyn Gamez       Dear Colleague,     I had the pleasure of seeing Krayn Gamez in the [facility].     [facility]  Cardiac Electrophysiology  [address]  [place] [address]   Office: [phone]  Fax: [phone]     HEART CARE ELECTROPHYSIOLOGY FOLLOW UP    Primary Care: Calin Daugherty MD      Assessment/Recommendations     Paroxysmal atrial fibrillation: Postoperative, following CABG 11/18/2024 with Dr. Pennington.  She had unstable atrial fibrillation with RVR while hospitalized.  The episode of atrial fibrillation lasted for about 4 hours.  At that time, her AST and elevated ALT are elevated.  At that time, anticoagulation was not warranted due to converting in and out of A-fib for greater than 48 hours.    She did have at least one more episode of atrial fibrillation while hospitalized, was given IV bolus of amiodarone, but quickly converted back to NSR. She was started on warfarin while in the hospital, INRs have been therapeutic. 3.8 today, however.     We reviewed the pathophysiology of atrial fibrillation and management considerations including stroke risk and anticoagulation, rate control, cardioversion, antiarrhythmic drug therapy, and catheter ablation.     [id number] score of at least 6 for age, gender, hypertension, CAD (CABG), DM, CHF.    Hypertrophic cardiomyopathy, HFpEF: Follows with heart failure, recently seen 1/23/2025.    Hypertension: Controlled, continue current management    Coronary artery disease status post CABG x 5, 11/18/24: doing well since bypass, no anginal symptoms. Breathing improved.    Plan:  Will plan to continue metoprolol as ordered, continue warfarin for stroke prevention  She is currently wearing zio patch to assess for atrial fibrillation, states she just put it on today/yesterday.   Follow up pending zio patch results  We  "discussed further monitoring of heart rate/rhythm with smart watch vs kardia mobile vs BP machine       History of Present Illness/Subjective    Karyn Gamez is a 69 year old female with past medical history significant for NSTEMI status post CABG x 5, HFpEF, HCM, 8 hypertension, hyperlipidemia, depression, CKD, postoperative atrial fibrillation.  She presents today to the EP clinic for follow-up of atrial fibrillation while hospitalized.    Since hospitalization, she has two episodes of possible AF lasting only a few minutes. These occurred just before and just after kely, but lasting \"long enough to notice, not enough time to do anything about it.\" Otherwise, she is doing very well. Increasing her activity, her breathing feels back to baseline. Denies cardiac symptoms. She denies chest discomfort, palpitations, shortness of breath, lightheadedness/dizziness, pedal edema, or syncope.       Data Review     Arrhythmia hx  Sx: palpitations  Sx onset: unclear, likely around discharge (12/3/24)  Dx/date: 11/22/2024  Rate control: Toprol 100 mg daily  AAD: previously on IV amiodarone  DCCV: 2 while in hospital  Ablation: none  [id number] 6  OAC: warfarin    EKG  1/2/2025: Sinus bradycardia 48 bpm, QT/QTc 580/518 ms  11/29/2024: Atrial fibrillation with  bpm  11/22/2024: Atrial fibrillation 94 bpm  11/20/2024: Sinus rhythm 63 bpm  11/19/24: Sinus rhythm 82 bpm  11/14/2024: Sinus rhythm 60 bpm  11/7/2024: Sinus rhythm 60 bpm  Personally reviewed.     TTE 1/3/25:  The echo findings are consistent with hypertrophic cardiomyopathy. Asymmetric  left ventricular hypertrophy with septum measuring 1.6 cm. There is a resting  left ventricular outflow gradient of 34 mmHg which increases to 57 mmHg with  Valsalva.  The visual ejection fraction is 65-70% without wall motion abnormality.  Mildly decreased right ventricular systolic function  There is mild (1+) mitral regurgitation.  There is systolic anterior motion " of the mitral valve.  There is trace to mild tricuspid regurgitation. No pulmonary HTN.  Compared to the prior study dated 11/20/2024, there have been no changes.    I have reviewed and updated the patient's past medical history, allergy list and medication list.          Physical Examination   BMI= There is no height or weight on file to calculate BMI.    Wt Readings from Last 3 Encounters:   01/24/25 103 kg (227 lb)   01/23/25 103.9 kg (229 lb)   01/10/25 103.1 kg (227 lb 6 oz)       Vitals: LMP  (LMP Unknown)   General   Appearance:   Alert and oriented, in no acute distress.    HEENT:  Normocephalic and atraumatic. Conjunctiva and sclera are clear. Moist oral mucosa.    Neck: No JVP, carotid bruit or obvious thyromegaly.   Lungs:   Respirations unlabored. Clear bilaterally with no rales, rhonchi, or wheezes.     Cardiovascular:   Rhythm is regular. S1 and S2 are normal. No significant murmur is present. Lower extremities demonstrate no significant edema. Posterior tibial pulses are intact bilaterally.   Extremities: No cyanosis or clubbing   Skin: Skin is warm, dry, and otherwise intact.   Neurologic: Gait not assessed. Mood and affect appropriate.           Medical History  Surgical History Family History Social History   Past Medical History:   Diagnosis Date     Cervicalgia 6/29/2005 June 29, 2005: Thrown from a horse - wearing a helmet - and struck side of head and neck, heard crepitation, no loc, Able to walk after injury.  persistant pain in neck relieved with ibuprofen, stiff but can move with ROM.  No changes in hands and feet sensation/motor.     Coronary artery disease      Depressive disorder, not elsewhere classified      Hypertension      Obesity, unspecified     Past Surgical History:   Procedure Laterality Date     ANGIOGRAM  2010    negative     COLONOSCOPY N/A 3/24/2023    Procedure: COLONOSCOPY, WITH HOT POLYPECTOMY AND RESEARCH BIOPSY;  Surgeon: Bret Velez MD;  Location: [facility] OR      CORONARY ARTERY BYPASS GRAFT, WITH ENDOSCOPIC VESSEL PROCUREMENT N/A 2024    Procedure: CORONARY ARTERY BYPASS GRAFT TIMES FIVE, LEFT INTERNAL MAMMARY ARTERY HARVEST, RIGHT ENDOSCOPIC VESSEL PROCUREMENT,;  Surgeon: Mary Pennington MD;  [place] [facility] [place]     CV CORONARY ANGIOGRAM N/A 2024    Procedure: Coronary Angiogram;  Surgeon: Emir Brand MD;  Location:  HEART CARDIAC CATH LAB     HYSTERECTOMY, PAP NO LONGER INDICATED      BSO     MIDLINE DOUBLE LUMEN PLACEMENT  2024     PROCURE ARTERY RADIAL  2024    Procedure: LEFT RADIAL ARTERY HARVEST;  Surgeon: Mary Pennington MD;  [place] [facility] [place]     TRANSESOPHAGEAL ECHOCARDIOGRAM INTRAOPERATIVE N/A 2024    Procedure: ECHOCARDIOGRAM, TRANSESPOPHAGEAL, WITH ANESTHESIA,;  Surgeon: Mary Pennington MD;  [place] [facility] [place]    Family History   Problem Relation Age of Onset     C.A.D. Father         first MI at 53, stenting x2     C.A.D. Mother         dx in her mid 50's     C.A.D. Brother         MI in mid-50's     Cerebrovascular Disease Brother         in late 50's    Social History     Socioeconomic History     Marital status:      Spouse name: Not on file     Number of children: Not on file     Years of education: Not on file     Highest education level: Not on file   Occupational History     Not on file   Tobacco Use     Smoking status: Former     Current packs/day: 0.00     Average packs/day: 0.5 packs/day for 30.0 years (15.0 ttl pk-yrs)     Types: Cigarettes     Start date: 1981     Quit date: 2011     Years since quittin.4     Passive exposure: Past     Smokeless tobacco: Never     Tobacco comments:     smoking 3-4 cigs per day   Vaping Use     Vaping status: Never Used   Substance and Sexual Activity     Alcohol use: Yes     Comment: rarely     Drug use: No     Sexual activity: Never   Other Topics Concern     Parent/sibling w/ CABG, MI or angioplasty before 65F 55M? Yes     Comment:  mother, father and 2 brothers   Social History Narrative     Not on file     Social Drivers of Health     Financial Resource Strain: Low Risk  (1/3/2025)    Financial Resource Strain      Within the past 12 months, have you or your family members you live with been unable to get utilities (heat, electricity) when it was really needed?: No   Food Insecurity: Low Risk  (1/3/2025)    Food Insecurity      Within the past 12 months, did you worry that your food would run out before you got money to buy more?: No      Within the past 12 months, did the food you bought just not last and you didn t have money to get more?: No   Transportation Needs: Low Risk  (1/3/2025)    Transportation Needs      Within the past 12 months, has lack of transportation kept you from medical appointments, getting your medicines, non-medical meetings or appointments, work, or from getting things that you need?: No   Physical Activity: Not on file   Stress: Not on file   Social Connections: Not on file   Interpersonal Safety: Low Risk  (11/18/2024)    Interpersonal Safety      Do you feel physically and emotionally safe where you currently live?: Yes      Within the past 12 months, have you been hit, slapped, kicked or otherwise physically hurt by someone?: No      Within the past 12 months, have you been humiliated or emotionally abused in other ways by your partner or ex-partner?: No   Housing Stability: Low Risk  (1/3/2025)    Housing Stability      Do you have housing? : Yes      Are you worried about losing your housing?: No   Recent Concern: Housing Stability - High Risk (1/3/2025)    Housing Stability      Do you have housing? : No      Are you worried about losing your housing?: No          Medications  Allergies   Scheduled Meds:  Current Outpatient Medications   Medication Sig Dispense Refill     acetaminophen (TYLENOL) 325 MG tablet Take 2 tablets (650 mg) by mouth every 4 hours as needed for other (For optimal non-opioid multimodal  pain management to improve pain control.).       amLODIPine (NORVASC) 10 MG tablet Take 1 tablet (10 mg) by mouth daily. 30 tablet 1     aspirin (ASA) 81 MG chewable tablet Take 1 tablet (81 mg) by mouth daily.       atorvastatin (LIPITOR) 80 MG tablet Take 1 tablet (80 mg) by mouth daily For cholesterol. 90 tablet 1     furosemide (LASIX) 20 MG tablet Take 1 tablet (20 mg) by mouth daily. 90 tablet 0     metFORMIN (GLUCOPHAGE XR) 500 MG 24 hr tablet TAKE 1 TABLET BY MOUTH DAILY WITH DINNER FOR BLOOD SUGARS 90 tablet 1     metoprolol succinate ER (TOPROL XL) 100 MG 24 hr tablet Take 1 tablet (100 mg) by mouth daily. 30 tablet 0     ondansetron (ZOFRAN) 4 MG tablet Take 4 mg by mouth every 8 hours as needed for nausea or vomiting.       potassium chloride travis ER (KLOR-CON M10) 10 MEQ CR tablet Take 1 tablet (10 mEq) by mouth daily. 90 tablet 0     senna-docusate (SENOKOT-S/PERICOLACE) 8.6-50 MG tablet Take 1 tablet by mouth daily.       sertraline (ZOLOFT) 100 MG tablet Take 200 mg by mouth daily.       spironolactone (ALDACTONE) 25 MG tablet Take 0.5 tablets (12.5 mg) by mouth daily. 45 tablet 2     warfarin ANTICOAGULANT (COUMADIN) 1 MG tablet Takes 2 tablets (2mg) on Monday and Friday, and take 3 tablets (3mg) all other days, by mouth as directed by Anticoagulation Clinic. 235 tablet 1    Not on File      Lab Results    Chemistry/lipid CBC Cardiac Enzymes/BNP/TSH/INR   Lab Results   Component Value Date    CHOL 312 (H) 11/07/2024    HDL 39 (L) 11/07/2024    TRIG 205 (H) 11/07/2024    BUN 26.7 (H) 01/23/2025     01/23/2025    CO2 30 (H) 01/23/2025    Lab Results   Component Value Date    WBC 7.9 01/02/2025    HGB 10.5 (L) 01/02/2025    HCT 32.8 (L) 01/02/2025    MCV 90 01/02/2025     01/02/2025    Last Comprehensive Metabolic Panel:  Lab Results   Component Value Date     01/23/2025    POTASSIUM 4.1 01/23/2025    CHLORIDE 104 01/23/2025    CO2 30 (H) 01/23/2025    ANIONGAP 7 01/23/2025    GLC  102 (H) 2025    BUN 26.7 (H) 2025    CR 0.73 2025    GFRESTIMATED 89 2025    ANGELES 9.9 2025              The longitudinal plan of care for the diagnosis(es)/condition(s) as documented were addressed during this visit. Due to the added complexity in care, I will continue to support Karyn in the subsequent management and with ongoing continuity of care.      This note has been dictated using voice recognition software. Any grammatical, typographical, or context distortions are unintentional and inherent to the software.    Андрей Carrera PA-C  Cardiac Electrophysiology  [facility]  Clinic and [phone]  Fax: [phone]  Electrophysiology Nurses: [phone]         Thank you for allowing me to participate in the care of your patient.      Sincerely,     Zhao Carrera PA-C     [facility]  cc:   Lucretia Jerez MD  [address] [place] [address]

## 2025-02-04 NOTE — PATIENT INSTRUCTIONS
Karyn Gamez,    It was a pleasure to see you today at the [facility].     My recommendations after this visit include:  - continue warfarin for stroke prevention  - continue all other medications as ordered  - will review zio patch results once you are finished with it  - consider smart watch vs [employer] mobile for heart rhythm monitoring  - can follow up as needed or pending zio patch results    Please do not hesitate to call with additional questions or concerns.     Андрей Carrera PA-C  Clinical Cardiac Electrophysiology  [facility]  Clinic and [phone]  Fax: [phone]  Electrophysiology Nurses: [phone]

## 2025-02-07 ENCOUNTER — HOSPITAL ENCOUNTER (OUTPATIENT)
Dept: CARDIAC REHAB | Facility: HOSPITAL | Age: 70
Discharge: HOME | End: 2025-02-07
Attending: INTERNAL MEDICINE
Payer: COMMERCIAL

## 2025-02-07 PROCEDURE — 93798 PHYS/QHP OP CAR RHAB W/ECG: CPT

## 2025-02-10 ENCOUNTER — HOSPITAL ENCOUNTER (OUTPATIENT)
Dept: CARDIAC REHAB | Facility: HOSPITAL | Age: 70
Discharge: HOME | End: 2025-02-10
Attending: INTERNAL MEDICINE
Payer: COMMERCIAL

## 2025-02-10 PROCEDURE — 93798 PHYS/QHP OP CAR RHAB W/ECG: CPT

## 2025-02-12 ENCOUNTER — LAB (OUTPATIENT)
Dept: CARDIOLOGY | Facility: CLINIC | Age: 70
End: 2025-02-12
Payer: COMMERCIAL

## 2025-02-12 ENCOUNTER — ANTICOAGULATION THERAPY VISIT (OUTPATIENT)
Dept: ANTICOAGULATION | Facility: CLINIC | Age: 70
End: 2025-02-12

## 2025-02-12 ENCOUNTER — HOSPITAL ENCOUNTER (OUTPATIENT)
Dept: CARDIAC REHAB | Facility: HOSPITAL | Age: 70
Discharge: HOME | End: 2025-02-12
Attending: INTERNAL MEDICINE
Payer: COMMERCIAL

## 2025-02-12 DIAGNOSIS — I97.89 POSTOPERATIVE ATRIAL FIBRILLATION (H): ICD-10-CM

## 2025-02-12 DIAGNOSIS — I48.91 POSTOPERATIVE ATRIAL FIBRILLATION (H): ICD-10-CM

## 2025-02-12 DIAGNOSIS — Z95.1 S/P CABG (CORONARY ARTERY BYPASS GRAFT): Primary | ICD-10-CM

## 2025-02-12 LAB — INR POINT OF CARE: 1.9 (ref 0.9–1.1)

## 2025-02-12 PROCEDURE — 93798 PHYS/QHP OP CAR RHAB W/ECG: CPT

## 2025-02-13 ENCOUNTER — OFFICE VISIT (OUTPATIENT)
Dept: PULMONOLOGY | Facility: CLINIC | Age: 70
End: 2025-02-13
Payer: COMMERCIAL

## 2025-02-13 ENCOUNTER — OFFICE VISIT (OUTPATIENT)
Dept: PULMONOLOGY | Facility: CLINIC | Age: 70
End: 2025-02-13
Attending: FAMILY MEDICINE
Payer: COMMERCIAL

## 2025-02-13 VITALS
WEIGHT: 228 LBS | DIASTOLIC BLOOD PRESSURE: 66 MMHG | BODY MASS INDEX: 38.93 KG/M2 | HEART RATE: 53 BPM | OXYGEN SATURATION: 95 % | HEIGHT: 64 IN | SYSTOLIC BLOOD PRESSURE: 110 MMHG

## 2025-02-13 DIAGNOSIS — J96.01 ACUTE RESPIRATORY FAILURE WITH HYPOXIA (H): ICD-10-CM

## 2025-02-13 DIAGNOSIS — R06.09 DOE (DYSPNEA ON EXERTION): Primary | ICD-10-CM

## 2025-02-13 DIAGNOSIS — R09.02 HYPOXIA: ICD-10-CM

## 2025-02-13 LAB
DLCOCOR-%PRED-PRE: 89 %
DLCOCOR-PRE: 17.02 ML/MIN/MMHG
DLCOUNC-%PRED-PRE: 84 %
DLCOUNC-PRE: 16.06 ML/MIN/MMHG
DLCOUNC-PRED: 19.1 ML/MIN/MMHG
ERV-%PRED-PRE: 37 %
ERV-PRE: 0.38 L
ERV-PRED: 1.01 L
EXPTIME-PRE: 6.15 SEC
FEF2575-%PRED-POST: 101 %
FEF2575-%PRED-PRE: 67 %
FEF2575-POST: 1.86 L/SEC
FEF2575-PRE: 1.23 L/SEC
FEF2575-PRED: 1.83 L/SEC
FEFMAX-%PRED-PRE: 102 %
FEFMAX-PRE: 5.9 L/SEC
FEFMAX-PRED: 5.76 L/SEC
FEV1-%PRED-PRE: 73 %
FEV1-PRE: 1.54 L
FEV1FEV6-PRE: 77 %
FEV1FEV6-PRED: 79 %
FEV1FVC-PRE: 78 %
FEV1FVC-PRED: 79 %
FEV1SVC-PRE: 68 %
FEV1SVC-PRED: 67 %
FIFMAX-PRE: 3.81 L/SEC
FRCPLETH-%PRED-PRE: 79 %
FRCPLETH-PRE: 2.29 L
FRCPLETH-PRED: 2.88 L
FVC-%PRED-PRE: 74 %
FVC-PRE: 1.99 L
FVC-PRED: 2.67 L
HGB BLD-MCNC: 11.7 G/DL
IC-%PRED-PRE: 93 %
IC-PRE: 1.88 L
IC-PRED: 2.01 L
RVPLETH-%PRED-PRE: 94 %
RVPLETH-PRE: 1.92 L
RVPLETH-PRED: 2.04 L
TLCPLETH-%PRED-PRE: 84 %
TLCPLETH-PRE: 4.17 L
TLCPLETH-PRED: 4.94 L
VA-%PRED-PRE: 75 %
VA-PRE: 3.44 L
VC-%PRED-PRE: 72 %
VC-PRE: 2.25 L
VC-PRED: 3.12 L

## 2025-02-13 NOTE — PROGRESS NOTES
WALK NOTE:     Oxygen saturation on room air are 96%.  After walking 400 feet, sats are 96%.     Patient is ambulatory within his/her home.     Shoaib IRWIN CMA, [facility]

## 2025-02-13 NOTE — PROGRESS NOTES
Pulmonary New Clinic Visit       Visit Date 02/13/2025  MRN [id number]  PCP Calin Daugherty    Chief Complaint   Patient presents with    Consult       J96.01 (ICD-10-CM) - Acute respiratory failure with hypoxia (H)  PFT 2/13/25         Karyn Gamez is a 69 year old female referred for respiratory failure       I have reviewed patient's records from hospital and outpatient (primary care/cardiology) notes and summarized as follows:      Pertinent PMH includes    HFpEF, hypertrophic cardiomyopathy, HTN, HLD, CKD  CAD s/p CABG 11/18/2024.  Recovery complicated by A-fib/RVR    Hospitalized overnight 1/2/2025 for elevated BP, dyspnea, treated for mild CHF exacerbation.  Was hypoxic on presentation to ED. Suspicion was also raised for possible sleep apnea or COPD given smoking history.  Treated with DuoNebs, short corticosteroid course.  Noted was requiring O2 predominantly with sleep 1 to 2 L/min by nasal cannula.  Outpatient pulmonary and sleep evaluation was recommended.      Subjective:   Overall feels well. Doing cardiac rehab. No chronic/daily cough.  She does notice dyspnea with significant exertion or going up stairs.  No known history of lung disease  Former smoker, quit when she had her cardiac event last year      Social Hx:   On/off smoker since teenager years, until Nov 2024 MI  Smoked .5ppd or less for that time, ~50 years but with frequent breaks for 2-3+ years before relapses. Py hx is probably less than 20        ROS:  Reviewed pertinent systems, summarized above       Past Medical History:   Diagnosis Date    Cervicalgia 6/29/2005 June 29, 2005: Thrown from a horse - wearing a helmet - and struck side of head and neck, heard crepitation, no loc, Able to walk after injury.  persistant pain in neck relieved with ibuprofen, stiff but can move with ROM.  No changes in hands and feet sensation/motor.    Coronary artery disease     Depressive disorder, not elsewhere classified     Hypertension      Obesity, unspecified         Past Surgical History:   Procedure Laterality Date    ANGIOGRAM  2010    negative    COLONOSCOPY N/A 3/24/2023    Procedure: COLONOSCOPY, WITH HOT POLYPECTOMY AND RESEARCH BIOPSY;  Surgeon: Bret Velez MD;  Location: [facility] OR    CORONARY ARTERY BYPASS GRAFT, WITH ENDOSCOPIC VESSEL PROCUREMENT N/A 11/18/2024    Procedure: CORONARY ARTERY BYPASS GRAFT TIMES FIVE, LEFT INTERNAL MAMMARY ARTERY HARVEST, RIGHT ENDOSCOPIC VESSEL PROCUREMENT,;  Surgeon: Mary Pennington MD;  [place] [facility] [place]    CV CORONARY ANGIOGRAM N/A 11/8/2024    Procedure: Coronary Angiogram;  Surgeon: Emir Brand MD;  Location: [facility] CARDIAC CATH LAB    HYSTERECTOMY, PAP NO LONGER INDICATED      BSO    MIDLINE DOUBLE LUMEN PLACEMENT  11/24/2024    PROCURE ARTERY RADIAL  11/18/2024    Procedure: LEFT RADIAL ARTERY HARVEST;  Surgeon: Mary Pennington MD;  [place] [facility] [place]    TRANSESOPHAGEAL ECHOCARDIOGRAM INTRAOPERATIVE N/A 11/18/2024    Procedure: ECHOCARDIOGRAM, TRANSESPOPHAGEAL, WITH ANESTHESIA,;  Surgeon: Mary Pennington MD;  [place] [facility] [place]        Family History   Problem Relation Age of Onset    C.A.D. Father         first MI at 53, stenting x2    C.A.D. Mother         dx in her mid 50's    C.A.D. Brother         MI in mid-50's    Cerebrovascular Disease Brother         in late 50's         No Known Allergies        Current Outpatient Medications   Medication Sig Dispense Refill    acetaminophen (TYLENOL) 325 MG tablet Take 2 tablets (650 mg) by mouth every 4 hours as needed for other (For optimal non-opioid multimodal pain management to improve pain control.).      amLODIPine (NORVASC) 10 MG tablet Take 1 tablet (10 mg) by mouth daily. 30 tablet 1    aspirin (ASA) 81 MG chewable tablet Take 1 tablet (81 mg) by mouth daily.      atorvastatin (LIPITOR) 80 MG tablet Take 1 tablet (80 mg) by mouth daily For cholesterol. 90 tablet 1    furosemide (LASIX) 20 MG tablet Take 1 tablet  "(20 mg) by mouth daily. 90 tablet 0    metFORMIN (GLUCOPHAGE XR) 500 MG 24 hr tablet TAKE 1 TABLET BY MOUTH DAILY WITH DINNER FOR BLOOD SUGARS 90 tablet 1    metoprolol succinate ER (TOPROL XL) 100 MG 24 hr tablet Take 1 tablet (100 mg) by mouth daily. 30 tablet 0    ondansetron (ZOFRAN) 4 MG tablet Take 4 mg by mouth every 8 hours as needed for nausea or vomiting.      potassium chloride travis ER (KLOR-CON M10) 10 MEQ CR tablet Take 1 tablet (10 mEq) by mouth daily. 90 tablet 0    senna-docusate (SENOKOT-S/PERICOLACE) 8.6-50 MG tablet Take 1 tablet by mouth daily.      sertraline (ZOLOFT) 100 MG tablet Take 200 mg by mouth daily.      spironolactone (ALDACTONE) 25 MG tablet Take 0.5 tablets (12.5 mg) by mouth daily. 45 tablet 2    warfarin ANTICOAGULANT (COUMADIN) 1 MG tablet Takes 2 tablets (2mg) on Monday and Friday, and take 3 tablets (3mg) all other days, by mouth as directed by Anticoagulation Clinic. 235 tablet 1     No current facility-administered medications for this visit.           Objective:  Vitals:    02/13/25 1029   BP: 110/66   BP Location: Left arm   Patient Position: Sitting   Cuff Size: Adult Regular   Pulse: 53   SpO2: 95%   Weight: 103.4 kg (228 lb)   Height: 1.626 m (5' 4\")      Body mass index is 39.14 kg/m .       Physical Exam  Vitals reviewed.   Constitutional:       Appearance: Normal appearance. She is obese.   HENT:      Head: Normocephalic and atraumatic.   Eyes:      General: No scleral icterus.     Conjunctiva/sclera: Conjunctivae normal.   Cardiovascular:      Rate and Rhythm: Normal rate and regular rhythm.      Heart sounds: No murmur heard.  Pulmonary:      Effort: Pulmonary effort is normal. No respiratory distress.      Breath sounds: No wheezing or rales.   Musculoskeletal:         General: Deformity: no clubbing.   Skin:     General: Skin is warm and dry.   Neurological:      General: No focal deficit present.      Mental Status: She is alert and oriented to person, place, and " "time.   Psychiatric:         Mood and Affect: Mood normal.         Behavior: Behavior normal.         Thought Content: Thought content normal.         Judgment: Judgment normal.              PFTs:   Date 2/13/2025   Pre  Post  FVC 1.99 74% 2.19 +7%  FEV1 1.54 73% 1.73 +8%  Ratio 78  RV 1.92 94%   TLC 4.17 84%  DLCOu 84%  DLCOc 89%  Interpretation: Baseline spirometry with reduced FEV1 and FVC.  Post bronchodilator spirometry appears normalized however the improvement is not large enough to count as \"significant\" by ATS criteria.  Lung volumes and diffusion capacity are normal.        Imaging:   Personally and independently reviewed following imaging with my own impression:   CXR 1/2/2025: RUL granuloma.  Left costophrenic angle blunting, stable in appearance compared to November 2024 x-ray while hospitalized post CABG    Cardiac/Echo/Caths:   TTE 1/3/25:  The echo findings are consistent with hypertrophic cardiomyopathy. Asymmetric  left ventricular hypertrophy with septum measuring 1.6 cm. There is a resting  left ventricular outflow gradient of 34 mmHg which increases to 57 mmHg with  Valsalva.  The visual ejection fraction is 65-70% without wall motion abnormality.  Mildly decreased right ventricular systolic function  There is mild (1+) mitral regurgitation.  There is systolic anterior motion of the mitral valve.  There is trace to mild tricuspid regurgitation. No pulmonary HTN.  Compared to the prior study dated 11/20/2024, there have been no changes.        Pertinent Labs:   Serum CO2  28-34 over the last few months     ABGs while hospitalized for CABG reviewed, no evidence of chronic CO2 retention        Assessment:   Dyspnea on exertion, predominantly from obesity and deconditioning and heart disease  History of hypoxemic respiratory failure, during CHF exacerbation  At risk for sleep apnea  calcified RUL granuloma, splenic calcifications, probably from prior histoplasma exposure, no workup necessary  H/o " tobaccoism    Reviewed reassuring PFTs with her.  Also reviewed reassuring imaging   No clear evidence of specific lung disease.  Agree with her risk of sleep apnea, needs a sleep evaluation and she already has an appointment set up with sleep medicine on 4/7/2025.    Plan:  Increase exercise, recommend weight loss  Agree with sleep referral  Encouraged continued abstinence from smoking      RTC PRN    Jonathon Layton MD  02/13/2025 10:41 AM  Pulmonary & Critical Care        This note was completed using voice recognition software. Dictation errors may have been missed.     Future Appointments   Date Time Provider Department Center   2/13/2025 10:45 AM Jonathon Layton MD [facility]   2/14/2025 10:00 AM [facility] CARDIAC REHAB RESOURCE 1 [facility]   2/17/2025 10:00 AM [facility] CARDIAC REHAB RESOURCE 1 [facility]   2/19/2025 10:00 AM [facility] CARDIAC REHAB RESOURCE 1 [facility]   2/21/2025 10:00 AM [facility] CARDIAC REHAB RESOURCE 1 [facility]   2/24/2025 10:00 AM [facility] CARDIAC REHAB RESOURCE 1 [facility]   2/26/2025 10:00 AM [facility] CARDIAC REHAB RESOURCE 1 [facility]   2/26/2025 11:15 AM HCC LAB [facility]   2/28/2025 10:00 AM [facility] CARDIAC REHAB RESOURCE 1 [facility]   3/3/2025 10:00 AM [facility] CARDIAC REHAB RESOURCE 1 [facility]   3/5/2025 10:00 AM [facility] CARDIAC REHAB RESOURCE 1 [facility]   3/7/2025 10:00 AM [facility] CARDIAC REHAB RESOURCE 1 [facility]   3/10/2025 10:00 AM [facility] CARDIAC REHAB RESOURCE 1 [facility]   3/12/2025  9:00 AM [facility] CARDIAC REHAB RESOURCE 5 [facility]   3/12/2025 10:00 AM [facility] CARDIAC REHAB RESOURCE 1 [facility]   3/14/2025 10:00 AM [facility] CARDIAC REHAB RESOURCE 1 [facility]   3/17/2025 10:00 AM [facility]   3/19/2025 10:00 AM [facility] CARDIAC REHAB RESOURCE 1 [facility]   3/21/2025 10:00 AM N CARDIAC REHAB RESOURCE 1 [facility]   3/24/2025 10:00 AM N CARDIAC REHAB RESOURCE 1 [facility]   3/26/2025 10:00 AM  [facility] CARDIAC REHAB RESOURCE 1 [facility]   3/28/2025 10:00 AM [facility] CARDIAC REHAB RESOURCE 1 [facility]   3/31/2025 10:00 AM [facility] CARDIAC REHAB RESOURCE 1 [facility]   4/2/2025 10:00 AM [facility] CARDIAC REHAB RESOURCE 1 [facility]   4/4/2025 10:00 AM [facility] CARDIAC REHAB RESOURCE 1 [facility]   4/7/2025 10:00 AM [facility] CARDIAC REHAB RESOURCE 1 JNCVRB [facility]V N   4/7/2025  1:00 PM Oralia Blake APRN CNP [facility]   4/9/2025 10:00 AM [facility] CARDIAC REHAB RESOURCE 1 JNCVRB [facility] N   4/11/2025 10:00 AM [facility] CARDIAC REHAB RESOURCE 1 JNCVRB [facility]V N   4/14/2025 10:00 AM [facility] CARDIAC REHAB RESOURCE 1 JNCVRB [facility]V N   4/16/2025 10:00 AM [facility] CARDIAC REHAB RESOURCE 1 [facility]RB [facility]   4/18/2025 10:00 AM [facility] CARDIAC REHAB RESOURCE 1 JNCVRB [facility]V N   4/21/2025 10:00 AM [facility] CARDIAC REHAB RESOURCE 1 JNCVRB FV N   4/22/2025 12:50 PM Maria Elena Yancey, CNP [facility]   4/23/2025 10:00 AM [facility] CARDIAC REHAB RESOURCE 1 [facility]RB [facility]   4/25/2025 10:00 AM [facility] CARDIAC REHAB RESOURCE 1 [facility]RB [facility]   4/28/2025 10:00 AM [facility] CARDIAC REHAB RESOURCE 1 JNCVRB FV N         CC: Copy sent to Calin Daugherty through Epic and/or letter

## 2025-02-14 ENCOUNTER — HOSPITAL ENCOUNTER (OUTPATIENT)
Dept: CARDIAC REHAB | Facility: HOSPITAL | Age: 70
Discharge: HOME | End: 2025-02-14
Attending: INTERNAL MEDICINE
Payer: COMMERCIAL

## 2025-02-14 PROCEDURE — 93798 PHYS/QHP OP CAR RHAB W/ECG: CPT

## 2025-02-19 ENCOUNTER — HOSPITAL ENCOUNTER (OUTPATIENT)
Dept: CARDIAC REHAB | Facility: HOSPITAL | Age: 70
Discharge: HOME | End: 2025-02-19
Attending: INTERNAL MEDICINE
Payer: COMMERCIAL

## 2025-02-19 ENCOUNTER — PATIENT OUTREACH (OUTPATIENT)
Dept: CARE COORDINATION | Facility: CLINIC | Age: 70
End: 2025-02-19
Payer: COMMERCIAL

## 2025-02-19 ENCOUNTER — TELEPHONE (OUTPATIENT)
Dept: CARE COORDINATION | Facility: CLINIC | Age: 70
End: 2025-02-19
Payer: COMMERCIAL

## 2025-02-19 DIAGNOSIS — I97.89 POSTOPERATIVE ATRIAL FIBRILLATION (H): ICD-10-CM

## 2025-02-19 DIAGNOSIS — I10 HYPERTENSION GOAL BP (BLOOD PRESSURE) < 140/90: ICD-10-CM

## 2025-02-19 DIAGNOSIS — R80.9 CONTROLLED TYPE 2 DIABETES MELLITUS WITH MICROALBUMINURIA, WITHOUT LONG-TERM CURRENT USE OF INSULIN (H): Primary | ICD-10-CM

## 2025-02-19 DIAGNOSIS — E11.29 CONTROLLED TYPE 2 DIABETES MELLITUS WITH MICROALBUMINURIA, WITHOUT LONG-TERM CURRENT USE OF INSULIN (H): Primary | ICD-10-CM

## 2025-02-19 DIAGNOSIS — I48.91 POSTOPERATIVE ATRIAL FIBRILLATION (H): ICD-10-CM

## 2025-02-19 PROCEDURE — 93798 PHYS/QHP OP CAR RHAB W/ECG: CPT

## 2025-02-19 RX ORDER — METOPROLOL SUCCINATE 100 MG/1
100 TABLET, EXTENDED RELEASE ORAL DAILY
Qty: 30 TABLET | Refills: 0 | Status: CANCELLED | OUTPATIENT
Start: 2025-02-19

## 2025-02-19 RX ORDER — METFORMIN HYDROCHLORIDE 500 MG/1
TABLET, EXTENDED RELEASE ORAL
Qty: 90 TABLET | Refills: 1 | Status: CANCELLED | OUTPATIENT
Start: 2025-02-19

## 2025-02-19 RX ORDER — METOPROLOL SUCCINATE 100 MG/1
100 TABLET, EXTENDED RELEASE ORAL DAILY
Qty: 90 TABLET | Refills: 0 | Status: SHIPPED | OUTPATIENT
Start: 2025-02-19

## 2025-02-19 NOTE — PROGRESS NOTES
"Clinic Care Coordination Contact  Follow Up Progress Note      Assessment: spoke with Karyn for follow up.  She has been attending Cardiac Rehab and increasing her physical activity.  She stated the wound to her leg \"just healed\".  She stated this limited her activity but she is doing well now.  She denies any other needs.      Care Gaps:    Health Maintenance Due   Topic Date Due    DEXA  Never done    HF ACTION PLAN  Never done    RSV VACCINE (1 - Risk 60-74 years 1-dose series) Never done    MEDICARE ANNUAL WELLNESS VISIT  09/23/2021    DIABETIC FOOT EXAM  08/26/2022    EYE EXAM  02/06/2024    MAMMO SCREENING  02/01/2025       Currently focused on cardiac rehab and will focus on her AWV later.    Care Plans      Intervention/Education provided during outreach: yes      During the call Karyn stated that she hasn't been able to get a refill of her Metoprolol.  Upon a chart review it appears this was ordered while inpatient.        Plan:   Will send a note to her PCP to refill her Metoprolol.  Care Coordinator do no further follow up as resources have been given.  "

## 2025-02-19 NOTE — TELEPHONE ENCOUNTER
Clinic Care Coordination Contact      Spoke with Karyn today for Clinic Care Coordination.  She stated she has been having difficulty getting a refill of her Metoprolol.  Appears this was written by a physician while she was inpatient.  She currently has 5 tabs left.       metoprolol succinate ER (TOPROL XL) 100 MG 24 hr tablet Take 1 tablet (100 mg) by mouth daily.

## 2025-02-19 NOTE — TELEPHONE ENCOUNTER
Provider unable to sign prescriptions under current encounter. New refill encounter started for this request. Please see 2/19 refill encounter for further information.    Effie Bailey RN

## 2025-02-20 DIAGNOSIS — F33.1 MAJOR DEPRESSIVE DISORDER, RECURRENT EPISODE, MODERATE (H): Primary | ICD-10-CM

## 2025-02-20 RX ORDER — SERTRALINE HYDROCHLORIDE 100 MG/1
200 TABLET, FILM COATED ORAL DAILY
Qty: 180 TABLET | Refills: 0 | Status: SHIPPED | OUTPATIENT
Start: 2025-02-20

## 2025-02-21 ENCOUNTER — HOSPITAL ENCOUNTER (OUTPATIENT)
Dept: CARDIAC REHAB | Facility: HOSPITAL | Age: 70
Discharge: HOME | End: 2025-02-21
Attending: INTERNAL MEDICINE
Payer: COMMERCIAL

## 2025-02-21 PROCEDURE — 93798 PHYS/QHP OP CAR RHAB W/ECG: CPT

## 2025-02-24 ENCOUNTER — HOSPITAL ENCOUNTER (OUTPATIENT)
Dept: CARDIAC REHAB | Facility: HOSPITAL | Age: 70
Discharge: HOME | End: 2025-02-24
Attending: INTERNAL MEDICINE
Payer: COMMERCIAL

## 2025-02-24 ENCOUNTER — TELEPHONE (OUTPATIENT)
Dept: CARDIOLOGY | Facility: CLINIC | Age: 70
End: 2025-02-24

## 2025-02-24 LAB — CV ZIO PRELIM RESULTS: NORMAL

## 2025-02-24 PROCEDURE — 93798 PHYS/QHP OP CAR RHAB W/ECG: CPT

## 2025-02-26 ENCOUNTER — ANTICOAGULATION THERAPY VISIT (OUTPATIENT)
Dept: ANTICOAGULATION | Facility: CLINIC | Age: 70
End: 2025-02-26

## 2025-02-26 ENCOUNTER — LAB (OUTPATIENT)
Dept: CARDIOLOGY | Facility: CLINIC | Age: 70
End: 2025-02-26
Payer: COMMERCIAL

## 2025-02-26 ENCOUNTER — HOSPITAL ENCOUNTER (OUTPATIENT)
Dept: CARDIAC REHAB | Facility: HOSPITAL | Age: 70
Discharge: HOME | End: 2025-02-26
Attending: INTERNAL MEDICINE
Payer: COMMERCIAL

## 2025-02-26 DIAGNOSIS — I97.89 POSTOPERATIVE ATRIAL FIBRILLATION (H): ICD-10-CM

## 2025-02-26 DIAGNOSIS — I48.91 POSTOPERATIVE ATRIAL FIBRILLATION (H): ICD-10-CM

## 2025-02-26 DIAGNOSIS — Z95.1 S/P CABG (CORONARY ARTERY BYPASS GRAFT): Primary | ICD-10-CM

## 2025-02-26 LAB — INR POINT OF CARE: 2.2 (ref 0.9–1.1)

## 2025-02-26 PROCEDURE — 93798 PHYS/QHP OP CAR RHAB W/ECG: CPT

## 2025-02-26 NOTE — PROGRESS NOTES
ANTICOAGULATION MANAGEMENT     Karyn Gamez 69 year old female is on warfarin with therapeutic INR result. (Goal INR 2.0-3.0)    Recent labs: (last 7 days)     02/26/25  1117   INR 2.2*       ASSESSMENT     Source(s): Chart Reviewed    Diet: per last INR patient started eating Vitamin-K foods.  Previously avoided any intake of salads / vegetables.  Medication/supplement changes:  Yes   Weekly warfarin dose was increased by 5.3% on 2/12/25.  New illness, injury, or hospitalization: No  Previous result: Subtherapeutic at 1.9 on 2/12/25.  Additional findings:  Tried call 2x -  was full.   - sent [employer] message with instructions.       PLAN     Recommended plan for ongoing change(s) affecting INR     Dosing Instructions: Continue your current warfarin dose with next INR in 2 weeks       Summary  As of 2/26/2025      Full warfarin instructions:  2 mg every Mon; 3 mg all other days   Next INR check:  3/12/2025               Sent [employer] message with dosing and follow up instructions    Contact [phone] to schedule and with any changes, questions or concerns.     Education provided: Goal range and lab monitoring: goal range and significance of current result  Dietary considerations: importance of consistent vitamin K intake    Plan made per [facility] anticoagulation protocol    Davina Galarza RN  2/26/2025  Anticoagulation Clinic  [facility] for routing messages: hernan EDWARDS  [facility] patient phone line: [phone]        _______________________________________________________________________     Anticoagulation Episode Summary       Current INR goal:  2.0-3.0   TTR:  55.7% (2.5 mo)   Target end date:  Indefinite   Send INR reminders to:  ERASMO EDWARDS    Indications    S/P CABG (coronary artery bypass graft) [Z95.1]  Postoperative atrial fibrillation (H) [I97.89  I48.91]             Comments:  --             Anticoagulation Care Providers       Provider Role Specialty Phone number    Angelic  Ly Tovar PA-C Referring Cardiovascular & Thoracic Surgery [phone]    Cristiana Davis MD Referring [facility] Medicine [phone]

## 2025-02-27 LAB
DLCOCOR-%PRED-PRE: 89 %
DLCOCOR-PRE: 17.02 ML/MIN/MMHG
DLCOUNC-%PRED-PRE: 84 %
DLCOUNC-PRE: 16.06 ML/MIN/MMHG
DLCOUNC-PRED: 19.1 ML/MIN/MMHG
ERV-%PRED-PRE: 37 %
ERV-PRE: 0.38 L
ERV-PRED: 1.01 L
EXPTIME-PRE: 6.15 SEC
FEF2575-%PRED-POST: 101 %
FEF2575-%PRED-PRE: 67 %
FEF2575-POST: 1.86 L/SEC
FEF2575-PRE: 1.23 L/SEC
FEF2575-PRED: 1.83 L/SEC
FEFMAX-%PRED-PRE: 102 %
FEFMAX-PRE: 5.9 L/SEC
FEFMAX-PRED: 5.76 L/SEC
FEV1-%PRED-PRE: 73 %
FEV1-PRE: 1.54 L
FEV1FEV6-PRE: 77 %
FEV1FEV6-PRED: 79 %
FEV1FVC-PRE: 78 %
FEV1FVC-PRED: 79 %
FEV1SVC-PRE: 68 %
FEV1SVC-PRED: 67 %
FIFMAX-PRE: 3.81 L/SEC
FRCPLETH-%PRED-PRE: 79 %
FRCPLETH-PRE: 2.29 L
FRCPLETH-PRED: 2.88 L
FVC-%PRED-PRE: 74 %
FVC-PRE: 1.99 L
FVC-PRED: 2.67 L
IC-%PRED-PRE: 93 %
IC-PRE: 1.88 L
IC-PRED: 2.01 L
RVPLETH-%PRED-PRE: 94 %
RVPLETH-PRE: 1.92 L
RVPLETH-PRED: 2.04 L
TLCPLETH-%PRED-PRE: 84 %
TLCPLETH-PRE: 4.17 L
TLCPLETH-PRED: 4.94 L
VA-%PRED-PRE: 75 %
VA-PRE: 3.44 L
VC-%PRED-PRE: 72 %
VC-PRE: 2.25 L
VC-PRED: 3.12 L

## 2025-03-03 ENCOUNTER — HOSPITAL ENCOUNTER (OUTPATIENT)
Dept: CARDIAC REHAB | Facility: HOSPITAL | Age: 70
Discharge: HOME | End: 2025-03-03
Attending: INTERNAL MEDICINE
Payer: COMMERCIAL

## 2025-03-03 ENCOUNTER — MYC REFILL (OUTPATIENT)
Dept: FAMILY MEDICINE | Facility: CLINIC | Age: 70
End: 2025-03-03
Payer: COMMERCIAL

## 2025-03-03 DIAGNOSIS — R80.9 CONTROLLED TYPE 2 DIABETES MELLITUS WITH MICROALBUMINURIA, WITHOUT LONG-TERM CURRENT USE OF INSULIN (H): ICD-10-CM

## 2025-03-03 DIAGNOSIS — E11.29 CONTROLLED TYPE 2 DIABETES MELLITUS WITH MICROALBUMINURIA, WITHOUT LONG-TERM CURRENT USE OF INSULIN (H): ICD-10-CM

## 2025-03-03 PROCEDURE — 93798 PHYS/QHP OP CAR RHAB W/ECG: CPT

## 2025-03-03 RX ORDER — METFORMIN HYDROCHLORIDE 500 MG/1
TABLET, EXTENDED RELEASE ORAL
Qty: 90 TABLET | Refills: 1 | Status: SHIPPED | OUTPATIENT
Start: 2025-03-03

## 2025-03-03 NOTE — TELEPHONE ENCOUNTER
Called patient. She had not been taking any of her medications for a long time. She states she started them up again after she had a heart attack in February. She states she has not run out of her Metformin and is in need of a refill. Please advise     Kenneth To RN

## 2025-03-05 ENCOUNTER — TELEPHONE (OUTPATIENT)
Dept: CARDIOLOGY | Facility: CLINIC | Age: 70
End: 2025-03-05
Payer: COMMERCIAL

## 2025-03-05 NOTE — TELEPHONE ENCOUNTER
See [employer] message 3/4/25.    View All Conversations on this Encounter  Eloina Wagner, RN  [facility] Cv Rn Team X20 minutes ago (2:28 PM)     AARTI Doe there!  Could someone help with this? Thank you so much!  Lucretia Flynn MD Johnson, Caroline, RN45 minutes ago (2:02 PM)       Her stroke risk is high (NMHSY-5-Uwnb = 5).  Even though the monitor showed no atrial fibrillation, it can return at any time, therefore I would advise continuing anticoagulation.  Either warfarin, Eliquis, Xarelto, or Pradxa (whichever insurance covers)    Eloina Miranda, Lucretia Diaz MD55 minutes ago (1:53 PM)     AARTI Jerez,  You saw this patient on 1-24-25, would you be able to assist with the note below?  Thank you!  Zhao York, PA-C  Eloina Wagner, RN1 hour ago (1:33 PM)     ARIEL Doe,    No, this was somebody else. But I would defer DOAC to primary cardiologist given that there was no AF on monitor. Would be happy to see again if AF recurs.    Thanks,  Eloina New, Zhao Bergman, PA-C1 hour ago (1:10 PM)     AARTI Chiang,  You saw this patient for afib post CABG on 2-4-25.  Looks like her monitor was negative for afib.  I wonder if this was the patient you spoke to me about in clinic yesterday?  Maria Elena from HF is wanting you to follow-up with DOAC recommendations.  Not sure if you have signed off?  She has no EP follow-up scheduled.  Thoughts?  Thank you!  Eloina

## 2025-03-05 NOTE — TELEPHONE ENCOUNTER
----- Message from Maria Elena Yancey sent at 3/5/2025 12:07 PM CST -----  Please call patient to review. Reviewed with Dr. Jerez. No significant ventricular arrhythmia on heart monitor, ICD is not indicated at this time. No episodes of Afib were recorded. Given HCM and [id number] score of 6 for female sex, age 65-74 and h/o HTN, CHF, vascular disease, and DM, it is recommended that she remain on anticoagulation for stroke risk. If she is interested in switching from warfarin to DOAC, we can send message to Андрей from  for these recommendations.

## 2025-03-05 NOTE — TELEPHONE ENCOUNTER
Updated pt with results and recommendations, she wishes to move forward with a different DOAC @ this time, denies palpitations, sob, abd bloating or LE swelling, advised EP will be contacting her when available, encouraged if new or worsening HF sx develop to call HCC or be seen in ER, pt agreeable to this plan.    Cecile Sen RN

## 2025-03-07 ENCOUNTER — HOSPITAL ENCOUNTER (OUTPATIENT)
Dept: CARDIAC REHAB | Facility: HOSPITAL | Age: 70
Discharge: HOME | End: 2025-03-07
Attending: INTERNAL MEDICINE
Payer: COMMERCIAL

## 2025-03-07 PROCEDURE — 93798 PHYS/QHP OP CAR RHAB W/ECG: CPT

## 2025-03-10 ENCOUNTER — HOSPITAL ENCOUNTER (OUTPATIENT)
Dept: CARDIAC REHAB | Facility: HOSPITAL | Age: 70
Discharge: HOME | End: 2025-03-10
Attending: INTERNAL MEDICINE
Payer: COMMERCIAL

## 2025-03-10 PROCEDURE — 93798 PHYS/QHP OP CAR RHAB W/ECG: CPT

## 2025-03-12 ENCOUNTER — LAB (OUTPATIENT)
Dept: LAB | Facility: CLINIC | Age: 70
End: 2025-03-12
Payer: COMMERCIAL

## 2025-03-12 ENCOUNTER — ANTICOAGULATION THERAPY VISIT (OUTPATIENT)
Dept: ANTICOAGULATION | Facility: CLINIC | Age: 70
End: 2025-03-12

## 2025-03-12 DIAGNOSIS — I48.91 POSTOPERATIVE ATRIAL FIBRILLATION (H): ICD-10-CM

## 2025-03-12 DIAGNOSIS — J96.01 ACUTE RESPIRATORY FAILURE WITH HYPOXIA (H): ICD-10-CM

## 2025-03-12 DIAGNOSIS — Z95.1 S/P CABG (CORONARY ARTERY BYPASS GRAFT): Primary | ICD-10-CM

## 2025-03-12 DIAGNOSIS — I97.89 POSTOPERATIVE ATRIAL FIBRILLATION (H): ICD-10-CM

## 2025-03-12 DIAGNOSIS — R09.02 HYPOXIA: Primary | ICD-10-CM

## 2025-03-12 LAB
HGB BLD-MCNC: 11.8 G/DL (ref 11.7–15.7)
INR BLD: 2.9 (ref 0.9–1.1)

## 2025-03-12 PROCEDURE — 85018 HEMOGLOBIN: CPT

## 2025-03-12 PROCEDURE — 85610 PROTHROMBIN TIME: CPT

## 2025-03-12 PROCEDURE — 36415 COLL VENOUS BLD VENIPUNCTURE: CPT

## 2025-03-12 NOTE — PROGRESS NOTES
ANTICOAGULATION MANAGEMENT     Krayn Gamez 69 year old female is on warfarin with therapeutic INR result. (Goal INR 2.0-3.0)    Recent labs: (last 7 days)     03/12/25  0851   INR 2.9*       ASSESSMENT     Warfarin Lab Questionnaire    Warfarin Doses Last 7 Days      3/12/2025     8:55 AM   Dose in Tablet or Mg   TAB or MG? tablet (tab)     Pt Rptd Dose NAS MONDAY TUESDAY WED THURS FRIDAY SATURDAY   3/12/2025   8:55 AM 3 2 3 3 3 3 3         3/12/2025   Warfarin Lab Questionnaire   Missed doses within past 14 days? No   Changes in diet or alcohol within past 14 days? No   Medication changes since last result? No   Injuries or illness since last result? No   New shortness of breath, severe headaches or sudden changes in vision since last result? No   Abnormal bleeding since last result? No   Upcoming surgery, procedure? No   Best number to call with results? [phone]     Previous result: Therapeutic last 2(+) visits  Additional findings: None       PLAN     Recommended plan for no diet, medication or health factor changes affecting INR     Dosing Instructions: Continue your current warfarin dose with next INR in 2 weeks       Summary  As of 3/12/2025      Full warfarin instructions:  2 mg every Mon; 3 mg all other days   Next INR check:  3/26/2025               Detailed voice message left for Karyn with dosing instructions and follow up date.     Contact [phone] to schedule and with any changes, questions or concerns.     Education provided: Please call back if any changes to your diet, medications or how you've been taking warfarin    Plan made per [facility] anticoagulation protocol    Magaly Brooks RN  3/12/2025  Anticoagulation Clinic  [employer] for routing messages: hernan EDWARDS  [facility] patient phone line: [phone]        _______________________________________________________________________     Anticoagulation Episode Summary       Current INR goal:  2.0-3.0   TTR:  62.6% (3 mo)   Target  end date:  Indefinite   Send INR reminders to:  ERASMO WANG [facility]    Indications    S/P CABG (coronary artery bypass graft) [Z95.1]  Postoperative atrial fibrillation (H) [I97.89  I48.91]             Comments:  --             Anticoagulation Care Providers       Provider Role Specialty Phone number    Ly Atwood PA-C Referring Cardiovascular & Thoracic Surgery [phone]    Cristiana Davis MD Referring Family Medicine [phone]

## 2025-03-14 ENCOUNTER — HOSPITAL ENCOUNTER (OUTPATIENT)
Dept: CARDIAC REHAB | Facility: HOSPITAL | Age: 70
Discharge: HOME | End: 2025-03-14
Attending: INTERNAL MEDICINE
Payer: COMMERCIAL

## 2025-03-14 PROCEDURE — 93798 PHYS/QHP OP CAR RHAB W/ECG: CPT

## 2025-03-19 ENCOUNTER — HOSPITAL ENCOUNTER (OUTPATIENT)
Dept: CARDIAC REHAB | Facility: HOSPITAL | Age: 70
Discharge: HOME | End: 2025-03-19
Attending: INTERNAL MEDICINE
Payer: COMMERCIAL

## 2025-03-19 PROCEDURE — 93798 PHYS/QHP OP CAR RHAB W/ECG: CPT

## 2025-03-24 ENCOUNTER — HOSPITAL ENCOUNTER (OUTPATIENT)
Dept: CARDIAC REHAB | Facility: HOSPITAL | Age: 70
Discharge: HOME | End: 2025-03-24
Attending: INTERNAL MEDICINE
Payer: COMMERCIAL

## 2025-03-24 PROCEDURE — 93798 PHYS/QHP OP CAR RHAB W/ECG: CPT

## 2025-03-26 ENCOUNTER — ANTICOAGULATION THERAPY VISIT (OUTPATIENT)
Dept: ANTICOAGULATION | Facility: CLINIC | Age: 70
End: 2025-03-26

## 2025-03-26 ENCOUNTER — LAB (OUTPATIENT)
Dept: LAB | Facility: CLINIC | Age: 70
End: 2025-03-26
Payer: COMMERCIAL

## 2025-03-26 DIAGNOSIS — I97.89 POSTOPERATIVE ATRIAL FIBRILLATION (H): ICD-10-CM

## 2025-03-26 DIAGNOSIS — I48.91 POSTOPERATIVE ATRIAL FIBRILLATION (H): ICD-10-CM

## 2025-03-26 DIAGNOSIS — Z95.1 S/P CABG (CORONARY ARTERY BYPASS GRAFT): Primary | ICD-10-CM

## 2025-03-26 LAB — INR BLD: 1.7 (ref 0.9–1.1)

## 2025-03-26 PROCEDURE — 36416 COLLJ CAPILLARY BLOOD SPEC: CPT

## 2025-03-26 PROCEDURE — 85610 PROTHROMBIN TIME: CPT

## 2025-03-26 NOTE — PROGRESS NOTES
ANTICOAGULATION MANAGEMENT     Karyn Gamez 69 year old female is on warfarin with subtherapeutic INR result. (Goal INR 2.0-3.0)    Recent labs: (last 7 days)     03/26/25  1400   INR 1.7*       ASSESSMENT     Warfarin Lab Questionnaire    Warfarin Doses Last 7 Days      3/26/2025    12:42 PM   Dose in Tablet or Mg   TAB or MG? tablet (tab)     Pt Rptd Dose NAS MONDAY TUESDAY WED THURS FRIDAY SATURDAY   3/26/2025  12:42 PM 3 2 3 3 3 3 3         3/26/2025   Warfarin Lab Questionnaire   Missed doses within past 14 days? No   Changes in diet or alcohol within past 14 days? No   Medication changes since last result? No   Injuries or illness since last result? No   New shortness of breath, severe headaches or sudden changes in vision since last result? No   Abnormal bleeding since last result? No   Upcoming surgery, procedure? No   Best number to call with results? [phone]     Previous result: Therapeutic last 2(+) visits  Additional findings: None       PLAN     Recommended plan for no diet, medication or health factor changes affecting INR     Dosing Instructions: Continue your current warfarin dose with next INR in 2 weeks       Summary  As of 3/26/2025      Full warfarin instructions:  2 mg every Mon; 3 mg all other days   Next INR check:  4/9/2025               Detailed voice message left for Karyn with dosing instructions and follow up date.   Sent [employer] message with dosing and follow up instructions    Contact [phone] to schedule and with any changes, questions or concerns.     Education provided: Please call back if any changes to your diet, medications or how you've been taking warfarin    Plan made per [facility] anticoagulation protocol    Denisa Barcenas, RN  3/26/2025  Anticoagulation Clinic  [facility] for routing messages: hernan WANG [facility] patient phone line: [phone]        _______________________________________________________________________     Anticoagulation Episode  Summary       Current INR goal:  2.0-3.0   TTR:  64.3% (3.4 mo)   Target end date:  Indefinite   Send INR reminders to:  ERASMO EDWARDS    Indications    S/P CABG (coronary artery bypass graft) [Z95.1]  Postoperative atrial fibrillation (H) [I97.89  I48.91]             Comments:  --             Anticoagulation Care Providers       Provider Role Specialty Phone number    yL Atwood PA-C Referring Cardiovascular & Thoracic Surgery [phone]    Cristiana Davis MD Referring Family Medicine [phone]

## 2025-03-31 ENCOUNTER — HOSPITAL ENCOUNTER (OUTPATIENT)
Dept: CARDIAC REHAB | Facility: HOSPITAL | Age: 70
Discharge: HOME | End: 2025-03-31
Attending: INTERNAL MEDICINE
Payer: COMMERCIAL

## 2025-03-31 PROCEDURE — 93798 PHYS/QHP OP CAR RHAB W/ECG: CPT

## 2025-04-02 ENCOUNTER — HOSPITAL ENCOUNTER (OUTPATIENT)
Dept: CARDIAC REHAB | Facility: HOSPITAL | Age: 70
Discharge: HOME | End: 2025-04-02
Attending: INTERNAL MEDICINE
Payer: COMMERCIAL

## 2025-04-02 PROCEDURE — 93798 PHYS/QHP OP CAR RHAB W/ECG: CPT

## 2025-04-07 ENCOUNTER — LAB (OUTPATIENT)
Dept: LAB | Facility: CLINIC | Age: 70
End: 2025-04-07
Payer: COMMERCIAL

## 2025-04-07 ENCOUNTER — ANTICOAGULATION THERAPY VISIT (OUTPATIENT)
Dept: ANTICOAGULATION | Facility: CLINIC | Age: 70
End: 2025-04-07

## 2025-04-07 DIAGNOSIS — I97.89 POSTOPERATIVE ATRIAL FIBRILLATION (H): ICD-10-CM

## 2025-04-07 DIAGNOSIS — I48.91 POSTOPERATIVE ATRIAL FIBRILLATION (H): ICD-10-CM

## 2025-04-07 DIAGNOSIS — Z95.1 S/P CABG (CORONARY ARTERY BYPASS GRAFT): Primary | ICD-10-CM

## 2025-04-07 DIAGNOSIS — Z95.1 S/P CABG (CORONARY ARTERY BYPASS GRAFT): ICD-10-CM

## 2025-04-07 LAB — INR BLD: 1.9 (ref 0.9–1.1)

## 2025-04-07 PROCEDURE — 85610 PROTHROMBIN TIME: CPT

## 2025-04-07 PROCEDURE — 36416 COLLJ CAPILLARY BLOOD SPEC: CPT

## 2025-04-07 RX ORDER — FUROSEMIDE 20 MG/1
20 TABLET ORAL DAILY
Qty: 90 TABLET | Refills: 0 | Status: SHIPPED | OUTPATIENT
Start: 2025-04-07

## 2025-04-07 NOTE — PROGRESS NOTES
ANTICOAGULATION MANAGEMENT     Karyn Gamez 69 year old female is on warfarin with subtherapeutic INR result. (Goal INR 2.0-3.0)    Recent labs: (last 7 days)     04/07/25  1314   INR 1.9*       ASSESSMENT     Warfarin Lab Questionnaire    Warfarin Doses Last 7 Days      4/7/2025     1:15 PM   Dose in Tablet or Mg   TAB or MG? tablet (tab)     Pt Rptd Dose SUNDAY MONDAY TUESDAY WED THURS FRIDAY SATURDAY 4/7/2025   1:15 PM 3 2 3 3 3 3 3         4/7/2025   Warfarin Lab Questionnaire   Missed doses within past 14 days? No   Changes in diet or alcohol within past 14 days? No   Medication changes since last result? No   Injuries or illness since last result? No   New shortness of breath, severe headaches or sudden changes in vision since last result? No   Abnormal bleeding since last result? No   Upcoming surgery, procedure? No   Best number to call with results? [phone]     Previous result: Subtherapeutic  Additional findings: None       PLAN     Recommended plan for ongoing change(s) affecting INR     Dosing Instructions: Increase your warfarin dose (5% change) with next INR in 2 weeks       Summary  As of 4/7/2025      Full warfarin instructions:  3 mg every day   Next INR check:  4/21/2025               Detailed voice message left for Karyn with dosing instructions and follow up date.   Sent [employer] message with dosing and follow up instructions    Contact [phone] to schedule and with any changes, questions or concerns.     Education provided: Please call back if any changes to your diet, medications or how you've been taking warfarin  Goal range and lab monitoring: goal range and significance of current result, Importance of therapeutic range, and Importance of following up at instructed interval    Plan made per [facility] anticoagulation protocol    Leana Rivera, RN  4/7/2025  Anticoagulation Clinic  [facility] for routing messages: hernan ZIMMERMAN [facility] patient phone line:  [phone]        _______________________________________________________________________     Anticoagulation Episode Summary       Current INR goal:  2.0-3.0   TTR:  57.7% (3.8 mo)   Target end date:  Indefinite   Send INR reminders to:  [facility]    Indications    S/P CABG (coronary artery bypass graft) [Z95.1]  Postoperative atrial fibrillation (H) [I97.89  I48.91]             Comments:  --             Anticoagulation Care Providers       Provider Role Specialty Phone number    Ly Atwood PA-C Referring Cardiovascular & Thoracic Surgery [phone]    Cristiana Davis MD Referring Family Medicine [phone]

## 2025-04-08 RX ORDER — FUROSEMIDE 20 MG/1
20 TABLET ORAL DAILY
Qty: 30 TABLET | Refills: 0 | OUTPATIENT
Start: 2025-04-08

## 2025-04-12 ENCOUNTER — HEALTH MAINTENANCE LETTER (OUTPATIENT)
Age: 70
End: 2025-04-12

## 2025-04-14 ENCOUNTER — HOSPITAL ENCOUNTER (OUTPATIENT)
Dept: CARDIAC REHAB | Facility: HOSPITAL | Age: 70
Discharge: HOME | End: 2025-04-14
Attending: INTERNAL MEDICINE
Payer: COMMERCIAL

## 2025-04-14 DIAGNOSIS — Z95.1 S/P CABG (CORONARY ARTERY BYPASS GRAFT): ICD-10-CM

## 2025-04-14 PROCEDURE — 93798 PHYS/QHP OP CAR RHAB W/ECG: CPT

## 2025-04-15 ENCOUNTER — OFFICE VISIT (OUTPATIENT)
Dept: FAMILY MEDICINE | Facility: CLINIC | Age: 70
End: 2025-04-15
Payer: COMMERCIAL

## 2025-04-15 VITALS
HEART RATE: 56 BPM | HEIGHT: 64 IN | RESPIRATION RATE: 18 BRPM | WEIGHT: 237.6 LBS | TEMPERATURE: 98.6 F | BODY MASS INDEX: 40.56 KG/M2 | DIASTOLIC BLOOD PRESSURE: 69 MMHG | SYSTOLIC BLOOD PRESSURE: 119 MMHG | OXYGEN SATURATION: 94 %

## 2025-04-15 DIAGNOSIS — Z78.0 ASYMPTOMATIC POSTMENOPAUSAL STATUS: ICD-10-CM

## 2025-04-15 DIAGNOSIS — E11.22 TYPE 2 DIABETES MELLITUS WITH STAGE 3 CHRONIC KIDNEY DISEASE, WITHOUT LONG-TERM CURRENT USE OF INSULIN, UNSPECIFIED WHETHER STAGE 3A OR 3B CKD (H): Primary | ICD-10-CM

## 2025-04-15 DIAGNOSIS — I25.10 CORONARY ARTERY DISEASE INVOLVING NATIVE CORONARY ARTERY OF NATIVE HEART WITHOUT ANGINA PECTORIS: ICD-10-CM

## 2025-04-15 DIAGNOSIS — N18.30 TYPE 2 DIABETES MELLITUS WITH STAGE 3 CHRONIC KIDNEY DISEASE, WITHOUT LONG-TERM CURRENT USE OF INSULIN, UNSPECIFIED WHETHER STAGE 3A OR 3B CKD (H): Primary | ICD-10-CM

## 2025-04-15 DIAGNOSIS — E66.01 MORBID OBESITY (H): ICD-10-CM

## 2025-04-15 LAB
EST. AVERAGE GLUCOSE BLD GHB EST-MCNC: 128 MG/DL
HBA1C MFR BLD: 6.1 % (ref 0–5.6)

## 2025-04-15 PROCEDURE — 99214 OFFICE O/P EST MOD 30 MIN: CPT | Performed by: FAMILY MEDICINE

## 2025-04-15 PROCEDURE — 80053 COMPREHEN METABOLIC PANEL: CPT | Performed by: FAMILY MEDICINE

## 2025-04-15 PROCEDURE — 80061 LIPID PANEL: CPT | Performed by: FAMILY MEDICINE

## 2025-04-15 PROCEDURE — 3078F DIAST BP <80 MM HG: CPT | Performed by: FAMILY MEDICINE

## 2025-04-15 PROCEDURE — 3074F SYST BP LT 130 MM HG: CPT | Performed by: FAMILY MEDICINE

## 2025-04-15 PROCEDURE — 83036 HEMOGLOBIN GLYCOSYLATED A1C: CPT | Performed by: FAMILY MEDICINE

## 2025-04-15 PROCEDURE — G2211 COMPLEX E/M VISIT ADD ON: HCPCS | Performed by: FAMILY MEDICINE

## 2025-04-15 PROCEDURE — 36415 COLL VENOUS BLD VENIPUNCTURE: CPT | Performed by: FAMILY MEDICINE

## 2025-04-15 ASSESSMENT — ENCOUNTER SYMPTOMS: SHORTNESS OF BREATH: 1

## 2025-04-15 NOTE — PROGRESS NOTES
Assessment & Plan     ICD-10-CM    1. Type 2 diabetes mellitus with stage 3 chronic kidney disease, without long-term current use of insulin, unspecified whether stage 3a or 3b CKD (H)  E11.22 Adult Eye  Referral    N18.30 Hemoglobin A1c     Comprehensive metabolic panel (BMP + Alb, Alk Phos, ALT, AST, Total. Bili, TP)     Hemoglobin A1c     Comprehensive metabolic panel (BMP + Alb, Alk Phos, ALT, AST, Total. Bili, TP)     semaglutide-weight management (WEGOVY) 0.25 MG/0.5ML pen     Semaglutide-Weight Management (WEGOVY) 0.5 MG/0.5ML pen      2. Asymptomatic postmenopausal status  Z78.0 DEXA HIP/PELVIS/SPINE - Future      3. Morbid obesity (H)  E66.01 Adult Comprehensive Weight Management  Referral      4. Coronary artery disease involving native coronary artery of native heart without angina pectoris  I25.10 Lipid panel reflex to direct LDL Fasting     Lipid panel reflex to direct LDL Fasting     CANCELED: Lipid panel reflex to direct LDL Non-fasting        Patient follows up today for her multiple medical condition and is interested in medication for weight loss.  Note this patient has coronary artery disease and had CABG done earlier in the year.  She has been taking all her medication for her HFpEF, hyperlipidemia, hypertension and has been seen by cardiology, heart failure clinic and pulmonology.  Following issues addressed today  1: Type 2 diabetes: Stable.  Continue metformin.  Diabetic foot exam was normal.  Refer to ophthalmology.  2: Morbid obesity with BMI greater than 40.  Start semaglutide and refer to bariatrics for further management  3: Coronary artery disease: Lipid panel done during hospitalization showed high cholesterol.  Subsequently she is on atorvastatin 80 mg that she has been taking.  Check nonfasting lipid panel today.  4: Healthcare maintenance with DEXA scan.  Appears that this has not been pursued.    The longitudinal plan of care for the diagnosis(es)/condition(s) as  "documented were addressed during this visit. Due to the added complexity in care, I will continue to support Karyn in the subsequent management and with ongoing continuity of care.      BMI  Estimated body mass index is 40.78 kg/m  as calculated from the following:    Height as of this encounter: 1.626 m (5' 4\").    Weight as of this encounter: 107.8 kg (237 lb 9.6 oz).   Weight management plan: Patient referred to endocrine and/or weight management specialty      MEDICATIONS:   Orders Placed This Encounter   Medications    semaglutide-weight management (WEGOVY) 0.25 MG/0.5ML pen     Sig: Inject 0.5 mLs (0.25 mg) subcutaneously once a week. After 4 weeks increase to )2/22/25 mg weekly  dose     Dispense:  2 mL     Refill:  0    Semaglutide-Weight Management (WEGOVY) 0.5 MG/0.5ML pen     Sig: Inject 0.5 mg subcutaneously once a week.     Dispense:  2 mL     Refill:  0          - Continue other medications without change  Work on weight loss  Regular exercise  See Patient Instructions    Subjective   Karyn is a 69 year old, presenting for the following health issues:  Recheck Medication (Med check- talk about ozempic. ) and Shortness of Breath        4/15/2025     1:16 PM   Additional Questions   Roomed by Autumn Santamaria CMA     History of Present Illness       Diabetes:   She presents for follow up of diabetes.  She is checking home blood glucose a few times a month.   She checks blood glucose at bedtime.  Blood glucose is never over 200 and never under 70. She is aware of hypoglycemia symptoms including weakness.   She is concerned about other.   She is having excessive thirst.  The patient has not had a diabetic eye exam in the last 12 months.          She eats 2-3 servings of fruits and vegetables daily.She consumes 0 sweetened beverage(s) daily.She exercises with enough effort to increase her heart rate 20 to 29 minutes per day.  She exercises with enough effort to increase her heart rate 3 or less days per " week.   She is taking medications regularly.      Patient Active Problem List   Diagnosis    Hypertensive hypertrophic cardiomyopathy (H)    Morbid obesity (H)    Cerebral embolism with cerebral infarction (H)    Major depressive disorder, recurrent episode, moderate (H)    Chronic kidney disease, stage 2 (mild)    NSTEMI (non-ST elevated myocardial infarction) (H)    CAD (coronary artery disease)    S/P CABG (coronary artery bypass graft)    Postoperative atrial fibrillation (H)    Type 2 diabetes mellitus with stage 3 chronic kidney disease, without long-term current use of insulin, unspecified whether stage 3a or 3b CKD (H)    Hypoxia     Current Outpatient Medications   Medication Sig Dispense Refill    acetaminophen (TYLENOL) 325 MG tablet Take 2 tablets (650 mg) by mouth every 4 hours as needed for other (For optimal non-opioid multimodal pain management to improve pain control.).      amLODIPine (NORVASC) 10 MG tablet Take 1 tablet (10 mg) by mouth daily. 30 tablet 1    aspirin (ASA) 81 MG chewable tablet Take 1 tablet (81 mg) by mouth daily.      atorvastatin (LIPITOR) 80 MG tablet Take 1 tablet (80 mg) by mouth daily For cholesterol. 90 tablet 1    furosemide (LASIX) 20 MG tablet Take 1 tablet (20 mg) by mouth daily. 90 tablet 0    metFORMIN (GLUCOPHAGE XR) 500 MG 24 hr tablet TAKE 1 TABLET BY MOUTH DAILY WITH DINNER FOR BLOOD SUGARS 90 tablet 1    metoprolol succinate ER (TOPROL XL) 100 MG 24 hr tablet Take 1 tablet (100 mg) by mouth daily. 90 tablet 0    ondansetron (ZOFRAN) 4 MG tablet Take 4 mg by mouth every 8 hours as needed for nausea or vomiting.      potassium chloride travis ER (KLOR-CON M10) 10 MEQ CR tablet Take 1 tablet (10 mEq) by mouth daily. 90 tablet 0    semaglutide-weight management (WEGOVY) 0.25 MG/0.5ML pen Inject 0.5 mLs (0.25 mg) subcutaneously once a week. After 4 weeks increase to )2/22/25 mg weekly  dose 2 mL 0    Semaglutide-Weight Management (WEGOVY) 0.5 MG/0.5ML pen Inject 0.5 mg  "subcutaneously once a week. 2 mL 0    senna-docusate (SENOKOT-S/PERICOLACE) 8.6-50 MG tablet Take 1 tablet by mouth daily.      sertraline (ZOLOFT) 100 MG tablet TAKE 2 TABLETS(200 MG) BY MOUTH DAILY 180 tablet 0    spironolactone (ALDACTONE) 25 MG tablet Take 0.5 tablets (12.5 mg) by mouth daily. 45 tablet 2    warfarin ANTICOAGULANT (COUMADIN) 1 MG tablet Takes 2 tablets (2mg) on Monday and Friday, and take 3 tablets (3mg) all other days, by mouth as directed by Anticoagulation Clinic. 235 tablet 1     No current facility-administered medications for this visit.           Review of Systems  Constitutional, HEENT, cardiovascular, pulmonary, GI, , musculoskeletal, neuro, skin, endocrine and psych systems are negative, except as otherwise noted.      Objective    /69 (BP Location: Left arm, Patient Position: Sitting, Cuff Size: Adult Large)   Pulse 56   Temp 98.6  F (37  C) (Oral)   Resp 18   Ht 1.626 m (5' 4\")   Wt 107.8 kg (237 lb 9.6 oz)   LMP  (LMP Unknown)   SpO2 94%   BMI 40.78 kg/m    Body mass index is 40.78 kg/m .  Physical Exam   GENERAL: alert and no distress  Diabetic foot exam: normal DP and PT pulses, no trophic changes or ulcerative lesions, normal sensory exam, and normal monofilament exam    Results for orders placed or performed in visit on 04/15/25 (from the past 24 hours)   Hemoglobin A1c   Result Value Ref Range    Estimated Average Glucose 128 (H) <117 mg/dL    Hemoglobin A1C 6.1 (H) 0.0 - 5.6 %           Signed Electronically by: Calin Daugherty MD    "

## 2025-04-16 ENCOUNTER — TELEPHONE (OUTPATIENT)
Dept: FAMILY MEDICINE | Facility: CLINIC | Age: 70
End: 2025-04-16
Payer: COMMERCIAL

## 2025-04-16 ENCOUNTER — HOSPITAL ENCOUNTER (OUTPATIENT)
Dept: CARDIAC REHAB | Facility: HOSPITAL | Age: 70
Discharge: HOME | End: 2025-04-16
Attending: INTERNAL MEDICINE
Payer: COMMERCIAL

## 2025-04-16 DIAGNOSIS — E11.8 TYPE 2 DIABETES MELLITUS WITH COMPLICATION, WITHOUT LONG-TERM CURRENT USE OF INSULIN (H): Primary | ICD-10-CM

## 2025-04-16 LAB
ALBUMIN SERPL BCG-MCNC: 4.4 G/DL (ref 3.5–5.2)
ALP SERPL-CCNC: 73 U/L (ref 40–150)
ALT SERPL W P-5'-P-CCNC: 21 U/L (ref 0–50)
ANION GAP SERPL CALCULATED.3IONS-SCNC: 10 MMOL/L (ref 7–15)
AST SERPL W P-5'-P-CCNC: 25 U/L (ref 0–45)
BILIRUB SERPL-MCNC: 0.4 MG/DL
BUN SERPL-MCNC: 37.2 MG/DL (ref 8–23)
CALCIUM SERPL-MCNC: 9.9 MG/DL (ref 8.8–10.4)
CHLORIDE SERPL-SCNC: 102 MMOL/L (ref 98–107)
CHOLEST SERPL-MCNC: 151 MG/DL
CREAT SERPL-MCNC: 0.85 MG/DL (ref 0.51–0.95)
EGFRCR SERPLBLD CKD-EPI 2021: 74 ML/MIN/1.73M2
FASTING STATUS PATIENT QL REPORTED: YES
FASTING STATUS PATIENT QL REPORTED: YES
GLUCOSE SERPL-MCNC: 80 MG/DL (ref 70–99)
HCO3 SERPL-SCNC: 29 MMOL/L (ref 22–29)
HDLC SERPL-MCNC: 41 MG/DL
LDLC SERPL CALC-MCNC: 87 MG/DL
NONHDLC SERPL-MCNC: 110 MG/DL
POTASSIUM SERPL-SCNC: 4.7 MMOL/L (ref 3.4–5.3)
PROT SERPL-MCNC: 7.7 G/DL (ref 6.4–8.3)
SODIUM SERPL-SCNC: 141 MMOL/L (ref 135–145)
TRIGL SERPL-MCNC: 115 MG/DL

## 2025-04-16 PROCEDURE — 93798 PHYS/QHP OP CAR RHAB W/ECG: CPT

## 2025-04-16 RX ORDER — POTASSIUM CHLORIDE 750 MG/1
10 TABLET, EXTENDED RELEASE ORAL DAILY
Qty: 90 TABLET | Refills: 3 | Status: SHIPPED | OUTPATIENT
Start: 2025-04-16

## 2025-04-16 NOTE — TELEPHONE ENCOUNTER
Patient called in and wanted to let us know that she was prescribed this Wegovy for years and her insurance is saying that this is not covered anymore and will be $1,400 out of pocket because it is for weight loss. Please let patient know what can be done or if we need to change the medication to another brand. This is for diabetes NOT weight loss.   Pharmacy attached

## 2025-04-20 NOTE — PROGRESS NOTES
[facility]  [address], [place] [address]  Office: [phone]     Assessment/Recommendations   Assessment: Ms. Gamez presents to [facility] today for heart failure focused follow-up visit.    # Hypertrophic cardiomyopathy  -No current high risk factors for SCD  -Zio monitor 2/2025 showed 2 episodes of non-sustained supraventricular tachycardia 5 beats at 125 bpm, no sustained tachyarrhythmias   -LVOT- resting left ventricular outflow gradient of 34 mmHg which increases to 57 mmHg with Valsalva  -Continue metoprolol succinate 100 mg  -Yearly follow-up with general cardiology, Dr. Jerez with zio monitor, TMET, and echo  -cMRI every 3-years    # Acute on chronic heart failure with preserved ejection fraction (EF 65-70% per echo 1/3/2025)  # RV dysfunction  Stage C. NYHA Class II.  Her weight on the clinic scale today is 240 lbs. Upon exam, patient is well-compensated. She has trace LE edema at her baseline. No overt fluid retention on exam, consider excess volume today given sx report of progressive ADAN and uptrending weight. Recent stable BMP 4/15/2025. Plan to uptitrate loop today.     BP: controlled, soft  Fluid status: volume excess. Diuretic: Lasix 20 mg w/KCL 10 mEq---> increase lasix to 20 mg bid today   Aldosterone antagonist: spironolactone 12.5 mg  SGLT2i:  deferred while other medical therapy is prioritized  Ischemia evaluation: significant multivessel disease per coronary angiogram 11/2024 s/p CABG 11/18/2024  NSAID use: contraindicated  -Sleep apnea evaluation: scheduled 6/302025    Heart failure education including medication compliance and lifestyle management discussed today: low sodium diet <2g Na/day, fluid intake <2L/day, daily weight monitoring, and physical activity as tolerated.     # Valvular heart disease  -Mild (1+) mitral regurgitation per echo 1/3/2025    # Hypertension  -BP today 94/57  -Amlodipine 10 mg (low dose  initially was prescribed for radial artery graft protection s/p CAB 11/2024 with subsequent dose increases for BP management; caution amlodipine may worsen LVOT)--> decrease amlodipine dose to 5 mg today    # Coronary artery disease s/p CABG x5 11/18/2024  # Dyslipidemia  -Denies chest pain and anginal equivalents  -Continues with cardiac rehab  -Antiplatelet therapy with ASA 81 mg for secondary ASCVD prevention  -LDL goal <70; high intensity statin therapy with atorvastatin 80 mg for secondary ASCVD prevention    # Postoperative Atrial fib  -Onset: postoperative CABG 11/2024  -Zio monitor 2/2025 showed predominately SR  bpm, 2 episodes of non-sustained supraventricular tachycardia, no sustained tachyarrhythmias, no Afib  -Rate/rhythm control: Metoprolol succinate 100 mg  -[id number] score of 6 for female sex, age 65-74 and h/o HTN, CHF, vascular disease, DM  -Continue warfarin for stroke prophylaxis    # DM 2  -Most recent A1C 6.1 (4/15/2025)  -Metformin, managed by PCP    # Obesity  -Body mass index is 41.2 kg/m   -Semaglutide, managed by PCP, scheduled to follow with weight management clinic 8/14/2025      Plan:  Increase lasix to 20 mg twice daily  Decrease amlodipine to 5 mg daily  Continue to monitor HR, BP, and daily weight    -Follow-up in the heart failure clinic with NANCY in ~1-month.  -Follow-up with general cardiology, Dr. Jerez ~July 2026.    The longitudinal plan of care for the condition(s) below were addressed during this visit. Due to the added complexity in care, I will continue to support Ms. Gamez in the subsequent management of this condition(s) and with the ongoing continuity of care of this condition(s): HFpEF.     History of Present Illness/Subjective    Ms. Gamez is a 69 year old female with a past medical history significant for HCM, HFpEF, MV CAD s/p CABG x5 11/2024, HTN, HLD, CKD, DM2, and depression. Today patient presents to [facility] for heart failure  focused follow-up visit.     Primary Cardiologist: Dr. Jerez; Last office visit 1/24/2025.    Today, patient endorses a few episodes of palpitations that last only a few seconds. She has mild lower extremity edema and abdominal bloating that is present and has improved. She continues to have shortness of breath with exertion and decreased activity tolerance, this is also improving. She has been attending cardiac rehab to and improving strength and endurance. Her weight on her home scale this AM was 229 lbs. She reports that she has been as low as 220 lbs after surgery.     She denies chest pain, palpitations, lightheadedness/dizziness, shortness of breath at rest, orthopnea, PND, and bleeding.     Today, patient endorses progressive ADAN and activity intolerance over the last 1-week. She reports transient leg swelling and abdominal bloating she notices after saltier meal. She tells me that on these occasions she takes an extra dose of lasix and this helps to relieve bloating and LE swelling. Today, she denies bloating. She has trace LE edema that is her baseline.     She continues to attend cardiac rehab sessions. Overall, she has noticed improvement in strength and endurance.    She denies chest pain, palpitations, lightheadedness/dizziness, presyncope, syncope, shortness of breath at rest, orthopnea, PND, and bleeding.      Recent test results & labs below (personally reviewed):    Zio monitor 2/24/2025  Zio monitoring from 2/3/2025 to 2/17/2025 (duration 13d 21h)  Predominant rhythm was sinus rhythm, 38 to 100bpm, average 49bpm.  2 episode(s) of nonsustained supraventricular tachycardia - longest and fastest 13.6s, 120bpm.  1 episode(s) of nonsustained ventricular tachycardia - 5 beats, 125bpm.  No sustained tachyarrhythmias.  No atrial fibrillation.  There were no pauses of greater than 3 seconds.  Rare premature atrial contractions beats (isolated <1%).  Rare premature ventricular contractions (isolated <1%).  No  symptoms recorded.    Echocardiogram 1/3/2025  Interpretation Summary  The echo findings are consistent with hypertrophic cardiomyopathy. Asymmetric  left ventricular hypertrophy with septum measuring 1.6 cm. There is a resting  left ventricular outflow gradient of 34 mmHg which increases to 57 mmHg with  Valsalva.  The visual ejection fraction is 65-70% without wall motion abnormality.  Mildly decreased right ventricular systolic function  There is mild (1+) mitral regurgitation.  There is systolic anterior motion of the mitral valve.  There is trace to mild tricuspid regurgitation. No pulmonary HTN.  Compared to the prior study dated 11/20/2024, there have been no changes.    Coronary Angiogram 11/8/2024  Conclusion    Ramus lesion is 40% stenosed.    Prox Cx lesion is 95% stenosed.    Mid Cx lesion is 30% stenosed.    Dist LAD-2 lesion is 70% stenosed.    Mid LAD to Dist LAD lesion is 70% stenosed.    3rd Diag lesion is 70% stenosed.    Mid LAD lesion is 80% stenosed.    2nd Diag lesion is 90% stenosed.    Dist LAD-1 lesion is 80% stenosed.    Prox LAD to Mid LAD lesion is 30% stenosed.    Dist RCA lesion is 99% stenosed.    Mid RCA lesion is 40% stenosed.    Prox RCA lesion is 40% stenosed.    RPDA lesion is 80% stenosed.  1.significant three-vessel CAD  Plan   Follow bedrest per protocol   Return to the primary inpatient team for further evaluation and managmenet  1.continue therapeutic anticoagulation with heparin drip or Lovenox for ACS  2.continue aspirin  3.consult cardiothoracic surgery service for CABG     Cardiac MRI 11/13/2024  CONCLUSIONS:   Asymmetric septal hypertrophy with maximal wall thickness 2.0 cm. Systolic anterior motion of the mitral  valve is present. There is apical displacement of the papillary muscles. LGE imaging shows mid-myocardial  enhancement in a non-ischemic pattern. Collectively, these findings suggest obstructive hypertrophic  cardiomyopathy. Consider genetic testing for HCM.    Normal biventricular systolic function, LVEF 74%, RVEF 70%.     Physical Examination Review of Systems   BP 94/57 (BP Location: Right arm, Patient Position: Sitting, Cuff Size: Adult Large)   Pulse 52   Resp 14   Wt 108.9 kg (240 lb)   LMP  (LMP Unknown)   BMI 41.20 kg/m    Body mass index is 41.2 kg/m .  Wt Readings from Last 3 Encounters:   04/22/25 108.9 kg (240 lb)   04/15/25 107.8 kg (237 lb 9.6 oz)   02/13/25 103.4 kg (228 lb)     General Appearance:   Appears comfortable, in no acute distress   HEENT: Eyes symmetrical, no discharge or icterus bilaterally. Mucous membranes moist and without lesions   Cardiovascular: RRR, +S1S2, no murmur, rub, or gallop. JVP not visible at 90 degrees      Respiratory:   Respirations regular, even, and unlabored. Lungs CTA throughout   GI:  Soft and non distended   Musculoskeletal: No joint swelling or tenderness   Extremities   No cyanosis. Trace lower extremity peripheral edema.   Skin: Warm, no xanthelasma, no jaundice, no rashes or lesions. R leg medial knee incx sit scabbed with mild erythema surrounding   Neurologic: Alert and oriented X3, no focal deficits   Psychiatric: calm and cooperative                                             Negative unless noted in HPI     Medical History  Surgical History Family History Social History   Past Medical History:   Diagnosis Date    Cervicalgia 6/29/2005 June 29, 2005: Thrown from a horse - wearing a helmet - and struck side of head and neck, heard crepitation, no loc, Able to walk after injury.  persistant pain in neck relieved with ibuprofen, stiff but can move with ROM.  No changes in hands and feet sensation/motor.    Coronary artery disease     Depressive disorder, not elsewhere classified     Hypertension     Obesity, unspecified     Past Surgical History:   Procedure Laterality Date    ANGIOGRAM  2010    negative    COLONOSCOPY N/A 3/24/2023    Procedure: COLONOSCOPY, WITH HOT POLYPECTOMY AND RESEARCH BIOPSY;   Surgeon: Bret Velez MD;  Location: [facility] OR    CORONARY ARTERY BYPASS GRAFT, WITH ENDOSCOPIC VESSEL PROCUREMENT N/A 2024    Procedure: CORONARY ARTERY BYPASS GRAFT TIMES FIVE, LEFT INTERNAL MAMMARY ARTERY HARVEST, RIGHT ENDOSCOPIC VESSEL PROCUREMENT,;  Surgeon: Mary Pennington MD;  Location: [facility] OR    CV CORONARY ANGIOGRAM N/A 2024    Procedure: Coronary Angiogram;  Surgeon: Emir Brand MD;  Location:  [facility] LAB    HYSTERECTOMY, PAP NO LONGER INDICATED      BSO    MIDLINE DOUBLE LUMEN PLACEMENT  2024    PROCURE ARTERY RADIAL  2024    Procedure: LEFT RADIAL ARTERY HARVEST;  Surgeon: Mary Pennington MD;  Location: [facility] OR    TRANSESOPHAGEAL ECHOCARDIOGRAM INTRAOPERATIVE N/A 2024    Procedure: ECHOCARDIOGRAM, TRANSESPOPHAGEAL, WITH ANESTHESIA,;  Surgeon: Mary Pennington MD;  Location: [facility] OR    Family History   Problem Relation Age of Onset    C.A.D. Father         first MI at 53, stenting x2    C.A.LESLI. Mother         dx in her mid 50's    C.A.ELLI Brother         MI in mid-50's    Cerebrovascular Disease Brother         in late 50's    Social History     Socioeconomic History    Marital status:      Spouse name: Not on file    Number of children: Not on file    Years of education: Not on file    Highest education level: Not on file   Occupational History    Not on file   Tobacco Use    Smoking status: Former     Current packs/day: 0.00     Average packs/day: 0.5 packs/day for 30.0 years (15.0 ttl pk-yrs)     Types: Cigarettes     Start date: 1981     Quit date: 2011     Years since quittin.7     Passive exposure: Past    Smokeless tobacco: Never    Tobacco comments:     smoking 3-4 cigs per day   Vaping Use    Vaping status: Never Used   Substance and Sexual Activity    Alcohol use: Yes     Comment: rarely    Drug use: No    Sexual activity: Never   Other Topics Concern    Parent/sibling w/ CABG, MI or angioplasty  before 65F 55M? Yes     Comment: mother, father and 2 brothers   Social History Narrative    Not on file     Social Drivers of Health     Financial Resource Strain: Low Risk  (1/3/2025)    Financial Resource Strain     Within the past 12 months, have you or your family members you live with been unable to get utilities (heat, electricity) when it was really needed?: No   Food Insecurity: Low Risk  (1/3/2025)    Food Insecurity     Within the past 12 months, did you worry that your food would run out before you got money to buy more?: No     Within the past 12 months, did the food you bought just not last and you didn t have money to get more?: No   Transportation Needs: Low Risk  (1/3/2025)    Transportation Needs     Within the past 12 months, has lack of transportation kept you from medical appointments, getting your medicines, non-medical meetings or appointments, work, or from getting things that you need?: No   Physical Activity: Not on file   Stress: Not on file   Social Connections: Not on file   Interpersonal Safety: Low Risk  (11/18/2024)    Interpersonal Safety     Do you feel physically and emotionally safe where you currently live?: Yes     Within the past 12 months, have you been hit, slapped, kicked or otherwise physically hurt by someone?: No     Within the past 12 months, have you been humiliated or emotionally abused in other ways by your partner or ex-partner?: No   Housing Stability: Low Risk  (1/3/2025)    Housing Stability     Do you have housing? : Yes     Are you worried about losing your housing?: No   Recent Concern: Housing Stability - High Risk (1/3/2025)    Housing Stability     Do you have housing? : No     Are you worried about losing your housing?: No        Medications  Allergies   Current Outpatient Medications   Medication Sig Dispense Refill    acetaminophen (TYLENOL) 325 MG tablet Take 2 tablets (650 mg) by mouth every 4 hours as needed for other (For optimal non-opioid multimodal  pain management to improve pain control.).      amLODIPine (NORVASC) 5 MG tablet Take 1 tablet (5 mg) by mouth daily. 90 tablet 0    aspirin (ASA) 81 MG chewable tablet Take 1 tablet (81 mg) by mouth daily.      atorvastatin (LIPITOR) 80 MG tablet Take 1 tablet (80 mg) by mouth daily. For cholesterol. 90 tablet 1    furosemide (LASIX) 20 MG tablet Take 1 tablet (20 mg) by mouth 2 times daily. 180 tablet 3    metFORMIN (GLUCOPHAGE XR) 500 MG 24 hr tablet TAKE 1 TABLET BY MOUTH DAILY WITH DINNER FOR BLOOD SUGARS 90 tablet 1    metoprolol succinate ER (TOPROL XL) 100 MG 24 hr tablet Take 1 tablet (100 mg) by mouth daily. 90 tablet 0    ondansetron (ZOFRAN) 4 MG tablet Take 4 mg by mouth every 8 hours as needed for nausea or vomiting.      potassium chloride travis ER (KLOR-CON M10) 10 MEQ CR tablet Take 1 tablet (10 mEq) by mouth daily. 90 tablet 3    semaglutide (OZEMPIC) 2 MG/3ML pen Inject 0.25 mg subcutaneously every 7 days. Increase to 0.5 mg after 4 doses 3 mL 0    sertraline (ZOLOFT) 100 MG tablet TAKE 2 TABLETS(200 MG) BY MOUTH DAILY 180 tablet 0    spironolactone (ALDACTONE) 25 MG tablet Take 0.5 tablets (12.5 mg) by mouth daily. 45 tablet 2    warfarin ANTICOAGULANT (COUMADIN) 1 MG tablet Takes 2 tablets (2mg) on Monday and Friday, and take 3 tablets (3mg) all other days, by mouth as directed by Anticoagulation Clinic. 235 tablet 1    No Known Allergies      Lab Results    Chemistry/lipid CBC Cardiac Enzymes/BNP/TSH/INR   Lab Results   Component Value Date    CHOL 151 04/15/2025    HDL 41 (L) 04/15/2025    TRIG 115 04/15/2025    BUN 37.2 (H) 04/15/2025     04/15/2025    CO2 29 04/15/2025    Lab Results   Component Value Date    WBC 7.9 01/02/2025    HGB 11.8 03/12/2025    HCT 32.8 (L) 01/02/2025    MCV 90 01/02/2025     01/02/2025    Lab Results   Component Value Date    TSH 3.54 11/07/2024    INR 1.9 (H) 04/07/2025            Maria Elena Yancey, KAREN, APRN, CNP  [facility]  Failure Clinic [place]   Clinic and [phone]  Fax: [phone]  Heart Failure Nurses: [phone]

## 2025-04-21 ENCOUNTER — HOSPITAL ENCOUNTER (OUTPATIENT)
Dept: CARDIAC REHAB | Facility: HOSPITAL | Age: 70
Discharge: HOME | End: 2025-04-21
Attending: INTERNAL MEDICINE
Payer: COMMERCIAL

## 2025-04-21 DIAGNOSIS — E78.5 HYPERLIPIDEMIA LDL GOAL <70: ICD-10-CM

## 2025-04-21 PROCEDURE — 93798 PHYS/QHP OP CAR RHAB W/ECG: CPT

## 2025-04-21 RX ORDER — ATORVASTATIN CALCIUM 80 MG/1
80 TABLET, FILM COATED ORAL DAILY
Qty: 90 TABLET | Refills: 1 | Status: SHIPPED | OUTPATIENT
Start: 2025-04-21

## 2025-04-22 ENCOUNTER — OFFICE VISIT (OUTPATIENT)
Dept: CARDIOLOGY | Facility: CLINIC | Age: 70
End: 2025-04-22
Attending: NURSE PRACTITIONER
Payer: COMMERCIAL

## 2025-04-22 VITALS
BODY MASS INDEX: 41.2 KG/M2 | RESPIRATION RATE: 14 BRPM | DIASTOLIC BLOOD PRESSURE: 57 MMHG | SYSTOLIC BLOOD PRESSURE: 94 MMHG | WEIGHT: 240 LBS | HEART RATE: 52 BPM

## 2025-04-22 DIAGNOSIS — I50.33 ACUTE ON CHRONIC HEART FAILURE WITH PRESERVED EJECTION FRACTION (H): Primary | ICD-10-CM

## 2025-04-22 DIAGNOSIS — E11.9 TYPE 2 DIABETES MELLITUS WITHOUT COMPLICATION, WITHOUT LONG-TERM CURRENT USE OF INSULIN (H): ICD-10-CM

## 2025-04-22 DIAGNOSIS — I38 VALVULAR HEART DISEASE: ICD-10-CM

## 2025-04-22 DIAGNOSIS — I48.91 POSTOPERATIVE ATRIAL FIBRILLATION (H): ICD-10-CM

## 2025-04-22 DIAGNOSIS — I51.9 RIGHT VENTRICULAR DYSFUNCTION: ICD-10-CM

## 2025-04-22 DIAGNOSIS — I97.89 POSTOPERATIVE ATRIAL FIBRILLATION (H): ICD-10-CM

## 2025-04-22 DIAGNOSIS — I42.2 HYPERTROPHIC CARDIOMYOPATHY (H): ICD-10-CM

## 2025-04-22 DIAGNOSIS — I25.10 CORONARY ARTERY DISEASE INVOLVING NATIVE CORONARY ARTERY OF NATIVE HEART WITHOUT ANGINA PECTORIS: ICD-10-CM

## 2025-04-22 DIAGNOSIS — E66.01 MORBID OBESITY (H): ICD-10-CM

## 2025-04-22 DIAGNOSIS — Z95.1 S/P CABG (CORONARY ARTERY BYPASS GRAFT): ICD-10-CM

## 2025-04-22 DIAGNOSIS — I10 HYPERTENSION GOAL BP (BLOOD PRESSURE) < 140/90: ICD-10-CM

## 2025-04-22 DIAGNOSIS — I10 ESSENTIAL HYPERTENSION: ICD-10-CM

## 2025-04-22 PROCEDURE — G2211 COMPLEX E/M VISIT ADD ON: HCPCS | Performed by: NURSE PRACTITIONER

## 2025-04-22 PROCEDURE — 3074F SYST BP LT 130 MM HG: CPT | Performed by: NURSE PRACTITIONER

## 2025-04-22 PROCEDURE — 3078F DIAST BP <80 MM HG: CPT | Performed by: NURSE PRACTITIONER

## 2025-04-22 PROCEDURE — 99214 OFFICE O/P EST MOD 30 MIN: CPT | Performed by: NURSE PRACTITIONER

## 2025-04-22 RX ORDER — FUROSEMIDE 20 MG/1
20 TABLET ORAL
Qty: 180 TABLET | Refills: 3 | Status: SHIPPED | OUTPATIENT
Start: 2025-04-22

## 2025-04-22 RX ORDER — AMLODIPINE BESYLATE 5 MG/1
5 TABLET ORAL DAILY
Qty: 90 TABLET | Refills: 0 | Status: SHIPPED | OUTPATIENT
Start: 2025-04-22

## 2025-04-22 NOTE — PATIENT INSTRUCTIONS
It was a pleasure to see you today at the [facility].     Recommendations:  Increase lasix to 20 mg twice daily  Decrease amlodipine to 5 mg daily    -Continue to follow a low sodium diet (<2g/day) and maintain adequate fluid intake (~50-60 oz/day).   -Continue to monitor your weight daily and please call if you gain 2 lbs or more in 1 day OR 5 lbs or more in 1-week.    -Follow-up in the heart failure clinic with NANCY in ~1-month.  -Follow-up with general cardiology, Dr. Jerez ~2026.     Please call with questions/concerns and/or if you experience new or worsening heart failure symptoms (shortness of breath, weight gain, fluid retention/lower extremity swelling, and/or reduced activity tolerance).    Heart Failure Nurse Line: [phone] ([id number] [id number])  24-hour HF Nurse Line: [phone] (weekends, evenings, holidays)    Maria Elena Yancey CNP  [facility] - Heart Failure Clinic [place]   [facility]  Clinic and [phone]  Fax: [phone]  Heart Failure Nurses: [phone]

## 2025-04-22 NOTE — LETTER
4/22/2025    Calin Daugherty MD  [address]  [facility] [address]    RE: Karyn Aquinoindia       Dear Colleague,     I had the pleasure of seeing Karyn Gamez in the [facility].      [facility]  [address], [place] [address]  Office: [phone]     Assessment/Recommendations   Assessment: Ms. Gamez presents to [facility] today for heart failure focused follow-up visit.    # Hypertrophic cardiomyopathy  -No current high risk factors for SCD  -Zio monitor 2/2025 showed 2 episodes of non-sustained supraventricular tachycardia 5 beats at 125 bpm, no sustained tachyarrhythmias   -LVOT- resting left ventricular outflow gradient of 34 mmHg which increases to 57 mmHg with Valsalva  -Continue metoprolol succinate 100 mg  -Yearly follow-up with general cardiology, Dr. Jerez with zio monitor, TMET, and echo  -cMRI every 3-years    # Acute on chronic heart failure with preserved ejection fraction (EF 65-70% per echo 1/3/2025)  # RV dysfunction  Stage C. NYHA Class II.  Her weight on the clinic scale today is 240 lbs. Upon exam, patient is well-compensated. She has trace LE edema at her baseline. No overt fluid retention on exam, consider excess volume today given sx report of progressive ADAN and uptrending weight. Recent stable BMP 4/15/2025. Plan to uptitrate loop today.     BP: controlled, soft  Fluid status: volume excess. Diuretic: Lasix 20 mg w/KCL 10 mEq---> increase lasix to 20 mg bid today   Aldosterone antagonist: spironolactone 12.5 mg  SGLT2i:  deferred while other medical therapy is prioritized  Ischemia evaluation: significant multivessel disease per coronary angiogram 11/2024 s/p CABG 11/18/2024  NSAID use: contraindicated  -Sleep apnea evaluation: scheduled 6/302025    Heart failure education including medication compliance and lifestyle management discussed today: low sodium diet <2g Na/day, fluid intake  <2L/day, daily weight monitoring, and physical activity as tolerated.     # Valvular heart disease  -Mild (1+) mitral regurgitation per echo 1/3/2025    # Hypertension  -BP today 94/57  -Amlodipine 10 mg (low dose initially was prescribed for radial artery graft protection s/p CAB 11/2024 with subsequent dose increases for BP management; caution amlodipine may worsen LVOT)--> decrease amlodipine dose to 5 mg today    # Coronary artery disease s/p CABG x5 11/18/2024  # Dyslipidemia  -Denies chest pain and anginal equivalents  -Continues with cardiac rehab  -Antiplatelet therapy with ASA 81 mg for secondary ASCVD prevention  -LDL goal <70; high intensity statin therapy with atorvastatin 80 mg for secondary ASCVD prevention    # Postoperative Atrial fib  -Onset: postoperative CABG 11/2024  -Zio monitor 2/2025 showed predominately SR  bpm, 2 episodes of non-sustained supraventricular tachycardia, no sustained tachyarrhythmias, no Afib  -Rate/rhythm control: Metoprolol succinate 100 mg  -[id number] score of 6 for female sex, age 65-74 and h/o HTN, CHF, vascular disease, DM  -Continue warfarin for stroke prophylaxis    # DM 2  -Most recent A1C 6.1 (4/15/2025)  -Metformin, managed by PCP    # Obesity  -Body mass index is 41.2 kg/m   -Semaglutide, managed by PCP, scheduled to follow with weight management clinic 8/14/2025      Plan:  Increase lasix to 20 mg twice daily  Decrease amlodipine to 5 mg daily  Continue to monitor HR, BP, and daily weight    -Follow-up in the heart failure clinic with NANCY in ~1-month.  -Follow-up with general cardiology, Dr. Jerez ~July 2026.    The longitudinal plan of care for the condition(s) below were addressed during this visit. Due to the added complexity in care, I will continue to support Ms. Gamez in the subsequent management of this condition(s) and with the ongoing continuity of care of this condition(s): HFpEF.     History of Present Illness/Subjective    Ms. Gamez is a 69  year old female with a past medical history significant for HCM, HFpEF, MV CAD s/p CABG x5 11/2024, HTN, HLD, CKD, DM2, and depression. Today patient presents to [facility] for heart failure focused follow-up visit.     Primary Cardiologist: Dr. Jerez; Last office visit 1/24/2025.    Today, patient endorses a few episodes of palpitations that last only a few seconds. She has mild lower extremity edema and abdominal bloating that is present and has improved. She continues to have shortness of breath with exertion and decreased activity tolerance, this is also improving. She has been attending cardiac rehab to and improving strength and endurance. Her weight on her home scale this AM was 229 lbs. She reports that she has been as low as 220 lbs after surgery.     She denies chest pain, palpitations, lightheadedness/dizziness, shortness of breath at rest, orthopnea, PND, and bleeding.     Today, patient endorses progressive ADAN and activity intolerance over the last 1-week. She reports transient leg swelling and abdominal bloating she notices after saltier meal. She tells me that on these occasions she takes an extra dose of lasix and this helps to relieve bloating and LE swelling. Today, she denies bloating. She has trace LE edema that is her baseline.     She continues to attend cardiac rehab sessions. Overall, she has noticed improvement in strength and endurance.    She denies chest pain, palpitations, lightheadedness/dizziness, presyncope, syncope, shortness of breath at rest, orthopnea, PND, and bleeding.      Recent test results & labs below (personally reviewed):    Zio monitor 2/24/2025  Zio monitoring from 2/3/2025 to 2/17/2025 (duration 13d 21h)  Predominant rhythm was sinus rhythm, 38 to 100bpm, average 49bpm.  2 episode(s) of nonsustained supraventricular tachycardia - longest and fastest 13.6s, 120bpm.  1 episode(s) of nonsustained ventricular tachycardia - 5 beats, 125bpm.  No  sustained tachyarrhythmias.  No atrial fibrillation.  There were no pauses of greater than 3 seconds.  Rare premature atrial contractions beats (isolated <1%).  Rare premature ventricular contractions (isolated <1%).  No symptoms recorded.    Echocardiogram 1/3/2025  Interpretation Summary  The echo findings are consistent with hypertrophic cardiomyopathy. Asymmetric  left ventricular hypertrophy with septum measuring 1.6 cm. There is a resting  left ventricular outflow gradient of 34 mmHg which increases to 57 mmHg with  Valsalva.  The visual ejection fraction is 65-70% without wall motion abnormality.  Mildly decreased right ventricular systolic function  There is mild (1+) mitral regurgitation.  There is systolic anterior motion of the mitral valve.  There is trace to mild tricuspid regurgitation. No pulmonary HTN.  Compared to the prior study dated 11/20/2024, there have been no changes.    Coronary Angiogram 11/8/2024  Conclusion     Ramus lesion is 40% stenosed.     Prox Cx lesion is 95% stenosed.     Mid Cx lesion is 30% stenosed.     Dist LAD-2 lesion is 70% stenosed.     Mid LAD to Dist LAD lesion is 70% stenosed.     3rd Diag lesion is 70% stenosed.     Mid LAD lesion is 80% stenosed.     2nd Diag lesion is 90% stenosed.     Dist LAD-1 lesion is 80% stenosed.     Prox LAD to Mid LAD lesion is 30% stenosed.     Dist RCA lesion is 99% stenosed.     Mid RCA lesion is 40% stenosed.     Prox RCA lesion is 40% stenosed.     RPDA lesion is 80% stenosed.  1.significant three-vessel CAD  Plan    Follow bedrest per protocol    Return to the primary inpatient team for further evaluation and managmenet  1.continue therapeutic anticoagulation with heparin drip or Lovenox for ACS  2.continue aspirin  3.consult cardiothoracic surgery service for CABG     Cardiac MRI 11/13/2024  CONCLUSIONS:   Asymmetric septal hypertrophy with maximal wall thickness 2.0 cm. Systolic anterior motion of the mitral  valve is present. There  is apical displacement of the papillary muscles. LGE imaging shows mid-myocardial  enhancement in a non-ischemic pattern. Collectively, these findings suggest obstructive hypertrophic  cardiomyopathy. Consider genetic testing for HCM.   Normal biventricular systolic function, LVEF 74%, RVEF 70%.     Physical Examination Review of Systems   BP 94/57 (BP Location: Right arm, Patient Position: Sitting, Cuff Size: Adult Large)   Pulse 52   Resp 14   Wt 108.9 kg (240 lb)   LMP  (LMP Unknown)   BMI 41.20 kg/m    Body mass index is 41.2 kg/m .  Wt Readings from Last 3 Encounters:   04/22/25 108.9 kg (240 lb)   04/15/25 107.8 kg (237 lb 9.6 oz)   02/13/25 103.4 kg (228 lb)     General Appearance:   Appears comfortable, in no acute distress   HEENT: Eyes symmetrical, no discharge or icterus bilaterally. Mucous membranes moist and without lesions   Cardiovascular: RRR, +S1S2, no murmur, rub, or gallop. JVP not visible at 90 degrees      Respiratory:   Respirations regular, even, and unlabored. Lungs CTA throughout   GI:  Soft and non distended   Musculoskeletal: No joint swelling or tenderness   Extremities   No cyanosis. Trace lower extremity peripheral edema.   Skin: Warm, no xanthelasma, no jaundice, no rashes or lesions. R leg medial knee incx sit scabbed with mild erythema surrounding   Neurologic: Alert and oriented X3, no focal deficits   Psychiatric: calm and cooperative                                             Negative unless noted in HPI     Medical History  Surgical History Family History Social History   Past Medical History:   Diagnosis Date     Cervicalgia 6/29/2005 June 29, 2005: Thrown from a horse - wearing a helmet - and struck side of head and neck, heard crepitation, no loc, Able to walk after injury.  persistant pain in neck relieved with ibuprofen, stiff but can move with ROM.  No changes in hands and feet sensation/motor.     Coronary artery disease      Depressive disorder, not elsewhere  classified      Hypertension      Obesity, unspecified     Past Surgical History:   Procedure Laterality Date     ANGIOGRAM      negative     COLONOSCOPY N/A 3/24/2023    Procedure: COLONOSCOPY, WITH HOT POLYPECTOMY AND RESEARCH BIOPSY;  Surgeon: Bret Velez MD;  Location: [facility] OR     CORONARY ARTERY BYPASS GRAFT, WITH ENDOSCOPIC VESSEL PROCUREMENT N/A 2024    Procedure: CORONARY ARTERY BYPASS GRAFT TIMES FIVE, LEFT INTERNAL MAMMARY ARTERY HARVEST, RIGHT ENDOSCOPIC VESSEL PROCUREMENT,;  Surgeon: Mary Pennington MD;  Location: [facility] OR     CV CORONARY ANGIOGRAM N/A 2024    Procedure: Coronary Angiogram;  Surgeon: Emir Brand MD;  Location:  [facility]ARDI[facility] CATH LAB     HYSTERECTOMY, PAP NO LONGER INDICATED      BSO     MIDLINE DOUBLE LUMEN PLACEMENT  2024     PROCURE ARTERY RADIAL  2024    Procedure: LEFT RADIAL ARTERY HARVEST;  Surgeon: Mary Pennington MD;  Location: [facility] OR     TRANSESOPHAGEAL ECHOCARDIOGRAM INTRAOPERATIVE N/A 2024    Procedure: ECHOCARDIOGRAM, TRANSESPOPHAGEAL, WITH ANESTHESIA,;  Surgeon: Mary Pennington MD;  Location: [facility] OR    Family History   Problem Relation Age of Onset     C.A.D. Father         first MI at 53, stenting x2     C.A.D. Mother         dx in her mid 50's     C.A.D. Brother         MI in mid-50's     Cerebrovascular Disease Brother         in late 50's    Social History     Socioeconomic History     Marital status:      Spouse name: Not on file     Number of children: Not on file     Years of education: Not on file     Highest education level: Not on file   Occupational History     Not on file   Tobacco Use     Smoking status: Former     Current packs/day: 0.00     Average packs/day: 0.5 packs/day for 30.0 years (15.0 ttl pk-yrs)     Types: Cigarettes     Start date: 1981     Quit date: 2011     Years since quittin.7     Passive exposure: Past     Smokeless tobacco: Never     Tobacco  comments:     smoking 3-4 cigs per day   Vaping Use     Vaping status: Never Used   Substance and Sexual Activity     Alcohol use: Yes     Comment: rarely     Drug use: No     Sexual activity: Never   Other Topics Concern     Parent/sibling w/ CABG, MI or angioplasty before 65F 55M? Yes     Comment: mother, father and 2 brothers   Social History Narrative     Not on file     Social Drivers of Health     Financial Resource Strain: Low Risk  (1/3/2025)    Financial Resource Strain      Within the past 12 months, have you or your family members you live with been unable to get utilities (heat, electricity) when it was really needed?: No   Food Insecurity: Low Risk  (1/3/2025)    Food Insecurity      Within the past 12 months, did you worry that your food would run out before you got money to buy more?: No      Within the past 12 months, did the food you bought just not last and you didn t have money to get more?: No   Transportation Needs: Low Risk  (1/3/2025)    Transportation Needs      Within the past 12 months, has lack of transportation kept you from medical appointments, getting your medicines, non-medical meetings or appointments, work, or from getting things that you need?: No   Physical Activity: Not on file   Stress: Not on file   Social Connections: Not on file   Interpersonal Safety: Low Risk  (11/18/2024)    Interpersonal Safety      Do you feel physically and emotionally safe where you currently live?: Yes      Within the past 12 months, have you been hit, slapped, kicked or otherwise physically hurt by someone?: No      Within the past 12 months, have you been humiliated or emotionally abused in other ways by your partner or ex-partner?: No   Housing Stability: Low Risk  (1/3/2025)    Housing Stability      Do you have housing? : Yes      Are you worried about losing your housing?: No   Recent Concern: Housing Stability - High Risk (1/3/2025)    Housing Stability      Do you have housing? : No      Are  you worried about losing your housing?: No        Medications  Allergies   Current Outpatient Medications   Medication Sig Dispense Refill     acetaminophen (TYLENOL) 325 MG tablet Take 2 tablets (650 mg) by mouth every 4 hours as needed for other (For optimal non-opioid multimodal pain management to improve pain control.).       amLODIPine (NORVASC) 5 MG tablet Take 1 tablet (5 mg) by mouth daily. 90 tablet 0     aspirin (ASA) 81 MG chewable tablet Take 1 tablet (81 mg) by mouth daily.       atorvastatin (LIPITOR) 80 MG tablet Take 1 tablet (80 mg) by mouth daily. For cholesterol. 90 tablet 1     furosemide (LASIX) 20 MG tablet Take 1 tablet (20 mg) by mouth 2 times daily. 180 tablet 3     metFORMIN (GLUCOPHAGE XR) 500 MG 24 hr tablet TAKE 1 TABLET BY MOUTH DAILY WITH DINNER FOR BLOOD SUGARS 90 tablet 1     metoprolol succinate ER (TOPROL XL) 100 MG 24 hr tablet Take 1 tablet (100 mg) by mouth daily. 90 tablet 0     ondansetron (ZOFRAN) 4 MG tablet Take 4 mg by mouth every 8 hours as needed for nausea or vomiting.       potassium chloride travis ER (KLOR-CON M10) 10 MEQ CR tablet Take 1 tablet (10 mEq) by mouth daily. 90 tablet 3     semaglutide (OZEMPIC) 2 MG/3ML pen Inject 0.25 mg subcutaneously every 7 days. Increase to 0.5 mg after 4 doses 3 mL 0     sertraline (ZOLOFT) 100 MG tablet TAKE 2 TABLETS(200 MG) BY MOUTH DAILY 180 tablet 0     spironolactone (ALDACTONE) 25 MG tablet Take 0.5 tablets (12.5 mg) by mouth daily. 45 tablet 2     warfarin ANTICOAGULANT (COUMADIN) 1 MG tablet Takes 2 tablets (2mg) on Monday and Friday, and take 3 tablets (3mg) all other days, by mouth as directed by Anticoagulation Clinic. 235 tablet 1    No Known Allergies      Lab Results    Chemistry/lipid CBC Cardiac Enzymes/BNP/TSH/INR   Lab Results   Component Value Date    CHOL 151 04/15/2025    HDL 41 (L) 04/15/2025    TRIG 115 04/15/2025    BUN 37.2 (H) 04/15/2025     04/15/2025    CO2 29 04/15/2025    Lab Results   Component  Value Date    WBC 7.9 2025    HGB 11.8 2025    HCT 32.8 (L) 2025    MCV 90 2025     2025    Lab Results   Component Value Date    TSH 3.54 2024    INR 1.9 (H) 2025            Maria Elena Yancey, DNP, APRN, CNP  [facility] - [facility] [place]   Clinic and [phone]  Fax: [phone]  Heart Failure Nurses: [phone]      Thank you for allowing me to participate in the care of your patient.      Sincerely,     LALO [facility]  cc:   Maria Elena Yancey CNP  [address] [place] [address]

## 2025-04-23 ENCOUNTER — LAB (OUTPATIENT)
Dept: LAB | Facility: CLINIC | Age: 70
End: 2025-04-23
Payer: COMMERCIAL

## 2025-04-23 ENCOUNTER — ANTICOAGULATION THERAPY VISIT (OUTPATIENT)
Dept: ANTICOAGULATION | Facility: CLINIC | Age: 70
End: 2025-04-23

## 2025-04-23 DIAGNOSIS — I48.91 POSTOPERATIVE ATRIAL FIBRILLATION (H): ICD-10-CM

## 2025-04-23 DIAGNOSIS — Z95.1 S/P CABG (CORONARY ARTERY BYPASS GRAFT): Primary | ICD-10-CM

## 2025-04-23 DIAGNOSIS — I97.89 POSTOPERATIVE ATRIAL FIBRILLATION (H): ICD-10-CM

## 2025-04-23 LAB — INR BLD: 2.4 (ref 0.9–1.1)

## 2025-04-23 PROCEDURE — 36416 COLLJ CAPILLARY BLOOD SPEC: CPT

## 2025-04-23 PROCEDURE — 85610 PROTHROMBIN TIME: CPT

## 2025-04-23 NOTE — PROGRESS NOTES
ANTICOAGULATION MANAGEMENT     Karyn Gamez 69 year old female is on warfarin with therapeutic INR result. (Goal INR 2.0-3.0)    Recent labs: (last 7 days)     04/23/25  1322   INR 2.4*       ASSESSMENT     Warfarin Lab Questionnaire    Warfarin Doses Last 7 Days      4/23/2025     1:26 PM   Dose in Tablet or Mg   TAB or MG? - 1mg warfarin tabs milligram (mg)     Pt Rptd Dose SUNDAY MONDAY TUESDAY WED THURS FRIDAY SATURDAY 4/23/2025   1:26 PM 3 3 3 3 3 3 3         4/23/2025   Warfarin Lab Questionnaire   Missed doses within past 14 days? No   Changes in diet or alcohol within past 14 days? No   Medication changes since last result? Yes   Please list: lasix 40mg and amlodoplne 5 mg          - 4/22/25 Amlodipine decreased from 10mg to 5mg daily.  Also increased Lasix 20mg 2x/day.  (No known interaection with warfarin).          - weekly warfarin dose was increased by 5% on 4/7/25.     Injuries or illness since last result? No - follow-up today for acute on chronic HF with cardiologist.     New shortness of breath, severe headaches or sudden changes in vision since last result? No   Abnormal bleeding since last result? No   Upcoming surgery, procedure? No   Best number to call with results? [phone]     Previous result: Subtherapeutic last 2 INR results;(1.9, 1.7)    Additional findings:  Chart reviewed.       PLAN     Recommended plan for ongoing change(s) affecting INR     Dosing Instructions: Continue your current warfarin dose with next INR in 2 weeks       Summary  As of 4/23/2025      Full warfarin instructions:  3 mg every day   Next INR check:  5/7/2025               Detailed voice message left for Karyn with dosing instructions and follow up date.   - Sent [employer] message with dosing and follow up instructions    Contact [phone] to schedule and with any changes, questions or concerns.     Education provided: Please call back if any changes to your diet, medications or how you've been taking  warfarin  Taking warfarin: Importance of taking warfarin as instructed  Goal range and lab monitoring: goal range and significance of current result  Interaction IS NOT anticipated between warfarin and Amlodipine and Lasix    Plan made per [facility] anticoagulation protocol    Davina Galarza RN  4/23/2025  Anticoagulation Clinic  [facility] for routing messages: p ERASMO EDWARDS  [facility] patient phone line: [phone]        _______________________________________________________________________     Anticoagulation Episode Summary       Current INR goal:  2.0-3.0   TTR:  60.4% (4.4 mo)   Target end date:  Indefinite   Send INR reminders to:  ERASMO EDWARDS    Indications    S/P CABG (coronary artery bypass graft) [Z95.1]  Postoperative atrial fibrillation (H) [I97.89  I48.91]             Comments:  --             Anticoagulation Care Providers       Provider Role Specialty Phone number    Ly Atwood PA-C Referring Cardiovascular & Thoracic Surgery [phone]    Cristiana Davis MD Referring Family Medicine [phone]

## 2025-04-28 ENCOUNTER — TELEPHONE (OUTPATIENT)
Dept: CARDIOLOGY | Facility: CLINIC | Age: 70
End: 2025-04-28
Payer: COMMERCIAL

## 2025-04-28 NOTE — TELEPHONE ENCOUNTER
----- Message from Maria Elena Yancey sent at 4/27/2025  4:40 PM CDT -----  Please call patient to follow-up on changes from last week:  -lasix was increased  -amlodipine was decreased  -review HR, BP and daily weights

## 2025-04-28 NOTE — TELEPHONE ENCOUNTER
"Spoke with pt regarding weights and VS, reports weight today 239 lbs \"ate saltier foods last night with family\" 4/27  237 lbs   4/26 236 lbs 4/25 237 lbs, 's/50's \"consisently\" HR in the \"50\"s\" denies any increase in sob, LE swelling or abd bloating, \"feeling better with the medication changes\", scheduled to see Maria Elena Leon on 5/22 as previously scheduled, advised to call [facility] if new or worsening HF sx develop, she is agreeable to this plan.    HANNA Sen RN  "

## 2025-04-28 NOTE — TELEPHONE ENCOUNTER
LM for pt to return call regarding weight/HF status after medications changes last week.    Cecile Sen RN

## 2025-05-20 ENCOUNTER — TELEPHONE (OUTPATIENT)
Dept: ANTICOAGULATION | Facility: CLINIC | Age: 70
End: 2025-05-20
Payer: COMMERCIAL

## 2025-05-20 NOTE — TELEPHONE ENCOUNTER
ANTICOAGULATION     Karyn Gamez is overdue for an INR check.     Patient has appt with [facility] on 5/22/25.       - informed patient is due to check INR, and if she would like to check INR at this appt.       - waiting call back - Karyn called back - yes she would like to check INR this visit    Davina Galarza, RN  5/20/2025  Anticoagulation Clinic  [facility] for routing messages: hernan WANG [facility] patient phone line: [phone]

## 2025-05-22 ENCOUNTER — LAB (OUTPATIENT)
Dept: CARDIOLOGY | Facility: CLINIC | Age: 70
End: 2025-05-22
Payer: COMMERCIAL

## 2025-05-22 ENCOUNTER — ANTICOAGULATION THERAPY VISIT (OUTPATIENT)
Dept: ANTICOAGULATION | Facility: CLINIC | Age: 70
End: 2025-05-22

## 2025-05-22 ENCOUNTER — OFFICE VISIT (OUTPATIENT)
Dept: CARDIOLOGY | Facility: CLINIC | Age: 70
End: 2025-05-22
Payer: COMMERCIAL

## 2025-05-22 VITALS
WEIGHT: 239 LBS | DIASTOLIC BLOOD PRESSURE: 58 MMHG | HEIGHT: 64 IN | BODY MASS INDEX: 40.8 KG/M2 | SYSTOLIC BLOOD PRESSURE: 108 MMHG | HEART RATE: 54 BPM | RESPIRATION RATE: 16 BRPM

## 2025-05-22 DIAGNOSIS — E11.9 TYPE 2 DIABETES MELLITUS WITHOUT COMPLICATION, WITHOUT LONG-TERM CURRENT USE OF INSULIN (H): ICD-10-CM

## 2025-05-22 DIAGNOSIS — I50.32 CHRONIC HEART FAILURE WITH PRESERVED EJECTION FRACTION (HFPEF) (H): ICD-10-CM

## 2025-05-22 DIAGNOSIS — Z95.1 S/P CABG (CORONARY ARTERY BYPASS GRAFT): ICD-10-CM

## 2025-05-22 DIAGNOSIS — I97.89 POSTOPERATIVE ATRIAL FIBRILLATION (H): ICD-10-CM

## 2025-05-22 DIAGNOSIS — I10 ESSENTIAL HYPERTENSION: ICD-10-CM

## 2025-05-22 DIAGNOSIS — I48.91 POSTOPERATIVE ATRIAL FIBRILLATION (H): ICD-10-CM

## 2025-05-22 DIAGNOSIS — I42.2 HYPERTROPHIC CARDIOMYOPATHY (H): ICD-10-CM

## 2025-05-22 DIAGNOSIS — I48.91 ATRIAL FIBRILLATION, UNSPECIFIED TYPE (H): ICD-10-CM

## 2025-05-22 DIAGNOSIS — I51.9 RIGHT VENTRICULAR DYSFUNCTION: ICD-10-CM

## 2025-05-22 DIAGNOSIS — I38 VALVULAR HEART DISEASE: ICD-10-CM

## 2025-05-22 DIAGNOSIS — I50.33 ACUTE ON CHRONIC HEART FAILURE WITH PRESERVED EJECTION FRACTION (H): Primary | ICD-10-CM

## 2025-05-22 DIAGNOSIS — Z95.1 S/P CABG (CORONARY ARTERY BYPASS GRAFT): Primary | ICD-10-CM

## 2025-05-22 DIAGNOSIS — I10 HYPERTENSION GOAL BP (BLOOD PRESSURE) < 140/90: ICD-10-CM

## 2025-05-22 DIAGNOSIS — I25.10 CORONARY ARTERY DISEASE INVOLVING NATIVE CORONARY ARTERY OF NATIVE HEART WITHOUT ANGINA PECTORIS: ICD-10-CM

## 2025-05-22 LAB
ALBUMIN SERPL BCG-MCNC: 4.4 G/DL (ref 3.5–5.2)
ALP SERPL-CCNC: 77 U/L (ref 40–150)
ALT SERPL W P-5'-P-CCNC: 29 U/L (ref 0–50)
ANION GAP SERPL CALCULATED.3IONS-SCNC: 8 MMOL/L (ref 7–15)
AST SERPL W P-5'-P-CCNC: 27 U/L (ref 0–45)
BILIRUB SERPL-MCNC: 0.4 MG/DL
BUN SERPL-MCNC: 34.6 MG/DL (ref 8–23)
CALCIUM SERPL-MCNC: 9.4 MG/DL (ref 8.8–10.4)
CHLORIDE SERPL-SCNC: 102 MMOL/L (ref 98–107)
CREAT SERPL-MCNC: 0.96 MG/DL (ref 0.51–0.95)
EGFRCR SERPLBLD CKD-EPI 2021: 64 ML/MIN/1.73M2
GLUCOSE SERPL-MCNC: 95 MG/DL (ref 70–99)
HCO3 SERPL-SCNC: 33 MMOL/L (ref 22–29)
INR POINT OF CARE: 2.6 (ref 0.9–1.1)
MAGNESIUM SERPL-MCNC: 2.4 MG/DL (ref 1.7–2.3)
NT-PROBNP SERPL-MCNC: 777 PG/ML (ref 0–353)
POTASSIUM SERPL-SCNC: 4.4 MMOL/L (ref 3.4–5.3)
PROT SERPL-MCNC: 7.3 G/DL (ref 6.4–8.3)
SODIUM SERPL-SCNC: 143 MMOL/L (ref 135–145)

## 2025-05-22 RX ORDER — AMLODIPINE BESYLATE 5 MG/1
2.5 TABLET ORAL DAILY
Qty: 45 TABLET | Refills: 0 | Status: SHIPPED | OUTPATIENT
Start: 2025-05-22

## 2025-05-22 NOTE — PROGRESS NOTES
ANTICOAGULATION MANAGEMENT     Karyn Gamez 69 year old female is on warfarin with therapeutic INR result. (Goal INR 2.0-3.0)    Recent labs: (last 7 days)     05/22/25  1005   INR 2.6*       ASSESSMENT     Source(s): Chart Reviewed     Medication/supplement changes: Yes   Amlodipine decreased from 10mg to 5mg daily, and increase Lasix from 10mg to 20mg daily, 5/22/25.  New illness, injury, or hospitalization: No   Follow-up [facility] with [facility] - she has noticed improvement in strength and endurance. Continues with Cardiac Rehab.  Previous result: Therapeutic last visit at 2.4; previously outside of goal range last 2 INRs; (1.9, 1.7)  Additional findings: None       PLAN     Recommended plan for ongoing change(s) affecting INR     Dosing Instructions: Continue your current warfarin dose with next INR in 3 weeks       Summary  As of 5/22/2025      Full warfarin instructions:  3 mg every day   Next INR check:  6/12/2025               Detailed voice message left for Karyn with dosing instructions and follow up date.   Sent [employer] message with dosing and follow up instructions    Contact [phone] to schedule and with any changes, questions or concerns.     Education provided: Please call back if any changes to your diet, medications or how you've been taking warfarin  Taking warfarin: Importance of taking warfarin as instructed  Goal range and lab monitoring: goal range and significance of current result  Interaction IS NOT anticipated between warfarin and Amlodipine and Lasix    Plan made per [facility] anticoagulation protocol    Davina Galarza RN  5/22/2025  Anticoagulation Clinic  [employer] for routing messages: hernan EDWARDS  [facility] patient phone line: [phone]        _______________________________________________________________________     Anticoagulation Episode Summary       Current INR goal:  2.0-3.0   TTR:  67.5% (5.3 mo)   Target end date:  Indefinite   [facility]    Indications    S/P CABG (coronary artery bypass graft) [Z95.1]  Postoperative atrial fibrillation (H) [I97.89  I48.91]             Comments:  --             Anticoagulation Care Providers       Provider Role Specialty Phone number    Ly Atwood PA-C Referring Cardiovascular & Thoracic Surgery [phone]    Cristiana Davis MD Referring Family Medicine [phone]

## 2025-05-22 NOTE — LETTER
5/22/2025    Calin Daugherty MD  [address]  [facility] [address]    RE: Karyn Gamez       Dear Colleague,     I had the pleasure of seeing Karyn Gamez in the [facility].      [facility]  [address], [place] [address]  Office: [phone]     Assessment/Recommendations   Assessment: Ms. Gamez presents to [facility] today for heart failure focused follow-up visit.    # Hypertrophic cardiomyopathy  -No current high risk factors for SCD  -Zio monitor 2/2025 showed 2 episodes of non-sustained supraventricular tachycardia 5 beats at 125 bpm, no sustained tachyarrhythmias   -LVOT- resting left ventricular outflow gradient of 34 mmHg which increases to 57 mmHg with Valsalva  -Continue metoprolol succinate 100 mg  -Yearly follow-up with general cardiology, Dr. Jerez with zio monitor, TMET, and echo  -cMRI every 3-years  -Today patient continues to report dyspnea with minimal activity that is slightly worse of the last several months- will plan to repeat echo now     # Acute on chronic heart failure with preserved ejection fraction (EF 65-70% per echo 1/3/2025)  # RV dysfunction  Stage C. NYHA Class II-III.  Her weight on the clinic scale today is 239 lbs. Upon exam, patient is well-compensated. She has trace LE edema at her baseline. No overt fluid retention on exam, still consider excess volume today given sx report of progressive ADAN bloated abdomen and uptrending weight. Last visit, patient was instructed to increase her lasix to bid dosing, she did not remember to do this. Plan to instruct her to uptitrate loop again today.     BP: controlled, soft  Fluid status: volume excess. Diuretic: Lasix 20 mg w/KCL 10 mEq---> increase lasix to 20 mg bid today   Aldosterone antagonist: spironolactone 12.5 mg  SGLT2i:  deferred while other medical therapy is prioritized  Ischemia evaluation: significant multivessel disease  per coronary angiogram 11/2024 s/p CABG 11/18/2024  NSAID use: contraindicated  -Sleep apnea evaluation: scheduled 6/30/2025    Heart failure education including medication compliance and lifestyle management discussed today: low sodium diet <2g Na/day, fluid intake <2L/day, daily weight monitoring, and physical activity as tolerated.     # Valvular heart disease  -Mild (1+) mitral regurgitation per echo 1/3/2025    # Hypertension  -BP today 108/58. Reports home BP 80/40s later in the day for the past week, otherwise home readings -130s  -Amlodipine 10 mg (low dose initially was prescribed for radial artery graft protection s/p CAB 11/2024 with subsequent dose increases for BP management -->amlodipine dose decreased to 5 mg 4/22, will decrease to 2.5 mg today given dyspnea and symptomatic softer BP with home readings  (* caution amlodipine may worsen LVOT)    # Coronary artery disease s/p CABG x5 11/18/2024  # Dyslipidemia  -Denies chest pain and anginal equivalents  -Completed cardiac rehab  -Antiplatelet therapy with ASA 81 mg for secondary ASCVD prevention  -LDL goal <70; high intensity statin therapy with atorvastatin 80 mg for secondary ASCVD prevention    # Postoperative Atrial fib  -Onset: postoperative CABG 11/2024  -Zio monitor 2/2025 showed predominately SR  bpm, 2 episodes of non-sustained supraventricular tachycardia, no sustained tachyarrhythmias, no Afib  -Rate/rhythm control: Metoprolol succinate 100 mg  -[id number] score of 6 for female sex, age 65-74 and h/o HTN, CHF, vascular disease, DM  -Continue warfarin for stroke prophylaxis    # DM 2  -Most recent A1C 6.1 (4/15/2025)  -Metformin, managed by PCP    # Obesity  -Body mass index is 41.2 kg/m   -Semaglutide, managed by PCP, scheduled to follow with weight management clinic 8/14/2025      Plan:  Labs today-BMP, Mg, NtproBNP- consider adding low dose MRA and SGLT2i  Increase lasix to 20 mg twice daily  Decrease amlodipine to 2.5 mg  daily  Continue to monitor HR, BP, and daily weight  Echocardiogram    -Follow-up in the heart failure clinic with NANCY in ~1- month.  -Follow-up with general cardiology, Dr. Jerez ~ July 2025, needs to be scheduled.     The longitudinal plan of care for the condition(s) below were addressed during this visit. Due to the added complexity in care, I will continue to support Ms. Gamez in the subsequent management of this condition(s) and with the ongoing continuity of care of this condition(s): HFpEF.     History of Present Illness/Subjective    Ms. Gmaez is a 69 year old female with a past medical history significant for HCM, HFpEF, MV CAD s/p CABG x5 11/2024, HTN, HLD, CKD, DM2, and depression. Today patient presents to [facility] for heart failure focused follow-up visit.     Primary Cardiologist: Dr. Jerez; Last office visit 1/24/2025.    Patient was last see in the heart failure clinic on 4/22/2025. At that time patient reported progressive ADAN and activity intolerance over the last 1-week. She reported transient leg swelling and abdominal bloating she notices after saltier meal. On these occasions she was taking an extra dose of lasix and this helped to relieve bloating and LE swelling. Patient was instructed to decrease amlodipine to 5 mg daily and to increase lasix to 20 mg bid that day. Weight in clinic that day was 240 lbs.     Today, patient endorses ongoing ADAN and activity intolerance. She is feeling discouraged and unable to go about her day d/t dyspnea with minimal activity. She did not remember to increase lasix dose to bid dosing and has continued on 20 mg daily. She is feeling bloated in her abdomen today. Over the last week has noticed dizziness with lower BPs 80s/40s in the afternoons. Otherwise SBPs ranging 120s-130s at other times that she checks. She has mild lower extremity edema that she feels is close to her baseline. Her weight on home scale this AM was 237 lbs. She  "also c/o \"muscle spasms\" in LE for the past ~1-week. She reports adequate fluid intake.     She denies chest pain, palpitations, presyncope, syncope, shortness of breath at rest, orthopnea, PND, and bleeding.      Recent test results & labs below (personally reviewed):    Zio monitor 2/24/2025  Zio monitoring from 2/3/2025 to 2/17/2025 (duration 13d 21h)  Predominant rhythm was sinus rhythm, 38 to 100bpm, average 49bpm.  2 episode(s) of nonsustained supraventricular tachycardia - longest and fastest 13.6s, 120bpm.  1 episode(s) of nonsustained ventricular tachycardia - 5 beats, 125bpm.  No sustained tachyarrhythmias.  No atrial fibrillation.  There were no pauses of greater than 3 seconds.  Rare premature atrial contractions beats (isolated <1%).  Rare premature ventricular contractions (isolated <1%).  No symptoms recorded.    Echocardiogram 1/3/2025  Interpretation Summary  The echo findings are consistent with hypertrophic cardiomyopathy. Asymmetric  left ventricular hypertrophy with septum measuring 1.6 cm. There is a resting  left ventricular outflow gradient of 34 mmHg which increases to 57 mmHg with  Valsalva.  The visual ejection fraction is 65-70% without wall motion abnormality.  Mildly decreased right ventricular systolic function  There is mild (1+) mitral regurgitation.  There is systolic anterior motion of the mitral valve.  There is trace to mild tricuspid regurgitation. No pulmonary HTN.  Compared to the prior study dated 11/20/2024, there have been no changes.    Coronary Angiogram 11/8/2024  Conclusion     Ramus lesion is 40% stenosed.     Prox Cx lesion is 95% stenosed.     Mid Cx lesion is 30% stenosed.     Dist LAD-2 lesion is 70% stenosed.     Mid LAD to Dist LAD lesion is 70% stenosed.     3rd Diag lesion is 70% stenosed.     Mid LAD lesion is 80% stenosed.     2nd Diag lesion is 90% stenosed.     Dist LAD-1 lesion is 80% stenosed.     Prox LAD to Mid LAD lesion is 30% stenosed.     Dist RCA " lesion is 99% stenosed.     Mid RCA lesion is 40% stenosed.     Prox RCA lesion is 40% stenosed.     RPDA lesion is 80% stenosed.  1.significant three-vessel CAD  Plan    Follow bedrest per protocol    Return to the primary inpatient team for further evaluation and managmenet  1.continue therapeutic anticoagulation with heparin drip or Lovenox for ACS  2.continue aspirin  3.consult cardiothoracic surgery service for CABG     Cardiac MRI 11/13/2024  CONCLUSIONS:   Asymmetric septal hypertrophy with maximal wall thickness 2.0 cm. Systolic anterior motion of the mitral  valve is present. There is apical displacement of the papillary muscles. LGE imaging shows mid-myocardial  enhancement in a non-ischemic pattern. Collectively, these findings suggest obstructive hypertrophic  cardiomyopathy. Consider genetic testing for HCM.   Normal biventricular systolic function, LVEF 74%, RVEF 70%.     Physical Examination Review of Systems   LMP  (LMP Unknown)   There is no height or weight on file to calculate BMI.  Wt Readings from Last 3 Encounters:   04/22/25 108.9 kg (240 lb)   04/15/25 107.8 kg (237 lb 9.6 oz)   02/13/25 103.4 kg (228 lb)     General Appearance:   Appears comfortable, in no acute distress   HEENT: Eyes symmetrical, no discharge or icterus bilaterally. Mucous membranes moist and without lesions   Cardiovascular: RRR, +S1S2, no murmur, rub, or gallop. JVP not visible at 90 degrees      Respiratory:   Respirations regular, even, and unlabored. Lungs CTA throughout   GI:  Soft and non distended   Musculoskeletal: No joint swelling or tenderness   Extremities   No cyanosis. Trace lower extremity peripheral edema.   Skin: Warm, no xanthelasma, no jaundice, no rashes or lesions. R leg medial knee incx sit scabbed with mild erythema surrounding   Neurologic: Alert and oriented X3, no focal deficits   Psychiatric: calm and cooperative                                             Negative unless noted in HPI     Medical  History  Surgical History Family History Social History   Past Medical History:   Diagnosis Date     Cervicalgia 6/29/2005 June 29, 2005: Thrown from a horse - wearing a helmet - and struck side of head and neck, heard crepitation, no loc, Able to walk after injury.  persistant pain in neck relieved with ibuprofen, stiff but can move with ROM.  No changes in hands and feet sensation/motor.     Coronary artery disease      Depressive disorder, not elsewhere classified      Hypertension      Obesity, unspecified     Past Surgical History:   Procedure Laterality Date     ANGIOGRAM  2010    negative     COLONOSCOPY N/A 3/24/2023    Procedure: COLONOSCOPY, WITH HOT POLYPECTOMY AND RESEARCH BIOPSY;  Surgeon: Bret Velez MD;  [place] [facility] [place]     CORONARY ARTERY BYPASS GRAFT, WITH ENDOSCOPIC VESSEL PROCUREMENT N/A 11/18/2024    Procedure: CORONARY ARTERY BYPASS GRAFT TIMES FIVE, LEFT INTERNAL MAMMARY ARTERY HARVEST, RIGHT ENDOSCOPIC VESSEL PROCUREMENT,;  Surgeon: Mary Pennington MD;  Location: [facility] OR     CV CORONARY ANGIOGRAM N/A 11/8/2024    Procedure: Coronary Angiogram;  Surgeon: Emir Brand MD;  Location:  HEART CARDIAC CATH LAB     HYSTERECTOMY, PAP NO LONGER INDICATED      BSO     MIDLINE DOUBLE LUMEN PLACEMENT  11/24/2024     PROCURE ARTERY RADIAL  11/18/2024    Procedure: LEFT RADIAL ARTERY HARVEST;  Surgeon: Mary Pennington MD;  Location: [facility] OR     TRANSESOPHAGEAL ECHOCARDIOGRAM INTRAOPERATIVE N/A 11/18/2024    Procedure: ECHOCARDIOGRAM, TRANSESPOPHAGEAL, WITH ANESTHESIA,;  Surgeon: Mary Pennington MD;  Location: [facility] OR    Family History   Problem Relation Age of Onset     C.A.D. Father         first MI at 53, stenting x2     C.A.D. Mother         dx in her mid 50's     C.A.LESLI. Brother         MI in mid-50's     Cerebrovascular Disease Brother         in late 50's    Social History     Socioeconomic History     Marital status:      Spouse name: Not on file      Number of children: Not on file     Years of education: Not on file     Highest education level: Not on file   Occupational History     Not on file   Tobacco Use     Smoking status: Former     Current packs/day: 0.00     Average packs/day: 0.5 packs/day for 30.0 years (15.0 ttl pk-yrs)     Types: Cigarettes     Start date: 1981     Quit date: 2011     Years since quittin.7     Passive exposure: Past     Smokeless tobacco: Never     Tobacco comments:     smoking 3-4 cigs per day   Vaping Use     Vaping status: Never Used   Substance and Sexual Activity     Alcohol use: Yes     Comment: rarely     Drug use: No     Sexual activity: Never   Other Topics Concern     Parent/sibling w/ CABG, MI or angioplasty before 65F 55M? Yes     Comment: mother, father and 2 brothers   Social History Narrative     Not on file     Social Drivers of Health     Financial Resource Strain: Low Risk  (1/3/2025)    Financial Resource Strain      Within the past 12 months, have you or your family members you live with been unable to get utilities (heat, electricity) when it was really needed?: No   Food Insecurity: Low Risk  (1/3/2025)    Food Insecurity      Within the past 12 months, did you worry that your food would run out before you got money to buy more?: No      Within the past 12 months, did the food you bought just not last and you didn t have money to get more?: No   Transportation Needs: Low Risk  (1/3/2025)    Transportation Needs      Within the past 12 months, has lack of transportation kept you from medical appointments, getting your medicines, non-medical meetings or appointments, work, or from getting things that you need?: No   Physical Activity: Not on file   Stress: Not on file   Social Connections: Not on file   Interpersonal Safety: Low Risk  (2024)    Interpersonal Safety      Do you feel physically and emotionally safe where you currently live?: Yes      Within the past 12 months, have you been  hit, slapped, kicked or otherwise physically hurt by someone?: No      Within the past 12 months, have you been humiliated or emotionally abused in other ways by your partner or ex-partner?: No   Housing Stability: Low Risk  (1/3/2025)    Housing Stability      Do you have housing? : Yes      Are you worried about losing your housing?: No   Recent Concern: Housing Stability - High Risk (1/3/2025)    Housing Stability      Do you have housing? : No      Are you worried about losing your housing?: No        Medications  Allergies   Current Outpatient Medications   Medication Sig Dispense Refill     acetaminophen (TYLENOL) 325 MG tablet Take 2 tablets (650 mg) by mouth every 4 hours as needed for other (For optimal non-opioid multimodal pain management to improve pain control.).       amLODIPine (NORVASC) 5 MG tablet Take 1 tablet (5 mg) by mouth daily. 90 tablet 0     aspirin (ASA) 81 MG chewable tablet Take 1 tablet (81 mg) by mouth daily.       atorvastatin (LIPITOR) 80 MG tablet Take 1 tablet (80 mg) by mouth daily. For cholesterol. 90 tablet 1     furosemide (LASIX) 20 MG tablet Take 1 tablet (20 mg) by mouth 2 times daily. 180 tablet 3     metFORMIN (GLUCOPHAGE XR) 500 MG 24 hr tablet TAKE 1 TABLET BY MOUTH DAILY WITH DINNER FOR BLOOD SUGARS 90 tablet 1     metoprolol succinate ER (TOPROL XL) 100 MG 24 hr tablet Take 1 tablet (100 mg) by mouth daily. 90 tablet 0     ondansetron (ZOFRAN) 4 MG tablet Take 4 mg by mouth every 8 hours as needed for nausea or vomiting.       potassium chloride travis ER (KLOR-CON M10) 10 MEQ CR tablet Take 1 tablet (10 mEq) by mouth daily. 90 tablet 3     semaglutide (OZEMPIC) 2 MG/3ML pen Inject 0.25 mg subcutaneously every 7 days. Increase to 0.5 mg after 4 doses 3 mL 0     sertraline (ZOLOFT) 100 MG tablet TAKE 2 TABLETS(200 MG) BY MOUTH DAILY 180 tablet 0     spironolactone (ALDACTONE) 25 MG tablet Take 0.5 tablets (12.5 mg) by mouth daily. 45 tablet 2     warfarin ANTICOAGULANT  (COUMADIN) 1 MG tablet Takes 2 tablets (2mg) on Monday and Friday, and take 3 tablets (3mg) all other days, by mouth as directed by Anticoagulation Clinic. 235 tablet 1    No Known Allergies      Lab Results    Chemistry/lipid CBC Cardiac Enzymes/BNP/TSH/INR   Lab Results   Component Value Date    CHOL 151 04/15/2025    HDL 41 (L) 04/15/2025    TRIG 115 04/15/2025    BUN 37.2 (H) 04/15/2025     04/15/2025    CO2 29 04/15/2025    Lab Results   Component Value Date    WBC 7.9 2025    HGB 11.8 2025    HCT 32.8 (L) 2025    MCV 90 2025     2025    Lab Results   Component Value Date    TSH 3.54 2024    INR 2.4 (H) 2025            Maria Elena Yancey, DNP, APRN, CNP  [facility] [place]   Clinic and [phone]  Fax: [phone]  Heart Failure Nurses: [phone]    Thank you for allowing me to participate in the care of your patient.      Sincerely,     [facility]  cc:   Maria Elena Yancey CNP  [address] [place] [address]

## 2025-05-22 NOTE — PROGRESS NOTES
[facility]  [address], [place] [address]  Office: [phone]     Assessment/Recommendations   Assessment: Ms. Gamez presents to [facility] today for heart failure focused follow-up visit.    # Hypertrophic cardiomyopathy  -No current high risk factors for SCD  -Zio monitor 2/2025 showed 2 episodes of non-sustained supraventricular tachycardia 5 beats at 125 bpm, no sustained tachyarrhythmias   -LVOT- resting left ventricular outflow gradient of 34 mmHg which increases to 57 mmHg with Valsalva  -Continue metoprolol succinate 100 mg  -Yearly follow-up with general cardiology, Dr. Jerez with zio monitor, TMET, and echo  -cMRI every 3-years  -Today patient continues to report dyspnea with minimal activity that is slightly worse of the last several months- will plan to repeat echo now     # Acute on chronic heart failure with preserved ejection fraction (EF 65-70% per echo 1/3/2025)  # RV dysfunction  Stage C. NYHA Class II-III.  Her weight on the clinic scale today is 239 lbs. Upon exam, patient is well-compensated. She has trace LE edema at her baseline. No overt fluid retention on exam, still consider excess volume today given sx report of progressive ADAN bloated abdomen and uptrending weight. Last visit, patient was instructed to increase her lasix to bid dosing, she did not remember to do this. Plan to instruct her to uptitrate loop again today.     BP: controlled, soft  Fluid status: volume excess. Diuretic: Lasix 20 mg w/KCL 10 mEq---> increase lasix to 20 mg bid today   Aldosterone antagonist: spironolactone 12.5 mg  SGLT2i:  deferred while other medical therapy is prioritized  Ischemia evaluation: significant multivessel disease per coronary angiogram 11/2024 s/p CABG 11/18/2024  NSAID use: contraindicated  -Sleep apnea evaluation: scheduled 6/30/2025    Heart failure education including medication compliance and lifestyle management  discussed today: low sodium diet <2g Na/day, fluid intake <2L/day, daily weight monitoring, and physical activity as tolerated.     # Valvular heart disease  -Mild (1+) mitral regurgitation per echo 1/3/2025    # Hypertension  -BP today 108/58. Reports home BP 80/40s later in the day for the past week, otherwise home readings -130s  -Amlodipine 10 mg (low dose initially was prescribed for radial artery graft protection s/p CAB 11/2024 with subsequent dose increases for BP management -->amlodipine dose decreased to 5 mg 4/22, will decrease to 2.5 mg today given dyspnea and symptomatic softer BP with home readings  (* caution amlodipine may worsen LVOT)    # Coronary artery disease s/p CABG x5 11/18/2024  # Dyslipidemia  -Denies chest pain and anginal equivalents  -Completed cardiac rehab  -Antiplatelet therapy with ASA 81 mg for secondary ASCVD prevention  -LDL goal <70; high intensity statin therapy with atorvastatin 80 mg for secondary ASCVD prevention    # Postoperative Atrial fib  -Onset: postoperative CABG 11/2024  -Zio monitor 2/2025 showed predominately SR  bpm, 2 episodes of non-sustained supraventricular tachycardia, no sustained tachyarrhythmias, no Afib  -Rate/rhythm control: Metoprolol succinate 100 mg  -[id number] score of 6 for female sex, age 65-74 and h/o HTN, CHF, vascular disease, DM  -Continue warfarin for stroke prophylaxis    # DM 2  -Most recent A1C 6.1 (4/15/2025)  -Metformin, managed by PCP    # Obesity  -Body mass index is 41.2 kg/m   -Semaglutide, managed by PCP, scheduled to follow with weight management clinic 8/14/2025      Plan:  Labs today-BMP, Mg, NtproBNP- consider adding low dose MRA and SGLT2i  Increase lasix to 20 mg twice daily  Decrease amlodipine to 2.5 mg daily  Continue to monitor HR, BP, and daily weight  Echocardiogram    -Follow-up in the heart failure clinic with NANCY in ~1- month.  -Follow-up with general cardiology, Dr. Jerez ~ July 2025, needs to be  "scheduled.     The longitudinal plan of care for the condition(s) below were addressed during this visit. Due to the added complexity in care, I will continue to support Ms. Gamez in the subsequent management of this condition(s) and with the ongoing continuity of care of this condition(s): HFpEF.     History of Present Illness/Subjective    Ms. Gamez is a 69 year old female with a past medical history significant for HCM, HFpEF, MV CAD s/p CABG x5 11/2024, HTN, HLD, CKD, DM2, and depression. Today patient presents to [facility] for heart failure focused follow-up visit.     Primary Cardiologist: Dr. Jerez; Last office visit 1/24/2025.    Patient was last see in the heart failure clinic on 4/22/2025. At that time patient reported progressive ADAN and activity intolerance over the last 1-week. She reported transient leg swelling and abdominal bloating she notices after saltier meal. On these occasions she was taking an extra dose of lasix and this helped to relieve bloating and LE swelling. Patient was instructed to decrease amlodipine to 5 mg daily and to increase lasix to 20 mg bid that day. Weight in clinic that day was 240 lbs.     Today, patient endorses ongoing ADAN and activity intolerance. She is feeling discouraged and unable to go about her day d/t dyspnea with minimal activity. She did not remember to increase lasix dose to bid dosing and has continued on 20 mg daily. She is feeling bloated in her abdomen today. Over the last week has noticed dizziness with lower BPs 80s/40s in the afternoons. Otherwise SBPs ranging 120s-130s at other times that she checks. She has mild lower extremity edema that she feels is close to her baseline. Her weight on home scale this AM was 237 lbs. She also c/o \"muscle spasms\" in LE for the past ~1-week. She reports adequate fluid intake.     She denies chest pain, palpitations, presyncope, syncope, shortness of breath at rest, orthopnea, PND, and " bleeding.      Recent test results & labs below (personally reviewed):    Zio monitor 2/24/2025  Zio monitoring from 2/3/2025 to 2/17/2025 (duration 13d 21h)  Predominant rhythm was sinus rhythm, 38 to 100bpm, average 49bpm.  2 episode(s) of nonsustained supraventricular tachycardia - longest and fastest 13.6s, 120bpm.  1 episode(s) of nonsustained ventricular tachycardia - 5 beats, 125bpm.  No sustained tachyarrhythmias.  No atrial fibrillation.  There were no pauses of greater than 3 seconds.  Rare premature atrial contractions beats (isolated <1%).  Rare premature ventricular contractions (isolated <1%).  No symptoms recorded.    Echocardiogram 1/3/2025  Interpretation Summary  The echo findings are consistent with hypertrophic cardiomyopathy. Asymmetric  left ventricular hypertrophy with septum measuring 1.6 cm. There is a resting  left ventricular outflow gradient of 34 mmHg which increases to 57 mmHg with  Valsalva.  The visual ejection fraction is 65-70% without wall motion abnormality.  Mildly decreased right ventricular systolic function  There is mild (1+) mitral regurgitation.  There is systolic anterior motion of the mitral valve.  There is trace to mild tricuspid regurgitation. No pulmonary HTN.  Compared to the prior study dated 11/20/2024, there have been no changes.    Coronary Angiogram 11/8/2024  Conclusion    Ramus lesion is 40% stenosed.    Prox Cx lesion is 95% stenosed.    Mid Cx lesion is 30% stenosed.    Dist LAD-2 lesion is 70% stenosed.    Mid LAD to Dist LAD lesion is 70% stenosed.    3rd Diag lesion is 70% stenosed.    Mid LAD lesion is 80% stenosed.    2nd Diag lesion is 90% stenosed.    Dist LAD-1 lesion is 80% stenosed.    Prox LAD to Mid LAD lesion is 30% stenosed.    Dist RCA lesion is 99% stenosed.    Mid RCA lesion is 40% stenosed.    Prox RCA lesion is 40% stenosed.    RPDA lesion is 80% stenosed.  1.significant three-vessel CAD  Plan   Follow bedrest per protocol   Return to the  primary inpatient team for further evaluation and managmenet  1.continue therapeutic anticoagulation with heparin drip or Lovenox for ACS  2.continue aspirin  3.consult cardiothoracic surgery service for CABG     Cardiac MRI 11/13/2024  CONCLUSIONS:   Asymmetric septal hypertrophy with maximal wall thickness 2.0 cm. Systolic anterior motion of the mitral  valve is present. There is apical displacement of the papillary muscles. LGE imaging shows mid-myocardial  enhancement in a non-ischemic pattern. Collectively, these findings suggest obstructive hypertrophic  cardiomyopathy. Consider genetic testing for HCM.   Normal biventricular systolic function, LVEF 74%, RVEF 70%.     Physical Examination Review of Systems   LMP  (LMP Unknown)   There is no height or weight on file to calculate BMI.  Wt Readings from Last 3 Encounters:   04/22/25 108.9 kg (240 lb)   04/15/25 107.8 kg (237 lb 9.6 oz)   02/13/25 103.4 kg (228 lb)     General Appearance:   Appears comfortable, in no acute distress   HEENT: Eyes symmetrical, no discharge or icterus bilaterally. Mucous membranes moist and without lesions   Cardiovascular: RRR, +S1S2, no murmur, rub, or gallop. JVP not visible at 90 degrees      Respiratory:   Respirations regular, even, and unlabored. Lungs CTA throughout   GI:  Soft and non distended   Musculoskeletal: No joint swelling or tenderness   Extremities   No cyanosis. Trace lower extremity peripheral edema.   Skin: Warm, no xanthelasma, no jaundice, no rashes or lesions. R leg medial knee incx sit scabbed with mild erythema surrounding   Neurologic: Alert and oriented X3, no focal deficits   Psychiatric: calm and cooperative                                             Negative unless noted in HPI     Medical History  Surgical History Family History Social History   Past Medical History:   Diagnosis Date    Cervicalgia 6/29/2005 June 29, 2005: Thrown from a horse - wearing a helmet - and struck side of head and neck,  heard crepitation, no loc, Able to walk after injury.  persistant pain in neck relieved with ibuprofen, stiff but can move with ROM.  No changes in hands and feet sensation/motor.    Coronary artery disease     Depressive disorder, not elsewhere classified     Hypertension     Obesity, unspecified     Past Surgical History:   Procedure Laterality Date    ANGIOGRAM  2010    negative    COLONOSCOPY N/A 3/24/2023    Procedure: COLONOSCOPY, WITH HOT POLYPECTOMY AND RESEARCH BIOPSY;  Surgeon: Bret Velez MD;  Location: [facility] OR    CORONARY ARTERY BYPASS GRAFT, WITH ENDOSCOPIC VESSEL PROCUREMENT N/A 11/18/2024    Procedure: CORONARY ARTERY BYPASS GRAFT TIMES FIVE, LEFT INTERNAL MAMMARY ARTERY HARVEST, RIGHT ENDOSCOPIC VESSEL PROCUREMENT,;  Surgeon: Mary Pennington MD;  Location: [facility] OR    CV CORONARY ANGIOGRAM N/A 11/8/2024    Procedure: Coronary Angiogram;  Surgeon: Emir Brand MD;  Location:  HE[facility] CARDI[facility] CATH LAB    HYSTERECTOMY, PAP NO LONGER INDICATED      BSO    MIDLINE DOUBLE LUMEN PLACEMENT  11/24/2024    PROCURE ARTERY RADIAL  11/18/2024    Procedure: LEFT RADIAL ARTERY HARVEST;  Surgeon: Mary Pennington MD;  Location: [facility] OR    TRANSESOPHAGEAL ECHOCARDIOGRAM INTRAOPERATIVE N/A 11/18/2024    Procedure: ECHOCARDIOGRAM, TRANSESPOPHAGEAL, WITH ANESTHESIA,;  Surgeon: Mary Pennington MD;  Location: [facility] OR    Family History   Problem Relation Age of Onset    C.A.D. Father         first MI at 53, stenting x2    C.A.D. Mother         dx in her mid 50's    C.A.D. Brother         MI in mid-50's    Cerebrovascular Disease Brother         in late 50's    Social History     Socioeconomic History    Marital status:      Spouse name: Not on file    Number of children: Not on file    Years of education: Not on file    Highest education level: Not on file   Occupational History    Not on file   Tobacco Use    Smoking status: Former     Current packs/day: 0.00     Average packs/day:  0.5 packs/day for 30.0 years (15.0 ttl pk-yrs)     Types: Cigarettes     Start date: 1981     Quit date: 2011     Years since quittin.7     Passive exposure: Past    Smokeless tobacco: Never    Tobacco comments:     smoking 3-4 cigs per day   Vaping Use    Vaping status: Never Used   Substance and Sexual Activity    Alcohol use: Yes     Comment: rarely    Drug use: No    Sexual activity: Never   Other Topics Concern    Parent/sibling w/ CABG, MI or angioplasty before 65F 55M? Yes     Comment: mother, father and 2 brothers   Social History Narrative    Not on file     Social Drivers of Health     Financial Resource Strain: Low Risk  (1/3/2025)    Financial Resource Strain     Within the past 12 months, have you or your family members you live with been unable to get utilities (heat, electricity) when it was really needed?: No   Food Insecurity: Low Risk  (1/3/2025)    Food Insecurity     Within the past 12 months, did you worry that your food would run out before you got money to buy more?: No     Within the past 12 months, did the food you bought just not last and you didn t have money to get more?: No   Transportation Needs: Low Risk  (1/3/2025)    Transportation Needs     Within the past 12 months, has lack of transportation kept you from medical appointments, getting your medicines, non-medical meetings or appointments, work, or from getting things that you need?: No   Physical Activity: Not on file   Stress: Not on file   Social Connections: Not on file   Interpersonal Safety: Low Risk  (2024)    Interpersonal Safety     Do you feel physically and emotionally safe where you currently live?: Yes     Within the past 12 months, have you been hit, slapped, kicked or otherwise physically hurt by someone?: No     Within the past 12 months, have you been humiliated or emotionally abused in other ways by your partner or ex-partner?: No   Housing Stability: Low Risk  (1/3/2025)    Housing Stability      Do you have housing? : Yes     Are you worried about losing your housing?: No   Recent Concern: Housing Stability - High Risk (1/3/2025)    Housing Stability     Do you have housing? : No     Are you worried about losing your housing?: No        Medications  Allergies   Current Outpatient Medications   Medication Sig Dispense Refill    acetaminophen (TYLENOL) 325 MG tablet Take 2 tablets (650 mg) by mouth every 4 hours as needed for other (For optimal non-opioid multimodal pain management to improve pain control.).      amLODIPine (NORVASC) 5 MG tablet Take 1 tablet (5 mg) by mouth daily. 90 tablet 0    aspirin (ASA) 81 MG chewable tablet Take 1 tablet (81 mg) by mouth daily.      atorvastatin (LIPITOR) 80 MG tablet Take 1 tablet (80 mg) by mouth daily. For cholesterol. 90 tablet 1    furosemide (LASIX) 20 MG tablet Take 1 tablet (20 mg) by mouth 2 times daily. 180 tablet 3    metFORMIN (GLUCOPHAGE XR) 500 MG 24 hr tablet TAKE 1 TABLET BY MOUTH DAILY WITH DINNER FOR BLOOD SUGARS 90 tablet 1    metoprolol succinate ER (TOPROL XL) 100 MG 24 hr tablet Take 1 tablet (100 mg) by mouth daily. 90 tablet 0    ondansetron (ZOFRAN) 4 MG tablet Take 4 mg by mouth every 8 hours as needed for nausea or vomiting.      potassium chloride travis ER (KLOR-CON M10) 10 MEQ CR tablet Take 1 tablet (10 mEq) by mouth daily. 90 tablet 3    semaglutide (OZEMPIC) 2 MG/3ML pen Inject 0.25 mg subcutaneously every 7 days. Increase to 0.5 mg after 4 doses 3 mL 0    sertraline (ZOLOFT) 100 MG tablet TAKE 2 TABLETS(200 MG) BY MOUTH DAILY 180 tablet 0    spironolactone (ALDACTONE) 25 MG tablet Take 0.5 tablets (12.5 mg) by mouth daily. 45 tablet 2    warfarin ANTICOAGULANT (COUMADIN) 1 MG tablet Takes 2 tablets (2mg) on Monday and Friday, and take 3 tablets (3mg) all other days, by mouth as directed by Anticoagulation Clinic. 235 tablet 1    No Known Allergies      Lab Results    Chemistry/lipid CBC Cardiac Enzymes/BNP/TSH/INR   Lab Results    Component Value Date    CHOL 151 04/15/2025    HDL 41 (L) 04/15/2025    TRIG 115 04/15/2025    BUN 37.2 (H) 04/15/2025     04/15/2025    CO2 29 04/15/2025    Lab Results   Component Value Date    WBC 7.9 2025    HGB 11.8 2025    HCT 32.8 (L) 2025    MCV 90 2025     2025    Lab Results   Component Value Date    TSH 3.54 2024    INR 2.4 (H) 2025            Maria Elena Yancey, DNP, APRN, CNP  [facility] and [phone]  Fax: [phone]  Heart Failure Nurses: [phone]

## 2025-05-22 NOTE — PATIENT INSTRUCTIONS
It was a pleasure to see you today at the [facility].     Recommendations:  The nurse will call you to review your lab results and recommendations.   Increase lasix to 20 mg twice daily.  Decrease amlodipine to 2.5 mg daily.   Continue to monitor your blood pressure at home call for lower blood pressures and/or lightheadedness.   Schedule echocardiogram.     -Continue to follow a low sodium diet (<2g/day) and maintain adequate fluid intake (~50-60 oz/day).   -Continue to monitor your weight daily and please call if you gain 2 lbs or more in 1 day OR 5 lbs or more in 1-week.    Follow-up in the heart failure clinic with NANCY in ~1- month.  Follow-up with general cardiology, Dr. Jerez ~ 2025, needs to be scheduled.     Please call with questions/concerns and/or if you experience new or worsening heart failure symptoms (shortness of breath, weight gain, fluid retention/lower extremity swelling, and/or reduced activity tolerance).    Heart Failure Nurse Line: [phone] (M-F 8a-430)  24-hour HF Nurse Line: [phone] (weekends, evenings, holidays)    Maria Elena Yancey CNP  [facility]  Clinic and [phone]  Fax: [phone]  Heart Failure Nurses: [phone]

## 2025-05-23 ENCOUNTER — RESULTS FOLLOW-UP (OUTPATIENT)
Dept: CARDIOLOGY | Facility: CLINIC | Age: 70
End: 2025-05-23

## 2025-05-27 DIAGNOSIS — I10 HYPERTENSION GOAL BP (BLOOD PRESSURE) < 140/90: ICD-10-CM

## 2025-05-27 DIAGNOSIS — I48.91 POSTOPERATIVE ATRIAL FIBRILLATION (H): ICD-10-CM

## 2025-05-27 DIAGNOSIS — I97.89 POSTOPERATIVE ATRIAL FIBRILLATION (H): ICD-10-CM

## 2025-05-28 RX ORDER — METOPROLOL SUCCINATE 100 MG/1
100 TABLET, EXTENDED RELEASE ORAL DAILY
Qty: 90 TABLET | Refills: 2 | Status: SHIPPED | OUTPATIENT
Start: 2025-05-28

## 2025-06-04 DIAGNOSIS — E11.8 TYPE 2 DIABETES MELLITUS WITH COMPLICATION, WITHOUT LONG-TERM CURRENT USE OF INSULIN (H): ICD-10-CM

## 2025-06-04 DIAGNOSIS — F33.1 MAJOR DEPRESSIVE DISORDER, RECURRENT EPISODE, MODERATE (H): ICD-10-CM

## 2025-06-05 RX ORDER — SERTRALINE HYDROCHLORIDE 100 MG/1
200 TABLET, FILM COATED ORAL DAILY
Qty: 180 TABLET | Refills: 0 | Status: SHIPPED | OUTPATIENT
Start: 2025-06-05

## 2025-06-10 ENCOUNTER — LAB (OUTPATIENT)
Dept: LAB | Facility: CLINIC | Age: 70
End: 2025-06-10
Payer: COMMERCIAL

## 2025-06-10 ENCOUNTER — RESULTS FOLLOW-UP (OUTPATIENT)
Dept: ANTICOAGULATION | Facility: CLINIC | Age: 70
End: 2025-06-10

## 2025-06-10 ENCOUNTER — ANTICOAGULATION THERAPY VISIT (OUTPATIENT)
Dept: ANTICOAGULATION | Facility: CLINIC | Age: 70
End: 2025-06-10

## 2025-06-10 DIAGNOSIS — Z95.1 S/P CABG (CORONARY ARTERY BYPASS GRAFT): Primary | ICD-10-CM

## 2025-06-10 DIAGNOSIS — I48.91 POSTOPERATIVE ATRIAL FIBRILLATION (H): ICD-10-CM

## 2025-06-10 DIAGNOSIS — I97.89 POSTOPERATIVE ATRIAL FIBRILLATION (H): ICD-10-CM

## 2025-06-10 LAB — INR BLD: 3.3 (ref 0.9–1.1)

## 2025-06-10 PROCEDURE — 85610 PROTHROMBIN TIME: CPT

## 2025-06-10 PROCEDURE — 36416 COLLJ CAPILLARY BLOOD SPEC: CPT

## 2025-06-10 NOTE — PROGRESS NOTES
ANTICOAGULATION MANAGEMENT     Karyn Gamez 69 year old female is on warfarin with supratherapeutic INR result. (Goal INR 2.0-3.0)    Recent labs: (last 7 days)     06/10/25  1435   INR 3.3*       ASSESSMENT     Warfarin Lab Questionnaire    Warfarin Doses Last 7 Days      6/10/2025     2:26 PM   Dose in Tablet or Mg   TAB or MG? tablet (tab)     Pt Rptd Dose NAS MONDAY TUESDAY WED THURS FRIDAY SATURDAY   6/10/2025   2:26 PM 3 3 3 3 3 3 3         6/10/2025   Warfarin Lab Questionnaire   Missed doses within past 14 days? No   Changes in diet or alcohol within past 14 days? No   Medication changes since last result? No   Injuries or illness since last result? No   New shortness of breath, severe headaches or sudden changes in vision since last result? No   Abnormal bleeding since last result? No   Upcoming surgery, procedure? No   Best number to call with results? [phone]     Previous result: Therapeutic last 2(+) visits  Additional findings: None       PLAN     Recommended plan for no diet, medication or health factor changes affecting INR     Dosing Instructions: Continue your current warfarin dose with next INR in 2 weeks       Summary  As of 6/10/2025      Full warfarin instructions:  3 mg every day   Next INR check:  6/24/2025               Detailed voice message left for Karyn with dosing instructions and follow up date.     Check at provider office visit    Education provided: Please call back if any changes to your diet, medications or how you've been taking warfarin  Goal range and lab monitoring: goal range and significance of current result    Plan made per [facility] anticoagulation protocol    Emily Banerjee RN  6/10/2025  Anticoagulation Clinic  [employer] for routing messages: hernan EDWARDS  [facility] patient phone line: [phone]        _______________________________________________________________________     Anticoagulation Episode Summary       Current INR goal:  2.0-3.0   TTR:   66.4% (6 mo)   Target end date:  Indefinite   Send INR reminders to:  BAYRONELVIN WANG GRACE    Indications    S/P CABG (coronary artery bypass graft) [Z95.1]  Postoperative atrial fibrillation (H) [I97.89  I48.91]             Comments:  --             Anticoagulation Care Providers       Provider Role Specialty Phone number    Ly Atwood PA-C Referring Cardiovascular & Thoracic Surgery [phone]    Cristiana Davis MD Referring Family Medicine [phone]

## 2025-06-23 ENCOUNTER — HOSPITAL ENCOUNTER (OUTPATIENT)
Dept: CARDIOLOGY | Facility: HOSPITAL | Age: 70
Discharge: HOME | End: 2025-06-23
Attending: NURSE PRACTITIONER | Admitting: NURSE PRACTITIONER
Payer: COMMERCIAL

## 2025-06-23 LAB — LVEF ECHO: NORMAL

## 2025-06-23 PROCEDURE — 93306 TTE W/DOPPLER COMPLETE: CPT

## 2025-06-23 PROCEDURE — 93306 TTE W/DOPPLER COMPLETE: CPT | Mod: 26 | Performed by: INTERNAL MEDICINE

## 2025-06-26 ENCOUNTER — OFFICE VISIT (OUTPATIENT)
Dept: CARDIOLOGY | Facility: CLINIC | Age: 70
End: 2025-06-26
Payer: COMMERCIAL

## 2025-06-26 ENCOUNTER — RESULTS FOLLOW-UP (OUTPATIENT)
Dept: CARDIOLOGY | Facility: CLINIC | Age: 70
End: 2025-06-26

## 2025-06-26 ENCOUNTER — LAB (OUTPATIENT)
Dept: CARDIOLOGY | Facility: CLINIC | Age: 70
End: 2025-06-26
Payer: COMMERCIAL

## 2025-06-26 ENCOUNTER — ANTICOAGULATION THERAPY VISIT (OUTPATIENT)
Dept: ANTICOAGULATION | Facility: CLINIC | Age: 70
End: 2025-06-26

## 2025-06-26 VITALS
BODY MASS INDEX: 42.1 KG/M2 | WEIGHT: 246.6 LBS | HEIGHT: 64 IN | RESPIRATION RATE: 16 BRPM | DIASTOLIC BLOOD PRESSURE: 66 MMHG | HEART RATE: 49 BPM | SYSTOLIC BLOOD PRESSURE: 126 MMHG

## 2025-06-26 DIAGNOSIS — E66.813 CLASS 3 SEVERE OBESITY WITH SERIOUS COMORBIDITY AND BODY MASS INDEX (BMI) OF 40.0 TO 44.9 IN ADULT, UNSPECIFIED OBESITY TYPE (H): ICD-10-CM

## 2025-06-26 DIAGNOSIS — E11.9 TYPE 2 DIABETES MELLITUS WITHOUT COMPLICATION, WITHOUT LONG-TERM CURRENT USE OF INSULIN (H): ICD-10-CM

## 2025-06-26 DIAGNOSIS — Z95.1 S/P CABG (CORONARY ARTERY BYPASS GRAFT): ICD-10-CM

## 2025-06-26 DIAGNOSIS — Z95.1 S/P CABG (CORONARY ARTERY BYPASS GRAFT): Primary | ICD-10-CM

## 2025-06-26 DIAGNOSIS — I51.9 RIGHT VENTRICULAR DYSFUNCTION: ICD-10-CM

## 2025-06-26 DIAGNOSIS — R06.09 DOE (DYSPNEA ON EXERTION): ICD-10-CM

## 2025-06-26 DIAGNOSIS — I48.91 POSTOPERATIVE ATRIAL FIBRILLATION (H): ICD-10-CM

## 2025-06-26 DIAGNOSIS — I48.91 ATRIAL FIBRILLATION, UNSPECIFIED TYPE (H): ICD-10-CM

## 2025-06-26 DIAGNOSIS — I50.9 ACUTE DECOMPENSATED HEART FAILURE (H): ICD-10-CM

## 2025-06-26 DIAGNOSIS — I42.2 HYPERTROPHIC CARDIOMYOPATHY (H): ICD-10-CM

## 2025-06-26 DIAGNOSIS — I25.10 CORONARY ARTERY DISEASE INVOLVING NATIVE CORONARY ARTERY OF NATIVE HEART WITHOUT ANGINA PECTORIS: ICD-10-CM

## 2025-06-26 DIAGNOSIS — I50.32 CHRONIC HEART FAILURE WITH PRESERVED EJECTION FRACTION (HFPEF) (H): Primary | ICD-10-CM

## 2025-06-26 DIAGNOSIS — I97.89 POSTOPERATIVE ATRIAL FIBRILLATION (H): ICD-10-CM

## 2025-06-26 DIAGNOSIS — I38 VALVULAR HEART DISEASE: ICD-10-CM

## 2025-06-26 DIAGNOSIS — E78.5 DYSLIPIDEMIA: ICD-10-CM

## 2025-06-26 DIAGNOSIS — I10 ESSENTIAL HYPERTENSION: ICD-10-CM

## 2025-06-26 LAB
ANION GAP SERPL CALCULATED.3IONS-SCNC: 10 MMOL/L (ref 7–15)
BUN SERPL-MCNC: 30.1 MG/DL (ref 8–23)
CALCIUM SERPL-MCNC: 10.5 MG/DL (ref 8.8–10.4)
CHLORIDE SERPL-SCNC: 101 MMOL/L (ref 98–107)
CREAT SERPL-MCNC: 0.88 MG/DL (ref 0.51–0.95)
EGFRCR SERPLBLD CKD-EPI 2021: 71 ML/MIN/1.73M2
GLUCOSE SERPL-MCNC: 98 MG/DL (ref 70–99)
HCO3 SERPL-SCNC: 33 MMOL/L (ref 22–29)
INR POINT OF CARE: 2.9 (ref 0.9–1.1)
NT-PROBNP SERPL-MCNC: 731 PG/ML (ref 0–353)
POTASSIUM SERPL-SCNC: 4.9 MMOL/L (ref 3.4–5.3)
SODIUM SERPL-SCNC: 144 MMOL/L (ref 135–145)

## 2025-06-26 RX ORDER — FUROSEMIDE 20 MG/1
20 TABLET ORAL
Qty: 180 TABLET | Refills: 3 | Status: CANCELLED | OUTPATIENT
Start: 2025-06-26

## 2025-06-26 RX ORDER — SPIRONOLACTONE 25 MG/1
12.5 TABLET ORAL DAILY
Qty: 45 TABLET | Refills: 2 | Status: CANCELLED | OUTPATIENT
Start: 2025-06-26

## 2025-06-26 RX ORDER — FUROSEMIDE 20 MG/1
40 TABLET ORAL DAILY
Qty: 180 TABLET | Refills: 3 | Status: SHIPPED | OUTPATIENT
Start: 2025-06-26

## 2025-06-26 NOTE — PATIENT INSTRUCTIONS
It was a pleasure to see you today at the [facility].     Recommendations:  The nurse will call you to review your lab results and recommendations.   [employer] meal application provided to you today  We will connect you back with cardiac rehab to finish sessions  Continue to monitor blood pressure at home, see if your monitor also checks heart rate, start recording these as well  Work on getting in enough fluids throughout the day, water is best!     -Continue to follow a low sodium diet (<2g/day) and maintain adequate fluid intake (~50-60 oz/day).   -Continue to monitor your weight daily and please call if you gain 2 lbs or more in 1 day OR 5 lbs or more in 1-week.    Follow-up with general cardiology, Dr. Jerez- scheduled 2025.  Follow-up in the heart failure clinic with NANCY in ~2-3 months.    Please call with questions/concerns and/or if you experience new or worsening heart failure symptoms (shortness of breath, weight gain, fluid retention/lower extremity swelling, and/or reduced activity tolerance).    Heart Failure Nurse Line: [phone] (M-F [phone]-430x)  24-hour HF Nurse Line: [phone] (weekends, evenings, holidays)    Maria Elena Yancey CNP  [facility] - [facility]  Clinic and [phone]  Fax: [phone]  Heart Failure Nurses: [phone]

## 2025-06-26 NOTE — LETTER
6/26/2025    Calin Daugherty MD  [address]  [facility] [address]    RE: Karyn Aquinoindia       Dear Colleague,     I had the pleasure of seeing Karyn Gamez in the [facility].      [facility]  [address], [place] [address]  Office: [phone]     Assessment/Recommendations   Assessment: Ms. Gamez presents to [facility] today for heart failure focused follow-up visit.    # Hypertrophic cardiomyopathy  -No current high risk factors for SCD  -Zio monitor 2/2025 showed 2 episodes of non-sustained supraventricular tachycardia 5 beats at 125 bpm, no sustained tachyarrhythmias   -LVOT- resting left ventricular outflow gradient of 34 mmHg which increases to 57 mmHg with Valsalva per echo 1/3/2025, more recently repeat TTE 6/23/2025 showed mild LVOT obstruction with mean gradient of 13 mmHg which did not change significantly with valsalva, overall noted to have a decrease in resting and provoked LV outflow tract gradients  -Continue metoprolol succinate 100 mg  -Encouraged adequate hydration   -Yearly follow-up with general cardiology, Dr. Jerez with zio monitor, TMET, and echo  -cMRI every 3-years    # Chronic heart failure with preserved ejection fraction (EF 65-70% per TTE 6/23/2025)  # RV dysfunction- mild   Stage C. NYHA Class II-III.  Kidney function has remained relatively stable with transient fluctuations in Cr. Her weight on the clinic scale today is 246 lbs. Upon exam, patient is well-compensated. She has mild LE edema that has increased over the last month despite increase in loop diuretic. She c/o abdominal bloating that is also worse. ADAN is similar for the last several months. She continues to c/o dyspnea with minimal activity like household chores. Weight has uptrended over the last month, she thinks r/t both fluid retention and dietary indiscretion. Discussed diet and fluid intake in detail today, she  thinks she can do better reducing sodium intake and increasing H20 intake to meet goals.   -Repeat labs today, will consider increasing MRA and adding SGLT2i  -[employer] application  -Resume cardiac rehab sessions    BP: controlled  Fluid status: volume excess. Diuretic: Lasix 40 mg daily w/KCL 10 mEq  Aldosterone antagonist: spironolactone 12.5 mg  SGLT2i: deferred while other medical therapy is prioritized  Ischemia evaluation: significant multivessel disease per coronary angiogram 11/2024 s/p CABG 11/18/2024  NSAID use: contraindicated  -Sleep apnea evaluation: scheduled 6/30/2025    Heart failure education including medication compliance and lifestyle management discussed today: low sodium diet <2g Na/day, fluid intake <2L/day, daily weight monitoring, and physical activity as tolerated.     # Dyspnea on exertion  -Unchanged over the last several months  -Multifactorial- consider deconditioning and obesity in addition to above outline cardiac contributors   -Patient did not complete cardiac rehab sessions s/p CABG d/t family member health conflict, she is willing to return for these sessions if insurance allows  -If cardiac rehab not covered, will place orders for pulmonary rehab as indicated for ADAN   -Review with Dr. Jerez at upcoming appointment if TMET would be recommended at this juncture    # Valvular heart disease  -Mild (1+) mitral regurgitation per echo stable per TTE 6/2025    # Hypertension  -BP today 126/88. Reports home SBPs 90-130s, lightheadedness has resolved  -Amlodipine 2.5 mg (low dose initially was prescribed for radial artery graft protection s/p CAB 11/2024 with subsequent dose increases for BP management -->amlodipine dose decreased to 5 mg 4/22/2025, further decreased to 2.5 mg 5/22/2025 given ongoing dyspnea and symptomatic softer BP with home readings  (*caution amlodipine may worsen LVOT)    # Coronary artery disease s/p CABG x5 11/18/2024  # Dyslipidemia  -Denies chest pain and anginal  equivalents  -Amlodipine for radial artery graft protection for at least 6 months post-op, ideally one year per CVTS notation   -Antiplatelet therapy with ASA 81 mg for secondary ASCVD prevention  -LDL goal <70; high intensity statin therapy with atorvastatin 80 mg for secondary ASCVD prevention    # Episodic Atrial fib  -Onset: postoperative CABG 11/2024  -Zio monitor 2/2025 showed predominately SR  bpm, 2 episodes of non-sustained supraventricular tachycardia, no sustained tachyarrhythmias, no Afib  -Rate/rhythm control: Metoprolol succinate 100 mg  -[id number] score of 6 for female sex, age 65-74 and h/o HTN, CHF, vascular disease, DM  -Continue warfarin for stroke prophylaxis    # DM 2  -Most recent A1C 6.1 (4/15/2025)  -Metformin and semaglutide managed by PCP    # Obesity  -Body mass index is 42.33 kg/m .  -Semaglutide initiated by PCP, patient only took one dose was not sure if she wanted to take this medication, encouraged retrial and slow uptitration per PCP and/or further management in weight management clinic  -She was scheduled to follow with weight management clinic upcoming in Aug, she got a call recently that this needs to be rescheduled because provider is not available    # Depression  -Sertraline, managed by PCP      Plan:  Labs today BMP and NtproBNP- consider increasing MRA and addition of SGLT2i (jardiance or farxiga)  [employer] meal application provided  Will connect patient back to resume cardiac rehab sessions  Continue to monitor blood pressure at home, start tracking HR as well  Encouraged adequate H20 intake  Review with Dr. Jerez at upcoming appointment if TMET would be recommended at this juncture    -Follow-up with general cardiology, Dr. Jerez- scheduled 7/11/2025.  -Follow-up in the heart failure clinic with NANCY in ~2-3 months.    The longitudinal plan of care for the condition(s) below were addressed during this visit. Due to the added complexity in care, I will continue to  support Ms. Gamez in the subsequent management of this condition(s) and with the ongoing continuity of care of this condition(s): HFpEF.     History of Present Illness/Subjective    Ms. Gamez is a 69 year old female with a past medical history significant for HCM, HFpEF, MV CAD s/p CABG x5 11/2024, HTN, HLD, DM2, and depression. Today patient presents to [facility] for heart failure focused follow-up visit.     Primary Cardiologist: Dr. Jerez; Last office visit 1/24/2025.    Patient was seen in the heart failure clinic on 4/22/2025 by myself. At that time patient reported progressive ADAN and activity intolerance. She also reported transient leg swelling and abdominal bloating she noticed after saltier meals. On these occasions she was taking an extra dose of lasix and this helped to relieve bloating and LE swelling. Patient was instructed to decrease amlodipine to 5 mg daily and to increase lasix to 20 mg bid that day. Weight in clinic that day was 240 lbs.     Patient was last seen in the heart failure clinic 5/22/2025 by myself. At that time, she reported ongoing ADAN and activity intolerance. Reported feeling discouraged and unable to go about her day d/t dyspnea with minimal activity. She did not remember to increase lasix dose to bid dosing and had continued on 20 mg daily. She reported bloated abdomen. Also reporting dizziness with lower blood pressures at home 80/40s in the afternoons. Amlodipine was decreased to 2.5 mg daily. Lasix was increased to 20 mg bid (same as recs from previous visit). Her weight in clinic that day was 239 lbs. Echo was ordered given ongoing dyspnea to reassess LVOT.     Echo 6/23/2025 showed mild LVOT obstruction with mean gradient of 13 mmHg which did not change significantly with valsalva, overall noted to have a decrease in resting and provoked LV outflow tract gradients    Today, patient endorses mild LE edema that has increased over the last month  despite increase in loop diuretic. She c/o abdominal bloating that is also worse. ADAN is similar for the last several months. She continues to c/o dyspnea with minimal activity like household chores. Weight has uptrended over the last month, she thinks r/t both fluid retention and dietary indiscretion.     She denies chest pain, palpitations, lightheadedness/dizziness, presyncope, syncope, shortness of breath at rest, orthopnea, PND, and bleeding.      Recent test results & labs below (personally reviewed):    TTE 6/23/2025  Interpretation Summary  There is mild to moderate assymetric LV hypertrophy  The visual ejection fraction is 65-70%.  Mildly decreased right ventricular systolic function  Mild LVOT obstruction with mean gradient of 13 mmHg which does not change  significantly with valsalva.  Compared to the prior study dated 1/3/2025, there are changes as noted. There  has been a decrease in resting and provoked LV outflow tract gradients.    Zio monitor 2/24/2025  Zio monitoring from 2/3/2025 to 2/17/2025 (duration 13d 21h)  Predominant rhythm was sinus rhythm, 38 to 100bpm, average 49bpm.  2 episode(s) of nonsustained supraventricular tachycardia - longest and fastest 13.6s, 120bpm.  1 episode(s) of nonsustained ventricular tachycardia - 5 beats, 125bpm.  No sustained tachyarrhythmias.  No atrial fibrillation.  There were no pauses of greater than 3 seconds.  Rare premature atrial contractions beats (isolated <1%).  Rare premature ventricular contractions (isolated <1%).  No symptoms recorded.    Echocardiogram 1/3/2025  Interpretation Summary  The echo findings are consistent with hypertrophic cardiomyopathy. Asymmetric  left ventricular hypertrophy with septum measuring 1.6 cm. There is a resting  left ventricular outflow gradient of 34 mmHg which increases to 57 mmHg with  Valsalva.  The visual ejection fraction is 65-70% without wall motion abnormality.  Mildly decreased right ventricular systolic  "function  There is mild (1+) mitral regurgitation.  There is systolic anterior motion of the mitral valve.  There is trace to mild tricuspid regurgitation. No pulmonary HTN.  Compared to the prior study dated 11/20/2024, there have been no changes.    Coronary Angiogram 11/8/2024  Conclusion     Ramus lesion is 40% stenosed.     Prox Cx lesion is 95% stenosed.     Mid Cx lesion is 30% stenosed.     Dist LAD-2 lesion is 70% stenosed.     Mid LAD to Dist LAD lesion is 70% stenosed.     3rd Diag lesion is 70% stenosed.     Mid LAD lesion is 80% stenosed.     2nd Diag lesion is 90% stenosed.     Dist LAD-1 lesion is 80% stenosed.     Prox LAD to Mid LAD lesion is 30% stenosed.     Dist RCA lesion is 99% stenosed.     Mid RCA lesion is 40% stenosed.     Prox RCA lesion is 40% stenosed.     RPDA lesion is 80% stenosed.  1.significant three-vessel CAD  Plan    Follow bedrest per protocol    Return to the primary inpatient team for further evaluation and managmenet  1.continue therapeutic anticoagulation with heparin drip or Lovenox for ACS  2.continue aspirin  3.consult cardiothoracic surgery service for CABG     Cardiac MRI 11/13/2024  CONCLUSIONS:   Asymmetric septal hypertrophy with maximal wall thickness 2.0 cm. Systolic anterior motion of the mitral  valve is present. There is apical displacement of the papillary muscles. LGE imaging shows mid-myocardial  enhancement in a non-ischemic pattern. Collectively, these findings suggest obstructive hypertrophic  cardiomyopathy. Consider genetic testing for HCM.   Normal biventricular systolic function, LVEF 74%, RVEF 70%.     Physical Examination Review of Systems   /66 (BP Location: Right arm, Patient Position: Sitting, Cuff Size: Adult Large)   Pulse (!) 49   Resp 16   Ht 1.626 m (5' 4\")   Wt 111.9 kg (246 lb 9.6 oz)   LMP  (LMP Unknown)   BMI 42.33 kg/m    Body mass index is 42.33 kg/m .  Wt Readings from Last 3 Encounters:   06/26/25 111.9 kg (246 lb 9.6 oz) "   05/22/25 108.4 kg (239 lb)   04/22/25 108.9 kg (240 lb)     General Appearance:   Appears comfortable, in no acute distress   HEENT: Eyes symmetrical, no discharge or icterus bilaterally. Mucous membranes moist and without lesions   Cardiovascular: RRR, +S1S2, no murmur, rub, or gallop. JVP not visible at 90 degrees      Respiratory:   Respirations regular, even, and unlabored. Lungs CTA throughout   GI:  Soft and non distended   Musculoskeletal: No joint swelling or tenderness   Extremities   No cyanosis. Mild lower extremity peripheral edema-pitting   Skin: Warm, no xanthelasma, no jaundice, no rashes or lesions.    Neurologic: Alert and oriented X3, no focal deficits   Psychiatric: calm and cooperative                                             Negative unless noted in HPI     Medical History  Surgical History Family History Social History   Past Medical History:   Diagnosis Date     Cervicalgia 6/29/2005 June 29, 2005: Thrown from a horse - wearing a helmet - and struck side of head and neck, heard crepitation, no loc, Able to walk after injury.  persistant pain in neck relieved with ibuprofen, stiff but can move with ROM.  No changes in hands and feet sensation/motor.     Coronary artery disease      Depressive disorder, not elsewhere classified      Hypertension      Obesity, unspecified     Past Surgical History:   Procedure Laterality Date     ANGIOGRAM  2010    negative     COLONOSCOPY N/A 3/24/2023    Procedure: COLONOSCOPY, WITH HOT POLYPECTOMY AND RESEARCH BIOPSY;  Surgeon: Bret Velez MD;  Location: [facility] OR     CORONARY ARTERY BYPASS GRAFT, WITH ENDOSCOPIC VESSEL PROCUREMENT N/A 11/18/2024    Procedure: CORONARY ARTERY BYPASS GRAFT TIMES FIVE, LEFT INTERNAL MAMMARY ARTERY HARVEST, RIGHT ENDOSCOPIC VESSEL PROCUREMENT,;  Surgeon: Mary Pennington MD;  Location: [facility] OR     CV CORONARY ANGIOGRAM N/A 11/8/2024    Procedure: Coronary Angiogram;  Surgeon: Emir Brand MD;   Location:  HEART CARDIAC CATH LAB     HYSTERECTOMY, PAP NO LONGER INDICATED      BSO     MIDLINE DOUBLE LUMEN PLACEMENT  2024     PROCURE ARTERY RADIAL  2024    Procedure: LEFT RADIAL ARTERY HARVEST;  Surgeon: Mary Pennington MD;  Location: [facility] OR     TRANSESOPHAGEAL ECHOCARDIOGRAM INTRAOPERATIVE N/A 2024    Procedure: ECHOCARDIOGRAM, TRANSESPOPHAGEAL, WITH ANESTHESIA,;  Surgeon: Mary Pennington MD;  Location: [facility] OR    Family History   Problem Relation Age of Onset     C.A.D. Father         first MI at 53, stenting x2     C.A.D. Mother         dx in her mid 50's     C.A.LESLI. Brother         MI in mid-50's     Cerebrovascular Disease Brother         in late 50's    Social History     Socioeconomic History     Marital status:      Spouse name: Not on file     Number of children: Not on file     Years of education: Not on file     Highest education level: Not on file   Occupational History     Not on file   Tobacco Use     Smoking status: Former     Current packs/day: 0.00     Average packs/day: 0.5 packs/day for 30.0 years (15.0 ttl pk-yrs)     Types: Cigarettes     Start date: 1981     Quit date: 2011     Years since quittin.8     Passive exposure: Past     Smokeless tobacco: Never     Tobacco comments:     smoking 3-4 cigs per day   Vaping Use     Vaping status: Never Used   Substance and Sexual Activity     Alcohol use: Yes     Comment: rarely     Drug use: No     Sexual activity: Never   Other Topics Concern     Parent/sibling w/ CABG, MI or angioplasty before 65F 55M? Yes     Comment: mother, father and 2 brothers   Social History Narrative     Not on file     Social Drivers of Health     Financial Resource Strain: Low Risk  (1/3/2025)    Financial Resource Strain      Within the past 12 months, have you or your family members you live with been unable to get utilities (heat, electricity) when it was really needed?: No   Food Insecurity: Low Risk  (1/3/2025)     Food Insecurity      Within the past 12 months, did you worry that your food would run out before you got money to buy more?: No      Within the past 12 months, did the food you bought just not last and you didn t have money to get more?: No   Transportation Needs: Low Risk  (1/3/2025)    Transportation Needs      Within the past 12 months, has lack of transportation kept you from medical appointments, getting your medicines, non-medical meetings or appointments, work, or from getting things that you need?: No   Physical Activity: Not on file   Stress: Not on file   Social Connections: Not on file   Interpersonal Safety: Low Risk  (11/18/2024)    Interpersonal Safety      Do you feel physically and emotionally safe where you currently live?: Yes      Within the past 12 months, have you been hit, slapped, kicked or otherwise physically hurt by someone?: No      Within the past 12 months, have you been humiliated or emotionally abused in other ways by your partner or ex-partner?: No   Housing Stability: Low Risk  (1/3/2025)    Housing Stability      Do you have housing? : Yes      Are you worried about losing your housing?: No   Recent Concern: Housing Stability - High Risk (1/3/2025)    Housing Stability      Do you have housing? : No      Are you worried about losing your housing?: No        Medications  Allergies   Current Outpatient Medications   Medication Sig Dispense Refill     acetaminophen (TYLENOL) 325 MG tablet Take 2 tablets (650 mg) by mouth every 4 hours as needed for other (For optimal non-opioid multimodal pain management to improve pain control.).       amLODIPine (NORVASC) 5 MG tablet Take 0.5 tablets (2.5 mg) by mouth daily. 45 tablet 0     aspirin (ASA) 81 MG chewable tablet Take 1 tablet (81 mg) by mouth daily.       atorvastatin (LIPITOR) 80 MG tablet Take 1 tablet (80 mg) by mouth daily. For cholesterol. 90 tablet 1     furosemide (LASIX) 20 MG tablet Take 1 tablet (20 mg) by mouth 2 times  daily. 180 tablet 3     metFORMIN (GLUCOPHAGE XR) 500 MG 24 hr tablet TAKE 1 TABLET BY MOUTH DAILY WITH DINNER FOR BLOOD SUGARS (Patient taking differently: Take 500 mg by mouth daily (with dinner). TAKE 1 TABLET BY MOUTH DAILY WITH DINNER FOR BLOOD SUGARS) 90 tablet 1     metoprolol succinate ER (TOPROL XL) 100 MG 24 hr tablet Take 1 tablet (100 mg) by mouth daily. 90 tablet 2     ondansetron (ZOFRAN) 4 MG tablet Take 4 mg by mouth every 8 hours as needed for nausea or vomiting.       potassium chloride travis ER (KLOR-CON M10) 10 MEQ CR tablet Take 1 tablet (10 mEq) by mouth daily. 90 tablet 3     sertraline (ZOLOFT) 100 MG tablet Take 2 tablets (200 mg) by mouth daily. 180 tablet 0     spironolactone (ALDACTONE) 25 MG tablet Take 0.5 tablets (12.5 mg) by mouth daily. 45 tablet 2     warfarin ANTICOAGULANT (COUMADIN) 1 MG tablet Takes 2 tablets (2mg) on Monday and Friday, and take 3 tablets (3mg) all other days, by mouth as directed by Anticoagulation Clinic. (Patient taking differently: Take 1 mg by mouth 3 times daily. Takes 2 tablets (2mg) on Monday and Friday, and take 3 tablets (3mg) all other days, by mouth as directed by Anticoagulation Clinic.) 235 tablet 1     semaglutide (OZEMPIC) 2 MG/3ML pen Inject 0.25 mg subcutaneously every 7 days. Increase to 0.5 mg after 4 doses (Patient not taking: Reported on 6/26/2025) 3 mL 0    No Known Allergies      Lab Results    Chemistry/lipid CBC Cardiac Enzymes/BNP/TSH/INR   Lab Results   Component Value Date    CHOL 151 04/15/2025    HDL 41 (L) 04/15/2025    TRIG 115 04/15/2025    BUN 30.1 (H) 06/26/2025     06/26/2025    CO2 33 (H) 06/26/2025    Lab Results   Component Value Date    WBC 7.9 01/02/2025    HGB 11.8 03/12/2025    HCT 32.8 (L) 01/02/2025    MCV 90 01/02/2025     01/02/2025    Lab Results   Component Value Date    TSH 3.54 11/07/2024    INR 2.9 (A) 06/26/2025            Maria Elena Yancey, KAREN, APRN, CNP  [facility] [place]   Clinic and [phone]  Fax: [phone]  Heart Failure Nurses: [phone]    Thank you for allowing me to participate in the care of your patient.      Sincerely,     Maria Elena Yancey CNP     [facility]  cc:   Maria Elena Yancey CNP  [address] [place] [address]

## 2025-06-26 NOTE — PROGRESS NOTES
[facility]  [address], [place] [address]  Office: [phone]     Assessment/Recommendations   Assessment: Ms. Gamez presents to [facility] today for heart failure focused follow-up visit.    # Hypertrophic cardiomyopathy  -No current high risk factors for SCD  -Zio monitor 2/2025 showed 2 episodes of non-sustained supraventricular tachycardia 5 beats at 125 bpm, no sustained tachyarrhythmias   -LVOT- resting left ventricular outflow gradient of 34 mmHg which increases to 57 mmHg with Valsalva per echo 1/3/2025, more recently repeat TTE 6/23/2025 showed mild LVOT obstruction with mean gradient of 13 mmHg which did not change significantly with valsalva, overall noted to have a decrease in resting and provoked LV outflow tract gradients  -Continue metoprolol succinate 100 mg  -Encouraged adequate hydration   -Yearly follow-up with general cardiology, Dr. Jerez with zio monitor, TMET, and echo  -cMRI every 3-years    # Chronic heart failure with preserved ejection fraction (EF 65-70% per TTE 6/23/2025)  # RV dysfunction- mild   Stage C. NYHA Class II-III.  Kidney function has remained relatively stable with transient fluctuations in Cr. Her weight on the clinic scale today is 246 lbs. Upon exam, patient is well-compensated. She has mild LE edema that has increased over the last month despite increase in loop diuretic. She c/o abdominal bloating that is also worse. ADAN is similar for the last several months. She continues to c/o dyspnea with minimal activity like household chores. Weight has uptrended over the last month, she thinks r/t both fluid retention and dietary indiscretion. Discussed diet and fluid intake in detail today, she thinks she can do better reducing sodium intake and increasing H20 intake to meet goals.   -Repeat labs today, will consider increasing MRA and adding SGLT2i  -[employer] application  -Resume cardiac rehab  sessions    BP: controlled  Fluid status: volume excess. Diuretic: Lasix 40 mg daily w/KCL 10 mEq  Aldosterone antagonist: spironolactone 12.5 mg  SGLT2i: deferred while other medical therapy is prioritized  Ischemia evaluation: significant multivessel disease per coronary angiogram 11/2024 s/p CABG 11/18/2024  NSAID use: contraindicated  -Sleep apnea evaluation: scheduled 6/30/2025    Heart failure education including medication compliance and lifestyle management discussed today: low sodium diet <2g Na/day, fluid intake <2L/day, daily weight monitoring, and physical activity as tolerated.     # Dyspnea on exertion  -Unchanged over the last several months  -Multifactorial- consider deconditioning and obesity in addition to above outline cardiac contributors   -Patient did not complete cardiac rehab sessions s/p CABG d/t family member health conflict, she is willing to return for these sessions if insurance allows  -If cardiac rehab not covered, will place orders for pulmonary rehab as indicated for ADAN   -Review with Dr. Jerez at upcoming appointment if TMET would be recommended at this juncture    # Valvular heart disease  -Mild (1+) mitral regurgitation per echo stable per TTE 6/2025    # Hypertension  -BP today 126/88. Reports home SBPs 90-130s, lightheadedness has resolved  -Amlodipine 2.5 mg (low dose initially was prescribed for radial artery graft protection s/p CAB 11/2024 with subsequent dose increases for BP management -->amlodipine dose decreased to 5 mg 4/22/2025, further decreased to 2.5 mg 5/22/2025 given ongoing dyspnea and symptomatic softer BP with home readings  (*caution amlodipine may worsen LVOT)    # Coronary artery disease s/p CABG x5 11/18/2024  # Dyslipidemia  -Denies chest pain and anginal equivalents  -Amlodipine for radial artery graft protection for at least 6 months post-op, ideally one year per CVTS notation   -Antiplatelet therapy with ASA 81 mg for secondary ASCVD prevention  -LDL  goal <70; high intensity statin therapy with atorvastatin 80 mg for secondary ASCVD prevention    # Episodic Atrial fib  -Onset: postoperative CABG 11/2024  -Zio monitor 2/2025 showed predominately SR  bpm, 2 episodes of non-sustained supraventricular tachycardia, no sustained tachyarrhythmias, no Afib  -Rate/rhythm control: Metoprolol succinate 100 mg  -[id number] score of 6 for female sex, age 65-74 and h/o HTN, CHF, vascular disease, DM  -Continue warfarin for stroke prophylaxis    # DM 2  -Most recent A1C 6.1 (4/15/2025)  -Metformin and semaglutide managed by PCP    # Obesity  -Body mass index is 42.33 kg/m .  -Semaglutide initiated by PCP, patient only took one dose was not sure if she wanted to take this medication, encouraged retrial and slow uptitration per PCP and/or further management in weight management clinic  -She was scheduled to follow with weight management clinic upcoming in Aug, she got a call recently that this needs to be rescheduled because provider is not available    # Depression  -Sertraline, managed by PCP      Plan:  Labs today BMP and NtproBNP- consider increasing MRA and addition of SGLT2i (jardiance or farxiga)  [employer] meal application provided  Will connect patient back to resume cardiac rehab sessions  Continue to monitor blood pressure at home, start tracking HR as well  Encouraged adequate H20 intake  Review with Dr. Jerez at upcoming appointment if TMET would be recommended at this juncture    -Follow-up with general cardiology, Dr. Jerez- scheduled 7/11/2025.  -Follow-up in the heart failure clinic with NANCY in ~2-3 months.    The longitudinal plan of care for the condition(s) below were addressed during this visit. Due to the added complexity in care, I will continue to support Ms. Gamez in the subsequent management of this condition(s) and with the ongoing continuity of care of this condition(s): HFpEF.     History of Present Illness/Subjective    Ms. Gamez is a 69  year old female with a past medical history significant for HCM, HFpEF, MV CAD s/p CABG x5 11/2024, HTN, HLD, DM2, and depression. Today patient presents to [facility] for heart failure focused follow-up visit.     Primary Cardiologist: Dr. Jerez; Last office visit 1/24/2025.    Patient was seen in the heart failure clinic on 4/22/2025 by myself. At that time patient reported progressive ADAN and activity intolerance. She also reported transient leg swelling and abdominal bloating she noticed after saltier meals. On these occasions she was taking an extra dose of lasix and this helped to relieve bloating and LE swelling. Patient was instructed to decrease amlodipine to 5 mg daily and to increase lasix to 20 mg bid that day. Weight in clinic that day was 240 lbs.     Patient was last seen in the heart failure clinic 5/22/2025 by myself. At that time, she reported ongoing ADAN and activity intolerance. Reported feeling discouraged and unable to go about her day d/t dyspnea with minimal activity. She did not remember to increase lasix dose to bid dosing and had continued on 20 mg daily. She reported bloated abdomen. Also reporting dizziness with lower blood pressures at home 80/40s in the afternoons. Amlodipine was decreased to 2.5 mg daily. Lasix was increased to 20 mg bid (same as recs from previous visit). Her weight in clinic that day was 239 lbs. Echo was ordered given ongoing dyspnea to reassess LVOT.     Echo 6/23/2025 showed mild LVOT obstruction with mean gradient of 13 mmHg which did not change significantly with valsalva, overall noted to have a decrease in resting and provoked LV outflow tract gradients    Today, patient endorses mild LE edema that has increased over the last month despite increase in loop diuretic. She c/o abdominal bloating that is also worse. ADAN is similar for the last several months. She continues to c/o dyspnea with minimal activity like household chores. Weight  has uptrended over the last month, she thinks r/t both fluid retention and dietary indiscretion.     She denies chest pain, palpitations, lightheadedness/dizziness, presyncope, syncope, shortness of breath at rest, orthopnea, PND, and bleeding.      Recent test results & labs below (personally reviewed):    TTE 6/23/2025  Interpretation Summary  There is mild to moderate assymetric LV hypertrophy  The visual ejection fraction is 65-70%.  Mildly decreased right ventricular systolic function  Mild LVOT obstruction with mean gradient of 13 mmHg which does not change  significantly with valsalva.  Compared to the prior study dated 1/3/2025, there are changes as noted. There  has been a decrease in resting and provoked LV outflow tract gradients.    Zio monitor 2/24/2025  Zio monitoring from 2/3/2025 to 2/17/2025 (duration 13d 21h)  Predominant rhythm was sinus rhythm, 38 to 100bpm, average 49bpm.  2 episode(s) of nonsustained supraventricular tachycardia - longest and fastest 13.6s, 120bpm.  1 episode(s) of nonsustained ventricular tachycardia - 5 beats, 125bpm.  No sustained tachyarrhythmias.  No atrial fibrillation.  There were no pauses of greater than 3 seconds.  Rare premature atrial contractions beats (isolated <1%).  Rare premature ventricular contractions (isolated <1%).  No symptoms recorded.    Echocardiogram 1/3/2025  Interpretation Summary  The echo findings are consistent with hypertrophic cardiomyopathy. Asymmetric  left ventricular hypertrophy with septum measuring 1.6 cm. There is a resting  left ventricular outflow gradient of 34 mmHg which increases to 57 mmHg with  Valsalva.  The visual ejection fraction is 65-70% without wall motion abnormality.  Mildly decreased right ventricular systolic function  There is mild (1+) mitral regurgitation.  There is systolic anterior motion of the mitral valve.  There is trace to mild tricuspid regurgitation. No pulmonary HTN.  Compared to the prior study dated  "11/20/2024, there have been no changes.    Coronary Angiogram 11/8/2024  Conclusion    Ramus lesion is 40% stenosed.    Prox Cx lesion is 95% stenosed.    Mid Cx lesion is 30% stenosed.    Dist LAD-2 lesion is 70% stenosed.    Mid LAD to Dist LAD lesion is 70% stenosed.    3rd Diag lesion is 70% stenosed.    Mid LAD lesion is 80% stenosed.    2nd Diag lesion is 90% stenosed.    Dist LAD-1 lesion is 80% stenosed.    Prox LAD to Mid LAD lesion is 30% stenosed.    Dist RCA lesion is 99% stenosed.    Mid RCA lesion is 40% stenosed.    Prox RCA lesion is 40% stenosed.    RPDA lesion is 80% stenosed.  1.significant three-vessel CAD  Plan   Follow bedrest per protocol   Return to the primary inpatient team for further evaluation and managmenet  1.continue therapeutic anticoagulation with heparin drip or Lovenox for ACS  2.continue aspirin  3.consult cardiothoracic surgery service for CABG     Cardiac MRI 11/13/2024  CONCLUSIONS:   Asymmetric septal hypertrophy with maximal wall thickness 2.0 cm. Systolic anterior motion of the mitral  valve is present. There is apical displacement of the papillary muscles. LGE imaging shows mid-myocardial  enhancement in a non-ischemic pattern. Collectively, these findings suggest obstructive hypertrophic  cardiomyopathy. Consider genetic testing for HCM.   Normal biventricular systolic function, LVEF 74%, RVEF 70%.     Physical Examination Review of Systems   /66 (BP Location: Right arm, Patient Position: Sitting, Cuff Size: Adult Large)   Pulse (!) 49   Resp 16   Ht 1.626 m (5' 4\")   Wt 111.9 kg (246 lb 9.6 oz)   LMP  (LMP Unknown)   BMI 42.33 kg/m    Body mass index is 42.33 kg/m .  Wt Readings from Last 3 Encounters:   06/26/25 111.9 kg (246 lb 9.6 oz)   05/22/25 108.4 kg (239 lb)   04/22/25 108.9 kg (240 lb)     General Appearance:   Appears comfortable, in no acute distress   HEENT: Eyes symmetrical, no discharge or icterus bilaterally. Mucous membranes moist and without " lesions   Cardiovascular: RRR, +S1S2, no murmur, rub, or gallop. JVP not visible at 90 degrees      Respiratory:   Respirations regular, even, and unlabored. Lungs CTA throughout   GI:  Soft and non distended   Musculoskeletal: No joint swelling or tenderness   Extremities   No cyanosis. Mild lower extremity peripheral edema-pitting   Skin: Warm, no xanthelasma, no jaundice, no rashes or lesions.    Neurologic: Alert and oriented X3, no focal deficits   Psychiatric: calm and cooperative                                             Negative unless noted in HPI     Medical History  Surgical History Family History Social History   Past Medical History:   Diagnosis Date    Cervicalgia 6/29/2005 June 29, 2005: Thrown from a horse - wearing a helmet - and struck side of head and neck, heard crepitation, no loc, Able to walk after injury.  persistant pain in neck relieved with ibuprofen, stiff but can move with ROM.  No changes in hands and feet sensation/motor.    Coronary artery disease     Depressive disorder, not elsewhere classified     Hypertension     Obesity, unspecified     Past Surgical History:   Procedure Laterality Date    ANGIOGRAM  2010    negative    COLONOSCOPY N/A 3/24/2023    Procedure: COLONOSCOPY, WITH HOT POLYPECTOMY AND RESEARCH BIOPSY;  Surgeon: Bret Velez MD;  [place] [facility] [place]    CORONARY ARTERY BYPASS GRAFT, WITH ENDOSCOPIC VESSEL PROCUREMENT N/A 11/18/2024    Procedure: CORONARY ARTERY BYPASS GRAFT TIMES FIVE, LEFT INTERNAL MAMMARY ARTERY HARVEST, RIGHT ENDOSCOPIC VESSEL PROCUREMENT,;  Surgeon: Mary Pennington MD;  [facility] [place]    CV CORONARY ANGIOGRAM N/A 11/8/2024    Procedure: Coronary Angiogram;  Surgeon: Emir Brand MD;  Location:  HEART CARDIAC CATH LAB    HYSTERECTOMY, PAP NO LONGER INDICATED      BSO    MIDLINE DOUBLE LUMEN PLACEMENT  11/24/2024    PROCURE ARTERY RADIAL  11/18/2024    Procedure: LEFT RADIAL ARTERY HARVEST;  Surgeon: Mary Pennington MD;   Location: [facility] [place]    TRANSESOPHAGEAL ECHOCARDIOGRAM INTRAOPERATIVE N/A 2024    Procedure: ECHOCARDIOGRAM, TRANSESPOPHAGEAL, WITH ANESTHESIA,;  Surgeon: Mary Pennington MD;  Location: [facility] [place]    Family History   Problem Relation Age of Onset    C.A.D. Father         first MI at 53, stenting x2    C.A.D. Mother         dx in her mid 50's    C.A.D. Brother         MI in mid-50's    Cerebrovascular Disease Brother         in late 50's    Social History     Socioeconomic History    Marital status:      Spouse name: Not on file    Number of children: Not on file    Years of education: Not on file    Highest education level: Not on file   Occupational History    Not on file   Tobacco Use    Smoking status: Former     Current packs/day: 0.00     Average packs/day: 0.5 packs/day for 30.0 years (15.0 ttl pk-yrs)     Types: Cigarettes     Start date: 1981     Quit date: 2011     Years since quittin.8     Passive exposure: Past    Smokeless tobacco: Never    Tobacco comments:     smoking 3-4 cigs per day   Vaping Use    Vaping status: Never Used   Substance and Sexual Activity    Alcohol use: Yes     Comment: rarely    Drug use: No    Sexual activity: Never   Other Topics Concern    Parent/sibling w/ CABG, MI or angioplasty before 65F 55M? Yes     Comment: mother, father and 2 brothers   Social History Narrative    Not on file     Social Drivers of Health     Financial Resource Strain: Low Risk  (1/3/2025)    Financial Resource Strain     Within the past 12 months, have you or your family members you live with been unable to get utilities (heat, electricity) when it was really needed?: No   Food Insecurity: Low Risk  (1/3/2025)    Food Insecurity     Within the past 12 months, did you worry that your food would run out before you got money to buy more?: No     Within the past 12 months, did the food you bought just not last and you didn t have money to get more?: No   Transportation  Needs: Low Risk  (1/3/2025)    Transportation Needs     Within the past 12 months, has lack of transportation kept you from medical appointments, getting your medicines, non-medical meetings or appointments, work, or from getting things that you need?: No   Physical Activity: Not on file   Stress: Not on file   Social Connections: Not on file   Interpersonal Safety: Low Risk  (11/18/2024)    Interpersonal Safety     Do you feel physically and emotionally safe where you currently live?: Yes     Within the past 12 months, have you been hit, slapped, kicked or otherwise physically hurt by someone?: No     Within the past 12 months, have you been humiliated or emotionally abused in other ways by your partner or ex-partner?: No   Housing Stability: Low Risk  (1/3/2025)    Housing Stability     Do you have housing? : Yes     Are you worried about losing your housing?: No   Recent Concern: Housing Stability - High Risk (1/3/2025)    Housing Stability     Do you have housing? : No     Are you worried about losing your housing?: No        Medications  Allergies   Current Outpatient Medications   Medication Sig Dispense Refill    acetaminophen (TYLENOL) 325 MG tablet Take 2 tablets (650 mg) by mouth every 4 hours as needed for other (For optimal non-opioid multimodal pain management to improve pain control.).      amLODIPine (NORVASC) 5 MG tablet Take 0.5 tablets (2.5 mg) by mouth daily. 45 tablet 0    aspirin (ASA) 81 MG chewable tablet Take 1 tablet (81 mg) by mouth daily.      atorvastatin (LIPITOR) 80 MG tablet Take 1 tablet (80 mg) by mouth daily. For cholesterol. 90 tablet 1    furosemide (LASIX) 20 MG tablet Take 1 tablet (20 mg) by mouth 2 times daily. 180 tablet 3    metFORMIN (GLUCOPHAGE XR) 500 MG 24 hr tablet TAKE 1 TABLET BY MOUTH DAILY WITH DINNER FOR BLOOD SUGARS (Patient taking differently: Take 500 mg by mouth daily (with dinner). TAKE 1 TABLET BY MOUTH DAILY WITH DINNER FOR BLOOD SUGARS) 90 tablet 1     metoprolol succinate ER (TOPROL XL) 100 MG 24 hr tablet Take 1 tablet (100 mg) by mouth daily. 90 tablet 2    ondansetron (ZOFRAN) 4 MG tablet Take 4 mg by mouth every 8 hours as needed for nausea or vomiting.      potassium chloride travis ER (KLOR-CON M10) 10 MEQ CR tablet Take 1 tablet (10 mEq) by mouth daily. 90 tablet 3    sertraline (ZOLOFT) 100 MG tablet Take 2 tablets (200 mg) by mouth daily. 180 tablet 0    spironolactone (ALDACTONE) 25 MG tablet Take 0.5 tablets (12.5 mg) by mouth daily. 45 tablet 2    warfarin ANTICOAGULANT (COUMADIN) 1 MG tablet Takes 2 tablets (2mg) on Monday and Friday, and take 3 tablets (3mg) all other days, by mouth as directed by Anticoagulation Clinic. (Patient taking differently: Take 1 mg by mouth 3 times daily. Takes 2 tablets (2mg) on Monday and Friday, and take 3 tablets (3mg) all other days, by mouth as directed by Anticoagulation Clinic.) 235 tablet 1    semaglutide (OZEMPIC) 2 MG/3ML pen Inject 0.25 mg subcutaneously every 7 days. Increase to 0.5 mg after 4 doses (Patient not taking: Reported on 2025) 3 mL 0    No Known Allergies      Lab Results    Chemistry/lipid CBC Cardiac Enzymes/BNP/TSH/INR   Lab Results   Component Value Date    CHOL 151 04/15/2025    HDL 41 (L) 04/15/2025    TRIG 115 04/15/2025    BUN 30.1 (H) 2025     2025    CO2 33 (H) 2025    Lab Results   Component Value Date    WBC 7.9 2025    HGB 11.8 2025    HCT 32.8 (L) 2025    MCV 90 2025     2025    Lab Results   Component Value Date    TSH 3.54 2024    INR 2.9 (A) 2025            Maria Elena Yancey, DNP, APRN, CNP  [facility] [place]   Clinic and [phone]  Fax: [phone]  Heart Failure Nurses: [phone]

## 2025-06-26 NOTE — PROGRESS NOTES
ANTICOAGULATION MANAGEMENT     Karyn Gamez 69 year old female is on warfarin with therapeutic INR result. (Goal INR 2.0-3.0)    Recent labs: (last 7 days)     06/26/25  1117   INR 2.9*       ASSESSMENT     Source(s): Chart Review and Patient/Caregiver Call     Warfarin doses taken: Warfarin taken as instructed  Diet: No new diet changes identified  Medication/supplement changes: None noted  New illness, injury, or hospitalization: Yes:    Reported had Heart Care visit today - awaiting for BMP and BNP results.  Potentially will adjust diuretic dosing.   Signs or symptoms of bleeding or clotting: No  Previous result: Supratherapeutic at 3.3 on 6/10/25.  Additional findings: None       PLAN     Recommended plan for no diet, medication or health factor changes affecting INR     Dosing Instructions: Continue your current warfarin dose with next INR in 2 weeks       Summary  As of 6/26/2025      Full warfarin instructions:  3 mg every day   Next INR check:  7/10/2025               Telephone call with Karyn who verbalizes understanding and agrees to plan    Check at provider office visit - INR on 7/11/25 at Heart Care visit @ [facility].    Education provided: Taking warfarin: Importance of taking warfarin as instructed  Goal range and lab monitoring: goal range and significance of current result    Plan made per [facility] anticoagulation protocol    Davina Galarza RN  6/26/2025  Anticoagulation Clinic  Definition 6 for routing messages: hernan EDWARDS  [facility] patient phone line: [phone]        _______________________________________________________________________     Anticoagulation Episode Summary       Current INR goal:  2.0-3.0   TTR:  63.0% (6.5 mo)   Target end date:  Indefinite   Send INR reminders to:  ERASMO EDWARDS    Indications    S/P CABG (coronary artery bypass graft) [Z95.1]  Postoperative atrial fibrillation (H) [I97.89  I48.91]             Comments:  --              Anticoagulation Care Providers       Provider Role Specialty Phone number    Ly Atwood PA-C Referring Cardiovascular & Thoracic Surgery [phone]    Cristiana Davis MD Referring Family Medicine [phone]

## 2025-06-29 PROBLEM — I48.0 EPISODIC ATRIAL FIBRILLATION (H): Status: ACTIVE | Noted: 2024-12-03

## 2025-06-30 NOTE — TELEPHONE ENCOUNTER
[facility] called us back and reported patient  needs to have a new order placed for her to receive the rest of her cardiac rehab appointments post CABG in Nov 2024.    Order is placed, [facility] updated.  Called patient and relayed that scheduling will be in contact with her.    Ambrosio Dee RN

## 2025-07-09 DIAGNOSIS — Z95.1 S/P CABG (CORONARY ARTERY BYPASS GRAFT): Primary | ICD-10-CM

## 2025-07-10 ENCOUNTER — TELEPHONE (OUTPATIENT)
Dept: ANTICOAGULATION | Facility: CLINIC | Age: 70
End: 2025-07-10
Payer: COMMERCIAL

## 2025-07-10 DIAGNOSIS — I97.89 POSTOPERATIVE ATRIAL FIBRILLATION (H): ICD-10-CM

## 2025-07-10 DIAGNOSIS — Z79.01 LONG TERM (CURRENT) USE OF ANTICOAGULANTS: ICD-10-CM

## 2025-07-10 DIAGNOSIS — Z95.1 S/P CABG (CORONARY ARTERY BYPASS GRAFT): ICD-10-CM

## 2025-07-10 DIAGNOSIS — I48.91 POSTOPERATIVE ATRIAL FIBRILLATION (H): ICD-10-CM

## 2025-07-10 RX ORDER — WARFARIN SODIUM 1 MG/1
TABLET ORAL
Qty: 270 TABLET | Refills: 1 | Status: SHIPPED | OUTPATIENT
Start: 2025-07-10

## 2025-07-17 ENCOUNTER — TELEPHONE (OUTPATIENT)
Dept: ANTICOAGULATION | Facility: CLINIC | Age: 70
End: 2025-07-17
Payer: COMMERCIAL

## 2025-07-17 NOTE — TELEPHONE ENCOUNTER
ANTICOAGULATION     Karyn Aquinoindia is overdue for an INR check.     Care Everywhere updated: no    Left message for patient to call and schedule lab appointment as soon as possible. If returning call, please schedule.     Davina Galarza, RN  7/17/2025  Anticoagulation Clinic  [facility] for routing messages: hernan WANG [facility] patient phone line: [phone]

## 2025-07-22 ENCOUNTER — HOSPITAL ENCOUNTER (OUTPATIENT)
Dept: CARDIAC REHAB | Facility: HOSPITAL | Age: 70
Discharge: HOME | End: 2025-07-22
Attending: NURSE PRACTITIONER
Payer: COMMERCIAL

## 2025-07-22 DIAGNOSIS — Z95.1 S/P CABG (CORONARY ARTERY BYPASS GRAFT): ICD-10-CM

## 2025-07-22 PROCEDURE — 93798 PHYS/QHP OP CAR RHAB W/ECG: CPT

## 2025-07-25 ENCOUNTER — HOSPITAL ENCOUNTER (OUTPATIENT)
Dept: CARDIAC REHAB | Facility: HOSPITAL | Age: 70
Discharge: HOME | End: 2025-07-25
Attending: INTERNAL MEDICINE
Payer: COMMERCIAL

## 2025-07-25 PROCEDURE — 93798 PHYS/QHP OP CAR RHAB W/ECG: CPT

## 2025-07-28 ENCOUNTER — HOSPITAL ENCOUNTER (OUTPATIENT)
Dept: CARDIAC REHAB | Facility: HOSPITAL | Age: 70
Discharge: HOME | End: 2025-07-28
Attending: INTERNAL MEDICINE
Payer: COMMERCIAL

## 2025-07-28 PROCEDURE — 93798 PHYS/QHP OP CAR RHAB W/ECG: CPT

## 2025-07-30 ENCOUNTER — HOSPITAL ENCOUNTER (OUTPATIENT)
Dept: CARDIAC REHAB | Facility: HOSPITAL | Age: 70
Discharge: HOME | End: 2025-07-30
Attending: INTERNAL MEDICINE
Payer: COMMERCIAL

## 2025-07-30 PROCEDURE — 93798 PHYS/QHP OP CAR RHAB W/ECG: CPT

## 2025-07-31 ENCOUNTER — PATIENT OUTREACH (OUTPATIENT)
Dept: CARE COORDINATION | Facility: CLINIC | Age: 70
End: 2025-07-31
Payer: COMMERCIAL

## 2025-08-01 ENCOUNTER — HOSPITAL ENCOUNTER (OUTPATIENT)
Dept: CARDIAC REHAB | Facility: HOSPITAL | Age: 70
Discharge: HOME | End: 2025-08-01
Attending: INTERNAL MEDICINE
Payer: COMMERCIAL

## 2025-08-01 PROCEDURE — 93798 PHYS/QHP OP CAR RHAB W/ECG: CPT

## 2025-08-04 ENCOUNTER — HOSPITAL ENCOUNTER (OUTPATIENT)
Dept: CARDIAC REHAB | Facility: HOSPITAL | Age: 70
Discharge: HOME | End: 2025-08-04
Attending: INTERNAL MEDICINE
Payer: COMMERCIAL

## 2025-08-04 PROCEDURE — 93798 PHYS/QHP OP CAR RHAB W/ECG: CPT | Performed by: OCCUPATIONAL THERAPIST

## 2025-08-06 ENCOUNTER — HOSPITAL ENCOUNTER (OUTPATIENT)
Dept: CARDIAC REHAB | Facility: HOSPITAL | Age: 70
Discharge: HOME | End: 2025-08-06
Attending: INTERNAL MEDICINE
Payer: COMMERCIAL

## 2025-08-06 PROCEDURE — 93798 PHYS/QHP OP CAR RHAB W/ECG: CPT

## 2025-08-08 ENCOUNTER — HOSPITAL ENCOUNTER (OUTPATIENT)
Dept: CARDIAC REHAB | Facility: HOSPITAL | Age: 70
Discharge: HOME | End: 2025-08-08
Attending: INTERNAL MEDICINE
Payer: COMMERCIAL

## 2025-08-08 PROCEDURE — 93798 PHYS/QHP OP CAR RHAB W/ECG: CPT

## 2025-08-11 ENCOUNTER — HOSPITAL ENCOUNTER (OUTPATIENT)
Dept: CARDIAC REHAB | Facility: HOSPITAL | Age: 70
Discharge: HOME | End: 2025-08-11
Attending: INTERNAL MEDICINE
Payer: COMMERCIAL

## 2025-08-11 PROCEDURE — 93798 PHYS/QHP OP CAR RHAB W/ECG: CPT

## 2025-08-12 ENCOUNTER — LAB (OUTPATIENT)
Dept: CARDIOLOGY | Facility: CLINIC | Age: 70
End: 2025-08-12
Payer: COMMERCIAL

## 2025-08-12 ENCOUNTER — OFFICE VISIT (OUTPATIENT)
Dept: CARDIOLOGY | Facility: CLINIC | Age: 70
End: 2025-08-12
Payer: COMMERCIAL

## 2025-08-12 ENCOUNTER — ANTICOAGULATION THERAPY VISIT (OUTPATIENT)
Dept: ANTICOAGULATION | Facility: CLINIC | Age: 70
End: 2025-08-12

## 2025-08-12 VITALS
BODY MASS INDEX: 42.91 KG/M2 | DIASTOLIC BLOOD PRESSURE: 66 MMHG | RESPIRATION RATE: 14 BRPM | HEART RATE: 48 BPM | SYSTOLIC BLOOD PRESSURE: 102 MMHG | WEIGHT: 250 LBS

## 2025-08-12 DIAGNOSIS — I38 VALVULAR HEART DISEASE: ICD-10-CM

## 2025-08-12 DIAGNOSIS — I50.32 CHRONIC HEART FAILURE WITH PRESERVED EJECTION FRACTION (HFPEF) (H): ICD-10-CM

## 2025-08-12 DIAGNOSIS — F32.A DEPRESSION, UNSPECIFIED DEPRESSION TYPE: ICD-10-CM

## 2025-08-12 DIAGNOSIS — E66.01 MORBID OBESITY (H): ICD-10-CM

## 2025-08-12 DIAGNOSIS — I25.10 CORONARY ARTERY DISEASE INVOLVING NATIVE CORONARY ARTERY OF NATIVE HEART WITHOUT ANGINA PECTORIS: ICD-10-CM

## 2025-08-12 DIAGNOSIS — I48.91 POSTOPERATIVE ATRIAL FIBRILLATION (H): ICD-10-CM

## 2025-08-12 DIAGNOSIS — R06.09 DOE (DYSPNEA ON EXERTION): ICD-10-CM

## 2025-08-12 DIAGNOSIS — I48.91 ATRIAL FIBRILLATION, UNSPECIFIED TYPE (H): ICD-10-CM

## 2025-08-12 DIAGNOSIS — I10 ESSENTIAL HYPERTENSION: ICD-10-CM

## 2025-08-12 DIAGNOSIS — I51.9 RIGHT VENTRICULAR DYSFUNCTION: ICD-10-CM

## 2025-08-12 DIAGNOSIS — Z95.1 S/P CABG (CORONARY ARTERY BYPASS GRAFT): ICD-10-CM

## 2025-08-12 DIAGNOSIS — I42.2 HYPERTROPHIC CARDIOMYOPATHY (H): Primary | ICD-10-CM

## 2025-08-12 DIAGNOSIS — I48.0 PAROXYSMAL ATRIAL FIBRILLATION (H): ICD-10-CM

## 2025-08-12 DIAGNOSIS — I97.89 POSTOPERATIVE ATRIAL FIBRILLATION (H): ICD-10-CM

## 2025-08-12 DIAGNOSIS — Z95.1 S/P CABG (CORONARY ARTERY BYPASS GRAFT): Primary | ICD-10-CM

## 2025-08-12 LAB — INR POINT OF CARE: 2.7 (ref 0.9–1.1)

## 2025-08-12 PROCEDURE — G2211 COMPLEX E/M VISIT ADD ON: HCPCS | Performed by: NURSE PRACTITIONER

## 2025-08-12 PROCEDURE — 3074F SYST BP LT 130 MM HG: CPT | Performed by: NURSE PRACTITIONER

## 2025-08-12 PROCEDURE — 99214 OFFICE O/P EST MOD 30 MIN: CPT | Performed by: NURSE PRACTITIONER

## 2025-08-12 PROCEDURE — 3078F DIAST BP <80 MM HG: CPT | Performed by: NURSE PRACTITIONER

## 2025-08-13 ENCOUNTER — HOSPITAL ENCOUNTER (OUTPATIENT)
Dept: CARDIAC REHAB | Facility: HOSPITAL | Age: 70
Discharge: HOME | End: 2025-08-13
Attending: INTERNAL MEDICINE
Payer: COMMERCIAL

## 2025-08-13 PROCEDURE — 93798 PHYS/QHP OP CAR RHAB W/ECG: CPT

## 2025-08-15 ENCOUNTER — HOSPITAL ENCOUNTER (OUTPATIENT)
Dept: CARDIAC REHAB | Facility: HOSPITAL | Age: 70
Discharge: HOME | End: 2025-08-15
Attending: INTERNAL MEDICINE
Payer: COMMERCIAL

## 2025-08-15 PROCEDURE — 93797 PHYS/QHP OP CAR RHAB WO ECG: CPT

## 2025-08-15 PROCEDURE — 93798 PHYS/QHP OP CAR RHAB W/ECG: CPT

## 2025-08-18 ASSESSMENT — SLEEP AND FATIGUE QUESTIONNAIRES
HOW LIKELY ARE YOU TO NOD OFF OR FALL ASLEEP WHILE LYING DOWN TO REST IN THE AFTERNOON WHEN CIRCUMSTANCES PERMIT: HIGH CHANCE OF DOZING
HOW LIKELY ARE YOU TO NOD OFF OR FALL ASLEEP WHILE SITTING AND READING: HIGH CHANCE OF DOZING
HOW LIKELY ARE YOU TO NOD OFF OR FALL ASLEEP WHILE SITTING AND TALKING TO SOMEONE: SLIGHT CHANCE OF DOZING
HOW LIKELY ARE YOU TO NOD OFF OR FALL ASLEEP WHILE SITTING QUIETLY AFTER LUNCH WITHOUT ALCOHOL: HIGH CHANCE OF DOZING
HOW LIKELY ARE YOU TO NOD OFF OR FALL ASLEEP WHEN YOU ARE A PASSENGER IN A CAR FOR AN HOUR WITHOUT A BREAK: HIGH CHANCE OF DOZING
HOW LIKELY ARE YOU TO NOD OFF OR FALL ASLEEP WHILE SITTING INACTIVE IN A PUBLIC PLACE: SLIGHT CHANCE OF DOZING
HOW LIKELY ARE YOU TO NOD OFF OR FALL ASLEEP WHILE WATCHING TV: MODERATE CHANCE OF DOZING
HOW LIKELY ARE YOU TO NOD OFF OR FALL ASLEEP IN A CAR, WHILE STOPPED FOR A FEW MINUTES IN TRAFFIC: SLIGHT CHANCE OF DOZING

## 2025-08-19 ENCOUNTER — OFFICE VISIT (OUTPATIENT)
Dept: SLEEP MEDICINE | Facility: CLINIC | Age: 70
End: 2025-08-19
Attending: FAMILY MEDICINE
Payer: COMMERCIAL

## 2025-08-19 VITALS
BODY MASS INDEX: 42.9 KG/M2 | DIASTOLIC BLOOD PRESSURE: 79 MMHG | WEIGHT: 251.3 LBS | HEART RATE: 72 BPM | SYSTOLIC BLOOD PRESSURE: 133 MMHG | HEIGHT: 64 IN | OXYGEN SATURATION: 94 %

## 2025-08-19 DIAGNOSIS — R53.83 FATIGUE, UNSPECIFIED TYPE: ICD-10-CM

## 2025-08-19 DIAGNOSIS — R29.818 SUSPECTED SLEEP APNEA: Primary | ICD-10-CM

## 2025-08-19 DIAGNOSIS — R06.81 WITNESSED EPISODE OF APNEA: ICD-10-CM

## 2025-08-19 DIAGNOSIS — G47.21 CIRCADIAN RHYTHM SLEEP DISORDER, DELAYED SLEEP PHASE TYPE: ICD-10-CM

## 2025-08-19 DIAGNOSIS — F33.1 MAJOR DEPRESSIVE DISORDER, RECURRENT EPISODE, MODERATE (H): ICD-10-CM

## 2025-08-19 DIAGNOSIS — E66.01 OBESITY, MORBID, BMI 40.0-49.9 (H): ICD-10-CM

## 2025-08-19 DIAGNOSIS — R09.02 HYPOXIA: ICD-10-CM

## 2025-08-19 DIAGNOSIS — E11.22 TYPE 2 DIABETES MELLITUS WITH STAGE 3 CHRONIC KIDNEY DISEASE, WITHOUT LONG-TERM CURRENT USE OF INSULIN, UNSPECIFIED WHETHER STAGE 3A OR 3B CKD (H): ICD-10-CM

## 2025-08-19 DIAGNOSIS — R06.83 SNORING: ICD-10-CM

## 2025-08-19 DIAGNOSIS — N18.30 TYPE 2 DIABETES MELLITUS WITH STAGE 3 CHRONIC KIDNEY DISEASE, WITHOUT LONG-TERM CURRENT USE OF INSULIN, UNSPECIFIED WHETHER STAGE 3A OR 3B CKD (H): ICD-10-CM

## 2025-08-19 DIAGNOSIS — Z95.1 S/P CABG (CORONARY ARTERY BYPASS GRAFT): ICD-10-CM

## 2025-08-19 DIAGNOSIS — G47.8 UNREFRESHED BY SLEEP: ICD-10-CM

## 2025-08-19 DIAGNOSIS — G47.19 EXCESSIVE DAYTIME SLEEPINESS: ICD-10-CM

## 2025-08-19 DIAGNOSIS — F51.12 INSUFFICIENT SLEEP SYNDROME: ICD-10-CM

## 2025-08-19 DIAGNOSIS — Z86.73 HISTORY OF CVA (CEREBROVASCULAR ACCIDENT): ICD-10-CM

## 2025-08-19 PROCEDURE — 1126F AMNT PAIN NOTED NONE PRSNT: CPT | Performed by: INTERNAL MEDICINE

## 2025-08-19 PROCEDURE — 3078F DIAST BP <80 MM HG: CPT | Performed by: INTERNAL MEDICINE

## 2025-08-19 PROCEDURE — 99204 OFFICE O/P NEW MOD 45 MIN: CPT | Performed by: INTERNAL MEDICINE

## 2025-08-19 PROCEDURE — 3075F SYST BP GE 130 - 139MM HG: CPT | Performed by: INTERNAL MEDICINE

## 2025-09-04 ENCOUNTER — ANTICOAGULATION THERAPY VISIT (OUTPATIENT)
Dept: ANTICOAGULATION | Facility: CLINIC | Age: 70
End: 2025-09-04

## 2025-09-04 ENCOUNTER — LAB (OUTPATIENT)
Dept: LAB | Facility: CLINIC | Age: 70
End: 2025-09-04
Payer: COMMERCIAL

## 2025-09-04 DIAGNOSIS — Z95.1 S/P CABG (CORONARY ARTERY BYPASS GRAFT): Primary | ICD-10-CM

## 2025-09-04 DIAGNOSIS — I48.91 POSTOPERATIVE ATRIAL FIBRILLATION (H): ICD-10-CM

## 2025-09-04 DIAGNOSIS — I48.0 PAROXYSMAL ATRIAL FIBRILLATION (H): ICD-10-CM

## 2025-09-04 DIAGNOSIS — I97.89 POSTOPERATIVE ATRIAL FIBRILLATION (H): ICD-10-CM

## 2025-09-04 LAB — INR BLD: 2.9 (ref 0.9–1.1)

## (undated) DEVICE — DRAIN CHEST TUBE 32FR STR 8032

## (undated) DEVICE — SUCTION MANIFOLD NEPTUNE 2 SYS 1 PORT 702-025-000

## (undated) DEVICE — SU DERMABOND ADVANCED .7ML DNX12

## (undated) DEVICE — SUTURE PDS 0 27IN CT1 + VIOLET PDP340H

## (undated) DEVICE — PREP DYNA-HEX 4% CHG SCRUB 4OZ BOTTLE MDS098710

## (undated) DEVICE — GEL ULTRASOUND AQUASONIC 20GM 01-01

## (undated) DEVICE — SPECIMEN CONTAINER 3OZ W/FORMALIN 59901

## (undated) DEVICE — SYR 01ML 27GA 0.5" NDL TBC 309623

## (undated) DEVICE — SU ETHIBOND 0 CT-1 CR 8X18" CX21D

## (undated) DEVICE — CLIP HORIZON MULTI MED BLUE 002204

## (undated) DEVICE — CLIP APPLIER 11" MED LIGACLIP MCM20

## (undated) DEVICE — BLOWER/MISTER CLEARVIEW 22150

## (undated) DEVICE — BANDAGE ESMARK 4 X 3 YARDS STL 23578-143

## (undated) DEVICE — GLOVE BIOGEL 6 LATEX

## (undated) DEVICE — GW VASC .035IN DIA 260CML 7CML 3 MM RADIUS J CURVE 502455

## (undated) DEVICE — SU RETRACT-O-TAPE 1041

## (undated) DEVICE — BLADE KNIFE SURG 15 371115

## (undated) DEVICE — SOL NACL 0.9% 10ML VIAL 0409-4888-02

## (undated) DEVICE — POSITIONER ASSIST ESSTECH 3S T401210S

## (undated) DEVICE — Device

## (undated) DEVICE — DRSG DRAIN 4X4" 7086

## (undated) DEVICE — SU PROLENE 6-0 C-1DA 30" 8706H

## (undated) DEVICE — SU PDS II 2-0 CT 36"  Z357H

## (undated) DEVICE — KIT ENDO TURNOVER/PROCEDURE CARRY-ON 101822

## (undated) DEVICE — SU PROLENE 6-0 C-1DA 4X24" M8726

## (undated) DEVICE — SU SILK 0 TIE 6X30" A306H

## (undated) DEVICE — SNARE CAPIVATOR ROUND COLD SNR BX10 M00561101

## (undated) DEVICE — HEMOSTAT ABSORBABLE AGENT ARISTA 3GM POWDER SM0002-USA

## (undated) DEVICE — SU PLEDGET SOFT TFE 3/8"X3/26"X1/16" PCP40

## (undated) DEVICE — SYR 10ML LL W/O NDL 302995

## (undated) DEVICE — INTRO SHEATH 7FRX10CM PINNACLE RSS702

## (undated) DEVICE — PREP SKIN SCRUB TRAY 4461A

## (undated) DEVICE — CLIP HORIZON MULTI SM YELLOW 001204

## (undated) DEVICE — SUCTION DRY CHEST DRAIN OASIS 3600-100

## (undated) DEVICE — DRSG KERLIX 4 1/2"X4YDS ROLL 6715

## (undated) DEVICE — CUSTOM PACK CV ST JOES SCV5BCVHEA

## (undated) DEVICE — CANNULA VESSEL 3ML BEVELED DPL 30000

## (undated) DEVICE — LEAD PACER MYOCARDIAL BIPOLAR TEMPORARY 53CM 6495F

## (undated) DEVICE — KIT ENDO FIRST STEP DISINFECTANT 200ML W/POUCH EP-4

## (undated) DEVICE — SUCTION MANIFOLD NEPTUNE 2 SYS 4 PORT 0702-020-000

## (undated) DEVICE — DRAPE IOBAN INCISE 23X17" 6650EZ

## (undated) DEVICE — DEVICE TISSUE STABILIZATION OCTOBASE 28707

## (undated) DEVICE — BLADE SAW STRK STERNAL 6207-97-101

## (undated) DEVICE — KIT HAND CONTROL ACIST 014644 AR-P54

## (undated) DEVICE — CABLE MYO/LEAD PACING WHITE DISP 019-530

## (undated) DEVICE — PREP CHLORAPREP 26ML TINTED HI-LITE ORANGE 930815

## (undated) DEVICE — SU MYOSTERNAL WIRE  046-267

## (undated) DEVICE — SU ETHIBOND 2-0 SHDA 30" X563H

## (undated) DEVICE — CATH ANGIO SUPERTORQUE PLUS JL4 6FRX100CM 533620

## (undated) DEVICE — SOL WATER IRRIG 500ML BOTTLE 2F7113

## (undated) DEVICE — CONTAINER URINE SPEC 4OZ STRL 1053

## (undated) DEVICE — SPONGE LAP 18X18" X8435

## (undated) DEVICE — WIRE GUIDE 0.035"X150CM EMERALD J TIP 502521

## (undated) DEVICE — HEMOSTASIS BONE OSTENE 2.5G SYNTHETIC 1503832

## (undated) DEVICE — BLADE KNIFE BEAVER MICROSHARP GREEN 377515

## (undated) DEVICE — SU PROLENE 6-0 C-1DA 30" M8706

## (undated) DEVICE — DRAPE MAYO STAND 23X54 8337

## (undated) DEVICE — BNDG ELASTIC 4"X5YDS STERILE 6611-4S

## (undated) DEVICE — SLEEVE TR BAND RADIAL COMPRESSION DEVICE 29CM XX-RF06L

## (undated) DEVICE — RX SURGIFLO HEMOSTATIC MATRIX W/THROMBIN 8ML 2994

## (undated) DEVICE — SUCTION CATH AIRLIFE TRI-FLO W/CONTROL PORT 14FR  T60C

## (undated) DEVICE — DRAPE FLUID WARMING 52 X 60" ORS-321

## (undated) DEVICE — ESU GROUND PAD ADULT W/CORD E7507

## (undated) DEVICE — SU PROLENE 8-0 BV130-5DA 24" 8732H

## (undated) DEVICE — BLANKET BAIR ADLT PLYMR 60X36IN 63000

## (undated) DEVICE — PACK HEART RIGHT CUSTOM SAN32RHF18

## (undated) DEVICE — CUSTOM PACK CAB SCV5BCBHEA

## (undated) DEVICE — TUBING SUCTION 10'X3/16" N510

## (undated) DEVICE — CATH TRAY FOLEY SURESTEP 16FR W/TMP PRB STLK LATEX A319416AM

## (undated) DEVICE — BNDG ELASTIC 6"X5YDS STERILE 6611-6S

## (undated) DEVICE — SU VICRYL 4-0 PS-2 18" UND J496H

## (undated) DEVICE — TUBING INSUFFLATION PNEUMOCLEAR 0620050100

## (undated) DEVICE — MANIFOLD KIT ANGIO AUTOMATED 014613

## (undated) DEVICE — SHTH INTRO 0.021IN ID 6FR DIA

## (undated) DEVICE — TUBING INSUFFLATION W/FILTER 28-0207

## (undated) DEVICE — ENDO SNARE POLYPECTOMY OVAL 10MM LOOP SD-240U-10

## (undated) DEVICE — CLIP SPRING FOGARTY SOFTJAW CSOFT6

## (undated) DEVICE — ANTIFOG SOLUTION W/FOAM PAD 31142527

## (undated) DEVICE — CLIP HORIZON SM YELLOW 001200

## (undated) DEVICE — WIPES FOLEY CARE SURESTEP PROVON DFC100

## (undated) DEVICE — PREP DURAPREP 26ML APL 8630

## (undated) DEVICE — ESU ELEC BLADE E-SEP INSULATED NEPTUNE 70MM 0703-070-002

## (undated) DEVICE — SU PROLENE 4-0 RB-1DA 36" 8557H

## (undated) DEVICE — SU STEEL 6 CCS 4X18" M654G

## (undated) DEVICE — TUBING PRESSURE 30"

## (undated) DEVICE — CLIP HORIZON SM RED WIDE SLOT 001201

## (undated) DEVICE — PROTECTOR ARM STANDARD ONE STEP 40433 (COI)

## (undated) DEVICE — GLIDEWIRE TERUMO .035X180CM 1.5,, J-TIP GR3525

## (undated) DEVICE — TIES BANDING T50R

## (undated) DEVICE — CONNECTOR SIMS TUBING FOR CHEST TUBES 361

## (undated) DEVICE — SU PROLENE 7-0 BV-1DA 4X24" M8702

## (undated) DEVICE — CATH ANGIO SUPERTORQUE PLUS JR4 6FRX100CM 533621

## (undated) DEVICE — SOL NACL 0.9% IRRIG 1000ML BOTTLE 2F7124

## (undated) DEVICE — TAPE MEDIPORE 4"X2YD 2864

## (undated) DEVICE — ESU HOLSTER PLASTIC DISP E2400

## (undated) DEVICE — BNDG ESMARK 4" STERILE LF 820-3412

## (undated) DEVICE — PROTECTOR ARM ONE-STEP TRENDELENBURG 40418 (COI)

## (undated) DEVICE — KIT ENDO VASOVIEW HEMOPRO 2 VH-4000

## (undated) DEVICE — TOURNIQUET SGL BLADDER 18"X4" RED 5921-218-135

## (undated) DEVICE — PITCHER STERILE 1000ML  SSK9004A

## (undated) DEVICE — DEFIB PRO-PADZ LVP LQD GEL ADULT 8900-2105-01

## (undated) DEVICE — ENDO TRAP POLYP E-TRAP 00711099

## (undated) DEVICE — SU PROLENE 5-0 RB-2DA 30" 8710H

## (undated) DEVICE — TUBING SUCTION 12"X1/4" N612

## (undated) DEVICE — SUCTION CANISTER MEDIVAC LINER 3000ML W/LID 65651-530

## (undated) DEVICE — PUNCH AORTIC 3.5MM AP-435

## (undated) DEVICE — ESU GROUND PAD ADULT REM W/15' CORD E7507DB

## (undated) DEVICE — PUNCH AORTIC 4.0MMX8" RCB40

## (undated) DEVICE — KIT RIGHT HEART CATH 60130719

## (undated) DEVICE — SU VICRYL+ 3-0 FS1 27IN UND VCP442H

## (undated) DEVICE — GOWN IMPERVIOUS 2XL BLUE

## (undated) DEVICE — SU VICRYL+ 3-0 27IN SH UND VCP416H

## (undated) DEVICE — DRAIN CHEST TUBE RIGHT ANGLED 28FR 8128

## (undated) DEVICE — ESU ELEC BLADE E-SEP INSULATED NEPTUNE 165MM 0703-165-002

## (undated) DEVICE — ESU PENCIL SMOKE EVAC W/ROCKER SWITCH 0703-047-000

## (undated) DEVICE — CLIP HORIZON LG ORANGE 004200

## (undated) DEVICE — SU PROLENE 7-0 BV-1DA 30" 8703H

## (undated) DEVICE — SU PROLENE 7-0 BV-1DA 4X30" M8703

## (undated) DEVICE — SU MONOCRYL+ 4-0 18IN PS2 UND MCP496G

## (undated) DEVICE — SU PROLENE 4-0 SHDA 36" 8521H

## (undated) DEVICE — CELL SAVER

## (undated) DEVICE — PACK MAJOR BASIN 673

## (undated) DEVICE — DRSG TELFA 3X8" 1238

## (undated) DEVICE — CONNECTOR BLAKE DRAIN SGL BCC1

## (undated) DEVICE — SPONGE RAY-TEC 4X8" 7318

## (undated) DEVICE — SU PLEDGET SOFT TFE 13MMX7MMX1.5MM D7044

## (undated) DEVICE — BLADE KNIFE SURG 10 371110

## (undated) DEVICE — SU ETHIBOND 3-0 BBDA 36" X588H

## (undated) DEVICE — SU PROLENE 5-0 C-1 36" 2ARM MONOFILAMENT M8720

## (undated) DEVICE — PACK ADULT HEART UMMC PV15CG92D

## (undated) DEVICE — TUBING SUCTION DRAINAGE PLEURAL DUAL 8884714200

## (undated) DEVICE — SU VICRYL 3-0 CT-1 36" J344H

## (undated) DEVICE — CLIP HORIZON MED BLUE 002200

## (undated) DEVICE — WAND SUCTION LP SOFT 15.2CM SU-22702

## (undated) DEVICE — BLADE CLIPPER DISP 4406

## (undated) DEVICE — LINEN TOWEL PACK X30 5481

## (undated) DEVICE — LINEN TOWEL PACK X6 WHITE 5487

## (undated) DEVICE — SU STRATAFIX MONOCRYL 4-0 SPIRAL PS-2 SXMP1B118

## (undated) DEVICE — NDL COUNTER 20CT 31142493

## (undated) DEVICE — DRSG ABDOMINAL 07 1/2X8" 7197D

## (undated) DEVICE — SUTURE STRATAFIX PDS 3-0 SH

## (undated) DEVICE — SU STERNAL WIRE SINGLE #6 046-032

## (undated) DEVICE — SPECIMEN CONTAINER 5OZ STERILE 2600SA

## (undated) DEVICE — BLADE KNIFE BEAVER MINI STR BEAVER6900

## (undated) DEVICE — SU VICRYL 0 CTX 36" J370H

## (undated) DEVICE — SU STRATAFIX PDS PLUS 2-0 SPIRAL CT-1 30CM SXPP1B410

## (undated) DEVICE — SYR 30ML LL W/O NDL 302832

## (undated) DEVICE — SOL WATER IRRIG 1000ML BOTTLE 2F7114

## (undated) DEVICE — CUFF TOURN 18IN STRL DISP

## (undated) DEVICE — SU PROLENE 3-0 SHDA 36" 8522H

## (undated) DEVICE — RX VISTASEAL FIBRIN SEALANT W/THROMBIN 4ML VST04

## (undated) DEVICE — SU STRATAFIX PDS PLUS 0 CT 45CM SXPP1A406

## (undated) DEVICE — DRAPE STOCKINETTE 4" 8544

## (undated) RX ORDER — ETOMIDATE 2 MG/ML
INJECTION INTRAVENOUS
Status: DISPENSED
Start: 2024-11-18

## (undated) RX ORDER — HEPARIN SODIUM 1000 [USP'U]/ML
INJECTION, SOLUTION INTRAVENOUS; SUBCUTANEOUS
Status: DISPENSED
Start: 2024-11-14

## (undated) RX ORDER — FENTANYL CITRATE 50 UG/ML
INJECTION, SOLUTION INTRAMUSCULAR; INTRAVENOUS
Status: DISPENSED
Start: 2024-11-18

## (undated) RX ORDER — CEFAZOLIN SODIUM 1 G/3ML
INJECTION, POWDER, FOR SOLUTION INTRAMUSCULAR; INTRAVENOUS
Status: DISPENSED
Start: 2024-11-14

## (undated) RX ORDER — PAPAVERINE HYDROCHLORIDE 30 MG/ML
INJECTION INTRAMUSCULAR; INTRAVENOUS
Status: DISPENSED
Start: 2024-11-14

## (undated) RX ORDER — HEPARIN SODIUM 1000 [USP'U]/ML
INJECTION, SOLUTION INTRAVENOUS; SUBCUTANEOUS
Status: DISPENSED
Start: 2024-11-08

## (undated) RX ORDER — FENTANYL CITRATE 50 UG/ML
INJECTION, SOLUTION INTRAMUSCULAR; INTRAVENOUS
Status: DISPENSED
Start: 2024-11-14

## (undated) RX ORDER — HEPARIN SODIUM 200 [USP'U]/100ML
INJECTION, SOLUTION INTRAVENOUS
Status: DISPENSED
Start: 2024-11-08

## (undated) RX ORDER — PROPOFOL 10 MG/ML
INJECTION, EMULSION INTRAVENOUS
Status: DISPENSED
Start: 2024-11-14

## (undated) RX ORDER — GABAPENTIN 100 MG/1
CAPSULE ORAL
Status: DISPENSED
Start: 2024-11-14

## (undated) RX ORDER — FAMOTIDINE 20 MG/1
TABLET, FILM COATED ORAL
Status: DISPENSED
Start: 2024-11-14

## (undated) RX ORDER — FENTANYL CITRATE 50 UG/ML
INJECTION, SOLUTION INTRAMUSCULAR; INTRAVENOUS
Status: DISPENSED
Start: 2024-11-08

## (undated) RX ORDER — METOPROLOL TARTRATE 25 MG/1
TABLET, FILM COATED ORAL
Status: DISPENSED
Start: 2024-11-14

## (undated) RX ORDER — CHLORHEXIDINE GLUCONATE ORAL RINSE 1.2 MG/ML
SOLUTION DENTAL
Status: DISPENSED
Start: 2024-11-14

## (undated) RX ORDER — ACETAMINOPHEN 325 MG/1
TABLET ORAL
Status: DISPENSED
Start: 2024-11-14

## (undated) RX ORDER — EPHEDRINE SULFATE 50 MG/ML
INJECTION, SOLUTION INTRAMUSCULAR; INTRAVENOUS; SUBCUTANEOUS
Status: DISPENSED
Start: 2024-11-18